# Patient Record
Sex: MALE | Race: WHITE | NOT HISPANIC OR LATINO | URBAN - METROPOLITAN AREA
[De-identification: names, ages, dates, MRNs, and addresses within clinical notes are randomized per-mention and may not be internally consistent; named-entity substitution may affect disease eponyms.]

---

## 2017-02-17 ENCOUNTER — INPATIENT (INPATIENT)
Facility: HOSPITAL | Age: 67
LOS: 4 days | Discharge: HOME | DRG: 446 | End: 2017-02-22
Attending: HOSPITALIST | Admitting: INTERNAL MEDICINE
Payer: MEDICARE

## 2017-02-17 VITALS
HEART RATE: 100 BPM | SYSTOLIC BLOOD PRESSURE: 178 MMHG | WEIGHT: 173.06 LBS | HEIGHT: 70 IN | RESPIRATION RATE: 16 BRPM | TEMPERATURE: 98 F | OXYGEN SATURATION: 99 % | DIASTOLIC BLOOD PRESSURE: 95 MMHG

## 2017-02-17 DIAGNOSIS — Z98.1 ARTHRODESIS STATUS: Chronic | ICD-10-CM

## 2017-02-17 DIAGNOSIS — R17 UNSPECIFIED JAUNDICE: ICD-10-CM

## 2017-02-17 LAB
ALBUMIN SERPL ELPH-MCNC: 4.9 G/DL — SIGNIFICANT CHANGE UP (ref 3.3–5)
ALP SERPL-CCNC: 450 U/L — HIGH (ref 40–120)
ALT FLD-CCNC: 189 U/L RC — HIGH (ref 10–45)
ANION GAP SERPL CALC-SCNC: 19 MMOL/L — HIGH (ref 5–17)
AST SERPL-CCNC: 116 U/L — HIGH (ref 10–40)
BASOPHILS # BLD AUTO: 0.1 K/UL — SIGNIFICANT CHANGE UP (ref 0–0.2)
BASOPHILS NFR BLD AUTO: 1.8 % — SIGNIFICANT CHANGE UP (ref 0–2)
BILIRUB DIRECT SERPL-MCNC: 9.1 MG/DL — HIGH (ref 0–0.2)
BILIRUB INDIRECT FLD-MCNC: 3.4 MG/DL — HIGH (ref 0.2–1)
BILIRUB SERPL-MCNC: 12.5 MG/DL — HIGH (ref 0.2–1.2)
BILIRUB SERPL-MCNC: 12.5 MG/DL — HIGH (ref 0.2–1.2)
BUN SERPL-MCNC: 20 MG/DL — SIGNIFICANT CHANGE UP (ref 7–23)
CALCIUM SERPL-MCNC: 10.1 MG/DL — SIGNIFICANT CHANGE UP (ref 8.4–10.5)
CHLORIDE SERPL-SCNC: 104 MMOL/L — SIGNIFICANT CHANGE UP (ref 96–108)
CO2 SERPL-SCNC: 17 MMOL/L — LOW (ref 22–31)
CREAT SERPL-MCNC: 1.13 MG/DL — SIGNIFICANT CHANGE UP (ref 0.5–1.3)
EOSINOPHIL # BLD AUTO: 0.1 K/UL — SIGNIFICANT CHANGE UP (ref 0–0.5)
EOSINOPHIL NFR BLD AUTO: 1.3 % — SIGNIFICANT CHANGE UP (ref 0–6)
GAS PNL BLDV: SIGNIFICANT CHANGE UP
GLUCOSE SERPL-MCNC: 108 MG/DL — HIGH (ref 70–99)
HCT VFR BLD CALC: 46.3 % — SIGNIFICANT CHANGE UP (ref 39–50)
HGB BLD-MCNC: 15.4 G/DL — SIGNIFICANT CHANGE UP (ref 13–17)
LDH SERPL L TO P-CCNC: 208 U/L — SIGNIFICANT CHANGE UP (ref 50–242)
LYMPHOCYTES # BLD AUTO: 2 K/UL — SIGNIFICANT CHANGE UP (ref 1–3.3)
LYMPHOCYTES # BLD AUTO: 30.7 % — SIGNIFICANT CHANGE UP (ref 13–44)
MCHC RBC-ENTMCNC: 29.8 PG — SIGNIFICANT CHANGE UP (ref 27–34)
MCHC RBC-ENTMCNC: 33.3 GM/DL — SIGNIFICANT CHANGE UP (ref 32–36)
MCV RBC AUTO: 89.6 FL — SIGNIFICANT CHANGE UP (ref 80–100)
MONOCYTES # BLD AUTO: 0.8 K/UL — SIGNIFICANT CHANGE UP (ref 0–0.9)
MONOCYTES NFR BLD AUTO: 12.4 % — SIGNIFICANT CHANGE UP (ref 2–14)
NEUTROPHILS # BLD AUTO: 3.6 K/UL — SIGNIFICANT CHANGE UP (ref 1.8–7.4)
NEUTROPHILS NFR BLD AUTO: 53.8 % — SIGNIFICANT CHANGE UP (ref 43–77)
PLATELET # BLD AUTO: 251 K/UL — SIGNIFICANT CHANGE UP (ref 150–400)
POTASSIUM SERPL-MCNC: 3.9 MMOL/L — SIGNIFICANT CHANGE UP (ref 3.5–5.3)
POTASSIUM SERPL-SCNC: 3.9 MMOL/L — SIGNIFICANT CHANGE UP (ref 3.5–5.3)
PROT SERPL-MCNC: 8.8 G/DL — HIGH (ref 6–8.3)
RBC # BLD: 5.17 M/UL — SIGNIFICANT CHANGE UP (ref 4.2–5.8)
RBC # FLD: 14.7 % — HIGH (ref 10.3–14.5)
SODIUM SERPL-SCNC: 140 MMOL/L — SIGNIFICANT CHANGE UP (ref 135–145)
WBC # BLD: 6.6 K/UL — SIGNIFICANT CHANGE UP (ref 3.8–10.5)
WBC # FLD AUTO: 6.6 K/UL — SIGNIFICANT CHANGE UP (ref 3.8–10.5)

## 2017-02-17 PROCEDURE — 74177 CT ABD & PELVIS W/CONTRAST: CPT | Mod: 26

## 2017-02-17 PROCEDURE — 99284 EMERGENCY DEPT VISIT MOD MDM: CPT | Mod: GC

## 2017-02-17 RX ORDER — SODIUM CHLORIDE 9 MG/ML
1000 INJECTION INTRAMUSCULAR; INTRAVENOUS; SUBCUTANEOUS
Qty: 0 | Refills: 0 | Status: DISCONTINUED | OUTPATIENT
Start: 2017-02-17 | End: 2017-02-22

## 2017-02-17 RX ORDER — SODIUM CHLORIDE 9 MG/ML
2000 INJECTION INTRAMUSCULAR; INTRAVENOUS; SUBCUTANEOUS ONCE
Qty: 0 | Refills: 0 | Status: COMPLETED | OUTPATIENT
Start: 2017-02-17 | End: 2017-02-17

## 2017-02-17 RX ADMIN — SODIUM CHLORIDE 75 MILLILITER(S): 9 INJECTION INTRAMUSCULAR; INTRAVENOUS; SUBCUTANEOUS at 23:20

## 2017-02-17 RX ADMIN — SODIUM CHLORIDE 4000 MILLILITER(S): 9 INJECTION INTRAMUSCULAR; INTRAVENOUS; SUBCUTANEOUS at 20:38

## 2017-02-17 NOTE — ED PROVIDER NOTE - OBJECTIVE STATEMENT
57 year old male with history of hepatic steatosis, gerd, ibs-c, htn and hld presents with one week of painless jaundice and pruritis.   Patient was recently evaluated by his GI (NANCY Zhao) for transaminitis, serial blood work demonstrated improvement. Patient was referred for outpatient ultrasound (results not available at this time). During this time, patient reports dark colored urine and pale stool. He denies diarrhea or abdominal pain. Patient also denies nausea and vomiting.   Patient denies fever, chills, weight loss, black stool, bright red blood per rectum

## 2017-02-17 NOTE — ED ADULT NURSE NOTE - CHPI ED SYMPTOMS NEG
no dysuria/no abdominal distension/no vomiting/no diarrhea/no fever/no burning urination/no chills/no nausea/no hematuria

## 2017-02-17 NOTE — ED PROVIDER NOTE - CHIEF COMPLAINT
The patient is a 66y Male complaining of see chief jaundice The patient is a 66y Male complaining of jaundice

## 2017-02-17 NOTE — ED PROVIDER NOTE - ATTENDING CONTRIBUTION TO CARE
****ATTENDING**** 65yo male hx listed pw transaminitis x 1 mo and symptoms of jaundice and itching. Patient saw GI and was told that he had a CBD curvature. No abd pain, fever, chills, vomiting. No recent travel. On exam, Patient is awake,alert,oriented x 3. Patient is well appearing and in no acute distress. +scelra icteric, + jaundice. Patient's chest is clear to ausculation, +s1s2. Abdomen is soft nd/nt +BS. Extremity with no swelling or calf tenderness. Check Labs, CT to eval for malignancy and IVF.

## 2017-02-17 NOTE — ED ADULT NURSE NOTE - OBJECTIVE STATEMENT
pt is as 66 yr M presents with worsening jaundice and urticaria. denies abd pain/n/v/fever/chills. +dark yellow urine. abd soft. denies medical hx.

## 2017-02-17 NOTE — ED PROVIDER NOTE - MEDICAL DECISION MAKING DETAILS
66M presents with painless jaundice  Plan cbc cmp indirect roni vbg ct a/p r/o obstructive mass vs stone  ivf

## 2017-02-18 DIAGNOSIS — R17 UNSPECIFIED JAUNDICE: ICD-10-CM

## 2017-02-18 DIAGNOSIS — I10 ESSENTIAL (PRIMARY) HYPERTENSION: ICD-10-CM

## 2017-02-18 DIAGNOSIS — I82.90 ACUTE EMBOLISM AND THROMBOSIS OF UNSPECIFIED VEIN: ICD-10-CM

## 2017-02-18 DIAGNOSIS — K58.1 IRRITABLE BOWEL SYNDROME WITH CONSTIPATION: ICD-10-CM

## 2017-02-18 LAB
ALBUMIN SERPL ELPH-MCNC: 3.5 G/DL — SIGNIFICANT CHANGE UP (ref 3.3–5)
ALP SERPL-CCNC: 312 U/L — HIGH (ref 40–120)
ALT FLD-CCNC: 126 U/L — HIGH (ref 10–45)
ANION GAP SERPL CALC-SCNC: 15 MMOL/L — SIGNIFICANT CHANGE UP (ref 5–17)
AST SERPL-CCNC: 83 U/L — HIGH (ref 10–40)
BASOPHILS # BLD AUTO: 0.03 K/UL — SIGNIFICANT CHANGE UP (ref 0–0.2)
BASOPHILS NFR BLD AUTO: 0.6 % — SIGNIFICANT CHANGE UP (ref 0–2)
BILIRUB DIRECT SERPL-MCNC: 7.2 MG/DL — HIGH (ref 0–0.2)
BILIRUB INDIRECT FLD-MCNC: 2.8 MG/DL — HIGH (ref 0.2–1)
BILIRUB SERPL-MCNC: 10 MG/DL — HIGH (ref 0.2–1.2)
BUN SERPL-MCNC: 16 MG/DL — SIGNIFICANT CHANGE UP (ref 7–23)
CALCIUM SERPL-MCNC: 9.2 MG/DL — SIGNIFICANT CHANGE UP (ref 8.4–10.5)
CHLORIDE SERPL-SCNC: 109 MMOL/L — HIGH (ref 96–108)
CO2 SERPL-SCNC: 17 MMOL/L — LOW (ref 22–31)
CREAT SERPL-MCNC: 0.91 MG/DL — SIGNIFICANT CHANGE UP (ref 0.5–1.3)
EOSINOPHIL # BLD AUTO: 0.19 K/UL — SIGNIFICANT CHANGE UP (ref 0–0.5)
EOSINOPHIL NFR BLD AUTO: 3.9 % — SIGNIFICANT CHANGE UP (ref 0–6)
GLUCOSE SERPL-MCNC: 92 MG/DL — SIGNIFICANT CHANGE UP (ref 70–99)
HAPTOGLOB SERPL-MCNC: 122 MG/DL — SIGNIFICANT CHANGE UP (ref 34–200)
HCT VFR BLD CALC: 36.1 % — LOW (ref 39–50)
HGB BLD-MCNC: 12 G/DL — LOW (ref 13–17)
IMM GRANULOCYTES NFR BLD AUTO: 0.2 % — SIGNIFICANT CHANGE UP (ref 0–1.5)
LYMPHOCYTES # BLD AUTO: 1.38 K/UL — SIGNIFICANT CHANGE UP (ref 1–3.3)
LYMPHOCYTES # BLD AUTO: 28.6 % — SIGNIFICANT CHANGE UP (ref 13–44)
MCHC RBC-ENTMCNC: 29.1 PG — SIGNIFICANT CHANGE UP (ref 27–34)
MCHC RBC-ENTMCNC: 33.2 GM/DL — SIGNIFICANT CHANGE UP (ref 32–36)
MCV RBC AUTO: 87.6 FL — SIGNIFICANT CHANGE UP (ref 80–100)
MONOCYTES # BLD AUTO: 0.78 K/UL — SIGNIFICANT CHANGE UP (ref 0–0.9)
MONOCYTES NFR BLD AUTO: 16.1 % — HIGH (ref 2–14)
NEUTROPHILS # BLD AUTO: 2.44 K/UL — SIGNIFICANT CHANGE UP (ref 1.8–7.4)
NEUTROPHILS NFR BLD AUTO: 50.6 % — SIGNIFICANT CHANGE UP (ref 43–77)
PLATELET # BLD AUTO: 235 K/UL — SIGNIFICANT CHANGE UP (ref 150–400)
POTASSIUM SERPL-MCNC: 4.1 MMOL/L — SIGNIFICANT CHANGE UP (ref 3.5–5.3)
POTASSIUM SERPL-SCNC: 4.1 MMOL/L — SIGNIFICANT CHANGE UP (ref 3.5–5.3)
PROT SERPL-MCNC: 6.2 G/DL — SIGNIFICANT CHANGE UP (ref 6–8.3)
RBC # BLD: 4.12 M/UL — LOW (ref 4.2–5.8)
RBC # FLD: 15.6 % — HIGH (ref 10.3–14.5)
SODIUM SERPL-SCNC: 141 MMOL/L — SIGNIFICANT CHANGE UP (ref 135–145)
WBC # BLD: 4.83 K/UL — SIGNIFICANT CHANGE UP (ref 3.8–10.5)
WBC # FLD AUTO: 4.83 K/UL — SIGNIFICANT CHANGE UP (ref 3.8–10.5)

## 2017-02-18 PROCEDURE — 99223 1ST HOSP IP/OBS HIGH 75: CPT | Mod: AI

## 2017-02-18 PROCEDURE — 99233 SBSQ HOSP IP/OBS HIGH 50: CPT | Mod: GC

## 2017-02-18 PROCEDURE — 93010 ELECTROCARDIOGRAM REPORT: CPT

## 2017-02-18 PROCEDURE — 74182 MRI ABDOMEN W/CONTRAST: CPT | Mod: 26

## 2017-02-18 RX ORDER — ACETAMINOPHEN 500 MG
650 TABLET ORAL EVERY 6 HOURS
Qty: 0 | Refills: 0 | Status: DISCONTINUED | OUTPATIENT
Start: 2017-02-18 | End: 2017-02-22

## 2017-02-18 RX ORDER — SODIUM CHLORIDE 0.65 %
1 AEROSOL, SPRAY (ML) NASAL EVERY 4 HOURS
Qty: 0 | Refills: 0 | Status: DISCONTINUED | OUTPATIENT
Start: 2017-02-18 | End: 2017-02-22

## 2017-02-18 RX ORDER — PANTOPRAZOLE SODIUM 20 MG/1
40 TABLET, DELAYED RELEASE ORAL
Qty: 0 | Refills: 0 | Status: DISCONTINUED | OUTPATIENT
Start: 2017-02-18 | End: 2017-02-22

## 2017-02-18 RX ORDER — ONDANSETRON 8 MG/1
4 TABLET, FILM COATED ORAL EVERY 6 HOURS
Qty: 0 | Refills: 0 | Status: DISCONTINUED | OUTPATIENT
Start: 2017-02-18 | End: 2017-02-22

## 2017-02-18 RX ADMIN — PANTOPRAZOLE SODIUM 40 MILLIGRAM(S): 20 TABLET, DELAYED RELEASE ORAL at 04:20

## 2017-02-18 RX ADMIN — Medication 20 MILLIGRAM(S): at 18:15

## 2017-02-18 RX ADMIN — Medication 20 MILLIGRAM(S): at 04:20

## 2017-02-18 RX ADMIN — SODIUM CHLORIDE 75 MILLILITER(S): 9 INJECTION INTRAMUSCULAR; INTRAVENOUS; SUBCUTANEOUS at 12:33

## 2017-02-18 NOTE — H&P ADULT. - GASTROINTESTINAL DETAILS
soft/no rebound tenderness/nontender/no masses palpable/no distention/no organomegaly/no guarding/no rigidity/bowel sounds normal

## 2017-02-18 NOTE — H&P ADULT. - PROBLEM SELECTOR PLAN 1
CT A/P w/ significant intra/extrahepatic ductal dilatation, no evidence of stone or pancreatic head mass. labs w/ tbili-12.5 (dbili-9.1), ast/alt-116/189, alp-450. c/f choledocholithiasis vs malignancy. afebrile, hemodynamically stable, unremarkable gi exam.  -check MRCP  -f/u RUQ sono  -GI consult  -trend hepatic enzymes CT A/P w/ significant intra/extrahepatic ductal dilatation, no evidence of stone or pancreatic head mass. labs w/ tbili-12.5 (dbili-9.1), ast/alt-116/189, alp-450. c/f choledocholithiasis vs malignancy (panc ca, cholangioca). afebrile, hemodynamically stable, unremarkable gi exam, diffusely jaundiced.  -check MRCP  -f/u RUQ sono  -GI consult  -trend hepatic enzymes  -npo

## 2017-02-18 NOTE — H&P ADULT. - FAMILY HISTORY
Mother  Still living? Unknown  Family history of coronary artery disease, Age at diagnosis: Age Unknown Mother  Still living? Unknown  Family history of coronary artery disease, Age at diagnosis: Age Unknown     Father  Still living? Unknown  Family history of prostate cancer, Age at diagnosis: Age Unknown

## 2017-02-18 NOTE — H&P ADULT. - PMH
Essential hypertension    GERD (gastroesophageal reflux disease)    IBS (irritable bowel syndrome)    Other hyperlipidemia

## 2017-02-18 NOTE — H&P ADULT. - RADIOLOGY RESULTS AND INTERPRETATION
CT A/P personally reviewed: intra and extrahepatic biliary duct dilatation w/o evidence retained CBD stone or definite pancreatic head mass. no pancreatic ductal dilatation. kicked appearance to mid to distal cbd of uncertain significance.  RUQ sono ordered.

## 2017-02-18 NOTE — H&P ADULT. - RS GEN PE MLT RESP DETAILS PC
airway patent/clear to auscultation bilaterally/good air movement/breath sounds equal/respirations non-labored

## 2017-02-18 NOTE — H&P ADULT. - PROBLEM SELECTOR PLAN 4
hold am dvt ppx as likely ercp/possible sphincterotomy hold am dvt ppx as likely ercp/possible sphincterotomy  -verify home medications in am

## 2017-02-18 NOTE — H&P ADULT. - HISTORY OF PRESENT ILLNESS
57M hx hepatic steatosis, gerd, ibs-c, htn p/w painless jaundice, pruritus x 1 week.    ED VS: Tmax: 98.3, BP: 169-189/, P: , R: 16-18, O2: 96-99% on RA  ED meds: NS x 2L Nighttime hospitalist, patient not previously known to me    57M hx hepatic steatosis, gerd, ibs-c, htn p/w painless jaundice, pruritus. notes he saw his pcp around a month ago for a checkup and was told he had liver abnormalities on labwork. repeat labwork was for the most part unchanged and he was sent for a RUQ sono and told he had biliary and liver abnormalities and was recommended by his pcp to have repeat labwork in a month. over the past couple of weeks he has experienced jaundice and diffuse itching. over the past few days he has had darkening of his urine and lightening of his stools. he denies new abdominal pain, fever, chills, night sweats, unexplained weight loss, fhx GI cancer, dysphagia/odynophagia, pain associated with fatty food, dysuria, hematuria.    ED VS: Tmax: 98.3, BP: 169-189/, P: , R: 16-18, O2: 96-99% on RA  ED meds: NS x 2L

## 2017-02-19 LAB
ALBUMIN SERPL ELPH-MCNC: 3.6 G/DL — SIGNIFICANT CHANGE UP (ref 3.3–5)
ALP SERPL-CCNC: 332 U/L — HIGH (ref 40–120)
ALT FLD-CCNC: 116 U/L — HIGH (ref 10–45)
ANION GAP SERPL CALC-SCNC: 15 MMOL/L — SIGNIFICANT CHANGE UP (ref 5–17)
AST SERPL-CCNC: 76 U/L — HIGH (ref 10–40)
BASOPHILS # BLD AUTO: 0.09 K/UL — SIGNIFICANT CHANGE UP (ref 0–0.2)
BASOPHILS NFR BLD AUTO: 1.7 % — SIGNIFICANT CHANGE UP (ref 0–2)
BILIRUB SERPL-MCNC: 11.9 MG/DL — HIGH (ref 0.2–1.2)
BUN SERPL-MCNC: 9 MG/DL — SIGNIFICANT CHANGE UP (ref 7–23)
CALCIUM SERPL-MCNC: 9.5 MG/DL — SIGNIFICANT CHANGE UP (ref 8.4–10.5)
CANCER AG19-9 SERPL-ACNC: 31.1 U/ML — SIGNIFICANT CHANGE UP
CHLORIDE SERPL-SCNC: 110 MMOL/L — HIGH (ref 96–108)
CO2 SERPL-SCNC: 18 MMOL/L — LOW (ref 22–31)
CREAT SERPL-MCNC: 0.96 MG/DL — SIGNIFICANT CHANGE UP (ref 0.5–1.3)
EOSINOPHIL # BLD AUTO: 0.23 K/UL — SIGNIFICANT CHANGE UP (ref 0–0.5)
EOSINOPHIL NFR BLD AUTO: 4.3 % — SIGNIFICANT CHANGE UP (ref 0–6)
GLUCOSE SERPL-MCNC: 114 MG/DL — HIGH (ref 70–99)
HCT VFR BLD CALC: 35.5 % — LOW (ref 39–50)
HGB BLD-MCNC: 11.8 G/DL — LOW (ref 13–17)
LYMPHOCYTES # BLD AUTO: 0.77 K/UL — LOW (ref 1–3.3)
LYMPHOCYTES # BLD AUTO: 14.5 % — SIGNIFICANT CHANGE UP (ref 13–44)
MCHC RBC-ENTMCNC: 29.1 PG — SIGNIFICANT CHANGE UP (ref 27–34)
MCHC RBC-ENTMCNC: 33.2 GM/DL — SIGNIFICANT CHANGE UP (ref 32–36)
MCV RBC AUTO: 87.4 FL — SIGNIFICANT CHANGE UP (ref 80–100)
MONOCYTES # BLD AUTO: 0.5 K/UL — SIGNIFICANT CHANGE UP (ref 0–0.9)
MONOCYTES NFR BLD AUTO: 9.4 % — SIGNIFICANT CHANGE UP (ref 2–14)
NEUTROPHILS # BLD AUTO: 3.7 K/UL — SIGNIFICANT CHANGE UP (ref 1.8–7.4)
NEUTROPHILS NFR BLD AUTO: 69.2 % — SIGNIFICANT CHANGE UP (ref 43–77)
PLATELET # BLD AUTO: 270 K/UL — SIGNIFICANT CHANGE UP (ref 150–400)
POTASSIUM SERPL-MCNC: 4.1 MMOL/L — SIGNIFICANT CHANGE UP (ref 3.5–5.3)
POTASSIUM SERPL-SCNC: 4.1 MMOL/L — SIGNIFICANT CHANGE UP (ref 3.5–5.3)
PROT SERPL-MCNC: 6.3 G/DL — SIGNIFICANT CHANGE UP (ref 6–8.3)
RBC # BLD: 4.06 M/UL — LOW (ref 4.2–5.8)
RBC # FLD: 15.8 % — HIGH (ref 10.3–14.5)
SODIUM SERPL-SCNC: 143 MMOL/L — SIGNIFICANT CHANGE UP (ref 135–145)
WBC # BLD: 5.34 K/UL — SIGNIFICANT CHANGE UP (ref 3.8–10.5)
WBC # FLD AUTO: 5.34 K/UL — SIGNIFICANT CHANGE UP (ref 3.8–10.5)

## 2017-02-19 PROCEDURE — 99233 SBSQ HOSP IP/OBS HIGH 50: CPT

## 2017-02-19 PROCEDURE — 99222 1ST HOSP IP/OBS MODERATE 55: CPT | Mod: GC

## 2017-02-19 RX ORDER — URSODIOL 250 MG/1
300 TABLET, FILM COATED ORAL
Qty: 0 | Refills: 0 | Status: DISCONTINUED | OUTPATIENT
Start: 2017-02-19 | End: 2017-02-22

## 2017-02-19 RX ORDER — POLYETHYLENE GLYCOL 3350 17 G/17G
17 POWDER, FOR SOLUTION ORAL DAILY
Qty: 0 | Refills: 0 | Status: DISCONTINUED | OUTPATIENT
Start: 2017-02-19 | End: 2017-02-22

## 2017-02-19 RX ORDER — DIPHENHYDRAMINE HCL 50 MG
25 CAPSULE ORAL EVERY 6 HOURS
Qty: 0 | Refills: 0 | Status: DISCONTINUED | OUTPATIENT
Start: 2017-02-19 | End: 2017-02-22

## 2017-02-19 RX ADMIN — PANTOPRAZOLE SODIUM 40 MILLIGRAM(S): 20 TABLET, DELAYED RELEASE ORAL at 06:24

## 2017-02-19 RX ADMIN — Medication 20 MILLIGRAM(S): at 06:24

## 2017-02-19 RX ADMIN — URSODIOL 300 MILLIGRAM(S): 250 TABLET, FILM COATED ORAL at 12:28

## 2017-02-19 RX ADMIN — Medication 25 MILLIGRAM(S): at 18:12

## 2017-02-19 RX ADMIN — Medication 20 MILLIGRAM(S): at 17:42

## 2017-02-19 RX ADMIN — URSODIOL 300 MILLIGRAM(S): 250 TABLET, FILM COATED ORAL at 17:43

## 2017-02-19 RX ADMIN — POLYETHYLENE GLYCOL 3350 17 GRAM(S): 17 POWDER, FOR SOLUTION ORAL at 12:28

## 2017-02-20 LAB
ALBUMIN SERPL ELPH-MCNC: 4.1 G/DL — SIGNIFICANT CHANGE UP (ref 3.3–5)
ALP SERPL-CCNC: 368 U/L — HIGH (ref 40–120)
ALT FLD-CCNC: 103 U/L — HIGH (ref 10–45)
ANION GAP SERPL CALC-SCNC: 15 MMOL/L — SIGNIFICANT CHANGE UP (ref 5–17)
AST SERPL-CCNC: 60 U/L — HIGH (ref 10–40)
BILIRUB SERPL-MCNC: 12.4 MG/DL — HIGH (ref 0.2–1.2)
BUN SERPL-MCNC: 6 MG/DL — LOW (ref 7–23)
CALCIUM SERPL-MCNC: 9.5 MG/DL — SIGNIFICANT CHANGE UP (ref 8.4–10.5)
CHLORIDE SERPL-SCNC: 106 MMOL/L — SIGNIFICANT CHANGE UP (ref 96–108)
CO2 SERPL-SCNC: 21 MMOL/L — LOW (ref 22–31)
CREAT SERPL-MCNC: 0.86 MG/DL — SIGNIFICANT CHANGE UP (ref 0.5–1.3)
GLUCOSE SERPL-MCNC: 91 MG/DL — SIGNIFICANT CHANGE UP (ref 70–99)
HCT VFR BLD CALC: 37.6 % — LOW (ref 39–50)
HGB BLD-MCNC: 12.7 G/DL — LOW (ref 13–17)
IGG SERPL-MCNC: 991 MG/DL — SIGNIFICANT CHANGE UP (ref 767–1590)
IGG1 SER-MCNC: 526 MG/DL — SIGNIFICANT CHANGE UP (ref 341–894)
IGG2 SER-MCNC: 330 MG/DL — SIGNIFICANT CHANGE UP (ref 171–632)
IGG3 SER-MCNC: 28.8 MG/DL — SIGNIFICANT CHANGE UP (ref 18.4–106)
IGG4 SER-MCNC: 25.6 MG/DL — SIGNIFICANT CHANGE UP (ref 2.4–121)
MCHC RBC-ENTMCNC: 29.1 PG — SIGNIFICANT CHANGE UP (ref 27–34)
MCHC RBC-ENTMCNC: 33.8 GM/DL — SIGNIFICANT CHANGE UP (ref 32–36)
MCV RBC AUTO: 86 FL — SIGNIFICANT CHANGE UP (ref 80–100)
PLATELET # BLD AUTO: 281 K/UL — SIGNIFICANT CHANGE UP (ref 150–400)
POTASSIUM SERPL-MCNC: 3.9 MMOL/L — SIGNIFICANT CHANGE UP (ref 3.5–5.3)
POTASSIUM SERPL-SCNC: 3.9 MMOL/L — SIGNIFICANT CHANGE UP (ref 3.5–5.3)
PROT SERPL-MCNC: 7 G/DL — SIGNIFICANT CHANGE UP (ref 6–8.3)
RBC # BLD: 4.37 M/UL — SIGNIFICANT CHANGE UP (ref 4.2–5.8)
RBC # FLD: 16.4 % — HIGH (ref 10.3–14.5)
SODIUM SERPL-SCNC: 142 MMOL/L — SIGNIFICANT CHANGE UP (ref 135–145)
WBC # BLD: 4.17 K/UL — SIGNIFICANT CHANGE UP (ref 3.8–10.5)
WBC # FLD AUTO: 4.17 K/UL — SIGNIFICANT CHANGE UP (ref 3.8–10.5)

## 2017-02-20 PROCEDURE — 99232 SBSQ HOSP IP/OBS MODERATE 35: CPT

## 2017-02-20 RX ORDER — SIMETHICONE 80 MG/1
80 TABLET, CHEWABLE ORAL ONCE
Qty: 0 | Refills: 0 | Status: COMPLETED | OUTPATIENT
Start: 2017-02-20 | End: 2017-02-20

## 2017-02-20 RX ADMIN — SIMETHICONE 80 MILLIGRAM(S): 80 TABLET, CHEWABLE ORAL at 22:26

## 2017-02-20 RX ADMIN — SODIUM CHLORIDE 75 MILLILITER(S): 9 INJECTION INTRAMUSCULAR; INTRAVENOUS; SUBCUTANEOUS at 18:35

## 2017-02-20 RX ADMIN — SODIUM CHLORIDE 75 MILLILITER(S): 9 INJECTION INTRAMUSCULAR; INTRAVENOUS; SUBCUTANEOUS at 05:27

## 2017-02-20 RX ADMIN — POLYETHYLENE GLYCOL 3350 17 GRAM(S): 17 POWDER, FOR SOLUTION ORAL at 11:43

## 2017-02-20 RX ADMIN — Medication 25 MILLIGRAM(S): at 22:13

## 2017-02-20 RX ADMIN — Medication 20 MILLIGRAM(S): at 05:27

## 2017-02-20 RX ADMIN — PANTOPRAZOLE SODIUM 40 MILLIGRAM(S): 20 TABLET, DELAYED RELEASE ORAL at 05:27

## 2017-02-20 RX ADMIN — Medication 25 MILLIGRAM(S): at 00:26

## 2017-02-20 RX ADMIN — Medication 25 MILLIGRAM(S): at 16:06

## 2017-02-20 RX ADMIN — Medication 20 MILLIGRAM(S): at 18:35

## 2017-02-20 RX ADMIN — URSODIOL 300 MILLIGRAM(S): 250 TABLET, FILM COATED ORAL at 17:47

## 2017-02-20 RX ADMIN — URSODIOL 300 MILLIGRAM(S): 250 TABLET, FILM COATED ORAL at 09:54

## 2017-02-20 RX ADMIN — URSODIOL 300 MILLIGRAM(S): 250 TABLET, FILM COATED ORAL at 13:34

## 2017-02-21 LAB
ANION GAP SERPL CALC-SCNC: 13 MMOL/L — SIGNIFICANT CHANGE UP (ref 5–17)
BUN SERPL-MCNC: 5 MG/DL — LOW (ref 7–23)
CALCIUM SERPL-MCNC: 9.1 MG/DL — SIGNIFICANT CHANGE UP (ref 8.4–10.5)
CHLORIDE SERPL-SCNC: 107 MMOL/L — SIGNIFICANT CHANGE UP (ref 96–108)
CO2 SERPL-SCNC: 23 MMOL/L — SIGNIFICANT CHANGE UP (ref 22–31)
CREAT SERPL-MCNC: 0.83 MG/DL — SIGNIFICANT CHANGE UP (ref 0.5–1.3)
GLUCOSE SERPL-MCNC: 117 MG/DL — HIGH (ref 70–99)
HCT VFR BLD CALC: 34.5 % — LOW (ref 39–50)
HGB BLD-MCNC: 11.5 G/DL — LOW (ref 13–17)
MCHC RBC-ENTMCNC: 28.5 PG — SIGNIFICANT CHANGE UP (ref 27–34)
MCHC RBC-ENTMCNC: 33.3 GM/DL — SIGNIFICANT CHANGE UP (ref 32–36)
MCV RBC AUTO: 85.4 FL — SIGNIFICANT CHANGE UP (ref 80–100)
PLATELET # BLD AUTO: 280 K/UL — SIGNIFICANT CHANGE UP (ref 150–400)
POTASSIUM SERPL-MCNC: 4.3 MMOL/L — SIGNIFICANT CHANGE UP (ref 3.5–5.3)
POTASSIUM SERPL-SCNC: 4.3 MMOL/L — SIGNIFICANT CHANGE UP (ref 3.5–5.3)
RBC # BLD: 4.04 M/UL — LOW (ref 4.2–5.8)
RBC # FLD: 16.5 % — HIGH (ref 10.3–14.5)
SODIUM SERPL-SCNC: 143 MMOL/L — SIGNIFICANT CHANGE UP (ref 135–145)
WBC # BLD: 4.33 K/UL — SIGNIFICANT CHANGE UP (ref 3.8–10.5)
WBC # FLD AUTO: 4.33 K/UL — SIGNIFICANT CHANGE UP (ref 3.8–10.5)

## 2017-02-21 PROCEDURE — 43261 ENDO CHOLANGIOPANCREATOGRAPH: CPT | Mod: GC

## 2017-02-21 PROCEDURE — 43273 ENDOSCOPIC PANCREATOSCOPY: CPT | Mod: GC

## 2017-02-21 PROCEDURE — 43264 ERCP REMOVE DUCT CALCULI: CPT | Mod: GC

## 2017-02-21 PROCEDURE — 88305 TISSUE EXAM BY PATHOLOGIST: CPT | Mod: 26

## 2017-02-21 PROCEDURE — 43242 EGD US FINE NEEDLE BX/ASPIR: CPT | Mod: GC

## 2017-02-21 PROCEDURE — 99232 SBSQ HOSP IP/OBS MODERATE 35: CPT

## 2017-02-21 PROCEDURE — 88173 CYTOPATH EVAL FNA REPORT: CPT | Mod: 26,59

## 2017-02-21 PROCEDURE — 43254 EGD ENDO MUCOSAL RESECTION: CPT | Mod: GC

## 2017-02-21 PROCEDURE — 43274 ERCP DUCT STENT PLACEMENT: CPT | Mod: GC

## 2017-02-21 PROCEDURE — 74328 X-RAY BILE DUCT ENDOSCOPY: CPT | Mod: 26,GC

## 2017-02-21 PROCEDURE — 88104 CYTOPATH FL NONGYN SMEARS: CPT | Mod: 26

## 2017-02-21 RX ORDER — MORPHINE SULFATE 50 MG/1
2 CAPSULE, EXTENDED RELEASE ORAL EVERY 4 HOURS
Qty: 0 | Refills: 0 | Status: DISCONTINUED | OUTPATIENT
Start: 2017-02-21 | End: 2017-02-22

## 2017-02-21 RX ADMIN — Medication 25 MILLIGRAM(S): at 10:46

## 2017-02-21 RX ADMIN — SODIUM CHLORIDE 75 MILLILITER(S): 9 INJECTION INTRAMUSCULAR; INTRAVENOUS; SUBCUTANEOUS at 19:59

## 2017-02-21 RX ADMIN — MORPHINE SULFATE 2 MILLIGRAM(S): 50 CAPSULE, EXTENDED RELEASE ORAL at 21:13

## 2017-02-21 RX ADMIN — Medication 20 MILLIGRAM(S): at 05:24

## 2017-02-21 RX ADMIN — Medication 20 MILLIGRAM(S): at 18:55

## 2017-02-21 RX ADMIN — Medication 25 MILLIGRAM(S): at 19:59

## 2017-02-21 RX ADMIN — PANTOPRAZOLE SODIUM 40 MILLIGRAM(S): 20 TABLET, DELAYED RELEASE ORAL at 05:24

## 2017-02-21 RX ADMIN — MORPHINE SULFATE 2 MILLIGRAM(S): 50 CAPSULE, EXTENDED RELEASE ORAL at 20:58

## 2017-02-22 ENCOUNTER — TRANSCRIPTION ENCOUNTER (OUTPATIENT)
Age: 67
End: 2017-02-22

## 2017-02-22 VITALS
TEMPERATURE: 98 F | SYSTOLIC BLOOD PRESSURE: 129 MMHG | HEART RATE: 89 BPM | RESPIRATION RATE: 189 BRPM | DIASTOLIC BLOOD PRESSURE: 67 MMHG | OXYGEN SATURATION: 97 %

## 2017-02-22 LAB
ALBUMIN SERPL ELPH-MCNC: 2.6 G/DL — LOW (ref 3.3–5)
ALP SERPL-CCNC: 301 U/L — HIGH (ref 40–120)
ALT FLD-CCNC: 69 U/L — HIGH (ref 10–45)
ANION GAP SERPL CALC-SCNC: 12 MMOL/L — SIGNIFICANT CHANGE UP (ref 5–17)
AST SERPL-CCNC: 53 U/L — HIGH (ref 10–40)
BILIRUB SERPL-MCNC: 9.5 MG/DL — HIGH (ref 0.2–1.2)
BUN SERPL-MCNC: 8 MG/DL — SIGNIFICANT CHANGE UP (ref 7–23)
CALCIUM SERPL-MCNC: 8.5 MG/DL — SIGNIFICANT CHANGE UP (ref 8.4–10.5)
CHLORIDE SERPL-SCNC: 106 MMOL/L — SIGNIFICANT CHANGE UP (ref 96–108)
CO2 SERPL-SCNC: 22 MMOL/L — SIGNIFICANT CHANGE UP (ref 22–31)
CREAT SERPL-MCNC: 1.04 MG/DL — SIGNIFICANT CHANGE UP (ref 0.5–1.3)
GLUCOSE SERPL-MCNC: 136 MG/DL — HIGH (ref 70–99)
HCT VFR BLD CALC: 30.7 % — LOW (ref 39–50)
HGB BLD-MCNC: 10.3 G/DL — LOW (ref 13–17)
MCHC RBC-ENTMCNC: 28.9 PG — SIGNIFICANT CHANGE UP (ref 27–34)
MCHC RBC-ENTMCNC: 33.6 GM/DL — SIGNIFICANT CHANGE UP (ref 32–36)
MCV RBC AUTO: 86.2 FL — SIGNIFICANT CHANGE UP (ref 80–100)
PLATELET # BLD AUTO: 260 K/UL — SIGNIFICANT CHANGE UP (ref 150–400)
POTASSIUM SERPL-MCNC: 3.9 MMOL/L — SIGNIFICANT CHANGE UP (ref 3.5–5.3)
POTASSIUM SERPL-SCNC: 3.9 MMOL/L — SIGNIFICANT CHANGE UP (ref 3.5–5.3)
PROT SERPL-MCNC: 5.6 G/DL — LOW (ref 6–8.3)
RBC # BLD: 3.56 M/UL — LOW (ref 4.2–5.8)
RBC # FLD: 16.4 % — HIGH (ref 10.3–14.5)
SODIUM SERPL-SCNC: 140 MMOL/L — SIGNIFICANT CHANGE UP (ref 135–145)
WBC # BLD: 6.85 K/UL — SIGNIFICANT CHANGE UP (ref 3.8–10.5)
WBC # FLD AUTO: 6.85 K/UL — SIGNIFICANT CHANGE UP (ref 3.8–10.5)

## 2017-02-22 PROCEDURE — 93005 ELECTROCARDIOGRAM TRACING: CPT

## 2017-02-22 PROCEDURE — 88172 CYTP DX EVAL FNA 1ST EA SITE: CPT

## 2017-02-22 PROCEDURE — 88173 CYTOPATH EVAL FNA REPORT: CPT

## 2017-02-22 PROCEDURE — 83010 ASSAY OF HAPTOGLOBIN QUANT: CPT

## 2017-02-22 PROCEDURE — 88104 CYTOPATH FL NONGYN SMEARS: CPT

## 2017-02-22 PROCEDURE — 86301 IMMUNOASSAY TUMOR CA 19-9: CPT

## 2017-02-22 PROCEDURE — C2617: CPT

## 2017-02-22 PROCEDURE — C1726: CPT

## 2017-02-22 PROCEDURE — 74330 X-RAY BILE/PANC ENDOSCOPY: CPT

## 2017-02-22 PROCEDURE — 82248 BILIRUBIN DIRECT: CPT

## 2017-02-22 PROCEDURE — C1769: CPT

## 2017-02-22 PROCEDURE — 83615 LACTATE (LD) (LDH) ENZYME: CPT

## 2017-02-22 PROCEDURE — 99285 EMERGENCY DEPT VISIT HI MDM: CPT | Mod: 25

## 2017-02-22 PROCEDURE — 74182 MRI ABDOMEN W/CONTRAST: CPT

## 2017-02-22 PROCEDURE — 82435 ASSAY OF BLOOD CHLORIDE: CPT

## 2017-02-22 PROCEDURE — A9585: CPT

## 2017-02-22 PROCEDURE — 82247 BILIRUBIN TOTAL: CPT

## 2017-02-22 PROCEDURE — 82947 ASSAY GLUCOSE BLOOD QUANT: CPT

## 2017-02-22 PROCEDURE — 80076 HEPATIC FUNCTION PANEL: CPT

## 2017-02-22 PROCEDURE — 80048 BASIC METABOLIC PNL TOTAL CA: CPT

## 2017-02-22 PROCEDURE — 99239 HOSP IP/OBS DSCHRG MGMT >30: CPT

## 2017-02-22 PROCEDURE — 82330 ASSAY OF CALCIUM: CPT

## 2017-02-22 PROCEDURE — 88305 TISSUE EXAM BY PATHOLOGIST: CPT

## 2017-02-22 PROCEDURE — 80053 COMPREHEN METABOLIC PANEL: CPT

## 2017-02-22 PROCEDURE — C1776: CPT

## 2017-02-22 PROCEDURE — 84132 ASSAY OF SERUM POTASSIUM: CPT

## 2017-02-22 PROCEDURE — 85027 COMPLETE CBC AUTOMATED: CPT

## 2017-02-22 PROCEDURE — 83605 ASSAY OF LACTIC ACID: CPT

## 2017-02-22 PROCEDURE — 85014 HEMATOCRIT: CPT

## 2017-02-22 PROCEDURE — 82787 IGG 1 2 3 OR 4 EACH: CPT

## 2017-02-22 PROCEDURE — 84295 ASSAY OF SERUM SODIUM: CPT

## 2017-02-22 PROCEDURE — 74177 CT ABD & PELVIS W/CONTRAST: CPT

## 2017-02-22 PROCEDURE — 82803 BLOOD GASES ANY COMBINATION: CPT

## 2017-02-22 RX ORDER — DIPHENHYDRAMINE HCL 50 MG
1 CAPSULE ORAL
Qty: 28 | Refills: 0 | OUTPATIENT
Start: 2017-02-22 | End: 2017-03-01

## 2017-02-22 RX ORDER — URSODIOL 250 MG/1
1 TABLET, FILM COATED ORAL
Qty: 0 | Refills: 0 | COMMUNITY
Start: 2017-02-22

## 2017-02-22 RX ORDER — DIPHENHYDRAMINE HCL 50 MG
1 CAPSULE ORAL
Qty: 0 | Refills: 0 | COMMUNITY
Start: 2017-02-22

## 2017-02-22 RX ORDER — HYOSCYAMINE SULFATE 0.13 MG
0 TABLET ORAL
Qty: 0 | Refills: 0 | COMMUNITY

## 2017-02-22 RX ORDER — LINACLOTIDE 145 UG/1
0 CAPSULE, GELATIN COATED ORAL
Qty: 0 | Refills: 0 | COMMUNITY

## 2017-02-22 RX ORDER — PANTOPRAZOLE SODIUM 20 MG/1
0 TABLET, DELAYED RELEASE ORAL
Qty: 0 | Refills: 0 | COMMUNITY

## 2017-02-22 RX ORDER — PANTOPRAZOLE SODIUM 20 MG/1
1 TABLET, DELAYED RELEASE ORAL
Qty: 0 | Refills: 0 | COMMUNITY
Start: 2017-02-22

## 2017-02-22 RX ORDER — HYDROXYZINE HCL 10 MG
0 TABLET ORAL
Qty: 0 | Refills: 0 | COMMUNITY

## 2017-02-22 RX ORDER — POLYETHYLENE GLYCOL 3350 17 G/17G
17 POWDER, FOR SOLUTION ORAL
Qty: 0 | Refills: 0 | COMMUNITY
Start: 2017-02-22

## 2017-02-22 RX ORDER — AMITRIPTYLINE HCL 25 MG
0 TABLET ORAL
Qty: 0 | Refills: 0 | COMMUNITY

## 2017-02-22 RX ORDER — URSODIOL 250 MG/1
1 TABLET, FILM COATED ORAL
Qty: 90 | Refills: 0 | OUTPATIENT
Start: 2017-02-22 | End: 2017-03-24

## 2017-02-22 RX ADMIN — Medication 20 MILLIGRAM(S): at 05:28

## 2017-02-22 RX ADMIN — PANTOPRAZOLE SODIUM 40 MILLIGRAM(S): 20 TABLET, DELAYED RELEASE ORAL at 05:28

## 2017-02-22 RX ADMIN — Medication 20 MILLIGRAM(S): at 16:56

## 2017-02-22 RX ADMIN — URSODIOL 300 MILLIGRAM(S): 250 TABLET, FILM COATED ORAL at 09:09

## 2017-02-22 RX ADMIN — MORPHINE SULFATE 2 MILLIGRAM(S): 50 CAPSULE, EXTENDED RELEASE ORAL at 05:51

## 2017-02-22 RX ADMIN — URSODIOL 300 MILLIGRAM(S): 250 TABLET, FILM COATED ORAL at 13:19

## 2017-02-22 RX ADMIN — SODIUM CHLORIDE 75 MILLILITER(S): 9 INJECTION INTRAMUSCULAR; INTRAVENOUS; SUBCUTANEOUS at 09:29

## 2017-02-22 RX ADMIN — MORPHINE SULFATE 2 MILLIGRAM(S): 50 CAPSULE, EXTENDED RELEASE ORAL at 05:36

## 2017-02-22 NOTE — DISCHARGE NOTE ADULT - CARE PROVIDERS DIRECT ADDRESSES
[email or web address],DirectAddress_Unknown

## 2017-02-22 NOTE — DISCHARGE NOTE ADULT - PATIENT PORTAL LINK FT
“You can access the [facility] Patient Portal, offered by [facility], by registering with the following website: [email or web address]”

## 2017-02-22 NOTE — DISCHARGE NOTE ADULT - PLAN OF CARE
you were seen by gastroenterologist        you had ERCP on 2/21/17    1. Multiple gastric polyps s/p resection of one of the larger polyps                       2. EUS finding with subtle 14mm x 15mm hypoechoic, heterogenous area in                        pancreatic head (at site of transition to biliary dilation) s/p FNA and core                        biopsies                       3. ERCP with biliary sphincterotomy, extraction of dark-thick sludge,                        cholangioscopy, biopies of exposed ampulla/distal CBD, brushings of distal                        CBD, and placement of double pigtail biliary stent. No obvious findings seen                        on cholangioscopy although visualization limited by thick, dark, sludge                        throughout biliary tree.  Recommendation:      1. Follow up FNA results, exposed ampulla/distal CBD biopsies, brushings of                        distal CBD, and resected gastric polyp results                       2. Monitor LFTS                       3. Patient will need stent revision in 4-12 weeks based on pathology results                       4. Follow up with Dr. Rashaad Zhao improve symptoms continue your medication continue your blood pressure medication  follow up with your cardiologist continue with your medication you were seen by gastroenterologist        you had ERCP on 2/21/17    1. Multiple gastric polyps s/p resection of one of the larger polyps                       2. EUS finding with subtle 14mm x 15mm hypoechoic, heterogenous area in                        pancreatic head (at site of transition to biliary dilation) s/p FNA and core                        biopsies                       3. ERCP with biliary sphincterotomy, extraction of dark-thick sludge,                        cholangioscopy, biopies of exposed ampulla/distal CBD, brushings of distal                        CBD, and placement of double pigtail biliary stent. No obvious findings seen                        on cholangioscopy although visualization limited by thick, dark, sludge                        throughout biliary tree.  Recommendation:      1. Follow up FNA results, exposed ampulla/distal CBD biopsies, brushings of                        distal CBD, and resected gastric polyp results                       2. Monitor LFTS                       3. Patient will need stent revision in 4-12 weeks based on pathology results                       4. Follow up with Dr. Rashaad Zhao  follow up with your gastroenterologist-biopsy result and follow up in 1-2 weeks improve symptomss

## 2017-02-22 NOTE — DISCHARGE NOTE ADULT - CARE PLAN
Principal Discharge DX:	Jaundice of recent onset  Instructions for follow-up, activity and diet:	you were seen by gastroenterologist        you had ERCP on 2/21/17    1. Multiple gastric polyps s/p resection of one of the larger polyps                       2. EUS finding with subtle 14mm x 15mm hypoechoic, heterogenous area in                        pancreatic head (at site of transition to biliary dilation) s/p FNA and core                        biopsies                       3. ERCP with biliary sphincterotomy, extraction of dark-thick sludge,                        cholangioscopy, biopies of exposed ampulla/distal CBD, brushings of distal                        CBD, and placement of double pigtail biliary stent. No obvious findings seen                        on cholangioscopy although visualization limited by thick, dark, sludge                        throughout biliary tree.  Recommendation:      1. Follow up FNA results, exposed ampulla/distal CBD biopsies, brushings of                        distal CBD, and resected gastric polyp results                       2. Monitor LFTS                       3. Patient will need stent revision in 4-12 weeks based on pathology results                       4. Follow up with Dr. Rashaad Zhao  Secondary Diagnosis:	Irritable bowel syndrome with constipation  Secondary Diagnosis:	Essential hypertension  Secondary Diagnosis:	GERD (gastroesophageal reflux disease) Principal Discharge DX:	Jaundice of recent onset  Goal:	improve symptoms  Instructions for follow-up, activity and diet:	you were seen by gastroenterologist        you had ERCP on 2/21/17    1. Multiple gastric polyps s/p resection of one of the larger polyps                       2. EUS finding with subtle 14mm x 15mm hypoechoic, heterogenous area in                        pancreatic head (at site of transition to biliary dilation) s/p FNA and core                        biopsies                       3. ERCP with biliary sphincterotomy, extraction of dark-thick sludge,                        cholangioscopy, biopies of exposed ampulla/distal CBD, brushings of distal                        CBD, and placement of double pigtail biliary stent. No obvious findings seen                        on cholangioscopy although visualization limited by thick, dark, sludge                        throughout biliary tree.  Recommendation:      1. Follow up FNA results, exposed ampulla/distal CBD biopsies, brushings of                        distal CBD, and resected gastric polyp results                       2. Monitor LFTS                       3. Patient will need stent revision in 4-12 weeks based on pathology results                       4. Follow up with Dr. Rashaad Zhao  follow up with your gastroenterologist-biopsy result and follow up in 1-2 weeks  Secondary Diagnosis:	Irritable bowel syndrome with constipation  Instructions for follow-up, activity and diet:	continue your medication  Secondary Diagnosis:	Essential hypertension  Instructions for follow-up, activity and diet:	continue your blood pressure medication  follow up with your cardiologist  Secondary Diagnosis:	GERD (gastroesophageal reflux disease)  Instructions for follow-up, activity and diet:	continue with your medication Principal Discharge DX:	Jaundice of recent onset  Goal:	improve symptomss  Assessment and plan of treatment:	you were seen by gastroenterologist        you had ERCP on 2/21/17    1. Multiple gastric polyps s/p resection of one of the larger polyps                       2. EUS finding with subtle 14mm x 15mm hypoechoic, heterogenous area in                        pancreatic head (at site of transition to biliary dilation) s/p FNA and core                        biopsies                       3. ERCP with biliary sphincterotomy, extraction of dark-thick sludge,                        cholangioscopy, biopies of exposed ampulla/distal CBD, brushings of distal                        CBD, and placement of double pigtail biliary stent. No obvious findings seen                        on cholangioscopy although visualization limited by thick, dark, sludge                        throughout biliary tree.  Recommendation:      1. Follow up FNA results, exposed ampulla/distal CBD biopsies, brushings of                        distal CBD, and resected gastric polyp results                       2. Monitor LFTS                       3. Patient will need stent revision in 4-12 weeks based on pathology results                       4. Follow up with Dr. Rashaad Zhao  follow up with your gastroenterologist-biopsy result and follow up in 1-2 weeks  Secondary Diagnosis:	Irritable bowel syndrome with constipation  Assessment and plan of treatment:	continue your medication  Secondary Diagnosis:	Essential hypertension  Assessment and plan of treatment:	continue your blood pressure medication  follow up with your cardiologist  Secondary Diagnosis:	GERD (gastroesophageal reflux disease)  Assessment and plan of treatment:	continue with your medication

## 2017-02-22 NOTE — DISCHARGE NOTE ADULT - MEDICATION SUMMARY - MEDICATIONS TO TAKE
I will START or STAY ON the medications listed below when I get home from the hospital:    enalapril 20 mg oral tablet  -- 1 tab(s) by mouth 2 times a day  -- Indication: For Essential hypertension    amitriptyline 25 mg oral tablet  -- 1 tab(s) by mouth once a day (at bedtime)  -- Indication: For IBS (irritable bowel syndrome)    diphenhydrAMINE 25 mg oral capsule  -- 1 cap(s) by mouth every 6 hours, As needed, Rash and/or Itching  -- Indication: For Jaundice    Linzess 145 mcg oral capsule  -- 1 cap(s) by mouth once a day  -- Indication: For Irritable bowel syndrome with constipation    hyoscyamine 0.125 mg oral tablet  -- 1 tab(s) by mouth 4 times a day, As Needed  -- for abdominal pain  -- Indication: For Irritable bowel syndrome with constipation    ursodiol 300 mg oral capsule  -- 1 cap(s) by mouth 3 times a day (with meals)  -- Indication: For Jaundice    pantoprazole 40 mg oral delayed release tablet  -- 1 tab(s) by mouth once a day (before a meal)  -- Indication: For GERD (gastroesophageal reflux disease)

## 2017-02-22 NOTE — DISCHARGE NOTE ADULT - HOSPITAL COURSE
to be completed by attending md 57M hx hepatic steatosis, gerd, ibs-c, htn p/w painless jaundice, pruritus x 1 week. On admission pt noted to have elevated bilirubin levels- GI workup initiated with concern for pancreatic vs biliary mass. Pt started on ursodiol by GI. CT A/P revealed intra and extra hepatic biliary duct dilation, with no clear evidence of pancreatic mass. Follow up MRI abdomen revealed a stricture of the CBD again with no obvious pancreatic or biliary mass seen. Pt subsequently underwent ERCP with sphincterotomy and stent placement. ERCP  which revealed significant biliary sludge and a hypoechoic pancreatic ?mass- multiple biopsies were taken of bilary and pancreatic tissue. Following ERCP patient was advanced to a regular diet, and improved symptomatically. Per GI he was medically stable for discharge with close follow up for biopsy results. Medically stable for discharge home on 2/22

## 2017-02-22 NOTE — DISCHARGE NOTE ADULT - SECONDARY DIAGNOSIS.
Irritable bowel syndrome with constipation Essential hypertension GERD (gastroesophageal reflux disease)

## 2017-02-22 NOTE — DISCHARGE NOTE ADULT - CARE PROVIDER_API CALL
Sukhi Zhao), Medicine  [address] [facility] [address] [place] [address]  Phone: [phone]  Fax: [phone] Sukhi Zhao), Medicine  [address] [facility] [address] [place] [id number]  Phone: [phone]  Fax: [phone]    Brady Roberts), Gastroenterology  [address] [place] [address]  Phone: [phone]  Fax: [phone]

## 2017-02-24 LAB — NON-GYNECOLOGICAL CYTOLOGY STUDY: SIGNIFICANT CHANGE UP

## 2017-02-28 LAB — NON-GYNECOLOGICAL CYTOLOGY STUDY: SIGNIFICANT CHANGE UP

## 2017-03-01 ENCOUNTER — INPATIENT (INPATIENT)
Facility: HOSPITAL | Age: 67
LOS: 4 days | Discharge: HOME | DRG: 441 | End: 2017-03-06
Attending: INTERNAL MEDICINE | Admitting: HOSPITALIST
Payer: MEDICARE

## 2017-03-01 VITALS
SYSTOLIC BLOOD PRESSURE: 158 MMHG | HEIGHT: 70 IN | WEIGHT: 169.98 LBS | OXYGEN SATURATION: 96 % | TEMPERATURE: 98 F | HEART RATE: 95 BPM | RESPIRATION RATE: 18 BRPM | DIASTOLIC BLOOD PRESSURE: 96 MMHG

## 2017-03-01 DIAGNOSIS — Z98.1 ARTHRODESIS STATUS: Chronic | ICD-10-CM

## 2017-03-01 DIAGNOSIS — K21.9 GASTRO-ESOPHAGEAL REFLUX DISEASE WITHOUT ESOPHAGITIS: ICD-10-CM

## 2017-03-01 DIAGNOSIS — E80.6 OTHER DISORDERS OF BILIRUBIN METABOLISM: ICD-10-CM

## 2017-03-01 DIAGNOSIS — R21 RASH AND OTHER NONSPECIFIC SKIN ERUPTION: ICD-10-CM

## 2017-03-01 DIAGNOSIS — Z29.9 ENCOUNTER FOR PROPHYLACTIC MEASURES, UNSPECIFIED: ICD-10-CM

## 2017-03-01 DIAGNOSIS — Z98.890 OTHER SPECIFIED POSTPROCEDURAL STATES: Chronic | ICD-10-CM

## 2017-03-01 DIAGNOSIS — I10 ESSENTIAL (PRIMARY) HYPERTENSION: ICD-10-CM

## 2017-03-01 PROBLEM — K58.9 IRRITABLE BOWEL SYNDROME WITHOUT DIARRHEA: Chronic | Status: ACTIVE | Noted: 2017-02-17

## 2017-03-01 LAB
ALBUMIN SERPL ELPH-MCNC: 4.1 G/DL — SIGNIFICANT CHANGE UP (ref 3.3–5)
ALP SERPL-CCNC: 502 U/L — HIGH (ref 40–120)
ALT FLD-CCNC: 30 U/L RC — SIGNIFICANT CHANGE UP (ref 10–45)
ANION GAP SERPL CALC-SCNC: 18 MMOL/L — HIGH (ref 5–17)
APPEARANCE UR: ABNORMAL
APTT BLD: 36.3 SEC — SIGNIFICANT CHANGE UP (ref 27.5–37.4)
AST SERPL-CCNC: 35 U/L — SIGNIFICANT CHANGE UP (ref 10–40)
BASOPHILS # BLD AUTO: 0.1 K/UL — SIGNIFICANT CHANGE UP (ref 0–0.2)
BASOPHILS NFR BLD AUTO: 1.4 % — SIGNIFICANT CHANGE UP (ref 0–2)
BILIRUB DIRECT SERPL-MCNC: 16.7 MG/DL — HIGH (ref 0–0.2)
BILIRUB INDIRECT FLD-MCNC: 2.2 MG/DL — HIGH (ref 0.2–1)
BILIRUB SERPL-MCNC: 18.9 MG/DL — HIGH (ref 0.2–1.2)
BILIRUB SERPL-MCNC: 18.9 MG/DL — HIGH (ref 0.2–1.2)
BILIRUB UR-MCNC: ABNORMAL
BLD GP AB SCN SERPL QL: NEGATIVE — SIGNIFICANT CHANGE UP
BUN SERPL-MCNC: 11 MG/DL — SIGNIFICANT CHANGE UP (ref 7–23)
CALCIUM SERPL-MCNC: 9.8 MG/DL — SIGNIFICANT CHANGE UP (ref 8.4–10.5)
CHLORIDE SERPL-SCNC: 100 MMOL/L — SIGNIFICANT CHANGE UP (ref 96–108)
CO2 SERPL-SCNC: 22 MMOL/L — SIGNIFICANT CHANGE UP (ref 22–31)
COLOR SPEC: ABNORMAL
CREAT SERPL-MCNC: 1.26 MG/DL — SIGNIFICANT CHANGE UP (ref 0.5–1.3)
DIFF PNL FLD: NEGATIVE — SIGNIFICANT CHANGE UP
EOSINOPHIL # BLD AUTO: 0.1 K/UL — SIGNIFICANT CHANGE UP (ref 0–0.5)
EOSINOPHIL NFR BLD AUTO: 2.3 % — SIGNIFICANT CHANGE UP (ref 0–6)
GLUCOSE SERPL-MCNC: 133 MG/DL — HIGH (ref 70–99)
GLUCOSE UR QL: NEGATIVE — SIGNIFICANT CHANGE UP
HCT VFR BLD CALC: 41.1 % — SIGNIFICANT CHANGE UP (ref 39–50)
HGB BLD-MCNC: 13.9 G/DL — SIGNIFICANT CHANGE UP (ref 13–17)
INR BLD: 1.2 RATIO — HIGH (ref 0.88–1.16)
KETONES UR-MCNC: NEGATIVE — SIGNIFICANT CHANGE UP
LEUKOCYTE ESTERASE UR-ACNC: NEGATIVE — SIGNIFICANT CHANGE UP
LIDOCAIN IGE QN: 21 U/L — SIGNIFICANT CHANGE UP (ref 7–60)
LYMPHOCYTES # BLD AUTO: 1.6 K/UL — SIGNIFICANT CHANGE UP (ref 1–3.3)
LYMPHOCYTES # BLD AUTO: 26 % — SIGNIFICANT CHANGE UP (ref 13–44)
MCHC RBC-ENTMCNC: 30.4 PG — SIGNIFICANT CHANGE UP (ref 27–34)
MCHC RBC-ENTMCNC: 33.9 GM/DL — SIGNIFICANT CHANGE UP (ref 32–36)
MCV RBC AUTO: 89.5 FL — SIGNIFICANT CHANGE UP (ref 80–100)
MONOCYTES # BLD AUTO: 1 K/UL — HIGH (ref 0–0.9)
MONOCYTES NFR BLD AUTO: 15.8 % — HIGH (ref 2–14)
NEUTROPHILS # BLD AUTO: 3.3 K/UL — SIGNIFICANT CHANGE UP (ref 1.8–7.4)
NEUTROPHILS NFR BLD AUTO: 54.4 % — SIGNIFICANT CHANGE UP (ref 43–77)
NITRITE UR-MCNC: NEGATIVE — SIGNIFICANT CHANGE UP
PH UR: 6 — SIGNIFICANT CHANGE UP (ref 4.8–8)
PLATELET # BLD AUTO: 376 K/UL — SIGNIFICANT CHANGE UP (ref 150–400)
POTASSIUM SERPL-MCNC: 3.9 MMOL/L — SIGNIFICANT CHANGE UP (ref 3.5–5.3)
POTASSIUM SERPL-SCNC: 3.9 MMOL/L — SIGNIFICANT CHANGE UP (ref 3.5–5.3)
PROT SERPL-MCNC: 7.3 G/DL — SIGNIFICANT CHANGE UP (ref 6–8.3)
PROT UR-MCNC: SIGNIFICANT CHANGE UP
PROTHROM AB SERPL-ACNC: 13 SEC — SIGNIFICANT CHANGE UP (ref 10–13.1)
RBC # BLD: 4.59 M/UL — SIGNIFICANT CHANGE UP (ref 4.2–5.8)
RBC # FLD: 16.6 % — HIGH (ref 10.3–14.5)
RH IG SCN BLD-IMP: POSITIVE — SIGNIFICANT CHANGE UP
SODIUM SERPL-SCNC: 140 MMOL/L — SIGNIFICANT CHANGE UP (ref 135–145)
SP GR SPEC: 1.01 — SIGNIFICANT CHANGE UP (ref 1.01–1.02)
UROBILINOGEN FLD QL: 1
WBC # BLD: 6.1 K/UL — SIGNIFICANT CHANGE UP (ref 3.8–10.5)
WBC # FLD AUTO: 6.1 K/UL — SIGNIFICANT CHANGE UP (ref 3.8–10.5)

## 2017-03-01 PROCEDURE — 99222 1ST HOSP IP/OBS MODERATE 55: CPT | Mod: GC

## 2017-03-01 PROCEDURE — 99285 EMERGENCY DEPT VISIT HI MDM: CPT | Mod: GC

## 2017-03-01 PROCEDURE — 99223 1ST HOSP IP/OBS HIGH 75: CPT | Mod: GC

## 2017-03-01 RX ORDER — CHOLESTYRAMINE 4 G/9G
4 POWDER, FOR SUSPENSION ORAL
Qty: 0 | Refills: 0 | Status: DISCONTINUED | OUTPATIENT
Start: 2017-03-01 | End: 2017-03-01

## 2017-03-01 RX ORDER — HYOSCYAMINE SULFATE 0.13 MG
1 TABLET ORAL
Qty: 0 | Refills: 0 | COMMUNITY

## 2017-03-01 RX ORDER — ENOXAPARIN SODIUM 100 MG/ML
40 INJECTION SUBCUTANEOUS EVERY 24 HOURS
Qty: 0 | Refills: 0 | Status: DISCONTINUED | OUTPATIENT
Start: 2017-03-01 | End: 2017-03-06

## 2017-03-01 RX ORDER — AMITRIPTYLINE HCL 25 MG
1 TABLET ORAL
Qty: 0 | Refills: 0 | COMMUNITY

## 2017-03-01 RX ORDER — CHOLESTYRAMINE 4 G/9G
4 POWDER, FOR SUSPENSION ORAL DAILY
Qty: 0 | Refills: 0 | Status: DISCONTINUED | OUTPATIENT
Start: 2017-03-01 | End: 2017-03-06

## 2017-03-01 RX ORDER — PANTOPRAZOLE SODIUM 20 MG/1
40 TABLET, DELAYED RELEASE ORAL
Qty: 0 | Refills: 0 | Status: DISCONTINUED | OUTPATIENT
Start: 2017-03-01 | End: 2017-03-06

## 2017-03-01 RX ORDER — LINACLOTIDE 145 UG/1
1 CAPSULE, GELATIN COATED ORAL
Qty: 0 | Refills: 0 | COMMUNITY

## 2017-03-01 RX ORDER — DIPHENHYDRAMINE HCL 50 MG
25 CAPSULE ORAL EVERY 6 HOURS
Qty: 0 | Refills: 0 | Status: DISCONTINUED | OUTPATIENT
Start: 2017-03-01 | End: 2017-03-06

## 2017-03-01 RX ADMIN — Medication 1 APPLICATION(S): at 23:21

## 2017-03-01 RX ADMIN — Medication 20 MILLIGRAM(S): at 22:11

## 2017-03-01 RX ADMIN — ENOXAPARIN SODIUM 40 MILLIGRAM(S): 100 INJECTION SUBCUTANEOUS at 22:11

## 2017-03-01 RX ADMIN — CHOLESTYRAMINE 4 GRAM(S): 4 POWDER, FOR SUSPENSION ORAL at 22:54

## 2017-03-01 RX ADMIN — Medication 25 MILLIGRAM(S): at 22:17

## 2017-03-01 NOTE — ED PROVIDER NOTE - PHYSICAL EXAMINATION
Vladislav: A & O x 3, NAD, HEENT with icteric sclera and no facial asymmetry; lungs CTAB, heart with reg rhythm without murmur; abdomen soft NTND; extremities with no edema; skin jaundice and abdominal blanchable telangectasia, neuro exam non focal with no motor or sensory deficits

## 2017-03-01 NOTE — H&P ADULT. - MUSCULOSKELETAL
details… detailed exam normal strength/no calf tenderness/no joint warmth/no joint erythema/no joint swelling/ROM intact

## 2017-03-01 NOTE — H&P ADULT. - PMH
Essential hypertension    GERD (gastroesophageal reflux disease)    IBS (irritable bowel syndrome)    Other hyperlipidemia Biliary stricture  s/p biliary stent  Essential hypertension    Gastric polyp    GERD (gastroesophageal reflux disease)    IBS (irritable bowel syndrome)    Obstructive jaundice    Other hyperlipidemia    Pancreatic lesion    Rash

## 2017-03-01 NOTE — ED ADULT TRIAGE NOTE - CHIEF COMPLAINT QUOTE
recurrent rash on upper body and itching all over the body started 2wks ago, had follow up with dermatology in past . recently had biliary stent on last Tuesday

## 2017-03-01 NOTE — ED ADULT NURSE NOTE - PSH
History of biliary stent insertion  2/21/2017  S/P cervical spinal fusion    S/P lumbar spinal fusion

## 2017-03-01 NOTE — ED PROVIDER NOTE - ATTENDING CONTRIBUTION TO CARE
MD Valencia:  patient seen and evaluated with the resident.  I was present for key portions of the History & Physical, and I agree with the Impression & Plan.  MD Valencia:  67 yo M, c/o 8d progressively worsening jaundice.  Recent ERCP for obstructive jaundice on 2/21/17 MD Valencia:  patient seen and evaluated with the resident.  I was present for key portions of the History & Physical, and I agree with the Impression & Plan.  MD Valencia:  67 yo M, c/o 8d progressively worsening jaundice.  Recent ERCP for obstructive jaundice on 2/21/17; obstruction 2/2 black sludge; stent placed.  Patient dc'd with down-trending bilrubin and jaundice, but symptoms have returned, and he was instructed to return to the ED.  Impression:  recurrent obstructive jaundice - cause unknown.  Plan:  re-admit for repeat ERCP, primary GI aware, awaiting call back from GI fellow.

## 2017-03-01 NOTE — H&P ADULT. - PROBLEM SELECTOR PLAN 1
-given worsening since stent placement last week, there is concern for possible occlusion of the stent  -GI consulted - plan for ERCP +/- EUS tomorrow  -clear liquid diet; NPO after midnight  -patient received one dose of rifampin in ED for pruritis; will give benadryl PRN and trial cholestyramine  -trend CMP  -follow up fine needle aspiration from pancreatic mass -given worsening since stent placement last week, there is concern for possible occlusion of the stent  -GI consulted - plan for ERCP +/- EUS tomorrow  -clear liquid diet; NPO after midnight  -patient received one dose of rifampin in ED for pruritis; will give benadryl PRN and trial cholestyramine  -trend CMP  -initial biopsy results are concerning for adenocarcinoma -given worsening since stent placement last week, there is concern for possible occlusion of the stent  -GI consulted - plan for ERCP +/- EUS tomorrow  -clear liquid diet; NPO after midnight  -patient received one dose of rifampin in ED for pruritis; will give benadryl PRN and trial cholestyramine  -trend CMP  -if develops fever, check blood culture and consider starting IV abx for possible ascending cholangitis.  -recent bile duct brush results are concerning for adenocarcinoma, FNA of pancreatic lesion showed atypical findings. Discuss with GI and consider obtaining input from oncology.

## 2017-03-01 NOTE — H&P ADULT. - NEUROLOGICAL DETAILS
sensation intact/alert and oriented x 3/cranial nerves intact/normal strength responds to pain/cranial nerves intact/sensation intact/normal strength/alert and oriented x 3/responds to verbal commands

## 2017-03-01 NOTE — ED PROVIDER NOTE - NS ED ROS FT
ROS: No fever/chills, no eye pain, no throat pain, no chest pain, no shortness of breath, no abdominal pain,  no dysuria, no muscle pain, abdominal blanchable rash , no focal neurologic complaints, no known mental health issues

## 2017-03-01 NOTE — H&P ADULT. - FAMILY HISTORY
Father  Still living? Unknown  Family history of coronary artery disease, Age at diagnosis: Age Unknown  Family history of prostate cancer, Age at diagnosis: Age Unknown

## 2017-03-01 NOTE — ED PROVIDER NOTE - MEDICAL DECISION MAKING DETAILS
MD Valencia:  67 yo M, c/o 8d progressively worsening jaundice.  Recent ERCP for obstructive jaundice on 2/21/17; obstruction 2/2 black sludge; stent placed.  Patient dc'd with down-trending bilrubin and jaundice, but symptoms have returned, and he was instructed to return to the ED.  Impression:  recurrent obstructive jaundice - cause unknown.  Plan:  re-admit for repeat ERCP, primary GI aware, awaiting call back from GI fellow.

## 2017-03-01 NOTE — H&P ADULT. - PROBLEM SELECTOR PLAN 5
-patient reports intermittent episodes of rash on scalp and chest. he has been dermatologist as outpatient and was prescribed triamcinolone which he has been using off and on the last couple of weeks but the rash is spreading to his abdomen. continue with triamcinolone and consider inpatient Derm consult.

## 2017-03-01 NOTE — ED ADULT NURSE NOTE - OBJECTIVE STATEMENT
65 yo male presents in the ed for jaundice and increased itching s/p ERCP with stent placement. pt is alert and orientedx4, speaking coherently. Pt states that he was sent by his gastro due to the sent not working- pt denies abdominal pain. lungs clear to ascultation, abdomen soft, non tender. Pt states that he has had a rash on chest and abdomen. Pt denies chest pain, fever, chill, n/v. Md at bedside, will continue to reassess

## 2017-03-01 NOTE — H&P ADULT. - HISTORY OF PRESENT ILLNESS
Mr. Medrano is a 66-year-old male with a history of HTN and biliary occlusion who presents to the ED with worsening jaundice and pruritis. The patient has had jaundice for two weeks and was admitted one week ago for hyperbilirubinemia to . He was evaluated by GI and had an ERCP that showed a pancreatic mass and thick and dark sludge throughout the biliary tree. Biopsies were done of the mass and a stent was placed. Since then, the jaundice and pruritis has worsened. He is unable to sleep because of the itchiness. It is worse in the legs than the upper body. He denies fevers, chills, nausea/vomiting/diarrhea, dysuria, confusion or any neurological deficits. He does endorse dark urine and light colored stools. He has no abdominal pain. He has been trying triamcinolone cream and ursodiol with minimal benefit.    In the ED, T 98.2 HR 85 /85 RR 19 O2 100% on room air. He received rifampin 300 mg. He is in no acute distress but is still uncomfortable because of the jaundice and pruritis. Mr. Medrano is a 66-year-old male with a history of HTN and obstructive jaundice due to biliary stricture who presents to the ED with worsening jaundice and pruritis. The patient has had jaundice for two weeks and was admitted one week ago for hyperbilirubinemia to 12. He was evaluated by GI and had an ERCP that showed 1.4 x 1.3cm hypoechoic heterogeneous area in pancreatic head and thick and dark sludge throughout the biliary tree. Biopsies were done of the pancreatic lesion, brushing of the bile duct and biliary stent was placed. Since then, the jaundice and pruritis has worsened. He is unable to sleep because of the itchiness. It is worse in the legs than the upper body. He denies fevers, chills, nausea/vomiting/diarrhea, dysuria, confusion or any neurological deficits. He does endorse dark urine and light colored stools. He has no abdominal pain. He has been trying triamcinolone cream and ursodiol with minimal benefit.    In the ED, T 98.2 HR 85 /85 RR 19 O2 100% on room air. He received rifampin 300 mg. He is in no acute distress but is still uncomfortable because of the jaundice and pruritis. Mr. Medrano is a 66-year-old male with a history of HTN and obstructive jaundice due to biliary stricture who presents to the ED with worsening jaundice and pruritis. The patient has had jaundice for two weeks and was admitted one week ago for hyperbilirubinemia to 12. He was evaluated by GI and had an ERCP that showed "Multiple gastric polyps s/p resection of one of the larger polyps, EUS finding with subtle 14mm x 15mm hypoechoic, heterogenous area in pancreatic head (at site of transition to biliary dilation) s/p FNA and core biopsies. ERCP with biliary sphincterotomy, extraction of dark-thick sludge, cholangioscopy, biopies of exposed ampulla/distal CBD, brushings of distal CBD, and placement of double pigtail biliary stent. No obvious findings seen on cholangioscopy although visualization limited by thick, dark, sludge throughout biliary tree." MRI abdomen did not reveal definitive pancreatic mass.     Since then, the jaundice and pruritis has worsened. He is unable to sleep because of the itchiness. It is worse in the legs than the upper body. He denies fevers, chills, nausea/vomiting/diarrhea, dysuria, confusion or any neurological deficits. He does endorse dark urine and light colored stools. He has no abdominal pain. He has been trying triamcinolone cream and ursodiol with minimal benefit.    In the ED, T 98.2 HR 85 /85 RR 19 O2 100% on room air. He received rifampin 300 mg. He is in no acute distress but is still uncomfortable because of the jaundice and pruritis. Mr. Medrano is a 66-year-old male with a history of HTN and obstructive jaundice due to biliary stricture who presents to the ED with worsening jaundice and pruritis. The patient has had jaundice for two weeks and was admitted one week ago for hyperbilirubinemia to 12. He was evaluated by GI and had an ERCP that showed "Multiple gastric polyps s/p resection of one of the larger polyps, EUS finding with subtle 14mm x 15mm hypoechoic, heterogenous area in pancreatic head (at site of transition to biliary dilation) s/p FNA and core biopsies. ERCP with biliary sphincterotomy, extraction of dark-thick sludge, cholangioscopy, biopies of exposed ampulla/distal CBD, brushings of distal CBD, and placement of double pigtail biliary stent. No obvious findings seen on cholangioscopy although visualization limited by thick, dark, sludge throughout biliary tree." MRI abdomen during last admission did not reveal definitive pancreatic mass, though it showed "subtle enhancement of the wall of the common bile duct which may be related to inflammation or malignant stricture of the common bile duct."    Since then, the jaundice and pruritis has worsened. He is unable to sleep because of the itchiness. It is worse in the legs than the upper body. He denies fevers, chills, nausea/vomiting/diarrhea, dysuria, confusion or any neurological deficits. He does endorse dark urine and light colored stools. He has no abdominal pain. He has been trying triamcinolone cream and ursodiol with minimal benefit.    In the ED, T 98.2 HR 85 /85 RR 19 O2 100% on room air. He received rifampin 300 mg. He is in no acute distress but is still uncomfortable because of the jaundice and pruritis.

## 2017-03-01 NOTE — H&P ADULT. - ASSESSMENT
66-year-old male with history of HTN and recent biliary stenting who presents with worsening jaundice, pruritis and hyperbilirubinemia concerning for occlusion of the biliary stent. 66-year-old male with a history of HTN, IBS and obstructive jaundice due to biliary stricture s/p ERCP with resection of gastric polyp, biliary sphincterotomy with extraction of sludge, biopsy and brushing of bile duct and placement of biliary stent, s/p FNA of pancreatic lesion, presents to the ED with worsening jaundice and pruritis. Patient found to have worsening hyperbilirubinemia concerning for biliary stent occlusion.

## 2017-03-01 NOTE — ED PROVIDER NOTE - OBJECTIVE STATEMENT
66 year old with jaundice, itching, dark urine, light stools. symptoms began 2 weeks ago, had ERCP last week with Dr. Roberts, with findings of "dark sticky sludge". patient was discharged after Bilirubin was downtrending. symptoms persisted and itching and jaundice worstened over the past few days and patient told to go to ER    GI: house

## 2017-03-01 NOTE — H&P ADULT. - LAB RESULTS AND INTERPRETATION
Labs reviewed. Notable for markedly elevated total bilirubin (18.9) with direct bilirubin 16.7. Alk phos elevated at 502. UA showing dark urine with bilirubin

## 2017-03-02 LAB
ALBUMIN SERPL ELPH-MCNC: 3.5 G/DL — SIGNIFICANT CHANGE UP (ref 3.3–5)
ALP SERPL-CCNC: 446 U/L — HIGH (ref 40–120)
ALT FLD-CCNC: 23 U/L — SIGNIFICANT CHANGE UP (ref 10–45)
ANION GAP SERPL CALC-SCNC: 15 MMOL/L — SIGNIFICANT CHANGE UP (ref 5–17)
APTT BLD: 38.7 SEC — HIGH (ref 27.5–37.4)
AST SERPL-CCNC: 36 U/L — SIGNIFICANT CHANGE UP (ref 10–40)
BASOPHILS # BLD AUTO: 0.04 K/UL — SIGNIFICANT CHANGE UP (ref 0–0.2)
BASOPHILS NFR BLD AUTO: 0.9 % — SIGNIFICANT CHANGE UP (ref 0–2)
BILIRUB DIRECT SERPL-MCNC: 15.5 MG/DL — HIGH (ref 0–0.2)
BILIRUB INDIRECT FLD-MCNC: 2.9 MG/DL — HIGH (ref 0.2–1)
BILIRUB SERPL-MCNC: 18.4 MG/DL — HIGH (ref 0.2–1.2)
BILIRUB SERPL-MCNC: 18.6 MG/DL — HIGH (ref 0.2–1.2)
BUN SERPL-MCNC: 9 MG/DL — SIGNIFICANT CHANGE UP (ref 7–23)
CALCIUM SERPL-MCNC: 9.6 MG/DL — SIGNIFICANT CHANGE UP (ref 8.4–10.5)
CHLORIDE SERPL-SCNC: 101 MMOL/L — SIGNIFICANT CHANGE UP (ref 96–108)
CO2 SERPL-SCNC: 24 MMOL/L — SIGNIFICANT CHANGE UP (ref 22–31)
CREAT SERPL-MCNC: 1.14 MG/DL — SIGNIFICANT CHANGE UP (ref 0.5–1.3)
EOSINOPHIL # BLD AUTO: 0.41 K/UL — SIGNIFICANT CHANGE UP (ref 0–0.5)
EOSINOPHIL NFR BLD AUTO: 8.6 % — HIGH (ref 0–6)
GLUCOSE SERPL-MCNC: 105 MG/DL — HIGH (ref 70–99)
HCT VFR BLD CALC: 36.2 % — LOW (ref 39–50)
HGB BLD-MCNC: 12.4 G/DL — LOW (ref 13–17)
INR BLD: 1.16 RATIO — SIGNIFICANT CHANGE UP (ref 0.88–1.16)
LYMPHOCYTES # BLD AUTO: 1.49 K/UL — SIGNIFICANT CHANGE UP (ref 1–3.3)
LYMPHOCYTES # BLD AUTO: 31 % — SIGNIFICANT CHANGE UP (ref 13–44)
MAGNESIUM SERPL-MCNC: 2 MG/DL — SIGNIFICANT CHANGE UP (ref 1.6–2.6)
MCHC RBC-ENTMCNC: 28.6 PG — SIGNIFICANT CHANGE UP (ref 27–34)
MCHC RBC-ENTMCNC: 34.3 GM/DL — SIGNIFICANT CHANGE UP (ref 32–36)
MCV RBC AUTO: 83.6 FL — SIGNIFICANT CHANGE UP (ref 80–100)
MONOCYTES # BLD AUTO: 0.58 K/UL — SIGNIFICANT CHANGE UP (ref 0–0.9)
MONOCYTES NFR BLD AUTO: 12.1 % — SIGNIFICANT CHANGE UP (ref 2–14)
NEUTROPHILS # BLD AUTO: 2.28 K/UL — SIGNIFICANT CHANGE UP (ref 1.8–7.4)
NEUTROPHILS NFR BLD AUTO: 46.5 % — SIGNIFICANT CHANGE UP (ref 43–77)
PHOSPHATE SERPL-MCNC: 3.2 MG/DL — SIGNIFICANT CHANGE UP (ref 2.5–4.5)
PLATELET # BLD AUTO: 472 K/UL — HIGH (ref 150–400)
POTASSIUM SERPL-MCNC: 3.9 MMOL/L — SIGNIFICANT CHANGE UP (ref 3.5–5.3)
POTASSIUM SERPL-SCNC: 3.9 MMOL/L — SIGNIFICANT CHANGE UP (ref 3.5–5.3)
PROT SERPL-MCNC: 6.5 G/DL — SIGNIFICANT CHANGE UP (ref 6–8.3)
PROTHROM AB SERPL-ACNC: 13.1 SEC — SIGNIFICANT CHANGE UP (ref 10–13.1)
RBC # BLD: 4.33 M/UL — SIGNIFICANT CHANGE UP (ref 4.2–5.8)
RBC # FLD: 18.5 % — HIGH (ref 10.3–14.5)
SODIUM SERPL-SCNC: 140 MMOL/L — SIGNIFICANT CHANGE UP (ref 135–145)
WBC # BLD: 4.81 K/UL — SIGNIFICANT CHANGE UP (ref 3.8–10.5)
WBC # FLD AUTO: 4.81 K/UL — SIGNIFICANT CHANGE UP (ref 3.8–10.5)

## 2017-03-02 PROCEDURE — 88112 CYTOPATH CELL ENHANCE TECH: CPT | Mod: 26

## 2017-03-02 PROCEDURE — 43259 EGD US EXAM DUODENUM/JEJUNUM: CPT | Mod: 59,GC

## 2017-03-02 PROCEDURE — 74328 X-RAY BILE DUCT ENDOSCOPY: CPT | Mod: 26,GC

## 2017-03-02 PROCEDURE — 43264 ERCP REMOVE DUCT CALCULI: CPT | Mod: 59,GC

## 2017-03-02 PROCEDURE — 88305 TISSUE EXAM BY PATHOLOGIST: CPT | Mod: 26

## 2017-03-02 PROCEDURE — 43276 ERCP STENT EXCHANGE W/DILATE: CPT | Mod: GC

## 2017-03-02 PROCEDURE — 99233 SBSQ HOSP IP/OBS HIGH 50: CPT | Mod: GC

## 2017-03-02 PROCEDURE — 43273 ENDOSCOPIC PANCREATOSCOPY: CPT | Mod: 59,GC

## 2017-03-02 PROCEDURE — 43274 ERCP DUCT STENT PLACEMENT: CPT | Mod: 59,GC

## 2017-03-02 PROCEDURE — 43261 ENDO CHOLANGIOPANCREATOGRAPH: CPT | Mod: 59,GC

## 2017-03-02 RX ORDER — SODIUM CHLORIDE 9 MG/ML
1000 INJECTION, SOLUTION INTRAVENOUS
Qty: 0 | Refills: 0 | Status: DISCONTINUED | OUTPATIENT
Start: 2017-03-02 | End: 2017-03-02

## 2017-03-02 RX ORDER — SODIUM CHLORIDE 9 MG/ML
1000 INJECTION, SOLUTION INTRAVENOUS
Qty: 0 | Refills: 0 | Status: DISCONTINUED | OUTPATIENT
Start: 2017-03-02 | End: 2017-03-06

## 2017-03-02 RX ORDER — DIPHENHYDRAMINE HCL 50 MG
25 CAPSULE ORAL EVERY 6 HOURS
Qty: 0 | Refills: 0 | Status: DISCONTINUED | OUTPATIENT
Start: 2017-03-02 | End: 2017-03-06

## 2017-03-02 RX ORDER — DIPHENHYDRAMINE HCL 50 MG
50 CAPSULE ORAL ONCE
Qty: 0 | Refills: 0 | Status: COMPLETED | OUTPATIENT
Start: 2017-03-02 | End: 2017-03-02

## 2017-03-02 RX ORDER — SODIUM CHLORIDE 9 MG/ML
1000 INJECTION INTRAMUSCULAR; INTRAVENOUS; SUBCUTANEOUS
Qty: 0 | Refills: 0 | Status: DISCONTINUED | OUTPATIENT
Start: 2017-03-02 | End: 2017-03-02

## 2017-03-02 RX ADMIN — ENOXAPARIN SODIUM 40 MILLIGRAM(S): 100 INJECTION SUBCUTANEOUS at 21:27

## 2017-03-02 RX ADMIN — SODIUM CHLORIDE 75 MILLILITER(S): 9 INJECTION, SOLUTION INTRAVENOUS at 20:21

## 2017-03-02 RX ADMIN — Medication 1 APPLICATION(S): at 18:23

## 2017-03-02 RX ADMIN — CHOLESTYRAMINE 4 GRAM(S): 4 POWDER, FOR SUSPENSION ORAL at 18:22

## 2017-03-02 RX ADMIN — Medication 1 APPLICATION(S): at 06:02

## 2017-03-02 RX ADMIN — Medication 25 MILLIGRAM(S): at 22:47

## 2017-03-02 RX ADMIN — Medication 20 MILLIGRAM(S): at 18:22

## 2017-03-02 RX ADMIN — Medication 25 MILLIGRAM(S): at 03:41

## 2017-03-02 RX ADMIN — Medication 20 MILLIGRAM(S): at 06:02

## 2017-03-02 RX ADMIN — Medication 50 MILLIGRAM(S): at 08:53

## 2017-03-03 LAB
ALBUMIN SERPL ELPH-MCNC: 2.9 G/DL — LOW (ref 3.3–5)
ALP SERPL-CCNC: 387 U/L — HIGH (ref 40–120)
ALT FLD-CCNC: 20 U/L — SIGNIFICANT CHANGE UP (ref 10–45)
ANION GAP SERPL CALC-SCNC: 14 MMOL/L — SIGNIFICANT CHANGE UP (ref 5–17)
AST SERPL-CCNC: 57 U/L — HIGH (ref 10–40)
BASOPHILS # BLD AUTO: 0.03 K/UL — SIGNIFICANT CHANGE UP (ref 0–0.2)
BASOPHILS NFR BLD AUTO: 0.5 % — SIGNIFICANT CHANGE UP (ref 0–2)
BILIRUB SERPL-MCNC: 8.4 MG/DL — HIGH (ref 0.2–1.2)
BUN SERPL-MCNC: 15 MG/DL — SIGNIFICANT CHANGE UP (ref 7–23)
CALCIUM SERPL-MCNC: 9.1 MG/DL — SIGNIFICANT CHANGE UP (ref 8.4–10.5)
CHLORIDE SERPL-SCNC: 102 MMOL/L — SIGNIFICANT CHANGE UP (ref 96–108)
CO2 SERPL-SCNC: 22 MMOL/L — SIGNIFICANT CHANGE UP (ref 22–31)
CREAT SERPL-MCNC: 1.22 MG/DL — SIGNIFICANT CHANGE UP (ref 0.5–1.3)
EOSINOPHIL # BLD AUTO: 0.09 K/UL — SIGNIFICANT CHANGE UP (ref 0–0.5)
EOSINOPHIL NFR BLD AUTO: 1.6 % — SIGNIFICANT CHANGE UP (ref 0–6)
GLUCOSE SERPL-MCNC: 101 MG/DL — HIGH (ref 70–99)
HCT VFR BLD CALC: 32.2 % — LOW (ref 39–50)
HGB BLD-MCNC: 11.2 G/DL — LOW (ref 13–17)
IMM GRANULOCYTES NFR BLD AUTO: 0.4 % — SIGNIFICANT CHANGE UP (ref 0–1.5)
LYMPHOCYTES # BLD AUTO: 1.56 K/UL — SIGNIFICANT CHANGE UP (ref 1–3.3)
LYMPHOCYTES # BLD AUTO: 27.5 % — SIGNIFICANT CHANGE UP (ref 13–44)
MCHC RBC-ENTMCNC: 29 PG — SIGNIFICANT CHANGE UP (ref 27–34)
MCHC RBC-ENTMCNC: 34.8 GM/DL — SIGNIFICANT CHANGE UP (ref 32–36)
MCV RBC AUTO: 83.4 FL — SIGNIFICANT CHANGE UP (ref 80–100)
MONOCYTES # BLD AUTO: 1.03 K/UL — HIGH (ref 0–0.9)
MONOCYTES NFR BLD AUTO: 18.2 % — HIGH (ref 2–14)
NEUTROPHILS # BLD AUTO: 2.94 K/UL — SIGNIFICANT CHANGE UP (ref 1.8–7.4)
NEUTROPHILS NFR BLD AUTO: 51.8 % — SIGNIFICANT CHANGE UP (ref 43–77)
PLATELET # BLD AUTO: 413 K/UL — HIGH (ref 150–400)
POTASSIUM SERPL-MCNC: 4.4 MMOL/L — SIGNIFICANT CHANGE UP (ref 3.5–5.3)
POTASSIUM SERPL-SCNC: 4.4 MMOL/L — SIGNIFICANT CHANGE UP (ref 3.5–5.3)
PROT SERPL-MCNC: 5.8 G/DL — LOW (ref 6–8.3)
RBC # BLD: 3.86 M/UL — LOW (ref 4.2–5.8)
RBC # FLD: 18.6 % — HIGH (ref 10.3–14.5)
SODIUM SERPL-SCNC: 138 MMOL/L — SIGNIFICANT CHANGE UP (ref 135–145)
WBC # BLD: 5.67 K/UL — SIGNIFICANT CHANGE UP (ref 3.8–10.5)
WBC # FLD AUTO: 5.67 K/UL — SIGNIFICANT CHANGE UP (ref 3.8–10.5)

## 2017-03-03 PROCEDURE — 99233 SBSQ HOSP IP/OBS HIGH 50: CPT

## 2017-03-03 RX ORDER — OXYCODONE HYDROCHLORIDE 5 MG/1
5 TABLET ORAL ONCE
Qty: 0 | Refills: 0 | Status: DISCONTINUED | OUTPATIENT
Start: 2017-03-03 | End: 2017-03-03

## 2017-03-03 RX ADMIN — OXYCODONE HYDROCHLORIDE 5 MILLIGRAM(S): 5 TABLET ORAL at 22:38

## 2017-03-03 RX ADMIN — PANTOPRAZOLE SODIUM 40 MILLIGRAM(S): 20 TABLET, DELAYED RELEASE ORAL at 05:42

## 2017-03-03 RX ADMIN — CHOLESTYRAMINE 4 GRAM(S): 4 POWDER, FOR SUSPENSION ORAL at 12:29

## 2017-03-03 RX ADMIN — Medication 1 APPLICATION(S): at 18:04

## 2017-03-03 RX ADMIN — Medication 1 APPLICATION(S): at 05:43

## 2017-03-03 RX ADMIN — Medication 20 MILLIGRAM(S): at 05:42

## 2017-03-03 RX ADMIN — SODIUM CHLORIDE 75 MILLILITER(S): 9 INJECTION, SOLUTION INTRAVENOUS at 20:55

## 2017-03-03 RX ADMIN — Medication 25 MILLIGRAM(S): at 20:55

## 2017-03-03 RX ADMIN — ENOXAPARIN SODIUM 40 MILLIGRAM(S): 100 INJECTION SUBCUTANEOUS at 22:38

## 2017-03-03 RX ADMIN — Medication 25 MILLIGRAM(S): at 12:28

## 2017-03-03 RX ADMIN — OXYCODONE HYDROCHLORIDE 5 MILLIGRAM(S): 5 TABLET ORAL at 23:30

## 2017-03-03 RX ADMIN — Medication 25 MILLIGRAM(S): at 05:42

## 2017-03-04 LAB
ALBUMIN SERPL ELPH-MCNC: 3.1 G/DL — LOW (ref 3.3–5)
ALP SERPL-CCNC: 360 U/L — HIGH (ref 40–120)
ALT FLD-CCNC: 21 U/L — SIGNIFICANT CHANGE UP (ref 10–45)
ANION GAP SERPL CALC-SCNC: 17 MMOL/L — SIGNIFICANT CHANGE UP (ref 5–17)
AST SERPL-CCNC: 40 U/L — SIGNIFICANT CHANGE UP (ref 10–40)
BILIRUB SERPL-MCNC: 16 MG/DL — HIGH (ref 0.2–1.2)
BUN SERPL-MCNC: 17 MG/DL — SIGNIFICANT CHANGE UP (ref 7–23)
CALCIUM SERPL-MCNC: 9.5 MG/DL — SIGNIFICANT CHANGE UP (ref 8.4–10.5)
CHLORIDE SERPL-SCNC: 104 MMOL/L — SIGNIFICANT CHANGE UP (ref 96–108)
CO2 SERPL-SCNC: 21 MMOL/L — LOW (ref 22–31)
CREAT SERPL-MCNC: 1.09 MG/DL — SIGNIFICANT CHANGE UP (ref 0.5–1.3)
GLUCOSE SERPL-MCNC: 105 MG/DL — HIGH (ref 70–99)
HCT VFR BLD CALC: 31 % — LOW (ref 39–50)
HGB BLD-MCNC: 11 G/DL — LOW (ref 13–17)
MCHC RBC-ENTMCNC: 29.6 PG — SIGNIFICANT CHANGE UP (ref 27–34)
MCHC RBC-ENTMCNC: 35.5 GM/DL — SIGNIFICANT CHANGE UP (ref 32–36)
MCV RBC AUTO: 83.3 FL — SIGNIFICANT CHANGE UP (ref 80–100)
PLATELET # BLD AUTO: 414 K/UL — HIGH (ref 150–400)
POTASSIUM SERPL-MCNC: 4.4 MMOL/L — SIGNIFICANT CHANGE UP (ref 3.5–5.3)
POTASSIUM SERPL-SCNC: 4.4 MMOL/L — SIGNIFICANT CHANGE UP (ref 3.5–5.3)
PROT SERPL-MCNC: 5.8 G/DL — LOW (ref 6–8.3)
RBC # BLD: 3.72 M/UL — LOW (ref 4.2–5.8)
RBC # FLD: 18.9 % — HIGH (ref 10.3–14.5)
SODIUM SERPL-SCNC: 142 MMOL/L — SIGNIFICANT CHANGE UP (ref 135–145)
WBC # BLD: 4.94 K/UL — SIGNIFICANT CHANGE UP (ref 3.8–10.5)
WBC # FLD AUTO: 4.94 K/UL — SIGNIFICANT CHANGE UP (ref 3.8–10.5)

## 2017-03-04 PROCEDURE — 99233 SBSQ HOSP IP/OBS HIGH 50: CPT

## 2017-03-04 RX ORDER — OXYCODONE HYDROCHLORIDE 5 MG/1
5 TABLET ORAL ONCE
Qty: 0 | Refills: 0 | Status: DISCONTINUED | OUTPATIENT
Start: 2017-03-04 | End: 2017-03-04

## 2017-03-04 RX ADMIN — OXYCODONE HYDROCHLORIDE 5 MILLIGRAM(S): 5 TABLET ORAL at 21:57

## 2017-03-04 RX ADMIN — Medication 20 MILLIGRAM(S): at 06:30

## 2017-03-04 RX ADMIN — Medication 25 MILLIGRAM(S): at 15:56

## 2017-03-04 RX ADMIN — PANTOPRAZOLE SODIUM 40 MILLIGRAM(S): 20 TABLET, DELAYED RELEASE ORAL at 06:30

## 2017-03-04 RX ADMIN — Medication 25 MILLIGRAM(S): at 09:40

## 2017-03-04 RX ADMIN — OXYCODONE HYDROCHLORIDE 5 MILLIGRAM(S): 5 TABLET ORAL at 20:57

## 2017-03-04 RX ADMIN — CHOLESTYRAMINE 4 GRAM(S): 4 POWDER, FOR SUSPENSION ORAL at 09:36

## 2017-03-04 RX ADMIN — Medication 25 MILLIGRAM(S): at 22:40

## 2017-03-05 LAB
ALBUMIN SERPL ELPH-MCNC: 3.2 G/DL — LOW (ref 3.3–5)
ALP SERPL-CCNC: 350 U/L — HIGH (ref 40–120)
ALT FLD-CCNC: 26 U/L — SIGNIFICANT CHANGE UP (ref 10–45)
ANION GAP SERPL CALC-SCNC: 17 MMOL/L — SIGNIFICANT CHANGE UP (ref 5–17)
AST SERPL-CCNC: 45 U/L — HIGH (ref 10–40)
BILIRUB SERPL-MCNC: 15.4 MG/DL — HIGH (ref 0.2–1.2)
BUN SERPL-MCNC: 16 MG/DL — SIGNIFICANT CHANGE UP (ref 7–23)
CALCIUM SERPL-MCNC: 9.5 MG/DL — SIGNIFICANT CHANGE UP (ref 8.4–10.5)
CHLORIDE SERPL-SCNC: 102 MMOL/L — SIGNIFICANT CHANGE UP (ref 96–108)
CO2 SERPL-SCNC: 23 MMOL/L — SIGNIFICANT CHANGE UP (ref 22–31)
CREAT SERPL-MCNC: 0.96 MG/DL — SIGNIFICANT CHANGE UP (ref 0.5–1.3)
GLUCOSE SERPL-MCNC: 106 MG/DL — HIGH (ref 70–99)
POTASSIUM SERPL-MCNC: 4.3 MMOL/L — SIGNIFICANT CHANGE UP (ref 3.5–5.3)
POTASSIUM SERPL-SCNC: 4.3 MMOL/L — SIGNIFICANT CHANGE UP (ref 3.5–5.3)
PROT SERPL-MCNC: 6.2 G/DL — SIGNIFICANT CHANGE UP (ref 6–8.3)
SODIUM SERPL-SCNC: 142 MMOL/L — SIGNIFICANT CHANGE UP (ref 135–145)

## 2017-03-05 PROCEDURE — 99232 SBSQ HOSP IP/OBS MODERATE 35: CPT

## 2017-03-05 RX ADMIN — Medication 25 MILLIGRAM(S): at 22:09

## 2017-03-05 RX ADMIN — PANTOPRAZOLE SODIUM 40 MILLIGRAM(S): 20 TABLET, DELAYED RELEASE ORAL at 06:01

## 2017-03-05 RX ADMIN — Medication 20 MILLIGRAM(S): at 05:40

## 2017-03-05 RX ADMIN — Medication 20 MILLIGRAM(S): at 17:43

## 2017-03-05 RX ADMIN — Medication 1 APPLICATION(S): at 17:43

## 2017-03-05 RX ADMIN — Medication 25 MILLIGRAM(S): at 05:40

## 2017-03-05 RX ADMIN — Medication 25 MILLIGRAM(S): at 13:05

## 2017-03-05 RX ADMIN — CHOLESTYRAMINE 4 GRAM(S): 4 POWDER, FOR SUSPENSION ORAL at 10:19

## 2017-03-06 VITALS
DIASTOLIC BLOOD PRESSURE: 78 MMHG | OXYGEN SATURATION: 99 % | SYSTOLIC BLOOD PRESSURE: 133 MMHG | RESPIRATION RATE: 18 BRPM | HEART RATE: 72 BPM | TEMPERATURE: 98 F

## 2017-03-06 LAB
ALBUMIN SERPL ELPH-MCNC: 3.2 G/DL — LOW (ref 3.3–5)
ALP SERPL-CCNC: 351 U/L — HIGH (ref 40–120)
ALT FLD-CCNC: 27 U/L — SIGNIFICANT CHANGE UP (ref 10–45)
ANION GAP SERPL CALC-SCNC: 14 MMOL/L — SIGNIFICANT CHANGE UP (ref 5–17)
AST SERPL-CCNC: 51 U/L — HIGH (ref 10–40)
BILIRUB DIRECT SERPL-MCNC: 12 MG/DL — HIGH (ref 0–0.2)
BILIRUB SERPL-MCNC: 16 MG/DL — HIGH (ref 0.2–1.2)
BUN SERPL-MCNC: 14 MG/DL — SIGNIFICANT CHANGE UP (ref 7–23)
CALCIUM SERPL-MCNC: 9.5 MG/DL — SIGNIFICANT CHANGE UP (ref 8.4–10.5)
CHLORIDE SERPL-SCNC: 104 MMOL/L — SIGNIFICANT CHANGE UP (ref 96–108)
CO2 SERPL-SCNC: 20 MMOL/L — LOW (ref 22–31)
CREAT SERPL-MCNC: 1 MG/DL — SIGNIFICANT CHANGE UP (ref 0.5–1.3)
GLUCOSE SERPL-MCNC: 108 MG/DL — HIGH (ref 70–99)
HCT VFR BLD CALC: 33 % — LOW (ref 39–50)
HGB BLD-MCNC: 11.2 G/DL — LOW (ref 13–17)
MCHC RBC-ENTMCNC: 28.5 PG — SIGNIFICANT CHANGE UP (ref 27–34)
MCHC RBC-ENTMCNC: 33.9 GM/DL — SIGNIFICANT CHANGE UP (ref 32–36)
MCV RBC AUTO: 84 FL — SIGNIFICANT CHANGE UP (ref 80–100)
NON-GYNECOLOGICAL CYTOLOGY STUDY: SIGNIFICANT CHANGE UP
PLATELET # BLD AUTO: 419 K/UL — HIGH (ref 150–400)
POTASSIUM SERPL-MCNC: 4.1 MMOL/L — SIGNIFICANT CHANGE UP (ref 3.5–5.3)
POTASSIUM SERPL-SCNC: 4.1 MMOL/L — SIGNIFICANT CHANGE UP (ref 3.5–5.3)
PROT SERPL-MCNC: 6.2 G/DL — SIGNIFICANT CHANGE UP (ref 6–8.3)
RBC # BLD: 3.93 M/UL — LOW (ref 4.2–5.8)
RBC # FLD: 19.1 % — HIGH (ref 10.3–14.5)
SODIUM SERPL-SCNC: 138 MMOL/L — SIGNIFICANT CHANGE UP (ref 135–145)
WBC # BLD: 5.41 K/UL — SIGNIFICANT CHANGE UP (ref 3.8–10.5)
WBC # FLD AUTO: 5.41 K/UL — SIGNIFICANT CHANGE UP (ref 3.8–10.5)

## 2017-03-06 PROCEDURE — 88112 CYTOPATH CELL ENHANCE TECH: CPT

## 2017-03-06 PROCEDURE — 86901 BLOOD TYPING SEROLOGIC RH(D): CPT

## 2017-03-06 PROCEDURE — 84100 ASSAY OF PHOSPHORUS: CPT

## 2017-03-06 PROCEDURE — C1726: CPT

## 2017-03-06 PROCEDURE — C1769: CPT

## 2017-03-06 PROCEDURE — 99285 EMERGENCY DEPT VISIT HI MDM: CPT

## 2017-03-06 PROCEDURE — 85610 PROTHROMBIN TIME: CPT

## 2017-03-06 PROCEDURE — 86850 RBC ANTIBODY SCREEN: CPT

## 2017-03-06 PROCEDURE — 74330 X-RAY BILE/PANC ENDOSCOPY: CPT

## 2017-03-06 PROCEDURE — 85027 COMPLETE CBC AUTOMATED: CPT

## 2017-03-06 PROCEDURE — C1887: CPT

## 2017-03-06 PROCEDURE — C1877: CPT

## 2017-03-06 PROCEDURE — 86900 BLOOD TYPING SEROLOGIC ABO: CPT

## 2017-03-06 PROCEDURE — 99239 HOSP IP/OBS DSCHRG MGMT >30: CPT

## 2017-03-06 PROCEDURE — 80053 COMPREHEN METABOLIC PANEL: CPT

## 2017-03-06 PROCEDURE — C2617: CPT

## 2017-03-06 PROCEDURE — 83690 ASSAY OF LIPASE: CPT

## 2017-03-06 PROCEDURE — 88305 TISSUE EXAM BY PATHOLOGIST: CPT

## 2017-03-06 PROCEDURE — 85730 THROMBOPLASTIN TIME PARTIAL: CPT

## 2017-03-06 PROCEDURE — 83735 ASSAY OF MAGNESIUM: CPT

## 2017-03-06 PROCEDURE — 81001 URINALYSIS AUTO W/SCOPE: CPT

## 2017-03-06 PROCEDURE — 82247 BILIRUBIN TOTAL: CPT

## 2017-03-06 PROCEDURE — 82248 BILIRUBIN DIRECT: CPT

## 2017-03-06 RX ORDER — DIPHENHYDRAMINE HCL 50 MG
25 CAPSULE ORAL EVERY 12 HOURS
Qty: 0 | Refills: 0 | Status: DISCONTINUED | OUTPATIENT
Start: 2017-03-06 | End: 2017-03-06

## 2017-03-06 RX ORDER — HYDROXYZINE HCL 10 MG
25 TABLET ORAL EVERY 6 HOURS
Qty: 0 | Refills: 0 | Status: DISCONTINUED | OUTPATIENT
Start: 2017-03-06 | End: 2017-03-06

## 2017-03-06 RX ORDER — CHOLESTYRAMINE 4 G/9G
4 POWDER, FOR SUSPENSION ORAL
Qty: 56 | Refills: 0 | OUTPATIENT
Start: 2017-03-06 | End: 2017-03-20

## 2017-03-06 RX ORDER — HYDROXYZINE HCL 10 MG
1 TABLET ORAL
Qty: 28 | Refills: 0 | OUTPATIENT
Start: 2017-03-06 | End: 2017-03-13

## 2017-03-06 RX ORDER — HYDROXYZINE HCL 10 MG
1 TABLET ORAL
Qty: 0 | Refills: 0 | COMMUNITY
Start: 2017-03-06

## 2017-03-06 RX ORDER — HYDROXYZINE HCL 10 MG
1 TABLET ORAL
Qty: 28 | Refills: 0 | COMMUNITY
Start: 2017-03-06 | End: 2017-03-13

## 2017-03-06 RX ORDER — DIPHENHYDRAMINE HCL 50 MG
12.5 CAPSULE ORAL ONCE
Qty: 0 | Refills: 0 | Status: COMPLETED | OUTPATIENT
Start: 2017-03-06 | End: 2017-03-06

## 2017-03-06 RX ORDER — DIPHENHYDRAMINE HCL 50 MG
12.5 CAPSULE ORAL ONCE
Qty: 0 | Refills: 0 | Status: DISCONTINUED | OUTPATIENT
Start: 2017-03-06 | End: 2017-03-06

## 2017-03-06 RX ADMIN — Medication 12.5 MILLIGRAM(S): at 04:07

## 2017-03-06 RX ADMIN — PANTOPRAZOLE SODIUM 40 MILLIGRAM(S): 20 TABLET, DELAYED RELEASE ORAL at 06:28

## 2017-03-06 RX ADMIN — SODIUM CHLORIDE 75 MILLILITER(S): 9 INJECTION, SOLUTION INTRAVENOUS at 09:26

## 2017-03-06 RX ADMIN — Medication 1 APPLICATION(S): at 05:42

## 2017-03-06 RX ADMIN — Medication 25 MILLIGRAM(S): at 09:25

## 2017-03-06 RX ADMIN — CHOLESTYRAMINE 4 GRAM(S): 4 POWDER, FOR SUSPENSION ORAL at 09:25

## 2017-03-06 RX ADMIN — Medication 20 MILLIGRAM(S): at 05:42

## 2017-03-06 RX ADMIN — Medication 25 MILLIGRAM(S): at 02:59

## 2017-03-06 NOTE — DISCHARGE NOTE ADULT - HOSPITAL COURSE
To be completed by attending 66-year-old male with a history of HTN and obstructive jaundice due to biliary stricture who presents to the ED with worsening jaundice and pruritis. The patient has had jaundice for two weeks and was admitted one week ago for hyperbilirubinemia to 12. He was evaluated by GI and had an ERCP that showed "Multiple gastric polyps s/p resection of one of the larger polyps, EUS finding with subtle 14mm x 15mm hypoechoic, heterogenous area in pancreatic head (at site of transition to biliary dilation) s/p FNA and core biopsies. ERCP with biliary sphincterotomy, extraction of dark-thick sludge, cholangioscopy, biopies of exposed ampulla/distal CBD, brushings of distal CBD, and placement of double pigtail biliary stent. No obvious findings seen on cholangioscopy although visualization limited by thick, dark, sludge throughout biliary tree." MRI abdomen during last admission did not reveal definitive pancreatic mass, though it showed "subtle enhancement of the wall of the common bile duct which may be related to inflammation or malignant stricture of the common bile duct."    Admitted patient for worsening Jaundice, Hyperbilirubinemia, seen by GI had ERCP with Biliary Stent exchange.   Trended down Bilirubin levels.   Discharge Condition was stable.   Patient to have follow up GI as out patient.

## 2017-03-06 NOTE — DISCHARGE NOTE ADULT - CARE PLAN
Principal Discharge DX:	Hyperbilirubinemia  Goal:	Remain free of symptoms  Instructions for follow-up, activity and diet:	Follow up with your GI doctor, call to set up appointment when you get home  you will need to have your bilirubin level checked latter this week Principal Discharge DX:	Hyperbilirubinemia  Goal:	Remain free of symptoms  Instructions for follow-up, activity and diet:	Follow up with your GI doctor  Call to set up appointment when you get home  you will need to have your bilirubin level checked later this week

## 2017-03-06 NOTE — DISCHARGE NOTE ADULT - CARE PROVIDER_API CALL
Brady Roberts), Gastroenterology  [address] [place] [address]  Phone: [phone]  Fax: [phone]

## 2017-03-06 NOTE — DISCHARGE NOTE ADULT - MEDICATION SUMMARY - MEDICATIONS TO CHANGE
I will SWITCH the dose or number of times a day I take the medications listed below when I get home from the hospital:    diphenhydrAMINE 25 mg oral capsule  -- 1 cap(s) by mouth every 6 hours, As needed, Rash and/or Itching

## 2017-03-06 NOTE — DISCHARGE NOTE ADULT - PATIENT PORTAL LINK FT
“You can access the Follow[employer] Patient Portal, offered by [facility], by registering with the following website: [email or web address]”

## 2017-03-06 NOTE — DISCHARGE NOTE ADULT - MEDICATION SUMMARY - MEDICATIONS TO TAKE
I will START or STAY ON the medications listed below when I get home from the hospital:    enalapril 20 mg oral tablet  -- 1 tab(s) by mouth 2 times a day  -- Indication: For High blood pressure    diphenhydrAMINE 25 mg oral capsule  -- 1 cap(s) by mouth 2 times a day, As Needed -Rash and/or Itching  -- Indication: For Itching     cholestyramine 4 g/9 g oral powder for reconstitution  -- 4 gram(s) by mouth once a day  -- Indication: For High cholesterol     hydrOXYzine hydrochloride 25 mg oral tablet  -- 1 tab(s) by mouth every 6 hours, As needed, Itching  -- Indication: For Itching    triamcinolone 0.1% topical cream  -- Apply on skin to affected area 2 times a day  -- Indication: For Rash    pantoprazole 40 mg oral delayed release tablet  -- 1 tab(s) by mouth once a day (before a meal)  -- Indication: For GERD (gastroesophageal reflux disease)

## 2017-03-06 NOTE — DISCHARGE NOTE ADULT - PLAN OF CARE
Remain free of symptoms Follow up with your GI doctor, call to set up appointment when you get home  you will need to have your bilirubin level checked latter this week Follow up with your GI doctor  Call to set up appointment when you get home  you will need to have your bilirubin level checked later this week

## 2017-03-07 DIAGNOSIS — E80.6 OTHER DISORDERS OF BILIRUBIN METABOLISM: ICD-10-CM

## 2017-03-07 PROBLEM — Z00.00 ENCOUNTER FOR PREVENTIVE HEALTH EXAMINATION: Status: ACTIVE | Noted: 2017-03-07

## 2017-03-08 ENCOUNTER — RESULT REVIEW (OUTPATIENT)
Age: 67
End: 2017-03-08

## 2017-03-08 ENCOUNTER — LABORATORY RESULT (OUTPATIENT)
Age: 67
End: 2017-03-08

## 2017-03-09 ENCOUNTER — OUTPATIENT (OUTPATIENT)
Dept: OUTPATIENT SERVICES | Facility: HOSPITAL | Age: 67
LOS: 1 days | End: 2017-03-09
Payer: MEDICARE

## 2017-03-09 ENCOUNTER — APPOINTMENT (OUTPATIENT)
Dept: GASTROENTEROLOGY | Facility: HOSPITAL | Age: 67
End: 2017-03-09

## 2017-03-09 DIAGNOSIS — E80.6 OTHER DISORDERS OF BILIRUBIN METABOLISM: ICD-10-CM

## 2017-03-09 DIAGNOSIS — Z98.1 ARTHRODESIS STATUS: Chronic | ICD-10-CM

## 2017-03-09 DIAGNOSIS — Z98.890 OTHER SPECIFIED POSTPROCEDURAL STATES: Chronic | ICD-10-CM

## 2017-03-09 LAB
ALBUMIN SERPL ELPH-MCNC: 3.7 G/DL
ALP BLD-CCNC: 426 U/L
ALT SERPL-CCNC: 27 U/L
ANION GAP SERPL CALC-SCNC: 16 MMOL/L
AST SERPL-CCNC: 51 U/L
BASOPHILS # BLD AUTO: 0.07 K/UL
BASOPHILS NFR BLD AUTO: 1.1 %
BILIRUB DIRECT SERPL-MCNC: 14.8 MG/DL
BILIRUB SERPL-MCNC: 20.6 MG/DL
BUN SERPL-MCNC: 15 MG/DL
CALCIUM SERPL-MCNC: 9.9 MG/DL
CHLORIDE SERPL-SCNC: 105 MMOL/L
CO2 SERPL-SCNC: 22 MMOL/L
CREAT SERPL-MCNC: 1.08 MG/DL
EOSINOPHIL # BLD AUTO: 0.32 K/UL
EOSINOPHIL NFR BLD AUTO: 4.8 %
GLUCOSE SERPL-MCNC: 110 MG/DL
HCT VFR BLD CALC: 36.5 %
HGB BLD-MCNC: 12.3 G/DL
IMM GRANULOCYTES NFR BLD AUTO: 0.8 %
LYMPHOCYTES # BLD AUTO: 1.67 K/UL
LYMPHOCYTES NFR BLD AUTO: 25.1 %
MAN DIFF?: NORMAL
MCHC RBC-ENTMCNC: 28.8 PG
MCHC RBC-ENTMCNC: 33.7 GM/DL
MCV RBC AUTO: 85.5 FL
MONOCYTES # BLD AUTO: 0.77 K/UL
MONOCYTES NFR BLD AUTO: 11.6 %
NEUTROPHILS # BLD AUTO: 3.77 K/UL
NEUTROPHILS NFR BLD AUTO: 56.6 %
PLATELET # BLD AUTO: 470 K/UL
POTASSIUM SERPL-SCNC: 4.7 MMOL/L
PROT SERPL-MCNC: 6.4 G/DL
RBC # BLD: 4.27 M/UL
RBC # FLD: 20.8 %
SODIUM SERPL-SCNC: 143 MMOL/L
WBC # FLD AUTO: 6.65 K/UL

## 2017-03-09 PROCEDURE — 88305 TISSUE EXAM BY PATHOLOGIST: CPT | Mod: 26

## 2017-03-09 PROCEDURE — 43261 ENDO CHOLANGIOPANCREATOGRAPH: CPT | Mod: GC

## 2017-03-09 PROCEDURE — 88305 TISSUE EXAM BY PATHOLOGIST: CPT

## 2017-03-09 PROCEDURE — 74330 X-RAY BILE/PANC ENDOSCOPY: CPT

## 2017-03-09 PROCEDURE — 88104 CYTOPATH FL NONGYN SMEARS: CPT

## 2017-03-09 PROCEDURE — 88112 CYTOPATH CELL ENHANCE TECH: CPT

## 2017-03-09 PROCEDURE — 88173 CYTOPATH EVAL FNA REPORT: CPT | Mod: 26,59

## 2017-03-09 PROCEDURE — 43261 ENDO CHOLANGIOPANCREATOGRAPH: CPT

## 2017-03-09 PROCEDURE — 88104 CYTOPATH FL NONGYN SMEARS: CPT | Mod: 26

## 2017-03-09 PROCEDURE — C1769: CPT

## 2017-03-09 PROCEDURE — C1874: CPT

## 2017-03-09 PROCEDURE — 43276 ERCP STENT EXCHANGE W/DILATE: CPT

## 2017-03-09 PROCEDURE — 88112 CYTOPATH CELL ENHANCE TECH: CPT | Mod: 26,59

## 2017-03-09 PROCEDURE — C1726: CPT

## 2017-03-09 PROCEDURE — 74328 X-RAY BILE DUCT ENDOSCOPY: CPT | Mod: 26,GC

## 2017-03-09 PROCEDURE — 43273 ENDOSCOPIC PANCREATOSCOPY: CPT | Mod: GC

## 2017-03-09 PROCEDURE — 43242 EGD US FINE NEEDLE BX/ASPIR: CPT | Mod: GC

## 2017-03-09 PROCEDURE — 88173 CYTOPATH EVAL FNA REPORT: CPT

## 2017-03-09 PROCEDURE — 43276 ERCP STENT EXCHANGE W/DILATE: CPT | Mod: GC

## 2017-03-09 PROCEDURE — 43264 ERCP REMOVE DUCT CALCULI: CPT | Mod: GC

## 2017-03-09 PROCEDURE — 88305 TISSUE EXAM BY PATHOLOGIST: CPT | Mod: 26,59

## 2017-03-10 LAB
IGG SUBSET TOTAL IGG: 1160 MG/DL
IGG1 SER-MCNC: 620 MG/DL
IGG2 SER-MCNC: 363 MG/DL
IGG3 SER-MCNC: 29.2 MG/DL
IGG4 SER-MCNC: 30.9 MG/DL

## 2017-03-13 ENCOUNTER — LABORATORY RESULT (OUTPATIENT)
Age: 67
End: 2017-03-13

## 2017-03-13 ENCOUNTER — INPATIENT (INPATIENT)
Facility: HOSPITAL | Age: 67
LOS: 7 days | Discharge: HOME | DRG: 446 | End: 2017-03-21
Attending: HOSPITALIST | Admitting: HOSPITALIST
Payer: MEDICARE

## 2017-03-13 VITALS
WEIGHT: 164.91 LBS | TEMPERATURE: 98 F | HEART RATE: 86 BPM | SYSTOLIC BLOOD PRESSURE: 136 MMHG | DIASTOLIC BLOOD PRESSURE: 85 MMHG | OXYGEN SATURATION: 98 % | RESPIRATION RATE: 16 BRPM | HEIGHT: 70 IN

## 2017-03-13 DIAGNOSIS — Z98.890 OTHER SPECIFIED POSTPROCEDURAL STATES: Chronic | ICD-10-CM

## 2017-03-13 DIAGNOSIS — Z98.1 ARTHRODESIS STATUS: Chronic | ICD-10-CM

## 2017-03-13 PROCEDURE — 99285 EMERGENCY DEPT VISIT HI MDM: CPT | Mod: 25,GC

## 2017-03-13 PROCEDURE — 93010 ELECTROCARDIOGRAM REPORT: CPT

## 2017-03-13 NOTE — ED ADULT NURSE NOTE - OBJECTIVE STATEMENT
67 y/o M, enters ED w/ c/o increased bilirubin levels. Pt. 67 y/o M, enters ED w/ c/o increased bilirubin levels. Went in to PCP around 1300 on 3/13 for blood work, PCP called pt. around 2100 and told pt. to come to ED for bilirubin level of 20. Pt. has received 3 stents in the past 3 weeks due to stricture in bile duct. Pig tail stent put in on 2/21, double stent put in on 3/2 and metal stent put in on 3/9 - none effective. Pt. presents w/ extreme pruritis all over, but more specifically on left arm. Skin to left arm is red and irritated. Skin otherwise intact. Denies pain anywhere/denies SOB/chest pain. Wife at bedside. 67 y/o M, enters ED w/ c/o increased bilirubin levels. Went in to PCP around 1300 on 3/13 for blood work, PCP called pt. around 2100 and told pt. to come to ED for bilirubin level of 20. Pt. has received 3 stents in the past 3 weeks due to stricture in bile duct. Pig tail stent put in on 2/21, double stent put in on 3/2 and metal stent put in on 3/9 - none effective. Pt. presents jaundice, yellow sclera w/ extreme pruritis all over, but more specifically on left arm. Skin to left arm is red and irritated. Skin otherwise intact. Denies pain anywhere/denies SOB/chest pain. Wife at bedside.

## 2017-03-13 NOTE — ED ADULT NURSE NOTE - PMH
Biliary stricture  s/p biliary stent  Essential hypertension    Gastric polyp    GERD (gastroesophageal reflux disease)    IBS (irritable bowel syndrome)    Obstructive jaundice    Other hyperlipidemia    Pancreatic lesion    Rash

## 2017-03-14 DIAGNOSIS — Z29.9 ENCOUNTER FOR PROPHYLACTIC MEASURES, UNSPECIFIED: ICD-10-CM

## 2017-03-14 DIAGNOSIS — K21.9 GASTRO-ESOPHAGEAL REFLUX DISEASE WITHOUT ESOPHAGITIS: ICD-10-CM

## 2017-03-14 DIAGNOSIS — K58.9 IRRITABLE BOWEL SYNDROME WITHOUT DIARRHEA: ICD-10-CM

## 2017-03-14 DIAGNOSIS — R17 UNSPECIFIED JAUNDICE: ICD-10-CM

## 2017-03-14 DIAGNOSIS — I10 ESSENTIAL (PRIMARY) HYPERTENSION: ICD-10-CM

## 2017-03-14 DIAGNOSIS — K83.8 OTHER SPECIFIED DISEASES OF BILIARY TRACT: ICD-10-CM

## 2017-03-14 LAB
ALBUMIN SERPL ELPH-MCNC: 3.4 G/DL — SIGNIFICANT CHANGE UP (ref 3.3–5)
ALBUMIN SERPL ELPH-MCNC: 3.7 G/DL — SIGNIFICANT CHANGE UP (ref 3.3–5)
ALP SERPL-CCNC: 335 U/L — HIGH (ref 40–120)
ALP SERPL-CCNC: 379 U/L — HIGH (ref 40–120)
ALT FLD-CCNC: 36 U/L RC — SIGNIFICANT CHANGE UP (ref 10–45)
ALT FLD-CCNC: 42 U/L RC — SIGNIFICANT CHANGE UP (ref 10–45)
ANION GAP SERPL CALC-SCNC: 14 MMOL/L — SIGNIFICANT CHANGE UP (ref 5–17)
ANION GAP SERPL CALC-SCNC: 17 MMOL/L — SIGNIFICANT CHANGE UP (ref 5–17)
APTT BLD: 34.8 SEC — SIGNIFICANT CHANGE UP (ref 27.5–37.4)
AST SERPL-CCNC: 58 U/L — HIGH (ref 10–40)
AST SERPL-CCNC: 88 U/L — HIGH (ref 10–40)
BASOPHILS # BLD AUTO: 0.1 K/UL — SIGNIFICANT CHANGE UP (ref 0–0.2)
BASOPHILS # BLD AUTO: 0.1 K/UL — SIGNIFICANT CHANGE UP (ref 0–0.2)
BASOPHILS NFR BLD AUTO: 1.4 % — SIGNIFICANT CHANGE UP (ref 0–2)
BASOPHILS NFR BLD AUTO: 1.5 % — SIGNIFICANT CHANGE UP (ref 0–2)
BILIRUB DIRECT SERPL-MCNC: 13 MG/DL — HIGH (ref 0–0.2)
BILIRUB INDIRECT FLD-MCNC: 2.8 MG/DL — HIGH (ref 0.2–1)
BILIRUB SERPL-MCNC: 15.8 MG/DL — HIGH (ref 0.2–1.2)
BILIRUB SERPL-MCNC: 17.4 MG/DL — HIGH (ref 0.2–1.2)
BUN SERPL-MCNC: 13 MG/DL — SIGNIFICANT CHANGE UP (ref 7–23)
BUN SERPL-MCNC: 15 MG/DL — SIGNIFICANT CHANGE UP (ref 7–23)
CALCIUM SERPL-MCNC: 9.5 MG/DL — SIGNIFICANT CHANGE UP (ref 8.4–10.5)
CALCIUM SERPL-MCNC: 9.5 MG/DL — SIGNIFICANT CHANGE UP (ref 8.4–10.5)
CHLORIDE SERPL-SCNC: 102 MMOL/L — SIGNIFICANT CHANGE UP (ref 96–108)
CHLORIDE SERPL-SCNC: 104 MMOL/L — SIGNIFICANT CHANGE UP (ref 96–108)
CO2 SERPL-SCNC: 20 MMOL/L — LOW (ref 22–31)
CO2 SERPL-SCNC: 25 MMOL/L — SIGNIFICANT CHANGE UP (ref 22–31)
CREAT SERPL-MCNC: 0.99 MG/DL — SIGNIFICANT CHANGE UP (ref 0.5–1.3)
CREAT SERPL-MCNC: 1.01 MG/DL — SIGNIFICANT CHANGE UP (ref 0.5–1.3)
EOSINOPHIL # BLD AUTO: 0.3 K/UL — SIGNIFICANT CHANGE UP (ref 0–0.5)
EOSINOPHIL # BLD AUTO: 0.3 K/UL — SIGNIFICANT CHANGE UP (ref 0–0.5)
EOSINOPHIL NFR BLD AUTO: 5.1 % — SIGNIFICANT CHANGE UP (ref 0–6)
EOSINOPHIL NFR BLD AUTO: 5.7 % — SIGNIFICANT CHANGE UP (ref 0–6)
GAS PNL BLDV: SIGNIFICANT CHANGE UP
GLUCOSE SERPL-MCNC: 104 MG/DL — HIGH (ref 70–99)
GLUCOSE SERPL-MCNC: 99 MG/DL — SIGNIFICANT CHANGE UP (ref 70–99)
HCT VFR BLD CALC: 34.5 % — LOW (ref 39–50)
HCT VFR BLD CALC: 36.8 % — LOW (ref 39–50)
HGB BLD-MCNC: 11.4 G/DL — LOW (ref 13–17)
HGB BLD-MCNC: 12.3 G/DL — LOW (ref 13–17)
INR BLD: 1.08 RATIO — SIGNIFICANT CHANGE UP (ref 0.88–1.16)
INR BLD: 1.1 RATIO — SIGNIFICANT CHANGE UP (ref 0.88–1.16)
LIDOCAIN IGE QN: 45 U/L — SIGNIFICANT CHANGE UP (ref 7–60)
LYMPHOCYTES # BLD AUTO: 1.6 K/UL — SIGNIFICANT CHANGE UP (ref 1–3.3)
LYMPHOCYTES # BLD AUTO: 2 K/UL — SIGNIFICANT CHANGE UP (ref 1–3.3)
LYMPHOCYTES # BLD AUTO: 31.3 % — SIGNIFICANT CHANGE UP (ref 13–44)
LYMPHOCYTES # BLD AUTO: 32.6 % — SIGNIFICANT CHANGE UP (ref 13–44)
MAGNESIUM SERPL-MCNC: 2.3 MG/DL — SIGNIFICANT CHANGE UP (ref 1.6–2.6)
MCHC RBC-ENTMCNC: 30.5 PG — SIGNIFICANT CHANGE UP (ref 27–34)
MCHC RBC-ENTMCNC: 30.7 PG — SIGNIFICANT CHANGE UP (ref 27–34)
MCHC RBC-ENTMCNC: 32.9 GM/DL — SIGNIFICANT CHANGE UP (ref 32–36)
MCHC RBC-ENTMCNC: 33.4 GM/DL — SIGNIFICANT CHANGE UP (ref 32–36)
MCV RBC AUTO: 91.9 FL — SIGNIFICANT CHANGE UP (ref 80–100)
MCV RBC AUTO: 92.6 FL — SIGNIFICANT CHANGE UP (ref 80–100)
MONOCYTES # BLD AUTO: 0.6 K/UL — SIGNIFICANT CHANGE UP (ref 0–0.9)
MONOCYTES # BLD AUTO: 0.8 K/UL — SIGNIFICANT CHANGE UP (ref 0–0.9)
MONOCYTES NFR BLD AUTO: 12.2 % — SIGNIFICANT CHANGE UP (ref 2–14)
MONOCYTES NFR BLD AUTO: 12.4 % — SIGNIFICANT CHANGE UP (ref 2–14)
NEUTROPHILS # BLD AUTO: 2.5 K/UL — SIGNIFICANT CHANGE UP (ref 1.8–7.4)
NEUTROPHILS # BLD AUTO: 3.1 K/UL — SIGNIFICANT CHANGE UP (ref 1.8–7.4)
NEUTROPHILS NFR BLD AUTO: 48.6 % — SIGNIFICANT CHANGE UP (ref 43–77)
NEUTROPHILS NFR BLD AUTO: 49.3 % — SIGNIFICANT CHANGE UP (ref 43–77)
PHOSPHATE SERPL-MCNC: 3.6 MG/DL — SIGNIFICANT CHANGE UP (ref 2.5–4.5)
PLATELET # BLD AUTO: 289 K/UL — SIGNIFICANT CHANGE UP (ref 150–400)
PLATELET # BLD AUTO: 313 K/UL — SIGNIFICANT CHANGE UP (ref 150–400)
POTASSIUM SERPL-MCNC: 4.3 MMOL/L — SIGNIFICANT CHANGE UP (ref 3.5–5.3)
POTASSIUM SERPL-MCNC: 5.9 MMOL/L — HIGH (ref 3.5–5.3)
POTASSIUM SERPL-SCNC: 4.3 MMOL/L — SIGNIFICANT CHANGE UP (ref 3.5–5.3)
POTASSIUM SERPL-SCNC: 5.9 MMOL/L — HIGH (ref 3.5–5.3)
PROT SERPL-MCNC: 6.2 G/DL — SIGNIFICANT CHANGE UP (ref 6–8.3)
PROT SERPL-MCNC: 7.1 G/DL — SIGNIFICANT CHANGE UP (ref 6–8.3)
PROTHROM AB SERPL-ACNC: 11.7 SEC — SIGNIFICANT CHANGE UP (ref 10–13.1)
PROTHROM AB SERPL-ACNC: 12 SEC — SIGNIFICANT CHANGE UP (ref 10–13.1)
RBC # BLD: 3.72 M/UL — LOW (ref 4.2–5.8)
RBC # BLD: 4 M/UL — LOW (ref 4.2–5.8)
RBC # FLD: 17.2 % — HIGH (ref 10.3–14.5)
RBC # FLD: 17.6 % — HIGH (ref 10.3–14.5)
SODIUM SERPL-SCNC: 139 MMOL/L — SIGNIFICANT CHANGE UP (ref 135–145)
SODIUM SERPL-SCNC: 143 MMOL/L — SIGNIFICANT CHANGE UP (ref 135–145)
WBC # BLD: 5 K/UL — SIGNIFICANT CHANGE UP (ref 3.8–10.5)
WBC # BLD: 6.3 K/UL — SIGNIFICANT CHANGE UP (ref 3.8–10.5)
WBC # FLD AUTO: 5 K/UL — SIGNIFICANT CHANGE UP (ref 3.8–10.5)
WBC # FLD AUTO: 6.3 K/UL — SIGNIFICANT CHANGE UP (ref 3.8–10.5)

## 2017-03-14 PROCEDURE — 71010: CPT | Mod: 26

## 2017-03-14 PROCEDURE — 99223 1ST HOSP IP/OBS HIGH 75: CPT | Mod: AI,GC

## 2017-03-14 PROCEDURE — 99222 1ST HOSP IP/OBS MODERATE 55: CPT | Mod: GC

## 2017-03-14 RX ORDER — DIPHENHYDRAMINE HCL 50 MG
50 CAPSULE ORAL ONCE
Qty: 0 | Refills: 0 | Status: COMPLETED | OUTPATIENT
Start: 2017-03-14 | End: 2017-03-14

## 2017-03-14 RX ORDER — CHOLESTYRAMINE 4 G/9G
4 POWDER, FOR SUSPENSION ORAL ONCE
Qty: 0 | Refills: 0 | Status: COMPLETED | OUTPATIENT
Start: 2017-03-14 | End: 2017-03-14

## 2017-03-14 RX ORDER — CHOLESTYRAMINE 4 G/9G
16 POWDER, FOR SUSPENSION ORAL DAILY
Qty: 0 | Refills: 0 | Status: DISCONTINUED | OUTPATIENT
Start: 2017-03-14 | End: 2017-03-15

## 2017-03-14 RX ORDER — SIMETHICONE 80 MG/1
80 TABLET, CHEWABLE ORAL DAILY
Qty: 0 | Refills: 0 | Status: DISCONTINUED | OUTPATIENT
Start: 2017-03-14 | End: 2017-03-21

## 2017-03-14 RX ORDER — SODIUM CHLORIDE 9 MG/ML
1000 INJECTION, SOLUTION INTRAVENOUS ONCE
Qty: 0 | Refills: 0 | Status: COMPLETED | OUTPATIENT
Start: 2017-03-14 | End: 2017-03-14

## 2017-03-14 RX ORDER — ONDANSETRON 8 MG/1
4 TABLET, FILM COATED ORAL EVERY 6 HOURS
Qty: 0 | Refills: 0 | Status: DISCONTINUED | OUTPATIENT
Start: 2017-03-14 | End: 2017-03-21

## 2017-03-14 RX ORDER — SODIUM CHLORIDE 9 MG/ML
1000 INJECTION INTRAMUSCULAR; INTRAVENOUS; SUBCUTANEOUS
Qty: 0 | Refills: 0 | Status: DISCONTINUED | OUTPATIENT
Start: 2017-03-14 | End: 2017-03-15

## 2017-03-14 RX ORDER — POLYETHYLENE GLYCOL 3350 17 G/17G
17 POWDER, FOR SOLUTION ORAL DAILY
Qty: 0 | Refills: 0 | Status: DISCONTINUED | OUTPATIENT
Start: 2017-03-14 | End: 2017-03-21

## 2017-03-14 RX ORDER — SENNA PLUS 8.6 MG/1
2 TABLET ORAL AT BEDTIME
Qty: 0 | Refills: 0 | Status: DISCONTINUED | OUTPATIENT
Start: 2017-03-14 | End: 2017-03-21

## 2017-03-14 RX ORDER — PANTOPRAZOLE SODIUM 20 MG/1
40 TABLET, DELAYED RELEASE ORAL
Qty: 0 | Refills: 0 | Status: DISCONTINUED | OUTPATIENT
Start: 2017-03-14 | End: 2017-03-21

## 2017-03-14 RX ORDER — SODIUM CHLORIDE 9 MG/ML
1000 INJECTION INTRAMUSCULAR; INTRAVENOUS; SUBCUTANEOUS
Qty: 0 | Refills: 0 | Status: DISCONTINUED | OUTPATIENT
Start: 2017-03-14 | End: 2017-03-21

## 2017-03-14 RX ORDER — CHOLESTYRAMINE 4 G/9G
4 POWDER, FOR SUSPENSION ORAL DAILY
Qty: 0 | Refills: 0 | Status: DISCONTINUED | OUTPATIENT
Start: 2017-03-14 | End: 2017-03-14

## 2017-03-14 RX ORDER — HYDROXYZINE HCL 10 MG
25 TABLET ORAL ONCE
Qty: 0 | Refills: 0 | Status: COMPLETED | OUTPATIENT
Start: 2017-03-14 | End: 2017-03-14

## 2017-03-14 RX ORDER — ENOXAPARIN SODIUM 100 MG/ML
40 INJECTION SUBCUTANEOUS EVERY 24 HOURS
Qty: 0 | Refills: 0 | Status: DISCONTINUED | OUTPATIENT
Start: 2017-03-14 | End: 2017-03-14

## 2017-03-14 RX ORDER — HYDROXYZINE HCL 10 MG
25 TABLET ORAL EVERY 6 HOURS
Qty: 0 | Refills: 0 | Status: DISCONTINUED | OUTPATIENT
Start: 2017-03-14 | End: 2017-03-21

## 2017-03-14 RX ORDER — DOCUSATE SODIUM 100 MG
100 CAPSULE ORAL THREE TIMES A DAY
Qty: 0 | Refills: 0 | Status: DISCONTINUED | OUTPATIENT
Start: 2017-03-14 | End: 2017-03-21

## 2017-03-14 RX ORDER — DIPHENHYDRAMINE HCL 50 MG
25 CAPSULE ORAL EVERY 6 HOURS
Qty: 0 | Refills: 0 | Status: DISCONTINUED | OUTPATIENT
Start: 2017-03-14 | End: 2017-03-15

## 2017-03-14 RX ORDER — DIPHENHYDRAMINE HCL 50 MG
25 CAPSULE ORAL ONCE
Qty: 0 | Refills: 0 | Status: COMPLETED | OUTPATIENT
Start: 2017-03-14 | End: 2017-03-14

## 2017-03-14 RX ADMIN — Medication 25 MILLIGRAM(S): at 13:02

## 2017-03-14 RX ADMIN — Medication 100 MILLIGRAM(S): at 23:45

## 2017-03-14 RX ADMIN — Medication 1 APPLICATION(S): at 17:48

## 2017-03-14 RX ADMIN — Medication 25 MILLIGRAM(S): at 23:46

## 2017-03-14 RX ADMIN — SODIUM CHLORIDE 2000 MILLILITER(S): 9 INJECTION, SOLUTION INTRAVENOUS at 02:40

## 2017-03-14 RX ADMIN — Medication 100 MILLIGRAM(S): at 13:02

## 2017-03-14 RX ADMIN — SODIUM CHLORIDE 75 MILLILITER(S): 9 INJECTION INTRAMUSCULAR; INTRAVENOUS; SUBCUTANEOUS at 23:47

## 2017-03-14 RX ADMIN — Medication 25 MILLIGRAM(S): at 06:02

## 2017-03-14 RX ADMIN — Medication 25 MILLIGRAM(S): at 05:16

## 2017-03-14 RX ADMIN — Medication 1 APPLICATION(S): at 06:01

## 2017-03-14 RX ADMIN — Medication 50 MILLIGRAM(S): at 01:03

## 2017-03-14 RX ADMIN — SIMETHICONE 80 MILLIGRAM(S): 80 TABLET, CHEWABLE ORAL at 05:16

## 2017-03-14 RX ADMIN — CHOLESTYRAMINE 4 GRAM(S): 4 POWDER, FOR SUSPENSION ORAL at 13:02

## 2017-03-14 RX ADMIN — Medication 25 MILLIGRAM(S): at 17:54

## 2017-03-14 RX ADMIN — PANTOPRAZOLE SODIUM 40 MILLIGRAM(S): 20 TABLET, DELAYED RELEASE ORAL at 05:31

## 2017-03-14 RX ADMIN — Medication 20 MILLIGRAM(S): at 17:47

## 2017-03-14 RX ADMIN — CHOLESTYRAMINE 16 GRAM(S): 4 POWDER, FOR SUSPENSION ORAL at 17:47

## 2017-03-14 RX ADMIN — SIMETHICONE 80 MILLIGRAM(S): 80 TABLET, CHEWABLE ORAL at 13:02

## 2017-03-14 RX ADMIN — Medication 20 MILLIGRAM(S): at 06:02

## 2017-03-14 RX ADMIN — SODIUM CHLORIDE 100 MILLILITER(S): 9 INJECTION INTRAMUSCULAR; INTRAVENOUS; SUBCUTANEOUS at 05:37

## 2017-03-14 RX ADMIN — Medication 100 MILLIGRAM(S): at 05:31

## 2017-03-14 RX ADMIN — SODIUM CHLORIDE 75 MILLILITER(S): 9 INJECTION INTRAMUSCULAR; INTRAVENOUS; SUBCUTANEOUS at 20:29

## 2017-03-14 NOTE — ED PROVIDER NOTE - OBJECTIVE STATEMENT
GI: Sarah  PMD: mauricio  66-year-old male with a history of HTN and obstructive jaundice due to biliary stricture who presents to the ED with worsening jaundice and pruritis. The patient has had jaundice for three weeks and was admitted one week ago for hyperbilirubinemia to 12. He was evaluated by GI and had 3 ERCP that showed "Multiple gastric polyps s/p resection of one of the larger polyps, EUS finding with subtle 14mm x 15mm hypoechoic, heterogenous area in pancreatic head (at site of transition to biliary dilation) s/p FNA and core biopsies. ERCP with biliary sphincterotomy, extraction of dark-thick sludge, cholangioscopy, biopies of exposed ampulla/distal CBD, brushings of distal CBD, and placement of double pigtail biliary stent. No obvious findings seen on cholangioscopy although visualization limited by thick, dark, sludge throughout biliary tree." MRI abdomen during last admission did not reveal definitive pancreatic mass, though it showed "subtle enhancement of the wall of the common bile duct which may be related to inflammation or malignant stricture of the common bile duct." Pt seen in outpatient office and had rising bilirubin to 20. sent in for admission. GI NP said to call Dr. Chance Lo at [phone].

## 2017-03-14 NOTE — H&P ADULT. - HISTORY OF PRESENT ILLNESS
66-year-old  M with pmhx of HTN and obstructive jaundice 2/2 biliary stricture presents to [facility] with worsening jaundice and pruritis.     The obstructive jaundice had started in January when pt was found to originally have an obstruction with bili of 20. Since then, the patient has had a total of 3 ERCPs, is  s/p resection of gastric polyps, an EUS with findings of 14mm x 15mm hypoechoic, heterogenous area in pancreatic head (at site of transition to biliary dilation) s/p FNA and core biopsies. The first ERCP was performed with biliary sphincterotomy, extraction of dark-thick sludge, cholangioscopy, and placement of double pigtail biliary stent.    MRI abdomen during last admission did not reveal definitive pancreatic mass, though it showed "subtle enhancement of the wall of the common bile duct which may be related to inflammation or malignant stricture of the common bile duct." He had biliary stent exchange at that time as well.    Pt continued to experience jaundice and intense pruritis, was seen in outpatient office last week where he had a metal stent placed, but today when he had a follow u0p appt he was once again found to have an elevated bilirubin to 20 and was sent in for admission.

## 2017-03-14 NOTE — ED PROVIDER NOTE - NS ED ROS FT
Constitutional: No fever or chills  Eyes: No visual changes  HEENT: No throat pain  CV: No chest pain  Resp: No SOB no cough  GI: mild abd pain, no nausea or vomiting  : No dysuria  MSK: No musculoskeletal pain  Skin: jaundice. puritis pale stool  Neuro: No headache

## 2017-03-14 NOTE — ED PROVIDER NOTE - MEDICAL DECISION MAKING DETAILS
66M hx htn obstructive jaundice s/p multiple ercp presents with jaundice puritis and worsening bili. will obtain labs gi consult and admit 66M hx htn obstructive jaundice s/p multiple ercp presents with jaundice puritis and worsening bili. will obtain labs gi consult and admit  Avanirin: See attending statement below

## 2017-03-14 NOTE — ED PROVIDER NOTE - PHYSICAL EXAMINATION
Constitutional: moderate distress  Eyes: PERRLA EOMI scleral icteris  Head: Normocephalic atraumatic  Cardiac: regular rate   Resp: Lungs CTAB  GI: Abd s/nt/nd  Neuro: CN2-12 intact  Skin: jaundice

## 2017-03-14 NOTE — H&P ADULT. - PROBLEM SELECTOR PLAN 1
- GI consulted o/n by ED, will assess patient this am for further intervention  - symptomatic control of jaundice and associated pruritis  - cholestyramine, benadryl, atarex, triamcinolone  - c/w IVF  - monitor bili, LFTs

## 2017-03-14 NOTE — H&P ADULT. - ASSESSMENT
66-year-old male with a history of HTN and obstructive jaundice due to biliary stricture who presents to the ED with worsening jaundice and pruritis likely in the setting of a re-obstructed biliary stent.

## 2017-03-14 NOTE — ED PROVIDER NOTE - ATTENDING CONTRIBUTION TO CARE
65yo M hx of obstructive jaundice 2/2 biliary stricture with recent stent placement sent in by GI for increasing bilirubin. Pt reports increasing pruritis. No change in jaundice.  Gen: WNWD NAD jaundiced   HEENT: NCAT scleral icterus PERRL EOMI normal pharynx  Neck: supple  CV: RRR, no murmur  Lung: CTA BL  Abd: +BS soft NTND  Ext: wwp, palp pulses, FROMx4, no cce  Neuro: A&Ox3, CN grossly intact, sensation intact, motor 5/5 throughout  Plan: Labs, CMP, benadryl, IVF, admit, call to GI

## 2017-03-14 NOTE — ED ADULT NURSE REASSESSMENT NOTE - NS ED NURSE REASSESS COMMENT FT1
report given to Brandi on 5 Conner. RN aware pt.'s pruritis. Safety and comfort provided while in ED.

## 2017-03-15 LAB
A1AT SERPL-MCNC: 167 MG/DL — SIGNIFICANT CHANGE UP (ref 90–200)
ALBUMIN SERPL ELPH-MCNC: 3.3 G/DL — SIGNIFICANT CHANGE UP (ref 3.3–5)
ALP SERPL-CCNC: 303 U/L — HIGH (ref 40–120)
ALT FLD-CCNC: 36 U/L RC — SIGNIFICANT CHANGE UP (ref 10–45)
ANION GAP SERPL CALC-SCNC: 12 MMOL/L — SIGNIFICANT CHANGE UP (ref 5–17)
APTT BLD: 32.7 SEC — SIGNIFICANT CHANGE UP (ref 27.5–37.4)
AST SERPL-CCNC: 66 U/L — HIGH (ref 10–40)
BILIRUB SERPL-MCNC: 14.3 MG/DL — HIGH (ref 0.2–1.2)
BUN SERPL-MCNC: 12 MG/DL — SIGNIFICANT CHANGE UP (ref 7–23)
CALCIUM SERPL-MCNC: 9.2 MG/DL — SIGNIFICANT CHANGE UP (ref 8.4–10.5)
CHLORIDE SERPL-SCNC: 108 MMOL/L — SIGNIFICANT CHANGE UP (ref 96–108)
CO2 SERPL-SCNC: 24 MMOL/L — SIGNIFICANT CHANGE UP (ref 22–31)
CREAT SERPL-MCNC: 0.87 MG/DL — SIGNIFICANT CHANGE UP (ref 0.5–1.3)
FERRITIN SERPL-MCNC: 487.5 NG/ML — HIGH (ref 30–400)
GLUCOSE SERPL-MCNC: 110 MG/DL — HIGH (ref 70–99)
HAV IGM SER-ACNC: SIGNIFICANT CHANGE UP
HBV CORE AB SER-ACNC: SIGNIFICANT CHANGE UP
HBV CORE IGM SER-ACNC: SIGNIFICANT CHANGE UP
HBV SURFACE AG SER-ACNC: SIGNIFICANT CHANGE UP
HCT VFR BLD CALC: 34.3 % — LOW (ref 39–50)
HCV AB S/CO SERPL IA: 0.1 S/CO — SIGNIFICANT CHANGE UP
HCV AB SERPL-IMP: SIGNIFICANT CHANGE UP
HGB BLD-MCNC: 11.1 G/DL — LOW (ref 13–17)
INR BLD: 1.07 RATIO — SIGNIFICANT CHANGE UP (ref 0.88–1.16)
IRON SATN MFR SERPL: 106 UG/DL — SIGNIFICANT CHANGE UP (ref 45–165)
IRON SATN MFR SERPL: 50 % — SIGNIFICANT CHANGE UP (ref 16–55)
MCHC RBC-ENTMCNC: 30.1 PG — SIGNIFICANT CHANGE UP (ref 27–34)
MCHC RBC-ENTMCNC: 32.3 GM/DL — SIGNIFICANT CHANGE UP (ref 32–36)
MCV RBC AUTO: 93.2 FL — SIGNIFICANT CHANGE UP (ref 80–100)
PLATELET # BLD AUTO: 255 K/UL — SIGNIFICANT CHANGE UP (ref 150–400)
POTASSIUM SERPL-MCNC: 3.9 MMOL/L — SIGNIFICANT CHANGE UP (ref 3.5–5.3)
POTASSIUM SERPL-SCNC: 3.9 MMOL/L — SIGNIFICANT CHANGE UP (ref 3.5–5.3)
PROT SERPL-MCNC: 5.9 G/DL — LOW (ref 6–8.3)
PROTHROM AB SERPL-ACNC: 11.7 SEC — SIGNIFICANT CHANGE UP (ref 10–13.1)
RBC # BLD: 3.68 M/UL — LOW (ref 4.2–5.8)
RBC # FLD: 17.3 % — HIGH (ref 10.3–14.5)
SODIUM SERPL-SCNC: 144 MMOL/L — SIGNIFICANT CHANGE UP (ref 135–145)
TIBC SERPL-MCNC: 211 UG/DL — LOW (ref 220–430)
UIBC SERPL-MCNC: 105 UG/DL — LOW (ref 110–370)
WBC # BLD: 5.1 K/UL — SIGNIFICANT CHANGE UP (ref 3.8–10.5)
WBC # FLD AUTO: 5.1 K/UL — SIGNIFICANT CHANGE UP (ref 3.8–10.5)

## 2017-03-15 PROCEDURE — 74177 CT ABD & PELVIS W/CONTRAST: CPT | Mod: 26

## 2017-03-15 PROCEDURE — 99232 SBSQ HOSP IP/OBS MODERATE 35: CPT | Mod: GC

## 2017-03-15 PROCEDURE — 99233 SBSQ HOSP IP/OBS HIGH 50: CPT | Mod: GC

## 2017-03-15 RX ORDER — DIPHENHYDRAMINE HCL 50 MG
25 CAPSULE ORAL EVERY 6 HOURS
Qty: 0 | Refills: 0 | Status: DISCONTINUED | OUTPATIENT
Start: 2017-03-15 | End: 2017-03-21

## 2017-03-15 RX ORDER — CHOLESTYRAMINE 4 G/9G
8 POWDER, FOR SUSPENSION ORAL
Qty: 0 | Refills: 0 | Status: DISCONTINUED | OUTPATIENT
Start: 2017-03-15 | End: 2017-03-21

## 2017-03-15 RX ADMIN — Medication 1 APPLICATION(S): at 06:08

## 2017-03-15 RX ADMIN — Medication 100 MILLIGRAM(S): at 06:06

## 2017-03-15 RX ADMIN — Medication 25 MILLIGRAM(S): at 11:43

## 2017-03-15 RX ADMIN — SODIUM CHLORIDE 75 MILLILITER(S): 9 INJECTION INTRAMUSCULAR; INTRAVENOUS; SUBCUTANEOUS at 06:07

## 2017-03-15 RX ADMIN — CHOLESTYRAMINE 16 GRAM(S): 4 POWDER, FOR SUSPENSION ORAL at 11:46

## 2017-03-15 RX ADMIN — Medication 25 MILLIGRAM(S): at 17:08

## 2017-03-15 RX ADMIN — Medication 100 MILLIGRAM(S): at 21:02

## 2017-03-15 RX ADMIN — PANTOPRAZOLE SODIUM 40 MILLIGRAM(S): 20 TABLET, DELAYED RELEASE ORAL at 06:06

## 2017-03-15 RX ADMIN — Medication 20 MILLIGRAM(S): at 06:07

## 2017-03-15 RX ADMIN — Medication 25 MILLIGRAM(S): at 17:05

## 2017-03-15 RX ADMIN — SENNA PLUS 2 TABLET(S): 8.6 TABLET ORAL at 21:02

## 2017-03-15 RX ADMIN — Medication 25 MILLIGRAM(S): at 11:46

## 2017-03-15 RX ADMIN — SIMETHICONE 80 MILLIGRAM(S): 80 TABLET, CHEWABLE ORAL at 15:09

## 2017-03-15 RX ADMIN — Medication 25 MILLIGRAM(S): at 04:23

## 2017-03-15 RX ADMIN — Medication 20 MILLIGRAM(S): at 18:09

## 2017-03-15 RX ADMIN — Medication 100 MILLIGRAM(S): at 15:09

## 2017-03-15 RX ADMIN — POLYETHYLENE GLYCOL 3350 17 GRAM(S): 17 POWDER, FOR SOLUTION ORAL at 21:03

## 2017-03-15 RX ADMIN — Medication 25 MILLIGRAM(S): at 23:56

## 2017-03-15 RX ADMIN — Medication 1 APPLICATION(S): at 17:02

## 2017-03-15 RX ADMIN — Medication 25 MILLIGRAM(S): at 23:32

## 2017-03-15 RX ADMIN — Medication 25 MILLIGRAM(S): at 04:24

## 2017-03-16 LAB
ALBUMIN SERPL ELPH-MCNC: 3.4 G/DL — SIGNIFICANT CHANGE UP (ref 3.3–5)
ALP SERPL-CCNC: 302 U/L — HIGH (ref 40–120)
ALT FLD-CCNC: 46 U/L RC — HIGH (ref 10–45)
ANA TITR SER: NEGATIVE — SIGNIFICANT CHANGE UP
ANION GAP SERPL CALC-SCNC: 16 MMOL/L — SIGNIFICANT CHANGE UP (ref 5–17)
AST SERPL-CCNC: 67 U/L — HIGH (ref 10–40)
BILIRUB SERPL-MCNC: 13.8 MG/DL — HIGH (ref 0.2–1.2)
BUN SERPL-MCNC: 10 MG/DL — SIGNIFICANT CHANGE UP (ref 7–23)
CALCIUM SERPL-MCNC: 9.1 MG/DL — SIGNIFICANT CHANGE UP (ref 8.4–10.5)
CEA SERPL-MCNC: 1.2 NG/ML — SIGNIFICANT CHANGE UP (ref 0–3.8)
CHLORIDE SERPL-SCNC: 102 MMOL/L — SIGNIFICANT CHANGE UP (ref 96–108)
CO2 SERPL-SCNC: 23 MMOL/L — SIGNIFICANT CHANGE UP (ref 22–31)
CREAT SERPL-MCNC: 0.96 MG/DL — SIGNIFICANT CHANGE UP (ref 0.5–1.3)
GLUCOSE SERPL-MCNC: 97 MG/DL — SIGNIFICANT CHANGE UP (ref 70–99)
HCT VFR BLD CALC: 34 % — LOW (ref 39–50)
HGB BLD-MCNC: 11.5 G/DL — LOW (ref 13–17)
IGA FLD-MCNC: 140 MG/DL — SIGNIFICANT CHANGE UP (ref 68–378)
IGG FLD-MCNC: 933 MG/DL — SIGNIFICANT CHANGE UP (ref 694–1618)
IGM SERPL-MCNC: 71 MG/DL — SIGNIFICANT CHANGE UP (ref 40–230)
KAPPA LC SER QL IFE: 1.51 MG/DL — SIGNIFICANT CHANGE UP (ref 0.33–1.94)
KAPPA/LAMBDA FREE LIGHT CHAIN RATIO, SERUM: 1.06 RATIO — SIGNIFICANT CHANGE UP (ref 0.26–1.65)
LAMBDA LC SER QL IFE: 1.42 MG/DL — SIGNIFICANT CHANGE UP (ref 0.57–2.63)
MAGNESIUM SERPL-MCNC: 2.2 MG/DL — SIGNIFICANT CHANGE UP (ref 1.6–2.6)
MCHC RBC-ENTMCNC: 31.7 PG — SIGNIFICANT CHANGE UP (ref 27–34)
MCHC RBC-ENTMCNC: 33.8 GM/DL — SIGNIFICANT CHANGE UP (ref 32–36)
MCV RBC AUTO: 93.8 FL — SIGNIFICANT CHANGE UP (ref 80–100)
MITOCHONDRIA AB SER-ACNC: SIGNIFICANT CHANGE UP
PHOSPHATE SERPL-MCNC: 3.4 MG/DL — SIGNIFICANT CHANGE UP (ref 2.5–4.5)
PLATELET # BLD AUTO: 241 K/UL — SIGNIFICANT CHANGE UP (ref 150–400)
POTASSIUM SERPL-MCNC: 3.8 MMOL/L — SIGNIFICANT CHANGE UP (ref 3.5–5.3)
POTASSIUM SERPL-SCNC: 3.8 MMOL/L — SIGNIFICANT CHANGE UP (ref 3.5–5.3)
PROT SERPL-MCNC: 6.3 G/DL — SIGNIFICANT CHANGE UP (ref 6–8.3)
RBC # BLD: 3.63 M/UL — LOW (ref 4.2–5.8)
RBC # FLD: 17.5 % — HIGH (ref 10.3–14.5)
SMOOTH MUSCLE AB SER-ACNC: SIGNIFICANT CHANGE UP
SODIUM SERPL-SCNC: 141 MMOL/L — SIGNIFICANT CHANGE UP (ref 135–145)
WBC # BLD: 7.1 K/UL — SIGNIFICANT CHANGE UP (ref 3.8–10.5)
WBC # FLD AUTO: 7.1 K/UL — SIGNIFICANT CHANGE UP (ref 3.8–10.5)

## 2017-03-16 PROCEDURE — 99222 1ST HOSP IP/OBS MODERATE 55: CPT

## 2017-03-16 PROCEDURE — 99233 SBSQ HOSP IP/OBS HIGH 50: CPT | Mod: GC

## 2017-03-16 PROCEDURE — 99223 1ST HOSP IP/OBS HIGH 75: CPT | Mod: GC

## 2017-03-16 RX ORDER — DIPHENHYDRAMINE HCL 50 MG
25 CAPSULE ORAL ONCE
Qty: 0 | Refills: 0 | Status: COMPLETED | OUTPATIENT
Start: 2017-03-16 | End: 2017-03-16

## 2017-03-16 RX ADMIN — Medication 1 APPLICATION(S): at 17:25

## 2017-03-16 RX ADMIN — Medication 100 MILLIGRAM(S): at 05:25

## 2017-03-16 RX ADMIN — PANTOPRAZOLE SODIUM 40 MILLIGRAM(S): 20 TABLET, DELAYED RELEASE ORAL at 05:25

## 2017-03-16 RX ADMIN — Medication 25 MILLIGRAM(S): at 05:24

## 2017-03-16 RX ADMIN — Medication 25 MILLIGRAM(S): at 12:07

## 2017-03-16 RX ADMIN — Medication 20 MILLIGRAM(S): at 05:25

## 2017-03-16 RX ADMIN — CHOLESTYRAMINE 8 GRAM(S): 4 POWDER, FOR SUSPENSION ORAL at 09:50

## 2017-03-16 RX ADMIN — SIMETHICONE 80 MILLIGRAM(S): 80 TABLET, CHEWABLE ORAL at 15:03

## 2017-03-16 RX ADMIN — Medication 25 MILLIGRAM(S): at 17:52

## 2017-03-16 RX ADMIN — Medication 25 MILLIGRAM(S): at 05:25

## 2017-03-16 RX ADMIN — Medication 25 MILLIGRAM(S): at 07:45

## 2017-03-16 RX ADMIN — Medication 25 MILLIGRAM(S): at 23:23

## 2017-03-16 RX ADMIN — Medication 1 APPLICATION(S): at 05:26

## 2017-03-16 RX ADMIN — Medication 20 MILLIGRAM(S): at 17:51

## 2017-03-16 RX ADMIN — Medication 25 MILLIGRAM(S): at 12:08

## 2017-03-16 RX ADMIN — CHOLESTYRAMINE 8 GRAM(S): 4 POWDER, FOR SUSPENSION ORAL at 21:52

## 2017-03-16 RX ADMIN — Medication 25 MILLIGRAM(S): at 17:51

## 2017-03-17 LAB
ALBUMIN SERPL ELPH-MCNC: 3.6 G/DL — SIGNIFICANT CHANGE UP (ref 3.3–5)
ALP SERPL-CCNC: 264 U/L — HIGH (ref 40–120)
ALT FLD-CCNC: 47 U/L RC — HIGH (ref 10–45)
ANION GAP SERPL CALC-SCNC: 13 MMOL/L — SIGNIFICANT CHANGE UP (ref 5–17)
AST SERPL-CCNC: 74 U/L — HIGH (ref 10–40)
BILIRUB SERPL-MCNC: 11.4 MG/DL — HIGH (ref 0.2–1.2)
BUN SERPL-MCNC: 12 MG/DL — SIGNIFICANT CHANGE UP (ref 7–23)
CALCIUM SERPL-MCNC: 9.1 MG/DL — SIGNIFICANT CHANGE UP (ref 8.4–10.5)
CARDIOLIPIN AB SER-ACNC: NEGATIVE — SIGNIFICANT CHANGE UP
CHLORIDE SERPL-SCNC: 109 MMOL/L — HIGH (ref 96–108)
CO2 SERPL-SCNC: 22 MMOL/L — SIGNIFICANT CHANGE UP (ref 22–31)
CREAT SERPL-MCNC: 0.85 MG/DL — SIGNIFICANT CHANGE UP (ref 0.5–1.3)
GLUCOSE SERPL-MCNC: 105 MG/DL — HIGH (ref 70–99)
HCT VFR BLD CALC: 33.2 % — LOW (ref 39–50)
HGB BLD-MCNC: 10.9 G/DL — LOW (ref 13–17)
IGG SERPL-MCNC: 1080 MG/DL — SIGNIFICANT CHANGE UP (ref 767–1590)
IGG1 SER-MCNC: 511 MG/DL — SIGNIFICANT CHANGE UP (ref 341–894)
IGG2 SER-MCNC: 325 MG/DL — SIGNIFICANT CHANGE UP (ref 171–632)
IGG3 SER-MCNC: 27.4 MG/DL — SIGNIFICANT CHANGE UP (ref 18.4–106)
IGG4 SER-MCNC: 27.7 MG/DL — SIGNIFICANT CHANGE UP (ref 2.4–121)
MCHC RBC-ENTMCNC: 31 PG — SIGNIFICANT CHANGE UP (ref 27–34)
MCHC RBC-ENTMCNC: 32.8 GM/DL — SIGNIFICANT CHANGE UP (ref 32–36)
MCV RBC AUTO: 94.6 FL — SIGNIFICANT CHANGE UP (ref 80–100)
PLATELET # BLD AUTO: 241 K/UL — SIGNIFICANT CHANGE UP (ref 150–400)
POTASSIUM SERPL-MCNC: 4.3 MMOL/L — SIGNIFICANT CHANGE UP (ref 3.5–5.3)
POTASSIUM SERPL-SCNC: 4.3 MMOL/L — SIGNIFICANT CHANGE UP (ref 3.5–5.3)
PROT SERPL-MCNC: 6.1 G/DL — SIGNIFICANT CHANGE UP (ref 6–8.3)
RBC # BLD: 3.51 M/UL — LOW (ref 4.2–5.8)
RBC # FLD: 17 % — HIGH (ref 10.3–14.5)
SODIUM SERPL-SCNC: 144 MMOL/L — SIGNIFICANT CHANGE UP (ref 135–145)
WBC # BLD: 5.7 K/UL — SIGNIFICANT CHANGE UP (ref 3.8–10.5)
WBC # FLD AUTO: 5.7 K/UL — SIGNIFICANT CHANGE UP (ref 3.8–10.5)

## 2017-03-17 PROCEDURE — 99233 SBSQ HOSP IP/OBS HIGH 50: CPT | Mod: GC

## 2017-03-17 PROCEDURE — 99232 SBSQ HOSP IP/OBS MODERATE 35: CPT | Mod: GC

## 2017-03-17 RX ADMIN — Medication 25 MILLIGRAM(S): at 06:03

## 2017-03-17 RX ADMIN — Medication 1 APPLICATION(S): at 17:54

## 2017-03-17 RX ADMIN — SODIUM CHLORIDE 75 MILLILITER(S): 9 INJECTION INTRAMUSCULAR; INTRAVENOUS; SUBCUTANEOUS at 06:02

## 2017-03-17 RX ADMIN — CHOLESTYRAMINE 8 GRAM(S): 4 POWDER, FOR SUSPENSION ORAL at 10:26

## 2017-03-17 RX ADMIN — Medication 25 MILLIGRAM(S): at 23:11

## 2017-03-17 RX ADMIN — PANTOPRAZOLE SODIUM 40 MILLIGRAM(S): 20 TABLET, DELAYED RELEASE ORAL at 06:03

## 2017-03-17 RX ADMIN — Medication 25 MILLIGRAM(S): at 17:53

## 2017-03-17 RX ADMIN — Medication 25 MILLIGRAM(S): at 11:34

## 2017-03-17 RX ADMIN — Medication 20 MILLIGRAM(S): at 17:53

## 2017-03-17 RX ADMIN — SODIUM CHLORIDE 75 MILLILITER(S): 9 INJECTION INTRAMUSCULAR; INTRAVENOUS; SUBCUTANEOUS at 16:19

## 2017-03-17 RX ADMIN — SIMETHICONE 80 MILLIGRAM(S): 80 TABLET, CHEWABLE ORAL at 16:18

## 2017-03-17 RX ADMIN — Medication 20 MILLIGRAM(S): at 06:04

## 2017-03-17 RX ADMIN — CHOLESTYRAMINE 8 GRAM(S): 4 POWDER, FOR SUSPENSION ORAL at 22:02

## 2017-03-17 RX ADMIN — Medication 1 APPLICATION(S): at 06:04

## 2017-03-17 RX ADMIN — Medication 100 MILLIGRAM(S): at 16:18

## 2017-03-17 RX ADMIN — Medication 25 MILLIGRAM(S): at 17:52

## 2017-03-18 LAB
ALBUMIN SERPL ELPH-MCNC: 3.1 G/DL — LOW (ref 3.3–5)
ALP SERPL-CCNC: 239 U/L — HIGH (ref 40–120)
ALT FLD-CCNC: 50 U/L RC — HIGH (ref 10–45)
ANION GAP SERPL CALC-SCNC: 14 MMOL/L — SIGNIFICANT CHANGE UP (ref 5–17)
AST SERPL-CCNC: 65 U/L — HIGH (ref 10–40)
BILIRUB SERPL-MCNC: 9 MG/DL — HIGH (ref 0.2–1.2)
BUN SERPL-MCNC: 12 MG/DL — SIGNIFICANT CHANGE UP (ref 7–23)
CALCIUM SERPL-MCNC: 8.9 MG/DL — SIGNIFICANT CHANGE UP (ref 8.4–10.5)
CHLORIDE SERPL-SCNC: 106 MMOL/L — SIGNIFICANT CHANGE UP (ref 96–108)
CO2 SERPL-SCNC: 23 MMOL/L — SIGNIFICANT CHANGE UP (ref 22–31)
CREAT SERPL-MCNC: 0.9 MG/DL — SIGNIFICANT CHANGE UP (ref 0.5–1.3)
GLUCOSE SERPL-MCNC: 114 MG/DL — HIGH (ref 70–99)
HCT VFR BLD CALC: 31.9 % — LOW (ref 39–50)
HGB BLD-MCNC: 11 G/DL — LOW (ref 13–17)
MCHC RBC-ENTMCNC: 32.6 PG — SIGNIFICANT CHANGE UP (ref 27–34)
MCHC RBC-ENTMCNC: 34.4 GM/DL — SIGNIFICANT CHANGE UP (ref 32–36)
MCV RBC AUTO: 94.9 FL — SIGNIFICANT CHANGE UP (ref 80–100)
PLATELET # BLD AUTO: 215 K/UL — SIGNIFICANT CHANGE UP (ref 150–400)
POTASSIUM SERPL-MCNC: 4.2 MMOL/L — SIGNIFICANT CHANGE UP (ref 3.5–5.3)
POTASSIUM SERPL-SCNC: 4.2 MMOL/L — SIGNIFICANT CHANGE UP (ref 3.5–5.3)
PROT SERPL-MCNC: 5.7 G/DL — LOW (ref 6–8.3)
RBC # BLD: 3.36 M/UL — LOW (ref 4.2–5.8)
RBC # FLD: 17 % — HIGH (ref 10.3–14.5)
SODIUM SERPL-SCNC: 143 MMOL/L — SIGNIFICANT CHANGE UP (ref 135–145)
WBC # BLD: 6.7 K/UL — SIGNIFICANT CHANGE UP (ref 3.8–10.5)
WBC # FLD AUTO: 6.7 K/UL — SIGNIFICANT CHANGE UP (ref 3.8–10.5)

## 2017-03-18 PROCEDURE — 99233 SBSQ HOSP IP/OBS HIGH 50: CPT | Mod: GC

## 2017-03-18 RX ORDER — DIPHENHYDRAMINE HCL 50 MG
25 CAPSULE ORAL ONCE
Qty: 0 | Refills: 0 | Status: COMPLETED | OUTPATIENT
Start: 2017-03-18 | End: 2017-03-18

## 2017-03-18 RX ADMIN — CHOLESTYRAMINE 8 GRAM(S): 4 POWDER, FOR SUSPENSION ORAL at 20:55

## 2017-03-18 RX ADMIN — Medication 100 MILLIGRAM(S): at 22:32

## 2017-03-18 RX ADMIN — SIMETHICONE 80 MILLIGRAM(S): 80 TABLET, CHEWABLE ORAL at 11:22

## 2017-03-18 RX ADMIN — Medication 25 MILLIGRAM(S): at 14:18

## 2017-03-18 RX ADMIN — Medication 25 MILLIGRAM(S): at 17:13

## 2017-03-18 RX ADMIN — PANTOPRAZOLE SODIUM 40 MILLIGRAM(S): 20 TABLET, DELAYED RELEASE ORAL at 06:02

## 2017-03-18 RX ADMIN — Medication 100 MILLIGRAM(S): at 13:40

## 2017-03-18 RX ADMIN — Medication 25 MILLIGRAM(S): at 23:51

## 2017-03-18 RX ADMIN — Medication 25 MILLIGRAM(S): at 17:12

## 2017-03-18 RX ADMIN — Medication 20 MILLIGRAM(S): at 17:12

## 2017-03-18 RX ADMIN — Medication 25 MILLIGRAM(S): at 11:22

## 2017-03-18 RX ADMIN — SODIUM CHLORIDE 75 MILLILITER(S): 9 INJECTION INTRAMUSCULAR; INTRAVENOUS; SUBCUTANEOUS at 15:13

## 2017-03-18 RX ADMIN — CHOLESTYRAMINE 8 GRAM(S): 4 POWDER, FOR SUSPENSION ORAL at 10:29

## 2017-03-18 RX ADMIN — SODIUM CHLORIDE 75 MILLILITER(S): 9 INJECTION INTRAMUSCULAR; INTRAVENOUS; SUBCUTANEOUS at 22:34

## 2017-03-18 RX ADMIN — Medication 25 MILLIGRAM(S): at 11:21

## 2017-03-18 RX ADMIN — Medication 1 APPLICATION(S): at 17:15

## 2017-03-18 RX ADMIN — Medication 25 MILLIGRAM(S): at 05:30

## 2017-03-18 RX ADMIN — Medication 1 APPLICATION(S): at 05:31

## 2017-03-18 RX ADMIN — Medication 20 MILLIGRAM(S): at 05:30

## 2017-03-19 LAB
ALBUMIN SERPL ELPH-MCNC: 3.3 G/DL — SIGNIFICANT CHANGE UP (ref 3.3–5)
ALP SERPL-CCNC: 231 U/L — HIGH (ref 40–120)
ALT FLD-CCNC: 53 U/L RC — HIGH (ref 10–45)
ANION GAP SERPL CALC-SCNC: 6 MMOL/L — SIGNIFICANT CHANGE UP (ref 5–17)
AST SERPL-CCNC: 69 U/L — HIGH (ref 10–40)
BILIRUB SERPL-MCNC: 9 MG/DL — HIGH (ref 0.2–1.2)
BLD GP AB SCN SERPL QL: NEGATIVE — SIGNIFICANT CHANGE UP
BUN SERPL-MCNC: 14 MG/DL — SIGNIFICANT CHANGE UP (ref 7–23)
CALCIUM SERPL-MCNC: 9.2 MG/DL — SIGNIFICANT CHANGE UP (ref 8.4–10.5)
CHLORIDE SERPL-SCNC: 108 MMOL/L — SIGNIFICANT CHANGE UP (ref 96–108)
CO2 SERPL-SCNC: 29 MMOL/L — SIGNIFICANT CHANGE UP (ref 22–31)
CREAT SERPL-MCNC: 0.91 MG/DL — SIGNIFICANT CHANGE UP (ref 0.5–1.3)
GLUCOSE SERPL-MCNC: 108 MG/DL — HIGH (ref 70–99)
HCT VFR BLD CALC: 34.7 % — LOW (ref 39–50)
HGB BLD-MCNC: 11.2 G/DL — LOW (ref 13–17)
MCHC RBC-ENTMCNC: 30.6 PG — SIGNIFICANT CHANGE UP (ref 27–34)
MCHC RBC-ENTMCNC: 32.2 GM/DL — SIGNIFICANT CHANGE UP (ref 32–36)
MCV RBC AUTO: 94.8 FL — SIGNIFICANT CHANGE UP (ref 80–100)
PLATELET # BLD AUTO: 232 K/UL — SIGNIFICANT CHANGE UP (ref 150–400)
POTASSIUM SERPL-MCNC: 4.4 MMOL/L — SIGNIFICANT CHANGE UP (ref 3.5–5.3)
POTASSIUM SERPL-SCNC: 4.4 MMOL/L — SIGNIFICANT CHANGE UP (ref 3.5–5.3)
PROT SERPL-MCNC: 6.1 G/DL — SIGNIFICANT CHANGE UP (ref 6–8.3)
RBC # BLD: 3.66 M/UL — LOW (ref 4.2–5.8)
RBC # FLD: 16.5 % — HIGH (ref 10.3–14.5)
RH IG SCN BLD-IMP: POSITIVE — SIGNIFICANT CHANGE UP
SODIUM SERPL-SCNC: 143 MMOL/L — SIGNIFICANT CHANGE UP (ref 135–145)
WBC # BLD: 5.4 K/UL — SIGNIFICANT CHANGE UP (ref 3.8–10.5)
WBC # FLD AUTO: 5.4 K/UL — SIGNIFICANT CHANGE UP (ref 3.8–10.5)

## 2017-03-19 PROCEDURE — 99232 SBSQ HOSP IP/OBS MODERATE 35: CPT | Mod: GC

## 2017-03-19 RX ORDER — PHENYLEPHRINE-SHARK LIVER OIL-MINERAL OIL-PETROLATUM RECTAL OINTMENT
1 OINTMENT (GRAM) RECTAL EVERY 6 HOURS
Qty: 0 | Refills: 0 | Status: DISCONTINUED | OUTPATIENT
Start: 2017-03-19 | End: 2017-03-21

## 2017-03-19 RX ORDER — SOD SULF/SODIUM/NAHCO3/KCL/PEG
1000 SOLUTION, RECONSTITUTED, ORAL ORAL EVERY 4 HOURS
Qty: 0 | Refills: 0 | Status: COMPLETED | OUTPATIENT
Start: 2017-03-19 | End: 2017-03-19

## 2017-03-19 RX ADMIN — CHOLESTYRAMINE 8 GRAM(S): 4 POWDER, FOR SUSPENSION ORAL at 10:12

## 2017-03-19 RX ADMIN — Medication 25 MILLIGRAM(S): at 06:27

## 2017-03-19 RX ADMIN — Medication 1 APPLICATION(S): at 06:18

## 2017-03-19 RX ADMIN — Medication 25 MILLIGRAM(S): at 12:48

## 2017-03-19 RX ADMIN — PANTOPRAZOLE SODIUM 40 MILLIGRAM(S): 20 TABLET, DELAYED RELEASE ORAL at 06:17

## 2017-03-19 RX ADMIN — Medication 20 MILLIGRAM(S): at 17:17

## 2017-03-19 RX ADMIN — Medication 1000 MILLILITER(S): at 17:18

## 2017-03-19 RX ADMIN — Medication 100 MILLIGRAM(S): at 06:17

## 2017-03-19 RX ADMIN — Medication 25 MILLIGRAM(S): at 18:40

## 2017-03-19 RX ADMIN — Medication 1 APPLICATION(S): at 17:21

## 2017-03-19 RX ADMIN — Medication 1000 MILLILITER(S): at 21:08

## 2017-03-19 RX ADMIN — Medication 20 MILLIGRAM(S): at 06:17

## 2017-03-19 RX ADMIN — SODIUM CHLORIDE 75 MILLILITER(S): 9 INJECTION INTRAMUSCULAR; INTRAVENOUS; SUBCUTANEOUS at 17:37

## 2017-03-19 RX ADMIN — SIMETHICONE 80 MILLIGRAM(S): 80 TABLET, CHEWABLE ORAL at 11:01

## 2017-03-19 RX ADMIN — CHOLESTYRAMINE 8 GRAM(S): 4 POWDER, FOR SUSPENSION ORAL at 21:05

## 2017-03-20 ENCOUNTER — RESULT REVIEW (OUTPATIENT)
Age: 67
End: 2017-03-20

## 2017-03-20 LAB
ALBUMIN SERPL ELPH-MCNC: 3.3 G/DL — SIGNIFICANT CHANGE UP (ref 3.3–5)
ALP SERPL-CCNC: 217 U/L — HIGH (ref 40–120)
ALT FLD-CCNC: 58 U/L RC — HIGH (ref 10–45)
ANION GAP SERPL CALC-SCNC: 14 MMOL/L — SIGNIFICANT CHANGE UP (ref 5–17)
APTT BLD: 33.7 SEC — SIGNIFICANT CHANGE UP (ref 27.5–37.4)
AST SERPL-CCNC: 77 U/L — HIGH (ref 10–40)
AUTO DIFF PNL BLD: NEGATIVE — SIGNIFICANT CHANGE UP
BILIRUB SERPL-MCNC: 8.8 MG/DL — HIGH (ref 0.2–1.2)
BUN SERPL-MCNC: 11 MG/DL — SIGNIFICANT CHANGE UP (ref 7–23)
C-ANCA SER-ACNC: NEGATIVE — SIGNIFICANT CHANGE UP
CALCIUM SERPL-MCNC: 9 MG/DL — SIGNIFICANT CHANGE UP (ref 8.4–10.5)
CHLORIDE SERPL-SCNC: 110 MMOL/L — HIGH (ref 96–108)
CO2 SERPL-SCNC: 21 MMOL/L — LOW (ref 22–31)
CREAT SERPL-MCNC: 0.91 MG/DL — SIGNIFICANT CHANGE UP (ref 0.5–1.3)
GLUCOSE SERPL-MCNC: 90 MG/DL — SIGNIFICANT CHANGE UP (ref 70–99)
HCT VFR BLD CALC: 34.2 % — LOW (ref 39–50)
HGB BLD-MCNC: 11 G/DL — LOW (ref 13–17)
INR BLD: 1.11 RATIO — SIGNIFICANT CHANGE UP (ref 0.88–1.16)
MCHC RBC-ENTMCNC: 30.7 PG — SIGNIFICANT CHANGE UP (ref 27–34)
MCHC RBC-ENTMCNC: 32.2 GM/DL — SIGNIFICANT CHANGE UP (ref 32–36)
MCV RBC AUTO: 95.6 FL — SIGNIFICANT CHANGE UP (ref 80–100)
P-ANCA SER-ACNC: NEGATIVE — SIGNIFICANT CHANGE UP
PLATELET # BLD AUTO: 241 K/UL — SIGNIFICANT CHANGE UP (ref 150–400)
POTASSIUM SERPL-MCNC: 4.7 MMOL/L — SIGNIFICANT CHANGE UP (ref 3.5–5.3)
POTASSIUM SERPL-SCNC: 4.7 MMOL/L — SIGNIFICANT CHANGE UP (ref 3.5–5.3)
PROT SERPL-MCNC: 6 G/DL — SIGNIFICANT CHANGE UP (ref 6–8.3)
PROTHROM AB SERPL-ACNC: 12 SEC — SIGNIFICANT CHANGE UP (ref 10–13.1)
RBC # BLD: 3.58 M/UL — LOW (ref 4.2–5.8)
RBC # FLD: 16.5 % — HIGH (ref 10.3–14.5)
SODIUM SERPL-SCNC: 145 MMOL/L — SIGNIFICANT CHANGE UP (ref 135–145)
WBC # BLD: 5.8 K/UL — SIGNIFICANT CHANGE UP (ref 3.8–10.5)
WBC # FLD AUTO: 5.8 K/UL — SIGNIFICANT CHANGE UP (ref 3.8–10.5)

## 2017-03-20 PROCEDURE — 99232 SBSQ HOSP IP/OBS MODERATE 35: CPT

## 2017-03-20 PROCEDURE — 99233 SBSQ HOSP IP/OBS HIGH 50: CPT | Mod: GC

## 2017-03-20 PROCEDURE — 45378 DIAGNOSTIC COLONOSCOPY: CPT | Mod: GC

## 2017-03-20 RX ORDER — DIPHENHYDRAMINE HCL 50 MG
25 CAPSULE ORAL ONCE
Qty: 0 | Refills: 0 | Status: COMPLETED | OUTPATIENT
Start: 2017-03-20 | End: 2017-03-20

## 2017-03-20 RX ADMIN — Medication 20 MILLIGRAM(S): at 05:13

## 2017-03-20 RX ADMIN — Medication 25 MILLIGRAM(S): at 16:46

## 2017-03-20 RX ADMIN — PHENYLEPHRINE-SHARK LIVER OIL-MINERAL OIL-PETROLATUM RECTAL OINTMENT 1 APPLICATION(S): at 05:14

## 2017-03-20 RX ADMIN — Medication 20 MILLIGRAM(S): at 18:46

## 2017-03-20 RX ADMIN — Medication 25 MILLIGRAM(S): at 22:51

## 2017-03-20 RX ADMIN — PHENYLEPHRINE-SHARK LIVER OIL-MINERAL OIL-PETROLATUM RECTAL OINTMENT 1 APPLICATION(S): at 18:46

## 2017-03-20 RX ADMIN — Medication 25 MILLIGRAM(S): at 00:46

## 2017-03-20 RX ADMIN — Medication 1 APPLICATION(S): at 18:46

## 2017-03-20 RX ADMIN — Medication 25 MILLIGRAM(S): at 06:38

## 2017-03-20 RX ADMIN — SODIUM CHLORIDE 75 MILLILITER(S): 9 INJECTION INTRAMUSCULAR; INTRAVENOUS; SUBCUTANEOUS at 11:58

## 2017-03-20 RX ADMIN — PHENYLEPHRINE-SHARK LIVER OIL-MINERAL OIL-PETROLATUM RECTAL OINTMENT 1 APPLICATION(S): at 11:57

## 2017-03-20 RX ADMIN — Medication 25 MILLIGRAM(S): at 22:43

## 2017-03-20 RX ADMIN — Medication 25 MILLIGRAM(S): at 08:56

## 2017-03-20 RX ADMIN — CHOLESTYRAMINE 8 GRAM(S): 4 POWDER, FOR SUSPENSION ORAL at 22:43

## 2017-03-20 RX ADMIN — Medication 25 MILLIGRAM(S): at 13:07

## 2017-03-20 RX ADMIN — Medication 100 MILLIGRAM(S): at 22:44

## 2017-03-21 ENCOUNTER — TRANSCRIPTION ENCOUNTER (OUTPATIENT)
Age: 67
End: 2017-03-21

## 2017-03-21 VITALS
SYSTOLIC BLOOD PRESSURE: 118 MMHG | OXYGEN SATURATION: 99 % | HEART RATE: 72 BPM | TEMPERATURE: 97 F | RESPIRATION RATE: 18 BRPM | DIASTOLIC BLOOD PRESSURE: 72 MMHG

## 2017-03-21 PROCEDURE — 83735 ASSAY OF MAGNESIUM: CPT

## 2017-03-21 PROCEDURE — 76942 ECHO GUIDE FOR BIOPSY: CPT | Mod: 26

## 2017-03-21 PROCEDURE — 80076 HEPATIC FUNCTION PANEL: CPT

## 2017-03-21 PROCEDURE — 84295 ASSAY OF SERUM SODIUM: CPT

## 2017-03-21 PROCEDURE — 88313 SPECIAL STAINS GROUP 2: CPT

## 2017-03-21 PROCEDURE — 47000 NEEDLE BIOPSY OF LIVER PERQ: CPT

## 2017-03-21 PROCEDURE — 85610 PROTHROMBIN TIME: CPT

## 2017-03-21 PROCEDURE — 82947 ASSAY GLUCOSE BLOOD QUANT: CPT

## 2017-03-21 PROCEDURE — 88313 SPECIAL STAINS GROUP 2: CPT | Mod: 26

## 2017-03-21 PROCEDURE — 74177 CT ABD & PELVIS W/CONTRAST: CPT

## 2017-03-21 PROCEDURE — 83550 IRON BINDING TEST: CPT

## 2017-03-21 PROCEDURE — 82784 ASSAY IGA/IGD/IGG/IGM EACH: CPT

## 2017-03-21 PROCEDURE — 82728 ASSAY OF FERRITIN: CPT

## 2017-03-21 PROCEDURE — 80053 COMPREHEN METABOLIC PANEL: CPT

## 2017-03-21 PROCEDURE — 82803 BLOOD GASES ANY COMBINATION: CPT

## 2017-03-21 PROCEDURE — 99239 HOSP IP/OBS DSCHRG MGMT >30: CPT

## 2017-03-21 PROCEDURE — 85014 HEMATOCRIT: CPT

## 2017-03-21 PROCEDURE — 86038 ANTINUCLEAR ANTIBODIES: CPT

## 2017-03-21 PROCEDURE — 84132 ASSAY OF SERUM POTASSIUM: CPT

## 2017-03-21 PROCEDURE — 82435 ASSAY OF BLOOD CHLORIDE: CPT

## 2017-03-21 PROCEDURE — 82248 BILIRUBIN DIRECT: CPT

## 2017-03-21 PROCEDURE — 84100 ASSAY OF PHOSPHORUS: CPT

## 2017-03-21 PROCEDURE — 86036 ANCA SCREEN EACH ANTIBODY: CPT

## 2017-03-21 PROCEDURE — 86381 MITOCHONDRIAL ANTIBODY EACH: CPT

## 2017-03-21 PROCEDURE — 99285 EMERGENCY DEPT VISIT HI MDM: CPT | Mod: 25

## 2017-03-21 PROCEDURE — 82787 IGG 1 2 3 OR 4 EACH: CPT

## 2017-03-21 PROCEDURE — 86255 FLUORESCENT ANTIBODY SCREEN: CPT

## 2017-03-21 PROCEDURE — 85027 COMPLETE CBC AUTOMATED: CPT

## 2017-03-21 PROCEDURE — 80048 BASIC METABOLIC PNL TOTAL CA: CPT

## 2017-03-21 PROCEDURE — 86147 CARDIOLIPIN ANTIBODY EA IG: CPT

## 2017-03-21 PROCEDURE — 93005 ELECTROCARDIOGRAM TRACING: CPT

## 2017-03-21 PROCEDURE — 82330 ASSAY OF CALCIUM: CPT

## 2017-03-21 PROCEDURE — 85730 THROMBOPLASTIN TIME PARTIAL: CPT

## 2017-03-21 PROCEDURE — 96374 THER/PROPH/DIAG INJ IV PUSH: CPT

## 2017-03-21 PROCEDURE — 83690 ASSAY OF LIPASE: CPT

## 2017-03-21 PROCEDURE — 88307 TISSUE EXAM BY PATHOLOGIST: CPT | Mod: 26

## 2017-03-21 PROCEDURE — 80074 ACUTE HEPATITIS PANEL: CPT

## 2017-03-21 PROCEDURE — 86704 HEP B CORE ANTIBODY TOTAL: CPT

## 2017-03-21 PROCEDURE — 81332 SERPINA1 GENE: CPT

## 2017-03-21 PROCEDURE — 86850 RBC ANTIBODY SCREEN: CPT

## 2017-03-21 PROCEDURE — 88307 TISSUE EXAM BY PATHOLOGIST: CPT

## 2017-03-21 PROCEDURE — 86901 BLOOD TYPING SEROLOGIC RH(D): CPT

## 2017-03-21 PROCEDURE — 76942 ECHO GUIDE FOR BIOPSY: CPT

## 2017-03-21 PROCEDURE — 71045 X-RAY EXAM CHEST 1 VIEW: CPT

## 2017-03-21 PROCEDURE — 86900 BLOOD TYPING SEROLOGIC ABO: CPT

## 2017-03-21 PROCEDURE — 82378 CARCINOEMBRYONIC ANTIGEN: CPT

## 2017-03-21 PROCEDURE — 83605 ASSAY OF LACTIC ACID: CPT

## 2017-03-21 RX ORDER — OXYCODONE HYDROCHLORIDE 5 MG/1
1 TABLET ORAL
Qty: 12 | Refills: 0 | OUTPATIENT
Start: 2017-03-21 | End: 2017-03-23

## 2017-03-21 RX ORDER — OXYCODONE HYDROCHLORIDE 5 MG/1
5 TABLET ORAL ONCE
Qty: 0 | Refills: 0 | Status: DISCONTINUED | OUTPATIENT
Start: 2017-03-21 | End: 2017-03-21

## 2017-03-21 RX ORDER — CHOLESTYRAMINE 4 G/9G
8 POWDER, FOR SUSPENSION ORAL
Qty: 480 | Refills: 0 | OUTPATIENT
Start: 2017-03-21 | End: 2017-04-20

## 2017-03-21 RX ADMIN — OXYCODONE HYDROCHLORIDE 5 MILLIGRAM(S): 5 TABLET ORAL at 16:20

## 2017-03-21 RX ADMIN — SODIUM CHLORIDE 75 MILLILITER(S): 9 INJECTION INTRAMUSCULAR; INTRAVENOUS; SUBCUTANEOUS at 06:53

## 2017-03-21 RX ADMIN — PHENYLEPHRINE-SHARK LIVER OIL-MINERAL OIL-PETROLATUM RECTAL OINTMENT 1 APPLICATION(S): at 00:06

## 2017-03-21 RX ADMIN — Medication 25 MILLIGRAM(S): at 06:52

## 2017-03-21 RX ADMIN — SIMETHICONE 80 MILLIGRAM(S): 80 TABLET, CHEWABLE ORAL at 12:47

## 2017-03-21 RX ADMIN — Medication 20 MILLIGRAM(S): at 17:23

## 2017-03-21 RX ADMIN — Medication 25 MILLIGRAM(S): at 12:47

## 2017-03-21 RX ADMIN — Medication 1 APPLICATION(S): at 06:57

## 2017-03-21 RX ADMIN — Medication 100 MILLIGRAM(S): at 12:47

## 2017-03-21 RX ADMIN — Medication 20 MILLIGRAM(S): at 06:53

## 2017-03-21 RX ADMIN — OXYCODONE HYDROCHLORIDE 5 MILLIGRAM(S): 5 TABLET ORAL at 15:52

## 2017-03-21 RX ADMIN — Medication 1 APPLICATION(S): at 17:23

## 2017-03-21 RX ADMIN — PANTOPRAZOLE SODIUM 40 MILLIGRAM(S): 20 TABLET, DELAYED RELEASE ORAL at 06:54

## 2017-03-21 RX ADMIN — PHENYLEPHRINE-SHARK LIVER OIL-MINERAL OIL-PETROLATUM RECTAL OINTMENT 1 APPLICATION(S): at 06:58

## 2017-03-21 NOTE — DISCHARGE NOTE ADULT - CARE PROVIDERS DIRECT ADDRESSES
[email or web address]_Unknown [email or web address],DirectAddress_Unknown

## 2017-03-21 NOTE — DISCHARGE NOTE ADULT - MEDICATION SUMMARY - MEDICATIONS TO TAKE
I will START or STAY ON the medications listed below when I get home from the hospital:    enalapril 20 mg oral tablet  -- 1 tab(s) by mouth 2 times a day  -- Indication: For Hypertension    diphenhydrAMINE 25 mg oral capsule  -- 1 cap(s) by mouth 2 times a day, As Needed -Rash and/or Itching  -- Indication: For Itching    Questran Packets 4 g/9 g oral powder for reconstitution  -- 8 gram(s) by mouth 2 times a day  -- Indication: For Itching    hydrOXYzine hydrochloride 25 mg oral tablet  -- 1 tab(s) by mouth every 6 hours, As needed, Itching  -- Indication: For Itching    triamcinolone 0.1% topical cream  -- Apply on skin to affected area 2 times a day  -- Indication: For Itching    pantoprazole 40 mg oral delayed release tablet  -- 1 tab(s) by mouth once a day (before a meal)  -- Indication: For GERD (gastroesophageal reflux disease)

## 2017-03-21 NOTE — DISCHARGE NOTE ADULT - PLAN OF CARE
Continue with follow up You were admitted to the hospital with yellowing of your skin and itching. You were evaluated by the gastroenterologists, surgeons, and hepatologists. You had a colonoscopy, which was normal, and a liver biopsy. Please continue to take your medications for relief of itching. Please follow up with the surgeon and gastroenterologist after you are discharged. You will need to make these appointment s Continue with treatment You have a history of high blood pressure, which was stable during this admission. Please continue to take your blood pressure medication and follow up with your primary care doctor in 1-2 weeks. Continue treatment You have a history of GERD, which was stable during this admission. Please continue to take your omeprazole and follow up with your primary care doctor in 1-2 weeks. You were admitted to the hospital with yellowing of your skin and itching. You were evaluated by the gastroenterologists, surgeons, and hepatologists. You had a colonoscopy, which was normal, and a liver biopsy. Please continue to take your medications for relief of itching. Please follow up with the surgeon and gastroenterologist after you are discharged, the contact numbers for these providers can be found in this document. Please only take the oxycodone as needed for abdominal pain after the procedure. If you develop fevers and/or extreme pain in your abdomen, please call your primary care doctor and/or present to the emergency room.

## 2017-03-21 NOTE — DISCHARGE NOTE ADULT - CARE PLAN
Principal Discharge DX:	Jaundice  Goal:	Continue with follow up  Instructions for follow-up, activity and diet:	You were admitted to the hospital with yellowing of your skin and itching. You were evaluated by the gastroenterologists, surgeons, and hepatologists. You had a colonoscopy, which was normal, and a liver biopsy. Please continue to take your medications for relief of itching. Please follow up with the surgeon and gastroenterologist after you are discharged. You will need to make these appointment s  Secondary Diagnosis:	Essential hypertension  Secondary Diagnosis:	GERD (gastroesophageal reflux disease) Principal Discharge DX:	Jaundice  Goal:	Continue with follow up  Instructions for follow-up, activity and diet:	You were admitted to the hospital with yellowing of your skin and itching. You were evaluated by the gastroenterologists, surgeons, and hepatologists. You had a colonoscopy, which was normal, and a liver biopsy. Please continue to take your medications for relief of itching. Please follow up with the surgeon and gastroenterologist after you are discharged. You will need to make these appointment s  Secondary Diagnosis:	Essential hypertension  Goal:	Continue with treatment  Instructions for follow-up, activity and diet:	You have a history of high blood pressure, which was stable during this admission. Please continue to take your blood pressure medication and follow up with your primary care doctor in 1-2 weeks.  Secondary Diagnosis:	GERD (gastroesophageal reflux disease)  Goal:	Continue treatment  Instructions for follow-up, activity and diet:	You have a history of GERD, which was stable during this admission. Please continue to take your omeprazole and follow up with your primary care doctor in 1-2 weeks. Principal Discharge DX:	Jaundice  Goal:	Continue with follow up  Instructions for follow-up, activity and diet:	You were admitted to the hospital with yellowing of your skin and itching. You were evaluated by the gastroenterologists, surgeons, and hepatologists. You had a colonoscopy, which was normal, and a liver biopsy. Please continue to take your medications for relief of itching. Please follow up with the surgeon and gastroenterologist after you are discharged, the contact numbers for these providers can be found in this document. Please only take the oxycodone as needed for abdominal pain after the procedure. If you develop fevers and/or extreme pain in your abdomen, please call your primary care doctor and/or present to the emergency room.  Secondary Diagnosis:	Essential hypertension  Goal:	Continue with treatment  Instructions for follow-up, activity and diet:	You have a history of high blood pressure, which was stable during this admission. Please continue to take your blood pressure medication and follow up with your primary care doctor in 1-2 weeks.  Secondary Diagnosis:	GERD (gastroesophageal reflux disease)  Goal:	Continue treatment  Instructions for follow-up, activity and diet:	You have a history of GERD, which was stable during this admission. Please continue to take your omeprazole and follow up with your primary care doctor in 1-2 weeks.

## 2017-03-21 NOTE — DISCHARGE NOTE ADULT - PATIENT PORTAL LINK FT
“You can access the Follow[employer] Patient Portal, offered by [facility], by registering with the following website: [email or web address]”

## 2017-03-21 NOTE — DISCHARGE NOTE ADULT - MEDICATION SUMMARY - MEDICATIONS TO CHANGE
I will SWITCH the dose or number of times a day I take the medications listed below when I get home from the hospital:    cholestyramine 4 g/9 g oral powder for reconstitution  -- 4 gram(s) by mouth once a day

## 2017-03-21 NOTE — DISCHARGE NOTE ADULT - HOSPITAL COURSE
66 year-old M with a PMH of obstructive jaundice and HTN who presented to the ED with increased jaundice and a total bilirubin of 20. He was admitted to the hospital and gastroenterology was consulted. The patient was begun on symptomatic relieving medications and had a CT triple phase to evaluate his liver. This study showed a dilated appendix, so surgery was consulted. Surgical oncology saw the patient and recommended planning for future removal of the appendix and biliary resection. The patient was scheduled for a colonoscopy and a liver biopsy to further evaluate etiology of the jaundice. The colonoscopy revealed no abnormalities and the liver biopsy was performed with no immediate complications. The patient is now medically stable, his bilirubin has trended down, and he is cleared for discharge with surgical oncology and gastroenterology follow up. 66 year-old M with a PMH of obstructive jaundice and HTN who presented to the ED with increased jaundice and a total bilirubin of 20. He was admitted to the hospital and gastroenterology was consulted. The patient was begun on symptomatic relieving medications and had a CT triple phase to evaluate his liver. This study showed a dilated appendix, so surgery was consulted. Surgical oncology saw the patient and recommended planning for future removal of the appendix and biliary resection. The patient was scheduled for a colonoscopy and a liver biopsy to further evaluate etiology of the jaundice. The colonoscopy revealed no abnormalities and the liver biopsy was performed with no immediate complications. The patient is now medically stable, his bilirubin has trended down, and he is cleared for discharge with surgical oncology and gastroenterology follow up to obtain biopsy results and determine any need for surgical intervention.

## 2017-03-21 NOTE — DISCHARGE NOTE ADULT - CARE PROVIDER_API CALL
Marshall Carcamo), Surgery; Surgical Oncology  [address] [place] [address]  Phone: [phone]  Fax: [phone] Marshall Carcamo), Surgery; Surgical Oncology  [address] [place] [address]  Phone: [phone]  Fax: [phone]    Brady Roberts), Gastroenterology  [address] [place] [address]  Phone: [phone]  Fax: [phone]

## 2017-03-24 ENCOUNTER — APPOINTMENT (OUTPATIENT)
Dept: SURGERY | Facility: CLINIC | Age: 67
End: 2017-03-24

## 2017-03-24 VITALS
HEART RATE: 83 BPM | HEIGHT: 70 IN | BODY MASS INDEX: 22.76 KG/M2 | WEIGHT: 159 LBS | SYSTOLIC BLOOD PRESSURE: 120 MMHG | DIASTOLIC BLOOD PRESSURE: 76 MMHG

## 2017-03-24 DIAGNOSIS — Z87.01 PERSONAL HISTORY OF PNEUMONIA (RECURRENT): ICD-10-CM

## 2017-03-24 DIAGNOSIS — E78.00 PURE HYPERCHOLESTEROLEMIA, UNSPECIFIED: ICD-10-CM

## 2017-03-24 DIAGNOSIS — Z86.39 PERSONAL HISTORY OF OTHER ENDOCRINE, NUTRITIONAL AND METABOLIC DISEASE: ICD-10-CM

## 2017-03-26 PROBLEM — Z87.01 HISTORY OF PNEUMONIA: Status: RESOLVED | Noted: 2017-03-26 | Resolved: 2017-03-26

## 2017-03-26 PROBLEM — E78.00 HIGH CHOLESTEROL: Status: RESOLVED | Noted: 2017-03-26 | Resolved: 2017-03-26

## 2017-03-26 PROBLEM — Z86.39 HISTORY OF THYROID DISEASE: Status: RESOLVED | Noted: 2017-03-26 | Resolved: 2017-03-26

## 2017-04-12 ENCOUNTER — OUTPATIENT (OUTPATIENT)
Dept: OUTPATIENT SERVICES | Facility: HOSPITAL | Age: 67
LOS: 1 days | End: 2017-04-12
Payer: MEDICARE

## 2017-04-12 VITALS
WEIGHT: 166.01 LBS | TEMPERATURE: 98 F | HEIGHT: 70 IN | DIASTOLIC BLOOD PRESSURE: 86 MMHG | SYSTOLIC BLOOD PRESSURE: 135 MMHG | OXYGEN SATURATION: 99 % | RESPIRATION RATE: 16 BRPM | HEART RATE: 80 BPM

## 2017-04-12 DIAGNOSIS — Z98.1 ARTHRODESIS STATUS: Chronic | ICD-10-CM

## 2017-04-12 DIAGNOSIS — Z98.890 OTHER SPECIFIED POSTPROCEDURAL STATES: Chronic | ICD-10-CM

## 2017-04-12 DIAGNOSIS — K86.9 DISEASE OF PANCREAS, UNSPECIFIED: ICD-10-CM

## 2017-04-12 DIAGNOSIS — K83.1 OBSTRUCTION OF BILE DUCT: ICD-10-CM

## 2017-04-12 DIAGNOSIS — I10 ESSENTIAL (PRIMARY) HYPERTENSION: ICD-10-CM

## 2017-04-12 DIAGNOSIS — Z01.818 ENCOUNTER FOR OTHER PREPROCEDURAL EXAMINATION: ICD-10-CM

## 2017-04-12 LAB
ALBUMIN SERPL ELPH-MCNC: 4.2 G/DL — SIGNIFICANT CHANGE UP (ref 3.3–5)
ALP SERPL-CCNC: 123 U/L — HIGH (ref 40–120)
ALT FLD-CCNC: 22 U/L — SIGNIFICANT CHANGE UP (ref 10–45)
ANION GAP SERPL CALC-SCNC: 14 MMOL/L — SIGNIFICANT CHANGE UP (ref 5–17)
AST SERPL-CCNC: 27 U/L — SIGNIFICANT CHANGE UP (ref 10–40)
BILIRUB SERPL-MCNC: 2.5 MG/DL — HIGH (ref 0.2–1.2)
BLD GP AB SCN SERPL QL: NEGATIVE — SIGNIFICANT CHANGE UP
BUN SERPL-MCNC: 17 MG/DL — SIGNIFICANT CHANGE UP (ref 7–23)
CALCIUM SERPL-MCNC: 9.7 MG/DL — SIGNIFICANT CHANGE UP (ref 8.4–10.5)
CHLORIDE SERPL-SCNC: 105 MMOL/L — SIGNIFICANT CHANGE UP (ref 96–108)
CO2 SERPL-SCNC: 22 MMOL/L — SIGNIFICANT CHANGE UP (ref 22–31)
CREAT SERPL-MCNC: 1 MG/DL — SIGNIFICANT CHANGE UP (ref 0.5–1.3)
GLUCOSE SERPL-MCNC: 74 MG/DL — SIGNIFICANT CHANGE UP (ref 70–99)
HCT VFR BLD CALC: 40.6 % — SIGNIFICANT CHANGE UP (ref 39–50)
HGB BLD-MCNC: 13.1 G/DL — SIGNIFICANT CHANGE UP (ref 13–17)
MCHC RBC-ENTMCNC: 29.9 PG — SIGNIFICANT CHANGE UP (ref 27–34)
MCHC RBC-ENTMCNC: 32.3 GM/DL — SIGNIFICANT CHANGE UP (ref 32–36)
MCV RBC AUTO: 92.7 FL — SIGNIFICANT CHANGE UP (ref 80–100)
PLATELET # BLD AUTO: 299 K/UL — SIGNIFICANT CHANGE UP (ref 150–400)
POTASSIUM SERPL-MCNC: 4.7 MMOL/L — SIGNIFICANT CHANGE UP (ref 3.5–5.3)
POTASSIUM SERPL-SCNC: 4.7 MMOL/L — SIGNIFICANT CHANGE UP (ref 3.5–5.3)
PROT SERPL-MCNC: 6.9 G/DL — SIGNIFICANT CHANGE UP (ref 6–8.3)
RBC # BLD: 4.38 M/UL — SIGNIFICANT CHANGE UP (ref 4.2–5.8)
RBC # FLD: 16 % — HIGH (ref 10.3–14.5)
RH IG SCN BLD-IMP: POSITIVE — SIGNIFICANT CHANGE UP
SODIUM SERPL-SCNC: 141 MMOL/L — SIGNIFICANT CHANGE UP (ref 135–145)
WBC # BLD: 5.09 K/UL — SIGNIFICANT CHANGE UP (ref 3.8–10.5)
WBC # FLD AUTO: 5.09 K/UL — SIGNIFICANT CHANGE UP (ref 3.8–10.5)

## 2017-04-12 PROCEDURE — 85027 COMPLETE CBC AUTOMATED: CPT

## 2017-04-12 PROCEDURE — 86900 BLOOD TYPING SEROLOGIC ABO: CPT

## 2017-04-12 PROCEDURE — 80053 COMPREHEN METABOLIC PANEL: CPT

## 2017-04-12 PROCEDURE — G0463: CPT

## 2017-04-12 PROCEDURE — 86901 BLOOD TYPING SEROLOGIC RH(D): CPT

## 2017-04-12 PROCEDURE — 86850 RBC ANTIBODY SCREEN: CPT

## 2017-04-12 NOTE — H&P PST ADULT - VENOUS THROMBOEMBOLISM CURRENT STATUS
present cancer or chemotherapy/major surgery lasting over 3 hrs/none major surgery lasting over 3 hrs/present cancer or chemotherapy

## 2017-04-12 NOTE — H&P PST ADULT - PSH
History of biliary stent insertion  2/21/2017  S/P cervical spinal fusion History of biliary stent insertion  2/21/2017  History of lumbar laminectomy    S/P cervical spinal fusion    S/P tendon repair  hand  S/P unilateral inguinal hernia repair

## 2017-04-12 NOTE — H&P PST ADULT - SKIN
detailed exam diffuse excoriations from itching/jaundiced/warm and dry jaundiced/warm and dry/mild generalized

## 2017-04-12 NOTE — H&P PST ADULT - GASTROINTESTINAL DETAILS
no distention/nontender/no masses palpable/soft/bowel sounds normal no distention/no rebound tenderness/no masses palpable/bowel sounds normal/nontender/soft

## 2017-04-12 NOTE — H&P PST ADULT - MUSCULOSKELETAL
details… detailed exam normal/ROM intact No joint pain, swelling or deformity; no limitation of movement

## 2017-04-12 NOTE — H&P PST ADULT - PSYCHIATRIC
negative detailed exam Affect and characteristics of appearance, verbalizations, behaviors are appropriate

## 2017-04-12 NOTE — H&P PST ADULT - HISTORY OF PRESENT ILLNESS
66-year-old  M with pmhx of HTN and obstructive jaundice 66 year old male with PMH of HTN, IBS-C, obstructive jaundice/pancreatic mass  with hospitalizations s/p biliary stent placement planned for whipple procedure, appendectomy. 66 year old male with PMH of HTN, IBS-C, obstructive jaundice/pancreatic mass  with hospitalizations s/p biliary stent placement 3/2017 planned for whipple procedure, appendectomy.

## 2017-04-25 ENCOUNTER — RESULT REVIEW (OUTPATIENT)
Age: 67
End: 2017-04-25

## 2017-04-26 ENCOUNTER — RESULT REVIEW (OUTPATIENT)
Age: 67
End: 2017-04-26

## 2017-04-26 ENCOUNTER — INPATIENT (INPATIENT)
Facility: HOSPITAL | Age: 67
LOS: 18 days | Discharge: HOME | DRG: 405 | End: 2017-05-15
Attending: SURGERY | Admitting: SURGERY
Payer: MEDICARE

## 2017-04-26 VITALS
HEIGHT: 70 IN | TEMPERATURE: 98 F | SYSTOLIC BLOOD PRESSURE: 149 MMHG | WEIGHT: 166.01 LBS | DIASTOLIC BLOOD PRESSURE: 85 MMHG | RESPIRATION RATE: 18 BRPM | HEART RATE: 92 BPM

## 2017-04-26 DIAGNOSIS — Z98.890 OTHER SPECIFIED POSTPROCEDURAL STATES: Chronic | ICD-10-CM

## 2017-04-26 DIAGNOSIS — K83.1 OBSTRUCTION OF BILE DUCT: ICD-10-CM

## 2017-04-26 DIAGNOSIS — Z98.1 ARTHRODESIS STATUS: Chronic | ICD-10-CM

## 2017-04-26 LAB
ALBUMIN SERPL ELPH-MCNC: 3.4 G/DL — SIGNIFICANT CHANGE UP (ref 3.3–5)
ALP SERPL-CCNC: 76 U/L — SIGNIFICANT CHANGE UP (ref 40–120)
ALT FLD-CCNC: 31 U/L RC — SIGNIFICANT CHANGE UP (ref 10–45)
ANION GAP SERPL CALC-SCNC: 16 MMOL/L — SIGNIFICANT CHANGE UP (ref 5–17)
AST SERPL-CCNC: 52 U/L — HIGH (ref 10–40)
BILIRUB SERPL-MCNC: 1.3 MG/DL — HIGH (ref 0.2–1.2)
BUN SERPL-MCNC: 9 MG/DL — SIGNIFICANT CHANGE UP (ref 7–23)
CALCIUM SERPL-MCNC: 8.6 MG/DL — SIGNIFICANT CHANGE UP (ref 8.4–10.5)
CHLORIDE SERPL-SCNC: 107 MMOL/L — SIGNIFICANT CHANGE UP (ref 96–108)
CO2 SERPL-SCNC: 20 MMOL/L — LOW (ref 22–31)
CREAT SERPL-MCNC: 0.93 MG/DL — SIGNIFICANT CHANGE UP (ref 0.5–1.3)
GAS PNL BLDA: SIGNIFICANT CHANGE UP
GLUCOSE SERPL-MCNC: 162 MG/DL — HIGH (ref 70–99)
HCT VFR BLD CALC: 36.1 % — LOW (ref 39–50)
HGB BLD-MCNC: 12.6 G/DL — LOW (ref 13–17)
MAGNESIUM SERPL-MCNC: 1.8 MG/DL — SIGNIFICANT CHANGE UP (ref 1.6–2.6)
MCHC RBC-ENTMCNC: 31.6 PG — SIGNIFICANT CHANGE UP (ref 27–34)
MCHC RBC-ENTMCNC: 34.9 GM/DL — SIGNIFICANT CHANGE UP (ref 32–36)
MCV RBC AUTO: 90.4 FL — SIGNIFICANT CHANGE UP (ref 80–100)
PHOSPHATE SERPL-MCNC: 3.2 MG/DL — SIGNIFICANT CHANGE UP (ref 2.5–4.5)
PLATELET # BLD AUTO: 209 K/UL — SIGNIFICANT CHANGE UP (ref 150–400)
POTASSIUM SERPL-MCNC: 4 MMOL/L — SIGNIFICANT CHANGE UP (ref 3.5–5.3)
POTASSIUM SERPL-SCNC: 4 MMOL/L — SIGNIFICANT CHANGE UP (ref 3.5–5.3)
PROT SERPL-MCNC: 5.6 G/DL — LOW (ref 6–8.3)
RBC # BLD: 3.99 M/UL — LOW (ref 4.2–5.8)
RBC # FLD: 13.4 % — SIGNIFICANT CHANGE UP (ref 10.3–14.5)
SODIUM SERPL-SCNC: 143 MMOL/L — SIGNIFICANT CHANGE UP (ref 135–145)
WBC # BLD: 14.2 K/UL — HIGH (ref 3.8–10.5)
WBC # FLD AUTO: 14.2 K/UL — HIGH (ref 3.8–10.5)

## 2017-04-26 PROCEDURE — 88307 TISSUE EXAM BY PATHOLOGIST: CPT | Mod: 26

## 2017-04-26 PROCEDURE — 48150 PARTIAL REMOVAL OF PANCREAS: CPT | Mod: GC

## 2017-04-26 PROCEDURE — 88309 TISSUE EXAM BY PATHOLOGIST: CPT | Mod: 26

## 2017-04-26 PROCEDURE — 88331 PATH CONSLTJ SURG 1 BLK 1SPC: CPT | Mod: 26

## 2017-04-26 PROCEDURE — 44955 APPENDECTOMY ADD-ON: CPT | Mod: GC

## 2017-04-26 PROCEDURE — 44955 APPENDECTOMY ADD-ON: CPT | Mod: 82

## 2017-04-26 PROCEDURE — 88304 TISSUE EXAM BY PATHOLOGIST: CPT | Mod: 26

## 2017-04-26 PROCEDURE — 48150 PARTIAL REMOVAL OF PANCREAS: CPT | Mod: 82

## 2017-04-26 RX ORDER — CIPROFLOXACIN LACTATE 400MG/40ML
400 VIAL (ML) INTRAVENOUS ONCE
Qty: 0 | Refills: 0 | Status: DISCONTINUED | OUTPATIENT
Start: 2017-04-26 | End: 2017-04-26

## 2017-04-26 RX ORDER — SODIUM CHLORIDE 9 MG/ML
1000 INJECTION, SOLUTION INTRAVENOUS
Qty: 0 | Refills: 0 | Status: DISCONTINUED | OUTPATIENT
Start: 2017-04-26 | End: 2017-04-28

## 2017-04-26 RX ORDER — ACETAMINOPHEN 500 MG
1000 TABLET ORAL ONCE
Qty: 0 | Refills: 0 | Status: COMPLETED | OUTPATIENT
Start: 2017-04-27 | End: 2017-04-27

## 2017-04-26 RX ORDER — ONDANSETRON 8 MG/1
4 TABLET, FILM COATED ORAL EVERY 6 HOURS
Qty: 0 | Refills: 0 | Status: DISCONTINUED | OUTPATIENT
Start: 2017-04-26 | End: 2017-05-15

## 2017-04-26 RX ORDER — ONDANSETRON 8 MG/1
4 TABLET, FILM COATED ORAL ONCE
Qty: 0 | Refills: 0 | Status: DISCONTINUED | OUTPATIENT
Start: 2017-04-26 | End: 2017-04-27

## 2017-04-26 RX ORDER — PANTOPRAZOLE SODIUM 20 MG/1
40 TABLET, DELAYED RELEASE ORAL DAILY
Qty: 0 | Refills: 0 | Status: DISCONTINUED | OUTPATIENT
Start: 2017-04-26 | End: 2017-05-12

## 2017-04-26 RX ORDER — SODIUM CHLORIDE 9 MG/ML
3 INJECTION INTRAMUSCULAR; INTRAVENOUS; SUBCUTANEOUS EVERY 8 HOURS
Qty: 0 | Refills: 0 | Status: DISCONTINUED | OUTPATIENT
Start: 2017-04-26 | End: 2017-04-26

## 2017-04-26 RX ORDER — HYDROMORPHONE HYDROCHLORIDE 2 MG/ML
0.5 INJECTION INTRAMUSCULAR; INTRAVENOUS; SUBCUTANEOUS
Qty: 0 | Refills: 0 | Status: DISCONTINUED | OUTPATIENT
Start: 2017-04-26 | End: 2017-04-27

## 2017-04-26 RX ORDER — HEPARIN SODIUM 5000 [USP'U]/ML
5000 INJECTION INTRAVENOUS; SUBCUTANEOUS EVERY 8 HOURS
Qty: 0 | Refills: 0 | Status: DISCONTINUED | OUTPATIENT
Start: 2017-04-26 | End: 2017-04-26

## 2017-04-26 RX ORDER — HEPARIN SODIUM 5000 [USP'U]/ML
5000 INJECTION INTRAVENOUS; SUBCUTANEOUS EVERY 12 HOURS
Qty: 0 | Refills: 0 | Status: DISCONTINUED | OUTPATIENT
Start: 2017-04-26 | End: 2017-05-01

## 2017-04-26 RX ORDER — NALOXONE HYDROCHLORIDE 4 MG/.1ML
0.1 SPRAY NASAL
Qty: 0 | Refills: 0 | Status: DISCONTINUED | OUTPATIENT
Start: 2017-04-26 | End: 2017-05-15

## 2017-04-26 RX ORDER — ACETAMINOPHEN 500 MG
1000 TABLET ORAL ONCE
Qty: 0 | Refills: 0 | Status: COMPLETED | OUTPATIENT
Start: 2017-04-26 | End: 2017-04-26

## 2017-04-26 RX ADMIN — SODIUM CHLORIDE 100 MILLILITER(S): 9 INJECTION, SOLUTION INTRAVENOUS at 18:18

## 2017-04-26 RX ADMIN — Medication 1000 MILLIGRAM(S): at 22:15

## 2017-04-26 RX ADMIN — Medication 1.25 MILLIGRAM(S): at 18:28

## 2017-04-26 RX ADMIN — Medication 400 MILLIGRAM(S): at 22:00

## 2017-04-26 NOTE — BRIEF OPERATIVE NOTE - POST-OP DX
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

## 2017-04-26 NOTE — PATIENT PROFILE ADULT. - PSH
History of biliary stent insertion  2/21/2017  History of lumbar laminectomy    S/P cervical spinal fusion    S/P tendon repair  hand  S/P unilateral inguinal hernia repair

## 2017-04-26 NOTE — BRIEF OPERATIVE NOTE - PROCEDURE
Open jejunostomy  04/26/2017    Active  [id number]  Pancreaticoduodenectomy  04/26/2017    Active  [id number]  Appendectomy  04/26/2017    Active  [id number]

## 2017-04-26 NOTE — BRIEF OPERATIVE NOTE - OPERATION/FINDINGS
OPERATION: appendectomy, pancreaticoduodenectomy, feeding jejunostomy tube placement    INDICATION: obstructive jaundice s/p ERCP (no masses identified, biopsy of bile duct negative for dysplasia); incidental appendiceal mass seen on imaging    DETAILS: extended R subcostal incision made - no signs of metastatic disease; appendix noted to have a soft lesion at the base ~ 1.5 cm; base of appendix taken with endoGIA stapler (tan load); mesentery taken w/ endo ANJANA stapler (gray load)    hepatic flexure mobilized; duodenum kocherized; CBD isolated; tunnel behind pancreatic neck created; stomach divided with endoGIA (black load); CBD transected (just above metal stent); pancreatic head divided w/ Harmonic; DJ flexure taken w/ endoGIA (purple load)    jejunum brought up to RUQ in a C-loop; pancreaticojejunostomy created (duct to mucosa; 2-layers); hepaticojejunostomy created (duct to mucosa; 1-layer); GJ created (stapled using endoGIA - purple load; antecolic, anterior stomach)    14-Pashto red rubber catheter used as jejunostomy tube (non-Witzeled); tacked to anterior abdominal wall    3 drains were placed:  1 - at hepaticojejunostomy (R-most drain)  2 - R of pancreaticojejunostomy (middle drain)  3 - L of pancreaticojejunostomy (L-most drain)

## 2017-04-27 LAB
ALBUMIN SERPL ELPH-MCNC: 3.1 G/DL — LOW (ref 3.3–5)
ALP SERPL-CCNC: 62 U/L — SIGNIFICANT CHANGE UP (ref 40–120)
ALT FLD-CCNC: 31 U/L RC — SIGNIFICANT CHANGE UP (ref 10–45)
ANION GAP SERPL CALC-SCNC: 11 MMOL/L — SIGNIFICANT CHANGE UP (ref 5–17)
AST SERPL-CCNC: 50 U/L — HIGH (ref 10–40)
BILIRUB SERPL-MCNC: 1.2 MG/DL — SIGNIFICANT CHANGE UP (ref 0.2–1.2)
BUN SERPL-MCNC: 9 MG/DL — SIGNIFICANT CHANGE UP (ref 7–23)
CALCIUM SERPL-MCNC: 7.9 MG/DL — LOW (ref 8.4–10.5)
CHLORIDE SERPL-SCNC: 107 MMOL/L — SIGNIFICANT CHANGE UP (ref 96–108)
CO2 SERPL-SCNC: 24 MMOL/L — SIGNIFICANT CHANGE UP (ref 22–31)
CREAT SERPL-MCNC: 0.82 MG/DL — SIGNIFICANT CHANGE UP (ref 0.5–1.3)
GLUCOSE SERPL-MCNC: 140 MG/DL — HIGH (ref 70–99)
HCT VFR BLD CALC: 34.8 % — LOW (ref 39–50)
HGB BLD-MCNC: 11.9 G/DL — LOW (ref 13–17)
MAGNESIUM SERPL-MCNC: 1.8 MG/DL — SIGNIFICANT CHANGE UP (ref 1.6–2.6)
MCHC RBC-ENTMCNC: 31.1 PG — SIGNIFICANT CHANGE UP (ref 27–34)
MCHC RBC-ENTMCNC: 34.1 GM/DL — SIGNIFICANT CHANGE UP (ref 32–36)
MCV RBC AUTO: 91.1 FL — SIGNIFICANT CHANGE UP (ref 80–100)
PHOSPHATE SERPL-MCNC: 3.5 MG/DL — SIGNIFICANT CHANGE UP (ref 2.5–4.5)
PLATELET # BLD AUTO: 201 K/UL — SIGNIFICANT CHANGE UP (ref 150–400)
POTASSIUM SERPL-MCNC: 4.2 MMOL/L — SIGNIFICANT CHANGE UP (ref 3.5–5.3)
POTASSIUM SERPL-SCNC: 4.2 MMOL/L — SIGNIFICANT CHANGE UP (ref 3.5–5.3)
PROT SERPL-MCNC: 4.9 G/DL — LOW (ref 6–8.3)
RBC # BLD: 3.82 M/UL — LOW (ref 4.2–5.8)
RBC # FLD: 13.3 % — SIGNIFICANT CHANGE UP (ref 10.3–14.5)
SODIUM SERPL-SCNC: 142 MMOL/L — SIGNIFICANT CHANGE UP (ref 135–145)
WBC # BLD: 13.3 K/UL — HIGH (ref 3.8–10.5)
WBC # FLD AUTO: 13.3 K/UL — HIGH (ref 3.8–10.5)

## 2017-04-27 PROCEDURE — 99222 1ST HOSP IP/OBS MODERATE 55: CPT

## 2017-04-27 RX ADMIN — Medication 1000 MILLIGRAM(S): at 06:30

## 2017-04-27 RX ADMIN — Medication 1.25 MILLIGRAM(S): at 18:15

## 2017-04-27 RX ADMIN — Medication 400 MILLIGRAM(S): at 12:00

## 2017-04-27 RX ADMIN — HEPARIN SODIUM 5000 UNIT(S): 5000 INJECTION INTRAVENOUS; SUBCUTANEOUS at 18:15

## 2017-04-27 RX ADMIN — PANTOPRAZOLE SODIUM 40 MILLIGRAM(S): 20 TABLET, DELAYED RELEASE ORAL at 12:00

## 2017-04-27 RX ADMIN — Medication 1000 MILLIGRAM(S): at 12:00

## 2017-04-27 RX ADMIN — Medication 1.25 MILLIGRAM(S): at 14:11

## 2017-04-27 RX ADMIN — HEPARIN SODIUM 5000 UNIT(S): 5000 INJECTION INTRAVENOUS; SUBCUTANEOUS at 05:59

## 2017-04-27 RX ADMIN — Medication 1.25 MILLIGRAM(S): at 05:58

## 2017-04-27 RX ADMIN — Medication 1000 MILLIGRAM(S): at 18:19

## 2017-04-27 RX ADMIN — Medication 1.25 MILLIGRAM(S): at 00:00

## 2017-04-27 RX ADMIN — Medication 400 MILLIGRAM(S): at 18:14

## 2017-04-27 RX ADMIN — Medication 400 MILLIGRAM(S): at 05:59

## 2017-04-27 NOTE — OCCUPATIONAL THERAPY INITIAL EVALUATION ADULT - LIVES WITH, PROFILE
spouse spouse/Pt lives with his wife in a private home, 1 step to enter with full flight to 2nd floor. Pt has a stall shower and was independent with ADLs, IADLs, driving prior to admission.

## 2017-04-27 NOTE — OCCUPATIONAL THERAPY INITIAL EVALUATION ADULT - PERTINENT HX OF CURRENT PROBLEM, REHAB EVAL
66 year old male with PMH of HTN, IBS-C, obstructive jaundice/pancreatic mass  with hospitalizations s/p biliary stent placement 3/2017 planned for whipple procedure, appendectomy.  Pt s/p whipple, feeding jujunostomy, appendectomy 4/26.

## 2017-04-27 NOTE — OCCUPATIONAL THERAPY INITIAL EVALUATION ADULT - PLANNED THERAPY INTERVENTIONS, OT EVAL
balance training/bed mobility training/strengthening/IADL retraining/transfer training/fine motor coordination training/ADL retraining/neuromuscular re-education

## 2017-04-28 LAB
ANION GAP SERPL CALC-SCNC: 11 MMOL/L — SIGNIFICANT CHANGE UP (ref 5–17)
BUN SERPL-MCNC: 12 MG/DL — SIGNIFICANT CHANGE UP (ref 7–23)
CALCIUM SERPL-MCNC: 8.3 MG/DL — LOW (ref 8.4–10.5)
CHLORIDE SERPL-SCNC: 104 MMOL/L — SIGNIFICANT CHANGE UP (ref 96–108)
CO2 SERPL-SCNC: 27 MMOL/L — SIGNIFICANT CHANGE UP (ref 22–31)
CREAT SERPL-MCNC: 1.04 MG/DL — SIGNIFICANT CHANGE UP (ref 0.5–1.3)
GLUCOSE SERPL-MCNC: 89 MG/DL — SIGNIFICANT CHANGE UP (ref 70–99)
HCT VFR BLD CALC: 33.2 % — LOW (ref 39–50)
HGB BLD-MCNC: 10.9 G/DL — LOW (ref 13–17)
MAGNESIUM SERPL-MCNC: 2 MG/DL — SIGNIFICANT CHANGE UP (ref 1.6–2.6)
MCHC RBC-ENTMCNC: 31.1 PG — SIGNIFICANT CHANGE UP (ref 27–34)
MCHC RBC-ENTMCNC: 32.8 GM/DL — SIGNIFICANT CHANGE UP (ref 32–36)
MCV RBC AUTO: 94.9 FL — SIGNIFICANT CHANGE UP (ref 80–100)
PHOSPHATE SERPL-MCNC: 2.3 MG/DL — LOW (ref 2.5–4.5)
PLATELET # BLD AUTO: 170 K/UL — SIGNIFICANT CHANGE UP (ref 150–400)
POTASSIUM SERPL-MCNC: 4.4 MMOL/L — SIGNIFICANT CHANGE UP (ref 3.5–5.3)
POTASSIUM SERPL-SCNC: 4.4 MMOL/L — SIGNIFICANT CHANGE UP (ref 3.5–5.3)
RBC # BLD: 3.5 M/UL — LOW (ref 4.2–5.8)
RBC # FLD: 14.6 % — HIGH (ref 10.3–14.5)
SODIUM SERPL-SCNC: 142 MMOL/L — SIGNIFICANT CHANGE UP (ref 135–145)
WBC # BLD: 12.47 K/UL — HIGH (ref 3.8–10.5)
WBC # FLD AUTO: 12.47 K/UL — HIGH (ref 3.8–10.5)

## 2017-04-28 RX ORDER — ACETAMINOPHEN 500 MG
1000 TABLET ORAL ONCE
Qty: 0 | Refills: 0 | Status: COMPLETED | OUTPATIENT
Start: 2017-04-28 | End: 2017-04-28

## 2017-04-28 RX ORDER — SODIUM CHLORIDE 9 MG/ML
1000 INJECTION INTRAMUSCULAR; INTRAVENOUS; SUBCUTANEOUS ONCE
Qty: 0 | Refills: 0 | Status: DISCONTINUED | OUTPATIENT
Start: 2017-04-28 | End: 2017-04-28

## 2017-04-28 RX ORDER — DEXTROSE MONOHYDRATE, SODIUM CHLORIDE, AND POTASSIUM CHLORIDE 50; .745; 4.5 G/1000ML; G/1000ML; G/1000ML
1000 INJECTION, SOLUTION INTRAVENOUS
Qty: 0 | Refills: 0 | Status: DISCONTINUED | OUTPATIENT
Start: 2017-04-28 | End: 2017-05-13

## 2017-04-28 RX ORDER — METOCLOPRAMIDE HCL 10 MG
10 TABLET ORAL EVERY 8 HOURS
Qty: 0 | Refills: 0 | Status: COMPLETED | OUTPATIENT
Start: 2017-04-28 | End: 2017-04-28

## 2017-04-28 RX ORDER — TETRACAINE/BENZOCAINE/BUTAMBEN 2%-14%-2%
1 OINTMENT (GRAM) TOPICAL THREE TIMES A DAY
Qty: 0 | Refills: 0 | Status: DISCONTINUED | OUTPATIENT
Start: 2017-04-28 | End: 2017-05-15

## 2017-04-28 RX ORDER — DEXTROSE MONOHYDRATE, SODIUM CHLORIDE, AND POTASSIUM CHLORIDE 50; .745; 4.5 G/1000ML; G/1000ML; G/1000ML
1000 INJECTION, SOLUTION INTRAVENOUS
Qty: 0 | Refills: 0 | Status: DISCONTINUED | OUTPATIENT
Start: 2017-04-28 | End: 2017-04-28

## 2017-04-28 RX ADMIN — DEXTROSE MONOHYDRATE, SODIUM CHLORIDE, AND POTASSIUM CHLORIDE 75 MILLILITER(S): 50; .745; 4.5 INJECTION, SOLUTION INTRAVENOUS at 10:43

## 2017-04-28 RX ADMIN — HEPARIN SODIUM 5000 UNIT(S): 5000 INJECTION INTRAVENOUS; SUBCUTANEOUS at 18:13

## 2017-04-28 RX ADMIN — Medication 1 SPRAY(S): at 18:24

## 2017-04-28 RX ADMIN — Medication 400 MILLIGRAM(S): at 03:06

## 2017-04-28 RX ADMIN — Medication 1.25 MILLIGRAM(S): at 05:22

## 2017-04-28 RX ADMIN — SODIUM CHLORIDE 100 MILLILITER(S): 9 INJECTION, SOLUTION INTRAVENOUS at 05:22

## 2017-04-28 RX ADMIN — Medication 1.25 MILLIGRAM(S): at 23:39

## 2017-04-28 RX ADMIN — Medication 1000 MILLIGRAM(S): at 03:36

## 2017-04-28 RX ADMIN — PANTOPRAZOLE SODIUM 40 MILLIGRAM(S): 20 TABLET, DELAYED RELEASE ORAL at 12:12

## 2017-04-28 RX ADMIN — Medication 63.75 MILLIMOLE(S): at 19:53

## 2017-04-28 RX ADMIN — Medication 1000 MILLIGRAM(S): at 15:25

## 2017-04-28 RX ADMIN — Medication 400 MILLIGRAM(S): at 15:05

## 2017-04-28 RX ADMIN — HEPARIN SODIUM 5000 UNIT(S): 5000 INJECTION INTRAVENOUS; SUBCUTANEOUS at 05:22

## 2017-04-28 NOTE — DIETITIAN INITIAL EVALUATION ADULT. - OTHER INFO
Pt seen for NPO day #3. POD#2 s/p whipple with feeding J-tube. NPO with NGT to suction (output: 4/2853=266nz thus far, 4/2753=763gm). Awaiting GI function, no flatus. Denies N+V. States NKFA. No hx of difficulty chewing/swallowing.

## 2017-04-28 NOTE — DIETITIAN INITIAL EVALUATION ADULT. - NS FNS REASON FOR WEIGHT CHANG
altered GI function (specify)/decreased po intake/Weight History: Pt reports UBW of 170-175 pounds with wt loss to ~158 pounds with subsequent intentional wt regain to 166 pounds (net loss of 4-9 lbs) x5 months decreased po intake/altered GI function (specify)/Weight History: Pt reports UBW of 170-175 up until January 2017 pounds with wt loss to ~158 pounds (February 2017) with subsequent intentional wt regain to 166 pounds (net loss of 4-9 lbs) x5 months.

## 2017-04-28 NOTE — DIETITIAN INITIAL EVALUATION ADULT. - NS AS NUTRI INTERV ENTERAL NUTRITION
When medically feasible, consider initiating enteral nutrition via J-tube. Recommend starting at 10-20ml/hr and increase as tolerated by 10ml/hr q  hrs to goal of Vital to goal rate of 65ml/hr x24 hrs. Provides: 1872kcal, 117g protein (~25kcal/kg, 1.55g prot/kg of 75.3kg dosing wt). RD to remain available for further nutritional interventions as indicated/requested by medical team/pt. When medically feasible, consider initiating enteral nutrition via J-tube. Recommend starting Jevity 1.2 at 10-20ml/hr and increase as tolerated by 10ml/hr q  6hrs to goal of Jevity 1.2 @70ml/hr x24 hrs. Provides 2016 kcal, 93 g protein (27kcal/kg, 1.2g prot/kg based on dosing wt of 75.3kg)

## 2017-04-28 NOTE — DIETITIAN INITIAL EVALUATION ADULT. - NS AS NUTRI INTERV COLLABORAT
Malnutrition sticker placed in chart & discussed with [facility] PA. RD to remain available for further nutritional interventions as indicated/requested by medical team/pt.

## 2017-04-28 NOTE — DIETITIAN INITIAL EVALUATION ADULT. - ORAL INTAKE PTA
poor/Pt reports periods of good and poor intake, stating intake was poor 2-3 weeks PTA. Attributes poor intake to early satiety and limited appetite. Lavelle taking any vitamins/supplements PTA.

## 2017-04-28 NOTE — DIETITIAN INITIAL EVALUATION ADULT. - NS AS NUTRI INTERV MEALS SNACK
Medical team to advance diet when medically feasible via tolerated route. Consider advancing to clear liquid, followed by low fiber diet as tolerated. If NPO exceeds 7 days, recommend team consider alternate means of nutrition.

## 2017-04-28 NOTE — DIETITIAN INITIAL EVALUATION ADULT. - ENERGY NEEDS
Height: 70 inches, Weight: 166 pounds, BMI: 23.8 kg/m2 IBW: 166 pounds (+/-10%), %IBW: 100%  No edema, no pressure ulcers.

## 2017-04-29 LAB
ALBUMIN SERPL ELPH-MCNC: 2.8 G/DL — LOW (ref 3.3–5)
ALP SERPL-CCNC: 97 U/L — SIGNIFICANT CHANGE UP (ref 40–120)
ALT FLD-CCNC: 25 U/L — SIGNIFICANT CHANGE UP (ref 10–45)
ANION GAP SERPL CALC-SCNC: 11 MMOL/L — SIGNIFICANT CHANGE UP (ref 5–17)
ANION GAP SERPL CALC-SCNC: 12 MMOL/L — SIGNIFICANT CHANGE UP (ref 5–17)
ANION GAP SERPL CALC-SCNC: 9 MMOL/L — SIGNIFICANT CHANGE UP (ref 5–17)
APPEARANCE UR: CLEAR — SIGNIFICANT CHANGE UP
AST SERPL-CCNC: 37 U/L — SIGNIFICANT CHANGE UP (ref 10–40)
BILIRUB SERPL-MCNC: 0.9 MG/DL — SIGNIFICANT CHANGE UP (ref 0.2–1.2)
BILIRUB UR-MCNC: NEGATIVE — SIGNIFICANT CHANGE UP
BUN SERPL-MCNC: 10 MG/DL — SIGNIFICANT CHANGE UP (ref 7–23)
BUN SERPL-MCNC: 11 MG/DL — SIGNIFICANT CHANGE UP (ref 7–23)
BUN SERPL-MCNC: 8 MG/DL — SIGNIFICANT CHANGE UP (ref 7–23)
CALCIUM SERPL-MCNC: 7.9 MG/DL — LOW (ref 8.4–10.5)
CALCIUM SERPL-MCNC: 7.9 MG/DL — LOW (ref 8.4–10.5)
CALCIUM SERPL-MCNC: 8 MG/DL — LOW (ref 8.4–10.5)
CHLORIDE SERPL-SCNC: 102 MMOL/L — SIGNIFICANT CHANGE UP (ref 96–108)
CHLORIDE SERPL-SCNC: 103 MMOL/L — SIGNIFICANT CHANGE UP (ref 96–108)
CHLORIDE SERPL-SCNC: 104 MMOL/L — SIGNIFICANT CHANGE UP (ref 96–108)
CO2 SERPL-SCNC: 25 MMOL/L — SIGNIFICANT CHANGE UP (ref 22–31)
CO2 SERPL-SCNC: 27 MMOL/L — SIGNIFICANT CHANGE UP (ref 22–31)
CO2 SERPL-SCNC: 30 MMOL/L — SIGNIFICANT CHANGE UP (ref 22–31)
COLOR SPEC: YELLOW — SIGNIFICANT CHANGE UP
CREAT SERPL-MCNC: 0.78 MG/DL — SIGNIFICANT CHANGE UP (ref 0.5–1.3)
CREAT SERPL-MCNC: 0.81 MG/DL — SIGNIFICANT CHANGE UP (ref 0.5–1.3)
CREAT SERPL-MCNC: 0.88 MG/DL — SIGNIFICANT CHANGE UP (ref 0.5–1.3)
DIFF PNL FLD: ABNORMAL
GLUCOSE SERPL-MCNC: 134 MG/DL — HIGH (ref 70–99)
GLUCOSE SERPL-MCNC: 136 MG/DL — HIGH (ref 70–99)
GLUCOSE SERPL-MCNC: 137 MG/DL — HIGH (ref 70–99)
GLUCOSE UR QL: NEGATIVE — SIGNIFICANT CHANGE UP
HCT VFR BLD CALC: 30.9 % — LOW (ref 39–50)
HCT VFR BLD CALC: 32.4 % — LOW (ref 39–50)
HCT VFR BLD CALC: 35 % — LOW (ref 39–50)
HGB BLD-MCNC: 10.6 G/DL — LOW (ref 13–17)
HGB BLD-MCNC: 10.9 G/DL — LOW (ref 13–17)
HGB BLD-MCNC: 11 G/DL — LOW (ref 13–17)
KETONES UR-MCNC: ABNORMAL
LEUKOCYTE ESTERASE UR-ACNC: NEGATIVE — SIGNIFICANT CHANGE UP
MCHC RBC-ENTMCNC: 29.1 PG — SIGNIFICANT CHANGE UP (ref 27–34)
MCHC RBC-ENTMCNC: 31.1 GM/DL — LOW (ref 32–36)
MCHC RBC-ENTMCNC: 31.7 PG — SIGNIFICANT CHANGE UP (ref 27–34)
MCHC RBC-ENTMCNC: 31.9 PG — SIGNIFICANT CHANGE UP (ref 27–34)
MCHC RBC-ENTMCNC: 34 GM/DL — SIGNIFICANT CHANGE UP (ref 32–36)
MCHC RBC-ENTMCNC: 34.4 GM/DL — SIGNIFICANT CHANGE UP (ref 32–36)
MCV RBC AUTO: 92.6 FL — SIGNIFICANT CHANGE UP (ref 80–100)
MCV RBC AUTO: 93.3 FL — SIGNIFICANT CHANGE UP (ref 80–100)
MCV RBC AUTO: 93.3 FL — SIGNIFICANT CHANGE UP (ref 80–100)
NITRITE UR-MCNC: NEGATIVE — SIGNIFICANT CHANGE UP
PH UR: 6 — SIGNIFICANT CHANGE UP (ref 5–8)
PLATELET # BLD AUTO: 163 K/UL — SIGNIFICANT CHANGE UP (ref 150–400)
PLATELET # BLD AUTO: 196 K/UL — SIGNIFICANT CHANGE UP (ref 150–400)
PLATELET # BLD AUTO: 207 K/UL — SIGNIFICANT CHANGE UP (ref 150–400)
POTASSIUM SERPL-MCNC: 3.6 MMOL/L — SIGNIFICANT CHANGE UP (ref 3.5–5.3)
POTASSIUM SERPL-MCNC: 3.6 MMOL/L — SIGNIFICANT CHANGE UP (ref 3.5–5.3)
POTASSIUM SERPL-MCNC: 3.9 MMOL/L — SIGNIFICANT CHANGE UP (ref 3.5–5.3)
POTASSIUM SERPL-SCNC: 3.6 MMOL/L — SIGNIFICANT CHANGE UP (ref 3.5–5.3)
POTASSIUM SERPL-SCNC: 3.6 MMOL/L — SIGNIFICANT CHANGE UP (ref 3.5–5.3)
POTASSIUM SERPL-SCNC: 3.9 MMOL/L — SIGNIFICANT CHANGE UP (ref 3.5–5.3)
PROT SERPL-MCNC: 6 G/DL — SIGNIFICANT CHANGE UP (ref 6–8.3)
PROT UR-MCNC: 100 MG/DL
RBC # BLD: 3.34 M/UL — LOW (ref 4.2–5.8)
RBC # BLD: 3.47 M/UL — LOW (ref 4.2–5.8)
RBC # BLD: 3.75 M/UL — LOW (ref 4.2–5.8)
RBC # FLD: 12.5 % — SIGNIFICANT CHANGE UP (ref 10.3–14.5)
RBC # FLD: 12.6 % — SIGNIFICANT CHANGE UP (ref 10.3–14.5)
RBC # FLD: 14 % — SIGNIFICANT CHANGE UP (ref 10.3–14.5)
SODIUM SERPL-SCNC: 140 MMOL/L — SIGNIFICANT CHANGE UP (ref 135–145)
SODIUM SERPL-SCNC: 141 MMOL/L — SIGNIFICANT CHANGE UP (ref 135–145)
SODIUM SERPL-SCNC: 142 MMOL/L — SIGNIFICANT CHANGE UP (ref 135–145)
SP GR SPEC: 1.03 — HIGH (ref 1.01–1.02)
UROBILINOGEN FLD QL: NEGATIVE — SIGNIFICANT CHANGE UP
WBC # BLD: 10.1 K/UL — SIGNIFICANT CHANGE UP (ref 3.8–10.5)
WBC # BLD: 10.61 K/UL — HIGH (ref 3.8–10.5)
WBC # BLD: 9.6 K/UL — SIGNIFICANT CHANGE UP (ref 3.8–10.5)
WBC # FLD AUTO: 10.1 K/UL — SIGNIFICANT CHANGE UP (ref 3.8–10.5)
WBC # FLD AUTO: 10.61 K/UL — HIGH (ref 3.8–10.5)
WBC # FLD AUTO: 9.6 K/UL — SIGNIFICANT CHANGE UP (ref 3.8–10.5)

## 2017-04-29 PROCEDURE — 71010: CPT | Mod: 26

## 2017-04-29 RX ORDER — ACETAMINOPHEN 500 MG
1000 TABLET ORAL ONCE
Qty: 0 | Refills: 0 | Status: COMPLETED | OUTPATIENT
Start: 2017-04-29 | End: 2017-04-29

## 2017-04-29 RX ADMIN — Medication 400 MILLIGRAM(S): at 20:47

## 2017-04-29 RX ADMIN — Medication 1000 MILLIGRAM(S): at 21:17

## 2017-04-29 RX ADMIN — HEPARIN SODIUM 5000 UNIT(S): 5000 INJECTION INTRAVENOUS; SUBCUTANEOUS at 18:30

## 2017-04-29 RX ADMIN — Medication 1000 MILLIGRAM(S): at 13:55

## 2017-04-29 RX ADMIN — Medication 1000 MILLIGRAM(S): at 08:47

## 2017-04-29 RX ADMIN — PANTOPRAZOLE SODIUM 40 MILLIGRAM(S): 20 TABLET, DELAYED RELEASE ORAL at 11:03

## 2017-04-29 RX ADMIN — Medication 400 MILLIGRAM(S): at 13:54

## 2017-04-29 RX ADMIN — Medication 1.25 MILLIGRAM(S): at 11:03

## 2017-04-29 RX ADMIN — HEPARIN SODIUM 5000 UNIT(S): 5000 INJECTION INTRAVENOUS; SUBCUTANEOUS at 04:54

## 2017-04-29 RX ADMIN — Medication 400 MILLIGRAM(S): at 08:46

## 2017-04-29 RX ADMIN — Medication 1000 MILLIGRAM(S): at 02:45

## 2017-04-29 RX ADMIN — Medication 400 MILLIGRAM(S): at 02:11

## 2017-04-30 LAB
ALBUMIN SERPL ELPH-MCNC: 2.4 G/DL — LOW (ref 3.3–5)
ALP SERPL-CCNC: 64 U/L — SIGNIFICANT CHANGE UP (ref 40–120)
ALT FLD-CCNC: 23 U/L — SIGNIFICANT CHANGE UP (ref 10–45)
ANION GAP SERPL CALC-SCNC: 15 MMOL/L — SIGNIFICANT CHANGE UP (ref 5–17)
APPEARANCE UR: CLEAR — SIGNIFICANT CHANGE UP
APTT BLD: 29.8 SEC — SIGNIFICANT CHANGE UP (ref 27.5–37.4)
AST SERPL-CCNC: 24 U/L — SIGNIFICANT CHANGE UP (ref 10–40)
BILIRUB DIRECT SERPL-MCNC: 0.4 MG/DL — HIGH (ref 0–0.2)
BILIRUB INDIRECT FLD-MCNC: 0.4 MG/DL — SIGNIFICANT CHANGE UP (ref 0.2–1)
BILIRUB SERPL-MCNC: 0.8 MG/DL — SIGNIFICANT CHANGE UP (ref 0.2–1.2)
BILIRUB UR-MCNC: NEGATIVE — SIGNIFICANT CHANGE UP
BUN SERPL-MCNC: 7 MG/DL — SIGNIFICANT CHANGE UP (ref 7–23)
CALCIUM SERPL-MCNC: 8.2 MG/DL — LOW (ref 8.4–10.5)
CHLORIDE SERPL-SCNC: 105 MMOL/L — SIGNIFICANT CHANGE UP (ref 96–108)
CO2 SERPL-SCNC: 22 MMOL/L — SIGNIFICANT CHANGE UP (ref 22–31)
COLOR SPEC: YELLOW — SIGNIFICANT CHANGE UP
CREAT SERPL-MCNC: 0.78 MG/DL — SIGNIFICANT CHANGE UP (ref 0.5–1.3)
CULTURE RESULTS: SIGNIFICANT CHANGE UP
DIFF PNL FLD: ABNORMAL
GLUCOSE SERPL-MCNC: 148 MG/DL — HIGH (ref 70–99)
GLUCOSE UR QL: NEGATIVE — SIGNIFICANT CHANGE UP
HCT VFR BLD CALC: 31.6 % — LOW (ref 39–50)
HGB BLD-MCNC: 10.3 G/DL — LOW (ref 13–17)
INR BLD: 0.97 RATIO — SIGNIFICANT CHANGE UP (ref 0.88–1.16)
KETONES UR-MCNC: NEGATIVE — SIGNIFICANT CHANGE UP
LEUKOCYTE ESTERASE UR-ACNC: NEGATIVE — SIGNIFICANT CHANGE UP
MAGNESIUM SERPL-MCNC: 1.8 MG/DL — SIGNIFICANT CHANGE UP (ref 1.6–2.6)
MCHC RBC-ENTMCNC: 30.6 PG — SIGNIFICANT CHANGE UP (ref 27–34)
MCHC RBC-ENTMCNC: 32.6 GM/DL — SIGNIFICANT CHANGE UP (ref 32–36)
MCV RBC AUTO: 93.8 FL — SIGNIFICANT CHANGE UP (ref 80–100)
NITRITE UR-MCNC: NEGATIVE — SIGNIFICANT CHANGE UP
PH UR: 6 — SIGNIFICANT CHANGE UP (ref 5–8)
PLATELET # BLD AUTO: 237 K/UL — SIGNIFICANT CHANGE UP (ref 150–400)
POTASSIUM SERPL-MCNC: 3.9 MMOL/L — SIGNIFICANT CHANGE UP (ref 3.5–5.3)
POTASSIUM SERPL-SCNC: 3.9 MMOL/L — SIGNIFICANT CHANGE UP (ref 3.5–5.3)
PROT SERPL-MCNC: 5.5 G/DL — LOW (ref 6–8.3)
PROT UR-MCNC: 100 MG/DL
PROTHROM AB SERPL-ACNC: 11 SEC — SIGNIFICANT CHANGE UP (ref 10–13.1)
RBC # BLD: 3.37 M/UL — LOW (ref 4.2–5.8)
RBC # FLD: 13.7 % — SIGNIFICANT CHANGE UP (ref 10.3–14.5)
SODIUM SERPL-SCNC: 142 MMOL/L — SIGNIFICANT CHANGE UP (ref 135–145)
SP GR SPEC: >1.03 — HIGH (ref 1.01–1.02)
SPECIMEN SOURCE: SIGNIFICANT CHANGE UP
UROBILINOGEN FLD QL: NEGATIVE — SIGNIFICANT CHANGE UP
WBC # BLD: 9.8 K/UL — SIGNIFICANT CHANGE UP (ref 3.8–10.5)
WBC # FLD AUTO: 9.8 K/UL — SIGNIFICANT CHANGE UP (ref 3.8–10.5)

## 2017-04-30 RX ORDER — ACETAMINOPHEN 500 MG
1000 TABLET ORAL ONCE
Qty: 0 | Refills: 0 | Status: COMPLETED | OUTPATIENT
Start: 2017-04-30 | End: 2017-04-30

## 2017-04-30 RX ORDER — MAGNESIUM SULFATE 500 MG/ML
2 VIAL (ML) INJECTION ONCE
Qty: 0 | Refills: 0 | Status: COMPLETED | OUTPATIENT
Start: 2017-04-30 | End: 2017-04-30

## 2017-04-30 RX ORDER — HYDROMORPHONE HYDROCHLORIDE 2 MG/ML
0.5 INJECTION INTRAMUSCULAR; INTRAVENOUS; SUBCUTANEOUS
Qty: 0 | Refills: 0 | Status: DISCONTINUED | OUTPATIENT
Start: 2017-04-30 | End: 2017-05-02

## 2017-04-30 RX ORDER — HYDROMORPHONE HYDROCHLORIDE 2 MG/ML
30 INJECTION INTRAMUSCULAR; INTRAVENOUS; SUBCUTANEOUS
Qty: 0 | Refills: 0 | Status: DISCONTINUED | OUTPATIENT
Start: 2017-04-30 | End: 2017-05-07

## 2017-04-30 RX ORDER — POTASSIUM PHOSPHATE, MONOBASIC POTASSIUM PHOSPHATE, DIBASIC 236; 224 MG/ML; MG/ML
15 INJECTION, SOLUTION INTRAVENOUS ONCE
Qty: 0 | Refills: 0 | Status: COMPLETED | OUTPATIENT
Start: 2017-04-30 | End: 2017-04-30

## 2017-04-30 RX ORDER — DIPHENHYDRAMINE HCL 50 MG
12.5 CAPSULE ORAL EVERY 4 HOURS
Qty: 0 | Refills: 0 | Status: DISCONTINUED | OUTPATIENT
Start: 2017-04-30 | End: 2017-05-15

## 2017-04-30 RX ADMIN — Medication 1.25 MILLIGRAM(S): at 12:54

## 2017-04-30 RX ADMIN — HEPARIN SODIUM 5000 UNIT(S): 5000 INJECTION INTRAVENOUS; SUBCUTANEOUS at 17:23

## 2017-04-30 RX ADMIN — Medication 400 MILLIGRAM(S): at 06:35

## 2017-04-30 RX ADMIN — Medication 1.25 MILLIGRAM(S): at 17:37

## 2017-04-30 RX ADMIN — HYDROMORPHONE HYDROCHLORIDE 30 MILLILITER(S): 2 INJECTION INTRAMUSCULAR; INTRAVENOUS; SUBCUTANEOUS at 14:14

## 2017-04-30 RX ADMIN — Medication 1.25 MILLIGRAM(S): at 05:52

## 2017-04-30 RX ADMIN — HEPARIN SODIUM 5000 UNIT(S): 5000 INJECTION INTRAVENOUS; SUBCUTANEOUS at 05:51

## 2017-04-30 RX ADMIN — Medication 1.25 MILLIGRAM(S): at 01:30

## 2017-04-30 RX ADMIN — HYDROMORPHONE HYDROCHLORIDE 30 MILLILITER(S): 2 INJECTION INTRAMUSCULAR; INTRAVENOUS; SUBCUTANEOUS at 19:17

## 2017-04-30 RX ADMIN — POTASSIUM PHOSPHATE, MONOBASIC POTASSIUM PHOSPHATE, DIBASIC 62.5 MILLIMOLE(S): 236; 224 INJECTION, SOLUTION INTRAVENOUS at 17:20

## 2017-04-30 RX ADMIN — PANTOPRAZOLE SODIUM 40 MILLIGRAM(S): 20 TABLET, DELAYED RELEASE ORAL at 12:54

## 2017-04-30 RX ADMIN — ONDANSETRON 4 MILLIGRAM(S): 8 TABLET, FILM COATED ORAL at 14:16

## 2017-04-30 RX ADMIN — Medication 50 GRAM(S): at 13:13

## 2017-05-01 RX ORDER — SODIUM CHLORIDE 9 MG/ML
1000 INJECTION, SOLUTION INTRAVENOUS ONCE
Qty: 0 | Refills: 0 | Status: COMPLETED | OUTPATIENT
Start: 2017-05-01 | End: 2017-05-01

## 2017-05-01 RX ORDER — ENOXAPARIN SODIUM 100 MG/ML
40 INJECTION SUBCUTANEOUS DAILY
Qty: 0 | Refills: 0 | Status: DISCONTINUED | OUTPATIENT
Start: 2017-05-01 | End: 2017-05-15

## 2017-05-01 RX ADMIN — ENOXAPARIN SODIUM 40 MILLIGRAM(S): 100 INJECTION SUBCUTANEOUS at 11:58

## 2017-05-01 RX ADMIN — PANTOPRAZOLE SODIUM 40 MILLIGRAM(S): 20 TABLET, DELAYED RELEASE ORAL at 11:58

## 2017-05-01 RX ADMIN — HYDROMORPHONE HYDROCHLORIDE 30 MILLILITER(S): 2 INJECTION INTRAMUSCULAR; INTRAVENOUS; SUBCUTANEOUS at 19:37

## 2017-05-01 RX ADMIN — Medication 1.25 MILLIGRAM(S): at 06:40

## 2017-05-01 RX ADMIN — HYDROMORPHONE HYDROCHLORIDE 0.5 MILLIGRAM(S): 2 INJECTION INTRAMUSCULAR; INTRAVENOUS; SUBCUTANEOUS at 23:36

## 2017-05-01 RX ADMIN — HYDROMORPHONE HYDROCHLORIDE 30 MILLILITER(S): 2 INJECTION INTRAMUSCULAR; INTRAVENOUS; SUBCUTANEOUS at 23:37

## 2017-05-01 RX ADMIN — HEPARIN SODIUM 5000 UNIT(S): 5000 INJECTION INTRAVENOUS; SUBCUTANEOUS at 06:39

## 2017-05-01 RX ADMIN — HYDROMORPHONE HYDROCHLORIDE 30 MILLILITER(S): 2 INJECTION INTRAMUSCULAR; INTRAVENOUS; SUBCUTANEOUS at 17:52

## 2017-05-01 RX ADMIN — HYDROMORPHONE HYDROCHLORIDE 0.5 MILLIGRAM(S): 2 INJECTION INTRAMUSCULAR; INTRAVENOUS; SUBCUTANEOUS at 10:19

## 2017-05-01 RX ADMIN — Medication 1.25 MILLIGRAM(S): at 23:06

## 2017-05-01 RX ADMIN — ONDANSETRON 4 MILLIGRAM(S): 8 TABLET, FILM COATED ORAL at 10:16

## 2017-05-01 RX ADMIN — HYDROMORPHONE HYDROCHLORIDE 30 MILLILITER(S): 2 INJECTION INTRAMUSCULAR; INTRAVENOUS; SUBCUTANEOUS at 07:26

## 2017-05-01 RX ADMIN — Medication 1.25 MILLIGRAM(S): at 01:19

## 2017-05-01 RX ADMIN — DEXTROSE MONOHYDRATE, SODIUM CHLORIDE, AND POTASSIUM CHLORIDE 150 MILLILITER(S): 50; .745; 4.5 INJECTION, SOLUTION INTRAVENOUS at 14:39

## 2017-05-01 RX ADMIN — SODIUM CHLORIDE 4000 MILLILITER(S): 9 INJECTION, SOLUTION INTRAVENOUS at 07:53

## 2017-05-01 RX ADMIN — Medication 1.25 MILLIGRAM(S): at 17:34

## 2017-05-01 RX ADMIN — Medication 1.25 MILLIGRAM(S): at 11:58

## 2017-05-01 RX ADMIN — ONDANSETRON 4 MILLIGRAM(S): 8 TABLET, FILM COATED ORAL at 23:06

## 2017-05-02 LAB
ALBUMIN SERPL ELPH-MCNC: 2.6 G/DL — LOW (ref 3.3–5)
ALP SERPL-CCNC: 131 U/L — HIGH (ref 40–120)
ALT FLD-CCNC: 26 U/L — SIGNIFICANT CHANGE UP (ref 10–45)
ANION GAP SERPL CALC-SCNC: 17 MMOL/L — SIGNIFICANT CHANGE UP (ref 5–17)
APPEARANCE UR: CLEAR — SIGNIFICANT CHANGE UP
AST SERPL-CCNC: 33 U/L — SIGNIFICANT CHANGE UP (ref 10–40)
BILIRUB SERPL-MCNC: 0.8 MG/DL — SIGNIFICANT CHANGE UP (ref 0.2–1.2)
BILIRUB UR-MCNC: NEGATIVE — SIGNIFICANT CHANGE UP
BUN SERPL-MCNC: 8 MG/DL — SIGNIFICANT CHANGE UP (ref 7–23)
CALCIUM SERPL-MCNC: 8.6 MG/DL — SIGNIFICANT CHANGE UP (ref 8.4–10.5)
CHLORIDE SERPL-SCNC: 103 MMOL/L — SIGNIFICANT CHANGE UP (ref 96–108)
CO2 SERPL-SCNC: 22 MMOL/L — SIGNIFICANT CHANGE UP (ref 22–31)
COLOR SPEC: ABNORMAL
CREAT SERPL-MCNC: 0.79 MG/DL — SIGNIFICANT CHANGE UP (ref 0.5–1.3)
DIFF PNL FLD: ABNORMAL
GLUCOSE SERPL-MCNC: 172 MG/DL — HIGH (ref 70–99)
GLUCOSE UR QL: NEGATIVE MG/DL — SIGNIFICANT CHANGE UP
HCT VFR BLD CALC: 37.1 % — LOW (ref 39–50)
HCT VFR BLD CALC: 38.1 % — LOW (ref 39–50)
HGB BLD-MCNC: 12.2 G/DL — LOW (ref 13–17)
HGB BLD-MCNC: 12.4 G/DL — LOW (ref 13–17)
KETONES UR-MCNC: NEGATIVE — SIGNIFICANT CHANGE UP
LEUKOCYTE ESTERASE UR-ACNC: NEGATIVE — SIGNIFICANT CHANGE UP
MAGNESIUM SERPL-MCNC: 2.1 MG/DL — SIGNIFICANT CHANGE UP (ref 1.6–2.6)
MCHC RBC-ENTMCNC: 29.9 PG — SIGNIFICANT CHANGE UP (ref 27–34)
MCHC RBC-ENTMCNC: 31 PG — SIGNIFICANT CHANGE UP (ref 27–34)
MCHC RBC-ENTMCNC: 32 GM/DL — SIGNIFICANT CHANGE UP (ref 32–36)
MCHC RBC-ENTMCNC: 33.5 GM/DL — SIGNIFICANT CHANGE UP (ref 32–36)
MCV RBC AUTO: 92.7 FL — SIGNIFICANT CHANGE UP (ref 80–100)
MCV RBC AUTO: 93.4 FL — SIGNIFICANT CHANGE UP (ref 80–100)
NITRITE UR-MCNC: NEGATIVE — SIGNIFICANT CHANGE UP
PH UR: 5.5 — SIGNIFICANT CHANGE UP (ref 5–8)
PHOSPHATE SERPL-MCNC: 3.1 MG/DL — SIGNIFICANT CHANGE UP (ref 2.5–4.5)
PLATELET # BLD AUTO: 336 K/UL — SIGNIFICANT CHANGE UP (ref 150–400)
PLATELET # BLD AUTO: 383 K/UL — SIGNIFICANT CHANGE UP (ref 150–400)
POTASSIUM SERPL-MCNC: 5.5 MMOL/L — HIGH (ref 3.5–5.3)
POTASSIUM SERPL-SCNC: 5.5 MMOL/L — HIGH (ref 3.5–5.3)
PROT SERPL-MCNC: 6 G/DL — SIGNIFICANT CHANGE UP (ref 6–8.3)
PROT UR-MCNC: 30 MG/DL
RBC # BLD: 4 M/UL — LOW (ref 4.2–5.8)
RBC # BLD: 4.08 M/UL — LOW (ref 4.2–5.8)
RBC # FLD: 12.7 % — SIGNIFICANT CHANGE UP (ref 10.3–14.5)
RBC # FLD: 13.9 % — SIGNIFICANT CHANGE UP (ref 10.3–14.5)
SODIUM SERPL-SCNC: 142 MMOL/L — SIGNIFICANT CHANGE UP (ref 135–145)
SP GR SPEC: 1.02 — SIGNIFICANT CHANGE UP (ref 1.01–1.02)
UROBILINOGEN FLD QL: NEGATIVE MG/DL — SIGNIFICANT CHANGE UP
WBC # BLD: 10.3 K/UL — SIGNIFICANT CHANGE UP (ref 3.8–10.5)
WBC # BLD: 11.81 K/UL — HIGH (ref 3.8–10.5)
WBC # FLD AUTO: 10.3 K/UL — SIGNIFICANT CHANGE UP (ref 3.8–10.5)
WBC # FLD AUTO: 11.81 K/UL — HIGH (ref 3.8–10.5)

## 2017-05-02 PROCEDURE — 71010: CPT | Mod: 26

## 2017-05-02 PROCEDURE — 74177 CT ABD & PELVIS W/CONTRAST: CPT | Mod: 26

## 2017-05-02 PROCEDURE — 93010 ELECTROCARDIOGRAM REPORT: CPT

## 2017-05-02 RX ORDER — PANTOPRAZOLE SODIUM 20 MG/1
40 TABLET, DELAYED RELEASE ORAL ONCE
Qty: 0 | Refills: 0 | Status: DISCONTINUED | OUTPATIENT
Start: 2017-05-02 | End: 2017-05-02

## 2017-05-02 RX ORDER — METOPROLOL TARTRATE 50 MG
5 TABLET ORAL ONCE
Qty: 0 | Refills: 0 | Status: COMPLETED | OUTPATIENT
Start: 2017-05-02 | End: 2017-05-02

## 2017-05-02 RX ORDER — DOXAZOSIN MESYLATE 4 MG
1 TABLET ORAL AT BEDTIME
Qty: 0 | Refills: 0 | Status: DISCONTINUED | OUTPATIENT
Start: 2017-05-02 | End: 2017-05-15

## 2017-05-02 RX ORDER — PANTOPRAZOLE SODIUM 20 MG/1
40 TABLET, DELAYED RELEASE ORAL DAILY
Qty: 0 | Refills: 0 | Status: DISCONTINUED | OUTPATIENT
Start: 2017-05-02 | End: 2017-05-02

## 2017-05-02 RX ORDER — ACETAMINOPHEN 500 MG
1000 TABLET ORAL ONCE
Qty: 0 | Refills: 0 | Status: COMPLETED | OUTPATIENT
Start: 2017-05-02 | End: 2017-05-02

## 2017-05-02 RX ORDER — ONDANSETRON 8 MG/1
4 TABLET, FILM COATED ORAL ONCE
Qty: 0 | Refills: 0 | Status: COMPLETED | OUTPATIENT
Start: 2017-05-02 | End: 2017-05-02

## 2017-05-02 RX ADMIN — DEXTROSE MONOHYDRATE, SODIUM CHLORIDE, AND POTASSIUM CHLORIDE 150 MILLILITER(S): 50; .745; 4.5 INJECTION, SOLUTION INTRAVENOUS at 10:44

## 2017-05-02 RX ADMIN — DEXTROSE MONOHYDRATE, SODIUM CHLORIDE, AND POTASSIUM CHLORIDE 150 MILLILITER(S): 50; .745; 4.5 INJECTION, SOLUTION INTRAVENOUS at 17:53

## 2017-05-02 RX ADMIN — HYDROMORPHONE HYDROCHLORIDE 0.5 MILLIGRAM(S): 2 INJECTION INTRAMUSCULAR; INTRAVENOUS; SUBCUTANEOUS at 07:15

## 2017-05-02 RX ADMIN — Medication 5 MILLIGRAM(S): at 03:59

## 2017-05-02 RX ADMIN — Medication 1 MILLIGRAM(S): at 23:07

## 2017-05-02 RX ADMIN — PANTOPRAZOLE SODIUM 40 MILLIGRAM(S): 20 TABLET, DELAYED RELEASE ORAL at 12:52

## 2017-05-02 RX ADMIN — ONDANSETRON 4 MILLIGRAM(S): 8 TABLET, FILM COATED ORAL at 07:11

## 2017-05-02 RX ADMIN — HYDROMORPHONE HYDROCHLORIDE 30 MILLILITER(S): 2 INJECTION INTRAMUSCULAR; INTRAVENOUS; SUBCUTANEOUS at 07:13

## 2017-05-02 RX ADMIN — ENOXAPARIN SODIUM 40 MILLIGRAM(S): 100 INJECTION SUBCUTANEOUS at 12:52

## 2017-05-02 RX ADMIN — Medication 400 MILLIGRAM(S): at 02:25

## 2017-05-02 RX ADMIN — Medication 1.25 MILLIGRAM(S): at 17:53

## 2017-05-02 RX ADMIN — Medication 1.25 MILLIGRAM(S): at 06:05

## 2017-05-02 RX ADMIN — Medication 10 MILLIGRAM(S): at 14:14

## 2017-05-02 RX ADMIN — ONDANSETRON 4 MILLIGRAM(S): 8 TABLET, FILM COATED ORAL at 10:43

## 2017-05-02 RX ADMIN — Medication 1.25 MILLIGRAM(S): at 12:52

## 2017-05-02 RX ADMIN — HYDROMORPHONE HYDROCHLORIDE 30 MILLILITER(S): 2 INJECTION INTRAMUSCULAR; INTRAVENOUS; SUBCUTANEOUS at 19:20

## 2017-05-02 NOTE — PROVIDER CONTACT NOTE (OTHER) - ACTION/TREATMENT ORDERED:
Blood culture, CBC, UA, UC, EKG, and chest xray done. 1000mg IV Tylenol given. Will continue to monitor pt.

## 2017-05-02 NOTE — PHYSICAL THERAPY INITIAL EVALUATION ADULT - ADDITIONAL COMMENTS
Pt lives with spouse in a private house with 1 step to enter, 1 flight of stairs inside house. As per pt, pt can stay on first floor upon DC, to avoid stair negotiation. Pt reporting he was staying on first floor post his two spinal surgeries. PTA, pt independent with all ADLs and functional activities with no AD. However pt owns cane, rolling walker. As per pt, spouse is available to assist when needed.

## 2017-05-02 NOTE — PHYSICAL THERAPY INITIAL EVALUATION ADULT - GAIT DEVIATIONS NOTED, PT EVAL
decreased whitney/decreased stride length/flexed posture, increased weight-bearing through UEs/decreased step length/decreased weight-shifting ability

## 2017-05-02 NOTE — PHYSICAL THERAPY INITIAL EVALUATION ADULT - PERTINENT HX OF CURRENT PROBLEM, REHAB EVAL
66yoM, pmhx below. presents for planned for whipple procedure, appendectomy. CT abd/pelvis Question mild enteritis and/or ileus along the course of the jejunostomy tube. Otherwise no acute abnormality with expected changes of Whipple procedure. CXR The heart is minimally enlarged. Poor inspiratory effort. Platelike atelectasis left lower lobe. Fluid in the right fissure. NG tube is removed. Orthopedic hardware is seen in cervical spine.

## 2017-05-02 NOTE — PHYSICAL THERAPY INITIAL EVALUATION ADULT - DISCHARGE DISPOSITION, PT EVAL
home w/ home PT/home w/ assist/Home with PT for functional/safety assessment, gait/endurance training, general strengthening and fall risk prevention. pt would require supervision with all ADLs and functional activities upon DC.

## 2017-05-03 LAB
ANION GAP SERPL CALC-SCNC: 13 MMOL/L — SIGNIFICANT CHANGE UP (ref 5–17)
APTT BLD: 28.3 SEC — SIGNIFICANT CHANGE UP (ref 27.5–37.4)
BUN SERPL-MCNC: 11 MG/DL — SIGNIFICANT CHANGE UP (ref 7–23)
CALCIUM SERPL-MCNC: 8.7 MG/DL — SIGNIFICANT CHANGE UP (ref 8.4–10.5)
CHLORIDE SERPL-SCNC: 103 MMOL/L — SIGNIFICANT CHANGE UP (ref 96–108)
CO2 SERPL-SCNC: 24 MMOL/L — SIGNIFICANT CHANGE UP (ref 22–31)
CREAT SERPL-MCNC: 0.7 MG/DL — SIGNIFICANT CHANGE UP (ref 0.5–1.3)
CULTURE RESULTS: NO GROWTH — SIGNIFICANT CHANGE UP
GLUCOSE SERPL-MCNC: 168 MG/DL — HIGH (ref 70–99)
HCT VFR BLD CALC: 36.1 % — LOW (ref 39–50)
HGB BLD-MCNC: 11.7 G/DL — LOW (ref 13–17)
INR BLD: 1.24 RATIO — HIGH (ref 0.88–1.16)
MAGNESIUM SERPL-MCNC: 1.9 MG/DL — SIGNIFICANT CHANGE UP (ref 1.6–2.6)
MCHC RBC-ENTMCNC: 30.2 PG — SIGNIFICANT CHANGE UP (ref 27–34)
MCHC RBC-ENTMCNC: 32.5 GM/DL — SIGNIFICANT CHANGE UP (ref 32–36)
MCV RBC AUTO: 93.1 FL — SIGNIFICANT CHANGE UP (ref 80–100)
PHOSPHATE SERPL-MCNC: 3.1 MG/DL — SIGNIFICANT CHANGE UP (ref 2.5–4.5)
PLATELET # BLD AUTO: 368 K/UL — SIGNIFICANT CHANGE UP (ref 150–400)
POTASSIUM SERPL-MCNC: 4.7 MMOL/L — SIGNIFICANT CHANGE UP (ref 3.5–5.3)
POTASSIUM SERPL-SCNC: 4.7 MMOL/L — SIGNIFICANT CHANGE UP (ref 3.5–5.3)
PROTHROM AB SERPL-ACNC: 13.5 SEC — HIGH (ref 9.8–12.7)
RBC # BLD: 3.88 M/UL — LOW (ref 4.2–5.8)
RBC # FLD: 12.8 % — SIGNIFICANT CHANGE UP (ref 10.3–14.5)
SODIUM SERPL-SCNC: 140 MMOL/L — SIGNIFICANT CHANGE UP (ref 135–145)
SPECIMEN SOURCE: SIGNIFICANT CHANGE UP
SURGICAL PATHOLOGY STUDY: SIGNIFICANT CHANGE UP
WBC # BLD: 10.9 K/UL — HIGH (ref 3.8–10.5)
WBC # FLD AUTO: 10.9 K/UL — HIGH (ref 3.8–10.5)

## 2017-05-03 RX ORDER — METOCLOPRAMIDE HCL 10 MG
5 TABLET ORAL EVERY 6 HOURS
Qty: 0 | Refills: 0 | Status: DISCONTINUED | OUTPATIENT
Start: 2017-05-03 | End: 2017-05-15

## 2017-05-03 RX ORDER — ERYTHROMYCIN ETHYLSUCCINATE 400 MG
125 TABLET ORAL EVERY 6 HOURS
Qty: 0 | Refills: 0 | Status: DISCONTINUED | OUTPATIENT
Start: 2017-05-03 | End: 2017-05-03

## 2017-05-03 RX ORDER — ERYTHROMYCIN ETHYLSUCCINATE 400 MG
125 TABLET ORAL EVERY 6 HOURS
Qty: 0 | Refills: 0 | Status: DISCONTINUED | OUTPATIENT
Start: 2017-05-03 | End: 2017-05-09

## 2017-05-03 RX ADMIN — Medication 100 MILLIGRAM(S): at 21:10

## 2017-05-03 RX ADMIN — ONDANSETRON 4 MILLIGRAM(S): 8 TABLET, FILM COATED ORAL at 02:18

## 2017-05-03 RX ADMIN — Medication 1.25 MILLIGRAM(S): at 17:10

## 2017-05-03 RX ADMIN — HYDROMORPHONE HYDROCHLORIDE 30 MILLILITER(S): 2 INJECTION INTRAMUSCULAR; INTRAVENOUS; SUBCUTANEOUS at 19:18

## 2017-05-03 RX ADMIN — PANTOPRAZOLE SODIUM 40 MILLIGRAM(S): 20 TABLET, DELAYED RELEASE ORAL at 13:57

## 2017-05-03 RX ADMIN — Medication 1.25 MILLIGRAM(S): at 02:16

## 2017-05-03 RX ADMIN — Medication 100 MILLIGRAM(S): at 13:56

## 2017-05-03 RX ADMIN — HYDROMORPHONE HYDROCHLORIDE 30 MILLILITER(S): 2 INJECTION INTRAMUSCULAR; INTRAVENOUS; SUBCUTANEOUS at 07:12

## 2017-05-03 RX ADMIN — DEXTROSE MONOHYDRATE, SODIUM CHLORIDE, AND POTASSIUM CHLORIDE 150 MILLILITER(S): 50; .745; 4.5 INJECTION, SOLUTION INTRAVENOUS at 13:56

## 2017-05-03 RX ADMIN — ENOXAPARIN SODIUM 40 MILLIGRAM(S): 100 INJECTION SUBCUTANEOUS at 13:56

## 2017-05-03 RX ADMIN — Medication 1.25 MILLIGRAM(S): at 13:57

## 2017-05-03 RX ADMIN — Medication 1.25 MILLIGRAM(S): at 06:09

## 2017-05-03 NOTE — PROVIDER CONTACT NOTE (OTHER) - ACTION/TREATMENT ORDERED:
PA made aware. Zofran given earlier. Pt c/o gastric reflux, on Protonix iv push daily. Will continue to monitor.

## 2017-05-04 LAB
ALBUMIN SERPL ELPH-MCNC: 2.4 G/DL — LOW (ref 3.3–5)
ALP SERPL-CCNC: 105 U/L — SIGNIFICANT CHANGE UP (ref 40–120)
ALT FLD-CCNC: 21 U/L RC — SIGNIFICANT CHANGE UP (ref 10–45)
ANION GAP SERPL CALC-SCNC: 13 MMOL/L — SIGNIFICANT CHANGE UP (ref 5–17)
AST SERPL-CCNC: 22 U/L — SIGNIFICANT CHANGE UP (ref 10–40)
BILIRUB DIRECT SERPL-MCNC: 0.3 MG/DL — HIGH (ref 0–0.2)
BILIRUB INDIRECT FLD-MCNC: 0.4 MG/DL — SIGNIFICANT CHANGE UP (ref 0.2–1)
BILIRUB SERPL-MCNC: 0.7 MG/DL — SIGNIFICANT CHANGE UP (ref 0.2–1.2)
BUN SERPL-MCNC: 10 MG/DL — SIGNIFICANT CHANGE UP (ref 7–23)
CALCIUM SERPL-MCNC: 7.8 MG/DL — LOW (ref 8.4–10.5)
CHLORIDE SERPL-SCNC: 98 MMOL/L — SIGNIFICANT CHANGE UP (ref 96–108)
CO2 SERPL-SCNC: 24 MMOL/L — SIGNIFICANT CHANGE UP (ref 22–31)
CREAT SERPL-MCNC: 0.77 MG/DL — SIGNIFICANT CHANGE UP (ref 0.5–1.3)
CULTURE RESULTS: SIGNIFICANT CHANGE UP
CULTURE RESULTS: SIGNIFICANT CHANGE UP
GLUCOSE SERPL-MCNC: 111 MG/DL — HIGH (ref 70–99)
HCT VFR BLD CALC: 32.3 % — LOW (ref 39–50)
HGB BLD-MCNC: 10.9 G/DL — LOW (ref 13–17)
MAGNESIUM SERPL-MCNC: 1.9 MG/DL — SIGNIFICANT CHANGE UP (ref 1.6–2.6)
MCHC RBC-ENTMCNC: 31.2 PG — SIGNIFICANT CHANGE UP (ref 27–34)
MCHC RBC-ENTMCNC: 33.7 GM/DL — SIGNIFICANT CHANGE UP (ref 32–36)
MCV RBC AUTO: 92.5 FL — SIGNIFICANT CHANGE UP (ref 80–100)
PHOSPHATE SERPL-MCNC: 4.1 MG/DL — SIGNIFICANT CHANGE UP (ref 2.5–4.5)
PLATELET # BLD AUTO: 365 K/UL — SIGNIFICANT CHANGE UP (ref 150–400)
POTASSIUM SERPL-MCNC: 4.5 MMOL/L — SIGNIFICANT CHANGE UP (ref 3.5–5.3)
POTASSIUM SERPL-SCNC: 4.5 MMOL/L — SIGNIFICANT CHANGE UP (ref 3.5–5.3)
PROT SERPL-MCNC: 5.1 G/DL — LOW (ref 6–8.3)
RBC # BLD: 3.5 M/UL — LOW (ref 4.2–5.8)
RBC # FLD: 12.7 % — SIGNIFICANT CHANGE UP (ref 10.3–14.5)
SODIUM SERPL-SCNC: 135 MMOL/L — SIGNIFICANT CHANGE UP (ref 135–145)
SPECIMEN SOURCE: SIGNIFICANT CHANGE UP
SPECIMEN SOURCE: SIGNIFICANT CHANGE UP
WBC # BLD: 13.4 K/UL — HIGH (ref 3.8–10.5)
WBC # FLD AUTO: 13.4 K/UL — HIGH (ref 3.8–10.5)

## 2017-05-04 PROCEDURE — 93010 ELECTROCARDIOGRAM REPORT: CPT

## 2017-05-04 RX ORDER — ACETAMINOPHEN 500 MG
1000 TABLET ORAL ONCE
Qty: 0 | Refills: 0 | Status: COMPLETED | OUTPATIENT
Start: 2017-05-04 | End: 2017-05-09

## 2017-05-04 RX ORDER — ACETAMINOPHEN 500 MG
1000 TABLET ORAL ONCE
Qty: 0 | Refills: 0 | Status: COMPLETED | OUTPATIENT
Start: 2017-05-04 | End: 2017-05-10

## 2017-05-04 RX ADMIN — Medication 100 MILLIGRAM(S): at 22:35

## 2017-05-04 RX ADMIN — Medication 5 MILLIGRAM(S): at 22:35

## 2017-05-04 RX ADMIN — Medication 100 MILLIGRAM(S): at 02:05

## 2017-05-04 RX ADMIN — PANTOPRAZOLE SODIUM 40 MILLIGRAM(S): 20 TABLET, DELAYED RELEASE ORAL at 11:25

## 2017-05-04 RX ADMIN — Medication 1.25 MILLIGRAM(S): at 06:46

## 2017-05-04 RX ADMIN — Medication 100 MILLIGRAM(S): at 08:38

## 2017-05-04 RX ADMIN — Medication 5 MILLIGRAM(S): at 13:44

## 2017-05-04 RX ADMIN — HYDROMORPHONE HYDROCHLORIDE 30 MILLILITER(S): 2 INJECTION INTRAMUSCULAR; INTRAVENOUS; SUBCUTANEOUS at 02:01

## 2017-05-04 RX ADMIN — HYDROMORPHONE HYDROCHLORIDE 30 MILLILITER(S): 2 INJECTION INTRAMUSCULAR; INTRAVENOUS; SUBCUTANEOUS at 07:11

## 2017-05-04 RX ADMIN — Medication 1.25 MILLIGRAM(S): at 22:59

## 2017-05-04 RX ADMIN — ENOXAPARIN SODIUM 40 MILLIGRAM(S): 100 INJECTION SUBCUTANEOUS at 11:26

## 2017-05-04 RX ADMIN — Medication 100 MILLIGRAM(S): at 15:07

## 2017-05-04 RX ADMIN — Medication 5 MILLIGRAM(S): at 07:12

## 2017-05-04 RX ADMIN — Medication 5 MILLIGRAM(S): at 02:04

## 2017-05-04 RX ADMIN — Medication 1.25 MILLIGRAM(S): at 02:05

## 2017-05-05 LAB
ALBUMIN SERPL ELPH-MCNC: 2.7 G/DL — LOW (ref 3.3–5)
ALP SERPL-CCNC: 118 U/L — SIGNIFICANT CHANGE UP (ref 40–120)
ALT FLD-CCNC: 25 U/L RC — SIGNIFICANT CHANGE UP (ref 10–45)
ANION GAP SERPL CALC-SCNC: 11 MMOL/L — SIGNIFICANT CHANGE UP (ref 5–17)
ANION GAP SERPL CALC-SCNC: 12 MMOL/L — SIGNIFICANT CHANGE UP (ref 5–17)
APPEARANCE UR: CLEAR — SIGNIFICANT CHANGE UP
AST SERPL-CCNC: 21 U/L — SIGNIFICANT CHANGE UP (ref 10–40)
BASOPHILS # BLD AUTO: 0.1 K/UL — SIGNIFICANT CHANGE UP (ref 0–0.2)
BASOPHILS NFR BLD AUTO: 0 % — SIGNIFICANT CHANGE UP (ref 0–2)
BILIRUB DIRECT SERPL-MCNC: 0.5 MG/DL — HIGH (ref 0–0.2)
BILIRUB INDIRECT FLD-MCNC: 0.3 MG/DL — SIGNIFICANT CHANGE UP (ref 0.2–1)
BILIRUB SERPL-MCNC: 0.8 MG/DL — SIGNIFICANT CHANGE UP (ref 0.2–1.2)
BILIRUB UR-MCNC: NEGATIVE — SIGNIFICANT CHANGE UP
BUN SERPL-MCNC: 10 MG/DL — SIGNIFICANT CHANGE UP (ref 7–23)
BUN SERPL-MCNC: 8 MG/DL — SIGNIFICANT CHANGE UP (ref 7–23)
CALCIUM SERPL-MCNC: 8.3 MG/DL — LOW (ref 8.4–10.5)
CALCIUM SERPL-MCNC: 8.3 MG/DL — LOW (ref 8.4–10.5)
CHLORIDE SERPL-SCNC: 100 MMOL/L — SIGNIFICANT CHANGE UP (ref 96–108)
CHLORIDE SERPL-SCNC: 97 MMOL/L — SIGNIFICANT CHANGE UP (ref 96–108)
CO2 SERPL-SCNC: 25 MMOL/L — SIGNIFICANT CHANGE UP (ref 22–31)
CO2 SERPL-SCNC: 25 MMOL/L — SIGNIFICANT CHANGE UP (ref 22–31)
COLOR SPEC: YELLOW — SIGNIFICANT CHANGE UP
CREAT SERPL-MCNC: 0.77 MG/DL — SIGNIFICANT CHANGE UP (ref 0.5–1.3)
CREAT SERPL-MCNC: 0.79 MG/DL — SIGNIFICANT CHANGE UP (ref 0.5–1.3)
CULTURE RESULTS: SIGNIFICANT CHANGE UP
CULTURE RESULTS: SIGNIFICANT CHANGE UP
DIFF PNL FLD: NEGATIVE — SIGNIFICANT CHANGE UP
EOSINOPHIL # BLD AUTO: 0.6 K/UL — HIGH (ref 0–0.5)
EOSINOPHIL NFR BLD AUTO: 2 % — SIGNIFICANT CHANGE UP (ref 0–6)
GLUCOSE SERPL-MCNC: 126 MG/DL — HIGH (ref 70–99)
GLUCOSE SERPL-MCNC: 98 MG/DL — SIGNIFICANT CHANGE UP (ref 70–99)
GLUCOSE UR QL: NEGATIVE — SIGNIFICANT CHANGE UP
HCT VFR BLD CALC: 32 % — LOW (ref 39–50)
HCT VFR BLD CALC: 32.9 % — LOW (ref 39–50)
HGB BLD-MCNC: 10.8 G/DL — LOW (ref 13–17)
HGB BLD-MCNC: 11.1 G/DL — LOW (ref 13–17)
KETONES UR-MCNC: NEGATIVE — SIGNIFICANT CHANGE UP
LEUKOCYTE ESTERASE UR-ACNC: NEGATIVE — SIGNIFICANT CHANGE UP
LYMPHOCYTES # BLD AUTO: 1.4 K/UL — SIGNIFICANT CHANGE UP (ref 1–3.3)
LYMPHOCYTES # BLD AUTO: 10 % — LOW (ref 13–44)
MAGNESIUM SERPL-MCNC: 1.8 MG/DL — SIGNIFICANT CHANGE UP (ref 1.6–2.6)
MAGNESIUM SERPL-MCNC: 1.9 MG/DL — SIGNIFICANT CHANGE UP (ref 1.6–2.6)
MCHC RBC-ENTMCNC: 30.8 PG — SIGNIFICANT CHANGE UP (ref 27–34)
MCHC RBC-ENTMCNC: 31 PG — SIGNIFICANT CHANGE UP (ref 27–34)
MCHC RBC-ENTMCNC: 33.6 GM/DL — SIGNIFICANT CHANGE UP (ref 32–36)
MCHC RBC-ENTMCNC: 33.8 GM/DL — SIGNIFICANT CHANGE UP (ref 32–36)
MCV RBC AUTO: 91.8 FL — SIGNIFICANT CHANGE UP (ref 80–100)
MCV RBC AUTO: 91.9 FL — SIGNIFICANT CHANGE UP (ref 80–100)
MONOCYTES # BLD AUTO: 1.5 K/UL — HIGH (ref 0–0.9)
MONOCYTES NFR BLD AUTO: 9 % — SIGNIFICANT CHANGE UP (ref 2–14)
NEUTROPHILS # BLD AUTO: 12.1 K/UL — HIGH (ref 1.8–7.4)
NEUTROPHILS NFR BLD AUTO: 74 % — SIGNIFICANT CHANGE UP (ref 43–77)
NEUTS BAND # BLD: 4 % — SIGNIFICANT CHANGE UP (ref 0–8)
NITRITE UR-MCNC: NEGATIVE — SIGNIFICANT CHANGE UP
PH UR: 5.5 — SIGNIFICANT CHANGE UP (ref 5–8)
PHOSPHATE SERPL-MCNC: 3.6 MG/DL — SIGNIFICANT CHANGE UP (ref 2.5–4.5)
PHOSPHATE SERPL-MCNC: 4 MG/DL — SIGNIFICANT CHANGE UP (ref 2.5–4.5)
PLAT MORPH BLD: NORMAL — SIGNIFICANT CHANGE UP
PLATELET # BLD AUTO: 433 K/UL — HIGH (ref 150–400)
PLATELET # BLD AUTO: 494 K/UL — HIGH (ref 150–400)
POTASSIUM SERPL-MCNC: 4.3 MMOL/L — SIGNIFICANT CHANGE UP (ref 3.5–5.3)
POTASSIUM SERPL-MCNC: 4.5 MMOL/L — SIGNIFICANT CHANGE UP (ref 3.5–5.3)
POTASSIUM SERPL-SCNC: 4.3 MMOL/L — SIGNIFICANT CHANGE UP (ref 3.5–5.3)
POTASSIUM SERPL-SCNC: 4.5 MMOL/L — SIGNIFICANT CHANGE UP (ref 3.5–5.3)
PROT SERPL-MCNC: 5.4 G/DL — LOW (ref 6–8.3)
PROT UR-MCNC: SIGNIFICANT CHANGE UP
RBC # BLD: 3.49 M/UL — LOW (ref 4.2–5.8)
RBC # BLD: 3.58 M/UL — LOW (ref 4.2–5.8)
RBC # FLD: 12.8 % — SIGNIFICANT CHANGE UP (ref 10.3–14.5)
RBC # FLD: 12.8 % — SIGNIFICANT CHANGE UP (ref 10.3–14.5)
RBC BLD AUTO: SIGNIFICANT CHANGE UP
SODIUM SERPL-SCNC: 133 MMOL/L — LOW (ref 135–145)
SODIUM SERPL-SCNC: 137 MMOL/L — SIGNIFICANT CHANGE UP (ref 135–145)
SP GR SPEC: 1.02 — SIGNIFICANT CHANGE UP (ref 1.01–1.02)
SPECIMEN SOURCE: SIGNIFICANT CHANGE UP
SPECIMEN SOURCE: SIGNIFICANT CHANGE UP
TOXIC GRANULES BLD QL SMEAR: PRESENT — SIGNIFICANT CHANGE UP
UROBILINOGEN FLD QL: NEGATIVE — SIGNIFICANT CHANGE UP
VARIANT LYMPHS # BLD: 1 % — SIGNIFICANT CHANGE UP (ref 0–6)
WBC # BLD: 15.6 K/UL — HIGH (ref 3.8–10.5)
WBC # BLD: 15.9 K/UL — HIGH (ref 3.8–10.5)
WBC # FLD AUTO: 15.6 K/UL — HIGH (ref 3.8–10.5)
WBC # FLD AUTO: 15.9 K/UL — HIGH (ref 3.8–10.5)

## 2017-05-05 PROCEDURE — 71010: CPT | Mod: 26

## 2017-05-05 RX ADMIN — Medication 1.25 MILLIGRAM(S): at 12:09

## 2017-05-05 RX ADMIN — Medication 100 MILLIGRAM(S): at 05:13

## 2017-05-05 RX ADMIN — Medication 1.25 MILLIGRAM(S): at 17:17

## 2017-05-05 RX ADMIN — HYDROMORPHONE HYDROCHLORIDE 30 MILLILITER(S): 2 INJECTION INTRAMUSCULAR; INTRAVENOUS; SUBCUTANEOUS at 19:06

## 2017-05-05 RX ADMIN — PANTOPRAZOLE SODIUM 40 MILLIGRAM(S): 20 TABLET, DELAYED RELEASE ORAL at 12:09

## 2017-05-05 RX ADMIN — Medication 100 MILLIGRAM(S): at 19:41

## 2017-05-05 RX ADMIN — HYDROMORPHONE HYDROCHLORIDE 30 MILLILITER(S): 2 INJECTION INTRAMUSCULAR; INTRAVENOUS; SUBCUTANEOUS at 07:15

## 2017-05-05 RX ADMIN — ENOXAPARIN SODIUM 40 MILLIGRAM(S): 100 INJECTION SUBCUTANEOUS at 12:08

## 2017-05-05 RX ADMIN — Medication 5 MILLIGRAM(S): at 17:17

## 2017-05-05 RX ADMIN — Medication 5 MILLIGRAM(S): at 05:12

## 2017-05-05 RX ADMIN — Medication 1.25 MILLIGRAM(S): at 05:12

## 2017-05-05 RX ADMIN — Medication 100 MILLIGRAM(S): at 12:17

## 2017-05-05 RX ADMIN — Medication 5 MILLIGRAM(S): at 12:09

## 2017-05-05 NOTE — PROVIDER CONTACT NOTE (OTHER) - ACTION/TREATMENT ORDERED:
Md made aware. stat labs ua, C7, mg and phos drawn stat. Md made aware. Md made aware. stat labs ua, C7, mg and phos +  blood cx drawn stat.  Will continue to monitor.

## 2017-05-06 LAB
ALBUMIN SERPL ELPH-MCNC: 2.1 G/DL — LOW (ref 3.3–5)
ALP SERPL-CCNC: 129 U/L — HIGH (ref 40–120)
ALT FLD-CCNC: 22 U/L — SIGNIFICANT CHANGE UP (ref 10–45)
ANION GAP SERPL CALC-SCNC: 14 MMOL/L — SIGNIFICANT CHANGE UP (ref 5–17)
AST SERPL-CCNC: 26 U/L — SIGNIFICANT CHANGE UP (ref 10–40)
BASOPHILS # BLD AUTO: 0.05 K/UL — SIGNIFICANT CHANGE UP (ref 0–0.2)
BASOPHILS NFR BLD AUTO: 0.3 % — SIGNIFICANT CHANGE UP (ref 0–2)
BILIRUB SERPL-MCNC: 0.7 MG/DL — SIGNIFICANT CHANGE UP (ref 0.2–1.2)
BUN SERPL-MCNC: 7 MG/DL — SIGNIFICANT CHANGE UP (ref 7–23)
CALCIUM SERPL-MCNC: 8.4 MG/DL — SIGNIFICANT CHANGE UP (ref 8.4–10.5)
CHLORIDE SERPL-SCNC: 101 MMOL/L — SIGNIFICANT CHANGE UP (ref 96–108)
CO2 SERPL-SCNC: 21 MMOL/L — LOW (ref 22–31)
CREAT SERPL-MCNC: 0.81 MG/DL — SIGNIFICANT CHANGE UP (ref 0.5–1.3)
EOSINOPHIL # BLD AUTO: 0.22 K/UL — SIGNIFICANT CHANGE UP (ref 0–0.5)
EOSINOPHIL NFR BLD AUTO: 1.5 % — SIGNIFICANT CHANGE UP (ref 0–6)
GLUCOSE SERPL-MCNC: 141 MG/DL — HIGH (ref 70–99)
HCT VFR BLD CALC: 29.9 % — LOW (ref 39–50)
HGB BLD-MCNC: 9.8 G/DL — LOW (ref 13–17)
IMM GRANULOCYTES NFR BLD AUTO: 1.9 % — HIGH (ref 0–1.5)
LYMPHOCYTES # BLD AUTO: 1.05 K/UL — SIGNIFICANT CHANGE UP (ref 1–3.3)
LYMPHOCYTES # BLD AUTO: 7.2 % — LOW (ref 13–44)
MAGNESIUM SERPL-MCNC: 2.1 MG/DL — SIGNIFICANT CHANGE UP (ref 1.6–2.6)
MCHC RBC-ENTMCNC: 29.8 PG — SIGNIFICANT CHANGE UP (ref 27–34)
MCHC RBC-ENTMCNC: 32.8 GM/DL — SIGNIFICANT CHANGE UP (ref 32–36)
MCV RBC AUTO: 90.9 FL — SIGNIFICANT CHANGE UP (ref 80–100)
MONOCYTES # BLD AUTO: 1.3 K/UL — HIGH (ref 0–0.9)
MONOCYTES NFR BLD AUTO: 9 % — SIGNIFICANT CHANGE UP (ref 2–14)
NEUTROPHILS # BLD AUTO: 11.62 K/UL — HIGH (ref 1.8–7.4)
NEUTROPHILS NFR BLD AUTO: 80.1 % — HIGH (ref 43–77)
PHOSPHATE SERPL-MCNC: 3.5 MG/DL — SIGNIFICANT CHANGE UP (ref 2.5–4.5)
PLATELET # BLD AUTO: 509 K/UL — HIGH (ref 150–400)
POTASSIUM SERPL-MCNC: 4.7 MMOL/L — SIGNIFICANT CHANGE UP (ref 3.5–5.3)
POTASSIUM SERPL-SCNC: 4.7 MMOL/L — SIGNIFICANT CHANGE UP (ref 3.5–5.3)
PROT SERPL-MCNC: 5.2 G/DL — LOW (ref 6–8.3)
RBC # BLD: 3.29 M/UL — LOW (ref 4.2–5.8)
RBC # FLD: 13.7 % — SIGNIFICANT CHANGE UP (ref 10.3–14.5)
SODIUM SERPL-SCNC: 136 MMOL/L — SIGNIFICANT CHANGE UP (ref 135–145)
WBC # BLD: 14.52 K/UL — HIGH (ref 3.8–10.5)
WBC # FLD AUTO: 14.52 K/UL — HIGH (ref 3.8–10.5)

## 2017-05-06 RX ADMIN — PANTOPRAZOLE SODIUM 40 MILLIGRAM(S): 20 TABLET, DELAYED RELEASE ORAL at 11:54

## 2017-05-06 RX ADMIN — Medication 1 MILLIGRAM(S): at 21:18

## 2017-05-06 RX ADMIN — Medication 1.25 MILLIGRAM(S): at 11:54

## 2017-05-06 RX ADMIN — Medication 5 MILLIGRAM(S): at 11:54

## 2017-05-06 RX ADMIN — Medication 100 MILLIGRAM(S): at 06:23

## 2017-05-06 RX ADMIN — Medication 5 MILLIGRAM(S): at 18:37

## 2017-05-06 RX ADMIN — Medication 1.25 MILLIGRAM(S): at 06:23

## 2017-05-06 RX ADMIN — Medication 5 MILLIGRAM(S): at 00:09

## 2017-05-06 RX ADMIN — HYDROMORPHONE HYDROCHLORIDE 30 MILLILITER(S): 2 INJECTION INTRAMUSCULAR; INTRAVENOUS; SUBCUTANEOUS at 07:15

## 2017-05-06 RX ADMIN — Medication 1.25 MILLIGRAM(S): at 00:09

## 2017-05-06 RX ADMIN — Medication 5 MILLIGRAM(S): at 06:23

## 2017-05-06 RX ADMIN — HYDROMORPHONE HYDROCHLORIDE 30 MILLILITER(S): 2 INJECTION INTRAMUSCULAR; INTRAVENOUS; SUBCUTANEOUS at 19:09

## 2017-05-06 RX ADMIN — Medication 100 MILLIGRAM(S): at 00:09

## 2017-05-06 RX ADMIN — Medication 100 MILLIGRAM(S): at 18:37

## 2017-05-06 RX ADMIN — Medication 100 MILLIGRAM(S): at 11:54

## 2017-05-06 RX ADMIN — Medication 1.25 MILLIGRAM(S): at 18:37

## 2017-05-06 RX ADMIN — ENOXAPARIN SODIUM 40 MILLIGRAM(S): 100 INJECTION SUBCUTANEOUS at 11:54

## 2017-05-07 LAB
ANION GAP SERPL CALC-SCNC: 16 MMOL/L — SIGNIFICANT CHANGE UP (ref 5–17)
BUN SERPL-MCNC: 6 MG/DL — LOW (ref 7–23)
CALCIUM SERPL-MCNC: 8.5 MG/DL — SIGNIFICANT CHANGE UP (ref 8.4–10.5)
CHLORIDE SERPL-SCNC: 99 MMOL/L — SIGNIFICANT CHANGE UP (ref 96–108)
CO2 SERPL-SCNC: 20 MMOL/L — LOW (ref 22–31)
CREAT SERPL-MCNC: 0.78 MG/DL — SIGNIFICANT CHANGE UP (ref 0.5–1.3)
CULTURE RESULTS: SIGNIFICANT CHANGE UP
CULTURE RESULTS: SIGNIFICANT CHANGE UP
GLUCOSE SERPL-MCNC: 121 MG/DL — HIGH (ref 70–99)
HCT VFR BLD CALC: 34.3 % — LOW (ref 39–50)
HGB BLD-MCNC: 11.5 G/DL — LOW (ref 13–17)
MAGNESIUM SERPL-MCNC: 2.2 MG/DL — SIGNIFICANT CHANGE UP (ref 1.6–2.6)
MCHC RBC-ENTMCNC: 30.7 PG — SIGNIFICANT CHANGE UP (ref 27–34)
MCHC RBC-ENTMCNC: 33.5 GM/DL — SIGNIFICANT CHANGE UP (ref 32–36)
MCV RBC AUTO: 91.7 FL — SIGNIFICANT CHANGE UP (ref 80–100)
PHOSPHATE SERPL-MCNC: 3.8 MG/DL — SIGNIFICANT CHANGE UP (ref 2.5–4.5)
PLATELET # BLD AUTO: 656 K/UL — HIGH (ref 150–400)
POTASSIUM SERPL-MCNC: 4.9 MMOL/L — SIGNIFICANT CHANGE UP (ref 3.5–5.3)
POTASSIUM SERPL-SCNC: 4.9 MMOL/L — SIGNIFICANT CHANGE UP (ref 3.5–5.3)
RBC # BLD: 3.74 M/UL — LOW (ref 4.2–5.8)
RBC # FLD: 13.6 % — SIGNIFICANT CHANGE UP (ref 10.3–14.5)
SODIUM SERPL-SCNC: 135 MMOL/L — SIGNIFICANT CHANGE UP (ref 135–145)
SPECIMEN SOURCE: SIGNIFICANT CHANGE UP
SPECIMEN SOURCE: SIGNIFICANT CHANGE UP
WBC # BLD: 12.47 K/UL — HIGH (ref 3.8–10.5)
WBC # FLD AUTO: 12.47 K/UL — HIGH (ref 3.8–10.5)

## 2017-05-07 RX ORDER — ONDANSETRON 8 MG/1
4 TABLET, FILM COATED ORAL EVERY 6 HOURS
Qty: 0 | Refills: 0 | Status: DISCONTINUED | OUTPATIENT
Start: 2017-05-08 | End: 2017-05-08

## 2017-05-07 RX ORDER — HYDROMORPHONE HYDROCHLORIDE 2 MG/ML
30 INJECTION INTRAMUSCULAR; INTRAVENOUS; SUBCUTANEOUS
Qty: 0 | Refills: 0 | Status: DISCONTINUED | OUTPATIENT
Start: 2017-05-08 | End: 2017-05-08

## 2017-05-07 RX ADMIN — Medication 5 MILLIGRAM(S): at 00:19

## 2017-05-07 RX ADMIN — Medication 100 MILLIGRAM(S): at 00:19

## 2017-05-07 RX ADMIN — HYDROMORPHONE HYDROCHLORIDE 30 MILLILITER(S): 2 INJECTION INTRAMUSCULAR; INTRAVENOUS; SUBCUTANEOUS at 07:11

## 2017-05-07 RX ADMIN — Medication 1.25 MILLIGRAM(S): at 18:12

## 2017-05-07 RX ADMIN — Medication 1.25 MILLIGRAM(S): at 00:19

## 2017-05-07 RX ADMIN — Medication 5 MILLIGRAM(S): at 05:09

## 2017-05-07 RX ADMIN — Medication 1 MILLIGRAM(S): at 21:40

## 2017-05-07 RX ADMIN — Medication 1.25 MILLIGRAM(S): at 12:56

## 2017-05-07 RX ADMIN — ENOXAPARIN SODIUM 40 MILLIGRAM(S): 100 INJECTION SUBCUTANEOUS at 11:36

## 2017-05-07 RX ADMIN — Medication 5 MILLIGRAM(S): at 11:36

## 2017-05-07 RX ADMIN — Medication 1.25 MILLIGRAM(S): at 23:37

## 2017-05-07 RX ADMIN — Medication 100 MILLIGRAM(S): at 18:12

## 2017-05-07 RX ADMIN — Medication 5 MILLIGRAM(S): at 23:37

## 2017-05-07 RX ADMIN — Medication 5 MILLIGRAM(S): at 18:12

## 2017-05-07 RX ADMIN — Medication 100 MILLIGRAM(S): at 23:36

## 2017-05-07 RX ADMIN — Medication 100 MILLIGRAM(S): at 05:09

## 2017-05-07 RX ADMIN — Medication 1.25 MILLIGRAM(S): at 05:09

## 2017-05-07 RX ADMIN — DEXTROSE MONOHYDRATE, SODIUM CHLORIDE, AND POTASSIUM CHLORIDE 150 MILLILITER(S): 50; .745; 4.5 INJECTION, SOLUTION INTRAVENOUS at 18:12

## 2017-05-07 RX ADMIN — Medication 100 MILLIGRAM(S): at 11:36

## 2017-05-07 RX ADMIN — PANTOPRAZOLE SODIUM 40 MILLIGRAM(S): 20 TABLET, DELAYED RELEASE ORAL at 11:36

## 2017-05-08 ENCOUNTER — TRANSCRIPTION ENCOUNTER (OUTPATIENT)
Age: 67
End: 2017-05-08

## 2017-05-08 LAB
ANION GAP SERPL CALC-SCNC: 11 MMOL/L — SIGNIFICANT CHANGE UP (ref 5–17)
ANION GAP SERPL CALC-SCNC: 24 MMOL/L — HIGH (ref 5–17)
BUN SERPL-MCNC: 7 MG/DL — SIGNIFICANT CHANGE UP (ref 7–23)
BUN SERPL-MCNC: 7 MG/DL — SIGNIFICANT CHANGE UP (ref 7–23)
CALCIUM SERPL-MCNC: 8.4 MG/DL — SIGNIFICANT CHANGE UP (ref 8.4–10.5)
CALCIUM SERPL-MCNC: 8.9 MG/DL — SIGNIFICANT CHANGE UP (ref 8.4–10.5)
CHLORIDE SERPL-SCNC: 95 MMOL/L — LOW (ref 96–108)
CHLORIDE SERPL-SCNC: 99 MMOL/L — SIGNIFICANT CHANGE UP (ref 96–108)
CO2 SERPL-SCNC: 14 MMOL/L — LOW (ref 22–31)
CO2 SERPL-SCNC: 28 MMOL/L — SIGNIFICANT CHANGE UP (ref 22–31)
CREAT SERPL-MCNC: 0.78 MG/DL — SIGNIFICANT CHANGE UP (ref 0.5–1.3)
CREAT SERPL-MCNC: 0.83 MG/DL — SIGNIFICANT CHANGE UP (ref 0.5–1.3)
GLUCOSE SERPL-MCNC: 102 MG/DL — HIGH (ref 70–99)
GLUCOSE SERPL-MCNC: 108 MG/DL — HIGH (ref 70–99)
HCT VFR BLD CALC: 33.5 % — LOW (ref 39–50)
HGB BLD-MCNC: 11.1 G/DL — LOW (ref 13–17)
MAGNESIUM SERPL-MCNC: 2.2 MG/DL — SIGNIFICANT CHANGE UP (ref 1.6–2.6)
MCHC RBC-ENTMCNC: 30.3 PG — SIGNIFICANT CHANGE UP (ref 27–34)
MCHC RBC-ENTMCNC: 33 GM/DL — SIGNIFICANT CHANGE UP (ref 32–36)
MCV RBC AUTO: 91.7 FL — SIGNIFICANT CHANGE UP (ref 80–100)
PHOSPHATE SERPL-MCNC: 4.2 MG/DL — SIGNIFICANT CHANGE UP (ref 2.5–4.5)
PLATELET # BLD AUTO: 722 K/UL — HIGH (ref 150–400)
POTASSIUM SERPL-MCNC: 4.5 MMOL/L — SIGNIFICANT CHANGE UP (ref 3.5–5.3)
POTASSIUM SERPL-MCNC: 5.5 MMOL/L — HIGH (ref 3.5–5.3)
POTASSIUM SERPL-SCNC: 4.5 MMOL/L — SIGNIFICANT CHANGE UP (ref 3.5–5.3)
POTASSIUM SERPL-SCNC: 5.5 MMOL/L — HIGH (ref 3.5–5.3)
RBC # BLD: 3.66 M/UL — LOW (ref 4.2–5.8)
RBC # FLD: 12.8 % — SIGNIFICANT CHANGE UP (ref 10.3–14.5)
SODIUM SERPL-SCNC: 133 MMOL/L — LOW (ref 135–145)
SODIUM SERPL-SCNC: 138 MMOL/L — SIGNIFICANT CHANGE UP (ref 135–145)
WBC # BLD: 12.2 K/UL — HIGH (ref 3.8–10.5)
WBC # FLD AUTO: 12.2 K/UL — HIGH (ref 3.8–10.5)

## 2017-05-08 RX ORDER — OXYCODONE HYDROCHLORIDE 5 MG/1
10 TABLET ORAL
Qty: 0 | Refills: 0 | Status: DISCONTINUED | OUTPATIENT
Start: 2017-05-08 | End: 2017-05-15

## 2017-05-08 RX ORDER — OXYCODONE HYDROCHLORIDE 5 MG/1
5 TABLET ORAL
Qty: 0 | Refills: 0 | Status: DISCONTINUED | OUTPATIENT
Start: 2017-05-08 | End: 2017-05-15

## 2017-05-08 RX ADMIN — Medication 5 MILLIGRAM(S): at 23:48

## 2017-05-08 RX ADMIN — Medication 5 MILLIGRAM(S): at 18:11

## 2017-05-08 RX ADMIN — OXYCODONE HYDROCHLORIDE 5 MILLIGRAM(S): 5 TABLET ORAL at 19:29

## 2017-05-08 RX ADMIN — ENOXAPARIN SODIUM 40 MILLIGRAM(S): 100 INJECTION SUBCUTANEOUS at 12:36

## 2017-05-08 RX ADMIN — Medication 1.25 MILLIGRAM(S): at 05:34

## 2017-05-08 RX ADMIN — Medication 1.25 MILLIGRAM(S): at 18:15

## 2017-05-08 RX ADMIN — PANTOPRAZOLE SODIUM 40 MILLIGRAM(S): 20 TABLET, DELAYED RELEASE ORAL at 12:36

## 2017-05-08 RX ADMIN — HYDROMORPHONE HYDROCHLORIDE 30 MILLILITER(S): 2 INJECTION INTRAMUSCULAR; INTRAVENOUS; SUBCUTANEOUS at 07:13

## 2017-05-08 RX ADMIN — Medication 100 MILLIGRAM(S): at 05:33

## 2017-05-08 RX ADMIN — OXYCODONE HYDROCHLORIDE 5 MILLIGRAM(S): 5 TABLET ORAL at 20:00

## 2017-05-08 RX ADMIN — Medication 100 MILLIGRAM(S): at 18:10

## 2017-05-08 RX ADMIN — Medication 5 MILLIGRAM(S): at 12:42

## 2017-05-08 RX ADMIN — Medication 100 MILLIGRAM(S): at 23:42

## 2017-05-08 RX ADMIN — ONDANSETRON 4 MILLIGRAM(S): 8 TABLET, FILM COATED ORAL at 08:41

## 2017-05-08 RX ADMIN — OXYCODONE HYDROCHLORIDE 5 MILLIGRAM(S): 5 TABLET ORAL at 12:59

## 2017-05-08 RX ADMIN — Medication 1 MILLIGRAM(S): at 21:50

## 2017-05-08 RX ADMIN — OXYCODONE HYDROCHLORIDE 10 MILLIGRAM(S): 5 TABLET ORAL at 23:49

## 2017-05-08 RX ADMIN — Medication 5 MILLIGRAM(S): at 05:34

## 2017-05-08 RX ADMIN — Medication 100 MILLIGRAM(S): at 12:42

## 2017-05-08 RX ADMIN — Medication 1.25 MILLIGRAM(S): at 12:36

## 2017-05-08 RX ADMIN — OXYCODONE HYDROCHLORIDE 5 MILLIGRAM(S): 5 TABLET ORAL at 12:35

## 2017-05-08 RX ADMIN — Medication 1.25 MILLIGRAM(S): at 23:48

## 2017-05-08 NOTE — PROVIDER CONTACT NOTE (OTHER) - SITUATION
pt with abd stpls halley, purulent fluid seeping from between stpls
Bp 99/63, 
Pt w/high temp of 100.5, asymptomatic, denies chills
Pt with one episode of 50ml foul smelling, bilious emesis
pt. has lower BP than he has had in the last few days
/64, , Temp 37.1
Order placed to have sawant removed at midnight but pt refuses. He wants sawant to be removed in the morning, MD notified.
Pt with low urine out s/p epidural removal.
Pts J tube is clogged. MD Flores notified
pt HR elevated, pt complaining of pain.
pt temp and heartrate elevated
Pt was DTV by 1800 tonight and he could not

## 2017-05-08 NOTE — DISCHARGE NOTE ADULT - PATIENT PORTAL LINK FT
“You can access the [employer] Patient Portal, offered by [facility], by registering with the following website: [email or web address]”

## 2017-05-08 NOTE — DISCHARGE NOTE ADULT - PLAN OF CARE
return to daily living activity prior to surgery, postoperative recovery WOUND CARE: Staples will be removed at follow up office visit.    BATHING: Please do not submerge wound underwater. You may shower and/or sponge bathe.  ACTIVITY: No heavy lifting anything more than 10-15lbs or straining. Otherwise, you may return to your usual level of physical activity. If you are taking narcotic pain medication (such as Percocet), do NOT drive a car, operate machinery or make important decisions.  DIET: Low fiber diet  NOTIFY YOUR SURGEON IF: You have any bleeding that does not stop, any pus draining from your wound, any fever (over 100.4 F) or chills, persistent nausea/vomiting with inability to tolerate food or liquids, persistent diarrhea, or if your pain is not controlled on your discharge pain medications.  FOLLOW-UP:  1. Please call to make a follow-up appointment within one week of discharge with Dr. Medrano (See below for office information to call for an appointment)   2. Please follow up with your primary care physician in one week regarding your hospitalization.

## 2017-05-08 NOTE — DISCHARGE NOTE ADULT - [id number]_GEN_ALL_CORE
wound care and assessment/observation and assessment/teaching and training/medication teaching and assessment

## 2017-05-08 NOTE — DISCHARGE NOTE ADULT - CARE PROVIDER_API CALL
Pravin Medrano), Surgery  [address] [place] [address]  Phone: [phone]  Fax: [phone]

## 2017-05-08 NOTE — DISCHARGE NOTE ADULT - HOSPITAL COURSE
66 year old male with PMH of HTN, IBS-C, obstructive jaundice/pancreatic mass with hospitalizations s/p biliary stent placement 3/2017. Pt underwent a whipple with J tube placement and appendectomy on 4/26/2017 with placement on 3 JAQUELIN drains. Pt tolerated the procedure well, was extubated and transferred to PACU in stable condition. On the floor, pain controlled with PCEA which was transitioned to PCA. Post op course complicated by ileus and febrile on 5/2 with a Tmax 38.3 and tachycardia HR @123. CT A/P performed which revealed questionable mild enteritis and/or ileus along the course of the jejunostomy tube. Otherwise no acute abnormality with expected changes of Whipple procedure. Pt started on ATC Reglan. Jtube feeds started and increased as tolerated. Once GI function resumed, NGT was removed and diet advanced slowly as tolerated. CXR on 5/5 Bibasilar linear or subsegmental atelectasis. Pt currently tolerating a low fiber diet, ambulating, voiding and pain controlled on oral regimen.

## 2017-05-08 NOTE — DISCHARGE NOTE ADULT - HOME CARE AGENCY
Home care services for visiting nurse and home physical therapy. VN will call day after discharge to schedule visit. [facility] [phone]. Home care services for visiting nurse and home physical therapy. VN will call day after discharge to schedule visit. Home physical therapist will call to schedule visit. [facility] [phone]. Visiting nurse will call day after discharge to schedule visit. VN scheduled to provide assessment, wound care, reinforce wound care and JAQUELIN teaching.

## 2017-05-08 NOTE — DISCHARGE NOTE ADULT - MEDICATION SUMMARY - MEDICATIONS TO TAKE
I will START or STAY ON the medications listed below when I get home from the hospital:    oxyCODONE 5 mg oral capsule  -- 1 cap(s) by mouth every 4 hours as needed for severe pain MDD:30mg  -- Caution federal law prohibits the transfer of this drug to any person other  than the person for whom it was prescribed.  It is very important that you take or use this exactly as directed.  Do not skip doses or discontinue unless directed by your doctor.  May cause drowsiness.  Alcohol may intensify this effect.  Use care when operating dangerous machinery.  This prescription cannot be refilled.  Using more of this medication than prescribed may cause serious breathing problems.    -- Indication: For Pain    enalapril 20 mg oral tablet  -- 1 tab(s) by mouth 2 times a day  -- Indication: For Hypertension    doxazosin 1 mg oral tablet  -- 1 tab(s) by mouth once a day (at bedtime)  -- Indication: For BPH    Reglan 5 mg oral tablet  -- 1 tab(s) by mouth every 6 hours, As Needed for nausea  -- May cause drowsiness.  Alcohol may intensify this effect.  Use care when operating dangerous machinery.  Take medication on an empty stomach 1 hour before or 2 to 3 hours after a meal unless otherwise directed by your doctor.    -- Indication: For Nausea    Questran Packets 4 g/9 g oral powder for reconstitution  -- 8 gram(s) by mouth 2 times a day  -- Indication: For Hyperlipidemia    hydrOXYzine hydrochloride 25 mg oral tablet  -- 1 tab(s) by mouth once a day (at bedtime) for itching  -- Indication: For Anxiety    famotidine 20 mg oral tablet  -- 1 tab(s) by mouth once a day (at bedtime)  -- Indication: For Reflux    sucralfate 1 g oral tablet  -- 1 tab(s) by mouth once a day  -- Indication: For GI prophylaxis    pantoprazole 40 mg oral delayed release tablet  -- 1 tab(s) by mouth once a day (before a meal)  -- Indication: For Reflux    ciprofloxacin 500 mg oral tablet  -- 1 tab(s) by mouth every 12 hours  -- Indication: For Wound infection

## 2017-05-08 NOTE — DISCHARGE NOTE ADULT - CARE PLAN
Principal Discharge DX:	Biliary stricture  Goal:	return to daily living activity prior to surgery, postoperative recovery  Instructions for follow-up, activity and diet:	WOUND CARE: Staples will be removed at follow up office visit.    BATHING: Please do not submerge wound underwater. You may shower and/or sponge bathe.  ACTIVITY: No heavy lifting anything more than 10-15lbs or straining. Otherwise, you may return to your usual level of physical activity. If you are taking narcotic pain medication (such as Percocet), do NOT drive a car, operate machinery or make important decisions.  DIET: Low fiber diet  NOTIFY YOUR SURGEON IF: You have any bleeding that does not stop, any pus draining from your wound, any fever (over 100.4 F) or chills, persistent nausea/vomiting with inability to tolerate food or liquids, persistent diarrhea, or if your pain is not controlled on your discharge pain medications.  FOLLOW-UP:  1. Please call to make a follow-up appointment within one week of discharge with Dr. Medrano (See below for office information to call for an appointment)   2. Please follow up with your primary care physician in one week regarding your hospitalization.

## 2017-05-08 NOTE — DISCHARGE NOTE ADULT - NS AS ACTIVITY OBS
Do not make important decisions/No Heavy lifting/straining/Do not drive or operate machinery/not while taking narcotic pain medication

## 2017-05-08 NOTE — DISCHARGE NOTE ADULT - INSTRUCTIONS
Low fiber diet Low fiber diet  You will be discharged with JAQUELIN drains. You will need to empty them and record outputs accurately. This will be taught to you by the nursing staff. Please do not remove the JAQUELIN drains. They will be removed in the office. Please bring to the office accurate records of output.

## 2017-05-08 NOTE — DISCHARGE NOTE ADULT - ADDITIONAL INSTRUCTIONS
Wound care: pack abdominal wound at medial and lateral portions with 1/2 inch plain packing daily. Cover incision with dry gauze and paper tape over.   You will be discharged with JAQUELIN drains. You will need to empty them and record outputs accurately. This will be taught to you by the nursing staff. Keep drain to suction at all times. Strip JAQUELIN drains once daily as instructed. Please do not remove the JAQUELIN drains. They will be removed in the office. Please bring to the office accurate records of output. Change dressing at drain site daily with dry 4x4 guaze and tape over. Wound care: pack abdominal wound at medial and lateral portions with 1/4 inch plain packing daily after irrigating wound with normal saline flush. Cover incision with dry gauze and paper tape over.   You will be discharged with JAQUELIN drains. You will need to empty them and record outputs accurately. This will be taught to you by the nursing staff. Keep drain to suction at all times. Strip JAQUELIN drains once daily as instructed. Please do not remove the JAQUELIN drains. They will be removed in the office. Please bring to the office accurate records of output. Change dressing at drain site daily with dry 4x4 guaze and tape over.

## 2017-05-09 LAB
BUN SERPL-MCNC: 9 MG/DL — SIGNIFICANT CHANGE UP (ref 7–23)
CALCIUM SERPL-MCNC: 8.6 MG/DL — SIGNIFICANT CHANGE UP (ref 8.4–10.5)
CHLORIDE SERPL-SCNC: 98 MMOL/L — SIGNIFICANT CHANGE UP (ref 96–108)
CO2 SERPL-SCNC: 25 MMOL/L — SIGNIFICANT CHANGE UP (ref 22–31)
CREAT SERPL-MCNC: 0.85 MG/DL — SIGNIFICANT CHANGE UP (ref 0.5–1.3)
GLUCOSE SERPL-MCNC: 142 MG/DL — HIGH (ref 70–99)
HCT VFR BLD CALC: 35.3 % — LOW (ref 39–50)
HGB BLD-MCNC: 12.2 G/DL — LOW (ref 13–17)
MAGNESIUM SERPL-MCNC: 2.1 MG/DL — SIGNIFICANT CHANGE UP (ref 1.6–2.6)
MCHC RBC-ENTMCNC: 30.9 PG — SIGNIFICANT CHANGE UP (ref 27–34)
MCHC RBC-ENTMCNC: 34.6 GM/DL — SIGNIFICANT CHANGE UP (ref 32–36)
MCV RBC AUTO: 89.4 FL — SIGNIFICANT CHANGE UP (ref 80–100)
PHOSPHATE SERPL-MCNC: 4.1 MG/DL — SIGNIFICANT CHANGE UP (ref 2.5–4.5)
PLATELET # BLD AUTO: 700 K/UL — HIGH (ref 150–400)
POTASSIUM SERPL-MCNC: 4.5 MMOL/L — SIGNIFICANT CHANGE UP (ref 3.5–5.3)
POTASSIUM SERPL-SCNC: 4.5 MMOL/L — SIGNIFICANT CHANGE UP (ref 3.5–5.3)
RBC # BLD: 3.94 M/UL — LOW (ref 4.2–5.8)
RBC # FLD: 12.9 % — SIGNIFICANT CHANGE UP (ref 10.3–14.5)
SODIUM SERPL-SCNC: 136 MMOL/L — SIGNIFICANT CHANGE UP (ref 135–145)
WBC # BLD: 18 K/UL — HIGH (ref 3.8–10.5)
WBC # FLD AUTO: 18 K/UL — HIGH (ref 3.8–10.5)

## 2017-05-09 RX ORDER — ERYTHROMYCIN ETHYLSUCCINATE 400 MG
TABLET ORAL
Qty: 0 | Refills: 0 | Status: DISCONTINUED | OUTPATIENT
Start: 2017-05-09 | End: 2017-05-15

## 2017-05-09 RX ORDER — ERYTHROMYCIN ETHYLSUCCINATE 400 MG
250 TABLET ORAL EVERY 8 HOURS
Qty: 0 | Refills: 0 | Status: DISCONTINUED | OUTPATIENT
Start: 2017-05-09 | End: 2017-05-15

## 2017-05-09 RX ORDER — ERYTHROMYCIN ETHYLSUCCINATE 400 MG
250 TABLET ORAL ONCE
Qty: 0 | Refills: 0 | Status: COMPLETED | OUTPATIENT
Start: 2017-05-09 | End: 2017-05-09

## 2017-05-09 RX ORDER — PANTOPRAZOLE SODIUM 20 MG/1
40 TABLET, DELAYED RELEASE ORAL ONCE
Qty: 0 | Refills: 0 | Status: COMPLETED | OUTPATIENT
Start: 2017-05-09 | End: 2017-05-10

## 2017-05-09 RX ADMIN — Medication 5 MILLIGRAM(S): at 11:37

## 2017-05-09 RX ADMIN — OXYCODONE HYDROCHLORIDE 10 MILLIGRAM(S): 5 TABLET ORAL at 07:00

## 2017-05-09 RX ADMIN — Medication 1.25 MILLIGRAM(S): at 17:26

## 2017-05-09 RX ADMIN — Medication 5 MILLIGRAM(S): at 05:28

## 2017-05-09 RX ADMIN — OXYCODONE HYDROCHLORIDE 10 MILLIGRAM(S): 5 TABLET ORAL at 00:20

## 2017-05-09 RX ADMIN — Medication 100 MILLIGRAM(S): at 21:21

## 2017-05-09 RX ADMIN — Medication 100 MILLIGRAM(S): at 11:32

## 2017-05-09 RX ADMIN — OXYCODONE HYDROCHLORIDE 5 MILLIGRAM(S): 5 TABLET ORAL at 06:29

## 2017-05-09 RX ADMIN — Medication 400 MILLIGRAM(S): at 19:49

## 2017-05-09 RX ADMIN — PANTOPRAZOLE SODIUM 40 MILLIGRAM(S): 20 TABLET, DELAYED RELEASE ORAL at 11:38

## 2017-05-09 RX ADMIN — Medication 1.25 MILLIGRAM(S): at 06:28

## 2017-05-09 RX ADMIN — Medication 100 MILLIGRAM(S): at 18:31

## 2017-05-09 RX ADMIN — Medication 1 MILLIGRAM(S): at 21:21

## 2017-05-09 RX ADMIN — ONDANSETRON 4 MILLIGRAM(S): 8 TABLET, FILM COATED ORAL at 03:32

## 2017-05-09 RX ADMIN — Medication 1.25 MILLIGRAM(S): at 11:38

## 2017-05-09 RX ADMIN — Medication 100 MILLIGRAM(S): at 17:26

## 2017-05-09 RX ADMIN — Medication 5 MILLIGRAM(S): at 17:26

## 2017-05-09 RX ADMIN — ENOXAPARIN SODIUM 40 MILLIGRAM(S): 100 INJECTION SUBCUTANEOUS at 11:38

## 2017-05-09 RX ADMIN — OXYCODONE HYDROCHLORIDE 5 MILLIGRAM(S): 5 TABLET ORAL at 05:40

## 2017-05-09 RX ADMIN — OXYCODONE HYDROCHLORIDE 5 MILLIGRAM(S): 5 TABLET ORAL at 05:19

## 2017-05-09 RX ADMIN — Medication 100 MILLIGRAM(S): at 05:28

## 2017-05-10 LAB
BASOPHILS # BLD AUTO: 0 K/UL — SIGNIFICANT CHANGE UP (ref 0–0.2)
BASOPHILS NFR BLD AUTO: 0.3 % — SIGNIFICANT CHANGE UP (ref 0–2)
BUN SERPL-MCNC: 12 MG/DL — SIGNIFICANT CHANGE UP (ref 7–23)
CALCIUM SERPL-MCNC: 8.3 MG/DL — LOW (ref 8.4–10.5)
CHLORIDE SERPL-SCNC: 100 MMOL/L — SIGNIFICANT CHANGE UP (ref 96–108)
CO2 SERPL-SCNC: 25 MMOL/L — SIGNIFICANT CHANGE UP (ref 22–31)
CREAT SERPL-MCNC: 0.86 MG/DL — SIGNIFICANT CHANGE UP (ref 0.5–1.3)
CULTURE RESULTS: SIGNIFICANT CHANGE UP
CULTURE RESULTS: SIGNIFICANT CHANGE UP
EOSINOPHIL # BLD AUTO: 0.2 K/UL — SIGNIFICANT CHANGE UP (ref 0–0.5)
EOSINOPHIL NFR BLD AUTO: 0.9 % — SIGNIFICANT CHANGE UP (ref 0–6)
GLUCOSE SERPL-MCNC: 146 MG/DL — HIGH (ref 70–99)
HCT VFR BLD CALC: 32 % — LOW (ref 39–50)
HGB BLD-MCNC: 11 G/DL — LOW (ref 13–17)
LYMPHOCYTES # BLD AUTO: 1.1 K/UL — SIGNIFICANT CHANGE UP (ref 1–3.3)
LYMPHOCYTES # BLD AUTO: 7.1 % — LOW (ref 13–44)
MAGNESIUM SERPL-MCNC: 2.1 MG/DL — SIGNIFICANT CHANGE UP (ref 1.6–2.6)
MCHC RBC-ENTMCNC: 31 PG — SIGNIFICANT CHANGE UP (ref 27–34)
MCHC RBC-ENTMCNC: 34.5 GM/DL — SIGNIFICANT CHANGE UP (ref 32–36)
MCV RBC AUTO: 89.8 FL — SIGNIFICANT CHANGE UP (ref 80–100)
MONOCYTES # BLD AUTO: 1.5 K/UL — HIGH (ref 0–0.9)
MONOCYTES NFR BLD AUTO: 9.3 % — SIGNIFICANT CHANGE UP (ref 2–14)
NEUTROPHILS # BLD AUTO: 13.2 K/UL — HIGH (ref 1.8–7.4)
NEUTROPHILS NFR BLD AUTO: 82.4 % — HIGH (ref 43–77)
PHOSPHATE SERPL-MCNC: 3.3 MG/DL — SIGNIFICANT CHANGE UP (ref 2.5–4.5)
PLATELET # BLD AUTO: 671 K/UL — HIGH (ref 150–400)
POTASSIUM SERPL-MCNC: 4.6 MMOL/L — SIGNIFICANT CHANGE UP (ref 3.5–5.3)
POTASSIUM SERPL-SCNC: 4.6 MMOL/L — SIGNIFICANT CHANGE UP (ref 3.5–5.3)
RBC # BLD: 3.56 M/UL — LOW (ref 4.2–5.8)
RBC # FLD: 12.8 % — SIGNIFICANT CHANGE UP (ref 10.3–14.5)
SODIUM SERPL-SCNC: 136 MMOL/L — SIGNIFICANT CHANGE UP (ref 135–145)
SPECIMEN SOURCE: SIGNIFICANT CHANGE UP
SPECIMEN SOURCE: SIGNIFICANT CHANGE UP
WBC # BLD: 16.1 K/UL — HIGH (ref 3.8–10.5)
WBC # FLD AUTO: 16.1 K/UL — HIGH (ref 3.8–10.5)

## 2017-05-10 RX ORDER — ACETAMINOPHEN 500 MG
1000 TABLET ORAL ONCE
Qty: 0 | Refills: 0 | Status: COMPLETED | OUTPATIENT
Start: 2017-05-10 | End: 2017-05-10

## 2017-05-10 RX ADMIN — Medication 5 MILLIGRAM(S): at 18:40

## 2017-05-10 RX ADMIN — Medication 400 MILLIGRAM(S): at 03:34

## 2017-05-10 RX ADMIN — Medication 5 MILLIGRAM(S): at 01:37

## 2017-05-10 RX ADMIN — Medication 1 MILLIGRAM(S): at 21:15

## 2017-05-10 RX ADMIN — OXYCODONE HYDROCHLORIDE 10 MILLIGRAM(S): 5 TABLET ORAL at 21:15

## 2017-05-10 RX ADMIN — Medication 1000 MILLIGRAM(S): at 04:16

## 2017-05-10 RX ADMIN — Medication 400 MILLIGRAM(S): at 11:14

## 2017-05-10 RX ADMIN — Medication 1.25 MILLIGRAM(S): at 11:14

## 2017-05-10 RX ADMIN — Medication 1.25 MILLIGRAM(S): at 01:37

## 2017-05-10 RX ADMIN — Medication 5 MILLIGRAM(S): at 11:14

## 2017-05-10 RX ADMIN — Medication 100 MILLIGRAM(S): at 21:43

## 2017-05-10 RX ADMIN — Medication 100 MILLIGRAM(S): at 14:57

## 2017-05-10 RX ADMIN — PANTOPRAZOLE SODIUM 40 MILLIGRAM(S): 20 TABLET, DELAYED RELEASE ORAL at 01:37

## 2017-05-10 RX ADMIN — Medication 1.25 MILLIGRAM(S): at 18:39

## 2017-05-10 RX ADMIN — OXYCODONE HYDROCHLORIDE 10 MILLIGRAM(S): 5 TABLET ORAL at 21:58

## 2017-05-10 RX ADMIN — DEXTROSE MONOHYDRATE, SODIUM CHLORIDE, AND POTASSIUM CHLORIDE 30 MILLILITER(S): 50; .745; 4.5 INJECTION, SOLUTION INTRAVENOUS at 11:14

## 2017-05-10 RX ADMIN — PANTOPRAZOLE SODIUM 40 MILLIGRAM(S): 20 TABLET, DELAYED RELEASE ORAL at 11:14

## 2017-05-10 RX ADMIN — Medication 100 MILLIGRAM(S): at 04:57

## 2017-05-10 RX ADMIN — Medication 5 MILLIGRAM(S): at 04:57

## 2017-05-10 RX ADMIN — Medication 5 MILLIGRAM(S): at 23:58

## 2017-05-10 RX ADMIN — ENOXAPARIN SODIUM 40 MILLIGRAM(S): 100 INJECTION SUBCUTANEOUS at 11:14

## 2017-05-10 RX ADMIN — Medication 1.25 MILLIGRAM(S): at 04:57

## 2017-05-11 LAB
ANION GAP SERPL CALC-SCNC: 13 MMOL/L — SIGNIFICANT CHANGE UP (ref 5–17)
BUN SERPL-MCNC: 11 MG/DL — SIGNIFICANT CHANGE UP (ref 7–23)
CALCIUM SERPL-MCNC: 8.4 MG/DL — SIGNIFICANT CHANGE UP (ref 8.4–10.5)
CHLORIDE SERPL-SCNC: 100 MMOL/L — SIGNIFICANT CHANGE UP (ref 96–108)
CO2 SERPL-SCNC: 23 MMOL/L — SIGNIFICANT CHANGE UP (ref 22–31)
CREAT SERPL-MCNC: 0.79 MG/DL — SIGNIFICANT CHANGE UP (ref 0.5–1.3)
GLUCOSE SERPL-MCNC: 115 MG/DL — HIGH (ref 70–99)
HCT VFR BLD CALC: 33.2 % — LOW (ref 39–50)
HGB BLD-MCNC: 11.3 G/DL — LOW (ref 13–17)
MAGNESIUM SERPL-MCNC: 2 MG/DL — SIGNIFICANT CHANGE UP (ref 1.6–2.6)
MCHC RBC-ENTMCNC: 30.8 PG — SIGNIFICANT CHANGE UP (ref 27–34)
MCHC RBC-ENTMCNC: 33.9 GM/DL — SIGNIFICANT CHANGE UP (ref 32–36)
MCV RBC AUTO: 90.7 FL — SIGNIFICANT CHANGE UP (ref 80–100)
PHOSPHATE SERPL-MCNC: 3.7 MG/DL — SIGNIFICANT CHANGE UP (ref 2.5–4.5)
PLATELET # BLD AUTO: 709 K/UL — HIGH (ref 150–400)
POTASSIUM SERPL-MCNC: 4.2 MMOL/L — SIGNIFICANT CHANGE UP (ref 3.5–5.3)
POTASSIUM SERPL-SCNC: 4.2 MMOL/L — SIGNIFICANT CHANGE UP (ref 3.5–5.3)
RBC # BLD: 3.66 M/UL — LOW (ref 4.2–5.8)
RBC # FLD: 12.8 % — SIGNIFICANT CHANGE UP (ref 10.3–14.5)
SODIUM SERPL-SCNC: 136 MMOL/L — SIGNIFICANT CHANGE UP (ref 135–145)
WBC # BLD: 17.8 K/UL — HIGH (ref 3.8–10.5)
WBC # FLD AUTO: 17.8 K/UL — HIGH (ref 3.8–10.5)

## 2017-05-11 RX ORDER — SUCRALFATE 1 G
1 TABLET ORAL DAILY
Qty: 0 | Refills: 0 | Status: DISCONTINUED | OUTPATIENT
Start: 2017-05-11 | End: 2017-05-15

## 2017-05-11 RX ORDER — FAMOTIDINE 10 MG/ML
20 INJECTION INTRAVENOUS ONCE
Qty: 0 | Refills: 0 | Status: COMPLETED | OUTPATIENT
Start: 2017-05-11 | End: 2017-05-11

## 2017-05-11 RX ADMIN — OXYCODONE HYDROCHLORIDE 10 MILLIGRAM(S): 5 TABLET ORAL at 10:45

## 2017-05-11 RX ADMIN — Medication 1.25 MILLIGRAM(S): at 05:17

## 2017-05-11 RX ADMIN — FAMOTIDINE 20 MILLIGRAM(S): 10 INJECTION INTRAVENOUS at 20:32

## 2017-05-11 RX ADMIN — Medication 1.25 MILLIGRAM(S): at 11:30

## 2017-05-11 RX ADMIN — Medication 5 MILLIGRAM(S): at 05:17

## 2017-05-11 RX ADMIN — DEXTROSE MONOHYDRATE, SODIUM CHLORIDE, AND POTASSIUM CHLORIDE 30 MILLILITER(S): 50; .745; 4.5 INJECTION, SOLUTION INTRAVENOUS at 11:30

## 2017-05-11 RX ADMIN — OXYCODONE HYDROCHLORIDE 10 MILLIGRAM(S): 5 TABLET ORAL at 10:13

## 2017-05-11 RX ADMIN — Medication 1 GRAM(S): at 11:30

## 2017-05-11 RX ADMIN — Medication 5 MILLIGRAM(S): at 18:05

## 2017-05-11 RX ADMIN — Medication 1.25 MILLIGRAM(S): at 18:05

## 2017-05-11 RX ADMIN — Medication 100 MILLIGRAM(S): at 14:16

## 2017-05-11 RX ADMIN — Medication 100 MILLIGRAM(S): at 05:17

## 2017-05-11 RX ADMIN — PANTOPRAZOLE SODIUM 40 MILLIGRAM(S): 20 TABLET, DELAYED RELEASE ORAL at 11:30

## 2017-05-11 RX ADMIN — OXYCODONE HYDROCHLORIDE 10 MILLIGRAM(S): 5 TABLET ORAL at 14:16

## 2017-05-11 RX ADMIN — ENOXAPARIN SODIUM 40 MILLIGRAM(S): 100 INJECTION SUBCUTANEOUS at 11:30

## 2017-05-11 RX ADMIN — Medication 5 MILLIGRAM(S): at 11:30

## 2017-05-12 LAB
ANION GAP SERPL CALC-SCNC: 12 MMOL/L — SIGNIFICANT CHANGE UP (ref 5–17)
BASOPHILS # BLD AUTO: 0 K/UL — SIGNIFICANT CHANGE UP (ref 0–0.2)
BASOPHILS NFR BLD AUTO: 0.2 % — SIGNIFICANT CHANGE UP (ref 0–2)
BUN SERPL-MCNC: 11 MG/DL — SIGNIFICANT CHANGE UP (ref 7–23)
CALCIUM SERPL-MCNC: 8 MG/DL — LOW (ref 8.4–10.5)
CHLORIDE SERPL-SCNC: 98 MMOL/L — SIGNIFICANT CHANGE UP (ref 96–108)
CO2 SERPL-SCNC: 24 MMOL/L — SIGNIFICANT CHANGE UP (ref 22–31)
CREAT SERPL-MCNC: 0.79 MG/DL — SIGNIFICANT CHANGE UP (ref 0.5–1.3)
EOSINOPHIL # BLD AUTO: 0.2 K/UL — SIGNIFICANT CHANGE UP (ref 0–0.5)
EOSINOPHIL NFR BLD AUTO: 1.2 % — SIGNIFICANT CHANGE UP (ref 0–6)
GLUCOSE SERPL-MCNC: 122 MG/DL — HIGH (ref 70–99)
HCT VFR BLD CALC: 33.1 % — LOW (ref 39–50)
HGB BLD-MCNC: 11 G/DL — LOW (ref 13–17)
LYMPHOCYTES # BLD AUTO: 1.4 K/UL — SIGNIFICANT CHANGE UP (ref 1–3.3)
LYMPHOCYTES # BLD AUTO: 7.9 % — LOW (ref 13–44)
MAGNESIUM SERPL-MCNC: 2 MG/DL — SIGNIFICANT CHANGE UP (ref 1.6–2.6)
MCHC RBC-ENTMCNC: 30.3 PG — SIGNIFICANT CHANGE UP (ref 27–34)
MCHC RBC-ENTMCNC: 33.1 GM/DL — SIGNIFICANT CHANGE UP (ref 32–36)
MCV RBC AUTO: 91.5 FL — SIGNIFICANT CHANGE UP (ref 80–100)
MONOCYTES # BLD AUTO: 1.7 K/UL — HIGH (ref 0–0.9)
MONOCYTES NFR BLD AUTO: 9.1 % — SIGNIFICANT CHANGE UP (ref 2–14)
NEUTROPHILS # BLD AUTO: 15 K/UL — HIGH (ref 1.8–7.4)
NEUTROPHILS NFR BLD AUTO: 81.6 % — HIGH (ref 43–77)
PHOSPHATE SERPL-MCNC: 3.6 MG/DL — SIGNIFICANT CHANGE UP (ref 2.5–4.5)
PLATELET # BLD AUTO: 669 K/UL — HIGH (ref 150–400)
POTASSIUM SERPL-MCNC: 4.1 MMOL/L — SIGNIFICANT CHANGE UP (ref 3.5–5.3)
POTASSIUM SERPL-SCNC: 4.1 MMOL/L — SIGNIFICANT CHANGE UP (ref 3.5–5.3)
RBC # BLD: 3.62 M/UL — LOW (ref 4.2–5.8)
RBC # FLD: 12.8 % — SIGNIFICANT CHANGE UP (ref 10.3–14.5)
SODIUM SERPL-SCNC: 134 MMOL/L — LOW (ref 135–145)
WBC # BLD: 18.3 K/UL — HIGH (ref 3.8–10.5)
WBC # FLD AUTO: 18.3 K/UL — HIGH (ref 3.8–10.5)

## 2017-05-12 RX ORDER — CALCIUM CARBONATE 500(1250)
1 TABLET ORAL ONCE
Qty: 0 | Refills: 0 | Status: DISCONTINUED | OUTPATIENT
Start: 2017-05-12 | End: 2017-05-15

## 2017-05-12 RX ORDER — FAMOTIDINE 10 MG/ML
20 INJECTION INTRAVENOUS DAILY
Qty: 0 | Refills: 0 | Status: DISCONTINUED | OUTPATIENT
Start: 2017-05-12 | End: 2017-05-13

## 2017-05-12 RX ADMIN — OXYCODONE HYDROCHLORIDE 10 MILLIGRAM(S): 5 TABLET ORAL at 23:40

## 2017-05-12 RX ADMIN — Medication 100 MILLIGRAM(S): at 18:00

## 2017-05-12 RX ADMIN — Medication 100 MILLIGRAM(S): at 09:30

## 2017-05-12 RX ADMIN — OXYCODONE HYDROCHLORIDE 5 MILLIGRAM(S): 5 TABLET ORAL at 01:39

## 2017-05-12 RX ADMIN — Medication 100 MILLIGRAM(S): at 00:57

## 2017-05-12 RX ADMIN — OXYCODONE HYDROCHLORIDE 10 MILLIGRAM(S): 5 TABLET ORAL at 23:10

## 2017-05-12 RX ADMIN — Medication 5 MILLIGRAM(S): at 12:37

## 2017-05-12 RX ADMIN — Medication 1 MILLIGRAM(S): at 21:24

## 2017-05-12 RX ADMIN — Medication 5 MILLIGRAM(S): at 23:06

## 2017-05-12 RX ADMIN — ENOXAPARIN SODIUM 40 MILLIGRAM(S): 100 INJECTION SUBCUTANEOUS at 12:32

## 2017-05-12 RX ADMIN — Medication 100 MILLIGRAM(S): at 23:05

## 2017-05-12 RX ADMIN — Medication 1.25 MILLIGRAM(S): at 23:06

## 2017-05-12 RX ADMIN — Medication 5 MILLIGRAM(S): at 17:58

## 2017-05-12 RX ADMIN — PANTOPRAZOLE SODIUM 40 MILLIGRAM(S): 20 TABLET, DELAYED RELEASE ORAL at 10:02

## 2017-05-12 RX ADMIN — FAMOTIDINE 20 MILLIGRAM(S): 10 INJECTION INTRAVENOUS at 12:33

## 2017-05-12 RX ADMIN — OXYCODONE HYDROCHLORIDE 5 MILLIGRAM(S): 5 TABLET ORAL at 01:09

## 2017-05-12 RX ADMIN — Medication 1.25 MILLIGRAM(S): at 12:35

## 2017-05-12 RX ADMIN — Medication 1 GRAM(S): at 12:33

## 2017-05-12 RX ADMIN — Medication 5 MILLIGRAM(S): at 00:57

## 2017-05-12 RX ADMIN — Medication 1.25 MILLIGRAM(S): at 06:04

## 2017-05-12 RX ADMIN — OXYCODONE HYDROCHLORIDE 5 MILLIGRAM(S): 5 TABLET ORAL at 14:38

## 2017-05-12 RX ADMIN — OXYCODONE HYDROCHLORIDE 5 MILLIGRAM(S): 5 TABLET ORAL at 15:08

## 2017-05-12 RX ADMIN — Medication 5 MILLIGRAM(S): at 06:04

## 2017-05-12 RX ADMIN — Medication 1.25 MILLIGRAM(S): at 00:58

## 2017-05-12 RX ADMIN — Medication 1.25 MILLIGRAM(S): at 18:04

## 2017-05-13 LAB
ANION GAP SERPL CALC-SCNC: 13 MMOL/L — SIGNIFICANT CHANGE UP (ref 5–17)
BUN SERPL-MCNC: 11 MG/DL — SIGNIFICANT CHANGE UP (ref 7–23)
CALCIUM SERPL-MCNC: 8.7 MG/DL — SIGNIFICANT CHANGE UP (ref 8.4–10.5)
CHLORIDE SERPL-SCNC: 100 MMOL/L — SIGNIFICANT CHANGE UP (ref 96–108)
CO2 SERPL-SCNC: 22 MMOL/L — SIGNIFICANT CHANGE UP (ref 22–31)
CREAT SERPL-MCNC: 0.86 MG/DL — SIGNIFICANT CHANGE UP (ref 0.5–1.3)
GLUCOSE SERPL-MCNC: 125 MG/DL — HIGH (ref 70–99)
HCT VFR BLD CALC: 33.6 % — LOW (ref 39–50)
HGB BLD-MCNC: 10.8 G/DL — LOW (ref 13–17)
MAGNESIUM SERPL-MCNC: 2 MG/DL — SIGNIFICANT CHANGE UP (ref 1.6–2.6)
MCHC RBC-ENTMCNC: 29 PG — SIGNIFICANT CHANGE UP (ref 27–34)
MCHC RBC-ENTMCNC: 32.1 GM/DL — SIGNIFICANT CHANGE UP (ref 32–36)
MCV RBC AUTO: 90.1 FL — SIGNIFICANT CHANGE UP (ref 80–100)
PHOSPHATE SERPL-MCNC: 3.8 MG/DL — SIGNIFICANT CHANGE UP (ref 2.5–4.5)
PLATELET # BLD AUTO: 662 K/UL — HIGH (ref 150–400)
POTASSIUM SERPL-MCNC: 4.1 MMOL/L — SIGNIFICANT CHANGE UP (ref 3.5–5.3)
POTASSIUM SERPL-SCNC: 4.1 MMOL/L — SIGNIFICANT CHANGE UP (ref 3.5–5.3)
RBC # BLD: 3.73 M/UL — LOW (ref 4.2–5.8)
RBC # FLD: 13.6 % — SIGNIFICANT CHANGE UP (ref 10.3–14.5)
SODIUM SERPL-SCNC: 135 MMOL/L — SIGNIFICANT CHANGE UP (ref 135–145)
WBC # BLD: 13.49 K/UL — HIGH (ref 3.8–10.5)
WBC # FLD AUTO: 13.49 K/UL — HIGH (ref 3.8–10.5)

## 2017-05-13 RX ORDER — FAMOTIDINE 10 MG/ML
20 INJECTION INTRAVENOUS AT BEDTIME
Qty: 0 | Refills: 0 | Status: DISCONTINUED | OUTPATIENT
Start: 2017-05-13 | End: 2017-05-15

## 2017-05-13 RX ORDER — SODIUM CHLORIDE 9 MG/ML
3 INJECTION INTRAMUSCULAR; INTRAVENOUS; SUBCUTANEOUS EVERY 8 HOURS
Qty: 0 | Refills: 0 | Status: DISCONTINUED | OUTPATIENT
Start: 2017-05-13 | End: 2017-05-15

## 2017-05-13 RX ADMIN — Medication 30 MILLILITER(S): at 12:43

## 2017-05-13 RX ADMIN — Medication 5 MILLIGRAM(S): at 23:26

## 2017-05-13 RX ADMIN — OXYCODONE HYDROCHLORIDE 5 MILLIGRAM(S): 5 TABLET ORAL at 19:38

## 2017-05-13 RX ADMIN — DEXTROSE MONOHYDRATE, SODIUM CHLORIDE, AND POTASSIUM CHLORIDE 50 MILLILITER(S): 50; .745; 4.5 INJECTION, SOLUTION INTRAVENOUS at 08:29

## 2017-05-13 RX ADMIN — Medication 100 MILLIGRAM(S): at 17:29

## 2017-05-13 RX ADMIN — Medication 1 MILLIGRAM(S): at 22:05

## 2017-05-13 RX ADMIN — OXYCODONE HYDROCHLORIDE 10 MILLIGRAM(S): 5 TABLET ORAL at 08:28

## 2017-05-13 RX ADMIN — Medication 1.25 MILLIGRAM(S): at 05:46

## 2017-05-13 RX ADMIN — Medication 1.25 MILLIGRAM(S): at 23:26

## 2017-05-13 RX ADMIN — FAMOTIDINE 20 MILLIGRAM(S): 10 INJECTION INTRAVENOUS at 22:05

## 2017-05-13 RX ADMIN — OXYCODONE HYDROCHLORIDE 10 MILLIGRAM(S): 5 TABLET ORAL at 08:58

## 2017-05-13 RX ADMIN — Medication 5 MILLIGRAM(S): at 12:38

## 2017-05-13 RX ADMIN — OXYCODONE HYDROCHLORIDE 10 MILLIGRAM(S): 5 TABLET ORAL at 23:26

## 2017-05-13 RX ADMIN — Medication 5 MILLIGRAM(S): at 17:29

## 2017-05-13 RX ADMIN — Medication 100 MILLIGRAM(S): at 23:29

## 2017-05-13 RX ADMIN — Medication 1.25 MILLIGRAM(S): at 17:30

## 2017-05-13 RX ADMIN — ENOXAPARIN SODIUM 40 MILLIGRAM(S): 100 INJECTION SUBCUTANEOUS at 12:37

## 2017-05-13 RX ADMIN — Medication 100 MILLIGRAM(S): at 08:29

## 2017-05-13 RX ADMIN — Medication 5 MILLIGRAM(S): at 05:46

## 2017-05-13 RX ADMIN — Medication 1 GRAM(S): at 12:37

## 2017-05-13 RX ADMIN — Medication 1.25 MILLIGRAM(S): at 12:43

## 2017-05-13 RX ADMIN — OXYCODONE HYDROCHLORIDE 10 MILLIGRAM(S): 5 TABLET ORAL at 23:50

## 2017-05-14 LAB
ANION GAP SERPL CALC-SCNC: 14 MMOL/L — SIGNIFICANT CHANGE UP (ref 5–17)
BUN SERPL-MCNC: 10 MG/DL — SIGNIFICANT CHANGE UP (ref 7–23)
CALCIUM SERPL-MCNC: 8.9 MG/DL — SIGNIFICANT CHANGE UP (ref 8.4–10.5)
CHLORIDE SERPL-SCNC: 101 MMOL/L — SIGNIFICANT CHANGE UP (ref 96–108)
CO2 SERPL-SCNC: 23 MMOL/L — SIGNIFICANT CHANGE UP (ref 22–31)
CREAT SERPL-MCNC: 1.04 MG/DL — SIGNIFICANT CHANGE UP (ref 0.5–1.3)
GLUCOSE SERPL-MCNC: 105 MG/DL — HIGH (ref 70–99)
HCT VFR BLD CALC: 34.4 % — LOW (ref 39–50)
HGB BLD-MCNC: 10.7 G/DL — LOW (ref 13–17)
MAGNESIUM SERPL-MCNC: 2 MG/DL — SIGNIFICANT CHANGE UP (ref 1.6–2.6)
MCHC RBC-ENTMCNC: 28.1 PG — SIGNIFICANT CHANGE UP (ref 27–34)
MCHC RBC-ENTMCNC: 31.1 GM/DL — LOW (ref 32–36)
MCV RBC AUTO: 90.3 FL — SIGNIFICANT CHANGE UP (ref 80–100)
PHOSPHATE SERPL-MCNC: 4 MG/DL — SIGNIFICANT CHANGE UP (ref 2.5–4.5)
PLATELET # BLD AUTO: 707 K/UL — HIGH (ref 150–400)
POTASSIUM SERPL-MCNC: 4.6 MMOL/L — SIGNIFICANT CHANGE UP (ref 3.5–5.3)
POTASSIUM SERPL-SCNC: 4.6 MMOL/L — SIGNIFICANT CHANGE UP (ref 3.5–5.3)
RBC # BLD: 3.81 M/UL — LOW (ref 4.2–5.8)
RBC # FLD: 13.8 % — SIGNIFICANT CHANGE UP (ref 10.3–14.5)
SODIUM SERPL-SCNC: 138 MMOL/L — SIGNIFICANT CHANGE UP (ref 135–145)
WBC # BLD: 14.34 K/UL — HIGH (ref 3.8–10.5)
WBC # FLD AUTO: 14.34 K/UL — HIGH (ref 3.8–10.5)

## 2017-05-14 RX ADMIN — Medication 1.25 MILLIGRAM(S): at 05:10

## 2017-05-14 RX ADMIN — Medication 1.25 MILLIGRAM(S): at 23:29

## 2017-05-14 RX ADMIN — OXYCODONE HYDROCHLORIDE 5 MILLIGRAM(S): 5 TABLET ORAL at 17:00

## 2017-05-14 RX ADMIN — Medication 100 MILLIGRAM(S): at 23:29

## 2017-05-14 RX ADMIN — Medication 1 GRAM(S): at 12:08

## 2017-05-14 RX ADMIN — Medication 1.25 MILLIGRAM(S): at 12:08

## 2017-05-14 RX ADMIN — Medication 5 MILLIGRAM(S): at 05:10

## 2017-05-14 RX ADMIN — SODIUM CHLORIDE 3 MILLILITER(S): 9 INJECTION INTRAMUSCULAR; INTRAVENOUS; SUBCUTANEOUS at 04:58

## 2017-05-14 RX ADMIN — Medication 5 MILLIGRAM(S): at 12:08

## 2017-05-14 RX ADMIN — Medication 5 MILLIGRAM(S): at 23:28

## 2017-05-14 RX ADMIN — Medication 100 MILLIGRAM(S): at 17:00

## 2017-05-14 RX ADMIN — SODIUM CHLORIDE 3 MILLILITER(S): 9 INJECTION INTRAMUSCULAR; INTRAVENOUS; SUBCUTANEOUS at 21:45

## 2017-05-14 RX ADMIN — Medication 1 MILLIGRAM(S): at 21:46

## 2017-05-14 RX ADMIN — OXYCODONE HYDROCHLORIDE 5 MILLIGRAM(S): 5 TABLET ORAL at 20:18

## 2017-05-14 RX ADMIN — SODIUM CHLORIDE 3 MILLILITER(S): 9 INJECTION INTRAMUSCULAR; INTRAVENOUS; SUBCUTANEOUS at 14:04

## 2017-05-14 RX ADMIN — Medication 100 MILLIGRAM(S): at 08:19

## 2017-05-14 RX ADMIN — OXYCODONE HYDROCHLORIDE 5 MILLIGRAM(S): 5 TABLET ORAL at 21:00

## 2017-05-14 RX ADMIN — FAMOTIDINE 20 MILLIGRAM(S): 10 INJECTION INTRAVENOUS at 21:46

## 2017-05-14 RX ADMIN — Medication 5 MILLIGRAM(S): at 17:14

## 2017-05-14 RX ADMIN — ENOXAPARIN SODIUM 40 MILLIGRAM(S): 100 INJECTION SUBCUTANEOUS at 12:08

## 2017-05-14 RX ADMIN — OXYCODONE HYDROCHLORIDE 5 MILLIGRAM(S): 5 TABLET ORAL at 17:30

## 2017-05-14 NOTE — PROVIDER CONTACT NOTE (OTHER) - BACKGROUND
pt status post modified whipple
pt status post modified whipple
4/26 - Derian  4/27 2M  4/28 tube feed held  4/29 pan cx for fever  4/30 Page #2 reinserted  5/3 NGT reinserted
Pt s/p charles post opt day #7, Pt's NGT d/c 5/2
Pt status post open j tube placement appendectomy, pancreotectomy.
S/P Derian
pt status post modified wipple
s/p whipple, pacreaticduodenectomy and appendectomy
4/26- s/p charles, 5/1 overnight- pt spiked fever, Elevated HR, elevated BP, pan cultures done
Pt s/p open jejunostomy, pancreaticoduodenectomy, and appendectomy on 4/26.
S/P Derian
s/p charles
s/p charles
s/p whipple procedure
obstruction of bile duct, jaundice, whipple procedure

## 2017-05-14 NOTE — PROVIDER CONTACT NOTE (OTHER) - DATE AND TIME:
10-May-2017 01:29
10-May-2017 23:00
14-May-2017 21:51
28-Apr-2017 23:59
03-May-2017 03:28
04-May-2017 22:32
28-Apr-2017 23:44
01-May-2017 18:45
02-May-2017 01:55
02-May-2017 10:00
06-May-2017 19:36
07-May-2017 00:05
27-Apr-2017 21:08
30-Apr-2017 18:50
08-May-2017 13:55

## 2017-05-14 NOTE — PROVIDER CONTACT NOTE (OTHER) - NAME OF MD/NP/PA/DO NOTIFIED:
ELHAM Candelaria
ELHAM Joshua
MD Flores
ELHAM Polo
MD Drew
MD Flores
MD Flores
MD vergara [facility]
[place], [facility]
PA Coper
MD Drew
PA Venita
diana rg
PA Venita
PA Venita

## 2017-05-14 NOTE — PROVIDER CONTACT NOTE (OTHER) - REASON
Elevated HR
J tube is clogged
Low urine output
low BP and elevated HR
pt BP low
pt bladder scan showed over 350ml retention
pt had temp and elevated heartrate
pt refusing sawant to be removed at midnight
J tube clogged
J tube will not flush
pt BP 96/61 
pt HR elevated at 110 BPM
temp 100.6

## 2017-05-14 NOTE — PROVIDER CONTACT NOTE (OTHER) - ASSESSMENT
J tube still will not flush after administration of medication by PA.
Jtube will not flush, tube feeds held.
BP 96/61. . All other VSS. Pt denies lightheadedness or dizziness. A&Ox4. No signs of distress noted.
Pt denies chills, c/o pain, pca dilaudid w/good relief.
Pt denies nausea, has been belching majority of night - PA made aware prior. Pt passing little flatus, faint BS, BM earlier sec to suppository
Pt having intermittent chills. Temp 100.6. . Pt 86% spo2 on room air. Pt placed on 2L o2 NC, now at 94%. Urine output 225. NGT output 300. Denies chest pain or shortness of breath.
Pt with no c/o chest pain, SOB or any other discomfort.
pt. asymptomatic, no c/o of dizziness or lightheaded ness, urine output adequate for shift, no complaints
Pt has Jejunostomy feeding tube in place, tube is clogged and feeds cannot infuse
Pt temp elevated to 101 and heartrate 123, bp elevated to 171/92, pt oxygenating normally.  Pt denies any chest pain or SOB. Denies and difficulty breathing.  Pt states a lot of gas and abdominal pain, PCA in use, pt currently reached his 4 hr limit.  Abdomen distended.
Pt with no c/o of chest pain, SOB or any other discomfort.
pt complaining of pain. HR elevated
pt stable, A&O
A&Ox4. Denies bladder pain or discomfort. Epidural removed by pain service. PCA Dilaudid ordered and infusing.
pt stated he didnt have urge to void

## 2017-05-14 NOTE — PROVIDER CONTACT NOTE (OTHER) - RECOMMENDATIONS
ELHAM Nathan made aware,
PA Venita made aware
informing ELHAM abdul of bladder scan which showed over 350ml and inserting sawant catheter
MD Drew made aware.
MD notified
PA Venita made aware
as per MD no intervention required at this time
recommend iv tylenol
As per MD no intervention required at this time
Make MD aware  Viokase to break up clog?
PA Copper aware and came to bedside to evaluate patient.
make MD aware
Notify red surgery team.

## 2017-05-15 VITALS
HEART RATE: 103 BPM | TEMPERATURE: 99 F | DIASTOLIC BLOOD PRESSURE: 66 MMHG | SYSTOLIC BLOOD PRESSURE: 107 MMHG | RESPIRATION RATE: 18 BRPM | OXYGEN SATURATION: 98 %

## 2017-05-15 LAB
BUN SERPL-MCNC: 9 MG/DL — SIGNIFICANT CHANGE UP (ref 7–23)
CALCIUM SERPL-MCNC: 8.3 MG/DL — LOW (ref 8.4–10.5)
CHLORIDE SERPL-SCNC: 100 MMOL/L — SIGNIFICANT CHANGE UP (ref 96–108)
CO2 SERPL-SCNC: 28 MMOL/L — SIGNIFICANT CHANGE UP (ref 22–31)
CREAT SERPL-MCNC: 1.17 MG/DL — SIGNIFICANT CHANGE UP (ref 0.5–1.3)
GLUCOSE SERPL-MCNC: 114 MG/DL — HIGH (ref 70–99)
HCT VFR BLD CALC: 33.3 % — LOW (ref 39–50)
HGB BLD-MCNC: 11 G/DL — LOW (ref 13–17)
MAGNESIUM SERPL-MCNC: 1.9 MG/DL — SIGNIFICANT CHANGE UP (ref 1.6–2.6)
MCHC RBC-ENTMCNC: 30.5 PG — SIGNIFICANT CHANGE UP (ref 27–34)
MCHC RBC-ENTMCNC: 33 GM/DL — SIGNIFICANT CHANGE UP (ref 32–36)
MCV RBC AUTO: 92.5 FL — SIGNIFICANT CHANGE UP (ref 80–100)
PHOSPHATE SERPL-MCNC: 4 MG/DL — SIGNIFICANT CHANGE UP (ref 2.5–4.5)
PLATELET # BLD AUTO: 611 K/UL — HIGH (ref 150–400)
POTASSIUM SERPL-MCNC: 4.2 MMOL/L — SIGNIFICANT CHANGE UP (ref 3.5–5.3)
POTASSIUM SERPL-SCNC: 4.2 MMOL/L — SIGNIFICANT CHANGE UP (ref 3.5–5.3)
RBC # BLD: 3.6 M/UL — LOW (ref 4.2–5.8)
RBC # FLD: 13 % — SIGNIFICANT CHANGE UP (ref 10.3–14.5)
SODIUM SERPL-SCNC: 139 MMOL/L — SIGNIFICANT CHANGE UP (ref 135–145)
WBC # BLD: 14.6 K/UL — HIGH (ref 3.8–10.5)
WBC # FLD AUTO: 14.6 K/UL — HIGH (ref 3.8–10.5)

## 2017-05-15 PROCEDURE — 82330 ASSAY OF CALCIUM: CPT

## 2017-05-15 PROCEDURE — 82803 BLOOD GASES ANY COMBINATION: CPT

## 2017-05-15 PROCEDURE — 88331 PATH CONSLTJ SURG 1 BLK 1SPC: CPT

## 2017-05-15 PROCEDURE — 85730 THROMBOPLASTIN TIME PARTIAL: CPT

## 2017-05-15 PROCEDURE — 84132 ASSAY OF SERUM POTASSIUM: CPT

## 2017-05-15 PROCEDURE — 83735 ASSAY OF MAGNESIUM: CPT

## 2017-05-15 PROCEDURE — 85014 HEMATOCRIT: CPT

## 2017-05-15 PROCEDURE — 80053 COMPREHEN METABOLIC PANEL: CPT

## 2017-05-15 PROCEDURE — 87086 URINE CULTURE/COLONY COUNT: CPT

## 2017-05-15 PROCEDURE — 97165 OT EVAL LOW COMPLEX 30 MIN: CPT

## 2017-05-15 PROCEDURE — 84100 ASSAY OF PHOSPHORUS: CPT

## 2017-05-15 PROCEDURE — 84295 ASSAY OF SERUM SODIUM: CPT

## 2017-05-15 PROCEDURE — 80048 BASIC METABOLIC PNL TOTAL CA: CPT

## 2017-05-15 PROCEDURE — 85610 PROTHROMBIN TIME: CPT

## 2017-05-15 PROCEDURE — 97110 THERAPEUTIC EXERCISES: CPT

## 2017-05-15 PROCEDURE — 86923 COMPATIBILITY TEST ELECTRIC: CPT

## 2017-05-15 PROCEDURE — 85027 COMPLETE CBC AUTOMATED: CPT

## 2017-05-15 PROCEDURE — 82435 ASSAY OF BLOOD CHLORIDE: CPT

## 2017-05-15 PROCEDURE — 74177 CT ABD & PELVIS W/CONTRAST: CPT

## 2017-05-15 PROCEDURE — 97116 GAIT TRAINING THERAPY: CPT

## 2017-05-15 PROCEDURE — 88307 TISSUE EXAM BY PATHOLOGIST: CPT

## 2017-05-15 PROCEDURE — 88304 TISSUE EXAM BY PATHOLOGIST: CPT

## 2017-05-15 PROCEDURE — 82947 ASSAY GLUCOSE BLOOD QUANT: CPT

## 2017-05-15 PROCEDURE — 97530 THERAPEUTIC ACTIVITIES: CPT

## 2017-05-15 PROCEDURE — 81001 URINALYSIS AUTO W/SCOPE: CPT

## 2017-05-15 PROCEDURE — 81003 URINALYSIS AUTO W/O SCOPE: CPT

## 2017-05-15 PROCEDURE — 82565 ASSAY OF CREATININE: CPT

## 2017-05-15 PROCEDURE — 88309 TISSUE EXAM BY PATHOLOGIST: CPT

## 2017-05-15 PROCEDURE — 83605 ASSAY OF LACTIC ACID: CPT

## 2017-05-15 PROCEDURE — 93005 ELECTROCARDIOGRAM TRACING: CPT

## 2017-05-15 PROCEDURE — 97162 PT EVAL MOD COMPLEX 30 MIN: CPT

## 2017-05-15 PROCEDURE — 87040 BLOOD CULTURE FOR BACTERIA: CPT

## 2017-05-15 PROCEDURE — 80076 HEPATIC FUNCTION PANEL: CPT

## 2017-05-15 PROCEDURE — 71045 X-RAY EXAM CHEST 1 VIEW: CPT

## 2017-05-15 RX ORDER — SUCRALFATE 1 G
1 TABLET ORAL
Qty: 0 | Refills: 0 | DISCHARGE
Start: 2017-05-15

## 2017-05-15 RX ORDER — FAMOTIDINE 10 MG/ML
1 INJECTION INTRAVENOUS
Qty: 0 | Refills: 0 | COMMUNITY
Start: 2017-05-15

## 2017-05-15 RX ORDER — DOXAZOSIN MESYLATE 4 MG
1 TABLET ORAL
Qty: 0 | Refills: 0 | DISCHARGE
Start: 2017-05-15

## 2017-05-15 RX ORDER — CIPROFLOXACIN LACTATE 400MG/40ML
500 VIAL (ML) INTRAVENOUS EVERY 12 HOURS
Qty: 0 | Refills: 0 | Status: DISCONTINUED | OUTPATIENT
Start: 2017-05-15 | End: 2017-05-15

## 2017-05-15 RX ORDER — CIPROFLOXACIN LACTATE 400MG/40ML
1 VIAL (ML) INTRAVENOUS
Qty: 14 | Refills: 0 | OUTPATIENT
Start: 2017-05-15 | End: 2017-05-22

## 2017-05-15 RX ORDER — OXYCODONE HYDROCHLORIDE 5 MG/1
1 TABLET ORAL
Qty: 12 | Refills: 0 | OUTPATIENT
Start: 2017-05-15 | End: 2017-05-17

## 2017-05-15 RX ORDER — METOCLOPRAMIDE HCL 10 MG
1 TABLET ORAL
Qty: 40 | Refills: 0
Start: 2017-05-15 | End: 2017-05-25

## 2017-05-15 RX ADMIN — OXYCODONE HYDROCHLORIDE 10 MILLIGRAM(S): 5 TABLET ORAL at 15:44

## 2017-05-15 RX ADMIN — Medication 5 MILLIGRAM(S): at 12:29

## 2017-05-15 RX ADMIN — OXYCODONE HYDROCHLORIDE 5 MILLIGRAM(S): 5 TABLET ORAL at 13:00

## 2017-05-15 RX ADMIN — SODIUM CHLORIDE 3 MILLILITER(S): 9 INJECTION INTRAMUSCULAR; INTRAVENOUS; SUBCUTANEOUS at 13:22

## 2017-05-15 RX ADMIN — OXYCODONE HYDROCHLORIDE 5 MILLIGRAM(S): 5 TABLET ORAL at 12:30

## 2017-05-15 RX ADMIN — Medication 100 MILLIGRAM(S): at 08:39

## 2017-05-15 RX ADMIN — SODIUM CHLORIDE 3 MILLILITER(S): 9 INJECTION INTRAMUSCULAR; INTRAVENOUS; SUBCUTANEOUS at 04:42

## 2017-05-15 RX ADMIN — Medication 1.25 MILLIGRAM(S): at 04:45

## 2017-05-15 RX ADMIN — ENOXAPARIN SODIUM 40 MILLIGRAM(S): 100 INJECTION SUBCUTANEOUS at 12:29

## 2017-05-15 RX ADMIN — Medication 5 MILLIGRAM(S): at 04:45

## 2017-05-15 RX ADMIN — Medication 1.25 MILLIGRAM(S): at 13:17

## 2017-05-15 RX ADMIN — Medication 1 GRAM(S): at 12:29

## 2017-05-16 RX ORDER — FAMOTIDINE 10 MG/ML
1 INJECTION INTRAVENOUS
Qty: 30 | Refills: 0 | OUTPATIENT
Start: 2017-05-16 | End: 2017-06-15

## 2017-05-16 RX ORDER — SUCRALFATE 1 G
1 TABLET ORAL
Qty: 30 | Refills: 0 | OUTPATIENT
Start: 2017-05-16 | End: 2017-06-15

## 2017-05-16 RX ORDER — DOXAZOSIN MESYLATE 4 MG
1 TABLET ORAL
Qty: 7 | Refills: 0 | OUTPATIENT
Start: 2017-05-16 | End: 2017-05-23

## 2017-05-23 ENCOUNTER — APPOINTMENT (OUTPATIENT)
Dept: SURGERY | Facility: CLINIC | Age: 67
End: 2017-05-23

## 2017-05-30 ENCOUNTER — APPOINTMENT (OUTPATIENT)
Dept: SURGERY | Facility: CLINIC | Age: 67
End: 2017-05-30

## 2017-06-06 ENCOUNTER — APPOINTMENT (OUTPATIENT)
Dept: SURGERY | Facility: CLINIC | Age: 67
End: 2017-06-06

## 2017-06-06 ENCOUNTER — OUTPATIENT (OUTPATIENT)
Dept: OUTPATIENT SERVICES | Facility: HOSPITAL | Age: 67
LOS: 1 days | End: 2017-06-06
Payer: MEDICARE

## 2017-06-06 DIAGNOSIS — Z98.890 OTHER SPECIFIED POSTPROCEDURAL STATES: Chronic | ICD-10-CM

## 2017-06-06 DIAGNOSIS — Z00.00 ENCOUNTER FOR GENERAL ADULT MEDICAL EXAMINATION WITHOUT ABNORMAL FINDINGS: ICD-10-CM

## 2017-06-06 DIAGNOSIS — Z98.1 ARTHRODESIS STATUS: Chronic | ICD-10-CM

## 2017-06-06 LAB
ALBUMIN SERPL ELPH-MCNC: 3.1 G/DL — LOW (ref 3.3–5)
ALP SERPL-CCNC: 187 U/L — HIGH (ref 30–120)
ALT FLD-CCNC: 13 U/L DA — SIGNIFICANT CHANGE UP (ref 10–60)
ANION GAP SERPL CALC-SCNC: 7 MMOL/L — SIGNIFICANT CHANGE UP (ref 5–17)
AST SERPL-CCNC: 20 U/L — SIGNIFICANT CHANGE UP (ref 10–40)
BASOPHILS # BLD AUTO: 0.1 K/UL — SIGNIFICANT CHANGE UP (ref 0–0.2)
BASOPHILS NFR BLD AUTO: 1.4 % — SIGNIFICANT CHANGE UP (ref 0–2)
BILIRUB SERPL-MCNC: 0.3 MG/DL — SIGNIFICANT CHANGE UP (ref 0.2–1.2)
BUN SERPL-MCNC: 13 MG/DL — SIGNIFICANT CHANGE UP (ref 7–23)
CALCIUM SERPL-MCNC: 9.3 MG/DL — SIGNIFICANT CHANGE UP (ref 8.4–10.5)
CHLORIDE SERPL-SCNC: 104 MMOL/L — SIGNIFICANT CHANGE UP (ref 96–108)
CO2 SERPL-SCNC: 30 MMOL/L — SIGNIFICANT CHANGE UP (ref 22–31)
CREAT SERPL-MCNC: 0.91 MG/DL — SIGNIFICANT CHANGE UP (ref 0.5–1.3)
EOSINOPHIL # BLD AUTO: 0.3 K/UL — SIGNIFICANT CHANGE UP (ref 0–0.5)
EOSINOPHIL NFR BLD AUTO: 4.5 % — SIGNIFICANT CHANGE UP (ref 0–6)
GLUCOSE SERPL-MCNC: 115 MG/DL — HIGH (ref 70–99)
GLUCOSE SERPL-MCNC: 116 MG/DL — HIGH (ref 70–99)
HCT VFR BLD CALC: 39.4 % — SIGNIFICANT CHANGE UP (ref 39–50)
HGB BLD-MCNC: 12.4 G/DL — LOW (ref 13–17)
LYMPHOCYTES # BLD AUTO: 1.3 K/UL — SIGNIFICANT CHANGE UP (ref 1–3.3)
LYMPHOCYTES # BLD AUTO: 19 % — SIGNIFICANT CHANGE UP (ref 13–44)
MCHC RBC-ENTMCNC: 27.3 PG — SIGNIFICANT CHANGE UP (ref 27–34)
MCHC RBC-ENTMCNC: 31.4 GM/DL — LOW (ref 32–36)
MCV RBC AUTO: 87.1 FL — SIGNIFICANT CHANGE UP (ref 80–100)
MONOCYTES # BLD AUTO: 0.6 K/UL — SIGNIFICANT CHANGE UP (ref 0–0.9)
MONOCYTES NFR BLD AUTO: 8 % — SIGNIFICANT CHANGE UP (ref 2–14)
NEUTROPHILS # BLD AUTO: 4.7 K/UL — SIGNIFICANT CHANGE UP (ref 1.8–7.4)
NEUTROPHILS NFR BLD AUTO: 67.1 % — SIGNIFICANT CHANGE UP (ref 43–77)
PLATELET # BLD AUTO: 384 K/UL — SIGNIFICANT CHANGE UP (ref 150–400)
POTASSIUM SERPL-MCNC: 4.6 MMOL/L — SIGNIFICANT CHANGE UP (ref 3.5–5.3)
POTASSIUM SERPL-SCNC: 4.6 MMOL/L — SIGNIFICANT CHANGE UP (ref 3.5–5.3)
PROT SERPL-MCNC: 7.3 G/DL — SIGNIFICANT CHANGE UP (ref 6–8.3)
RBC # BLD: 4.52 M/UL — SIGNIFICANT CHANGE UP (ref 4.2–5.8)
RBC # FLD: 14.3 % — SIGNIFICANT CHANGE UP (ref 10.3–14.5)
SODIUM SERPL-SCNC: 141 MMOL/L — SIGNIFICANT CHANGE UP (ref 135–145)
WBC # BLD: 7 K/UL — SIGNIFICANT CHANGE UP (ref 3.8–10.5)
WBC # FLD AUTO: 7 K/UL — SIGNIFICANT CHANGE UP (ref 3.8–10.5)

## 2017-06-06 PROCEDURE — 36415 COLL VENOUS BLD VENIPUNCTURE: CPT

## 2017-06-06 PROCEDURE — 82947 ASSAY GLUCOSE BLOOD QUANT: CPT

## 2017-06-06 PROCEDURE — 80053 COMPREHEN METABOLIC PANEL: CPT

## 2017-06-06 PROCEDURE — 85027 COMPLETE CBC AUTOMATED: CPT

## 2017-06-10 ENCOUNTER — INPATIENT (INPATIENT)
Facility: HOSPITAL | Age: 67
LOS: 3 days | Discharge: HOME | DRG: 862 | End: 2017-06-14
Attending: SURGERY | Admitting: SURGERY
Payer: MEDICARE

## 2017-06-10 VITALS
SYSTOLIC BLOOD PRESSURE: 103 MMHG | OXYGEN SATURATION: 97 % | TEMPERATURE: 100 F | DIASTOLIC BLOOD PRESSURE: 69 MMHG | RESPIRATION RATE: 22 BRPM | HEART RATE: 135 BPM

## 2017-06-10 DIAGNOSIS — R50.9 FEVER, UNSPECIFIED: ICD-10-CM

## 2017-06-10 DIAGNOSIS — Z98.1 ARTHRODESIS STATUS: Chronic | ICD-10-CM

## 2017-06-10 DIAGNOSIS — K90.81 WHIPPLE'S DISEASE: Chronic | ICD-10-CM

## 2017-06-10 DIAGNOSIS — Z98.890 OTHER SPECIFIED POSTPROCEDURAL STATES: Chronic | ICD-10-CM

## 2017-06-10 LAB
ALBUMIN SERPL ELPH-MCNC: 3 G/DL — LOW (ref 3.3–5)
ALP SERPL-CCNC: 127 U/L — HIGH (ref 40–120)
ALT FLD-CCNC: 9 U/L RC — LOW (ref 10–45)
ANION GAP SERPL CALC-SCNC: 14 MMOL/L — SIGNIFICANT CHANGE UP (ref 5–17)
APPEARANCE UR: CLEAR — SIGNIFICANT CHANGE UP
APTT BLD: 35.9 SEC — SIGNIFICANT CHANGE UP (ref 27.5–37.4)
AST SERPL-CCNC: 10 U/L — SIGNIFICANT CHANGE UP (ref 10–40)
BASOPHILS # BLD AUTO: 0 K/UL — SIGNIFICANT CHANGE UP (ref 0–0.2)
BASOPHILS NFR BLD AUTO: 0.2 % — SIGNIFICANT CHANGE UP (ref 0–2)
BILIRUB SERPL-MCNC: 0.6 MG/DL — SIGNIFICANT CHANGE UP (ref 0.2–1.2)
BILIRUB UR-MCNC: NEGATIVE — SIGNIFICANT CHANGE UP
BUN SERPL-MCNC: 9 MG/DL — SIGNIFICANT CHANGE UP (ref 7–23)
CALCIUM SERPL-MCNC: 8.6 MG/DL — SIGNIFICANT CHANGE UP (ref 8.4–10.5)
CHLORIDE SERPL-SCNC: 99 MMOL/L — SIGNIFICANT CHANGE UP (ref 96–108)
CO2 SERPL-SCNC: 24 MMOL/L — SIGNIFICANT CHANGE UP (ref 22–31)
COLOR SPEC: YELLOW — SIGNIFICANT CHANGE UP
CREAT SERPL-MCNC: 0.81 MG/DL — SIGNIFICANT CHANGE UP (ref 0.5–1.3)
DIFF PNL FLD: NEGATIVE — SIGNIFICANT CHANGE UP
EOSINOPHIL # BLD AUTO: 0.1 K/UL — SIGNIFICANT CHANGE UP (ref 0–0.5)
EOSINOPHIL NFR BLD AUTO: 0.9 % — SIGNIFICANT CHANGE UP (ref 0–6)
GAS PNL BLDV: SIGNIFICANT CHANGE UP
GLUCOSE SERPL-MCNC: 132 MG/DL — HIGH (ref 70–99)
GLUCOSE UR QL: NEGATIVE — SIGNIFICANT CHANGE UP
HCT VFR BLD CALC: 34.1 % — LOW (ref 39–50)
HGB BLD-MCNC: 11.1 G/DL — LOW (ref 13–17)
INR BLD: 1.35 RATIO — HIGH (ref 0.88–1.16)
KETONES UR-MCNC: NEGATIVE — SIGNIFICANT CHANGE UP
LEUKOCYTE ESTERASE UR-ACNC: NEGATIVE — SIGNIFICANT CHANGE UP
LIDOCAIN IGE QN: 16 U/L — SIGNIFICANT CHANGE UP (ref 7–60)
LYMPHOCYTES # BLD AUTO: 1.4 K/UL — SIGNIFICANT CHANGE UP (ref 1–3.3)
LYMPHOCYTES # BLD AUTO: 11.5 % — LOW (ref 13–44)
MCHC RBC-ENTMCNC: 28.6 PG — SIGNIFICANT CHANGE UP (ref 27–34)
MCHC RBC-ENTMCNC: 32.6 GM/DL — SIGNIFICANT CHANGE UP (ref 32–36)
MCV RBC AUTO: 87.7 FL — SIGNIFICANT CHANGE UP (ref 80–100)
MONOCYTES # BLD AUTO: 1.2 K/UL — HIGH (ref 0–0.9)
MONOCYTES NFR BLD AUTO: 9.9 % — SIGNIFICANT CHANGE UP (ref 2–14)
NEUTROPHILS # BLD AUTO: 9.8 K/UL — HIGH (ref 1.8–7.4)
NEUTROPHILS NFR BLD AUTO: 77.6 % — HIGH (ref 43–77)
NITRITE UR-MCNC: NEGATIVE — SIGNIFICANT CHANGE UP
PH UR: 6.5 — SIGNIFICANT CHANGE UP (ref 5–8)
PLATELET # BLD AUTO: 360 K/UL — SIGNIFICANT CHANGE UP (ref 150–400)
POTASSIUM SERPL-MCNC: 3.8 MMOL/L — SIGNIFICANT CHANGE UP (ref 3.5–5.3)
POTASSIUM SERPL-SCNC: 3.8 MMOL/L — SIGNIFICANT CHANGE UP (ref 3.5–5.3)
PROT SERPL-MCNC: 6.6 G/DL — SIGNIFICANT CHANGE UP (ref 6–8.3)
PROT UR-MCNC: NEGATIVE — SIGNIFICANT CHANGE UP
PROTHROM AB SERPL-ACNC: 14.8 SEC — HIGH (ref 9.8–12.7)
RAPID RVP RESULT: SIGNIFICANT CHANGE UP
RBC # BLD: 3.89 M/UL — LOW (ref 4.2–5.8)
RBC # FLD: 13.8 % — SIGNIFICANT CHANGE UP (ref 10.3–14.5)
SODIUM SERPL-SCNC: 137 MMOL/L — SIGNIFICANT CHANGE UP (ref 135–145)
SP GR SPEC: 1.01 — LOW (ref 1.01–1.02)
UROBILINOGEN FLD QL: NEGATIVE — SIGNIFICANT CHANGE UP
WBC # BLD: 12.6 K/UL — HIGH (ref 3.8–10.5)
WBC # FLD AUTO: 12.6 K/UL — HIGH (ref 3.8–10.5)

## 2017-06-10 PROCEDURE — 99285 EMERGENCY DEPT VISIT HI MDM: CPT | Mod: GC

## 2017-06-10 PROCEDURE — 74177 CT ABD & PELVIS W/CONTRAST: CPT | Mod: 26

## 2017-06-10 PROCEDURE — 71010: CPT | Mod: 26

## 2017-06-10 RX ORDER — HYDROXYZINE HCL 10 MG
25 TABLET ORAL AT BEDTIME
Qty: 0 | Refills: 0 | Status: DISCONTINUED | OUTPATIENT
Start: 2017-06-10 | End: 2017-06-14

## 2017-06-10 RX ORDER — CIPROFLOXACIN LACTATE 400MG/40ML
400 VIAL (ML) INTRAVENOUS ONCE
Qty: 0 | Refills: 0 | Status: COMPLETED | OUTPATIENT
Start: 2017-06-10 | End: 2017-06-10

## 2017-06-10 RX ORDER — ENOXAPARIN SODIUM 100 MG/ML
40 INJECTION SUBCUTANEOUS DAILY
Qty: 0 | Refills: 0 | Status: DISCONTINUED | OUTPATIENT
Start: 2017-06-10 | End: 2017-06-14

## 2017-06-10 RX ORDER — IBUPROFEN 200 MG
600 TABLET ORAL ONCE
Qty: 0 | Refills: 0 | Status: COMPLETED | OUTPATIENT
Start: 2017-06-10 | End: 2017-06-10

## 2017-06-10 RX ORDER — CIPROFLOXACIN LACTATE 400MG/40ML
400 VIAL (ML) INTRAVENOUS EVERY 12 HOURS
Qty: 0 | Refills: 0 | Status: DISCONTINUED | OUTPATIENT
Start: 2017-06-11 | End: 2017-06-12

## 2017-06-10 RX ORDER — ACETAMINOPHEN 500 MG
500 TABLET ORAL ONCE
Qty: 0 | Refills: 0 | Status: COMPLETED | OUTPATIENT
Start: 2017-06-10 | End: 2017-06-10

## 2017-06-10 RX ORDER — DOXAZOSIN MESYLATE 4 MG
1 TABLET ORAL AT BEDTIME
Qty: 0 | Refills: 0 | Status: DISCONTINUED | OUTPATIENT
Start: 2017-06-10 | End: 2017-06-14

## 2017-06-10 RX ORDER — AMITRIPTYLINE HCL 25 MG
25 TABLET ORAL AT BEDTIME
Qty: 0 | Refills: 0 | Status: DISCONTINUED | OUTPATIENT
Start: 2017-06-10 | End: 2017-06-14

## 2017-06-10 RX ORDER — METOCLOPRAMIDE HCL 10 MG
5 TABLET ORAL EVERY 6 HOURS
Qty: 0 | Refills: 0 | Status: DISCONTINUED | OUTPATIENT
Start: 2017-06-10 | End: 2017-06-14

## 2017-06-10 RX ORDER — SODIUM CHLORIDE 9 MG/ML
1500 INJECTION INTRAMUSCULAR; INTRAVENOUS; SUBCUTANEOUS ONCE
Qty: 0 | Refills: 0 | Status: COMPLETED | OUTPATIENT
Start: 2017-06-10 | End: 2017-06-10

## 2017-06-10 RX ORDER — METRONIDAZOLE 500 MG
500 TABLET ORAL ONCE
Qty: 0 | Refills: 0 | Status: COMPLETED | OUTPATIENT
Start: 2017-06-10 | End: 2017-06-10

## 2017-06-10 RX ORDER — METRONIDAZOLE 500 MG
500 TABLET ORAL EVERY 8 HOURS
Qty: 0 | Refills: 0 | Status: DISCONTINUED | OUTPATIENT
Start: 2017-06-11 | End: 2017-06-12

## 2017-06-10 RX ORDER — SODIUM CHLORIDE 9 MG/ML
3 INJECTION INTRAMUSCULAR; INTRAVENOUS; SUBCUTANEOUS ONCE
Qty: 0 | Refills: 0 | Status: COMPLETED | OUTPATIENT
Start: 2017-06-10 | End: 2017-06-10

## 2017-06-10 RX ORDER — SODIUM CHLORIDE 9 MG/ML
1000 INJECTION INTRAMUSCULAR; INTRAVENOUS; SUBCUTANEOUS ONCE
Qty: 0 | Refills: 0 | Status: COMPLETED | OUTPATIENT
Start: 2017-06-10 | End: 2017-06-10

## 2017-06-10 RX ORDER — PANTOPRAZOLE SODIUM 20 MG/1
40 TABLET, DELAYED RELEASE ORAL
Qty: 0 | Refills: 0 | Status: DISCONTINUED | OUTPATIENT
Start: 2017-06-10 | End: 2017-06-14

## 2017-06-10 RX ORDER — SODIUM CHLORIDE 9 MG/ML
1000 INJECTION, SOLUTION INTRAVENOUS
Qty: 0 | Refills: 0 | Status: DISCONTINUED | OUTPATIENT
Start: 2017-06-10 | End: 2017-06-11

## 2017-06-10 RX ORDER — SUCRALFATE 1 G
1 TABLET ORAL DAILY
Qty: 0 | Refills: 0 | Status: DISCONTINUED | OUTPATIENT
Start: 2017-06-10 | End: 2017-06-14

## 2017-06-10 RX ORDER — CHOLESTYRAMINE 4 G/9G
8 POWDER, FOR SUSPENSION ORAL
Qty: 0 | Refills: 0 | Status: DISCONTINUED | OUTPATIENT
Start: 2017-06-10 | End: 2017-06-14

## 2017-06-10 RX ORDER — FAMOTIDINE 10 MG/ML
20 INJECTION INTRAVENOUS DAILY
Qty: 0 | Refills: 0 | Status: DISCONTINUED | OUTPATIENT
Start: 2017-06-10 | End: 2017-06-11

## 2017-06-10 RX ORDER — VANCOMYCIN HCL 1 G
1000 VIAL (EA) INTRAVENOUS ONCE
Qty: 0 | Refills: 0 | Status: DISCONTINUED | OUTPATIENT
Start: 2017-06-10 | End: 2017-06-10

## 2017-06-10 RX ADMIN — Medication 600 MILLIGRAM(S): at 22:10

## 2017-06-10 RX ADMIN — Medication 200 MILLIGRAM(S): at 22:09

## 2017-06-10 RX ADMIN — SODIUM CHLORIDE 3 MILLILITER(S): 9 INJECTION INTRAMUSCULAR; INTRAVENOUS; SUBCUTANEOUS at 20:13

## 2017-06-10 RX ADMIN — SODIUM CHLORIDE 1000 MILLILITER(S): 9 INJECTION INTRAMUSCULAR; INTRAVENOUS; SUBCUTANEOUS at 19:46

## 2017-06-10 RX ADMIN — SODIUM CHLORIDE 1500 MILLILITER(S): 9 INJECTION INTRAMUSCULAR; INTRAVENOUS; SUBCUTANEOUS at 20:44

## 2017-06-10 RX ADMIN — Medication 500 MILLIGRAM(S): at 20:07

## 2017-06-10 RX ADMIN — Medication 600 MILLIGRAM(S): at 20:07

## 2017-06-10 RX ADMIN — Medication 100 MILLIGRAM(S): at 20:45

## 2017-06-10 NOTE — H&P ADULT - NSHPSOCIALHISTORY_GEN_ALL_CORE
Pt is  and lives with his wife. He used to drink 1-2 glasses of wine daily, but has not had any in several months. No history of drug use. Pt is a non-smoker.

## 2017-06-10 NOTE — H&P ADULT - HISTORY OF PRESENT ILLNESS
66M s/p whipple on 4/26/17 for pancreatic adenocarcinoma with positive lymph nodes presented to ER complaining of fevers. Pt reports that he had a fever of 102 at home. He reports some associated constipation, but was able to have a bowel movement this morning. No abdominal pain, diarrhea, nausea, emesis, dysuria, BRBPR, melena. ROS otherwise negative. 66M s/p whipple on 4/26/17 for pancreatic adenocarcinoma with positive lymph nodes presented to ER complaining of fevers. Pt reports that he had a fever of 102 at home. He reports some associated constipation, but was able to have a bowel movement this morning. Pt had his J tube and JAQUELIN removed four days ago. No abdominal pain, diarrhea, nausea, emesis, dysuria, BRBPR, melena. ROS otherwise negative.

## 2017-06-10 NOTE — H&P ADULT - PROBLEM SELECTOR PLAN 1
-NPO  -Pain Control  -Cipro/Flagyl  -Admit to Surgery  -If fevers/symptoms persist can consider IR drainage of abscess  -AM Labs  -Lovenox for DVT prophylaxis  -Continue home meds  -LR@75 -NPO  -Pain Control  -Cipro/Flagyl  -Admit to Surgery  -If fevers/symptoms persist can consider IR drainage of abscess  -AM Labs  -Lovenox for DVT prophylaxis  -Continue home meds  -LR@75  -Discussed with attending

## 2017-06-10 NOTE — ED PROVIDER NOTE - MEDICAL DECISION MAKING DETAILS
Att yo male [facility] 2017 presents with fever, malaise; no abdominal pain; no nausea, no vomiting nor cough, dysuria; on exam febrile; tachcyardic, lungs cta, abdomen ttp rlq (baseline per pt); r/o fluid collection, bacteremia; plan; sepsis labs, iv abx, cxr, ua, ct a/p, ivf, antipyretics, surgery, admission

## 2017-06-10 NOTE — H&P ADULT - NSHPLABSRESULTS_GEN_ALL_CORE
LABS:                        11.1   12.6  )-----------( 360      ( 10 Dudley 2017 19:52 )             34.1     06-10    137  |  99  |  9   ----------------------------<  132<H>  3.8   |  24  |  0.81    Ca    8.6      10 Dudley 2017 19:52    TPro  6.6  /  Alb  3.0<L>  /  TBili  0.6  /  DBili  x   /  AST  10  /  ALT  9<L>  /  AlkPhos  127<H>  0610    PT/INR - ( 10 Dudley 2017 19:52 )   PT: 14.8 sec;   INR: 1.35 ratio         PTT - ( 10 Dudley 2017 19:52 )  PTT:35.9 sec  Urinalysis Basic - ( 10 Dudley 2017 20:51 )    Color: Yellow / Appearance: Clear / S.008 / pH: x  Gluc: x / Ketone: Negative  / Bili: Negative / Urobili: Negative   Blood: x / Protein: Negative / Nitrite: Negative   Leuk Esterase: Negative / RBC: x / WBC x   Sq Epi: x / Non Sq Epi: x / Bacteria: x    CT abdomen/pelvis:   Abscess formation along the course of the prior JAQUELIN drain, with 3.4 cm   abscess collection adjacent to the cecum, contiguous with a smaller   collection in the right anterolateral abdominal wall and tracking   superiorly towards the duodenum.

## 2017-06-10 NOTE — H&P ADULT - NSHPPHYSICALEXAM_GEN_ALL_CORE
Vital Signs Last 24 Hrs  T(C): 36.7, Max: 37.9 (06-10 @ 18:55)  T(F): 98.1, Max: 100.3 (06-10 @ 18:55)  HR: 87 (87 - 135)  BP: 106/64 (103/69 - 112/70)  BP(mean): --  RR: 18 (18 - 22)  SpO2: 98% (97% - 98%)    General: NAD, well groomed  Neuro: CNs intact, motor/sensory intact.   Pulm: CTAB  CV: RRR  Abs: soft, NT/ND, well healed surgical incision. one small area along surgical incision that is packed, non-erythematous, no purulent drainage. the old JAQUELIN site and J tube site both are well-healed with a small opening. no purulent drainage or erythema around either site.

## 2017-06-10 NOTE — ED PROVIDER NOTE - ATTENDING CONTRIBUTION TO CARE
Att yo male [facility] 2017 presents with fever, malaise; no abdominal pain; no nausea, no vomiting nor cough, dysuria; on exam febrile; tachcyardic, lungs cta, abdomen ttp rlq (baseline per pt); r/o fluid collection, bacteremia; plan; sepsis labs, iv abx, cxr, ua, ct a/p, ivf, antipyretics, surgery, admission

## 2017-06-10 NOTE — ED ADULT NURSE NOTE - PLAN OF CARE
Side rails/Fall precautions/Position of comfort/Bedside visitors/Call bell/Explanation of exam/test/NPO

## 2017-06-10 NOTE — ED ADULT NURSE NOTE - PSH
History of biliary stent insertion  2/21/2017  History of lumbar laminectomy    Intestinal Whipple's disease    S/P cervical spinal fusion    S/P tendon repair  hand  S/P unilateral inguinal hernia repair

## 2017-06-10 NOTE — ED ADULT NURSE NOTE - OBJECTIVE STATEMENT
66 year old male presents to the ED complaining of fever, chills, fatigue. Pt had a whipple procedure on the 26th of April and had the last drain removed last week. As per patient he had a fever of 102 F at home today. Pt incisions are clean, dry, well approximated and absent of any signs or symptoms of infection. Pt tachycardic in ED. 2 IV lines places, cultures drawn,, pt placed on cardiac monitor, MD at bedside. Pt is A&O x 4, BP WNL, lung sounds clear.

## 2017-06-10 NOTE — ED PROVIDER NOTE - OBJECTIVE STATEMENT
66yoM pmh htn, billary ca (s/p whipple 4/17) and recent risa drain removal in past week comest to ED c/o +f (max 101) over past day. Pt reports no abd pain, no n/v/d, no cp/sob/palp/cough, no dysuria.     surgery - dr. chaudhry

## 2017-06-11 DIAGNOSIS — L02.91 CUTANEOUS ABSCESS, UNSPECIFIED: ICD-10-CM

## 2017-06-11 LAB
ALBUMIN SERPL ELPH-MCNC: 2.5 G/DL — LOW (ref 3.3–5)
ALP SERPL-CCNC: 103 U/L — SIGNIFICANT CHANGE UP (ref 40–120)
ALT FLD-CCNC: 7 U/L — LOW (ref 10–45)
ANION GAP SERPL CALC-SCNC: 14 MMOL/L — SIGNIFICANT CHANGE UP (ref 5–17)
APPEARANCE UR: CLEAR — SIGNIFICANT CHANGE UP
APTT BLD: 34.1 SEC — SIGNIFICANT CHANGE UP (ref 27.5–37.4)
AST SERPL-CCNC: 9 U/L — LOW (ref 10–40)
BASOPHILS # BLD AUTO: 0 K/UL — SIGNIFICANT CHANGE UP (ref 0–0.2)
BASOPHILS NFR BLD AUTO: 0.3 % — SIGNIFICANT CHANGE UP (ref 0–2)
BILIRUB DIRECT SERPL-MCNC: 0.2 MG/DL — SIGNIFICANT CHANGE UP (ref 0–0.2)
BILIRUB INDIRECT FLD-MCNC: 0.3 MG/DL — SIGNIFICANT CHANGE UP (ref 0.2–1)
BILIRUB SERPL-MCNC: 0.5 MG/DL — SIGNIFICANT CHANGE UP (ref 0.2–1.2)
BILIRUB UR-MCNC: NEGATIVE — SIGNIFICANT CHANGE UP
BLD GP AB SCN SERPL QL: NEGATIVE — SIGNIFICANT CHANGE UP
BUN SERPL-MCNC: 8 MG/DL — SIGNIFICANT CHANGE UP (ref 7–23)
CALCIUM SERPL-MCNC: 8.6 MG/DL — SIGNIFICANT CHANGE UP (ref 8.4–10.5)
CHLORIDE SERPL-SCNC: 108 MMOL/L — SIGNIFICANT CHANGE UP (ref 96–108)
CO2 SERPL-SCNC: 20 MMOL/L — LOW (ref 22–31)
COLOR SPEC: YELLOW — SIGNIFICANT CHANGE UP
CREAT SERPL-MCNC: 0.69 MG/DL — SIGNIFICANT CHANGE UP (ref 0.5–1.3)
CULTURE RESULTS: SIGNIFICANT CHANGE UP
DIFF PNL FLD: NEGATIVE — SIGNIFICANT CHANGE UP
EOSINOPHIL # BLD AUTO: 0 K/UL — SIGNIFICANT CHANGE UP (ref 0–0.5)
EOSINOPHIL NFR BLD AUTO: 0.3 % — SIGNIFICANT CHANGE UP (ref 0–6)
GLUCOSE SERPL-MCNC: 82 MG/DL — SIGNIFICANT CHANGE UP (ref 70–99)
GLUCOSE UR QL: NEGATIVE MG/DL — SIGNIFICANT CHANGE UP
HCT VFR BLD CALC: 30.9 % — LOW (ref 39–50)
HCT VFR BLD CALC: 30.9 % — LOW (ref 39–50)
HGB BLD-MCNC: 9.4 G/DL — LOW (ref 13–17)
HGB BLD-MCNC: 9.9 G/DL — LOW (ref 13–17)
INR BLD: 1.27 RATIO — HIGH (ref 0.88–1.16)
KETONES UR-MCNC: NEGATIVE — SIGNIFICANT CHANGE UP
LEUKOCYTE ESTERASE UR-ACNC: NEGATIVE — SIGNIFICANT CHANGE UP
LYMPHOCYTES # BLD AUTO: 1 K/UL — SIGNIFICANT CHANGE UP (ref 1–3.3)
LYMPHOCYTES # BLD AUTO: 7.3 % — LOW (ref 13–44)
MAGNESIUM SERPL-MCNC: 1.6 MG/DL — SIGNIFICANT CHANGE UP (ref 1.6–2.6)
MCHC RBC-ENTMCNC: 26.5 PG — LOW (ref 27–34)
MCHC RBC-ENTMCNC: 28 PG — SIGNIFICANT CHANGE UP (ref 27–34)
MCHC RBC-ENTMCNC: 30.4 GM/DL — LOW (ref 32–36)
MCHC RBC-ENTMCNC: 32 GM/DL — SIGNIFICANT CHANGE UP (ref 32–36)
MCV RBC AUTO: 87 FL — SIGNIFICANT CHANGE UP (ref 80–100)
MCV RBC AUTO: 87.3 FL — SIGNIFICANT CHANGE UP (ref 80–100)
MONOCYTES # BLD AUTO: 1.2 K/UL — HIGH (ref 0–0.9)
MONOCYTES NFR BLD AUTO: 9.1 % — SIGNIFICANT CHANGE UP (ref 2–14)
NEUTROPHILS # BLD AUTO: 11.4 K/UL — HIGH (ref 1.8–7.4)
NEUTROPHILS NFR BLD AUTO: 83 % — HIGH (ref 43–77)
NITRITE UR-MCNC: NEGATIVE — SIGNIFICANT CHANGE UP
PH UR: 5.5 — SIGNIFICANT CHANGE UP (ref 5–8)
PHOSPHATE SERPL-MCNC: 3.5 MG/DL — SIGNIFICANT CHANGE UP (ref 2.5–4.5)
PLATELET # BLD AUTO: 315 K/UL — SIGNIFICANT CHANGE UP (ref 150–400)
PLATELET # BLD AUTO: 318 K/UL — SIGNIFICANT CHANGE UP (ref 150–400)
POTASSIUM SERPL-MCNC: 4 MMOL/L — SIGNIFICANT CHANGE UP (ref 3.5–5.3)
POTASSIUM SERPL-SCNC: 4 MMOL/L — SIGNIFICANT CHANGE UP (ref 3.5–5.3)
PROT SERPL-MCNC: 5.4 G/DL — LOW (ref 6–8.3)
PROT UR-MCNC: ABNORMAL MG/DL
PROTHROM AB SERPL-ACNC: 14.4 SEC — HIGH (ref 10–13.1)
RBC # BLD: 3.54 M/UL — LOW (ref 4.2–5.8)
RBC # BLD: 3.55 M/UL — LOW (ref 4.2–5.8)
RBC # FLD: 13.7 % — SIGNIFICANT CHANGE UP (ref 10.3–14.5)
RBC # FLD: 14.8 % — HIGH (ref 10.3–14.5)
RH IG SCN BLD-IMP: POSITIVE — SIGNIFICANT CHANGE UP
SODIUM SERPL-SCNC: 142 MMOL/L — SIGNIFICANT CHANGE UP (ref 135–145)
SP GR SPEC: 1.03 — HIGH (ref 1.01–1.02)
SPECIMEN SOURCE: SIGNIFICANT CHANGE UP
UROBILINOGEN FLD QL: NEGATIVE MG/DL — SIGNIFICANT CHANGE UP
WBC # BLD: 11.3 K/UL — HIGH (ref 3.8–10.5)
WBC # BLD: 13.7 K/UL — HIGH (ref 3.8–10.5)
WBC # FLD AUTO: 11.3 K/UL — HIGH (ref 3.8–10.5)
WBC # FLD AUTO: 13.7 K/UL — HIGH (ref 3.8–10.5)

## 2017-06-11 PROCEDURE — 71010: CPT | Mod: 26

## 2017-06-11 RX ORDER — ACETAMINOPHEN 500 MG
650 TABLET ORAL ONCE
Qty: 0 | Refills: 0 | Status: COMPLETED | OUTPATIENT
Start: 2017-06-11 | End: 2017-06-11

## 2017-06-11 RX ORDER — ACETAMINOPHEN 500 MG
1000 TABLET ORAL ONCE
Qty: 0 | Refills: 0 | Status: COMPLETED | OUTPATIENT
Start: 2017-06-11 | End: 2017-06-11

## 2017-06-11 RX ORDER — DEXTROSE MONOHYDRATE, SODIUM CHLORIDE, AND POTASSIUM CHLORIDE 50; .745; 4.5 G/1000ML; G/1000ML; G/1000ML
1000 INJECTION, SOLUTION INTRAVENOUS
Qty: 0 | Refills: 0 | Status: DISCONTINUED | OUTPATIENT
Start: 2017-06-11 | End: 2017-06-13

## 2017-06-11 RX ORDER — MAGNESIUM SULFATE 500 MG/ML
2 VIAL (ML) INJECTION ONCE
Qty: 0 | Refills: 0 | Status: COMPLETED | OUTPATIENT
Start: 2017-06-11 | End: 2017-06-11

## 2017-06-11 RX ADMIN — PANTOPRAZOLE SODIUM 40 MILLIGRAM(S): 20 TABLET, DELAYED RELEASE ORAL at 06:58

## 2017-06-11 RX ADMIN — Medication 650 MILLIGRAM(S): at 23:40

## 2017-06-11 RX ADMIN — Medication 100 MILLIGRAM(S): at 06:19

## 2017-06-11 RX ADMIN — Medication 50 GRAM(S): at 11:47

## 2017-06-11 RX ADMIN — Medication 20 MILLIGRAM(S): at 06:58

## 2017-06-11 RX ADMIN — ENOXAPARIN SODIUM 40 MILLIGRAM(S): 100 INJECTION SUBCUTANEOUS at 12:09

## 2017-06-11 RX ADMIN — Medication 200 MILLIGRAM(S): at 23:36

## 2017-06-11 RX ADMIN — Medication 25 MILLIGRAM(S): at 23:26

## 2017-06-11 RX ADMIN — Medication 200 MILLIGRAM(S): at 09:30

## 2017-06-11 RX ADMIN — Medication 100 MILLIGRAM(S): at 13:19

## 2017-06-11 RX ADMIN — Medication 400 MILLIGRAM(S): at 17:02

## 2017-06-11 RX ADMIN — DEXTROSE MONOHYDRATE, SODIUM CHLORIDE, AND POTASSIUM CHLORIDE 75 MILLILITER(S): 50; .745; 4.5 INJECTION, SOLUTION INTRAVENOUS at 18:22

## 2017-06-11 RX ADMIN — CHOLESTYRAMINE 8 GRAM(S): 4 POWDER, FOR SUSPENSION ORAL at 23:37

## 2017-06-11 RX ADMIN — Medication 100 MILLIGRAM(S): at 23:34

## 2017-06-11 RX ADMIN — Medication 1 GRAM(S): at 23:37

## 2017-06-11 NOTE — PROGRESS NOTE ADULT - PROBLEM SELECTOR PLAN 1
-NPO  -Pain Control  -Cipro/Flagyl  -Admit to Surgery  -If fevers/symptoms persist can consider IR drainage of abscess  -AM Labs  -Lovenox for DVT prophylaxis  -Continue home meds  -LR@75  -Discussed with attending

## 2017-06-12 LAB
ALBUMIN SERPL ELPH-MCNC: 2.6 G/DL — LOW (ref 3.3–5)
ALP SERPL-CCNC: 92 U/L — SIGNIFICANT CHANGE UP (ref 40–120)
ALT FLD-CCNC: 7 U/L RC — LOW (ref 10–45)
ANION GAP SERPL CALC-SCNC: 11 MMOL/L — SIGNIFICANT CHANGE UP (ref 5–17)
AST SERPL-CCNC: 8 U/L — LOW (ref 10–40)
BILIRUB DIRECT SERPL-MCNC: 0.2 MG/DL — SIGNIFICANT CHANGE UP (ref 0–0.2)
BILIRUB INDIRECT FLD-MCNC: 0.2 MG/DL — SIGNIFICANT CHANGE UP (ref 0.2–1)
BILIRUB SERPL-MCNC: 0.4 MG/DL — SIGNIFICANT CHANGE UP (ref 0.2–1.2)
BUN SERPL-MCNC: 5 MG/DL — LOW (ref 7–23)
CALCIUM SERPL-MCNC: 7.9 MG/DL — LOW (ref 8.4–10.5)
CHLORIDE SERPL-SCNC: 102 MMOL/L — SIGNIFICANT CHANGE UP (ref 96–108)
CO2 SERPL-SCNC: 25 MMOL/L — SIGNIFICANT CHANGE UP (ref 22–31)
CREAT SERPL-MCNC: 0.75 MG/DL — SIGNIFICANT CHANGE UP (ref 0.5–1.3)
GLUCOSE SERPL-MCNC: 118 MG/DL — HIGH (ref 70–99)
HCT VFR BLD CALC: 29.9 % — LOW (ref 39–50)
HGB BLD-MCNC: 9.2 G/DL — LOW (ref 13–17)
MCHC RBC-ENTMCNC: 27.3 PG — SIGNIFICANT CHANGE UP (ref 27–34)
MCHC RBC-ENTMCNC: 30.9 GM/DL — LOW (ref 32–36)
MCV RBC AUTO: 88.3 FL — SIGNIFICANT CHANGE UP (ref 80–100)
PLATELET # BLD AUTO: 305 K/UL — SIGNIFICANT CHANGE UP (ref 150–400)
POTASSIUM SERPL-MCNC: 3.9 MMOL/L — SIGNIFICANT CHANGE UP (ref 3.5–5.3)
POTASSIUM SERPL-SCNC: 3.9 MMOL/L — SIGNIFICANT CHANGE UP (ref 3.5–5.3)
PROT SERPL-MCNC: 5.3 G/DL — LOW (ref 6–8.3)
RBC # BLD: 3.38 M/UL — LOW (ref 4.2–5.8)
RBC # FLD: 13.8 % — SIGNIFICANT CHANGE UP (ref 10.3–14.5)
SODIUM SERPL-SCNC: 138 MMOL/L — SIGNIFICANT CHANGE UP (ref 135–145)
WBC # BLD: 13.3 K/UL — HIGH (ref 3.8–10.5)
WBC # FLD AUTO: 13.3 K/UL — HIGH (ref 3.8–10.5)

## 2017-06-12 RX ORDER — MEROPENEM 1 G/30ML
INJECTION INTRAVENOUS
Qty: 0 | Refills: 0 | Status: DISCONTINUED | OUTPATIENT
Start: 2017-06-12 | End: 2017-06-14

## 2017-06-12 RX ORDER — ONDANSETRON 8 MG/1
4 TABLET, FILM COATED ORAL ONCE
Qty: 0 | Refills: 0 | Status: DISCONTINUED | OUTPATIENT
Start: 2017-06-12 | End: 2017-06-12

## 2017-06-12 RX ORDER — HYDROMORPHONE HYDROCHLORIDE 2 MG/ML
0.5 INJECTION INTRAMUSCULAR; INTRAVENOUS; SUBCUTANEOUS
Qty: 0 | Refills: 0 | Status: DISCONTINUED | OUTPATIENT
Start: 2017-06-12 | End: 2017-06-12

## 2017-06-12 RX ORDER — ACETAMINOPHEN 500 MG
1000 TABLET ORAL ONCE
Qty: 0 | Refills: 0 | Status: COMPLETED | OUTPATIENT
Start: 2017-06-12 | End: 2017-06-12

## 2017-06-12 RX ORDER — DIPHENHYDRAMINE HCL 50 MG
50 CAPSULE ORAL ONCE
Qty: 0 | Refills: 0 | Status: DISCONTINUED | OUTPATIENT
Start: 2017-06-12 | End: 2017-06-12

## 2017-06-12 RX ORDER — HYDROMORPHONE HYDROCHLORIDE 2 MG/ML
0.5 INJECTION INTRAMUSCULAR; INTRAVENOUS; SUBCUTANEOUS ONCE
Qty: 0 | Refills: 0 | Status: DISCONTINUED | OUTPATIENT
Start: 2017-06-12 | End: 2017-06-12

## 2017-06-12 RX ORDER — MEROPENEM 1 G/30ML
1000 INJECTION INTRAVENOUS EVERY 8 HOURS
Qty: 0 | Refills: 0 | Status: DISCONTINUED | OUTPATIENT
Start: 2017-06-12 | End: 2017-06-14

## 2017-06-12 RX ORDER — MEROPENEM 1 G/30ML
1000 INJECTION INTRAVENOUS ONCE
Qty: 0 | Refills: 0 | Status: COMPLETED | OUTPATIENT
Start: 2017-06-12 | End: 2017-06-12

## 2017-06-12 RX ADMIN — HYDROMORPHONE HYDROCHLORIDE 0.5 MILLIGRAM(S): 2 INJECTION INTRAMUSCULAR; INTRAVENOUS; SUBCUTANEOUS at 23:15

## 2017-06-12 RX ADMIN — Medication 25 MILLIGRAM(S): at 22:33

## 2017-06-12 RX ADMIN — PANTOPRAZOLE SODIUM 40 MILLIGRAM(S): 20 TABLET, DELAYED RELEASE ORAL at 06:52

## 2017-06-12 RX ADMIN — Medication 1 MILLIGRAM(S): at 22:32

## 2017-06-12 RX ADMIN — Medication 25 MILLIGRAM(S): at 22:32

## 2017-06-12 RX ADMIN — MEROPENEM 200 MILLIGRAM(S): 1 INJECTION INTRAVENOUS at 09:19

## 2017-06-12 RX ADMIN — MEROPENEM 200 MILLIGRAM(S): 1 INJECTION INTRAVENOUS at 17:05

## 2017-06-12 RX ADMIN — HYDROMORPHONE HYDROCHLORIDE 0.5 MILLIGRAM(S): 2 INJECTION INTRAMUSCULAR; INTRAVENOUS; SUBCUTANEOUS at 23:00

## 2017-06-12 RX ADMIN — MEROPENEM 200 MILLIGRAM(S): 1 INJECTION INTRAVENOUS at 22:34

## 2017-06-12 RX ADMIN — Medication 1000 MILLIGRAM(S): at 16:32

## 2017-06-12 RX ADMIN — Medication 5 MILLIGRAM(S): at 09:06

## 2017-06-12 RX ADMIN — Medication 400 MILLIGRAM(S): at 16:17

## 2017-06-12 RX ADMIN — HYDROMORPHONE HYDROCHLORIDE 0.5 MILLIGRAM(S): 2 INJECTION INTRAMUSCULAR; INTRAVENOUS; SUBCUTANEOUS at 20:21

## 2017-06-12 RX ADMIN — Medication 100 MILLIGRAM(S): at 04:46

## 2017-06-12 RX ADMIN — Medication 1 GRAM(S): at 22:33

## 2017-06-12 RX ADMIN — Medication 1 MILLIGRAM(S): at 01:12

## 2017-06-12 NOTE — PROGRESS NOTE ADULT - ASSESSMENT
66M with small abscess in RLQ after whipple procedure 66M with small abscess in RLQ after whipple procedure  - f/u fever workup  - consider changing antibiotics to meropenem  - oob/amb

## 2017-06-12 NOTE — PROCEDURE NOTE - GENERAL PROCEDURE DETAILS
10.2 Fr drain placed into RLQ fluid collection. ~10cc purulent fluid aspirated and sent for culture. drain connected to JAQUELIN bulb

## 2017-06-12 NOTE — PROGRESS NOTE ADULT - PROBLEM SELECTOR PLAN 1
-NPO  -Pain Control  -Cipro/Flagyl  - c/s IR for drainage of abscess  -AM Labs  -Lovenox for DVT prophylaxis  -Continue home meds

## 2017-06-13 ENCOUNTER — APPOINTMENT (OUTPATIENT)
Dept: SURGERY | Facility: CLINIC | Age: 67
End: 2017-06-13

## 2017-06-13 LAB
ANION GAP SERPL CALC-SCNC: 10 MMOL/L — SIGNIFICANT CHANGE UP (ref 5–17)
BUN SERPL-MCNC: 5 MG/DL — LOW (ref 7–23)
CALCIUM SERPL-MCNC: 8 MG/DL — LOW (ref 8.4–10.5)
CHLORIDE SERPL-SCNC: 105 MMOL/L — SIGNIFICANT CHANGE UP (ref 96–108)
CO2 SERPL-SCNC: 24 MMOL/L — SIGNIFICANT CHANGE UP (ref 22–31)
CREAT SERPL-MCNC: 0.59 MG/DL — SIGNIFICANT CHANGE UP (ref 0.5–1.3)
GLUCOSE SERPL-MCNC: 109 MG/DL — HIGH (ref 70–99)
HCT VFR BLD CALC: 29.5 % — LOW (ref 39–50)
HGB BLD-MCNC: 9.4 G/DL — LOW (ref 13–17)
MAGNESIUM SERPL-MCNC: 1.8 MG/DL — SIGNIFICANT CHANGE UP (ref 1.6–2.6)
MCHC RBC-ENTMCNC: 28.4 PG — SIGNIFICANT CHANGE UP (ref 27–34)
MCHC RBC-ENTMCNC: 31.9 GM/DL — LOW (ref 32–36)
MCV RBC AUTO: 88.8 FL — SIGNIFICANT CHANGE UP (ref 80–100)
PHOSPHATE SERPL-MCNC: 3 MG/DL — SIGNIFICANT CHANGE UP (ref 2.5–4.5)
PLATELET # BLD AUTO: 318 K/UL — SIGNIFICANT CHANGE UP (ref 150–400)
POTASSIUM SERPL-MCNC: 3.8 MMOL/L — SIGNIFICANT CHANGE UP (ref 3.5–5.3)
POTASSIUM SERPL-SCNC: 3.8 MMOL/L — SIGNIFICANT CHANGE UP (ref 3.5–5.3)
RBC # BLD: 3.32 M/UL — LOW (ref 4.2–5.8)
RBC # FLD: 13.7 % — SIGNIFICANT CHANGE UP (ref 10.3–14.5)
SODIUM SERPL-SCNC: 139 MMOL/L — SIGNIFICANT CHANGE UP (ref 135–145)
WBC # BLD: 9 K/UL — SIGNIFICANT CHANGE UP (ref 3.8–10.5)
WBC # FLD AUTO: 9 K/UL — SIGNIFICANT CHANGE UP (ref 3.8–10.5)

## 2017-06-13 PROCEDURE — 49406 IMAGE CATH FLUID PERI/RETRO: CPT

## 2017-06-13 RX ORDER — SODIUM CHLORIDE 9 MG/ML
1000 INJECTION, SOLUTION INTRAVENOUS
Qty: 0 | Refills: 0 | Status: DISCONTINUED | OUTPATIENT
Start: 2017-06-13 | End: 2017-06-14

## 2017-06-13 RX ORDER — OXYCODONE HYDROCHLORIDE 5 MG/1
10 TABLET ORAL EVERY 4 HOURS
Qty: 0 | Refills: 0 | Status: DISCONTINUED | OUTPATIENT
Start: 2017-06-13 | End: 2017-06-14

## 2017-06-13 RX ORDER — ACETAMINOPHEN 500 MG
650 TABLET ORAL EVERY 6 HOURS
Qty: 0 | Refills: 0 | Status: DISCONTINUED | OUTPATIENT
Start: 2017-06-13 | End: 2017-06-14

## 2017-06-13 RX ORDER — OXYCODONE HYDROCHLORIDE 5 MG/1
5 TABLET ORAL EVERY 4 HOURS
Qty: 0 | Refills: 0 | Status: DISCONTINUED | OUTPATIENT
Start: 2017-06-13 | End: 2017-06-14

## 2017-06-13 RX ADMIN — Medication 20 MILLIGRAM(S): at 17:37

## 2017-06-13 RX ADMIN — PANTOPRAZOLE SODIUM 40 MILLIGRAM(S): 20 TABLET, DELAYED RELEASE ORAL at 05:20

## 2017-06-13 RX ADMIN — ENOXAPARIN SODIUM 40 MILLIGRAM(S): 100 INJECTION SUBCUTANEOUS at 14:26

## 2017-06-13 RX ADMIN — MEROPENEM 200 MILLIGRAM(S): 1 INJECTION INTRAVENOUS at 14:26

## 2017-06-13 RX ADMIN — SODIUM CHLORIDE 50 MILLILITER(S): 9 INJECTION, SOLUTION INTRAVENOUS at 12:40

## 2017-06-13 RX ADMIN — Medication 650 MILLIGRAM(S): at 04:10

## 2017-06-13 RX ADMIN — MEROPENEM 200 MILLIGRAM(S): 1 INJECTION INTRAVENOUS at 21:51

## 2017-06-13 RX ADMIN — OXYCODONE HYDROCHLORIDE 10 MILLIGRAM(S): 5 TABLET ORAL at 00:05

## 2017-06-13 RX ADMIN — MEROPENEM 200 MILLIGRAM(S): 1 INJECTION INTRAVENOUS at 05:20

## 2017-06-13 RX ADMIN — Medication 650 MILLIGRAM(S): at 11:44

## 2017-06-13 RX ADMIN — Medication 5 MILLIGRAM(S): at 17:37

## 2017-06-13 RX ADMIN — OXYCODONE HYDROCHLORIDE 10 MILLIGRAM(S): 5 TABLET ORAL at 23:35

## 2017-06-13 RX ADMIN — Medication 25 MILLIGRAM(S): at 21:46

## 2017-06-13 RX ADMIN — Medication 1 GRAM(S): at 21:46

## 2017-06-13 RX ADMIN — OXYCODONE HYDROCHLORIDE 10 MILLIGRAM(S): 5 TABLET ORAL at 17:06

## 2017-06-13 RX ADMIN — Medication 650 MILLIGRAM(S): at 12:14

## 2017-06-13 RX ADMIN — Medication 650 MILLIGRAM(S): at 03:35

## 2017-06-13 RX ADMIN — OXYCODONE HYDROCHLORIDE 10 MILLIGRAM(S): 5 TABLET ORAL at 16:36

## 2017-06-13 RX ADMIN — Medication 5 MILLIGRAM(S): at 12:39

## 2017-06-13 NOTE — PROVIDER CONTACT NOTE (OTHER) - ACTION/TREATMENT ORDERED:
IV tylenol ordered and given and prescribed, IV ABx given, pt. sent to IR (IR RN aware), no further action required at present time, will continue to monitor.
IVF reordered. Will continue to monitor pt.
no action at this time continue to monitor
waiting for order and will hang. Waiting for lab work to be ordered

## 2017-06-13 NOTE — PROGRESS NOTE ADULT - SUBJECTIVE AND OBJECTIVE BOX
GENERAL SURGERY DAILY PROGRESS NOTE:     Subjective:  Some pain in RLQ. No n/v overnight. Was able to sleep some after admission.      Objective:    MEDICATIONS  (STANDING):  lactated ringers. 1000milliLiter(s) IV Continuous <Continuous>  enoxaparin Injectable 40milliGRAM(s) SubCutaneous daily  ciprofloxacin   IVPB 400milliGRAM(s) IV Intermittent every 12 hours  metroNIDAZOLE  IVPB 500milliGRAM(s) IV Intermittent every 8 hours  enalapril 20milliGRAM(s) Oral two times a day  doxazosin 1milliGRAM(s) Oral at bedtime  amitriptyline 25milliGRAM(s) Oral at bedtime  hydrOXYzine hydrochloride 25milliGRAM(s) Oral at bedtime  sucralfate 1Gram(s) Oral daily  pantoprazole    Tablet 40milliGRAM(s) Oral before breakfast  cholestyramine Powder (Sugar-Free) 8Gram(s) Oral two times a day    MEDICATIONS  (PRN):  metoclopramide 5milliGRAM(s) Oral every 6 hours PRN gut motility/nausea      Vital Signs Last 24 Hrs  T(C): 37.2, Max: 37.9 (06-10 @ 18:55)  T(F): 98.9, Max: 100.3 (06-10 @ 18:55)  HR: 100 (87 - 135)  BP: 115/74 (103/69 - 116/77)  BP(mean): --  RR: 18 (18 - 22)  SpO2: 98% (97% - 98%)    I&O's Detail    I & Os for current day (as of 2017 07:38)  =============================================  IN:    lactated ringers.: 450 ml    IV PiggyBack: 100 ml    Total IN: 550 ml  ---------------------------------------------  OUT:    Voided: 300 ml    Total OUT: 300 ml  ---------------------------------------------  Total NET: 250 ml      Daily     Daily     LABS:                        11.1   12.6  )-----------( 360      ( 10 Dudley 2017 19:52 )             34.1     06-10    137  |  99  |  9   ----------------------------<  132<H>  3.8   |  24  |  0.81    Ca    8.6      10 Dudley 2017 19:52    TPro  6.6  /  Alb  3.0<L>  /  TBili  0.6  /  DBili  x   /  AST  10  /  ALT  9<L>  /  AlkPhos  127<H>  0610    PT/INR - ( 10 Dudley 2017 19:52 )   PT: 14.8 sec;   INR: 1.35 ratio         PTT - ( 10 Dudley 2017 19:52 )  PTT:35.9 sec  Urinalysis Basic - ( 10 Dudley 2017 20:51 )    Color: Yellow / Appearance: Clear / S.008 / pH: x  Gluc: x / Ketone: Negative  / Bili: Negative / Urobili: Negative   Blood: x / Protein: Negative / Nitrite: Negative   Leuk Esterase: Negative / RBC: x / WBC x   Sq Epi: x / Non Sq Epi: x / Bacteria: x    NAD, awake and alert  Respirations nonlabored  Old incision healing well  No palpable fluctuance or skin changes
Interventional Radiology Pre-Procedure Note    This is a 66y male with h/o pancreatic lesion s/p whipple on 4/26/17. Pt was admitted on 6/10/17 with abdominal pain and fever.  CT on 6/10/17 demonstrated abscess formation along the course of the prior JAQUELIN drain, with 3.4 cm abscess collection adjacent to the cecum, contiguous with a smaller collection in the right anterolateral abdominal wall and tracking superiorly towards the duodenum. Pt reports J tube and RLQ abdominal drain was removed last Tuesday as an out pt. He was referred to IR for abdominal collection drainage. Pt is currently being treated with cipro 400mg (last dose 2336 6/11) and meropenem 1000mg q8hrs (last dose 1705, next dose alison 2200). He reports some constipation and mild RLQ discomfort. Last dose of lovenox 40mg at 1209PM 6/11. Plan is for CT guided drainage of abdominal fluid collection. Denies adverse events from prior anesthesia.     PAST MEDICAL & SURGICAL HISTORY:  Rash  Biliary stricture: s/p biliary stent  Pancreatic lesion  Gastric polyp  Obstructive jaundice  Other hyperlipidemia  Essential hypertension  IBS (irritable bowel syndrome)  GERD (gastroesophageal reflux disease)  Intestinal Whipple&#x27;s disease  S/P unilateral inguinal hernia repair  S/P tendon repair: hand  History of lumbar laminectomy  History of biliary stent insertion: 2/21/2017  S/P lumbar spinal fusion  S/P cervical spinal fusion       Vital Signs Last 24 Hrs  T(C): 39.1, Max: 39.1 (06-12 @ 16:09)  T(F): 102.4, Max: 102.4 (06-12 @ 16:09)  HR: 108 (90 - 108)  BP: 126/75 (105/59 - 126/75)  BP(mean): --  RR: 19 (18 - 20)  SpO2: 95% (95% - 97%)    Allergies: penicillin G benzathine (Rash)  sulfa drugs (Rash)    Physical Exam: Gen: NAD, A&O x3  Abd: Mild distension     Labs:                         9.2    13.3  )-----------( 305      ( 12 Jun 2017 09:26 )             29.9     06-12    138  |  102  |  5<L>  ----------------------------<  118<H>  3.9   |  25  |  0.75    Ca    7.9<L>      12 Jun 2017 09:25  Phos  3.5     06-11  Mg     1.6     06-11    TPro  5.3<L>  /  Alb  2.6<L>  /  TBili  0.4  /  DBili  0.2  /  AST  8<L>  /  ALT  7<L>  /  AlkPhos  92  06-12    PT/INR - ( 11 Jun 2017 09:35 )   PT: 14.4 sec;   INR: 1.27 ratio         PTT - ( 11 Jun 2017 09:35 )  PTT:34.1 sec    Informed consent obtained. All questions and concerns have been addressed at this time.
STATUS POST:  ID drain of RLQ abscess s/p Derian (2017)      Vital Signs Last 24 Hrs  T(C): 36.4, Max: 39.1 ( @ 16:09)  T(F): 97.5, Max: 102.4 ( @ 16:09)  HR: 96 (88 - 108)  BP: 103/67 (103/65 - 130/81)  BP(mean): --  RR: 18 (18 - 20)  SpO2: 96% (95% - 99%)    I&O's Summary  I & Os for 24h ending 2017 07:00  =============================================  IN: 1825 ml / OUT: 1710 ml / NET: 115 ml    I & Os for current day (as of 2017 12:28)  =============================================  IN: 240 ml / OUT: 570 ml / NET: -330 ml      SUBJECTIVE: Pt seen and examined. C/O of pain at drain site. Denies N/V.     Pain: [ ] YES [ ] NO  Pain (0-10):              Pain Control Adequate: [x ] YES [ ] NO  SOB: [ ]YES [x ] NO  Chest Discomfort: [ ] YES [x ] NO    Nausea: [ ] YES [x ] NO           Vomiting: [ ] YES [x ] NO  Void: [x ]YES [ ]No    Physical Exam:  General Appearance: Appears well, NAD  Abdomen: Soft, tender in RLQ. non-distended, purulent drainage at skin, drain in place      LABS:                        9.4    9.0   )-----------( 318      ( 2017 08:46 )             29.5     06-    139  |  105  |  5<L>  ----------------------------<  109<H>  3.8   |  24  |  0.59    Ca    8.0<L>      2017 08:46  Phos  3.0     06-13  Mg     1.8     06-13    TPro  5.3<L>  /  Alb  2.6<L>  /  TBili  0.4  /  DBili  0.2  /  AST  8<L>  /  ALT  7<L>  /  AlkPhos  92  06-12      Urinalysis Basic - ( 2017 20:36 )    Color: Yellow / Appearance: Clear / S.027 / pH: x  Gluc: x / Ketone: Negative  / Bili: Negative / Urobili: Negative mg/dL   Blood: x / Protein: Trace mg/dL / Nitrite: Negative   Leuk Esterase: Negative / RBC: 1 /HPF / WBC 1 /HPF   Sq Epi: x / Non Sq Epi: 0 /HPF / Bacteria: Few        Princess Joshua PA-C  p#[id number]
GENERAL SURGERY DAILY PROGRESS NOTE:     Subjective:    febrile overnight Tmax 102.3. UCx +coag neg staph.  Denies urinary symptoms ( frequency/ pain/ urgency)  Otherwise no other complaints          Objective:    MEDICATIONS  (STANDING):  enoxaparin Injectable 40milliGRAM(s) SubCutaneous daily  ciprofloxacin   IVPB 400milliGRAM(s) IV Intermittent every 12 hours  metroNIDAZOLE  IVPB 500milliGRAM(s) IV Intermittent every 8 hours  enalapril 20milliGRAM(s) Oral two times a day  doxazosin 1milliGRAM(s) Oral at bedtime  amitriptyline 25milliGRAM(s) Oral at bedtime  hydrOXYzine hydrochloride 25milliGRAM(s) Oral at bedtime  sucralfate 1Gram(s) Oral daily  pantoprazole    Tablet 40milliGRAM(s) Oral before breakfast  cholestyramine Powder (Sugar-Free) 8Gram(s) Oral two times a day  dextrose 5% + sodium chloride 0.45% with potassium chloride 20 mEq/L 1000milliLiter(s) IV Continuous <Continuous>    MEDICATIONS  (PRN):  metoclopramide 5milliGRAM(s) Oral every 6 hours PRN gut motility/nausea      Vital Signs Last 24 Hrs  T(C): 37.9, Max: 39.1 (11 @ 16:47)  T(F): 100.3, Max: 102.3 (11 @ 16:47)  HR: 90 (90 - 104)  BP: 105/59 (99/61 - 124/78)  BP(mean): --  RR: 18 (18 - 18)  SpO2: 96% (96% - 99%)    I&O's Detail    I & Os for current day (as of 2017 07:50)  =============================================  IN:    dextrose 5% + sodium chloride 0.45% with potassium chloride 20 mEq/L: 900 ml    Solution: 850 ml    Oral Fluid: 660 ml    lactated ringers.: 450 ml    Total IN: 2860 ml  ---------------------------------------------  OUT:    Voided: 2025 ml    Total OUT: 2025 ml  ---------------------------------------------  Total NET: 835 ml        LABS:                        9.9    13.7  )-----------( 315      ( 2017 17:24 )             30.9     -11    142  |  108  |  8   ----------------------------<  82  4.0   |  20<L>  |  0.69    Ca    8.6      2017 09:35  Phos  3.5       Mg     1.6         TPro  5.4<L>  /  Alb  2.5<L>  /  TBili  0.5  /  DBili  0.2  /  AST  9<L>  /  ALT  7<L>  /  AlkPhos  103  -11    PT/INR - ( 2017 09:35 )   PT: 14.4 sec;   INR: 1.27 ratio         PTT - ( 2017 09:35 )  PTT:34.1 sec  Urinalysis Basic - ( 2017 20:36 )    Color: Yellow / Appearance: Clear / S.027 / pH: x  Gluc: x / Ketone: Negative  / Bili: Negative / Urobili: Negative mg/dL   Blood: x / Protein: Trace mg/dL / Nitrite: Negative   Leuk Esterase: Negative / RBC: 1 /HPF / WBC 1 /HPF   Sq Epi: x / Non Sq Epi: 0 /HPF / Bacteria: Few          Physical Exam:   Gen: NAD A&Ox3  Abdomen: Soft nontender, nondistended  drain site C/D/I

## 2017-06-13 NOTE — PROGRESS NOTE ADULT - ASSESSMENT
66M with small abscess in RLQ after whipple procedure (04/2017), now s/p IR drain 6/12/17  -C/W meropenem  -F/U cultures  -Monitor for Fever  -Pain control

## 2017-06-13 NOTE — PROVIDER CONTACT NOTE (OTHER) - BACKGROUND
MD stated he would put in IV tylenol instead of PO. pt admitted for abd abscess
abscess
pt admitted for abd abscess
6/12 s/p abd. abscess drainage

## 2017-06-13 NOTE — ANESTHESIA FOLLOW-UP NOTE - NSEVALATION_GEN_ALL_CORE
No apparent complications or complaints reguarding anesthesia care at this time

## 2017-06-13 NOTE — PROVIDER CONTACT NOTE (OTHER) - SITUATION
Pt. s/p abd. abscess drainage. 1st OOB orthostatic VS positive. Pt. noted hypotensive when standing. Pt. s/p abd. abscess drainage 6/12. 1st OOB orthostatic VS positive. Pt. noted hypotensive when sitting/standing. Pt. noted hypotensive when standing during 1st OOB.  Laying BP: 103/65, standing BP: 79/48

## 2017-06-14 VITALS
SYSTOLIC BLOOD PRESSURE: 114 MMHG | DIASTOLIC BLOOD PRESSURE: 69 MMHG | RESPIRATION RATE: 18 BRPM | OXYGEN SATURATION: 98 % | TEMPERATURE: 98 F | HEART RATE: 86 BPM

## 2017-06-14 PROCEDURE — 83605 ASSAY OF LACTIC ACID: CPT

## 2017-06-14 PROCEDURE — 83735 ASSAY OF MAGNESIUM: CPT

## 2017-06-14 PROCEDURE — 81003 URINALYSIS AUTO W/O SCOPE: CPT

## 2017-06-14 PROCEDURE — 87486 CHLMYD PNEUM DNA AMP PROBE: CPT

## 2017-06-14 PROCEDURE — 87040 BLOOD CULTURE FOR BACTERIA: CPT

## 2017-06-14 PROCEDURE — 87070 CULTURE OTHR SPECIMN AEROBIC: CPT

## 2017-06-14 PROCEDURE — 49406 IMAGE CATH FLUID PERI/RETRO: CPT

## 2017-06-14 PROCEDURE — 85027 COMPLETE CBC AUTOMATED: CPT

## 2017-06-14 PROCEDURE — 96375 TX/PRO/DX INJ NEW DRUG ADDON: CPT

## 2017-06-14 PROCEDURE — 80053 COMPREHEN METABOLIC PANEL: CPT

## 2017-06-14 PROCEDURE — 82947 ASSAY GLUCOSE BLOOD QUANT: CPT

## 2017-06-14 PROCEDURE — 99285 EMERGENCY DEPT VISIT HI MDM: CPT | Mod: 25

## 2017-06-14 PROCEDURE — C1769: CPT

## 2017-06-14 PROCEDURE — 84100 ASSAY OF PHOSPHORUS: CPT

## 2017-06-14 PROCEDURE — 87205 SMEAR GRAM STAIN: CPT

## 2017-06-14 PROCEDURE — 82803 BLOOD GASES ANY COMBINATION: CPT

## 2017-06-14 PROCEDURE — 74177 CT ABD & PELVIS W/CONTRAST: CPT

## 2017-06-14 PROCEDURE — 85014 HEMATOCRIT: CPT

## 2017-06-14 PROCEDURE — 99223 1ST HOSP IP/OBS HIGH 75: CPT

## 2017-06-14 PROCEDURE — 80076 HEPATIC FUNCTION PANEL: CPT

## 2017-06-14 PROCEDURE — 87186 SC STD MICRODIL/AGAR DIL: CPT

## 2017-06-14 PROCEDURE — 87086 URINE CULTURE/COLONY COUNT: CPT

## 2017-06-14 PROCEDURE — 87581 M.PNEUMON DNA AMP PROBE: CPT

## 2017-06-14 PROCEDURE — 82435 ASSAY OF BLOOD CHLORIDE: CPT

## 2017-06-14 PROCEDURE — 84132 ASSAY OF SERUM POTASSIUM: CPT

## 2017-06-14 PROCEDURE — 86901 BLOOD TYPING SEROLOGIC RH(D): CPT

## 2017-06-14 PROCEDURE — 96374 THER/PROPH/DIAG INJ IV PUSH: CPT | Mod: XU

## 2017-06-14 PROCEDURE — 82330 ASSAY OF CALCIUM: CPT

## 2017-06-14 PROCEDURE — 84295 ASSAY OF SERUM SODIUM: CPT

## 2017-06-14 PROCEDURE — C1729: CPT

## 2017-06-14 PROCEDURE — 83690 ASSAY OF LIPASE: CPT

## 2017-06-14 PROCEDURE — 87798 DETECT AGENT NOS DNA AMP: CPT

## 2017-06-14 PROCEDURE — 81001 URINALYSIS AUTO W/SCOPE: CPT

## 2017-06-14 PROCEDURE — 85730 THROMBOPLASTIN TIME PARTIAL: CPT

## 2017-06-14 PROCEDURE — 71045 X-RAY EXAM CHEST 1 VIEW: CPT

## 2017-06-14 PROCEDURE — 85610 PROTHROMBIN TIME: CPT

## 2017-06-14 PROCEDURE — 87633 RESP VIRUS 12-25 TARGETS: CPT

## 2017-06-14 PROCEDURE — 86900 BLOOD TYPING SEROLOGIC ABO: CPT

## 2017-06-14 PROCEDURE — 80048 BASIC METABOLIC PNL TOTAL CA: CPT

## 2017-06-14 PROCEDURE — 86850 RBC ANTIBODY SCREEN: CPT

## 2017-06-14 RX ORDER — OXYCODONE HYDROCHLORIDE 5 MG/1
1 TABLET ORAL
Qty: 10 | Refills: 0 | OUTPATIENT
Start: 2017-06-14 | End: 2017-06-19

## 2017-06-14 RX ORDER — CEPHALEXIN 500 MG
1 CAPSULE ORAL
Qty: 30 | Refills: 0 | OUTPATIENT
Start: 2017-06-14 | End: 2017-06-24

## 2017-06-14 RX ORDER — ACETAMINOPHEN 500 MG
2 TABLET ORAL
Qty: 0 | Refills: 0 | DISCHARGE
Start: 2017-06-14

## 2017-06-14 RX ADMIN — MEROPENEM 200 MILLIGRAM(S): 1 INJECTION INTRAVENOUS at 13:52

## 2017-06-14 RX ADMIN — Medication 5 MILLIGRAM(S): at 08:12

## 2017-06-14 RX ADMIN — MEROPENEM 200 MILLIGRAM(S): 1 INJECTION INTRAVENOUS at 05:22

## 2017-06-14 RX ADMIN — Medication 5 MILLIGRAM(S): at 12:44

## 2017-06-14 RX ADMIN — PANTOPRAZOLE SODIUM 40 MILLIGRAM(S): 20 TABLET, DELAYED RELEASE ORAL at 05:23

## 2017-06-14 NOTE — CONSULT NOTE ADULT - ASSESSMENT
Drained infected collection growing g+c chains subbesting Enterococcus or Staph. The patient has low risk allergy to PCN and SULFA - Discussed with team who noted a large amount of drainage. Imipenem 500  q 6 hours; Vanco 1 mei every 12 hours  ----- Keflex and follow up culture results if pt discharged today is alternative.

## 2017-06-14 NOTE — DISCHARGE NOTE ADULT - PATIENT PORTAL LINK FT
“You can access the Follow[facility] Patient Portal, offered by [facility], by registering with the following website: [email or web address]”

## 2017-06-14 NOTE — DISCHARGE NOTE ADULT - ADDITIONAL INSTRUCTIONS
You will be discharged with JAQUELIN drains. You will need to empty them and record outputs accurately. This will be taught to you by the nursing staff. Please do not remove the JAQUELIN drains. They will be removed in the office. Please bring to the office accurate records of output.    Dr Palacios from ID will be following up your cultures and calling you Thursday for antibiotic plan.

## 2017-06-14 NOTE — DISCHARGE NOTE ADULT - CARE PROVIDER_API CALL
desmond enrique  [address] [place] [address]  Phone: [phone]  Fax: (   )    -    Pravin Medrano), Surgery  [address] [place] [address]  Phone: [phone]  Fax: [phone] Pravin Medrano), Surgery  [address] [place] [address]  Phone: [phone]  Fax: [phone]    desmond enrique  [address] [place] [address]  Phone: [phone]  Fax: (   )    -    Yan Palacios), Geriatric Medicine; Infectious Disease; Internal Medicine  [address] [place] [address]  Phone: [phone]  Fax: [phone]

## 2017-06-14 NOTE — DISCHARGE NOTE ADULT - CARE PLAN
Principal Discharge DX:	Abscess  Goal:	treat abscess  Instructions for follow-up, activity and diet:	complete course of antibiotics, empty and record drain output daily  plan for interventional radiology tube check in 1 week with Dr. Drew please call [phone] to schedule an appointment     follow up with Dr. Medrano in 1-2 weeks   if you have any problems coordinating interventional radiology appointment call Dr. Messina office to help arrange     notify Dr. Medrano if develop fever, chills, increased abdominal pain, unable to tolerate diet Principal Discharge DX:	Abscess  Goal:	treat abscess  Instructions for follow-up, activity and diet:	Complete course of antibiotics as per Dr Palacios from Infectious Disease.  Please plan with interventional radiology for a tube check in 1 week with Dr. Drew please call [phone] to schedule an appointment     follow up with Dr. Medrano in 1-2 weeks   if you have any problems coordinating interventional radiology appointment call Dr. Messina office to help arrange     notify Dr. Medrano if develop fever, chills, increased abdominal pain, unable to tolerate diet  Secondary Diagnosis:	Essential hypertension  Secondary Diagnosis:	Gastroesophageal reflux disease, esophagitis presence not specified  Secondary Diagnosis:	Intestinal Whipple's disease Principal Discharge DX:	Abscess  Goal:	treat abscess  Instructions for follow-up, activity and diet:	Complete course of antibiotics as per Dr Palacios from Infectious Disease.  Please plan with interventional radiology for a tube check in 1 week with Dr. Drew please call [phone] to schedule an appointment     follow up with Dr. Medrano in 1-2 weeks   if you have any problems coordinating interventional radiology appointment call Dr. Messina office to help arrange     notify Dr. Medrano if develop fever, chills, increased abdominal pain, unable to tolerate diet  Secondary Diagnosis:	Essential hypertension  Secondary Diagnosis:	Gastroesophageal reflux disease, esophagitis presence not specified  Secondary Diagnosis:	Intestinal Whipple's disease Principal Discharge DX:	Abscess  Goal:	treat abscess  Instructions for follow-up, activity and diet:	Complete course of antibiotics as per Dr Palacios from Infectious Disease.  Please plan with interventional radiology for a tube check in 1 week with Dr. Drew please call [phone] to schedule an appointment     follow up with Dr. Medrano in 1-2 weeks   if you have any problems coordinating interventional radiology appointment call Dr. Messina office to help arrange     notify Dr. Medrano if develop fever, chills, increased abdominal pain, unable to tolerate diet  Secondary Diagnosis:	Essential hypertension  Secondary Diagnosis:	Gastroesophageal reflux disease, esophagitis presence not specified  Secondary Diagnosis:	Intestinal Whipple's disease

## 2017-06-14 NOTE — DISCHARGE NOTE ADULT - SECONDARY DIAGNOSIS.
Essential hypertension Gastroesophageal reflux disease, esophagitis presence not specified Intestinal Whipple's disease

## 2017-06-14 NOTE — DISCHARGE NOTE ADULT - CARE PROVIDERS DIRECT ADDRESSES
,DirectAddress_Unknown,[email or web address],[email or web address],[email or web address]

## 2017-06-14 NOTE — DISCHARGE NOTE ADULT - HOSPITAL COURSE
66M s/p whipple on 4/26/17 for pancreatic adenocarcinoma with positive lymph nodes presented to ER complaining of fevers. Pt reports that he had a fever of 102 at home. He reports some associated constipation, but was able to have a bowel movement this morning. Pt had his J tube and JAQUELIN removed four days ago. No abdominal pain, diarrhea, nausea, emesis, dysuria, BRBPR, melena. ROS otherwise negative. Ct scan done showed Abscess formation along the course of the prior JAQUELIN drain, with 3.4 cm abscess collection adjacent to the cecum, contiguous with a smaller collection in the right anterolateral abdominal wall and tracking superiorly towards the duodenum. pt admitted IV Abx. Pt taken to IR underwent CT guided percutaneous drainage of a right lower quadrant fluid collection with a 10.2 Mosotho Whitney-Dodson drainage catheter yielded approximately 10 cc of purulent fluid. ID consulted for help with antibiotics management. Pt discharged home with drain to follow up with IR for tube check in 1 week 66M s/p whipple on 4/26/17 for pancreatic adenocarcinoma with positive lymph nodes presented to ER complaining of fevers. Pt reports that he had a fever of 102 at home. He reports some associated constipation, but was able to have a bowel movement this morning. Pt had his J tube and JAQUELIN removed four days ago. No abdominal pain, diarrhea, nausea, emesis, dysuria, BRBPR, melena. ROS otherwise negative.   CT scan showed Abscess formation along the course of the prior JAQUELIN drain, with 3.4 cm abscess collection adjacent to the cecum, contiguous with a smaller collection in the right anterolateral abdominal wall and tracking superiorly towards the duodenum. Pt  admitted for IV Abx. Pt taken to IR and underwent CT guided percutaneous drainage of a right lower quadrant fluid collection with a 10.2 Russian Whitney-Dodson drainage catheter yielded approximately 10 cc of purulent fluid.  ID consulted for help with antibiotics management.   Pt discharged home with drain to follow up with IR for tube check in 1 week 66M s/p whipple on 4/26/17 for pancreatic adenocarcinoma with positive lymph nodes presented to ER complaining of fevers. Pt reports that he had a fever of 102 at home. He reports some associated constipation, but was able to have a bowel movement this morning. Pt had his J tube and JAQUELIN removed four days ago. No abdominal pain, diarrhea, nausea, emesis, dysuria, BRBPR, melena. ROS otherwise negative.   CT scan showed Abscess formation along the course of the prior JAQUELIN drain, with 3.4 cm abscess collection adjacent to the cecum, contiguous with a smaller collection in the right anterolateral abdominal wall and tracking superiorly towards the duodenum. Pt  admitted for IV Abx. Pt taken to IR and underwent CT guided percutaneous drainage of a right lower quadrant fluid collection with a 10.2 Martiniquais Whitney-Dodson drainage catheter yielded approximately 10 cc of purulent fluid.  ID consulted for help with antibiotics management.   Pt discharged home with drain to follow up with IR for tube check in 1 week       Addendum:  Sepsis on Admission secondary to intra abdominal abscess.

## 2017-06-14 NOTE — DISCHARGE NOTE ADULT - PLAN OF CARE
treat abscess complete course of antibiotics, empty and record drain output daily  plan for interventional radiology tube check in 1 week with Dr. Drew please call [phone] to schedule an appointment     follow up with Dr. Medrano in 1-2 weeks   if you have any problems coordinating interventional radiology appointment call Dr. Messina office to help arrange     notify Dr. Medrano if develop fever, chills, increased abdominal pain, unable to tolerate diet Complete course of antibiotics as per Dr Palacios from Infectious Disease.  Please plan with interventional radiology for a tube check in 1 week with Dr. Drew please call [phone] to schedule an appointment     follow up with Dr. Medrano in 1-2 weeks   if you have any problems coordinating interventional radiology appointment call Dr. Messina office to help arrange     notify Dr. Medrano if develop fever, chills, increased abdominal pain, unable to tolerate diet

## 2017-06-14 NOTE — DISCHARGE NOTE ADULT - MEDICATION SUMMARY - MEDICATIONS TO TAKE
I will START or STAY ON the medications listed below when I get home from the hospital:    acetaminophen 325 mg oral tablet  -- 2 tab(s) by mouth every 6 hours, As needed, Mild Pain (1 - 3)  -- Indication: For mild pain    oxyCODONE 5 mg oral capsule  -- 1 cap(s) by mouth every 4 hours as needed for severe pain MDD:6  -- Caution federal law prohibits the transfer of this drug to any person other  than the person for whom it was prescribed.  It is very important that you take or use this exactly as directed.  Do not skip doses or discontinue unless directed by your doctor.  May cause drowsiness.  Alcohol may intensify this effect.  Use care when operating dangerous machinery.  This prescription cannot be refilled.  Using more of this medication than prescribed may cause serious breathing problems.    -- Indication: For mod pain    enalapril 20 mg oral tablet  -- 1 tab(s) by mouth 2 times a day  -- Indication: For HTN    doxazosin 1 mg oral tablet  -- 1 tab(s) by mouth once a day (at bedtime)  -- Indication: For BPH    amitriptyline 25 mg oral tablet  -- 1 tab(s) by mouth once a day (at bedtime)  -- Indication: For depression    Reglan 5 mg oral tablet  -- 1 tab(s) by mouth every 6 hours, As Needed for nausea  -- May cause drowsiness.  Alcohol may intensify this effect.  Use care when operating dangerous machinery.  Take medication on an empty stomach 1 hour before or 2 to 3 hours after a meal unless otherwise directed by your doctor.    -- Indication: For nausea    Questran Packets 4 g/9 g oral powder for reconstitution  -- 8 gram(s) by mouth 2 times a day  -- Indication: For whipple    hydrOXYzine hydrochloride 25 mg oral tablet  -- 1 tab(s) by mouth once a day (at bedtime) for itching  -- Indication: For Itching    Keflex 500 mg oral capsule  -- 1 cap(s) by mouth 3 times a day  -- Finish all this medication unless otherwise directed by prescriber.    -- Indication: For Abscess    famotidine 20 mg oral tablet  -- 1 tab(s) by mouth once a day (at bedtime)  -- Indication: For reflux    sucralfate 1 g oral tablet  -- 1 tab(s) by mouth once a day  -- Indication: For home med    pantoprazole 40 mg oral delayed release tablet  -- 1 tab(s) by mouth once a day (before a meal)  -- Indication: For reflux

## 2017-06-14 NOTE — CONSULT NOTE ADULT - SUBJECTIVE AND OBJECTIVE BOX
Patient is a 66y old  Male who presented with a chief complaint of fever admitted 17.      HPI:  66M s/p whipple on 17 for pancreatic adenocarcinoma with positive lymph nodes presented to ER complaining of fevers. His drain was removed on 17. He felt well until developing malaise on 12/10 and then following day fever and abdominal pain. Pt reports that he had a fever of 102 at home. He reports some associated constipation, but was able to have a bowel movement this morning. Pt had his J tube and JAQUELIN removed four days ago. No abdominal pain, diarrhea, nausea, emesis, dysuria, BRBPR, melena. ROS otherwise negative. (10 Dudley 2017 23:41)  Upon admission, Abd CT demonstrated collection. He initially was treated with Cipro/flagyl then changed to Meropenem on 17 when he last had fever with temp = 39.1. He underwent IR drainage on 17 with aspiration of 10 cc purulent material. Preliminary cutlure is growing G+C pairs.  He has low risk PCN allergy - At Age 6-7 he developed rash while he had mumps and was exposed to PCN and Sulfa.  He feels much better. His abdominal pain is much improved. He has good appetite.        PAST MEDICAL & SURGICAL HISTORY:  Rash  Biliary stricture: s/p biliary stent  Pancreatic lesion  Gastric polyp  Obstructive jaundice  Other hyperlipidemia  Essential hypertension  IBS (irritable bowel syndrome)  GERD (gastroesophageal reflux disease)  Intestinal Whipple&#x27;s disease  S/P unilateral inguinal hernia repair  S/P tendon repair: hand  History of lumbar laminectomy  History of biliary stent insertion: 2017  S/P lumbar spinal fusion  S/P cervical spinal fusion  No significant past surgical history      Social history:    FAMILY HISTORY:  No pertinent family history in first degree relatives  Family history of prostate cancer  Family history of coronary artery disease  Sister  at age 11      REVIEW OF SYSTEMS:  CONSTITUTIONAL: No weakness, fevers or chills since   EYES/ENT: No visual changes;  No vertigo or throat pain   NECK: No pain or stiffness  RESPIRATORY: No cough, wheezing, hemoptysis; No shortness of breath  CARDIOVASCULAR: No chest pain or palpitations  GASTROINTESTINAL: Mild residual RLQ abd pain. No nausea, vomiting, or hematemesis; No diarrhea or constipation. No melena or hematochezia.  GENITOURINARY: No dysuria, frequency or hematuria  NEUROLOGICAL: No numbness or weakness  SKIN: No itching, burning, rashes, or lesions   All other review of systems is negative unless indicated above    Allergies    penicillin G benzathine (Rash)  low risk remote history  sulfa drugs (Rash)    Intolerances        Antimicrobials:  meropenem IVPB  IV Intermittent   meropenem IVPB 1000milliGRAM(s) IV Intermittent every 8 hours      Vital Signs Last 24 Hrs  T(C): 36.8, Max: 36.8 ( @ 00:50)  T(F): 98.2, Max: 98.3 ( @ 00:50)  HR: 82 (68 - 96)  BP: 105/68 (103/67 - 123/74)  BP(mean): --  RR: 18 (18 - 18)  SpO2: 97% (95% - 98%)    PHYSICAL EXAM:  General: WN/WD NAD, Non-toxic  Neurology: A&Ox3, nonfocal  Respiratory: Clear to auscultation bilaterally  CV: RRR, S1S2, no murmurs, rubs or gallops  Abdominal: Soft, Non-tender, non-distended, normal bowel sounds  Extremities: No edema, + peripheral pulses  Line Sites: Clear  Skin: No rash                          9.4    9.0   )-----------( 318      ( 2017 08:46 )             29.5           139  |  105  |  5<L>  ----------------------------<  109<H>  3.8   |  24  |  0.59    Ca    8.0<L>      2017 08:46  Phos  3.0       Mg     1.8               MICROBIOLOGY: see above        Radiology:

## 2017-06-14 NOTE — DISCHARGE NOTE ADULT - OTHER SIGNIFICANT FINDINGS
EXAM:  CT ABDOMEN AND PELVIS OC IC                          PROCEDURE DATE:  06/10/2017      INTERPRETATION:  CLINICAL INFORMATION: Status post Whipple on April 2017 with recent JAQUELIN drain removal. Presenting with fevers.    COMPARISON: CT Abdomen/pelvis on 5/2/2017.    FINDINGS:    LOWER CHEST: Bibasilar subsegmental atelectasis.     LIVER: Subcentimeter hypodense lesion at the hepatic dome too small to characterize. Stable small hemangioma in the right lobe.  BILE DUCTS: Normal caliber. A stent traverses the hepaticojejunostomy, unchanged in position.   GALLBLADDER: Within normal limits.  SPLEEN: Within normal limits, splenule noted.  PANCREAS: Status post Whipple with pancreatic ductal stent still in place. The remaining pancreatic parenchyma is within normal limits. There is fat stranding with decrease in amount of free fluid along the pancreatic head surgical bed when compared with the prior study.  ADRENALS: Within normal limits.  KIDNEYS/URETERS: Left parapelvic cyst, otherwise within normal limits.    BLADDER: Distended.  REPRODUCTIVE ORGANS: The prostate and seminal vesicles are within normal limits.    BOWEL: There is a new 2.4 x 3.4 cm abscess collection in the right lower quadrant adjacent to cecum which is contiguous with the abdominal wall collection as described below along the tract of the drain that was removed. No bowel obstruction.   PERITONEUM: Interval removal of the percutaneous surgical chain. Small   amount of ascites, decreased from prior study.  VESSELS:  Atherosclerotic changes.  RETROPERITONEUM: No lymphadenopathy.    ABDOMINAL WALL: An ill-defined collection measuring roughly 2.5 x 2.4 cm   with likely peripheral enhancement in the abdominus muscle along the   right lateral abdominal wall, new from prior study. There is surrounding   fat stranding, slightly decreased from prior study.  BONES: Degenerative changes. Laminectomy L4 and L5.    IMPRESSION:  Abscess formation along the course of the prior JAQUELIN drain, with 3.4 cm abscess collection adjacent to the cecum, contiguous with a smaller collection in the right anterolateral abdominal wall and tracking superiorly towards the duodenum.        EXAM:  IR PROCEDURE NON PICC                            PROCEDURE DATE:  06/13/2017      INTERPRETATION:  CT-guided abscess drainage.    Clinical Information: Patient is status post Whipple procedure and recent drainage catheter removal who presents with a right lower quadrant abscess along the course of the previous drain tract.    : Dr. Drew.    Anesthesia: Sedation administered by anesthesiology attending. 2% Lidocaine local.    COMPLICATIONS: None.    TECHNIQUE: The procedure including risks and benefits was discussed with the patient. Informed written consent was obtained and placed in the patient's chart.    The patient was placed supine on the CT examination table and connected to physiologic monitoring.  Limited transaxial images of the abdomen were obtained without the use of oral or intravenous contrast materials.  The right lower quadrant fluid collection which had been seen on a recent CT scan was again identified and the overlying skin was prepped and draped in the usual sterile fashion.  Lidocaine 2% was administered for local anesthesia.    Using CT guidance, a 5 Bengali Yueh needle was advanced into the collection.  Access into the collection was confirmed upon aspirating 1cc of purulent fluid which was sent for microbiological analysis.  The needle was then removed over an Amplatz wire and the tract was dilated to 10 Bengali.  A 10.2 Bengali Whitney-Dodson drainage catheter was then advanced into the collection over the wire.  Approximately 10 cc of purulent fluid were manually aspirated.  Repeat images demonstrated good positioning of the catheter tip within the abscess cavity with virtual complete collapse of the cavity around the catheter tip.  The catheter was secured to the patient's skin with a silk suture and then connected to a JAQUELIN bulb.  A dry gauze dressing was then applied.  The patient tolerated the procedure well and was discharged from the radiology department in stable condition and without complaints.  There were no immediate complications.    Impression:   Successful CT guided percutaneous drainage of a right lower quadrant fluid collection with a 10.2 Bengali Whitney-Dodson drainage catheter yielded approximately 10 cc of purulent fluid. Sample of fluid sent for microbiological analysis. Catheter left to JAQUELIN bulb.

## 2017-06-14 NOTE — DISCHARGE NOTE ADULT - PROVIDER TOKENS
FREE:[LAST:[iva],FIRST:[desmond],PHONE:[phone]],FAX:[(   )    -],ADDRESS:[[address] [place] [address]]],TOKEN:'[id number] TOKEN:'[id number]',FREE:[LAST:[iva],FIRST:[desmond],PHONE:[phone]],FAX:[(   )    -],ADDRESS:[[address] [place] [address]]],TOKEN:'[id number]'

## 2017-06-14 NOTE — DISCHARGE NOTE ADULT - MEDICATION SUMMARY - MEDICATIONS TO STOP TAKING
I will STOP taking the medications listed below when I get home from the hospital:    ciprofloxacin 500 mg oral tablet  -- 1 tab(s) by mouth every 12 hours

## 2017-06-15 LAB
-  AMPICILLIN: SIGNIFICANT CHANGE UP
-  AMPICILLIN: SIGNIFICANT CHANGE UP
-  CIPROFLOXACIN: SIGNIFICANT CHANGE UP
-  CIPROFLOXACIN: SIGNIFICANT CHANGE UP
-  ERYTHROMYCIN: SIGNIFICANT CHANGE UP
-  ERYTHROMYCIN: SIGNIFICANT CHANGE UP
-  TETRACYCLINE: SIGNIFICANT CHANGE UP
-  TETRACYCLINE: SIGNIFICANT CHANGE UP
-  VANCOMYCIN: SIGNIFICANT CHANGE UP
-  VANCOMYCIN: SIGNIFICANT CHANGE UP
CULTURE RESULTS: SIGNIFICANT CHANGE UP
CULTURE RESULTS: SIGNIFICANT CHANGE UP
METHOD TYPE: SIGNIFICANT CHANGE UP
METHOD TYPE: SIGNIFICANT CHANGE UP
SPECIMEN SOURCE: SIGNIFICANT CHANGE UP
SPECIMEN SOURCE: SIGNIFICANT CHANGE UP

## 2017-06-16 ENCOUNTER — CLINICAL ADVICE (OUTPATIENT)
Age: 67
End: 2017-06-16

## 2017-06-17 LAB
CULTURE RESULTS: SIGNIFICANT CHANGE UP
ORGANISM # SPEC MICROSCOPIC CNT: SIGNIFICANT CHANGE UP
SPECIMEN SOURCE: SIGNIFICANT CHANGE UP

## 2017-06-20 ENCOUNTER — APPOINTMENT (OUTPATIENT)
Dept: SURGERY | Facility: CLINIC | Age: 67
End: 2017-06-20

## 2017-07-11 ENCOUNTER — APPOINTMENT (OUTPATIENT)
Dept: SURGERY | Facility: CLINIC | Age: 67
End: 2017-07-11

## 2017-07-18 NOTE — DISCHARGE NOTE ADULT - NS TRANSFER PATIENT BELONGINGS
I have personally taken the history and examined the patient and agree with the resident,s note as stated above Has been on prednisone 1mg daily x 3 months. No symptoms of PMR. Only c/o right medial knee pain as before. Fell off dock and landed on mid back, has appt with his primary already for this    Exam: no spinal tenderness. Neuro: normal reflexes and motor strength LEs. SLR neg  Knees:  mild right medial joint line tenderness. Mild PF crepitus both knees. No erythema, warmth or swelling. Bulge sign is negative. Nl medial and lateral collateral, neg drawer sign    Saw Nephrology 4/27/17:    1. Hypophosphatemia  No obvious etiology. He is chronically on a thiazide diuretic that only rarely can cause urinary phosphate wasting. No evidence of vitamin D deficiency or hyperparathyroidism when values were checked 3 weeks ago. Will order the following labs, including urine phosphorus to look for urine phos wasting as a source.   Comprehensive metabolic panel     Phosphorus     Magnesium     Urinalysis     Creatinine, urine, random     Phosphorus, urine, timed     PTH, intact     Vitamin D     FIBROBLAST GROWTH FACTOR 23, PLASMA      SUMMARY OF PLAN:  1. Ordering above listed labs  2. RTC in 6 weeks          Received note from AARON Roldan [facility] [place]  4/18/17: cc: itchiness scalp x 7-10 days.     Assessment: 1. Scar left posterior neck; 2. SK scattered upper body; 3. Dermatitis, ant scalp, erythematous and scaly plaque; LPP v seb derm: Rx clobetasol, if no better consider biopsy.     Results for ELISABETH MCCOLLUM JR. (MRN [id number]) as of 7/18/2017 10:02   Ref. Range 5/13/2016 09:50 6/28/2016 15:07 10/3/2016 09:54 11/17/2016 08:45 2/22/2017 14:30 3/17/2017 07:50 4/19/2017 13:30 4/24/2017 09:35 6/22/2017 09:53   Sed Rate Latest Ref Range: 0 - 10 mm/Hr 3 4 2 4 2 12 (H) 4 3 3   Results for ELISABETH MCCOLLUM JR. (MRN [id number]) as of 7/18/2017 10:02   Ref. Range 5/13/2016 09:50 6/28/2016 15:07 10/3/2016 09:54  11/17/2016 08:45 2/22/2017 14:30 3/17/2017 07:50 4/19/2017 13:30 4/24/2017 09:35 6/22/2017 09:53   CRP Latest Ref Range: 0.0 - 8.2 mg/L 1.4 2.0 1.7 2.0 3.1 4.0 1.8 1.4 2.3     Results for ELISABETH MCCOLLUM JR. (MRN [id number]) as of 7/18/2017 10:02   Ref. Range 3/22/2017 11:50 4/4/2017 09:40 4/27/2017 13:55 6/22/2017 09:53   Phosphorus Latest Ref Range: 2.7 - 4.5 mg/dL 2.2 (L) 2.3 (L) 3.2 2.9          Mild OA knees, right >>left knee pain posterior and prox calf pain dating to  Arthroscopic  medial meniscectomy 3/10/16, h/o ACL tear ? Repair at the time.  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 He is getting second opinion from Dr. Yeison Alfonso in [place] Thursday.   See primary as scheduled for post traumatic back pain  RTC 6 months   dvd; phone charge/Clothing/Cell Phone/PDA (specify)

## 2017-07-19 ENCOUNTER — OUTPATIENT (OUTPATIENT)
Dept: OUTPATIENT SERVICES | Facility: HOSPITAL | Age: 67
LOS: 1 days | Discharge: HOME | End: 2017-07-19

## 2017-07-19 DIAGNOSIS — Z98.890 OTHER SPECIFIED POSTPROCEDURAL STATES: Chronic | ICD-10-CM

## 2017-07-19 DIAGNOSIS — Z98.1 ARTHRODESIS STATUS: Chronic | ICD-10-CM

## 2017-07-19 DIAGNOSIS — C22.1 INTRAHEPATIC BILE DUCT CARCINOMA: ICD-10-CM

## 2017-07-19 DIAGNOSIS — K90.81 WHIPPLE'S DISEASE: Chronic | ICD-10-CM

## 2017-07-20 ENCOUNTER — APPOINTMENT (OUTPATIENT)
Dept: HEMATOLOGY ONCOLOGY | Facility: CLINIC | Age: 67
End: 2017-07-20

## 2017-07-20 ENCOUNTER — RESULT REVIEW (OUTPATIENT)
Age: 67
End: 2017-07-20

## 2017-07-20 VITALS
DIASTOLIC BLOOD PRESSURE: 80 MMHG | RESPIRATION RATE: 16 BRPM | TEMPERATURE: 97.9 F | HEIGHT: 70 IN | SYSTOLIC BLOOD PRESSURE: 140 MMHG | BODY MASS INDEX: 21.62 KG/M2 | OXYGEN SATURATION: 99 % | HEART RATE: 104 BPM | WEIGHT: 151.02 LBS

## 2017-07-20 DIAGNOSIS — T81.4XXD INFECTION FOLLOWING A PROCEDURE, SUBSEQUENT ENCOUNTER: ICD-10-CM

## 2017-07-20 DIAGNOSIS — K65.1 INFECTION FOLLOWING A PROCEDURE, SUBSEQUENT ENCOUNTER: ICD-10-CM

## 2017-07-20 LAB
HCT VFR BLD CALC: 37.3 % — LOW (ref 39–50)
HGB BLD-MCNC: 11.9 G/DL — LOW (ref 13–17)
MCHC RBC-ENTMCNC: 27.3 PG — SIGNIFICANT CHANGE UP (ref 27–34)
MCHC RBC-ENTMCNC: 31.8 G/DL — LOW (ref 32–36)
MCV RBC AUTO: 85.7 FL — SIGNIFICANT CHANGE UP (ref 80–100)
PLATELET # BLD AUTO: 229 K/UL — SIGNIFICANT CHANGE UP (ref 150–400)
RBC # BLD: 4.36 M/UL — SIGNIFICANT CHANGE UP (ref 4.2–5.8)
RBC # FLD: 18.7 % — HIGH (ref 10.3–14.5)
WBC # BLD: 4.9 K/UL — SIGNIFICANT CHANGE UP (ref 3.8–10.5)
WBC # FLD AUTO: 4.9 K/UL — SIGNIFICANT CHANGE UP (ref 3.8–10.5)

## 2017-07-20 RX ORDER — ONDANSETRON 4 MG/1
4 TABLET ORAL EVERY 6 HOURS
Qty: 40 | Refills: 0 | Status: DISCONTINUED | COMMUNITY
Start: 2017-03-13 | End: 2017-07-20

## 2017-07-20 RX ORDER — DOXAZOSIN 1 MG/1
1 TABLET ORAL DAILY
Qty: 30 | Refills: 0 | Status: ACTIVE | COMMUNITY
Start: 2017-05-23

## 2017-07-20 RX ORDER — TRIAMCINOLONE ACETONIDE 1 MG/G
0.1 CREAM TOPICAL
Qty: 454 | Refills: 0 | Status: DISCONTINUED | COMMUNITY
Start: 2017-02-14 | End: 2017-07-20

## 2017-07-20 RX ORDER — CIPROFLOXACIN HYDROCHLORIDE 500 MG/1
500 TABLET, FILM COATED ORAL TWICE DAILY
Qty: 10 | Refills: 0 | Status: DISCONTINUED | COMMUNITY
Start: 2017-06-16 | End: 2017-07-20

## 2017-07-20 RX ORDER — POLYETHYLENE GLYCOL 3350, SODIUM SULFATE, SODIUM CHLORIDE, POTASSIUM CHLORIDE, ASCORBIC ACID, SODIUM ASCORBATE 7.5-2.691G
100 KIT ORAL
Qty: 1 | Refills: 0 | Status: DISCONTINUED | COMMUNITY
Start: 2017-04-23 | End: 2017-07-20

## 2017-07-20 RX ORDER — OXYCODONE HYDROCHLORIDE 5 MG/1
5 CAPSULE ORAL
Qty: 12 | Refills: 0 | Status: DISCONTINUED | COMMUNITY
Start: 2017-05-15 | End: 2017-07-20

## 2017-07-20 RX ORDER — NALTREXONE HYDROCHLORIDE 50 MG/1
50 TABLET, FILM COATED ORAL DAILY
Qty: 4 | Refills: 4 | Status: DISCONTINUED | COMMUNITY
Start: 2017-03-10 | End: 2017-07-20

## 2017-07-20 RX ORDER — SUCRALFATE 1 G/1
1 TABLET ORAL
Qty: 30 | Refills: 0 | Status: DISCONTINUED | COMMUNITY
Start: 2017-05-16 | End: 2017-07-20

## 2017-07-20 RX ORDER — HYDROXYZINE HYDROCHLORIDE 25 MG/1
25 TABLET ORAL
Qty: 15 | Refills: 0 | Status: DISCONTINUED | COMMUNITY
Start: 2017-03-09 | End: 2017-07-20

## 2017-07-20 RX ORDER — METOCLOPRAMIDE 5 MG/1
5 TABLET ORAL 3 TIMES DAILY
Qty: 90 | Refills: 0 | Status: DISCONTINUED | COMMUNITY
Start: 2017-05-23 | End: 2017-07-20

## 2017-07-20 RX ORDER — URSODIOL 300 MG/1
300 CAPSULE ORAL
Qty: 90 | Refills: 0 | Status: DISCONTINUED | COMMUNITY
Start: 2017-02-22 | End: 2017-07-20

## 2017-07-20 RX ORDER — CHOLESTYRAMINE 4 G/9G
4 POWDER, FOR SUSPENSION ORAL TWICE DAILY
Qty: 60 | Refills: 6 | Status: DISCONTINUED | COMMUNITY
Start: 2017-03-13 | End: 2017-07-20

## 2017-07-20 RX ORDER — HYOSCYAMINE SULFATE 0.12 MG/1
0.12 TABLET, ORALLY DISINTEGRATING ORAL
Qty: 120 | Refills: 0 | Status: DISCONTINUED | COMMUNITY
Start: 2017-01-19 | End: 2017-07-20

## 2017-07-20 RX ORDER — CIPROFLOXACIN HYDROCHLORIDE 500 MG/1
500 TABLET, FILM COATED ORAL
Qty: 14 | Refills: 0 | Status: DISCONTINUED | COMMUNITY
Start: 2017-05-15 | End: 2017-07-20

## 2017-07-20 RX ORDER — FAMOTIDINE 20 MG/1
20 TABLET, FILM COATED ORAL
Qty: 30 | Refills: 0 | Status: DISCONTINUED | COMMUNITY
Start: 2017-05-16 | End: 2017-07-20

## 2017-07-23 ENCOUNTER — FORM ENCOUNTER (OUTPATIENT)
Age: 67
End: 2017-07-23

## 2017-07-23 PROBLEM — T81.4XXD: Status: ACTIVE | Noted: 2017-06-27

## 2017-07-24 ENCOUNTER — APPOINTMENT (OUTPATIENT)
Dept: CT IMAGING | Facility: CLINIC | Age: 67
End: 2017-07-24

## 2017-07-24 ENCOUNTER — OUTPATIENT (OUTPATIENT)
Dept: OUTPATIENT SERVICES | Facility: HOSPITAL | Age: 67
LOS: 1 days | End: 2017-07-24
Payer: MEDICARE

## 2017-07-24 DIAGNOSIS — Z98.890 OTHER SPECIFIED POSTPROCEDURAL STATES: Chronic | ICD-10-CM

## 2017-07-24 DIAGNOSIS — Z98.1 ARTHRODESIS STATUS: Chronic | ICD-10-CM

## 2017-07-24 DIAGNOSIS — K90.81 WHIPPLE'S DISEASE: Chronic | ICD-10-CM

## 2017-07-24 DIAGNOSIS — C24.9 MALIGNANT NEOPLASM OF BILIARY TRACT, UNSPECIFIED: ICD-10-CM

## 2017-07-24 PROCEDURE — 74178 CT ABD&PLV WO CNTR FLWD CNTR: CPT

## 2017-07-24 PROCEDURE — 82565 ASSAY OF CREATININE: CPT

## 2017-07-26 ENCOUNTER — FORM ENCOUNTER (OUTPATIENT)
Age: 67
End: 2017-07-26

## 2017-07-26 DIAGNOSIS — N40.0 BENIGN PROSTATIC HYPERPLASIA WITHOUT LOWER URINARY TRACT SYMPTOMS: ICD-10-CM

## 2017-07-26 DIAGNOSIS — C24.9 MALIGNANT NEOPLASM OF BILIARY TRACT, UNSPECIFIED: ICD-10-CM

## 2017-07-26 DIAGNOSIS — K21.9 GASTRO-ESOPHAGEAL REFLUX DISEASE WITHOUT ESOPHAGITIS: ICD-10-CM

## 2017-07-26 DIAGNOSIS — K92.89 OTHER SPECIFIED DISEASES OF THE DIGESTIVE SYSTEM: ICD-10-CM

## 2017-07-27 ENCOUNTER — APPOINTMENT (OUTPATIENT)
Age: 67
End: 2017-07-27
Payer: MEDICARE

## 2017-07-27 PROCEDURE — 36561 INSERT TUNNELED CV CATH: CPT

## 2017-07-27 PROCEDURE — 77001 FLUOROGUIDE FOR VEIN DEVICE: CPT

## 2017-07-27 PROCEDURE — 76937 US GUIDE VASCULAR ACCESS: CPT

## 2017-08-01 ENCOUNTER — APPOINTMENT (OUTPATIENT)
Dept: HEMATOLOGY ONCOLOGY | Facility: CLINIC | Age: 67
End: 2017-08-01
Payer: MEDICARE

## 2017-08-01 VITALS
WEIGHT: 154.1 LBS | SYSTOLIC BLOOD PRESSURE: 137 MMHG | TEMPERATURE: 98.3 F | HEART RATE: 86 BPM | BODY MASS INDEX: 22.11 KG/M2 | RESPIRATION RATE: 16 BRPM | DIASTOLIC BLOOD PRESSURE: 83 MMHG | OXYGEN SATURATION: 100 %

## 2017-08-01 PROCEDURE — 99214 OFFICE O/P EST MOD 30 MIN: CPT

## 2017-08-03 ENCOUNTER — LABORATORY RESULT (OUTPATIENT)
Age: 67
End: 2017-08-03

## 2017-08-03 ENCOUNTER — APPOINTMENT (OUTPATIENT)
Dept: INFUSION THERAPY | Facility: HOSPITAL | Age: 67
End: 2017-08-03

## 2017-08-03 ENCOUNTER — RESULT REVIEW (OUTPATIENT)
Age: 67
End: 2017-08-03

## 2017-08-03 LAB
HCT VFR BLD CALC: 35.1 % — LOW (ref 39–50)
HGB BLD-MCNC: 11.2 G/DL — LOW (ref 13–17)
MCHC RBC-ENTMCNC: 27.2 PG — SIGNIFICANT CHANGE UP (ref 27–34)
MCHC RBC-ENTMCNC: 31.9 G/DL — LOW (ref 32–36)
MCV RBC AUTO: 85.3 FL — SIGNIFICANT CHANGE UP (ref 80–100)
PLATELET # BLD AUTO: 234 K/UL — SIGNIFICANT CHANGE UP (ref 150–400)
RBC # BLD: 4.11 M/UL — LOW (ref 4.2–5.8)
RBC # FLD: 19.3 % — HIGH (ref 10.3–14.5)
WBC # BLD: 4.7 K/UL — SIGNIFICANT CHANGE UP (ref 3.8–10.5)
WBC # FLD AUTO: 4.7 K/UL — SIGNIFICANT CHANGE UP (ref 3.8–10.5)

## 2017-08-04 ENCOUNTER — APPOINTMENT (OUTPATIENT)
Dept: HEMATOLOGY ONCOLOGY | Facility: CLINIC | Age: 67
End: 2017-08-04

## 2017-08-04 DIAGNOSIS — Z51.11 ENCOUNTER FOR ANTINEOPLASTIC CHEMOTHERAPY: ICD-10-CM

## 2017-08-04 DIAGNOSIS — R11.2 NAUSEA WITH VOMITING, UNSPECIFIED: ICD-10-CM

## 2017-08-10 ENCOUNTER — APPOINTMENT (OUTPATIENT)
Dept: HEMATOLOGY ONCOLOGY | Facility: CLINIC | Age: 67
End: 2017-08-10
Payer: MEDICARE

## 2017-08-10 ENCOUNTER — RESULT REVIEW (OUTPATIENT)
Age: 67
End: 2017-08-10

## 2017-08-10 ENCOUNTER — APPOINTMENT (OUTPATIENT)
Dept: INFUSION THERAPY | Facility: HOSPITAL | Age: 67
End: 2017-08-10

## 2017-08-10 LAB
EOSINOPHIL # BLD AUTO: 0.2 K/UL — SIGNIFICANT CHANGE UP (ref 0–0.5)
EOSINOPHIL NFR BLD AUTO: 4 % — SIGNIFICANT CHANGE UP (ref 0–6)
HCT VFR BLD CALC: 33.2 % — LOW (ref 39–50)
HGB BLD-MCNC: 11.1 G/DL — LOW (ref 13–17)
LYMPHOCYTES # BLD AUTO: 1.3 K/UL — SIGNIFICANT CHANGE UP (ref 1–3.3)
LYMPHOCYTES # BLD AUTO: 68 % — HIGH (ref 13–44)
MCHC RBC-ENTMCNC: 28.6 PG — SIGNIFICANT CHANGE UP (ref 27–34)
MCHC RBC-ENTMCNC: 33.5 G/DL — SIGNIFICANT CHANGE UP (ref 32–36)
MCV RBC AUTO: 85.4 FL — SIGNIFICANT CHANGE UP (ref 80–100)
MONOCYTES # BLD AUTO: 0.1 K/UL — SIGNIFICANT CHANGE UP (ref 0–0.9)
MONOCYTES NFR BLD AUTO: 4 % — SIGNIFICANT CHANGE UP (ref 2–14)
NEUTROPHILS # BLD AUTO: 0.9 K/UL — LOW (ref 1.8–7.4)
NEUTROPHILS NFR BLD AUTO: 24 % — LOW (ref 43–77)
PLAT MORPH BLD: NORMAL — SIGNIFICANT CHANGE UP
PLATELET # BLD AUTO: 178 K/UL — SIGNIFICANT CHANGE UP (ref 150–400)
RBC # BLD: 3.89 M/UL — LOW (ref 4.2–5.8)
RBC # FLD: 17.5 % — HIGH (ref 10.3–14.5)
RBC BLD AUTO: SIGNIFICANT CHANGE UP
WBC # BLD: 2.5 K/UL — LOW (ref 3.8–10.5)
WBC # FLD AUTO: 2.5 K/UL — LOW (ref 3.8–10.5)

## 2017-08-10 PROCEDURE — 99215 OFFICE O/P EST HI 40 MIN: CPT

## 2017-08-21 ENCOUNTER — OUTPATIENT (OUTPATIENT)
Dept: OUTPATIENT SERVICES | Facility: HOSPITAL | Age: 67
LOS: 1 days | Discharge: HOME | End: 2017-08-21

## 2017-08-21 DIAGNOSIS — C24.9 MALIGNANT NEOPLASM OF BILIARY TRACT, UNSPECIFIED: ICD-10-CM

## 2017-08-21 DIAGNOSIS — Z98.890 OTHER SPECIFIED POSTPROCEDURAL STATES: Chronic | ICD-10-CM

## 2017-08-21 DIAGNOSIS — K90.81 WHIPPLE'S DISEASE: Chronic | ICD-10-CM

## 2017-08-21 DIAGNOSIS — Z98.1 ARTHRODESIS STATUS: Chronic | ICD-10-CM

## 2017-08-24 ENCOUNTER — RESULT REVIEW (OUTPATIENT)
Age: 67
End: 2017-08-24

## 2017-08-24 ENCOUNTER — APPOINTMENT (OUTPATIENT)
Dept: HEMATOLOGY ONCOLOGY | Facility: CLINIC | Age: 67
End: 2017-08-24
Payer: MEDICARE

## 2017-08-24 ENCOUNTER — APPOINTMENT (OUTPATIENT)
Dept: INFUSION THERAPY | Facility: HOSPITAL | Age: 67
End: 2017-08-24

## 2017-08-24 ENCOUNTER — OTHER (OUTPATIENT)
Age: 67
End: 2017-08-24

## 2017-08-24 VITALS
SYSTOLIC BLOOD PRESSURE: 120 MMHG | OXYGEN SATURATION: 99 % | HEART RATE: 89 BPM | TEMPERATURE: 98.1 F | WEIGHT: 153.22 LBS | RESPIRATION RATE: 16 BRPM | DIASTOLIC BLOOD PRESSURE: 80 MMHG | BODY MASS INDEX: 21.98 KG/M2

## 2017-08-24 LAB
HCT VFR BLD CALC: 36.1 % — LOW (ref 39–50)
HGB BLD-MCNC: 12 G/DL — LOW (ref 13–17)
MCHC RBC-ENTMCNC: 28.4 PG — SIGNIFICANT CHANGE UP (ref 27–34)
MCHC RBC-ENTMCNC: 33.3 G/DL — SIGNIFICANT CHANGE UP (ref 32–36)
MCV RBC AUTO: 85.4 FL — SIGNIFICANT CHANGE UP (ref 80–100)
PLATELET # BLD AUTO: 481 K/UL — HIGH (ref 150–400)
RBC # BLD: 4.23 M/UL — SIGNIFICANT CHANGE UP (ref 4.2–5.8)
RBC # FLD: 17.8 % — HIGH (ref 10.3–14.5)
WBC # BLD: 4.7 K/UL — SIGNIFICANT CHANGE UP (ref 3.8–10.5)
WBC # FLD AUTO: 4.7 K/UL — SIGNIFICANT CHANGE UP (ref 3.8–10.5)

## 2017-08-24 PROCEDURE — 99215 OFFICE O/P EST HI 40 MIN: CPT

## 2017-08-25 DIAGNOSIS — R11.2 NAUSEA WITH VOMITING, UNSPECIFIED: ICD-10-CM

## 2017-08-25 DIAGNOSIS — Z51.11 ENCOUNTER FOR ANTINEOPLASTIC CHEMOTHERAPY: ICD-10-CM

## 2017-08-31 ENCOUNTER — RESULT REVIEW (OUTPATIENT)
Age: 67
End: 2017-08-31

## 2017-08-31 ENCOUNTER — APPOINTMENT (OUTPATIENT)
Dept: INFUSION THERAPY | Facility: HOSPITAL | Age: 67
End: 2017-08-31

## 2017-08-31 LAB
BASOPHILS # BLD AUTO: 0.1 K/UL — SIGNIFICANT CHANGE UP (ref 0–0.2)
BASOPHILS NFR BLD AUTO: 2.3 % — HIGH (ref 0–2)
EOSINOPHIL # BLD AUTO: 0.1 K/UL — SIGNIFICANT CHANGE UP (ref 0–0.5)
EOSINOPHIL NFR BLD AUTO: 2.8 % — SIGNIFICANT CHANGE UP (ref 0–6)
HCT VFR BLD CALC: 36.2 % — LOW (ref 39–50)
HGB BLD-MCNC: 11.9 G/DL — LOW (ref 13–17)
LYMPHOCYTES # BLD AUTO: 1.9 K/UL — SIGNIFICANT CHANGE UP (ref 1–3.3)
LYMPHOCYTES # BLD AUTO: 51.5 % — HIGH (ref 13–44)
MCHC RBC-ENTMCNC: 28.1 PG — SIGNIFICANT CHANGE UP (ref 27–34)
MCHC RBC-ENTMCNC: 33 G/DL — SIGNIFICANT CHANGE UP (ref 32–36)
MCV RBC AUTO: 85.1 FL — SIGNIFICANT CHANGE UP (ref 80–100)
MONOCYTES # BLD AUTO: 0.1 K/UL — SIGNIFICANT CHANGE UP (ref 0–0.9)
MONOCYTES NFR BLD AUTO: 3.6 % — SIGNIFICANT CHANGE UP (ref 2–14)
NEUTROPHILS # BLD AUTO: 1.4 K/UL — LOW (ref 1.8–7.4)
NEUTROPHILS NFR BLD AUTO: 39.8 % — LOW (ref 43–77)
PLATELET # BLD AUTO: 258 K/UL — SIGNIFICANT CHANGE UP (ref 150–400)
RBC # BLD: 4.25 M/UL — SIGNIFICANT CHANGE UP (ref 4.2–5.8)
RBC # FLD: 16.9 % — HIGH (ref 10.3–14.5)
WBC # BLD: 3.6 K/UL — LOW (ref 3.8–10.5)
WBC # FLD AUTO: 3.6 K/UL — LOW (ref 3.8–10.5)

## 2017-09-01 DIAGNOSIS — K21.9 GASTRO-ESOPHAGEAL REFLUX DISEASE WITHOUT ESOPHAGITIS: ICD-10-CM

## 2017-09-01 DIAGNOSIS — K92.89 OTHER SPECIFIED DISEASES OF THE DIGESTIVE SYSTEM: ICD-10-CM

## 2017-09-01 DIAGNOSIS — N40.0 BENIGN PROSTATIC HYPERPLASIA WITHOUT LOWER URINARY TRACT SYMPTOMS: ICD-10-CM

## 2017-09-13 ENCOUNTER — APPOINTMENT (OUTPATIENT)
Age: 67
End: 2017-09-13
Payer: MEDICARE

## 2017-09-13 VITALS
HEART RATE: 105 BPM | SYSTOLIC BLOOD PRESSURE: 131 MMHG | DIASTOLIC BLOOD PRESSURE: 70 MMHG | TEMPERATURE: 97.4 F | HEIGHT: 70 IN | WEIGHT: 150 LBS | RESPIRATION RATE: 17 BRPM | BODY MASS INDEX: 21.47 KG/M2

## 2017-09-13 PROCEDURE — 99204 OFFICE O/P NEW MOD 45 MIN: CPT

## 2017-09-14 ENCOUNTER — LABORATORY RESULT (OUTPATIENT)
Age: 67
End: 2017-09-14

## 2017-09-14 ENCOUNTER — APPOINTMENT (OUTPATIENT)
Dept: INFUSION THERAPY | Facility: HOSPITAL | Age: 67
End: 2017-09-14

## 2017-09-14 ENCOUNTER — RESULT REVIEW (OUTPATIENT)
Age: 67
End: 2017-09-14

## 2017-09-14 LAB
HCT VFR BLD CALC: 32.5 % — LOW (ref 39–50)
HGB BLD-MCNC: 11.3 G/DL — LOW (ref 13–17)
MCHC RBC-ENTMCNC: 29.8 PG — SIGNIFICANT CHANGE UP (ref 27–34)
MCHC RBC-ENTMCNC: 34.7 G/DL — SIGNIFICANT CHANGE UP (ref 32–36)
MCV RBC AUTO: 85.9 FL — SIGNIFICANT CHANGE UP (ref 80–100)
PLATELET # BLD AUTO: 242 K/UL — SIGNIFICANT CHANGE UP (ref 150–400)
RBC # BLD: 3.78 M/UL — LOW (ref 4.2–5.8)
RBC # FLD: 17.1 % — HIGH (ref 10.3–14.5)
WBC # BLD: 4.6 K/UL — SIGNIFICANT CHANGE UP (ref 3.8–10.5)
WBC # FLD AUTO: 4.6 K/UL — SIGNIFICANT CHANGE UP (ref 3.8–10.5)

## 2017-09-19 ENCOUNTER — APPOINTMENT (OUTPATIENT)
Dept: GASTROENTEROLOGY | Facility: CLINIC | Age: 67
End: 2017-09-19
Payer: MEDICARE

## 2017-09-19 ENCOUNTER — APPOINTMENT (OUTPATIENT)
Dept: SURGERY | Facility: CLINIC | Age: 67
End: 2017-09-19
Payer: MEDICARE

## 2017-09-19 VITALS
DIASTOLIC BLOOD PRESSURE: 73 MMHG | HEIGHT: 70 IN | BODY MASS INDEX: 21.9 KG/M2 | WEIGHT: 153 LBS | OXYGEN SATURATION: 99 % | TEMPERATURE: 97.8 F | HEART RATE: 94 BPM | SYSTOLIC BLOOD PRESSURE: 110 MMHG | RESPIRATION RATE: 16 BRPM

## 2017-09-19 DIAGNOSIS — Z78.9 OTHER SPECIFIED HEALTH STATUS: ICD-10-CM

## 2017-09-19 DIAGNOSIS — Z80.51 FAMILY HISTORY OF MALIGNANT NEOPLASM OF KIDNEY: ICD-10-CM

## 2017-09-19 DIAGNOSIS — Z80.9 FAMILY HISTORY OF MALIGNANT NEOPLASM, UNSPECIFIED: ICD-10-CM

## 2017-09-19 DIAGNOSIS — Z80.42 FAMILY HISTORY OF MALIGNANT NEOPLASM OF PROSTATE: ICD-10-CM

## 2017-09-19 DIAGNOSIS — F17.200 NICOTINE DEPENDENCE, UNSPECIFIED, UNCOMPLICATED: ICD-10-CM

## 2017-09-19 DIAGNOSIS — Z82.49 FAMILY HISTORY OF ISCHEMIC HEART DISEASE AND OTHER DISEASES OF THE CIRCULATORY SYSTEM: ICD-10-CM

## 2017-09-19 PROCEDURE — 99213 OFFICE O/P EST LOW 20 MIN: CPT

## 2017-09-19 PROCEDURE — 99204 OFFICE O/P NEW MOD 45 MIN: CPT

## 2017-10-03 ENCOUNTER — OUTPATIENT (OUTPATIENT)
Dept: OUTPATIENT SERVICES | Facility: HOSPITAL | Age: 67
LOS: 1 days | Discharge: HOME | End: 2017-10-03

## 2017-10-03 DIAGNOSIS — Z98.890 OTHER SPECIFIED POSTPROCEDURAL STATES: Chronic | ICD-10-CM

## 2017-10-03 DIAGNOSIS — K90.81 WHIPPLE'S DISEASE: Chronic | ICD-10-CM

## 2017-10-03 DIAGNOSIS — C24.9 MALIGNANT NEOPLASM OF BILIARY TRACT, UNSPECIFIED: ICD-10-CM

## 2017-10-03 DIAGNOSIS — Z98.1 ARTHRODESIS STATUS: Chronic | ICD-10-CM

## 2017-10-05 ENCOUNTER — LABORATORY RESULT (OUTPATIENT)
Age: 67
End: 2017-10-05

## 2017-10-05 ENCOUNTER — APPOINTMENT (OUTPATIENT)
Dept: INFUSION THERAPY | Facility: HOSPITAL | Age: 67
End: 2017-10-05

## 2017-10-05 ENCOUNTER — RESULT REVIEW (OUTPATIENT)
Age: 67
End: 2017-10-05

## 2017-10-05 LAB
HCT VFR BLD CALC: 36.1 % — LOW (ref 39–50)
HGB BLD-MCNC: 12.1 G/DL — LOW (ref 13–17)
MCHC RBC-ENTMCNC: 29.9 PG — SIGNIFICANT CHANGE UP (ref 27–34)
MCHC RBC-ENTMCNC: 33.6 G/DL — SIGNIFICANT CHANGE UP (ref 32–36)
MCV RBC AUTO: 89.1 FL — SIGNIFICANT CHANGE UP (ref 80–100)
PLATELET # BLD AUTO: 254 K/UL — SIGNIFICANT CHANGE UP (ref 150–400)
RBC # BLD: 4.06 M/UL — LOW (ref 4.2–5.8)
RBC # FLD: 17.9 % — HIGH (ref 10.3–14.5)
WBC # BLD: 5.4 K/UL — SIGNIFICANT CHANGE UP (ref 3.8–10.5)
WBC # FLD AUTO: 5.4 K/UL — SIGNIFICANT CHANGE UP (ref 3.8–10.5)

## 2017-10-09 DIAGNOSIS — Z51.11 ENCOUNTER FOR ANTINEOPLASTIC CHEMOTHERAPY: ICD-10-CM

## 2017-10-09 DIAGNOSIS — R11.2 NAUSEA WITH VOMITING, UNSPECIFIED: ICD-10-CM

## 2017-10-12 ENCOUNTER — APPOINTMENT (OUTPATIENT)
Dept: HEMATOLOGY ONCOLOGY | Facility: CLINIC | Age: 67
End: 2017-10-12
Payer: MEDICARE

## 2017-10-12 ENCOUNTER — RESULT REVIEW (OUTPATIENT)
Age: 67
End: 2017-10-12

## 2017-10-12 ENCOUNTER — LABORATORY RESULT (OUTPATIENT)
Age: 67
End: 2017-10-12

## 2017-10-12 ENCOUNTER — APPOINTMENT (OUTPATIENT)
Dept: INFUSION THERAPY | Facility: HOSPITAL | Age: 67
End: 2017-10-12

## 2017-10-12 VITALS
DIASTOLIC BLOOD PRESSURE: 84 MMHG | HEART RATE: 82 BPM | BODY MASS INDEX: 22.24 KG/M2 | OXYGEN SATURATION: 100 % | TEMPERATURE: 97.8 F | WEIGHT: 154.98 LBS | SYSTOLIC BLOOD PRESSURE: 134 MMHG

## 2017-10-12 DIAGNOSIS — L29.9 PRURITUS, UNSPECIFIED: ICD-10-CM

## 2017-10-12 LAB
BASOPHILS # BLD AUTO: 0 K/UL — SIGNIFICANT CHANGE UP (ref 0–0.2)
BASOPHILS NFR BLD AUTO: 1 % — SIGNIFICANT CHANGE UP (ref 0–2)
EOSINOPHIL # BLD AUTO: 0.1 K/UL — SIGNIFICANT CHANGE UP (ref 0–0.5)
EOSINOPHIL NFR BLD AUTO: 3 % — SIGNIFICANT CHANGE UP (ref 0–6)
HCT VFR BLD CALC: 32.3 % — LOW (ref 39–50)
HGB BLD-MCNC: 11 G/DL — LOW (ref 13–17)
LYMPHOCYTES # BLD AUTO: 1.4 K/UL — SIGNIFICANT CHANGE UP (ref 1–3.3)
LYMPHOCYTES # BLD AUTO: 64 % — HIGH (ref 13–44)
MCHC RBC-ENTMCNC: 30.8 PG — SIGNIFICANT CHANGE UP (ref 27–34)
MCHC RBC-ENTMCNC: 34.1 G/DL — SIGNIFICANT CHANGE UP (ref 32–36)
MCV RBC AUTO: 90.2 FL — SIGNIFICANT CHANGE UP (ref 80–100)
MONOCYTES # BLD AUTO: 0.1 K/UL — SIGNIFICANT CHANGE UP (ref 0–0.9)
MONOCYTES NFR BLD AUTO: 4 % — SIGNIFICANT CHANGE UP (ref 2–14)
NEUTROPHILS # BLD AUTO: 0.8 K/UL — LOW (ref 1.8–7.4)
NEUTROPHILS NFR BLD AUTO: 28 % — LOW (ref 43–77)
PLAT MORPH BLD: NORMAL — SIGNIFICANT CHANGE UP
PLATELET # BLD AUTO: 186 K/UL — SIGNIFICANT CHANGE UP (ref 150–400)
RBC # BLD: 3.58 M/UL — LOW (ref 4.2–5.8)
RBC # FLD: 14.8 % — HIGH (ref 10.3–14.5)
RBC BLD AUTO: SIGNIFICANT CHANGE UP
WBC # BLD: 2.4 K/UL — LOW (ref 3.8–10.5)
WBC # FLD AUTO: 2.4 K/UL — LOW (ref 3.8–10.5)

## 2017-10-12 PROCEDURE — 99215 OFFICE O/P EST HI 40 MIN: CPT

## 2017-10-12 RX ORDER — PRAVASTATIN SODIUM 40 MG/1
40 TABLET ORAL
Refills: 0 | Status: DISCONTINUED | COMMUNITY
Start: 2016-12-15 | End: 2017-10-12

## 2017-10-12 RX ORDER — CEPHALEXIN 500 MG/1
500 CAPSULE ORAL
Qty: 30 | Refills: 0 | Status: COMPLETED | COMMUNITY
Start: 2017-06-14

## 2017-10-15 PROBLEM — L29.9 PRURITUS: Status: ACTIVE | Noted: 2017-03-09

## 2017-10-19 ENCOUNTER — RESULT REVIEW (OUTPATIENT)
Age: 67
End: 2017-10-19

## 2017-10-19 ENCOUNTER — APPOINTMENT (OUTPATIENT)
Dept: HEMATOLOGY ONCOLOGY | Facility: CLINIC | Age: 67
End: 2017-10-19

## 2017-10-19 ENCOUNTER — OTHER (OUTPATIENT)
Age: 67
End: 2017-10-19

## 2017-10-19 LAB
BASOPHILS # BLD AUTO: 0.1 K/UL — SIGNIFICANT CHANGE UP (ref 0–0.2)
BASOPHILS NFR BLD AUTO: 1.3 % — SIGNIFICANT CHANGE UP (ref 0–2)
EOSINOPHIL # BLD AUTO: 0.2 K/UL — SIGNIFICANT CHANGE UP (ref 0–0.5)
EOSINOPHIL NFR BLD AUTO: 3.2 % — SIGNIFICANT CHANGE UP (ref 0–6)
HCT VFR BLD CALC: 33.2 % — LOW (ref 39–50)
HGB BLD-MCNC: 11.3 G/DL — LOW (ref 13–17)
LYMPHOCYTES # BLD AUTO: 1.4 K/UL — SIGNIFICANT CHANGE UP (ref 1–3.3)
LYMPHOCYTES # BLD AUTO: 28.8 % — SIGNIFICANT CHANGE UP (ref 13–44)
MCHC RBC-ENTMCNC: 31.2 PG — SIGNIFICANT CHANGE UP (ref 27–34)
MCHC RBC-ENTMCNC: 34.2 G/DL — SIGNIFICANT CHANGE UP (ref 32–36)
MCV RBC AUTO: 91.3 FL — SIGNIFICANT CHANGE UP (ref 80–100)
MONOCYTES # BLD AUTO: 0.4 K/UL — SIGNIFICANT CHANGE UP (ref 0–0.9)
MONOCYTES NFR BLD AUTO: 9.4 % — SIGNIFICANT CHANGE UP (ref 2–14)
NEUTROPHILS # BLD AUTO: 2.7 K/UL — SIGNIFICANT CHANGE UP (ref 1.8–7.4)
NEUTROPHILS NFR BLD AUTO: 57.3 % — SIGNIFICANT CHANGE UP (ref 43–77)
PLATELET # BLD AUTO: 109 K/UL — LOW (ref 150–400)
RBC # BLD: 3.63 M/UL — LOW (ref 4.2–5.8)
RBC # FLD: 15.1 % — HIGH (ref 10.3–14.5)
WBC # BLD: 4.8 K/UL — SIGNIFICANT CHANGE UP (ref 3.8–10.5)
WBC # FLD AUTO: 4.8 K/UL — SIGNIFICANT CHANGE UP (ref 3.8–10.5)

## 2017-10-26 ENCOUNTER — APPOINTMENT (OUTPATIENT)
Dept: HEMATOLOGY ONCOLOGY | Facility: CLINIC | Age: 67
End: 2017-10-26

## 2017-10-26 ENCOUNTER — APPOINTMENT (OUTPATIENT)
Dept: INFUSION THERAPY | Facility: HOSPITAL | Age: 67
End: 2017-10-26

## 2017-11-02 ENCOUNTER — APPOINTMENT (OUTPATIENT)
Dept: INFUSION THERAPY | Facility: HOSPITAL | Age: 67
End: 2017-11-02

## 2017-11-13 ENCOUNTER — OUTPATIENT (OUTPATIENT)
Dept: OUTPATIENT SERVICES | Facility: HOSPITAL | Age: 67
LOS: 1 days | Discharge: HOME | End: 2017-11-13

## 2017-11-13 DIAGNOSIS — Z98.1 ARTHRODESIS STATUS: Chronic | ICD-10-CM

## 2017-11-13 DIAGNOSIS — Z98.890 OTHER SPECIFIED POSTPROCEDURAL STATES: Chronic | ICD-10-CM

## 2017-11-13 DIAGNOSIS — C24.9 MALIGNANT NEOPLASM OF BILIARY TRACT, UNSPECIFIED: ICD-10-CM

## 2017-11-13 DIAGNOSIS — K90.81 WHIPPLE'S DISEASE: Chronic | ICD-10-CM

## 2017-11-15 ENCOUNTER — APPOINTMENT (OUTPATIENT)
Dept: HEMATOLOGY ONCOLOGY | Facility: CLINIC | Age: 67
End: 2017-11-15
Payer: MEDICARE

## 2017-11-15 ENCOUNTER — RESULT REVIEW (OUTPATIENT)
Age: 67
End: 2017-11-15

## 2017-11-15 ENCOUNTER — LABORATORY RESULT (OUTPATIENT)
Age: 67
End: 2017-11-15

## 2017-11-15 ENCOUNTER — APPOINTMENT (OUTPATIENT)
Dept: INFUSION THERAPY | Facility: HOSPITAL | Age: 67
End: 2017-11-15

## 2017-11-15 ENCOUNTER — APPOINTMENT (OUTPATIENT)
Age: 67
End: 2017-11-15
Payer: MEDICARE

## 2017-11-15 VITALS
TEMPERATURE: 98.2 F | HEART RATE: 89 BPM | RESPIRATION RATE: 16 BRPM | OXYGEN SATURATION: 100 % | WEIGHT: 162.7 LBS | DIASTOLIC BLOOD PRESSURE: 80 MMHG | SYSTOLIC BLOOD PRESSURE: 126 MMHG | BODY MASS INDEX: 23.34 KG/M2

## 2017-11-15 LAB
BASOPHILS # BLD AUTO: 0 K/UL — SIGNIFICANT CHANGE UP (ref 0–0.2)
BASOPHILS NFR BLD AUTO: 0.8 % — SIGNIFICANT CHANGE UP (ref 0–2)
EOSINOPHIL # BLD AUTO: 0.1 K/UL — SIGNIFICANT CHANGE UP (ref 0–0.5)
EOSINOPHIL NFR BLD AUTO: 2.1 % — SIGNIFICANT CHANGE UP (ref 0–6)
HCT VFR BLD CALC: 32.8 % — LOW (ref 39–50)
HGB BLD-MCNC: 11.2 G/DL — LOW (ref 13–17)
LYMPHOCYTES # BLD AUTO: 1.2 K/UL — SIGNIFICANT CHANGE UP (ref 1–3.3)
LYMPHOCYTES # BLD AUTO: 34.1 % — SIGNIFICANT CHANGE UP (ref 13–44)
MCHC RBC-ENTMCNC: 32.1 PG — SIGNIFICANT CHANGE UP (ref 27–34)
MCHC RBC-ENTMCNC: 34.3 G/DL — SIGNIFICANT CHANGE UP (ref 32–36)
MCV RBC AUTO: 93.6 FL — SIGNIFICANT CHANGE UP (ref 80–100)
MONOCYTES # BLD AUTO: 0.3 K/UL — SIGNIFICANT CHANGE UP (ref 0–0.9)
MONOCYTES NFR BLD AUTO: 8.7 % — SIGNIFICANT CHANGE UP (ref 2–14)
NEUTROPHILS # BLD AUTO: 1.9 K/UL — SIGNIFICANT CHANGE UP (ref 1.8–7.4)
NEUTROPHILS NFR BLD AUTO: 54.3 % — SIGNIFICANT CHANGE UP (ref 43–77)
PLATELET # BLD AUTO: 211 K/UL — SIGNIFICANT CHANGE UP (ref 150–400)
RBC # BLD: 3.51 M/UL — LOW (ref 4.2–5.8)
RBC # FLD: 14.8 % — HIGH (ref 10.3–14.5)
WBC # BLD: 3.6 K/UL — LOW (ref 3.8–10.5)
WBC # FLD AUTO: 3.6 K/UL — LOW (ref 3.8–10.5)

## 2017-11-15 PROCEDURE — 99215 OFFICE O/P EST HI 40 MIN: CPT

## 2017-11-15 PROCEDURE — 91200 LIVER ELASTOGRAPHY: CPT

## 2017-11-21 ENCOUNTER — APPOINTMENT (OUTPATIENT)
Dept: GASTROENTEROLOGY | Facility: CLINIC | Age: 67
End: 2017-11-21
Payer: MEDICARE

## 2017-11-21 VITALS
WEIGHT: 158 LBS | HEART RATE: 86 BPM | RESPIRATION RATE: 15 BRPM | DIASTOLIC BLOOD PRESSURE: 93 MMHG | TEMPERATURE: 97.8 F | HEIGHT: 70 IN | SYSTOLIC BLOOD PRESSURE: 162 MMHG | BODY MASS INDEX: 22.62 KG/M2

## 2017-11-21 PROCEDURE — 99214 OFFICE O/P EST MOD 30 MIN: CPT

## 2017-12-07 ENCOUNTER — OUTPATIENT (OUTPATIENT)
Dept: OUTPATIENT SERVICES | Facility: HOSPITAL | Age: 67
LOS: 1 days | Discharge: HOME | End: 2017-12-07

## 2017-12-07 DIAGNOSIS — C24.9 MALIGNANT NEOPLASM OF BILIARY TRACT, UNSPECIFIED: ICD-10-CM

## 2017-12-07 DIAGNOSIS — Z98.890 OTHER SPECIFIED POSTPROCEDURAL STATES: Chronic | ICD-10-CM

## 2017-12-07 DIAGNOSIS — K90.81 WHIPPLE'S DISEASE: Chronic | ICD-10-CM

## 2017-12-07 DIAGNOSIS — Z98.1 ARTHRODESIS STATUS: Chronic | ICD-10-CM

## 2017-12-12 ENCOUNTER — OTHER (OUTPATIENT)
Age: 67
End: 2017-12-12

## 2017-12-14 ENCOUNTER — LABORATORY RESULT (OUTPATIENT)
Age: 67
End: 2017-12-14

## 2017-12-14 ENCOUNTER — APPOINTMENT (OUTPATIENT)
Dept: HEMATOLOGY ONCOLOGY | Facility: CLINIC | Age: 67
End: 2017-12-14
Payer: MEDICARE

## 2017-12-14 ENCOUNTER — APPOINTMENT (OUTPATIENT)
Dept: INFUSION THERAPY | Facility: HOSPITAL | Age: 67
End: 2017-12-14

## 2017-12-14 VITALS
SYSTOLIC BLOOD PRESSURE: 132 MMHG | WEIGHT: 162 LBS | RESPIRATION RATE: 16 BRPM | BODY MASS INDEX: 23.24 KG/M2 | DIASTOLIC BLOOD PRESSURE: 87 MMHG | OXYGEN SATURATION: 100 % | HEART RATE: 91 BPM | TEMPERATURE: 98.9 F

## 2017-12-14 PROCEDURE — 99215 OFFICE O/P EST HI 40 MIN: CPT

## 2017-12-26 ENCOUNTER — OTHER (OUTPATIENT)
Age: 67
End: 2017-12-26

## 2018-01-16 ENCOUNTER — OTHER (OUTPATIENT)
Age: 68
End: 2018-01-16

## 2018-01-16 ENCOUNTER — OUTPATIENT (OUTPATIENT)
Dept: OUTPATIENT SERVICES | Facility: HOSPITAL | Age: 68
LOS: 1 days | Discharge: HOME | End: 2018-01-16

## 2018-01-16 DIAGNOSIS — Z98.890 OTHER SPECIFIED POSTPROCEDURAL STATES: Chronic | ICD-10-CM

## 2018-01-16 DIAGNOSIS — Z98.1 ARTHRODESIS STATUS: Chronic | ICD-10-CM

## 2018-01-16 DIAGNOSIS — K90.81 WHIPPLE'S DISEASE: Chronic | ICD-10-CM

## 2018-01-16 DIAGNOSIS — C24.9 MALIGNANT NEOPLASM OF BILIARY TRACT, UNSPECIFIED: ICD-10-CM

## 2018-01-18 ENCOUNTER — APPOINTMENT (OUTPATIENT)
Dept: INFUSION THERAPY | Facility: HOSPITAL | Age: 68
End: 2018-01-18

## 2018-01-18 ENCOUNTER — APPOINTMENT (OUTPATIENT)
Dept: HEMATOLOGY ONCOLOGY | Facility: CLINIC | Age: 68
End: 2018-01-18
Payer: MEDICARE

## 2018-01-18 ENCOUNTER — RESULT REVIEW (OUTPATIENT)
Age: 68
End: 2018-01-18

## 2018-01-18 ENCOUNTER — LABORATORY RESULT (OUTPATIENT)
Age: 68
End: 2018-01-18

## 2018-01-18 VITALS
OXYGEN SATURATION: 100 % | RESPIRATION RATE: 16 BRPM | WEIGHT: 167.33 LBS | SYSTOLIC BLOOD PRESSURE: 137 MMHG | DIASTOLIC BLOOD PRESSURE: 81 MMHG | BODY MASS INDEX: 24.01 KG/M2 | HEART RATE: 82 BPM | TEMPERATURE: 98.2 F

## 2018-01-18 DIAGNOSIS — R11.0 NAUSEA: ICD-10-CM

## 2018-01-18 LAB
HCT VFR BLD CALC: 33.4 % — LOW (ref 39–50)
HGB BLD-MCNC: 11.5 G/DL — LOW (ref 13–17)
MCHC RBC-ENTMCNC: 31.9 PG — SIGNIFICANT CHANGE UP (ref 27–34)
MCHC RBC-ENTMCNC: 34.4 G/DL — SIGNIFICANT CHANGE UP (ref 32–36)
MCV RBC AUTO: 92.5 FL — SIGNIFICANT CHANGE UP (ref 80–100)
PLATELET # BLD AUTO: 295 K/UL — SIGNIFICANT CHANGE UP (ref 150–400)
RBC # BLD: 3.6 M/UL — LOW (ref 4.2–5.8)
RBC # FLD: 16.1 % — HIGH (ref 10.3–14.5)
WBC # BLD: 4.7 K/UL — SIGNIFICANT CHANGE UP (ref 3.8–10.5)
WBC # FLD AUTO: 4.7 K/UL — SIGNIFICANT CHANGE UP (ref 3.8–10.5)

## 2018-01-18 PROCEDURE — 99213 OFFICE O/P EST LOW 20 MIN: CPT

## 2018-01-18 RX ORDER — CAPECITABINE 500 MG/1
500 TABLET ORAL TWICE DAILY
Qty: 126 | Refills: 6 | Status: DISCONTINUED | COMMUNITY
Start: 2017-10-19 | End: 2018-01-15

## 2018-01-19 ENCOUNTER — OTHER (OUTPATIENT)
Age: 68
End: 2018-01-19

## 2018-01-21 RX ORDER — SUCRALFATE 1 G/1
1 TABLET ORAL
Qty: 60 | Refills: 0 | Status: DISCONTINUED | COMMUNITY
Start: 2017-06-06 | End: 2018-01-21

## 2018-01-21 RX ORDER — SUCRALFATE 1 G/1
1 TABLET ORAL
Qty: 60 | Refills: 5 | Status: DISCONTINUED | COMMUNITY
Start: 2017-11-21 | End: 2018-01-21

## 2018-01-25 ENCOUNTER — CLINICAL ADVICE (OUTPATIENT)
Age: 68
End: 2018-01-25

## 2018-01-26 ENCOUNTER — OTHER (OUTPATIENT)
Age: 68
End: 2018-01-26

## 2018-02-02 ENCOUNTER — OTHER (OUTPATIENT)
Age: 68
End: 2018-02-02

## 2018-02-06 ENCOUNTER — OTHER (OUTPATIENT)
Age: 68
End: 2018-02-06

## 2018-02-09 ENCOUNTER — MESSAGE (OUTPATIENT)
Age: 68
End: 2018-02-09

## 2018-02-12 NOTE — PATIENT PROFILE ADULT. - FUNCTIONAL SCREEN CURRENT LEVEL: TRANSFERRING, MLM
2/12/18, 12:02 PM    Discharge plan discussed by  and . Discharge plan reviewed with patient/ family. Patient/ family verbalize understanding of discharge plan and are in agreement with plan. Understanding was demonstrated using the teach back method. IMM Letter  IMM Letter date given[de-identified] 02/12/18  IMM Letter time given[de-identified] 7101       Discharge order in place. Pt plans return home with spouse and no new services. (0) independent

## 2018-02-13 ENCOUNTER — EMERGENCY (EMERGENCY)
Facility: HOSPITAL | Age: 68
LOS: 1 days | Discharge: HOME | End: 2018-02-13
Attending: EMERGENCY MEDICINE
Payer: MEDICARE

## 2018-02-13 VITALS
DIASTOLIC BLOOD PRESSURE: 96 MMHG | OXYGEN SATURATION: 99 % | RESPIRATION RATE: 17 BRPM | HEART RATE: 104 BPM | SYSTOLIC BLOOD PRESSURE: 194 MMHG | TEMPERATURE: 98 F

## 2018-02-13 DIAGNOSIS — Z98.890 OTHER SPECIFIED POSTPROCEDURAL STATES: Chronic | ICD-10-CM

## 2018-02-13 DIAGNOSIS — K90.81 WHIPPLE'S DISEASE: Chronic | ICD-10-CM

## 2018-02-13 DIAGNOSIS — Z98.1 ARTHRODESIS STATUS: Chronic | ICD-10-CM

## 2018-02-13 LAB
ALBUMIN SERPL ELPH-MCNC: 4.3 G/DL — SIGNIFICANT CHANGE UP (ref 3.3–5)
ALP SERPL-CCNC: 142 U/L — HIGH (ref 40–120)
ALT FLD-CCNC: 33 U/L RC — SIGNIFICANT CHANGE UP (ref 10–45)
ANION GAP SERPL CALC-SCNC: 16 MMOL/L — SIGNIFICANT CHANGE UP (ref 5–17)
APTT BLD: 32.6 SEC — SIGNIFICANT CHANGE UP (ref 27.5–37.4)
AST SERPL-CCNC: 56 U/L — HIGH (ref 10–40)
BASOPHILS # BLD AUTO: 0.1 K/UL — SIGNIFICANT CHANGE UP (ref 0–0.2)
BASOPHILS NFR BLD AUTO: 0.9 % — SIGNIFICANT CHANGE UP (ref 0–2)
BILIRUB SERPL-MCNC: 0.2 MG/DL — SIGNIFICANT CHANGE UP (ref 0.2–1.2)
BUN SERPL-MCNC: 12 MG/DL — SIGNIFICANT CHANGE UP (ref 7–23)
CALCIUM SERPL-MCNC: 9.1 MG/DL — SIGNIFICANT CHANGE UP (ref 8.4–10.5)
CHLORIDE SERPL-SCNC: 104 MMOL/L — SIGNIFICANT CHANGE UP (ref 96–108)
CK MB CFR SERPL CALC: 2.2 NG/ML — SIGNIFICANT CHANGE UP (ref 0–6.7)
CO2 SERPL-SCNC: 24 MMOL/L — SIGNIFICANT CHANGE UP (ref 22–31)
CREAT SERPL-MCNC: 1.07 MG/DL — SIGNIFICANT CHANGE UP (ref 0.5–1.3)
EOSINOPHIL # BLD AUTO: 0.2 K/UL — SIGNIFICANT CHANGE UP (ref 0–0.5)
EOSINOPHIL NFR BLD AUTO: 2.9 % — SIGNIFICANT CHANGE UP (ref 0–6)
GAS PNL BLDV: SIGNIFICANT CHANGE UP
GLUCOSE SERPL-MCNC: 99 MG/DL — SIGNIFICANT CHANGE UP (ref 70–99)
HCT VFR BLD CALC: 34.6 % — LOW (ref 39–50)
HGB BLD-MCNC: 12 G/DL — LOW (ref 13–17)
INR BLD: 1.01 RATIO — SIGNIFICANT CHANGE UP (ref 0.88–1.16)
LYMPHOCYTES # BLD AUTO: 2 K/UL — SIGNIFICANT CHANGE UP (ref 1–3.3)
LYMPHOCYTES # BLD AUTO: 32.4 % — SIGNIFICANT CHANGE UP (ref 13–44)
MCHC RBC-ENTMCNC: 32.7 PG — SIGNIFICANT CHANGE UP (ref 27–34)
MCHC RBC-ENTMCNC: 34.6 GM/DL — SIGNIFICANT CHANGE UP (ref 32–36)
MCV RBC AUTO: 94.4 FL — SIGNIFICANT CHANGE UP (ref 80–100)
MONOCYTES # BLD AUTO: 0.6 K/UL — SIGNIFICANT CHANGE UP (ref 0–0.9)
MONOCYTES NFR BLD AUTO: 9.7 % — SIGNIFICANT CHANGE UP (ref 2–14)
NEUTROPHILS # BLD AUTO: 3.3 K/UL — SIGNIFICANT CHANGE UP (ref 1.8–7.4)
NEUTROPHILS NFR BLD AUTO: 54.1 % — SIGNIFICANT CHANGE UP (ref 43–77)
PLATELET # BLD AUTO: 219 K/UL — SIGNIFICANT CHANGE UP (ref 150–400)
POTASSIUM SERPL-MCNC: 4.1 MMOL/L — SIGNIFICANT CHANGE UP (ref 3.5–5.3)
POTASSIUM SERPL-SCNC: 4.1 MMOL/L — SIGNIFICANT CHANGE UP (ref 3.5–5.3)
PROT SERPL-MCNC: 7.7 G/DL — SIGNIFICANT CHANGE UP (ref 6–8.3)
PROTHROM AB SERPL-ACNC: 10.9 SEC — SIGNIFICANT CHANGE UP (ref 9.8–12.7)
RBC # BLD: 3.67 M/UL — LOW (ref 4.2–5.8)
RBC # FLD: 16.2 % — HIGH (ref 10.3–14.5)
SODIUM SERPL-SCNC: 144 MMOL/L — SIGNIFICANT CHANGE UP (ref 135–145)
TROPONIN T SERPL-MCNC: <0.01 NG/ML — SIGNIFICANT CHANGE UP (ref 0–0.06)
WBC # BLD: 6.2 K/UL — SIGNIFICANT CHANGE UP (ref 3.8–10.5)
WBC # FLD AUTO: 6.2 K/UL — SIGNIFICANT CHANGE UP (ref 3.8–10.5)

## 2018-02-13 PROCEDURE — 70486 CT MAXILLOFACIAL W/O DYE: CPT | Mod: 26

## 2018-02-13 PROCEDURE — 73110 X-RAY EXAM OF WRIST: CPT | Mod: 26,LT

## 2018-02-13 PROCEDURE — 70450 CT HEAD/BRAIN W/O DYE: CPT | Mod: 26

## 2018-02-13 PROCEDURE — 73140 X-RAY EXAM OF FINGER(S): CPT | Mod: 26,LT

## 2018-02-13 PROCEDURE — 93010 ELECTROCARDIOGRAM REPORT: CPT

## 2018-02-13 PROCEDURE — 71046 X-RAY EXAM CHEST 2 VIEWS: CPT | Mod: 26

## 2018-02-13 PROCEDURE — 99218: CPT | Mod: 25

## 2018-02-13 RX ORDER — SODIUM CHLORIDE 9 MG/ML
1000 INJECTION INTRAMUSCULAR; INTRAVENOUS; SUBCUTANEOUS ONCE
Qty: 0 | Refills: 0 | Status: COMPLETED | OUTPATIENT
Start: 2018-02-13 | End: 2018-02-13

## 2018-02-13 RX ORDER — SODIUM CHLORIDE 9 MG/ML
1000 INJECTION INTRAMUSCULAR; INTRAVENOUS; SUBCUTANEOUS
Qty: 0 | Refills: 0 | Status: DISCONTINUED | OUTPATIENT
Start: 2018-02-13 | End: 2018-02-17

## 2018-02-13 RX ADMIN — SODIUM CHLORIDE 4000 MILLILITER(S): 9 INJECTION INTRAMUSCULAR; INTRAVENOUS; SUBCUTANEOUS at 22:46

## 2018-02-13 NOTE — ED PROVIDER NOTE - PHYSICAL EXAMINATION
Gen: NAD, AOx3  Head: NCAT  HEENT: PERRL, oral mucosa moist, normal conjunctiva, no pain with EOM no signs of entrapment  Lung: CTAB, no respiratory distress  CV: rrr, no murmurs, Normal perfusion  Abd: soft, NTND, no CVA tenderness  MSK: No edema, bruising and shallow laceration to bridge of nose, no septal hematoma, swelling and bruising to ulnar aspect of left wrist, no snuffbox tenderness bilaterally, soft compartments, all other extremity joints nontender with full ROM; chest wall nontender; pelvis stable nontender; entire spine without midline tenderness and with full ROM, no stepoffs or masses   Neuro: No focal neurologic deficits, CN intact, motor and sensation intact, no cerebellar signs   Skin: No rash   Psych: normal affect Gen: NAD, AOx3  Head: NCAT  HEENT: PERRL, oral mucosa moist, normal conjunctiva, no pain with EOM no signs of entrapment  Lung: CTAB, no respiratory distress  CV: rrr, no murmurs, Normal perfusion  Abd: soft, NTND, no CVA tenderness  MSK: No edema, bruising and shallow laceration to bridge of nose, no nasal septal hematoma, swelling and bruising to ulnar aspect of left wrist, no snuffbox tenderness bilaterally, soft compartments, all other extremity joints nontender with full ROM; chest wall nontender; pelvis stable nontender; entire spine without midline tenderness and with full ROM, no stepoffs or masses   Neuro: No focal neurologic deficits, CN intact, motor and sensation intact, no cerebellar signs   Skin: No rash   Psych: normal affect

## 2018-02-13 NOTE — ED PROVIDER NOTE - ATTENDING CONTRIBUTION TO CARE
I have seen and evaluated this patient with the resident.   I agree with the findings  unless other wise stated.  I have made appropriate changes in documentations where needed, After my face to face bedside evaluation, I am further  noting: Pt with whipple surgery for cholangio carcinoma on ACE inhibitors for HTN episodes of syncope facial trauma no sz non focal neuro exam cardiac exam normal will do CDU obs  --[place]

## 2018-02-13 NOTE — ED PROVIDER NOTE - OBJECTIVE STATEMENT
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 Was sleeping on the couch, got up to move glasses off a table, felt lightheaded and became unconscious, witnessed by wife, fell forward and hit face, was unconscious momentarily, had epistaxis R>L, no anticoagulants. No n/v or confusion. Has pain in left wrist, mild headache, nose,  Has had three episodes of syncope in the past, has been worked up with negative results. tetanus last year. Last echo last month, normal. Chemo ended 1/15, no radiation.    PMD: Homero Melgar  Card: Casey Kerr  Surg: Pravin Medrano  GI: Rolanda  ROS: Denies fever, palpitations, chills, recent sickness, vision changes, cough, SOB, chest pain, abdominal pain, n/v/d/c, dysuria, hematuria, rash, new joint aches, sick contacts, and recent travel.

## 2018-02-13 NOTE — ED ADULT NURSE NOTE - OBJECTIVE STATEMENT
67 y.o male w. PMH of pancreatic lesion, whipples's disease, GERD, HTN came to the ER w. c/o witnessed syncopal episode x 2 hrs ago. Wife states + LOC and hitting head, "was out for a few seconds." Pt states after surgery for whipples 04/2017, pt has had 3 syncopal episodes since. Denies CP, SOB, n/v, fever, chills, h/a, dizziness, weakness. Pt is axox3, PERRLA, lungs CTA, +bs abdomen soft non-distended, ambulates w. steady gait, abrasions and ecchymosis noted to nose and left wrist, no active bleeding noted. Safety and comfort mantained, no acute distress noted at this time.

## 2018-02-14 VITALS
TEMPERATURE: 98 F | RESPIRATION RATE: 16 BRPM | OXYGEN SATURATION: 97 % | HEART RATE: 86 BPM | SYSTOLIC BLOOD PRESSURE: 136 MMHG | DIASTOLIC BLOOD PRESSURE: 84 MMHG

## 2018-02-14 LAB
APPEARANCE UR: CLEAR — SIGNIFICANT CHANGE UP
BILIRUB UR-MCNC: NEGATIVE — SIGNIFICANT CHANGE UP
COLOR SPEC: COLORLESS — SIGNIFICANT CHANGE UP
CULTURE RESULTS: NO GROWTH — SIGNIFICANT CHANGE UP
DIFF PNL FLD: ABNORMAL
GLUCOSE UR QL: NEGATIVE — SIGNIFICANT CHANGE UP
KETONES UR-MCNC: NEGATIVE — SIGNIFICANT CHANGE UP
LEUKOCYTE ESTERASE UR-ACNC: NEGATIVE — SIGNIFICANT CHANGE UP
NITRITE UR-MCNC: NEGATIVE — SIGNIFICANT CHANGE UP
PH UR: 6 — SIGNIFICANT CHANGE UP (ref 5–8)
PROT UR-MCNC: NEGATIVE — SIGNIFICANT CHANGE UP
RBC CASTS # UR COMP ASSIST: ABNORMAL /HPF (ref 0–2)
SP GR SPEC: <1.005 — LOW (ref 1.01–1.02)
SPECIMEN SOURCE: SIGNIFICANT CHANGE UP
TROPONIN T SERPL-MCNC: <0.01 NG/ML — SIGNIFICANT CHANGE UP (ref 0–0.06)
UROBILINOGEN FLD QL: NEGATIVE — SIGNIFICANT CHANGE UP
WBC UR QL: SIGNIFICANT CHANGE UP /HPF (ref 0–5)

## 2018-02-14 PROCEDURE — 70486 CT MAXILLOFACIAL W/O DYE: CPT

## 2018-02-14 PROCEDURE — 70450 CT HEAD/BRAIN W/O DYE: CPT

## 2018-02-14 PROCEDURE — 82553 CREATINE MB FRACTION: CPT

## 2018-02-14 PROCEDURE — 81001 URINALYSIS AUTO W/SCOPE: CPT

## 2018-02-14 PROCEDURE — 82330 ASSAY OF CALCIUM: CPT

## 2018-02-14 PROCEDURE — 99284 EMERGENCY DEPT VISIT MOD MDM: CPT | Mod: 25

## 2018-02-14 PROCEDURE — 93306 TTE W/DOPPLER COMPLETE: CPT

## 2018-02-14 PROCEDURE — 71046 X-RAY EXAM CHEST 2 VIEWS: CPT

## 2018-02-14 PROCEDURE — 85027 COMPLETE CBC AUTOMATED: CPT

## 2018-02-14 PROCEDURE — 73140 X-RAY EXAM OF FINGER(S): CPT

## 2018-02-14 PROCEDURE — 87086 URINE CULTURE/COLONY COUNT: CPT

## 2018-02-14 PROCEDURE — 85730 THROMBOPLASTIN TIME PARTIAL: CPT

## 2018-02-14 PROCEDURE — 84295 ASSAY OF SERUM SODIUM: CPT

## 2018-02-14 PROCEDURE — 82962 GLUCOSE BLOOD TEST: CPT

## 2018-02-14 PROCEDURE — 73110 X-RAY EXAM OF WRIST: CPT

## 2018-02-14 PROCEDURE — 99217: CPT | Mod: 25

## 2018-02-14 PROCEDURE — 85610 PROTHROMBIN TIME: CPT

## 2018-02-14 PROCEDURE — 82947 ASSAY GLUCOSE BLOOD QUANT: CPT

## 2018-02-14 PROCEDURE — 80053 COMPREHEN METABOLIC PANEL: CPT

## 2018-02-14 PROCEDURE — 85014 HEMATOCRIT: CPT

## 2018-02-14 PROCEDURE — 82435 ASSAY OF BLOOD CHLORIDE: CPT

## 2018-02-14 PROCEDURE — 84484 ASSAY OF TROPONIN QUANT: CPT

## 2018-02-14 PROCEDURE — 82803 BLOOD GASES ANY COMBINATION: CPT

## 2018-02-14 PROCEDURE — 93306 TTE W/DOPPLER COMPLETE: CPT | Mod: 26

## 2018-02-14 PROCEDURE — G0378: CPT

## 2018-02-14 PROCEDURE — 93005 ELECTROCARDIOGRAM TRACING: CPT

## 2018-02-14 PROCEDURE — 83605 ASSAY OF LACTIC ACID: CPT

## 2018-02-14 PROCEDURE — 84132 ASSAY OF SERUM POTASSIUM: CPT

## 2018-02-14 RX ORDER — ACETAMINOPHEN 500 MG
975 TABLET ORAL ONCE
Qty: 0 | Refills: 0 | Status: COMPLETED | OUTPATIENT
Start: 2018-02-14 | End: 2018-02-14

## 2018-02-14 RX ORDER — FAMOTIDINE 10 MG/ML
20 INJECTION INTRAVENOUS
Qty: 0 | Refills: 0 | Status: DISCONTINUED | OUTPATIENT
Start: 2018-02-14 | End: 2018-02-17

## 2018-02-14 RX ORDER — AMITRIPTYLINE HCL 25 MG
25 TABLET ORAL AT BEDTIME
Qty: 0 | Refills: 0 | Status: DISCONTINUED | OUTPATIENT
Start: 2018-02-14 | End: 2018-02-17

## 2018-02-14 RX ORDER — DOXAZOSIN MESYLATE 4 MG
1 TABLET ORAL AT BEDTIME
Qty: 0 | Refills: 0 | Status: DISCONTINUED | OUTPATIENT
Start: 2018-02-14 | End: 2018-02-17

## 2018-02-14 RX ORDER — SODIUM CHLORIDE 9 MG/ML
3 INJECTION INTRAMUSCULAR; INTRAVENOUS; SUBCUTANEOUS EVERY 8 HOURS
Qty: 0 | Refills: 0 | Status: DISCONTINUED | OUTPATIENT
Start: 2018-02-14 | End: 2018-02-17

## 2018-02-14 RX ADMIN — Medication 25 MILLIGRAM(S): at 01:15

## 2018-02-14 RX ADMIN — Medication 975 MILLIGRAM(S): at 01:45

## 2018-02-14 RX ADMIN — Medication 10 MILLIGRAM(S): at 01:15

## 2018-02-14 RX ADMIN — Medication 975 MILLIGRAM(S): at 01:15

## 2018-02-14 RX ADMIN — SODIUM CHLORIDE 3 MILLILITER(S): 9 INJECTION INTRAMUSCULAR; INTRAVENOUS; SUBCUTANEOUS at 07:05

## 2018-02-14 RX ADMIN — Medication 1 MILLIGRAM(S): at 01:15

## 2018-02-14 RX ADMIN — SODIUM CHLORIDE 100 MILLILITER(S): 9 INJECTION INTRAMUSCULAR; INTRAVENOUS; SUBCUTANEOUS at 01:15

## 2018-02-14 RX ADMIN — FAMOTIDINE 20 MILLIGRAM(S): 10 INJECTION INTRAVENOUS at 01:16

## 2018-02-14 NOTE — ED CDU PROVIDER DISPOSITION NOTE - PLAN OF CARE
1.  Stay Hydrated  2. Continue taking all current home medications  3.  Please follow up with your Primary care provider in 1-2 (Please bring all of your results with you)       Follow up with your cardiologist, Dr. Kerr, upon discharge  4.  Return to the ER for worsening Chest Pain, Shortness of breath or any other concerning symptoms. 1. Stay Hydrated.  2. Continue taking all current home medications.  3.  Please follow up with your Primary care provider and Cardiologist, Dr. Kerr, in 1-2 (Please bring all of your results with you)       Follow up with your cardiologist, Dr. Kerr, upon discharge  4.  Return to the ER for new or worsening Chest Pain, Shortness of breath, passing out, or any other concerning symptoms.

## 2018-02-14 NOTE — ED CDU PROVIDER INITIAL DAY NOTE - PHYSICAL EXAMINATION
Gen: AAO x 3, NAD  Skin: No rashes or lesions  HEENT: +swelling and ecchymosis to the bridge of the nose.  Dry blood BL nares without septal hematoma, PERRLA, EOMI, MMM  Resp: unlabored CTAB  Cardiac: rrr s1s2, no murmurs, rubs or gallops  GI: ND, +BS, Soft, NT  Ext: no pedal edema, FROM in all extremities  MSK: no midline cspine TTP.  left wrist with mild swelling and TTP over ulna styloid. left 5th digit with minimal swelling and +small abrasion lateral 5th digit.  No snuff box ttp BL.  compartments soft.  Neuro: no focal deficits

## 2018-02-14 NOTE — ED CDU PROVIDER INITIAL DAY NOTE - OBJECTIVE STATEMENT
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 Was sleeping on the couch, got up to move glasses off a table, felt lightheaded and became unconscious, witnessed by wife, fell forward and hit face, was unconscious momentarily, had epistaxis R>L, no anticoagulants. No n/v or confusion. Has pain in left wrist, mild headache, nose,  Has had three episodes of syncope in the past, has been worked up with negative results. tetanus last year. Last echo last month, normal. Chemo ended 1/15, no radiation.  PMD: Homero Melgar  Card: Casey Kerr  Surg: Pravin Medrano  GI: Rolanda    In the ED VSS.  Labs unremarkable.  CT Head/maxillofacial negative for acute pathology.  Patient reports that he currently feels well.  Multiple episodes of syncope in the past with similar presentation.  Syncope work up with holter and echo negative in the past.  Last TTE (one month ago) performed after chemo negative per patient.

## 2018-02-14 NOTE — PATIENT PROFILE ADULT. - CURRENT SWALLOWING
Clear bilaterally, pupils equal, round and reactive to light. (0) swallows foods and liquids w/o difficulty

## 2018-02-14 NOTE — ED CDU PROVIDER INITIAL DAY NOTE - DETAILS
vital signs q4h, monitor on tele, trend cardiac enzymes and EKG, frequent re-evaluations  case d/w Dr. Woodruff

## 2018-02-14 NOTE — ED CDU PROVIDER INITIAL DAY NOTE - MEDICAL DECISION MAKING DETAILS
Pt with whipple surgery for cholangio carcinoma on ACE inhibitors for HTN episodes of syncope facial trauma no sz non focal neuro exam cardiac exam normal will do CDU obs  --Woodruff

## 2018-02-14 NOTE — ED CDU PROVIDER SUBSEQUENT DAY NOTE - PROGRESS NOTE DETAILS
Patient seen and evaluated at bedside, resting in bed comfortably in NAD. Reports pain is controlled at this time. Denies any CP/palpitations prior to syncope or now, denies epistaxis, vision changes, headache. VSS. No events on telemetry monitor. Spoke with patient's cardiologist, Dr. Casey Kerr, regarding previous TTE results 1/9/18 with unchanged EF 60%, mild mitral and tricuspid regurg, normal wall motion. Stress test in 2016 nonischemic. Would like patient to stay for TTE today. Attn - pt only c/o pain in left wrist and face.  NO: h/a, dizzy, slowed mentation.  Nuc Stress unremarkable. TTE unremarkable. Stable for d/c to f/u with PCP and cardiologist. D/w Dr. Condon.

## 2018-02-14 NOTE — ED CDU PROVIDER DISPOSITION NOTE - CLINICAL COURSE
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 Was sleeping on the couch, got up to move glasses off a table, felt lightheaded and became unconscious, witnessed by wife, fell forward and hit face, was unconscious momentarily, had epistaxis R>L, no anticoagulants. No n/v or confusion. Has pain in left wrist, mild headache, nose,  Has had three episodes of syncope in the past, has been worked up with negative results. tetanus last year. Last echo last month, normal. Chemo ended 1/15, no radiation.    In the ED VSS.  Labs unremarkable.  CT Head/maxillofacial negative for acute pathology.  Patient reports that he currently feels well.  Multiple episodes of syncope in the past with similar presentation.  Syncope work up with holter and echo negative in the past.  Last TTE (one month ago) performed after chemo negative per patient.  Patient placed in the CDU overnight.  Troponins trended and negative x 2.  EKG unchanged.  No cardiac events noted on tele.  TTE.................................. 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 Was sleeping on the couch, got up to move glasses off a table, felt lightheaded and became unconscious, witnessed by wife, fell forward and hit face, was unconscious momentarily, had epistaxis R>L, no anticoagulants. No n/v or confusion. Has pain in left wrist, mild headache, nose,  Has had three episodes of syncope in the past, has been worked up with negative results. tetanus last year. Last echo last month, normal. Chemo ended 1/15, no radiation.    In the ED VSS.  Labs unremarkable.  CT Head/maxillofacial negative for acute pathology.  Patient reports that he currently feels well.  Multiple episodes of syncope in the past with similar presentation.  Syncope work up with holter and echo negative in the past.  Last TTE (one month ago) performed after chemo negative per patient.  Patient placed in the CDU overnight.  Troponins trended and negative x 2.  EKG unchanged.  No cardiac events noted on tele.  TTE unremarkable. Stable for d/c to f/u with PCP and cardiology for further evaluation.

## 2018-02-14 NOTE — ED ADULT NURSE REASSESSMENT NOTE - NS ED NURSE REASSESS COMMENT FT1
Pt received from KEKE Verdugo. Pt oriented to CDU & plan of care was discussed. Pt denies any chest pain, SOB, dizziness or palpitations. Pt denies any lightheadedness at the moment. Pt endorses 3/10 headache. Medicated as per MAR. Safety & comfort measures maintained. Call bell in reach. Will continue to monitor.

## 2018-02-14 NOTE — ED CDU PROVIDER SUBSEQUENT DAY NOTE - HISTORY
Patient seen at bedside in NAD.  VSS.  Patient resting comfortably.  Sleeping.  No cardiac events noted on tele. No interval change from initial cdu provider note.  -Javier Youssef PA-C

## 2018-02-22 ENCOUNTER — OUTPATIENT (OUTPATIENT)
Dept: OUTPATIENT SERVICES | Facility: HOSPITAL | Age: 68
LOS: 1 days | Discharge: HOME | End: 2018-02-22

## 2018-02-22 DIAGNOSIS — C24.9 MALIGNANT NEOPLASM OF BILIARY TRACT, UNSPECIFIED: ICD-10-CM

## 2018-02-22 DIAGNOSIS — Z98.890 OTHER SPECIFIED POSTPROCEDURAL STATES: Chronic | ICD-10-CM

## 2018-02-22 DIAGNOSIS — K90.81 WHIPPLE'S DISEASE: Chronic | ICD-10-CM

## 2018-02-22 DIAGNOSIS — Z98.1 ARTHRODESIS STATUS: Chronic | ICD-10-CM

## 2018-02-27 ENCOUNTER — RESULT REVIEW (OUTPATIENT)
Age: 68
End: 2018-02-27

## 2018-02-27 ENCOUNTER — LABORATORY RESULT (OUTPATIENT)
Age: 68
End: 2018-02-27

## 2018-02-27 ENCOUNTER — APPOINTMENT (OUTPATIENT)
Dept: GASTROENTEROLOGY | Facility: CLINIC | Age: 68
End: 2018-02-27
Payer: MEDICARE

## 2018-02-27 ENCOUNTER — APPOINTMENT (OUTPATIENT)
Dept: INFUSION THERAPY | Facility: HOSPITAL | Age: 68
End: 2018-02-27

## 2018-02-27 VITALS
HEIGHT: 70 IN | SYSTOLIC BLOOD PRESSURE: 128 MMHG | OXYGEN SATURATION: 98 % | BODY MASS INDEX: 23.62 KG/M2 | RESPIRATION RATE: 16 BRPM | WEIGHT: 165 LBS | DIASTOLIC BLOOD PRESSURE: 78 MMHG | HEART RATE: 89 BPM

## 2018-02-27 DIAGNOSIS — K83.8 OTHER SPECIFIED DISEASES OF BILIARY TRACT: ICD-10-CM

## 2018-02-27 DIAGNOSIS — R14.0 ABDOMINAL DISTENSION (GASEOUS): ICD-10-CM

## 2018-02-27 LAB
HCT VFR BLD CALC: 33.6 % — LOW (ref 39–50)
HGB BLD-MCNC: 10.9 G/DL — LOW (ref 13–17)
MCHC RBC-ENTMCNC: 29.7 PG — SIGNIFICANT CHANGE UP (ref 27–34)
MCHC RBC-ENTMCNC: 32.3 G/DL — SIGNIFICANT CHANGE UP (ref 32–36)
MCV RBC AUTO: 91.9 FL — SIGNIFICANT CHANGE UP (ref 80–100)
PLATELET # BLD AUTO: 234 K/UL — SIGNIFICANT CHANGE UP (ref 150–400)
RBC # BLD: 3.65 M/UL — LOW (ref 4.2–5.8)
RBC # FLD: 15.7 % — HIGH (ref 10.3–14.5)
WBC # BLD: 4.6 K/UL — SIGNIFICANT CHANGE UP (ref 3.8–10.5)
WBC # FLD AUTO: 4.6 K/UL — SIGNIFICANT CHANGE UP (ref 3.8–10.5)

## 2018-02-27 PROCEDURE — 99214 OFFICE O/P EST MOD 30 MIN: CPT

## 2018-03-20 ENCOUNTER — TRANSCRIPTION ENCOUNTER (OUTPATIENT)
Age: 68
End: 2018-03-20

## 2018-03-27 ENCOUNTER — APPOINTMENT (OUTPATIENT)
Dept: SURGERY | Facility: CLINIC | Age: 68
End: 2018-03-27
Payer: MEDICARE

## 2018-03-27 PROCEDURE — 99212 OFFICE O/P EST SF 10 MIN: CPT

## 2018-04-17 ENCOUNTER — OUTPATIENT (OUTPATIENT)
Dept: OUTPATIENT SERVICES | Facility: HOSPITAL | Age: 68
LOS: 1 days | Discharge: HOME | End: 2018-04-17

## 2018-04-17 DIAGNOSIS — Z98.890 OTHER SPECIFIED POSTPROCEDURAL STATES: Chronic | ICD-10-CM

## 2018-04-17 DIAGNOSIS — Z98.1 ARTHRODESIS STATUS: Chronic | ICD-10-CM

## 2018-04-17 DIAGNOSIS — K90.81 WHIPPLE'S DISEASE: Chronic | ICD-10-CM

## 2018-04-17 DIAGNOSIS — C24.9 MALIGNANT NEOPLASM OF BILIARY TRACT, UNSPECIFIED: ICD-10-CM

## 2018-04-20 ENCOUNTER — RESULT REVIEW (OUTPATIENT)
Age: 68
End: 2018-04-20

## 2018-04-20 ENCOUNTER — LABORATORY RESULT (OUTPATIENT)
Age: 68
End: 2018-04-20

## 2018-04-20 ENCOUNTER — APPOINTMENT (OUTPATIENT)
Dept: INFUSION THERAPY | Facility: HOSPITAL | Age: 68
End: 2018-04-20

## 2018-04-20 LAB
HCT VFR BLD CALC: 35.4 % — LOW (ref 39–50)
HGB BLD-MCNC: 11.9 G/DL — LOW (ref 13–17)
MCHC RBC-ENTMCNC: 28.9 PG — SIGNIFICANT CHANGE UP (ref 27–34)
MCHC RBC-ENTMCNC: 33.5 G/DL — SIGNIFICANT CHANGE UP (ref 32–36)
MCV RBC AUTO: 86.2 FL — SIGNIFICANT CHANGE UP (ref 80–100)
PLATELET # BLD AUTO: 169 K/UL — SIGNIFICANT CHANGE UP (ref 150–400)
RBC # BLD: 4.11 M/UL — LOW (ref 4.2–5.8)
RBC # FLD: 13.6 % — SIGNIFICANT CHANGE UP (ref 10.3–14.5)
WBC # BLD: 4.2 K/UL — SIGNIFICANT CHANGE UP (ref 3.8–10.5)
WBC # FLD AUTO: 4.2 K/UL — SIGNIFICANT CHANGE UP (ref 3.8–10.5)

## 2018-04-27 ENCOUNTER — APPOINTMENT (OUTPATIENT)
Dept: HEMATOLOGY ONCOLOGY | Facility: CLINIC | Age: 68
End: 2018-04-27
Payer: MEDICARE

## 2018-04-27 VITALS
WEIGHT: 169.76 LBS | RESPIRATION RATE: 16 BRPM | DIASTOLIC BLOOD PRESSURE: 72 MMHG | SYSTOLIC BLOOD PRESSURE: 130 MMHG | TEMPERATURE: 98 F | BODY MASS INDEX: 24.36 KG/M2 | HEART RATE: 98 BPM | OXYGEN SATURATION: 100 %

## 2018-04-27 DIAGNOSIS — N40.0 BENIGN PROSTATIC HYPERPLASIA WITHOUT LOWER URINARY TRACT SYMPMS: ICD-10-CM

## 2018-04-27 PROCEDURE — 99215 OFFICE O/P EST HI 40 MIN: CPT

## 2018-05-07 ENCOUNTER — APPOINTMENT (OUTPATIENT)
Dept: GASTROENTEROLOGY | Facility: CLINIC | Age: 68
End: 2018-05-07
Payer: MEDICARE

## 2018-05-07 VITALS
DIASTOLIC BLOOD PRESSURE: 70 MMHG | HEIGHT: 70 IN | BODY MASS INDEX: 23.77 KG/M2 | OXYGEN SATURATION: 99 % | HEART RATE: 73 BPM | SYSTOLIC BLOOD PRESSURE: 130 MMHG | WEIGHT: 166 LBS

## 2018-05-07 DIAGNOSIS — R10.13 EPIGASTRIC PAIN: ICD-10-CM

## 2018-05-07 PROCEDURE — 99214 OFFICE O/P EST MOD 30 MIN: CPT

## 2018-05-29 ENCOUNTER — OUTPATIENT (OUTPATIENT)
Dept: OUTPATIENT SERVICES | Facility: HOSPITAL | Age: 68
LOS: 1 days | Discharge: HOME | End: 2018-05-29

## 2018-05-29 DIAGNOSIS — C24.9 MALIGNANT NEOPLASM OF BILIARY TRACT, UNSPECIFIED: ICD-10-CM

## 2018-05-29 DIAGNOSIS — Z98.890 OTHER SPECIFIED POSTPROCEDURAL STATES: Chronic | ICD-10-CM

## 2018-05-29 DIAGNOSIS — Z98.1 ARTHRODESIS STATUS: Chronic | ICD-10-CM

## 2018-05-29 DIAGNOSIS — K90.81 WHIPPLE'S DISEASE: Chronic | ICD-10-CM

## 2018-05-31 RX ORDER — AMITRIPTYLINE HYDROCHLORIDE 25 MG/1
25 TABLET, FILM COATED ORAL
Qty: 30 | Refills: 0 | Status: DISCONTINUED | COMMUNITY
Start: 2017-02-11 | End: 2018-04-27

## 2018-06-02 PROBLEM — N40.0 BPH (BENIGN PROSTATIC HYPERPLASIA): Status: ACTIVE | Noted: 2017-07-20

## 2018-06-04 ENCOUNTER — APPOINTMENT (OUTPATIENT)
Dept: HEPATOLOGY | Facility: CLINIC | Age: 68
End: 2018-06-04
Payer: MEDICARE

## 2018-06-04 ENCOUNTER — LABORATORY RESULT (OUTPATIENT)
Age: 68
End: 2018-06-04

## 2018-06-04 ENCOUNTER — RESULT REVIEW (OUTPATIENT)
Age: 68
End: 2018-06-04

## 2018-06-04 ENCOUNTER — APPOINTMENT (OUTPATIENT)
Dept: INFUSION THERAPY | Facility: HOSPITAL | Age: 68
End: 2018-06-04

## 2018-06-04 VITALS
HEART RATE: 85 BPM | HEIGHT: 70 IN | TEMPERATURE: 98 F | RESPIRATION RATE: 16 BRPM | WEIGHT: 164 LBS | BODY MASS INDEX: 23.48 KG/M2 | SYSTOLIC BLOOD PRESSURE: 153 MMHG | DIASTOLIC BLOOD PRESSURE: 99 MMHG

## 2018-06-04 LAB
HCT VFR BLD CALC: 34.3 % — LOW (ref 39–50)
HGB BLD-MCNC: 11.6 G/DL — LOW (ref 13–17)
MCHC RBC-ENTMCNC: 27.5 PG — SIGNIFICANT CHANGE UP (ref 27–34)
MCHC RBC-ENTMCNC: 33.8 G/DL — SIGNIFICANT CHANGE UP (ref 32–36)
MCV RBC AUTO: 81.4 FL — SIGNIFICANT CHANGE UP (ref 80–100)
PLATELET # BLD AUTO: 201 K/UL — SIGNIFICANT CHANGE UP (ref 150–400)
RBC # BLD: 4.21 M/UL — SIGNIFICANT CHANGE UP (ref 4.2–5.8)
RBC # FLD: 14.3 % — SIGNIFICANT CHANGE UP (ref 10.3–14.5)
WBC # BLD: 6.6 K/UL — SIGNIFICANT CHANGE UP (ref 3.8–10.5)
WBC # FLD AUTO: 6.6 K/UL — SIGNIFICANT CHANGE UP (ref 3.8–10.5)

## 2018-06-04 PROCEDURE — 99214 OFFICE O/P EST MOD 30 MIN: CPT

## 2018-06-04 RX ORDER — RANITIDINE 150 MG/1
150 TABLET ORAL
Qty: 30 | Refills: 5 | Status: DISCONTINUED | COMMUNITY
Start: 2017-11-21 | End: 2018-06-04

## 2018-06-04 RX ORDER — RANITIDINE 150 MG/1
150 TABLET ORAL
Qty: 60 | Refills: 5 | Status: DISCONTINUED | COMMUNITY
Start: 2017-09-19 | End: 2018-06-04

## 2018-06-05 ENCOUNTER — FORM ENCOUNTER (OUTPATIENT)
Age: 68
End: 2018-06-05

## 2018-06-06 ENCOUNTER — OUTPATIENT (OUTPATIENT)
Dept: OUTPATIENT SERVICES | Facility: HOSPITAL | Age: 68
LOS: 1 days | End: 2018-06-06
Payer: MEDICARE

## 2018-06-06 ENCOUNTER — OTHER (OUTPATIENT)
Age: 68
End: 2018-06-06

## 2018-06-06 ENCOUNTER — APPOINTMENT (OUTPATIENT)
Dept: MRI IMAGING | Facility: CLINIC | Age: 68
End: 2018-06-06
Payer: MEDICARE

## 2018-06-06 DIAGNOSIS — K90.81 WHIPPLE'S DISEASE: Chronic | ICD-10-CM

## 2018-06-06 DIAGNOSIS — Z98.890 OTHER SPECIFIED POSTPROCEDURAL STATES: Chronic | ICD-10-CM

## 2018-06-06 DIAGNOSIS — Z98.1 ARTHRODESIS STATUS: Chronic | ICD-10-CM

## 2018-06-06 DIAGNOSIS — Z00.8 ENCOUNTER FOR OTHER GENERAL EXAMINATION: ICD-10-CM

## 2018-06-06 PROCEDURE — 74183 MRI ABD W/O CNTR FLWD CNTR: CPT

## 2018-06-06 PROCEDURE — A9581: CPT

## 2018-06-06 PROCEDURE — 74183 MRI ABD W/O CNTR FLWD CNTR: CPT | Mod: 26

## 2018-06-11 ENCOUNTER — APPOINTMENT (OUTPATIENT)
Dept: HEMATOLOGY ONCOLOGY | Facility: CLINIC | Age: 68
End: 2018-06-11

## 2018-06-13 ENCOUNTER — APPOINTMENT (OUTPATIENT)
Dept: GASTROENTEROLOGY | Facility: HOSPITAL | Age: 68
End: 2018-06-13

## 2018-06-13 ENCOUNTER — OUTPATIENT (OUTPATIENT)
Dept: OUTPATIENT SERVICES | Facility: HOSPITAL | Age: 68
LOS: 1 days | End: 2018-06-13
Payer: MEDICARE

## 2018-06-13 DIAGNOSIS — Z98.1 ARTHRODESIS STATUS: Chronic | ICD-10-CM

## 2018-06-13 DIAGNOSIS — Z98.890 OTHER SPECIFIED POSTPROCEDURAL STATES: Chronic | ICD-10-CM

## 2018-06-13 DIAGNOSIS — R10.13 EPIGASTRIC PAIN: ICD-10-CM

## 2018-06-13 DIAGNOSIS — K90.81 WHIPPLE'S DISEASE: Chronic | ICD-10-CM

## 2018-06-13 DIAGNOSIS — K21.9 GASTRO-ESOPHAGEAL REFLUX DISEASE WITHOUT ESOPHAGITIS: ICD-10-CM

## 2018-06-13 PROCEDURE — 43235 EGD DIAGNOSTIC BRUSH WASH: CPT | Mod: 52

## 2018-06-13 PROCEDURE — 43239 EGD BIOPSY SINGLE/MULTIPLE: CPT | Mod: 74

## 2018-06-15 ENCOUNTER — RX RENEWAL (OUTPATIENT)
Age: 68
End: 2018-06-15

## 2018-06-20 ENCOUNTER — APPOINTMENT (OUTPATIENT)
Dept: NUCLEAR MEDICINE | Facility: HOSPITAL | Age: 68
End: 2018-06-20

## 2018-06-25 ENCOUNTER — APPOINTMENT (OUTPATIENT)
Dept: ELECTROPHYSIOLOGY | Facility: CLINIC | Age: 68
End: 2018-06-25
Payer: MEDICARE

## 2018-06-25 ENCOUNTER — NON-APPOINTMENT (OUTPATIENT)
Age: 68
End: 2018-06-25

## 2018-06-25 VITALS
BODY MASS INDEX: 22.9 KG/M2 | SYSTOLIC BLOOD PRESSURE: 158 MMHG | WEIGHT: 160 LBS | DIASTOLIC BLOOD PRESSURE: 84 MMHG | HEART RATE: 69 BPM | HEIGHT: 70 IN | OXYGEN SATURATION: 100 %

## 2018-06-25 PROCEDURE — 99205 OFFICE O/P NEW HI 60 MIN: CPT

## 2018-06-25 PROCEDURE — 93000 ELECTROCARDIOGRAM COMPLETE: CPT

## 2018-06-27 ENCOUNTER — FORM ENCOUNTER (OUTPATIENT)
Age: 68
End: 2018-06-27

## 2018-06-28 ENCOUNTER — APPOINTMENT (OUTPATIENT)
Dept: NUCLEAR MEDICINE | Facility: HOSPITAL | Age: 68
End: 2018-06-28
Payer: MEDICARE

## 2018-06-28 ENCOUNTER — OUTPATIENT (OUTPATIENT)
Dept: OUTPATIENT SERVICES | Facility: HOSPITAL | Age: 68
LOS: 1 days | End: 2018-06-28

## 2018-06-28 DIAGNOSIS — Z98.1 ARTHRODESIS STATUS: Chronic | ICD-10-CM

## 2018-06-28 DIAGNOSIS — Z98.890 OTHER SPECIFIED POSTPROCEDURAL STATES: Chronic | ICD-10-CM

## 2018-06-28 DIAGNOSIS — K90.81 WHIPPLE'S DISEASE: Chronic | ICD-10-CM

## 2018-06-28 DIAGNOSIS — K92.89 OTHER SPECIFIED DISEASES OF THE DIGESTIVE SYSTEM: ICD-10-CM

## 2018-06-28 PROCEDURE — 78264 GASTRIC EMPTYING IMG STUDY: CPT | Mod: 26

## 2018-07-03 ENCOUNTER — RESULT REVIEW (OUTPATIENT)
Age: 68
End: 2018-07-03

## 2018-07-12 ENCOUNTER — OUTPATIENT (OUTPATIENT)
Dept: OUTPATIENT SERVICES | Facility: HOSPITAL | Age: 68
LOS: 1 days | Discharge: HOME | End: 2018-07-12

## 2018-07-12 DIAGNOSIS — Z98.890 OTHER SPECIFIED POSTPROCEDURAL STATES: Chronic | ICD-10-CM

## 2018-07-12 DIAGNOSIS — Z98.1 ARTHRODESIS STATUS: Chronic | ICD-10-CM

## 2018-07-12 DIAGNOSIS — K90.81 WHIPPLE'S DISEASE: Chronic | ICD-10-CM

## 2018-07-12 DIAGNOSIS — C24.9 MALIGNANT NEOPLASM OF BILIARY TRACT, UNSPECIFIED: ICD-10-CM

## 2018-07-17 ENCOUNTER — APPOINTMENT (OUTPATIENT)
Dept: INFUSION THERAPY | Facility: HOSPITAL | Age: 68
End: 2018-07-17

## 2018-07-17 ENCOUNTER — APPOINTMENT (OUTPATIENT)
Dept: GASTROENTEROLOGY | Facility: CLINIC | Age: 68
End: 2018-07-17
Payer: MEDICARE

## 2018-07-17 ENCOUNTER — RESULT REVIEW (OUTPATIENT)
Age: 68
End: 2018-07-17

## 2018-07-17 ENCOUNTER — LABORATORY RESULT (OUTPATIENT)
Age: 68
End: 2018-07-17

## 2018-07-17 VITALS
HEART RATE: 78 BPM | TEMPERATURE: 96.9 F | HEIGHT: 70 IN | DIASTOLIC BLOOD PRESSURE: 64 MMHG | WEIGHT: 162 LBS | RESPIRATION RATE: 14 BRPM | OXYGEN SATURATION: 99 % | SYSTOLIC BLOOD PRESSURE: 120 MMHG | BODY MASS INDEX: 23.19 KG/M2

## 2018-07-17 PROBLEM — E78.4 OTHER HYPERLIPIDEMIA: Chronic | Status: ACTIVE | Noted: 2017-02-18

## 2018-07-17 PROBLEM — K86.9 DISEASE OF PANCREAS, UNSPECIFIED: Chronic | Status: ACTIVE | Noted: 2017-03-01

## 2018-07-17 PROBLEM — K83.8 OTHER SPECIFIED DISEASES OF BILIARY TRACT: Chronic | Status: ACTIVE | Noted: 2017-03-01

## 2018-07-17 PROBLEM — I10 ESSENTIAL (PRIMARY) HYPERTENSION: Chronic | Status: ACTIVE | Noted: 2017-02-18

## 2018-07-17 PROBLEM — R21 RASH AND OTHER NONSPECIFIC SKIN ERUPTION: Chronic | Status: ACTIVE | Noted: 2017-03-01

## 2018-07-17 PROBLEM — K83.1 OBSTRUCTION OF BILE DUCT: Chronic | Status: ACTIVE | Noted: 2017-03-01

## 2018-07-17 PROBLEM — K31.7 POLYP OF STOMACH AND DUODENUM: Chronic | Status: ACTIVE | Noted: 2017-03-01

## 2018-07-17 LAB
HCT VFR BLD CALC: 34 % — LOW (ref 39–50)
HGB BLD-MCNC: 10.9 G/DL — LOW (ref 13–17)
MCHC RBC-ENTMCNC: 26.9 PG — LOW (ref 27–34)
MCHC RBC-ENTMCNC: 32.2 G/DL — SIGNIFICANT CHANGE UP (ref 32–36)
MCV RBC AUTO: 83.6 FL — SIGNIFICANT CHANGE UP (ref 80–100)
PLATELET # BLD AUTO: 198 K/UL — SIGNIFICANT CHANGE UP (ref 150–400)
RBC # BLD: 4.07 M/UL — LOW (ref 4.2–5.8)
RBC # FLD: 15.4 % — HIGH (ref 10.3–14.5)
WBC # BLD: 5.1 K/UL — SIGNIFICANT CHANGE UP (ref 3.8–10.5)
WBC # FLD AUTO: 5.1 K/UL — SIGNIFICANT CHANGE UP (ref 3.8–10.5)

## 2018-07-17 PROCEDURE — 99214 OFFICE O/P EST MOD 30 MIN: CPT

## 2018-07-17 RX ORDER — PANTOPRAZOLE 40 MG/1
40 TABLET, DELAYED RELEASE ORAL
Qty: 30 | Refills: 5 | Status: DISCONTINUED | COMMUNITY
Start: 2017-11-21 | End: 2018-07-17

## 2018-07-17 RX ORDER — PANTOPRAZOLE 40 MG/1
40 TABLET, DELAYED RELEASE ORAL DAILY
Qty: 30 | Refills: 5 | Status: DISCONTINUED | COMMUNITY
Start: 2017-02-19 | End: 2018-07-17

## 2018-07-18 ENCOUNTER — CLINICAL ADVICE (OUTPATIENT)
Age: 68
End: 2018-07-18

## 2018-07-19 ENCOUNTER — APPOINTMENT (OUTPATIENT)
Dept: HEMATOLOGY ONCOLOGY | Facility: CLINIC | Age: 68
End: 2018-07-19
Payer: MEDICARE

## 2018-07-19 VITALS
BODY MASS INDEX: 23.24 KG/M2 | HEART RATE: 75 BPM | OXYGEN SATURATION: 100 % | SYSTOLIC BLOOD PRESSURE: 164 MMHG | WEIGHT: 162 LBS | TEMPERATURE: 97.6 F | RESPIRATION RATE: 16 BRPM | DIASTOLIC BLOOD PRESSURE: 84 MMHG

## 2018-07-19 PROCEDURE — 99214 OFFICE O/P EST MOD 30 MIN: CPT

## 2018-08-08 ENCOUNTER — RESULT REVIEW (OUTPATIENT)
Age: 68
End: 2018-08-08

## 2018-08-08 ENCOUNTER — APPOINTMENT (OUTPATIENT)
Dept: GASTROENTEROLOGY | Facility: HOSPITAL | Age: 68
End: 2018-08-08

## 2018-08-08 ENCOUNTER — OUTPATIENT (OUTPATIENT)
Dept: OUTPATIENT SERVICES | Facility: HOSPITAL | Age: 68
LOS: 1 days | End: 2018-08-08
Payer: MEDICARE

## 2018-08-08 DIAGNOSIS — Z98.1 ARTHRODESIS STATUS: Chronic | ICD-10-CM

## 2018-08-08 DIAGNOSIS — Z98.890 OTHER SPECIFIED POSTPROCEDURAL STATES: Chronic | ICD-10-CM

## 2018-08-08 DIAGNOSIS — K21.9 GASTRO-ESOPHAGEAL REFLUX DISEASE WITHOUT ESOPHAGITIS: ICD-10-CM

## 2018-08-08 DIAGNOSIS — K90.81 WHIPPLE'S DISEASE: Chronic | ICD-10-CM

## 2018-08-08 PROCEDURE — 88312 SPECIAL STAINS GROUP 1: CPT

## 2018-08-08 PROCEDURE — 88305 TISSUE EXAM BY PATHOLOGIST: CPT | Mod: 26

## 2018-08-08 PROCEDURE — 88305 TISSUE EXAM BY PATHOLOGIST: CPT

## 2018-08-08 PROCEDURE — 43239 EGD BIOPSY SINGLE/MULTIPLE: CPT

## 2018-08-08 PROCEDURE — C1889: CPT

## 2018-08-08 PROCEDURE — 88312 SPECIAL STAINS GROUP 1: CPT | Mod: 26

## 2018-08-14 ENCOUNTER — OTHER (OUTPATIENT)
Age: 68
End: 2018-08-14

## 2018-08-20 ENCOUNTER — OUTPATIENT (OUTPATIENT)
Dept: OUTPATIENT SERVICES | Facility: HOSPITAL | Age: 68
LOS: 1 days | Discharge: HOME | End: 2018-08-20

## 2018-08-20 DIAGNOSIS — Z98.890 OTHER SPECIFIED POSTPROCEDURAL STATES: Chronic | ICD-10-CM

## 2018-08-20 DIAGNOSIS — C24.9 MALIGNANT NEOPLASM OF BILIARY TRACT, UNSPECIFIED: ICD-10-CM

## 2018-08-20 DIAGNOSIS — Z98.1 ARTHRODESIS STATUS: Chronic | ICD-10-CM

## 2018-08-20 DIAGNOSIS — K90.81 WHIPPLE'S DISEASE: Chronic | ICD-10-CM

## 2018-08-30 ENCOUNTER — APPOINTMENT (OUTPATIENT)
Dept: INFUSION THERAPY | Facility: HOSPITAL | Age: 68
End: 2018-08-30

## 2018-08-30 ENCOUNTER — LABORATORY RESULT (OUTPATIENT)
Age: 68
End: 2018-08-30

## 2018-08-30 ENCOUNTER — RESULT REVIEW (OUTPATIENT)
Age: 68
End: 2018-08-30

## 2018-08-30 ENCOUNTER — CHART COPY (OUTPATIENT)
Age: 68
End: 2018-08-30

## 2018-08-30 LAB
HCT VFR BLD CALC: 33.3 % — LOW (ref 39–50)
HGB BLD-MCNC: 10.9 G/DL — LOW (ref 13–17)
MCHC RBC-ENTMCNC: 27 PG — SIGNIFICANT CHANGE UP (ref 27–34)
MCHC RBC-ENTMCNC: 32.6 G/DL — SIGNIFICANT CHANGE UP (ref 32–36)
MCV RBC AUTO: 82.8 FL — SIGNIFICANT CHANGE UP (ref 80–100)
PLATELET # BLD AUTO: 166 K/UL — SIGNIFICANT CHANGE UP (ref 150–400)
RBC # BLD: 4.02 M/UL — LOW (ref 4.2–5.8)
RBC # FLD: 14.4 % — SIGNIFICANT CHANGE UP (ref 10.3–14.5)
WBC # BLD: 4.5 K/UL — SIGNIFICANT CHANGE UP (ref 3.8–10.5)
WBC # FLD AUTO: 4.5 K/UL — SIGNIFICANT CHANGE UP (ref 3.8–10.5)

## 2018-09-04 ENCOUNTER — CHART COPY (OUTPATIENT)
Age: 68
End: 2018-09-04

## 2018-09-10 ENCOUNTER — OTHER (OUTPATIENT)
Age: 68
End: 2018-09-10

## 2018-09-18 ENCOUNTER — NON-APPOINTMENT (OUTPATIENT)
Age: 68
End: 2018-09-18

## 2018-09-18 ENCOUNTER — APPOINTMENT (OUTPATIENT)
Dept: ELECTROPHYSIOLOGY | Facility: CLINIC | Age: 68
End: 2018-09-18
Payer: MEDICARE

## 2018-09-18 VITALS
HEART RATE: 76 BPM | DIASTOLIC BLOOD PRESSURE: 84 MMHG | SYSTOLIC BLOOD PRESSURE: 151 MMHG | OXYGEN SATURATION: 100 % | WEIGHT: 161 LBS | HEIGHT: 70 IN | BODY MASS INDEX: 23.05 KG/M2

## 2018-09-18 DIAGNOSIS — R55 SYNCOPE AND COLLAPSE: ICD-10-CM

## 2018-09-18 PROCEDURE — 99214 OFFICE O/P EST MOD 30 MIN: CPT

## 2018-09-18 PROCEDURE — 93000 ELECTROCARDIOGRAM COMPLETE: CPT

## 2018-09-18 RX ORDER — METOPROLOL TARTRATE 25 MG/1
25 TABLET, FILM COATED ORAL
Refills: 0 | Status: DISCONTINUED | COMMUNITY
End: 2018-09-18

## 2018-09-18 RX ORDER — METOPROLOL SUCCINATE 25 MG/1
25 TABLET, EXTENDED RELEASE ORAL DAILY
Refills: 0 | Status: ACTIVE | COMMUNITY
Start: 2018-06-25

## 2018-09-20 ENCOUNTER — APPOINTMENT (OUTPATIENT)
Dept: GASTROENTEROLOGY | Facility: CLINIC | Age: 68
End: 2018-09-20
Payer: MEDICARE

## 2018-09-20 VITALS
DIASTOLIC BLOOD PRESSURE: 84 MMHG | HEIGHT: 70 IN | HEART RATE: 68 BPM | RESPIRATION RATE: 16 BRPM | WEIGHT: 160 LBS | OXYGEN SATURATION: 100 % | SYSTOLIC BLOOD PRESSURE: 144 MMHG | TEMPERATURE: 98.2 F | BODY MASS INDEX: 22.9 KG/M2

## 2018-09-20 PROCEDURE — 99214 OFFICE O/P EST MOD 30 MIN: CPT

## 2018-10-02 ENCOUNTER — APPOINTMENT (OUTPATIENT)
Dept: SURGERY | Facility: CLINIC | Age: 68
End: 2018-10-02

## 2018-10-03 ENCOUNTER — OUTPATIENT (OUTPATIENT)
Dept: OUTPATIENT SERVICES | Facility: HOSPITAL | Age: 68
LOS: 1 days | Discharge: HOME | End: 2018-10-03

## 2018-10-03 DIAGNOSIS — Z98.890 OTHER SPECIFIED POSTPROCEDURAL STATES: Chronic | ICD-10-CM

## 2018-10-03 DIAGNOSIS — K90.81 WHIPPLE'S DISEASE: Chronic | ICD-10-CM

## 2018-10-03 DIAGNOSIS — C24.9 MALIGNANT NEOPLASM OF BILIARY TRACT, UNSPECIFIED: ICD-10-CM

## 2018-10-03 DIAGNOSIS — Z98.1 ARTHRODESIS STATUS: Chronic | ICD-10-CM

## 2018-10-11 ENCOUNTER — LABORATORY RESULT (OUTPATIENT)
Age: 68
End: 2018-10-11

## 2018-10-11 ENCOUNTER — APPOINTMENT (OUTPATIENT)
Dept: INFUSION THERAPY | Facility: HOSPITAL | Age: 68
End: 2018-10-11

## 2018-10-11 ENCOUNTER — APPOINTMENT (OUTPATIENT)
Dept: HEMATOLOGY ONCOLOGY | Facility: CLINIC | Age: 68
End: 2018-10-11
Payer: MEDICARE

## 2018-10-11 ENCOUNTER — RESULT REVIEW (OUTPATIENT)
Age: 68
End: 2018-10-11

## 2018-10-11 VITALS
DIASTOLIC BLOOD PRESSURE: 73 MMHG | SYSTOLIC BLOOD PRESSURE: 115 MMHG | RESPIRATION RATE: 16 BRPM | WEIGHT: 161 LBS | BODY MASS INDEX: 23.1 KG/M2 | OXYGEN SATURATION: 100 % | TEMPERATURE: 98.2 F | HEART RATE: 62 BPM

## 2018-10-11 LAB
HCT VFR BLD CALC: 33.9 % — LOW (ref 39–50)
HGB BLD-MCNC: 11.2 G/DL — LOW (ref 13–17)
MCHC RBC-ENTMCNC: 27 PG — SIGNIFICANT CHANGE UP (ref 27–34)
MCHC RBC-ENTMCNC: 32.9 G/DL — SIGNIFICANT CHANGE UP (ref 32–36)
MCV RBC AUTO: 81.9 FL — SIGNIFICANT CHANGE UP (ref 80–100)
PLATELET # BLD AUTO: 189 K/UL — SIGNIFICANT CHANGE UP (ref 150–400)
RBC # BLD: 4.14 M/UL — LOW (ref 4.2–5.8)
RBC # FLD: 14.2 % — SIGNIFICANT CHANGE UP (ref 10.3–14.5)
WBC # BLD: 4.6 K/UL — SIGNIFICANT CHANGE UP (ref 3.8–10.5)
WBC # FLD AUTO: 4.6 K/UL — SIGNIFICANT CHANGE UP (ref 3.8–10.5)

## 2018-10-11 PROCEDURE — 99214 OFFICE O/P EST MOD 30 MIN: CPT

## 2018-10-23 ENCOUNTER — FORM ENCOUNTER (OUTPATIENT)
Age: 68
End: 2018-10-23

## 2018-10-24 ENCOUNTER — OUTPATIENT (OUTPATIENT)
Dept: OUTPATIENT SERVICES | Facility: HOSPITAL | Age: 68
LOS: 1 days | End: 2018-10-24
Payer: MEDICARE

## 2018-10-24 ENCOUNTER — APPOINTMENT (OUTPATIENT)
Dept: CT IMAGING | Facility: CLINIC | Age: 68
End: 2018-10-24
Payer: MEDICARE

## 2018-10-24 DIAGNOSIS — Z98.890 OTHER SPECIFIED POSTPROCEDURAL STATES: Chronic | ICD-10-CM

## 2018-10-24 DIAGNOSIS — Z98.1 ARTHRODESIS STATUS: Chronic | ICD-10-CM

## 2018-10-24 DIAGNOSIS — C24.9 MALIGNANT NEOPLASM OF BILIARY TRACT, UNSPECIFIED: ICD-10-CM

## 2018-10-24 DIAGNOSIS — K90.81 WHIPPLE'S DISEASE: Chronic | ICD-10-CM

## 2018-10-24 PROCEDURE — 74177 CT ABD & PELVIS W/CONTRAST: CPT

## 2018-10-24 PROCEDURE — 71260 CT THORAX DX C+: CPT

## 2018-10-24 PROCEDURE — 71260 CT THORAX DX C+: CPT | Mod: 26

## 2018-10-24 PROCEDURE — 74177 CT ABD & PELVIS W/CONTRAST: CPT | Mod: 26

## 2018-11-09 ENCOUNTER — OUTPATIENT (OUTPATIENT)
Dept: OUTPATIENT SERVICES | Facility: HOSPITAL | Age: 68
LOS: 1 days | Discharge: HOME | End: 2018-11-09

## 2018-11-09 DIAGNOSIS — C24.9 MALIGNANT NEOPLASM OF BILIARY TRACT, UNSPECIFIED: ICD-10-CM

## 2018-11-09 DIAGNOSIS — Z98.890 OTHER SPECIFIED POSTPROCEDURAL STATES: Chronic | ICD-10-CM

## 2018-11-09 DIAGNOSIS — K90.81 WHIPPLE'S DISEASE: Chronic | ICD-10-CM

## 2018-11-09 DIAGNOSIS — Z98.1 ARTHRODESIS STATUS: Chronic | ICD-10-CM

## 2018-11-20 ENCOUNTER — APPOINTMENT (OUTPATIENT)
Dept: INFUSION THERAPY | Facility: HOSPITAL | Age: 68
End: 2018-11-20

## 2018-11-20 ENCOUNTER — RESULT REVIEW (OUTPATIENT)
Age: 68
End: 2018-11-20

## 2018-11-20 ENCOUNTER — LABORATORY RESULT (OUTPATIENT)
Age: 68
End: 2018-11-20

## 2018-11-20 LAB
HCT VFR BLD CALC: 34.4 % — LOW (ref 39–50)
HGB BLD-MCNC: 11 G/DL — LOW (ref 13–17)
MCHC RBC-ENTMCNC: 26.5 PG — LOW (ref 27–34)
MCHC RBC-ENTMCNC: 32 G/DL — SIGNIFICANT CHANGE UP (ref 32–36)
MCV RBC AUTO: 82.8 FL — SIGNIFICANT CHANGE UP (ref 80–100)
PLATELET # BLD AUTO: 198 K/UL — SIGNIFICANT CHANGE UP (ref 150–400)
RBC # BLD: 4.16 M/UL — LOW (ref 4.2–5.8)
RBC # FLD: 14.6 % — HIGH (ref 10.3–14.5)
WBC # BLD: 4.5 K/UL — SIGNIFICANT CHANGE UP (ref 3.8–10.5)
WBC # FLD AUTO: 4.5 K/UL — SIGNIFICANT CHANGE UP (ref 3.8–10.5)

## 2018-12-03 ENCOUNTER — APPOINTMENT (OUTPATIENT)
Dept: HEPATOLOGY | Facility: CLINIC | Age: 68
End: 2018-12-03
Payer: MEDICARE

## 2018-12-03 VITALS
RESPIRATION RATE: 16 BRPM | TEMPERATURE: 97.8 F | BODY MASS INDEX: 22.76 KG/M2 | WEIGHT: 159 LBS | HEART RATE: 73 BPM | SYSTOLIC BLOOD PRESSURE: 132 MMHG | HEIGHT: 70 IN | DIASTOLIC BLOOD PRESSURE: 81 MMHG

## 2018-12-03 PROCEDURE — 99214 OFFICE O/P EST MOD 30 MIN: CPT

## 2018-12-03 RX ORDER — ENALAPRIL MALEATE 2.5 MG/1
2.5 TABLET ORAL
Refills: 0 | Status: ACTIVE | COMMUNITY

## 2018-12-03 RX ORDER — CHOLECALCIFEROL (VITAMIN D3) 10(400)/ML
DROPS ORAL
Refills: 0 | Status: DISCONTINUED | COMMUNITY
Start: 2018-01-21 | End: 2018-12-03

## 2018-12-03 RX ORDER — ENALAPRIL MALEATE 5 MG/1
5 TABLET ORAL
Refills: 0 | Status: DISCONTINUED | COMMUNITY
Start: 2017-02-16 | End: 2018-12-03

## 2018-12-07 ENCOUNTER — OTHER (OUTPATIENT)
Age: 68
End: 2018-12-07

## 2018-12-17 ENCOUNTER — OUTPATIENT (OUTPATIENT)
Dept: OUTPATIENT SERVICES | Facility: HOSPITAL | Age: 68
LOS: 1 days | Discharge: HOME | End: 2018-12-17

## 2018-12-17 DIAGNOSIS — Z98.890 OTHER SPECIFIED POSTPROCEDURAL STATES: Chronic | ICD-10-CM

## 2018-12-17 DIAGNOSIS — Z98.1 ARTHRODESIS STATUS: Chronic | ICD-10-CM

## 2018-12-17 DIAGNOSIS — K90.81 WHIPPLE'S DISEASE: Chronic | ICD-10-CM

## 2018-12-17 DIAGNOSIS — C24.9 MALIGNANT NEOPLASM OF BILIARY TRACT, UNSPECIFIED: ICD-10-CM

## 2018-12-28 ENCOUNTER — LABORATORY RESULT (OUTPATIENT)
Age: 68
End: 2018-12-28

## 2018-12-28 ENCOUNTER — APPOINTMENT (OUTPATIENT)
Dept: INFUSION THERAPY | Facility: HOSPITAL | Age: 68
End: 2018-12-28

## 2018-12-28 ENCOUNTER — RESULT REVIEW (OUTPATIENT)
Age: 68
End: 2018-12-28

## 2018-12-28 LAB
BASOPHILS # BLD AUTO: 0 K/UL — SIGNIFICANT CHANGE UP (ref 0–0.2)
BASOPHILS NFR BLD AUTO: 0.8 % — SIGNIFICANT CHANGE UP (ref 0–2)
EOSINOPHIL # BLD AUTO: 0 K/UL — SIGNIFICANT CHANGE UP (ref 0–0.5)
EOSINOPHIL NFR BLD AUTO: 0.9 % — SIGNIFICANT CHANGE UP (ref 0–6)
HCT VFR BLD CALC: 35.1 % — LOW (ref 39–50)
HGB BLD-MCNC: 11.5 G/DL — LOW (ref 13–17)
LYMPHOCYTES # BLD AUTO: 0.7 K/UL — LOW (ref 1–3.3)
LYMPHOCYTES # BLD AUTO: 22.4 % — SIGNIFICANT CHANGE UP (ref 13–44)
MCHC RBC-ENTMCNC: 26.1 PG — LOW (ref 27–34)
MCHC RBC-ENTMCNC: 32.7 G/DL — SIGNIFICANT CHANGE UP (ref 32–36)
MCV RBC AUTO: 79.8 FL — LOW (ref 80–100)
MONOCYTES # BLD AUTO: 0.5 K/UL — SIGNIFICANT CHANGE UP (ref 0–0.9)
MONOCYTES NFR BLD AUTO: 15.6 % — HIGH (ref 2–14)
NEUTROPHILS # BLD AUTO: 2 K/UL — SIGNIFICANT CHANGE UP (ref 1.8–7.4)
NEUTROPHILS NFR BLD AUTO: 60.3 % — SIGNIFICANT CHANGE UP (ref 43–77)
PLATELET # BLD AUTO: 171 K/UL — SIGNIFICANT CHANGE UP (ref 150–400)
RBC # BLD: 4.39 M/UL — SIGNIFICANT CHANGE UP (ref 4.2–5.8)
RBC # FLD: 14.5 % — SIGNIFICANT CHANGE UP (ref 10.3–14.5)
WBC # BLD: 3.3 K/UL — LOW (ref 3.8–10.5)
WBC # FLD AUTO: 3.3 K/UL — LOW (ref 3.8–10.5)

## 2018-12-31 LAB
ALBUMIN SERPL ELPH-MCNC: 4 G/DL
ALP BLD-CCNC: 108 U/L
ALT SERPL-CCNC: 22 U/L
ANION GAP SERPL CALC-SCNC: 14 MMOL/L
AST SERPL-CCNC: 34 U/L
BILIRUB SERPL-MCNC: 0.3 MG/DL
BUN SERPL-MCNC: 11 MG/DL
CALCIUM SERPL-MCNC: 9.6 MG/DL
CANCER AG19-9 SERPL-ACNC: 21.6 U/ML
CHLORIDE SERPL-SCNC: 106 MMOL/L
CHOLEST SERPL-MCNC: 107 MG/DL
CO2 SERPL-SCNC: 23 MMOL/L
CREAT SERPL-MCNC: 0.78 MG/DL
GLUCOSE SERPL-MCNC: 93 MG/DL
HBV CORE IGG+IGM SER QL: NONREACTIVE
HBV SURFACE AB SER QL: NONREACTIVE
HBV SURFACE AG SER QL: NONREACTIVE
HCV AB SER QL: NONREACTIVE
HCV S/CO RATIO: 0.08 S/CO
INR PPP: 1.04 RATIO
POTASSIUM SERPL-SCNC: 5.4 MMOL/L
PROT SERPL-MCNC: 7 G/DL
PT BLD: 11.8 SEC
SODIUM SERPL-SCNC: 143 MMOL/L

## 2019-01-02 ENCOUNTER — FORM ENCOUNTER (OUTPATIENT)
Age: 69
End: 2019-01-02

## 2019-01-03 ENCOUNTER — OUTPATIENT (OUTPATIENT)
Dept: OUTPATIENT SERVICES | Facility: HOSPITAL | Age: 69
LOS: 1 days | End: 2019-01-03
Payer: MEDICARE

## 2019-01-03 ENCOUNTER — APPOINTMENT (OUTPATIENT)
Dept: CT IMAGING | Facility: CLINIC | Age: 69
End: 2019-01-03
Payer: MEDICARE

## 2019-01-03 DIAGNOSIS — Z98.890 OTHER SPECIFIED POSTPROCEDURAL STATES: Chronic | ICD-10-CM

## 2019-01-03 DIAGNOSIS — K90.81 WHIPPLE'S DISEASE: Chronic | ICD-10-CM

## 2019-01-03 DIAGNOSIS — Z00.8 ENCOUNTER FOR OTHER GENERAL EXAMINATION: ICD-10-CM

## 2019-01-03 DIAGNOSIS — Z98.1 ARTHRODESIS STATUS: Chronic | ICD-10-CM

## 2019-01-03 PROCEDURE — 74177 CT ABD & PELVIS W/CONTRAST: CPT | Mod: 26

## 2019-01-03 PROCEDURE — 74177 CT ABD & PELVIS W/CONTRAST: CPT

## 2019-01-07 ENCOUNTER — APPOINTMENT (OUTPATIENT)
Dept: HEMATOLOGY ONCOLOGY | Facility: CLINIC | Age: 69
End: 2019-01-07
Payer: MEDICARE

## 2019-01-07 VITALS
TEMPERATURE: 97.9 F | BODY MASS INDEX: 23.88 KG/M2 | DIASTOLIC BLOOD PRESSURE: 81 MMHG | OXYGEN SATURATION: 100 % | HEART RATE: 71 BPM | WEIGHT: 166.45 LBS | SYSTOLIC BLOOD PRESSURE: 171 MMHG | RESPIRATION RATE: 16 BRPM

## 2019-01-07 PROCEDURE — 99215 OFFICE O/P EST HI 40 MIN: CPT

## 2019-01-07 RX ORDER — LIDOCAINE AND PRILOCAINE 25; 25 MG/G; MG/G
2.5-2.5 CREAM TOPICAL
Qty: 1 | Refills: 2 | Status: ACTIVE | COMMUNITY
Start: 2017-08-01 | End: 1900-01-01

## 2019-01-10 NOTE — REASON FOR VISIT
[Follow-Up Visit] : a follow-up [Spouse] : spouse [FreeTextEntry2] : cholangiocarcinoma/surveillance visit

## 2019-01-10 NOTE — HISTORY OF PRESENT ILLNESS
[Disease: _____________________] : Disease: [unfilled] [T: ___] : T[unfilled] [N: ___] : N[unfilled] [AJCC Stage: ____] : AJCC Stage: [unfilled] [Treatment Protocol] : Treatment Protocol [de-identified] : 66 Year old gentleman with PMH of IBS-C for many years, hypercholesterolemia, GERD, L4-L5 laminectomy 2016; cervical spine fusion 2014, here for a medical oncology evaluation for treatment of recently diagnosis of the common bile duct. \par HPI below as per the patient and available records\par 1/2017 Presented with painless jaundice to his PMD.  Found to have liver abnormalities, abnormal sonogram.\par 2/17/2017 CT abdomen revealed no definitive pancreatic mass\par 2/18/2017 He presented to the emergency room at [facility] with jaundice and progressive itching.  Total Bilirubin 12.5/Direct 9.1. CT abdomen showed intrabiliary Vs. extra biliary dilation. Started on ursodiol .\par 2/18/2017 MRI Abdomen with contrast: :Intra- as well as extra- hepatic biliary ductal dilatation, with stricturing of the mid to distal common bile duct. No shanna associated pancreatic mass, choledocholithiasis nor shanna mass of the common bile duct.  There is subtle enhancement of the wall of the common bile duct which may be related to inflammation or malignant stricture of the common bile duct. Multiple hepatic hemangiomas are demonstrated.\par \par 2/21/2017 EUS: Subtle 14mm x 15mm hypoechoic, heterogenous area in pancreatic head (at site of transition to biliary dilation) \par ERCP : biliary sphincterotomy, extraction of dark-thick sludge, cholangioscopy, biopsies of exposed ampulla/distal CBD, brushings of distal CBD, and placement of double pigtail biliary stent. No obvious findings seen on cholangioscopy although visualization was limited by thick, dark, sludge throughout biliary tree\par Bile duct brush suspicious for malignancy. \par Pancreas Head FNA :glandular cells with focal atypia in background of inflammatory cells including lymphocytes and fragments of fibrous tissue.\par Ampullary biopsy, ampullary type mucosa, negative for dysplasia\par Gastric body polyps; endoscopic biopsies show gastric fundic gland polyps\par \par 3/2/2017  EUS: Gastric polyps were noted. There was dilation in the common bile duct which measured up to 15 mm. There was small amount of sludge noted in the dilated bile duct. One stent was visualized endosonographically in the common bile duct. Extension of the stent was noted in the common bile duct. Pancreatic parenchymal abnormalities with hypoechoic foci were noted in the pancreatic head and pancreatic body. No obvious discrete mass / lesion was noted hence no FNA was performed.\par \par 3/2/2017 ERCP \par  A severe localized biliary stricture was found in the bile duct with SpyGlass cholangioscopy and biopsied using a cold forceps and also with a spy bite forceps for more directed sampling during cholangioscopy.  The stricture was then restented.\par \par 3/7/2017 Pt continued to experience jaundice and intense pruritus, was seen in outpatient office last week ere he had a metal stent placed, but today when he had a follow up appointment he was once again found to have an elevated bilirubin to 20 and was sent in for admission.\par \par 3/9/2017 EUS/ERCP: Distal biliary stricture with stent removal, cholangioscopy, biopsies, brushings, fully covered metal stent placement\par \par 4/26/2017 Whipple resection, feeding jejunostomy, appendectomy. Pathology:\par Common bile duct - Extra and intra pancreatic Tumor Size: 0.6 x 0.1 cm Histologic Type: Adenocarcinoma (not otherwise characterized) Histologic Grade: Moderate to poorly differentiated (G2 and G3).  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 Pt  admitted for IV Antibiotics with Cipro/Flagyl changed to Meropenem and Vancomycin.. Pt taken to IR and underwent CT guided percutaneous drainage of a right lower quadrant fluid collection on 6/13. \par Pt discharged home with drain to follow up with IR for tube check in 1 week \par Cultures of abscess grew out enterococcus faecalis and enterococcus raffinosus. He has completed antibiotic therapy.\par \par He was evaluated by Dr. Medrano on 7/11 and cleared for medical evaluation.\par \par Received Day 1 Cycle 1 gemcitabine and cisplatin, fatigue for 2 days lynne 5; Day 8 held due to neutropenia (WBC 2.5 ) resolved in 1 week.\par \par 10/12/17 Here for Cycle 4 Day 8 gemcitabine and cisplatin, very fatigued after day . Constipation has been more problematic 2 days since last bm. He has mild mucositis upper palate, improving. Denies paresthesias or subjective changes in hearing.  Plan: Senekot S nightly, Reglan 10 mg Po tid.  Based on current data, will rotate to oral capecitabine for the next 3 months to complete his course of Rx. 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   11/2/17  Capecitabine 1500 mg Po bid 2 weeks on/ 2 weeks off\par  [de-identified] : Surveillance visit.Feels well overall except for some back pain that is chronic and comes and goes as prev reported.Issue is a finding on recent scan, which indicates an enlarging soft tissue density, essentially doubled in size in 3 months adjacent to SMA.

## 2019-01-10 NOTE — HISTORY OF PRESENT ILLNESS
[Disease: _____________________] : Disease: [unfilled] [T: ___] : T[unfilled] [N: ___] : N[unfilled] [AJCC Stage: ____] : AJCC Stage: [unfilled] [Treatment Protocol] : Treatment Protocol [de-identified] : 66 Year old gentleman with PMH of IBS-C for many years, hypercholesterolemia, GERD, L4-L5 laminectomy 2016; cervical spine fusion 2014, here for a medical oncology evaluation for treatment of recently diagnosis of the common bile duct. \par HPI below as per the patient and available records\par 1/2017 Presented with painless jaundice to his PMD.  Found to have liver abnormalities, abnormal sonogram.\par 2/17/2017 CT abdomen revealed no definitive pancreatic mass\par 2/18/2017 He presented to the emergency room at [facility] with jaundice and progressive itching.  Total Bilirubin 12.5/Direct 9.1. CT abdomen showed intrabiliary Vs. extra biliary dilation. Started on ursodiol .\par 2/18/2017 MRI Abdomen with contrast: :Intra- as well as extra- hepatic biliary ductal dilatation, with stricturing of the mid to distal common bile duct. No shanna associated pancreatic mass, choledocholithiasis nor shanna mass of the common bile duct.  There is subtle enhancement of the wall of the common bile duct which may be related to inflammation or malignant stricture of the common bile duct. Multiple hepatic hemangiomas are demonstrated.\par \par 2/21/2017 EUS: Subtle 14mm x 15mm hypoechoic, heterogenous area in pancreatic head (at site of transition to biliary dilation) \par ERCP : biliary sphincterotomy, extraction of dark-thick sludge, cholangioscopy, biopsies of exposed ampulla/distal CBD, brushings of distal CBD, and placement of double pigtail biliary stent. No obvious findings seen on cholangioscopy although visualization was limited by thick, dark, sludge throughout biliary tree\par Bile duct brush suspicious for malignancy. \par Pancreas Head FNA :glandular cells with focal atypia in background of inflammatory cells including lymphocytes and fragments of fibrous tissue.\par Ampullary biopsy, ampullary type mucosa, negative for dysplasia\par Gastric body polyps; endoscopic biopsies show gastric fundic gland polyps\par \par 3/2/2017  EUS: Gastric polyps were noted. There was dilation in the common bile duct which measured up to 15 mm. There was small amount of sludge noted in the dilated bile duct. One stent was visualized endosonographically in the common bile duct. Extension of the stent was noted in the common bile duct. Pancreatic parenchymal abnormalities with hypoechoic foci were noted in the pancreatic head and pancreatic body. No obvious discrete mass / lesion was noted hence no FNA was performed.\par \par 3/2/2017 ERCP \par  A severe localized biliary stricture was found in the bile duct with SpyGlass cholangioscopy and biopsied using a cold forceps and also with a spy bite forceps for more directed sampling during cholangioscopy.  The stricture was then restented.\par \par 3/7/2017 Pt continued to experience jaundice and intense pruritus, was seen in outpatient office last week ere he had a metal stent placed, but today when he had a follow up appointment he was once again found to have an elevated bilirubin to 20 and was sent in for admission.\par \par 3/9/2017 EUS/ERCP: Distal biliary stricture with stent removal, cholangioscopy, biopsies, brushings, fully covered metal stent placement\par \par 4/26/2017 Whipple resection, feeding jejunostomy, appendectomy. Pathology:\par Common bile duct - Extra and intra pancreatic Tumor Size: 0.6 x 0.1 cm Histologic Type: Adenocarcinoma (not otherwise characterized) Histologic Grade: Moderate to poorly differentiated (G2 and G3).  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 Pt  admitted for IV Antibiotics with Cipro/Flagyl changed to Meropenem and Vancomycin.. Pt taken to IR and underwent CT guided percutaneous drainage of a right lower quadrant fluid collection on 6/13. \par Pt discharged home with drain to follow up with IR for tube check in 1 week \par Cultures of abscess grew out enterococcus faecalis and enterococcus raffinosus. He has completed antibiotic therapy.\par \par He was evaluated by Dr. Medrano on 7/11 and cleared for medical evaluation.\par \par Received Day 1 Cycle 1 gemcitabine and cisplatin, fatigue for 2 days lynne 5; Day 8 held due to neutropenia (WBC 2.5 ) resolved in 1 week.\par \par 10/12/17 Here for Cycle 4 Day 8 gemcitabine and cisplatin, very fatigued after day . Constipation has been more problematic 2 days since last bm. He has mild mucositis upper palate, improving. Denies paresthesias or subjective changes in hearing.  Plan: Senekot S nightly, Reglan 10 mg Po tid.  Based on current data, will rotate to oral capecitabine for the next 3 months to complete his course of Rx. 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   11/2/17  Capecitabine 1500 mg Po bid 2 weeks on/ 2 weeks off\par  [de-identified] : Surveillance visit.Feels well overall except for some back pain that is chronic and comes and goes as prev reported.Issue is a finding on recent scan, which indicates an enlarging soft tissue density, essentially doubled in size in 3 months adjacent to SMA.

## 2019-01-10 NOTE — PHYSICAL EXAM
[Restricted in physically strenuous activity but ambulatory and able to carry out work of a light or sedentary nature] : Status 1- Restricted in physically strenuous activity but ambulatory and able to carry out work of a light or sedentary nature, e.g., light house work, office work [Normal] : affect appropriate [de-identified] : hair thinning [de-identified] : lesion on upper palate is resolved, adjacent site of excision without tenderness or erythema  [de-identified] : well healed scar intermittent point tenderness LUQ 3 cm below LCM; no rebound

## 2019-01-10 NOTE — PHYSICAL EXAM
[Restricted in physically strenuous activity but ambulatory and able to carry out work of a light or sedentary nature] : Status 1- Restricted in physically strenuous activity but ambulatory and able to carry out work of a light or sedentary nature, e.g., light house work, office work [Normal] : affect appropriate [de-identified] : hair thinning [de-identified] : lesion on upper palate is resolved, adjacent site of excision without tenderness or erythema  [de-identified] : well healed scar intermittent point tenderness LUQ 3 cm below LCM; no rebound

## 2019-01-10 NOTE — ASSESSMENT
[Supportive] : Goals of care discussed with patient: Supportive [Palliative Care Plan] : not applicable at this time [FreeTextEntry1] : Bile duct cancer, s/p surgical resection.s/p  adjuvant chemo.Doing well.clinically, q 3 Monthly eval/surveillance.Concern re an enlarging soft tissue mass within an operative field.DDx can include an early recurrence.Recent labs are wnl..Will check a PETCT scan

## 2019-01-10 NOTE — REVIEW OF SYSTEMS
[Abdominal Pain] : abdominal pain [Negative] : Allergic/Immunologic [FreeTextEntry2] : see HPI [FreeTextEntry7] : IBS see HPI [FreeTextEntry9] : mild pruritus no rash

## 2019-01-10 NOTE — RESULTS/DATA
[FreeTextEntry1] : CA 19-9 32.1 *23.3 *20.0 *18.5 *18.8 *23.0 \par 1/18/18 *19.0 \par 12/14/17*17.9 \par 10/12/17  *18.5\par \par 7/20/17 *21.6

## 2019-01-14 ENCOUNTER — FORM ENCOUNTER (OUTPATIENT)
Age: 69
End: 2019-01-14

## 2019-01-15 ENCOUNTER — APPOINTMENT (OUTPATIENT)
Dept: NUCLEAR MEDICINE | Facility: CLINIC | Age: 69
End: 2019-01-15
Payer: MEDICARE

## 2019-01-15 ENCOUNTER — OUTPATIENT (OUTPATIENT)
Dept: OUTPATIENT SERVICES | Facility: HOSPITAL | Age: 69
LOS: 1 days | End: 2019-01-15
Payer: MEDICARE

## 2019-01-15 DIAGNOSIS — Z98.890 OTHER SPECIFIED POSTPROCEDURAL STATES: Chronic | ICD-10-CM

## 2019-01-15 DIAGNOSIS — C24.9 MALIGNANT NEOPLASM OF BILIARY TRACT, UNSPECIFIED: ICD-10-CM

## 2019-01-15 DIAGNOSIS — Z98.1 ARTHRODESIS STATUS: Chronic | ICD-10-CM

## 2019-01-15 DIAGNOSIS — K90.81 WHIPPLE'S DISEASE: Chronic | ICD-10-CM

## 2019-01-15 PROCEDURE — 78815 PET IMAGE W/CT SKULL-THIGH: CPT | Mod: 26,PI

## 2019-01-15 PROCEDURE — 78815 PET IMAGE W/CT SKULL-THIGH: CPT

## 2019-01-15 PROCEDURE — A9552: CPT

## 2019-01-22 ENCOUNTER — APPOINTMENT (OUTPATIENT)
Dept: HEPATOLOGY | Facility: CLINIC | Age: 69
End: 2019-01-22
Payer: MEDICARE

## 2019-01-22 PROCEDURE — 91200 LIVER ELASTOGRAPHY: CPT

## 2019-02-01 ENCOUNTER — RESULT REVIEW (OUTPATIENT)
Age: 69
End: 2019-02-01

## 2019-02-01 ENCOUNTER — OTHER (OUTPATIENT)
Age: 69
End: 2019-02-01

## 2019-02-01 ENCOUNTER — APPOINTMENT (OUTPATIENT)
Dept: GASTROENTEROLOGY | Facility: HOSPITAL | Age: 69
End: 2019-02-01

## 2019-02-01 ENCOUNTER — OUTPATIENT (OUTPATIENT)
Dept: OUTPATIENT SERVICES | Facility: HOSPITAL | Age: 69
LOS: 1 days | Discharge: HOME | End: 2019-02-01
Payer: MEDICARE

## 2019-02-01 DIAGNOSIS — Z98.890 OTHER SPECIFIED POSTPROCEDURAL STATES: Chronic | ICD-10-CM

## 2019-02-01 DIAGNOSIS — K90.81 WHIPPLE'S DISEASE: Chronic | ICD-10-CM

## 2019-02-01 DIAGNOSIS — C24.9 MALIGNANT NEOPLASM OF BILIARY TRACT, UNSPECIFIED: ICD-10-CM

## 2019-02-01 DIAGNOSIS — Z98.1 ARTHRODESIS STATUS: Chronic | ICD-10-CM

## 2019-02-01 PROCEDURE — 43242 EGD US FINE NEEDLE BX/ASPIR: CPT | Mod: GC

## 2019-02-01 PROCEDURE — 88305 TISSUE EXAM BY PATHOLOGIST: CPT | Mod: 26

## 2019-02-11 ENCOUNTER — OUTPATIENT (OUTPATIENT)
Dept: OUTPATIENT SERVICES | Facility: HOSPITAL | Age: 69
LOS: 1 days | Discharge: HOME | End: 2019-02-11

## 2019-02-11 DIAGNOSIS — Z98.1 ARTHRODESIS STATUS: Chronic | ICD-10-CM

## 2019-02-11 DIAGNOSIS — Z98.890 OTHER SPECIFIED POSTPROCEDURAL STATES: Chronic | ICD-10-CM

## 2019-02-11 DIAGNOSIS — K90.81 WHIPPLE'S DISEASE: Chronic | ICD-10-CM

## 2019-02-11 DIAGNOSIS — C24.9 MALIGNANT NEOPLASM OF BILIARY TRACT, UNSPECIFIED: ICD-10-CM

## 2019-02-14 ENCOUNTER — LABORATORY RESULT (OUTPATIENT)
Age: 69
End: 2019-02-14

## 2019-02-14 ENCOUNTER — RESULT REVIEW (OUTPATIENT)
Age: 69
End: 2019-02-14

## 2019-02-14 ENCOUNTER — APPOINTMENT (OUTPATIENT)
Dept: INFUSION THERAPY | Facility: HOSPITAL | Age: 69
End: 2019-02-14

## 2019-02-14 LAB
HCT VFR BLD CALC: 35 % — LOW (ref 39–50)
HGB BLD-MCNC: 11 G/DL — LOW (ref 13–17)
MCHC RBC-ENTMCNC: 25 PG — LOW (ref 27–34)
MCHC RBC-ENTMCNC: 31.4 G/DL — LOW (ref 32–36)
MCV RBC AUTO: 79.6 FL — LOW (ref 80–100)
PLATELET # BLD AUTO: 209 K/UL — SIGNIFICANT CHANGE UP (ref 150–400)
RBC # BLD: 4.4 M/UL — SIGNIFICANT CHANGE UP (ref 4.2–5.8)
RBC # FLD: 14.8 % — HIGH (ref 10.3–14.5)
WBC # BLD: 4.4 K/UL — SIGNIFICANT CHANGE UP (ref 3.8–10.5)
WBC # FLD AUTO: 4.4 K/UL — SIGNIFICANT CHANGE UP (ref 3.8–10.5)

## 2019-02-19 ENCOUNTER — APPOINTMENT (OUTPATIENT)
Dept: RADIATION ONCOLOGY | Facility: CLINIC | Age: 69
End: 2019-02-19
Payer: MEDICARE

## 2019-02-19 ENCOUNTER — OUTPATIENT (OUTPATIENT)
Dept: OUTPATIENT SERVICES | Facility: HOSPITAL | Age: 69
LOS: 1 days | Discharge: HOME | End: 2019-02-19
Payer: MEDICARE

## 2019-02-19 VITALS
BODY MASS INDEX: 22.96 KG/M2 | DIASTOLIC BLOOD PRESSURE: 87 MMHG | SYSTOLIC BLOOD PRESSURE: 146 MMHG | WEIGHT: 160 LBS | HEART RATE: 74 BPM | OXYGEN SATURATION: 100 % | RESPIRATION RATE: 16 BRPM

## 2019-02-19 DIAGNOSIS — Z98.890 OTHER SPECIFIED POSTPROCEDURAL STATES: Chronic | ICD-10-CM

## 2019-02-19 DIAGNOSIS — K90.81 WHIPPLE'S DISEASE: Chronic | ICD-10-CM

## 2019-02-19 DIAGNOSIS — Z98.1 ARTHRODESIS STATUS: Chronic | ICD-10-CM

## 2019-02-19 PROCEDURE — 99205 OFFICE O/P NEW HI 60 MIN: CPT | Mod: 25

## 2019-02-19 RX ORDER — METOCLOPRAMIDE 5 MG/1
5 TABLET ORAL
Qty: 90 | Refills: 0 | Status: DISCONTINUED | COMMUNITY
Start: 2018-07-18 | End: 2019-02-19

## 2019-02-19 RX ORDER — METOCLOPRAMIDE 5 MG/1
5 TABLET ORAL
Qty: 270 | Refills: 2 | Status: DISCONTINUED | COMMUNITY
Start: 2018-09-20 | End: 2019-02-19

## 2019-02-26 ENCOUNTER — APPOINTMENT (OUTPATIENT)
Dept: GASTROENTEROLOGY | Facility: HOSPITAL | Age: 69
End: 2019-02-26

## 2019-02-26 ENCOUNTER — OUTPATIENT (OUTPATIENT)
Dept: OUTPATIENT SERVICES | Facility: HOSPITAL | Age: 69
LOS: 1 days | Discharge: HOME | End: 2019-02-26
Payer: MEDICARE

## 2019-02-26 ENCOUNTER — RESULT REVIEW (OUTPATIENT)
Age: 69
End: 2019-02-26

## 2019-02-26 DIAGNOSIS — C24.9 MALIGNANT NEOPLASM OF BILIARY TRACT, UNSPECIFIED: ICD-10-CM

## 2019-02-26 DIAGNOSIS — Z98.890 OTHER SPECIFIED POSTPROCEDURAL STATES: Chronic | ICD-10-CM

## 2019-02-26 DIAGNOSIS — Z98.1 ARTHRODESIS STATUS: Chronic | ICD-10-CM

## 2019-02-26 DIAGNOSIS — K90.81 WHIPPLE'S DISEASE: Chronic | ICD-10-CM

## 2019-02-26 PROCEDURE — 43274 ERCP DUCT STENT PLACEMENT: CPT | Mod: GC

## 2019-02-26 PROCEDURE — 74328 X-RAY BILE DUCT ENDOSCOPY: CPT | Mod: 26,GC

## 2019-02-26 PROCEDURE — 88305 TISSUE EXAM BY PATHOLOGIST: CPT | Mod: 26

## 2019-02-26 PROCEDURE — 43261 ENDO CHOLANGIOPANCREATOGRAPH: CPT | Mod: GC,59

## 2019-02-28 ENCOUNTER — OUTPATIENT (OUTPATIENT)
Dept: OUTPATIENT SERVICES | Facility: HOSPITAL | Age: 69
LOS: 1 days | Discharge: HOME | End: 2019-02-28
Payer: MEDICARE

## 2019-02-28 DIAGNOSIS — K90.81 WHIPPLE'S DISEASE: Chronic | ICD-10-CM

## 2019-02-28 DIAGNOSIS — Z98.890 OTHER SPECIFIED POSTPROCEDURAL STATES: Chronic | ICD-10-CM

## 2019-02-28 DIAGNOSIS — Z98.1 ARTHRODESIS STATUS: Chronic | ICD-10-CM

## 2019-02-28 PROCEDURE — 77263 THER RADIOLOGY TX PLNG CPLX: CPT

## 2019-03-01 ENCOUNTER — TRANSCRIPTION ENCOUNTER (OUTPATIENT)
Age: 69
End: 2019-03-01

## 2019-03-05 PROCEDURE — 77334 RADIATION TREATMENT AID(S): CPT | Mod: 26

## 2019-03-05 PROCEDURE — 77290 THER RAD SIMULAJ FIELD CPLX: CPT | Mod: 26

## 2019-03-05 PROCEDURE — 77333 RADIATION TREATMENT AID(S): CPT | Mod: 26,59

## 2019-03-15 NOTE — ADDENDUM
[FreeTextEntry1] : After discussion with GI, he will undergo endoscopy and possible biopsy of any suspicious areas prior to finalizing radiation recommendations.

## 2019-03-15 NOTE — VITALS
[Maximal Pain Intensity: 8/10] : 8/10 [Least Pain Intensity: 2/10] : 2/10 [Pain Description/Quality: ___] : Pain description/quality: [unfilled] [Pain Duration: ___] : Pain duration: [unfilled] [Pain Location: ___] : Pain Location: [unfilled] [NoTreatment Scheduled] : no treatment scheduled [80: Normal activity with effort; some signs or symptoms of disease.] : 80: Normal activity with effort; some signs or symptoms of disease.  [ECOG Performance Status: 1 - Restricted in physically strenuous activity but ambulatory and able to carry out work of a light or sedentary nature] : Performance Status: 1 - Restricted in physically strenuous activity but ambulatory and able to carry out work of a light or sedentary nature, e.g., light house work, office work [Pain Interferes with ADLs] : Pain does not interfere with activities of daily living

## 2019-03-15 NOTE — DISEASE MANAGEMENT
[Clinical] : TNM Stage: c [FreeTextEntry4] : Recurrent [TTNM] : - [NTNM] : - [MTNM] : - [N/A] : Currently not applicable

## 2019-03-15 NOTE — PHYSICAL EXAM
[Bowel Sounds] : normal bowel sounds [Abdomen Soft] : soft [Nondistended] : nondistended [] : no hepato-splenomegaly [Normal] : oriented to person, place and time, the affect was normal, the mood was normal and not anxious [de-identified] : mild epigastric tenderness on deep palpation

## 2019-03-15 NOTE — HISTORY OF PRESENT ILLNESS
[FreeTextEntry1] : Mr. Medrano is a 67 y/o male with h/o IBS-C for many years, hypercholesterolemia, GERD, L4-L5 laminectomy 2016; cervical spine fusion 2014, Whipple surgery with stents and chemo for common bile duct carcinoma 2017 ([id number], Stage IIB) who now presents for consideration of radiation treatment for an enlarging LN adjacent to the SMA.  Biopsy of the node in Feb 2019 was c/w adenocarcinoma of biliary tract origin.  PET/CT showed uptake in this area and along the intrahepatic CBD and in the extrahepatic portion possibly within the jejunum.\par \par Today he reports low back pain for may years. No abd pain, jaundice, nausea, fever, weight loss. \par

## 2019-03-15 NOTE — REASON FOR VISIT
[Other: ___] : [unfilled] [Spouse] : spouse [Consideration of Curative Therapy] : consideration of curative therapy for

## 2019-03-15 NOTE — REVIEW OF SYSTEMS
[Negative] : Allergic/Immunologic [Constipation: Grade 0] : Constipation: Grade 0 [Diarrhea: Grade 0] : Diarrhea: Grade 0 [Dyspepsia: Grade 0] : Dyspepsia: Grade 0 [Nausea: Grade 0] : Nausea: Grade 0 [FreeTextEntry7] : h/o Whipple surgery with stents 4/2017, h/o chemo for bile common duct cancer

## 2019-03-22 PROCEDURE — 77300 RADIATION THERAPY DOSE PLAN: CPT | Mod: 26

## 2019-03-22 PROCEDURE — 77293 RESPIRATOR MOTION MGMT SIMUL: CPT | Mod: 26

## 2019-03-22 PROCEDURE — 77334 RADIATION TREATMENT AID(S): CPT | Mod: 26

## 2019-03-22 PROCEDURE — 77295 3-D RADIOTHERAPY PLAN: CPT | Mod: 26

## 2019-03-25 ENCOUNTER — OUTPATIENT (OUTPATIENT)
Dept: OUTPATIENT SERVICES | Facility: HOSPITAL | Age: 69
LOS: 1 days | Discharge: HOME | End: 2019-03-25

## 2019-03-25 DIAGNOSIS — Z98.890 OTHER SPECIFIED POSTPROCEDURAL STATES: Chronic | ICD-10-CM

## 2019-03-25 DIAGNOSIS — Z98.1 ARTHRODESIS STATUS: Chronic | ICD-10-CM

## 2019-03-25 DIAGNOSIS — K90.81 WHIPPLE'S DISEASE: Chronic | ICD-10-CM

## 2019-03-25 DIAGNOSIS — C24.9 MALIGNANT NEOPLASM OF BILIARY TRACT, UNSPECIFIED: ICD-10-CM

## 2019-03-27 VITALS
BODY MASS INDEX: 24.33 KG/M2 | WEIGHT: 169.53 LBS | SYSTOLIC BLOOD PRESSURE: 163 MMHG | HEART RATE: 71 BPM | OXYGEN SATURATION: 100 % | DIASTOLIC BLOOD PRESSURE: 90 MMHG | RESPIRATION RATE: 16 BRPM

## 2019-03-27 NOTE — REVIEW OF SYSTEMS
[Constipation: Grade 0] : Constipation: Grade 0 [Diarrhea: Grade 0] : Diarrhea: Grade 0 [Nausea: Grade 0] : Nausea: Grade 0 [Vomiting: Grade 0] : Vomiting: Grade 0 [Fatigue: Grade 0] : Fatigue: Grade 0 [Hematuria: Grade 0] : Hematuria: Grade 0 [Cough: Grade 0] : Cough: Grade 0 [Dyspnea: Grade 1 - Shortness of breath with moderate exertion] : Dyspnea: Grade 1 - Shortness of breath with moderate exertion

## 2019-03-27 NOTE — DISCUSSION/SUMMARY
[FreeTextEntry1] : I spoke with Mr. Medrano to discuss his EGD/Biopsy results.  Both came back negative with no malignancy.  Consequently I offered him SBRT to the known positive lymph node around the SMA.  He agreed and we will schedule him to return for a CT-simulation for SBRT planning shortly.

## 2019-03-27 NOTE — VITALS
[Maximal Pain Intensity: 5/10] : 5/10 [Least Pain Intensity: 2/10] : 2/10 [Pain Description/Quality: ___] : Pain description/quality: [unfilled] [Pain Duration: ___] : Pain duration: [unfilled] [Pain Location: ___] : Pain Location: [unfilled] [Pain Interferes with ADLs] : Pain interferes with activities of daily living. [NoTreatment Scheduled] : no treatment scheduled [80: Normal activity with effort; some signs or symptoms of disease.] : 80: Normal activity with effort; some signs or symptoms of disease.  [ECOG Performance Status: 0 - Fully active, able to carry on all pre-disease performance without restriction] : Performance Status: 0 - Fully active, able to carry on all pre-disease performance without restriction

## 2019-03-29 ENCOUNTER — LABORATORY RESULT (OUTPATIENT)
Age: 69
End: 2019-03-29

## 2019-03-29 ENCOUNTER — APPOINTMENT (OUTPATIENT)
Dept: INFUSION THERAPY | Facility: HOSPITAL | Age: 69
End: 2019-03-29

## 2019-03-29 ENCOUNTER — RESULT REVIEW (OUTPATIENT)
Age: 69
End: 2019-03-29

## 2019-03-29 LAB
HCT VFR BLD CALC: 31.6 % — LOW (ref 39–50)
HGB BLD-MCNC: 10.6 G/DL — LOW (ref 13–17)
MCHC RBC-ENTMCNC: 26.5 PG — LOW (ref 27–34)
MCHC RBC-ENTMCNC: 33.7 G/DL — SIGNIFICANT CHANGE UP (ref 32–36)
MCV RBC AUTO: 78.8 FL — LOW (ref 80–100)
PLATELET # BLD AUTO: 189 K/UL — SIGNIFICANT CHANGE UP (ref 150–400)
RBC # BLD: 4.01 M/UL — LOW (ref 4.2–5.8)
RBC # FLD: 15.1 % — HIGH (ref 10.3–14.5)
WBC # BLD: 4.3 K/UL — SIGNIFICANT CHANGE UP (ref 3.8–10.5)
WBC # FLD AUTO: 4.3 K/UL — SIGNIFICANT CHANGE UP (ref 3.8–10.5)

## 2019-04-03 NOTE — REVIEW OF SYSTEMS
[Constipation: Grade 0] : Constipation: Grade 0 [Diarrhea: Grade 0] : Diarrhea: Grade 0 [Nausea: Grade 0] : Nausea: Grade 0 [Vomiting: Grade 0] : Vomiting: Grade 0 [Fatigue: Grade 1 - Fatigue relieved by rest] : Fatigue: Grade 1 - Fatigue relieved by rest [Hematuria: Grade 0] : Hematuria: Grade 0 [Cough: Grade 0] : Cough: Grade 0 [Dyspnea: Grade 1 - Shortness of breath with moderate exertion] : Dyspnea: Grade 1 - Shortness of breath with moderate exertion

## 2019-04-05 PROCEDURE — 77435 SBRT MANAGEMENT: CPT

## 2019-04-12 DIAGNOSIS — C24.0 MALIGNANT NEOPLASM OF EXTRAHEPATIC BILE DUCT: ICD-10-CM

## 2019-04-12 DIAGNOSIS — C77.2 SECONDARY AND UNSPECIFIED MALIGNANT NEOPLASM OF INTRA-ABDOMINAL LYMPH NODES: ICD-10-CM

## 2019-04-14 NOTE — HISTORY OF PRESENT ILLNESS
[FreeTextEntry1] : 3/27/19:\par -tolerated first treatment well-no complaints of pain at this time\par -pt eating well and moving bowels well

## 2019-04-14 NOTE — DISEASE MANAGEMENT
[Clinical] : TNM Stage: c [TTNM] : - [NTNM] : - [MTNM] : - [N/A] : Currently not applicable [de-identified] : 1 tx/600cGy [de-identified] : 5 tx/3000cGy [de-identified] : abd

## 2019-04-14 NOTE — DISEASE MANAGEMENT
[Clinical] : TNM Stage: c [TTNM] : - [NTNM] : - [MTNM] : - [N/A] : Currently not applicable [de-identified] : 4tx/2200cGy [de-identified] : 5 tx/3000cGy [de-identified] : abd

## 2019-04-14 NOTE — HISTORY OF PRESENT ILLNESS
[FreeTextEntry1] : 4/3/19\par -tolerating tx well\par -no complaints of pain noted\par -eating well-no nausea or vomiting noted\par - mild fatigue. \par -mild headache, has not tried tylenol. reports history of post nasal drip\par -will complete on friday-discharge instructions reviewed and given along with followup appt\par \par 3/27/19\par -tolerated treatment well-no complaints of pain at this time\par -pt eating well and moving bowels well

## 2019-05-02 ENCOUNTER — OUTPATIENT (OUTPATIENT)
Dept: OUTPATIENT SERVICES | Facility: HOSPITAL | Age: 69
LOS: 1 days | Discharge: HOME | End: 2019-05-02

## 2019-05-02 DIAGNOSIS — Z98.890 OTHER SPECIFIED POSTPROCEDURAL STATES: Chronic | ICD-10-CM

## 2019-05-02 DIAGNOSIS — Z98.1 ARTHRODESIS STATUS: Chronic | ICD-10-CM

## 2019-05-02 DIAGNOSIS — C24.9 MALIGNANT NEOPLASM OF BILIARY TRACT, UNSPECIFIED: ICD-10-CM

## 2019-05-02 DIAGNOSIS — K90.81 WHIPPLE'S DISEASE: Chronic | ICD-10-CM

## 2019-05-07 ENCOUNTER — RESULT REVIEW (OUTPATIENT)
Age: 69
End: 2019-05-07

## 2019-05-07 ENCOUNTER — LABORATORY RESULT (OUTPATIENT)
Age: 69
End: 2019-05-07

## 2019-05-07 ENCOUNTER — APPOINTMENT (OUTPATIENT)
Dept: INFUSION THERAPY | Facility: HOSPITAL | Age: 69
End: 2019-05-07

## 2019-05-07 LAB
BASOPHILS # BLD AUTO: 0 K/UL — SIGNIFICANT CHANGE UP (ref 0–0.2)
BASOPHILS NFR BLD AUTO: 1 % — SIGNIFICANT CHANGE UP (ref 0–2)
EOSINOPHIL # BLD AUTO: 0.1 K/UL — SIGNIFICANT CHANGE UP (ref 0–0.5)
EOSINOPHIL NFR BLD AUTO: 3.8 % — SIGNIFICANT CHANGE UP (ref 0–6)
HCT VFR BLD CALC: 31.7 % — LOW (ref 39–50)
HGB BLD-MCNC: 10.7 G/DL — LOW (ref 13–17)
LYMPHOCYTES # BLD AUTO: 1 K/UL — SIGNIFICANT CHANGE UP (ref 1–3.3)
LYMPHOCYTES # BLD AUTO: 29.8 % — SIGNIFICANT CHANGE UP (ref 13–44)
MCHC RBC-ENTMCNC: 26.6 PG — LOW (ref 27–34)
MCHC RBC-ENTMCNC: 33.6 G/DL — SIGNIFICANT CHANGE UP (ref 32–36)
MCV RBC AUTO: 79.3 FL — LOW (ref 80–100)
MONOCYTES # BLD AUTO: 0.5 K/UL — SIGNIFICANT CHANGE UP (ref 0–0.9)
MONOCYTES NFR BLD AUTO: 15.5 % — HIGH (ref 2–14)
NEUTROPHILS # BLD AUTO: 1.7 K/UL — LOW (ref 1.8–7.4)
NEUTROPHILS NFR BLD AUTO: 49.9 % — SIGNIFICANT CHANGE UP (ref 43–77)
PLATELET # BLD AUTO: 206 K/UL — SIGNIFICANT CHANGE UP (ref 150–400)
RBC # BLD: 4 M/UL — LOW (ref 4.2–5.8)
RBC # FLD: 15.3 % — HIGH (ref 10.3–14.5)
WBC # BLD: 3.4 K/UL — LOW (ref 3.8–10.5)
WBC # FLD AUTO: 3.4 K/UL — LOW (ref 3.8–10.5)

## 2019-05-15 PROBLEM — I47.1 PAT (PAROXYSMAL ATRIAL TACHYCARDIA): Status: ACTIVE | Noted: 2018-06-25

## 2019-05-15 PROBLEM — Z86.79 HISTORY OF HYPERTENSION: Status: ACTIVE | Noted: 2017-07-20

## 2019-05-16 ENCOUNTER — APPOINTMENT (OUTPATIENT)
Dept: RADIATION ONCOLOGY | Facility: CLINIC | Age: 69
End: 2019-05-16
Payer: MEDICARE

## 2019-05-16 ENCOUNTER — APPOINTMENT (OUTPATIENT)
Dept: HEMATOLOGY ONCOLOGY | Facility: CLINIC | Age: 69
End: 2019-05-16
Payer: MEDICARE

## 2019-05-16 VITALS
HEART RATE: 78 BPM | SYSTOLIC BLOOD PRESSURE: 160 MMHG | RESPIRATION RATE: 16 BRPM | OXYGEN SATURATION: 100 % | BODY MASS INDEX: 23.68 KG/M2 | WEIGHT: 165 LBS | DIASTOLIC BLOOD PRESSURE: 75 MMHG

## 2019-05-16 VITALS
RESPIRATION RATE: 16 BRPM | SYSTOLIC BLOOD PRESSURE: 125 MMHG | DIASTOLIC BLOOD PRESSURE: 83 MMHG | HEART RATE: 71 BPM | BODY MASS INDEX: 23.68 KG/M2 | OXYGEN SATURATION: 99 % | WEIGHT: 164.99 LBS | TEMPERATURE: 97.8 F

## 2019-05-16 DIAGNOSIS — Z86.79 PERSONAL HISTORY OF OTHER DISEASES OF THE CIRCULATORY SYSTEM: ICD-10-CM

## 2019-05-16 DIAGNOSIS — I47.1 SUPRAVENTRICULAR TACHYCARDIA: ICD-10-CM

## 2019-05-16 PROCEDURE — 99024 POSTOP FOLLOW-UP VISIT: CPT | Mod: GC

## 2019-05-16 PROCEDURE — 99215 OFFICE O/P EST HI 40 MIN: CPT

## 2019-05-16 NOTE — REVIEW OF SYSTEMS
[SOB on Exertion] : shortness of breath during exertion [Joint Pain] : joint pain [Negative] : Heme/Lymph [Constipation: Grade 0] : Constipation: Grade 0 [Nausea: Grade 0] : Nausea: Grade 0 [Diarrhea: Grade 0] : Diarrhea: Grade 0 [Vomiting: Grade 0] : Vomiting: Grade 0 [Fatigue: Grade 0] : Fatigue: Grade 0 [Hematuria: Grade 0] : Hematuria: Grade 0 [Dermatitis Radiation: Grade 0] : Dermatitis Radiation: Grade 0

## 2019-05-20 NOTE — HISTORY OF PRESENT ILLNESS
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 \par HPI below as per the patient and available records\par 1/2017 Presented with painless jaundice to his PMD.  Found to have liver abnormalities, abnormal sonogram.\par 2/17/2017 CT abdomen revealed no definitive pancreatic mass\par 2/18/2017 He presented to the emergency room at [facility] with jaundice and progressive itching.  Total Bilirubin 12.5/Direct 9.1. CT abdomen showed intrabiliary Vs. extra biliary dilation. Started on ursodiol .\par 2/18/2017 MRI Abdomen with contrast: :Intra- as well as extra- hepatic biliary ductal dilatation, with stricturing of the mid to distal common bile duct. No shanna associated pancreatic mass, choledocholithiasis nor shanna mass of the common bile duct.  There is subtle enhancement of the wall of the common bile duct which may be related to inflammation or malignant stricture of the common bile duct. Multiple hepatic hemangiomas are demonstrated.\par \par 2/21/2017 EUS: Subtle 14mm x 15mm hypoechoic, heterogenous area in pancreatic head (at site of transition to biliary dilation) \par ERCP : biliary sphincterotomy, extraction of dark-thick sludge, cholangioscopy, biopsies of exposed ampulla/distal CBD, brushings of distal CBD, and placement of double pigtail biliary stent. No obvious findings seen on cholangioscopy although visualization was limited by thick, dark, sludge throughout biliary tree\par Bile duct brush suspicious for malignancy. \par Pancreas Head FNA :glandular cells with focal atypia in background of inflammatory cells including lymphocytes and fragments of fibrous tissue.\par Ampullary biopsy, ampullary type mucosa, negative for dysplasia\par Gastric body polyps; endoscopic biopsies show gastric fundic gland polyps\par \par 3/2/2017  EUS: Gastric polyps were noted. There was dilation in the common bile duct which measured up to 15 mm. There was small amount of sludge noted in the dilated bile duct. One stent was visualized endosonographically in the common bile duct. Extension of the stent was noted in the common bile duct. Pancreatic parenchymal abnormalities with hypoechoic foci were noted in the pancreatic head and pancreatic body. No obvious discrete mass / lesion was noted hence no FNA was performed.\par \par 3/2/2017 ERCP \par  A severe localized biliary stricture was found in the bile duct with SpyGlass cholangioscopy and biopsied using a cold forceps and also with a spy bite forceps for more directed sampling during cholangioscopy.  The stricture was then restented.\par \par 3/7/2017 Pt continued to experience jaundice and intense pruritus, was seen in outpatient office last week ere he had a metal stent placed, but today when he had a follow up appointment he was once again found to have an elevated bilirubin to 20 and was sent in for admission.\par \par 3/9/2017 EUS/ERCP: Distal biliary stricture with stent removal, cholangioscopy, biopsies, brushings, fully covered metal stent placement\par \par 4/26/2017 Whipple resection, feeding jejunostomy, appendectomy. Pathology:\par Common bile duct - Extra and intra pancreatic Tumor Size: 0.6 x 0.1 cm Histologic Type: Adenocarcinoma (not otherwise characterized) Histologic Grade: Moderate to poorly differentiated (G2 and G3).  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 Pt  admitted for IV Antibiotics with Cipro/Flagyl changed to Meropenem and Vancomycin.. Pt taken to IR and underwent CT guided percutaneous drainage of a right lower quadrant fluid collection on 6/13. \par Pt discharged home with drain to follow up with IR for tube check in 1 week \par Cultures of abscess grew out enterococcus faecalis and enterococcus raffinosus. He has completed antibiotic therapy.\par \par He was evaluated by Dr. Medrano on 7/11 and cleared for medical evaluation.\par \par Received Day 1 Cycle 1 gemcitabine and cisplatin, fatigue for 2 days lynne 5; Day 8 held due to neutropenia (WBC 2.5 ) resolved in 1 week.\par \par 10/12/17 Here for Cycle 4 Day 8 gemcitabine and cisplatin, very fatigued after day . Constipation has been more problematic 2 days since last bm. He has mild mucositis upper palate, improving. Denies paresthesias or subjective changes in hearing.  Plan: Senekot S nightly, Reglan 10 mg Po tid.  Based on current data, will rotate to oral capecitabine for the next 3 months to complete his course of Rx.\par \par \par 1/22/18 Fibroscan test:median liver stiffness of 5.0 kPa which is consistent with F0-F1 disease,  dB/m (S 2).\par \par 1/19/18  F/U visit: some back pain that is chronic and comes and goes as prev reported. Issue is a finding on recent scan, which indicates an enlarging soft tissue density, essentially doubled in size in 3 months adjacent to SMA. \par \par 2/9/18  Elevated alk phosphatase of 170.  As per discussion among DRs Mary, Rolanda, Alejandra and Mitchell regarding follow up plan after elevated alk phos and CT/MRI. No evidence of cancer recurrence on liver MRI, will continue surveillance q 3 months as planned with reimaging (MRI) as recommnded. F/U CMP in one month (week of 2/19) patient to obtain at [facility], orders entered and requisitions sent to patient. NO new symptoms. Advised to call sooner if he notes new symptoms. \par \par 4/27/18 Completed Capecitabine in January 2018. He experienced 3 episodes syncope, admitted with the last episode on 2/13. He sustained a fall on his face. Initial EKG showed accelerated junctional rhythm.  No events on telemetry, negative troponins, unremarkable TTE (2/14/18). Results from outpatient studies provided by his cardiologist, Dr. Casey Kerr: TTE results 1/9/18 with unchanged EF 60%, mild mitral and tricuspid regurgitation, normal wall motion. Stress test in 2016 nonischemic. \par \par 8/8/18 Upper Endoscopy :erythematous mucosa in stomach\par \par 10/11/18 Cardiology-PATs with no associated symptoms. Lifestyle changes recommended and beta blocker started. Told by Dr. Turner he would not need ablation\par \par 10/18/18 CT abdomen and pelvis:  Addendum 11/12/18 Upon further review, a 9 mm nodular focus is seen adjacent to the SMA on the left (2:77) new from prior imaging. Subtle recurrent disease is not excluded. Short-term follow-up imaging is recommended.  \par CA 19-9 wnl- FU in 3 mos, otherwise no new symptoms, chronic back pain\par \par 1/3/19 CT abdomen and pelvis showed increase in size of soft tissue density adjacent to the superior mesenteric \par artery today measuring 1.4 x 1.5 cm in size. \par 1/15/19 Pet CT obtained FDG avid lymph node adjacent to the SMA, suspicious for \par recurrent/metastatic disease.; FDG avidity along the intrahepatic CBD stent, with focal FDG avidity in the extrahepatic portion possibly within the jejunum, possibly recurrent \par disease. Consider evaluation with EUS/ERCP for further characterization.\par 2/1/19 Upper EUS Sono guided core needle Bx of “patricia SMA” LN 1 cm: Poorly-differentiated adenocarcinoma,\par 2/26/19 Altered Anatomy ERCP Mild hyperplasia at hepatic jejunostomy anastomosis felt to be stent related. Biopsy of Bile duct anastamosis: - Small intestinal type mucosa with preserved villous architecture and no significant intraepithelial lymphocytosis - Negative for dysplasia, Negative for malignancy, (multiple levels examined) \par SBRT offered to known positive lymph node around the SMA: 3/27/19-4/5/19 for a total of 3,000cGy in 5 fractions.\par \par \par  [FreeTextEntry1] : 8/3/17 Cycle 1 day 1 cisplatin 25 mg/m2 and gemcitabine 1000 mg/m2; held day  8 ; Cycle 2 dose reduction to 750 mg/m2 and 18.75 mg/m2 ; cycle 4 day 1 10/5/17 Day 8 held).   11/2/17  Capecitabine 1500 mg Po bid 2 weeks on/ 2 weeks off completed 1/15/18 \par  [de-identified] : feeling well post RT 3 weeks ago.As per Dr Cox, to be re-scanned in June.

## 2019-05-20 NOTE — RESULTS/DATA
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  While the findings are compatible with metastasis\par from this known primary, morphologic comparison will be performed\par upon receipt of the slides and the findings will be reported in\par an addendum.\par \par The carcinoma is present in the form of detached clusters as well\par as infiltrative into dense fibrous tissue showing desmoplastic\par type response.  Focal small lymphoid aggregates are present but\par convincing features of lymph node parenchyma are not seen.  It is\par not entirely certain in the sampling whether the findings\par represent near-total effacement of a lymph node versus invasion\par of soft tissue or other structures by carcinoma.  Correlation\par with clinical/radiographic impression may be helpful.\par \par This case was reviewed for  at the Gastrointestinal/Hepatobiliary intradepartmental consensus\par conference on 2/5/2019.\par \par Verified by: Farhat Baker DO\par (Electronic Signature)\par Reported on: 02/05/19 14:55 EST, [address] [place] [address], [place], [place]\par [id number]\par \par \par EXAM: PETCT SKUL-THI ONC FDG INIT \par PROCEDURE DATE: 01/15/2019 \par INTERPRETATION: PROCEDURE: PET/CT SKULL BASE-MID THIGH IMAGING \par \par RADIOPHARMACEUTICAL: 13.5 mCi F-18, FDG, I.V. 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 PET/CT is done as part of the initial treatment strategy evaluation. \par \par TECHNIQUE: Fasting blood sugar measured prior to injection of \par radiopharmaceutical was 85 mg/dl. Following intravenous injection of the \par radiopharmaceutical and an uptake period of approximately 75 minutes, \par FDG-PET/CT was obtained on a Siemens Biograph mCT 64 scanner from the skull \par base to mid thigh. Oral contrast was administered during the uptake period. \par CT was performed during shallow respiration. The CT protocol was optimized \par for PET attenuation correction and to provide anatomic detail for \par localization of PET abnormalities. The CT protocol was not designed to \par produce and cannot replace state-of-the-art diagnostic CT images with \par specific imaging protocols for different body parts and indications. Images \par were reviewed on a dedicated workstation using multiplanar reconstruction. \par \par The standardized uptake values (SUV) are normalized to patient body weight \par and indicate the highest activity concentration (SUVmax) in a given disease \par site. All image numbers refer to axial image number. \par \par COMPARISON: No prior PET/CT \par \par OTHER STUDIES USED FOR CORRELATION: CT abdomen pelvis January 3, 2019; CT \par chest, abdomen/pelvis October 24, 2018; MRI abdomen June 6, 2018 \par \par FINDINGS: \par \par HEAD/NECK: No abnormal avidity. Physiologic FDG activity seen in the \par visualized portions of the brain, major salivary glands and neck muscles. \par \par THORAX: Right chest wall port catheter tip cavoatrial junction. No abnormal \par avidity.Physiologic FDG activity in the myocardium and blood pool. \par \par LUNGS: No FDG avid foci. No pulmonary nodules. \par \par PLEURA/PERICARDIUM: No abnormal avidity. No pericardial or pleural effusions. \par \par HEPATOBILIARY/PANCREAS: Liver background SUV mean as a reference for \par comparing studies is 2.5. Hepatic steatosis. Postsurgical changes of Whipple \par procedure and hepaticojejunostomy. Common bile duct stent, with mild FDG \par avidity along intrahepatic course of the stent (SUV 4.9; image 139) and \par focal intense FDG avidity in the extrahepatic portion of the stent localized \par to the jejunum (SUV 9.2; image 152). \par \par SPLEEN: No abnormal avidity. \par \par ADRENAL GLANDS: No abnormal avidity. \par \par KIDNEYS/URINARY BLADDER: Excreted activity is seen. No abnormal avidity. \par \par ABDOMINOPELVIC NODES: FDG avid lymph node adjacent to the SMA 1.6 x 1.0 cm \par (SUV 8.7; image 156) \par \par BOWEL/PERITONEUM/MESENTERY: No abnormal avidity. \par \par PELVIC ORGANS: No abnormal avidity. \par \par BONES: Anterior cervical discectomy and fusion C4-C6. Mild degenerative \par changes thoracolumbar spine. \par \par SOFT TISSUES: No abnormal avidity. \par \par IMPRESSION: \par \par 1. FDG avid lymph node adjacent to the SMA, suspicious for \par recurrent/metastatic disease. \par \par 2. FDG avidity along the intrahepatic CBD stent, with focal FDG avidity in \par the extrahepatic portion possibly within the jejunum, possibly recurrent \par disease. Consider evaluation with EUS/ERCP for further characterization. \par \par FIONA SALAZAR M.D., ATTENDING RADIOLOGIST \par This document has been electronically signed. John 15 2019 3:22PM \par \par \par EXAM: CT ABDOMEN AND PELVIS OC IC \par PROCEDURE DATE: 01/03/2019 \par INTERPRETATION: CLINICAL INFORMATION: History gallbladder cancer with \par possible developing retroperitoneal adenopathy \par \par COMPARISON: 10/18 7/17 MRI dated 6/18 \par \par PROCEDURE: \par CT of the Abdomen and Pelvis was performed with intravenous contrast. \par Intravenous contrast: 90 ml Omnipaque 350. 10 ml discarded. \par Oral contrast: positive contrast was administered. \par Sagittal and coronal reformats were performed. \par \par FINDINGS: \par \par LOWER CHEST: Within normal limits. \par \par LIVER: Diffuse fatty infiltration of the liver. Previously described \par hemangioma and cysts are stable in their radiographic appearance \par BILE DUCTS: Enteric anastomosis appears intact. No intrahepatic ductal \par dilatation seen \par GALLBLADDER: Removed \par SPLEEN: Within normal limits. \par PANCREAS: Postop changes to the pancreatic head stable in its radiographic \par appearance \par ADRENALS: Within normal limits. \par KIDNEYS/URETERS: Within normal limits. \par \par BLADDER: Within normal limits. \par REPRODUCTIVE ORGANS: Unremarkable \par \par BOWEL: Gastrojejunostomy \par PERITONEUM: No ascites. \par VESSELS: Atherosclerotic changes \par RETROPERITONEUM: Previously seen soft tissue density adjacent to the \par superior mesenteric artery appears to be increased in size measuring 1.4 x \par 1.5 cm in size series 2 image 30 \par ABDOMINAL WALL: Within normal limits. \par BONES: Within normal limits. \par \par IMPRESSION: \par \par Increase in size of soft tissue density adjacent to the superior mesenteric \par artery today measuring 1.4 x 1.5 cm in size. \par \par SIRENA PETERSON M.D., ATTENDING RADIOLOGIST \par This document has been electronically signed. John 3 2019 10:54AM \par

## 2019-05-20 NOTE — ASSESSMENT
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 All in field.Will follow scans.Pt will likely need additional treatment post Rt but will re-assess expectantly. [Palliative Care Plan] : not applicable at this time [Palliative] : Goals of care discussed with patient: Palliative

## 2019-06-13 ENCOUNTER — FORM ENCOUNTER (OUTPATIENT)
Age: 69
End: 2019-06-13

## 2019-06-14 ENCOUNTER — OUTPATIENT (OUTPATIENT)
Dept: OUTPATIENT SERVICES | Facility: HOSPITAL | Age: 69
LOS: 1 days | End: 2019-06-14
Payer: MEDICARE

## 2019-06-14 ENCOUNTER — APPOINTMENT (OUTPATIENT)
Dept: CT IMAGING | Facility: CLINIC | Age: 69
End: 2019-06-14

## 2019-06-14 DIAGNOSIS — Z98.890 OTHER SPECIFIED POSTPROCEDURAL STATES: Chronic | ICD-10-CM

## 2019-06-14 DIAGNOSIS — Z00.8 ENCOUNTER FOR OTHER GENERAL EXAMINATION: ICD-10-CM

## 2019-06-14 DIAGNOSIS — Z98.1 ARTHRODESIS STATUS: Chronic | ICD-10-CM

## 2019-06-14 DIAGNOSIS — K90.81 WHIPPLE'S DISEASE: Chronic | ICD-10-CM

## 2019-06-14 PROCEDURE — 71260 CT THORAX DX C+: CPT

## 2019-06-14 PROCEDURE — 74160 CT ABDOMEN W/CONTRAST: CPT

## 2019-06-14 PROCEDURE — 74160 CT ABDOMEN W/CONTRAST: CPT | Mod: 26

## 2019-06-14 PROCEDURE — 82565 ASSAY OF CREATININE: CPT

## 2019-06-14 PROCEDURE — 71260 CT THORAX DX C+: CPT | Mod: 26

## 2019-06-20 ENCOUNTER — OUTPATIENT (OUTPATIENT)
Dept: OUTPATIENT SERVICES | Facility: HOSPITAL | Age: 69
LOS: 1 days | Discharge: HOME | End: 2019-06-20

## 2019-06-20 DIAGNOSIS — C24.9 MALIGNANT NEOPLASM OF BILIARY TRACT, UNSPECIFIED: ICD-10-CM

## 2019-06-20 DIAGNOSIS — Z98.1 ARTHRODESIS STATUS: Chronic | ICD-10-CM

## 2019-06-20 DIAGNOSIS — K90.81 WHIPPLE'S DISEASE: Chronic | ICD-10-CM

## 2019-06-20 DIAGNOSIS — Z98.890 OTHER SPECIFIED POSTPROCEDURAL STATES: Chronic | ICD-10-CM

## 2019-06-25 ENCOUNTER — LABORATORY RESULT (OUTPATIENT)
Age: 69
End: 2019-06-25

## 2019-06-25 ENCOUNTER — RESULT REVIEW (OUTPATIENT)
Age: 69
End: 2019-06-25

## 2019-06-25 ENCOUNTER — APPOINTMENT (OUTPATIENT)
Dept: INFUSION THERAPY | Facility: HOSPITAL | Age: 69
End: 2019-06-25

## 2019-06-25 LAB
BASOPHILS # BLD AUTO: 0 K/UL — SIGNIFICANT CHANGE UP (ref 0–0.2)
BASOPHILS NFR BLD AUTO: 0.3 % — SIGNIFICANT CHANGE UP (ref 0–2)
EOSINOPHIL # BLD AUTO: 0 K/UL — SIGNIFICANT CHANGE UP (ref 0–0.5)
EOSINOPHIL NFR BLD AUTO: 0.2 % — SIGNIFICANT CHANGE UP (ref 0–6)
HCT VFR BLD CALC: 34.8 % — LOW (ref 39–50)
HGB BLD-MCNC: 11.1 G/DL — LOW (ref 13–17)
LYMPHOCYTES # BLD AUTO: 0.9 K/UL — LOW (ref 1–3.3)
LYMPHOCYTES # BLD AUTO: 16.8 % — SIGNIFICANT CHANGE UP (ref 13–44)
MCHC RBC-ENTMCNC: 25.6 PG — LOW (ref 27–34)
MCHC RBC-ENTMCNC: 31.8 G/DL — LOW (ref 32–36)
MCV RBC AUTO: 80.3 FL — SIGNIFICANT CHANGE UP (ref 80–100)
MONOCYTES # BLD AUTO: 0.6 K/UL — SIGNIFICANT CHANGE UP (ref 0–0.9)
MONOCYTES NFR BLD AUTO: 11.4 % — SIGNIFICANT CHANGE UP (ref 2–14)
NEUTROPHILS # BLD AUTO: 3.8 K/UL — SIGNIFICANT CHANGE UP (ref 1.8–7.4)
NEUTROPHILS NFR BLD AUTO: 71.3 % — SIGNIFICANT CHANGE UP (ref 43–77)
PLATELET # BLD AUTO: 187 K/UL — SIGNIFICANT CHANGE UP (ref 150–400)
RBC # BLD: 4.34 M/UL — SIGNIFICANT CHANGE UP (ref 4.2–5.8)
RBC # FLD: 15 % — HIGH (ref 10.3–14.5)
WBC # BLD: 5.3 K/UL — SIGNIFICANT CHANGE UP (ref 3.8–10.5)
WBC # FLD AUTO: 5.3 K/UL — SIGNIFICANT CHANGE UP (ref 3.8–10.5)

## 2019-06-28 ENCOUNTER — OTHER (OUTPATIENT)
Age: 69
End: 2019-06-28

## 2019-06-28 ENCOUNTER — RESULT REVIEW (OUTPATIENT)
Age: 69
End: 2019-06-28

## 2019-07-18 ENCOUNTER — OUTPATIENT (OUTPATIENT)
Dept: OUTPATIENT SERVICES | Facility: HOSPITAL | Age: 69
LOS: 1 days | Discharge: HOME | End: 2019-07-18

## 2019-07-18 DIAGNOSIS — Z98.890 OTHER SPECIFIED POSTPROCEDURAL STATES: Chronic | ICD-10-CM

## 2019-07-18 DIAGNOSIS — C24.9 MALIGNANT NEOPLASM OF BILIARY TRACT, UNSPECIFIED: ICD-10-CM

## 2019-07-18 DIAGNOSIS — Z98.1 ARTHRODESIS STATUS: Chronic | ICD-10-CM

## 2019-07-18 DIAGNOSIS — K90.81 WHIPPLE'S DISEASE: Chronic | ICD-10-CM

## 2019-07-22 ENCOUNTER — APPOINTMENT (OUTPATIENT)
Dept: HEMATOLOGY ONCOLOGY | Facility: CLINIC | Age: 69
End: 2019-07-22
Payer: MEDICARE

## 2019-07-22 VITALS
OXYGEN SATURATION: 99 % | HEART RATE: 76 BPM | TEMPERATURE: 98.2 F | DIASTOLIC BLOOD PRESSURE: 96 MMHG | WEIGHT: 156 LBS | RESPIRATION RATE: 16 BRPM | SYSTOLIC BLOOD PRESSURE: 158 MMHG | BODY MASS INDEX: 22.38 KG/M2

## 2019-07-22 PROCEDURE — 99215 OFFICE O/P EST HI 40 MIN: CPT

## 2019-07-29 NOTE — HISTORY OF PRESENT ILLNESS
[N: ___] : N[unfilled] [Disease: _____________________] : Disease: [unfilled] [T: ___] : T[unfilled] [AJCC Stage: ____] : AJCC Stage: [unfilled] [Treatment Protocol] : Treatment Protocol [de-identified] : 66 Year old gentleman with PMH of IBS-C for many years, hypercholesterolemia, GERD, L4-L5 laminectomy 2016; cervical spine fusion 2014, here for a medical oncology evaluation for treatment of recently diagnosis of the common bile duct. \par HPI below as per the patient and available records\par 1/2017 Presented with painless jaundice to his PMD.  Found to have liver abnormalities, abnormal sonogram.\par 2/17/2017 CT abdomen revealed no definitive pancreatic mass\par 2/18/2017 He presented to the emergency room at [facility] with jaundice and progressive itching.  Total Bilirubin 12.5/Direct 9.1. CT abdomen showed intrabiliary Vs. extra biliary dilation. Started on ursodiol .\par 2/18/2017 MRI Abdomen with contrast: :Intra- as well as extra- hepatic biliary ductal dilatation, with stricturing of the mid to distal common bile duct. No shanna associated pancreatic mass, choledocholithiasis nor shanna mass of the common bile duct.  There is subtle enhancement of the wall of the common bile duct which may be related to inflammation or malignant stricture of the common bile duct. Multiple hepatic hemangiomas are demonstrated.\par \par 2/21/2017 EUS: Subtle 14mm x 15mm hypoechoic, heterogenous area in pancreatic head (at site of transition to biliary dilation) \par ERCP : biliary sphincterotomy, extraction of dark-thick sludge, cholangioscopy, biopsies of exposed ampulla/distal CBD, brushings of distal CBD, and placement of double pigtail biliary stent. No obvious findings seen on cholangioscopy although visualization was limited by thick, dark, sludge throughout biliary tree\par Bile duct brush suspicious for malignancy. \par Pancreas Head FNA :glandular cells with focal atypia in background of inflammatory cells including lymphocytes and fragments of fibrous tissue.\par Ampullary biopsy, ampullary type mucosa, negative for dysplasia\par Gastric body polyps; endoscopic biopsies show gastric fundic gland polyps\par \par 3/2/2017  EUS: Gastric polyps were noted. There was dilation in the common bile duct which measured up to 15 mm. There was small amount of sludge noted in the dilated bile duct. One stent was visualized endosonographically in the common bile duct. Extension of the stent was noted in the common bile duct. Pancreatic parenchymal abnormalities with hypoechoic foci were noted in the pancreatic head and pancreatic body. No obvious discrete mass / lesion was noted hence no FNA was performed.\par \par 3/2/2017 ERCP \par  A severe localized biliary stricture was found in the bile duct with SpyGlass cholangioscopy and biopsied using a cold forceps and also with a spy bite forceps for more directed sampling during cholangioscopy.  The stricture was then restented.\par \par 3/7/2017 Pt continued to experience jaundice and intense pruritus, was seen in outpatient office last week ere he had a metal stent placed, but today when he had a follow up appointment he was once again found to have an elevated bilirubin to 20 and was sent in for admission.\par \par 3/9/2017 EUS/ERCP: Distal biliary stricture with stent removal, cholangioscopy, biopsies, brushings, fully covered metal stent placement\par \par 4/26/2017 Whipple resection, feeding jejunostomy, appendectomy. Pathology:\par Common bile duct - Extra and intra pancreatic Tumor Size: 0.6 x 0.1 cm Histologic Type: Adenocarcinoma (not otherwise characterized) Histologic Grade: Moderate to poorly differentiated (G2 and G3).  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 Pt  admitted for IV Antibiotics with Cipro/Flagyl changed to Meropenem and Vancomycin.. Pt taken to IR and underwent CT guided percutaneous drainage of a right lower quadrant fluid collection on 6/13. \par Pt discharged home with drain to follow up with IR for tube check in 1 week \par Cultures of abscess grew out enterococcus faecalis and enterococcus raffinosus. He has completed antibiotic therapy.\par \par He was evaluated by Dr. Medrano on 7/11 and cleared for medical evaluation.\par \par Received Day 1 Cycle 1 gemcitabine and cisplatin, fatigue for 2 days lynne 5; Day 8 held due to neutropenia (WBC 2.5 ) resolved in 1 week.\par \par 10/12/17 Here for Cycle 4 Day 8 gemcitabine and cisplatin, very fatigued after day . Constipation has been more problematic 2 days since last bm. He has mild mucositis upper palate, improving. Denies paresthesias or subjective changes in hearing.  Plan: Senekot S nightly, Reglan 10 mg Po tid.  Based on current data, will rotate to oral capecitabine for the next 3 months to complete his course of Rx.\par \par \par 1/22/18 Fibroscan test:median liver stiffness of 5.0 kPa which is consistent with F0-F1 disease,  dB/m (S 2).\par \par 1/19/18  F/U visit: some back pain that is chronic and comes and goes as prev reported. Issue is a finding on recent scan, which indicates an enlarging soft tissue density, essentially doubled in size in 3 months adjacent to SMA. \par \par 2/9/18  Elevated alk phosphatase of 170.  As per discussion among DRs Mary, Rolanda, Alejandra and Mitchell regarding follow up plan after elevated alk phos and CT/MRI. No evidence of cancer recurrence on liver MRI, will continue surveillance q 3 months as planned with reimaging (MRI) as recommnded. F/U CMP in one month (week of 2/19) patient to obtain at [facility], orders entered and requisitions sent to patient. NO new symptoms. Advised to call sooner if he notes new symptoms. \par \par 4/27/18 Completed Capecitabine in January 2018. He experienced 3 episodes syncope, admitted with the last episode on 2/13. He sustained a fall on his face. Initial EKG showed accelerated junctional rhythm.  No events on telemetry, negative troponins, unremarkable TTE (2/14/18). Results from outpatient studies provided by his cardiologist, Dr. Casey Kerr: TTE results 1/9/18 with unchanged EF 60%, mild mitral and tricuspid regurgitation, normal wall motion. Stress test in 2016 nonischemic. \par \par 8/8/18 Upper Endoscopy :erythematous mucosa in stomach\par \par 10/11/18 Cardiology-PATs with no associated symptoms. Lifestyle changes recommended and beta blocker started. Told by Dr. Turner he would not need ablation\par \par 10/18/18 CT abdomen and pelvis:  Addendum 11/12/18 Upon further review, a 9 mm nodular focus is seen adjacent to the SMA on the left (2:77) new from prior imaging. Subtle recurrent disease is not excluded. Short-term follow-up imaging is recommended.  \par CA 19-9 wnl- FU in 3 mos, otherwise no new symptoms, chronic back pain\par \par 1/3/19 CT abdomen and pelvis showed increase in size of soft tissue density adjacent to the superior mesenteric \par artery today measuring 1.4 x 1.5 cm in size. \par 1/15/19 Pet CT obtained FDG avid lymph node adjacent to the SMA, suspicious for \par recurrent/metastatic disease.; FDG avidity along the intrahepatic CBD stent, with focal FDG avidity in the extrahepatic portion possibly within the jejunum, possibly recurrent \par disease. Consider evaluation with EUS/ERCP for further characterization.\par 2/1/19 Upper EUS Sono guided core needle Bx of “patricia SMA” LN 1 cm: Poorly-differentiated adenocarcinoma,\par 2/26/19 Altered Anatomy ERCP Mild hyperplasia at hepatic jejunostomy anastomosis felt to be stent related. 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   11/2/17  Capecitabine 1500 mg Po bid 2 weeks on/ 2 weeks off completed 1/15/18 \par  [de-identified] : Having some dull "bloating like' pains, otherwise, feeling well post RT 3mos.As per Dr Cox,was rescanned, no overt changes [de-identified] : Lymph-Vascular Invasion: Identified\par Perineural Invasion: Identified\par pT3: Tumor invades pancreas and duodenum \par Regional Lymph Nodes  N1: 4/18 positive lymph nodes \par \par ALSO\par Pancreatic intraepithelial neoplasia - highest grade 3 (PanIN-3)\par \par Acute and chronic cholecystitis

## 2019-07-29 NOTE — RESULTS/DATA
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  While the findings are compatible with metastasis\par from this known primary, morphologic comparison will be performed\par upon receipt of the slides and the findings will be reported in\par an addendum.\par \par The carcinoma is present in the form of detached clusters as well\par as infiltrative into dense fibrous tissue showing desmoplastic\par type response.  Focal small lymphoid aggregates are present but\par convincing features of lymph node parenchyma are not seen.  It is\par not entirely certain in the sampling whether the findings\par represent near-total effacement of a lymph node versus invasion\par of soft tissue or other structures by carcinoma.  Correlation\par with clinical/radiographic impression may be helpful.\par \par This case was reviewed for  at the Gastrointestinal/Hepatobiliary intradepartmental consensus\par conference on 2/5/2019.\par \par Verified by: Farhat Baker DO\par (Electronic Signature)\par Reported on: 02/05/19 14:55 EST, [address] [place] [address], [place], [place]\par [address]\par \par \par EXAM: PETCT SKUL-THI ONC FDG INIT \par PROCEDURE DATE: 01/15/2019 \par INTERPRETATION: PROCEDURE: PET/CT SKULL BASE-MID THIGH IMAGING \par \par RADIOPHARMACEUTICAL: 13.5 mCi F-18, FDG, I.V. 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 PET/CT is done as part of the initial treatment strategy evaluation. \par \par TECHNIQUE: Fasting blood sugar measured prior to injection of \par radiopharmaceutical was 85 mg/dl. Following intravenous injection of the \par radiopharmaceutical and an uptake period of approximately 75 minutes, \par FDG-PET/CT was obtained on a Siemens Biograph mCT 64 scanner from the skull \par base to mid thigh. Oral contrast was administered during the uptake period. \par CT was performed during shallow respiration. The CT protocol was optimized \par for PET attenuation correction and to provide anatomic detail for \par localization of PET abnormalities. The CT protocol was not designed to \par produce and cannot replace state-of-the-art diagnostic CT images with \par specific imaging protocols for different body parts and indications. Images \par were reviewed on a dedicated workstation using multiplanar reconstruction. \par \par The standardized uptake values (SUV) are normalized to patient body weight \par and indicate the highest activity concentration (SUVmax) in a given disease \par site. All image numbers refer to axial image number. \par \par COMPARISON: No prior PET/CT \par \par OTHER STUDIES USED FOR CORRELATION: CT abdomen pelvis January 3, 2019; CT \par chest, abdomen/pelvis October 24, 2018; MRI abdomen June 6, 2018 \par \par FINDINGS: \par \par HEAD/NECK: No abnormal avidity. Physiologic FDG activity seen in the \par visualized portions of the brain, major salivary glands and neck muscles. \par \par THORAX: Right chest wall port catheter tip cavoatrial junction. No abnormal \par avidity.Physiologic FDG activity in the myocardium and blood pool. \par \par LUNGS: No FDG avid foci. No pulmonary nodules. \par \par PLEURA/PERICARDIUM: No abnormal avidity. No pericardial or pleural effusions. \par \par HEPATOBILIARY/PANCREAS: Liver background SUV mean as a reference for \par comparing studies is 2.5. Hepatic steatosis. Postsurgical changes of Whipple \par procedure and hepaticojejunostomy. Common bile duct stent, with mild FDG \par avidity along intrahepatic course of the stent (SUV 4.9; image 139) and \par focal intense FDG avidity in the extrahepatic portion of the stent localized \par to the jejunum (SUV 9.2; image 152). \par \par SPLEEN: No abnormal avidity. \par \par ADRENAL GLANDS: No abnormal avidity. \par \par KIDNEYS/URINARY BLADDER: Excreted activity is seen. No abnormal avidity. \par \par ABDOMINOPELVIC NODES: FDG avid lymph node adjacent to the SMA 1.6 x 1.0 cm \par (SUV 8.7; image 156) \par \par BOWEL/PERITONEUM/MESENTERY: No abnormal avidity. \par \par PELVIC ORGANS: No abnormal avidity. \par \par BONES: Anterior cervical discectomy and fusion C4-C6. Mild degenerative \par changes thoracolumbar spine. \par \par SOFT TISSUES: No abnormal avidity. \par \par IMPRESSION: \par \par 1. FDG avid lymph node adjacent to the SMA, suspicious for \par recurrent/metastatic disease. \par \par 2. FDG avidity along the intrahepatic CBD stent, with focal FDG avidity in \par the extrahepatic portion possibly within the jejunum, possibly recurrent \par disease. Consider evaluation with EUS/ERCP for further characterization. \par \par FIONA SALAZAR M.D., ATTENDING RADIOLOGIST \par This document has been electronically signed. John 15 2019 3:22PM \par \par \par EXAM: CT ABDOMEN AND PELVIS OC IC \par PROCEDURE DATE: 01/03/2019 \par INTERPRETATION: CLINICAL INFORMATION: History gallbladder cancer with \par possible developing retroperitoneal adenopathy \par \par COMPARISON: 10/18 7/17 MRI dated 6/18 \par \par PROCEDURE: \par CT of the Abdomen and Pelvis was performed with intravenous contrast. \par Intravenous contrast: 90 ml Omnipaque 350. 10 ml discarded. \par Oral contrast: positive contrast was administered. \par Sagittal and coronal reformats were performed. \par \par FINDINGS: \par \par LOWER CHEST: Within normal limits. \par \par LIVER: Diffuse fatty infiltration of the liver. Previously described \par hemangioma and cysts are stable in their radiographic appearance \par BILE DUCTS: Enteric anastomosis appears intact. No intrahepatic ductal \par dilatation seen \par GALLBLADDER: Removed \par SPLEEN: Within normal limits. \par PANCREAS: Postop changes to the pancreatic head stable in its radiographic \par appearance \par ADRENALS: Within normal limits. \par KIDNEYS/URETERS: Within normal limits. \par \par BLADDER: Within normal limits. \par REPRODUCTIVE ORGANS: Unremarkable \par \par BOWEL: Gastrojejunostomy \par PERITONEUM: No ascites. \par VESSELS: Atherosclerotic changes \par RETROPERITONEUM: Previously seen soft tissue density adjacent to the \par superior mesenteric artery appears to be increased in size measuring 1.4 x \par 1.5 cm in size series 2 image 30 \par ABDOMINAL WALL: Within normal limits. \par BONES: Within normal limits. \par \par IMPRESSION: \par \par Increase in size of soft tissue density adjacent to the superior mesenteric \par artery today measuring 1.4 x 1.5 cm in size. \par \par SIRENA PETERSON M.D., ATTENDING RADIOLOGIST \par This document has been electronically signed. John 3 2019 10:54AM \par

## 2019-07-29 NOTE — ASSESSMENT
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 All in field.Will follow scans.Additional treatment possible, if any change

## 2019-08-01 ENCOUNTER — LABORATORY RESULT (OUTPATIENT)
Age: 69
End: 2019-08-01

## 2019-08-01 ENCOUNTER — APPOINTMENT (OUTPATIENT)
Dept: HEPATOLOGY | Facility: CLINIC | Age: 69
End: 2019-08-01
Payer: MEDICARE

## 2019-08-01 ENCOUNTER — RESULT REVIEW (OUTPATIENT)
Age: 69
End: 2019-08-01

## 2019-08-01 ENCOUNTER — APPOINTMENT (OUTPATIENT)
Dept: INFUSION THERAPY | Facility: HOSPITAL | Age: 69
End: 2019-08-01

## 2019-08-01 VITALS
WEIGHT: 157 LBS | DIASTOLIC BLOOD PRESSURE: 79 MMHG | RESPIRATION RATE: 16 BRPM | HEART RATE: 71 BPM | TEMPERATURE: 97.9 F | SYSTOLIC BLOOD PRESSURE: 129 MMHG | HEIGHT: 70 IN | BODY MASS INDEX: 22.48 KG/M2

## 2019-08-01 DIAGNOSIS — K76.0 FATTY (CHANGE OF) LIVER, NOT ELSEWHERE CLASSIFIED: ICD-10-CM

## 2019-08-01 LAB
BASOPHILS # BLD AUTO: 0 K/UL — SIGNIFICANT CHANGE UP (ref 0–0.2)
BASOPHILS NFR BLD AUTO: 1.1 % — SIGNIFICANT CHANGE UP (ref 0–2)
EOSINOPHIL # BLD AUTO: 0.1 K/UL — SIGNIFICANT CHANGE UP (ref 0–0.5)
EOSINOPHIL NFR BLD AUTO: 1.9 % — SIGNIFICANT CHANGE UP (ref 0–6)
HCT VFR BLD CALC: 34.2 % — LOW (ref 39–50)
HGB BLD-MCNC: 10.9 G/DL — LOW (ref 13–17)
LYMPHOCYTES # BLD AUTO: 1.3 K/UL — SIGNIFICANT CHANGE UP (ref 1–3.3)
LYMPHOCYTES # BLD AUTO: 30.5 % — SIGNIFICANT CHANGE UP (ref 13–44)
MCHC RBC-ENTMCNC: 26.4 PG — LOW (ref 27–34)
MCHC RBC-ENTMCNC: 32 G/DL — SIGNIFICANT CHANGE UP (ref 32–36)
MCV RBC AUTO: 82.7 FL — SIGNIFICANT CHANGE UP (ref 80–100)
MONOCYTES # BLD AUTO: 0.5 K/UL — SIGNIFICANT CHANGE UP (ref 0–0.9)
MONOCYTES NFR BLD AUTO: 11.1 % — SIGNIFICANT CHANGE UP (ref 2–14)
NEUTROPHILS # BLD AUTO: 2.3 K/UL — SIGNIFICANT CHANGE UP (ref 1.8–7.4)
NEUTROPHILS NFR BLD AUTO: 55.5 % — SIGNIFICANT CHANGE UP (ref 43–77)
PLATELET # BLD AUTO: 209 K/UL — SIGNIFICANT CHANGE UP (ref 150–400)
RBC # BLD: 4.14 M/UL — LOW (ref 4.2–5.8)
RBC # FLD: 15.4 % — HIGH (ref 10.3–14.5)
WBC # BLD: 4.2 K/UL — SIGNIFICANT CHANGE UP (ref 3.8–10.5)
WBC # FLD AUTO: 4.2 K/UL — SIGNIFICANT CHANGE UP (ref 3.8–10.5)

## 2019-08-01 PROCEDURE — 99214 OFFICE O/P EST MOD 30 MIN: CPT

## 2019-08-02 ENCOUNTER — RESULT REVIEW (OUTPATIENT)
Age: 69
End: 2019-08-02

## 2019-08-13 ENCOUNTER — OTHER (OUTPATIENT)
Age: 69
End: 2019-08-13

## 2019-08-15 ENCOUNTER — OTHER (OUTPATIENT)
Age: 69
End: 2019-08-15

## 2019-08-16 ENCOUNTER — OTHER (OUTPATIENT)
Age: 69
End: 2019-08-16

## 2019-08-19 ENCOUNTER — OTHER (OUTPATIENT)
Age: 69
End: 2019-08-19

## 2019-08-20 ENCOUNTER — APPOINTMENT (OUTPATIENT)
Dept: CT IMAGING | Facility: CLINIC | Age: 69
End: 2019-08-20
Payer: MEDICARE

## 2019-08-20 ENCOUNTER — OUTPATIENT (OUTPATIENT)
Dept: OUTPATIENT SERVICES | Facility: HOSPITAL | Age: 69
LOS: 1 days | End: 2019-08-20
Payer: MEDICARE

## 2019-08-20 DIAGNOSIS — Z98.890 OTHER SPECIFIED POSTPROCEDURAL STATES: Chronic | ICD-10-CM

## 2019-08-20 DIAGNOSIS — Z98.1 ARTHRODESIS STATUS: Chronic | ICD-10-CM

## 2019-08-20 DIAGNOSIS — K59.00 CONSTIPATION, UNSPECIFIED: ICD-10-CM

## 2019-08-20 DIAGNOSIS — K90.81 WHIPPLE'S DISEASE: Chronic | ICD-10-CM

## 2019-08-20 DIAGNOSIS — R10.32 LEFT LOWER QUADRANT PAIN: ICD-10-CM

## 2019-08-20 PROCEDURE — 74177 CT ABD & PELVIS W/CONTRAST: CPT | Mod: 26

## 2019-08-20 PROCEDURE — 74177 CT ABD & PELVIS W/CONTRAST: CPT

## 2019-08-23 ENCOUNTER — OTHER (OUTPATIENT)
Age: 69
End: 2019-08-23

## 2019-08-23 NOTE — HISTORY OF PRESENT ILLNESS
[FreeTextEntry1] : Mr. Medrano is a 67 y/o male with h/o IBS-C for many years, hypercholesterolemia, GERD, L4-L5 laminectomy 2016; cervical spine fusion 2014, Whipple surgery with stents and chemo for common bile duct carcinoma 2017 ([id number], Stage IIB) who recently presented for consideration of radiation treatment for an enlarging LN adjacent to the SMA. Biopsy of the node in 2/19 was c/w adenocarcinoma of bilary tract origin. He then received SBRT from 3/27/19-4/5/19 for a total of 3,000cGy in 5 fractions. He tolerated it well without unscheduled breaks.  Pt here today for PTE.\par \par He is doing well. His energy and appetite is good and he is maintaining his weight. No nausea, vomiting, abdominal pain, or diarrhea\par \par \par

## 2019-08-23 NOTE — PHYSICAL EXAM
[Abdomen Soft] : soft [Nondistended] : nondistended [Abdomen Tenderness] : non-tender [Normal] : normal skin color and pigmentation and no rash

## 2019-08-29 ENCOUNTER — APPOINTMENT (OUTPATIENT)
Dept: RADIATION ONCOLOGY | Facility: CLINIC | Age: 69
End: 2019-08-29
Payer: MEDICARE

## 2019-08-29 VITALS
SYSTOLIC BLOOD PRESSURE: 138 MMHG | RESPIRATION RATE: 18 BRPM | BODY MASS INDEX: 22.13 KG/M2 | HEART RATE: 89 BPM | TEMPERATURE: 36.5 F | WEIGHT: 154.21 LBS | DIASTOLIC BLOOD PRESSURE: 92 MMHG | OXYGEN SATURATION: 100 %

## 2019-08-29 PROCEDURE — 99213 OFFICE O/P EST LOW 20 MIN: CPT

## 2019-08-29 NOTE — VITALS
[Maximal Pain Intensity: 6/10] : 6/10 [Least Pain Intensity: 0/10] : 0/10 [Pain Duration: ___] : Pain duration: [unfilled] [Pain Description/Quality: ___] : Pain description/quality: [unfilled] [Pain Location: ___] : Pain Location: [unfilled] [NSAID/Non-Opioid] : NSAID/Non-Opioid [Pain Interferes with ADLs] : Pain interferes with activities of daily living. [ECOG Performance Status: 0 - Fully active, able to carry on all pre-disease performance without restriction] : Performance Status: 0 - Fully active, able to carry on all pre-disease performance without restriction [80: Normal activity with effort; some signs or symptoms of disease.] : 80: Normal activity with effort; some signs or symptoms of disease.

## 2019-08-29 NOTE — REVIEW OF SYSTEMS
[SOB on Exertion] : shortness of breath during exertion [Abdominal Pain] : abdominal pain [Joint Pain] : joint pain [Muscle Pain] : muscle pain [Negative] : Neurological [Constipation: Grade 0] : Constipation: Grade 0 [Diarrhea: Grade 0] : Diarrhea: Grade 0 [Dysphagia: Grade 0] : Dysphagia: Grade 0 [Nausea: Grade 0] : Nausea: Grade 0 [Vomiting: Grade 0] : Vomiting: Grade 0 [Fatigue: Grade 0] : Fatigue: Grade 0 [Dyspnea: Grade 1 - Shortness of breath with moderate exertion] : Dyspnea: Grade 1 - Shortness of breath with moderate exertion

## 2019-09-10 ENCOUNTER — OUTPATIENT (OUTPATIENT)
Dept: OUTPATIENT SERVICES | Facility: HOSPITAL | Age: 69
LOS: 1 days | Discharge: HOME | End: 2019-09-10

## 2019-09-10 DIAGNOSIS — K90.81 WHIPPLE'S DISEASE: Chronic | ICD-10-CM

## 2019-09-10 DIAGNOSIS — Z98.890 OTHER SPECIFIED POSTPROCEDURAL STATES: Chronic | ICD-10-CM

## 2019-09-10 DIAGNOSIS — Z98.1 ARTHRODESIS STATUS: Chronic | ICD-10-CM

## 2019-09-10 DIAGNOSIS — C24.9 MALIGNANT NEOPLASM OF BILIARY TRACT, UNSPECIFIED: ICD-10-CM

## 2019-09-12 ENCOUNTER — LABORATORY RESULT (OUTPATIENT)
Age: 69
End: 2019-09-12

## 2019-09-12 ENCOUNTER — OTHER (OUTPATIENT)
Age: 69
End: 2019-09-12

## 2019-09-12 ENCOUNTER — RESULT REVIEW (OUTPATIENT)
Age: 69
End: 2019-09-12

## 2019-09-12 ENCOUNTER — APPOINTMENT (OUTPATIENT)
Dept: INFUSION THERAPY | Facility: HOSPITAL | Age: 69
End: 2019-09-12

## 2019-09-12 LAB
BASOPHILS # BLD AUTO: 0 K/UL — SIGNIFICANT CHANGE UP (ref 0–0.2)
BASOPHILS NFR BLD AUTO: 0.6 % — SIGNIFICANT CHANGE UP (ref 0–2)
EOSINOPHIL # BLD AUTO: 0.2 K/UL — SIGNIFICANT CHANGE UP (ref 0–0.5)
EOSINOPHIL NFR BLD AUTO: 2.6 % — SIGNIFICANT CHANGE UP (ref 0–6)
HCT VFR BLD CALC: 36.9 % — LOW (ref 39–50)
HGB BLD-MCNC: 11.6 G/DL — LOW (ref 13–17)
LYMPHOCYTES # BLD AUTO: 1.1 K/UL — SIGNIFICANT CHANGE UP (ref 1–3.3)
LYMPHOCYTES # BLD AUTO: 19.3 % — SIGNIFICANT CHANGE UP (ref 13–44)
MCHC RBC-ENTMCNC: 26.6 PG — LOW (ref 27–34)
MCHC RBC-ENTMCNC: 31.5 G/DL — LOW (ref 32–36)
MCV RBC AUTO: 84.3 FL — SIGNIFICANT CHANGE UP (ref 80–100)
MONOCYTES # BLD AUTO: 0.6 K/UL — SIGNIFICANT CHANGE UP (ref 0–0.9)
MONOCYTES NFR BLD AUTO: 9.9 % — SIGNIFICANT CHANGE UP (ref 2–14)
NEUTROPHILS # BLD AUTO: 4 K/UL — SIGNIFICANT CHANGE UP (ref 1.8–7.4)
NEUTROPHILS NFR BLD AUTO: 67.5 % — SIGNIFICANT CHANGE UP (ref 43–77)
PLATELET # BLD AUTO: 213 K/UL — SIGNIFICANT CHANGE UP (ref 150–400)
RBC # BLD: 4.38 M/UL — SIGNIFICANT CHANGE UP (ref 4.2–5.8)
RBC # FLD: 15.5 % — HIGH (ref 10.3–14.5)
WBC # BLD: 5.9 K/UL — SIGNIFICANT CHANGE UP (ref 3.8–10.5)
WBC # FLD AUTO: 5.9 K/UL — SIGNIFICANT CHANGE UP (ref 3.8–10.5)

## 2019-09-13 ENCOUNTER — APPOINTMENT (OUTPATIENT)
Dept: GASTROENTEROLOGY | Facility: CLINIC | Age: 69
End: 2019-09-13
Payer: MEDICARE

## 2019-09-13 VITALS
RESPIRATION RATE: 14 BRPM | HEIGHT: 70 IN | DIASTOLIC BLOOD PRESSURE: 76 MMHG | OXYGEN SATURATION: 98 % | BODY MASS INDEX: 22.19 KG/M2 | HEART RATE: 69 BPM | TEMPERATURE: 97.7 F | SYSTOLIC BLOOD PRESSURE: 122 MMHG | WEIGHT: 155 LBS

## 2019-09-13 DIAGNOSIS — T18.2XXA FOREIGN BODY IN STOMACH, INITIAL ENCOUNTER: ICD-10-CM

## 2019-09-13 DIAGNOSIS — Z87.19 PERSONAL HISTORY OF OTHER DISEASES OF THE DIGESTIVE SYSTEM: ICD-10-CM

## 2019-09-13 PROCEDURE — 99214 OFFICE O/P EST MOD 30 MIN: CPT

## 2019-09-16 ENCOUNTER — OTHER (OUTPATIENT)
Age: 69
End: 2019-09-16

## 2019-09-18 ENCOUNTER — OUTPATIENT (OUTPATIENT)
Dept: OUTPATIENT SERVICES | Facility: HOSPITAL | Age: 69
LOS: 1 days | End: 2019-09-18
Payer: MEDICARE

## 2019-09-18 ENCOUNTER — APPOINTMENT (OUTPATIENT)
Dept: GASTROENTEROLOGY | Facility: HOSPITAL | Age: 69
End: 2019-09-18

## 2019-09-18 ENCOUNTER — RESULT REVIEW (OUTPATIENT)
Age: 69
End: 2019-09-18

## 2019-09-18 DIAGNOSIS — Z98.890 OTHER SPECIFIED POSTPROCEDURAL STATES: Chronic | ICD-10-CM

## 2019-09-18 DIAGNOSIS — R11.0 NAUSEA: ICD-10-CM

## 2019-09-18 DIAGNOSIS — R10.32 LEFT LOWER QUADRANT PAIN: ICD-10-CM

## 2019-09-18 DIAGNOSIS — Z98.1 ARTHRODESIS STATUS: Chronic | ICD-10-CM

## 2019-09-18 DIAGNOSIS — K90.81 WHIPPLE'S DISEASE: Chronic | ICD-10-CM

## 2019-09-18 PROBLEM — T18.2XXA GASTRIC BEZOAR: Status: RESOLVED | Noted: 2018-06-13 | Resolved: 2019-09-18

## 2019-09-18 PROBLEM — Z87.19 HISTORY OF GASTRIC POLYP: Status: RESOLVED | Noted: 2017-09-20 | Resolved: 2019-09-18

## 2019-09-18 PROCEDURE — 88312 SPECIAL STAINS GROUP 1: CPT

## 2019-09-18 PROCEDURE — 88312 SPECIAL STAINS GROUP 1: CPT | Mod: 26

## 2019-09-18 PROCEDURE — 88305 TISSUE EXAM BY PATHOLOGIST: CPT

## 2019-09-18 PROCEDURE — 43239 EGD BIOPSY SINGLE/MULTIPLE: CPT

## 2019-09-18 PROCEDURE — 45378 DIAGNOSTIC COLONOSCOPY: CPT

## 2019-09-18 PROCEDURE — 88305 TISSUE EXAM BY PATHOLOGIST: CPT | Mod: 26

## 2019-09-18 NOTE — PHYSICAL EXAM
[General Appearance - Alert] : alert [General Appearance - Well Nourished] : well nourished [General Appearance - In No Acute Distress] : in no acute distress [Sclera] : the sclera and conjunctiva were normal [Extraocular Movements] : extraocular movements were intact [Strabismus] : no strabismus was seen [Outer Ear] : the ears and nose were normal in appearance [Examination Of The Oral Cavity] : the lips and gums were normal [Oropharynx] : the oropharynx was normal [Neck Appearance] : the appearance of the neck was normal [Neck Cervical Mass (___cm)] : no neck mass was observed [Auscultation Breath Sounds / Voice Sounds] : lungs were clear to auscultation bilaterally [Respiration, Rhythm And Depth] : normal respiratory rhythm and effort [Exaggerated Use Of Accessory Muscles For Inspiration] : no accessory muscle use [Heart Rate And Rhythm] : heart rate was normal and rhythm regular [Murmurs] : no murmurs [Heart Sounds] : normal S1 and S2 [Edema] : there was no peripheral edema [Bowel Sounds] : normal bowel sounds [Abdomen Soft] : soft [Cervical Lymph Nodes Enlarged Posterior Bilaterally] : posterior cervical [Cervical Lymph Nodes Enlarged Anterior Bilaterally] : anterior cervical [Supraclavicular Lymph Nodes Enlarged Bilaterally] : supraclavicular [No CVA Tenderness] : no ~M costovertebral angle tenderness [No Spinal Tenderness] : no spinal tenderness [Abnormal Walk] : normal gait [] : no rash [Involuntary Movements] : no involuntary movements were seen [No Focal Deficits] : no focal deficits [Affect] : the affect was normal [Impaired Insight] : insight and judgment were intact [FreeTextEntry1] : aox3

## 2019-09-18 NOTE — HISTORY OF PRESENT ILLNESS
[FreeTextEntry1] : Gastroparesis (536.3) (K31.84)\par GERD (gastroesophageal reflux disease) (530.81) (K21.9)\par \par 67 yo M pmh cholangiocarcinoma s/p multiple ERCP then ultimately Whipple procedure s/p chemo presenting for f/u of GERD, IBS-C, and gastroparesis. Has had improvement with 1-2x/day Reglan. Still with minimal regurgitation but negative pH study when on therapy. Ultimately he is doing well and we ought to start weening down on the PPI to minimal effective dose. Pending response, we will attempt to ween off Reglan as well.\par \par Impression:\par 1) Delay in gastric emptying - symptomatically improved on Reglan\par 2) GERD - still with some regurgitation\par 3) IBS-C - improved\par 4) Cholangio s/p Whipple \par 5) Gastric polyps\par 6) Elevated LAEs - followed by Dr. Freeman\par \par Plan:\par 1) Continue with Reglan as is\par 2) Decrease PPI to daily and add H2 blocker in evening\par 3) Continue with bowel regimen - MIRALAX and Dulcolax standing. He is self titrating\par 4) All discussed at length. Plan agreed upon. All questions answered\par 5) RV 3-6 months. \par -----------------------------------------------------\par \par Since last visit, has been having abdominal discomfort\par First associated with constipation - improved with treatment\par Focused into LLQ\par Now more spread out across abdomen.  No fevers, chills, nausea, vomiting\par Is intermittent, but definitely bothersome\par Some early satiety and decreased appetite\par Some looser stools consistently now despite treatment\par \par Had CT scan  -> approximately stable LAD as per report (JEAN'Olwalter aware)\par Failed trial of bowel regimen, antispasmodic\par \par

## 2019-09-18 NOTE — ASSESSMENT
[FreeTextEntry1] : 69 yo M pmh cholangiocarcinoma s/p multiple ERCP then ultimately Whipple procedure s/p chemo presenting for f/u of GERD, IBS-C, and gastroparesis. Now with abdominal pain and some altered bowel habits.  Has multiple vague complaints including bloating, altered bowel habits, some possible fullness.  Will start with endoscopic evaluation and if negative, then can consider empiric tx for SIBO given post surgical anatomy (increased risk).  He is amenable to plan.  Will also loop in Oncology team.\par \par Impression:\par 1) Abdominal Pain\par 2) Delay in gastric emptying - symptomatically improved on Reglan\par 3) GERD - rare regurgitation despite PPI - over all improved\par 4) IBS-C - Now with altered bowel habits\par 5) Cholangio s/p Whipple with persistently present LAD\par 6) Gastric polyps\par 7) Elevated LAEs - followed by Dr. Freeman\par \par Plan:\par 1) EGD/Colon\par 2) Consider empiric treatment for SIBO pending above\par 3) Hold on bowel regimen given looser stools\par 4) Continue with Reglan as is - on low dose, tolerating well\par 5) PPI, H2 blocker as is\par 6) All discussed at length with wife and patient. Plan agreed upon. All questions answered\par 7) RV pending above

## 2019-09-18 NOTE — REVIEW OF SYSTEMS
[Feeling Poorly] : feeling poorly [As Noted in HPI] : as noted in HPI [Negative] : Heme/Lymph [Recent Weight Gain (___ Lbs)] : no recent weight gain

## 2019-09-19 ENCOUNTER — RESULT REVIEW (OUTPATIENT)
Age: 69
End: 2019-09-19

## 2019-09-19 ENCOUNTER — OTHER (OUTPATIENT)
Age: 69
End: 2019-09-19

## 2019-09-19 ENCOUNTER — APPOINTMENT (OUTPATIENT)
Dept: HEMATOLOGY ONCOLOGY | Facility: CLINIC | Age: 69
End: 2019-09-19
Payer: MEDICARE

## 2019-09-19 VITALS
HEART RATE: 96 BPM | BODY MASS INDEX: 21.67 KG/M2 | DIASTOLIC BLOOD PRESSURE: 88 MMHG | TEMPERATURE: 97.6 F | RESPIRATION RATE: 17 BRPM | WEIGHT: 150.99 LBS | SYSTOLIC BLOOD PRESSURE: 127 MMHG | OXYGEN SATURATION: 98 %

## 2019-09-19 LAB
BASOPHILS # BLD AUTO: 0 K/UL — SIGNIFICANT CHANGE UP (ref 0–0.2)
BASOPHILS NFR BLD AUTO: 0.7 % — SIGNIFICANT CHANGE UP (ref 0–2)
EOSINOPHIL # BLD AUTO: 0.1 K/UL — SIGNIFICANT CHANGE UP (ref 0–0.5)
EOSINOPHIL NFR BLD AUTO: 2.8 % — SIGNIFICANT CHANGE UP (ref 0–6)
HCT VFR BLD CALC: 40.7 % — SIGNIFICANT CHANGE UP (ref 39–50)
HGB BLD-MCNC: 12.5 G/DL — LOW (ref 13–17)
LYMPHOCYTES # BLD AUTO: 1.2 K/UL — SIGNIFICANT CHANGE UP (ref 1–3.3)
LYMPHOCYTES # BLD AUTO: 22.8 % — SIGNIFICANT CHANGE UP (ref 13–44)
MCHC RBC-ENTMCNC: 26.2 PG — LOW (ref 27–34)
MCHC RBC-ENTMCNC: 30.8 G/DL — LOW (ref 32–36)
MCV RBC AUTO: 85 FL — SIGNIFICANT CHANGE UP (ref 80–100)
MONOCYTES # BLD AUTO: 0.5 K/UL — SIGNIFICANT CHANGE UP (ref 0–0.9)
MONOCYTES NFR BLD AUTO: 10.5 % — SIGNIFICANT CHANGE UP (ref 2–14)
NEUTROPHILS # BLD AUTO: 3.2 K/UL — SIGNIFICANT CHANGE UP (ref 1.8–7.4)
NEUTROPHILS NFR BLD AUTO: 63.1 % — SIGNIFICANT CHANGE UP (ref 43–77)
PLATELET # BLD AUTO: 210 K/UL — SIGNIFICANT CHANGE UP (ref 150–400)
RBC # BLD: 4.79 M/UL — SIGNIFICANT CHANGE UP (ref 4.2–5.8)
RBC # FLD: 15.4 % — HIGH (ref 10.3–14.5)
WBC # BLD: 5 K/UL — SIGNIFICANT CHANGE UP (ref 3.8–10.5)
WBC # FLD AUTO: 5 K/UL — SIGNIFICANT CHANGE UP (ref 3.8–10.5)

## 2019-09-19 PROCEDURE — 99215 OFFICE O/P EST HI 40 MIN: CPT

## 2019-09-20 ENCOUNTER — OTHER (OUTPATIENT)
Age: 69
End: 2019-09-20

## 2019-09-24 ENCOUNTER — APPOINTMENT (OUTPATIENT)
Dept: GASTROENTEROLOGY | Facility: CLINIC | Age: 69
End: 2019-09-24
Payer: MEDICARE

## 2019-09-24 VITALS
BODY MASS INDEX: 21.19 KG/M2 | WEIGHT: 148 LBS | OXYGEN SATURATION: 98 % | HEART RATE: 87 BPM | HEIGHT: 70 IN | RESPIRATION RATE: 16 BRPM | TEMPERATURE: 98.1 F | SYSTOLIC BLOOD PRESSURE: 122 MMHG | DIASTOLIC BLOOD PRESSURE: 60 MMHG

## 2019-09-24 DIAGNOSIS — G89.29 DORSALGIA, UNSPECIFIED: ICD-10-CM

## 2019-09-24 DIAGNOSIS — R10.9 UNSPECIFIED ABDOMINAL PAIN: ICD-10-CM

## 2019-09-24 DIAGNOSIS — M54.9 DORSALGIA, UNSPECIFIED: ICD-10-CM

## 2019-09-24 DIAGNOSIS — K58.9 IRRITABLE BOWEL SYNDROME W/OUT DIARRHEA: ICD-10-CM

## 2019-09-24 DIAGNOSIS — A04.8 OTHER SPECIFIED BACTERIAL INTESTINAL INFECTIONS: ICD-10-CM

## 2019-09-24 PROCEDURE — 99214 OFFICE O/P EST MOD 30 MIN: CPT

## 2019-09-24 NOTE — RESULTS/DATA
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  While the findings are compatible with metastasis\par from this known primary, morphologic comparison will be performed\par upon receipt of the slides and the findings will be reported in\par an addendum.\par \par The carcinoma is present in the form of detached clusters as well\par as infiltrative into dense fibrous tissue showing desmoplastic\par type response.  Focal small lymphoid aggregates are present but\par convincing features of lymph node parenchyma are not seen.  It is\par not entirely certain in the sampling whether the findings\par represent near-total effacement of a lymph node versus invasion\par of soft tissue or other structures by carcinoma.  Correlation\par with clinical/radiographic impression may be helpful.\par \par This case was reviewed for  at the Gastrointestinal/Hepatobiliary intradepartmental consensus\par conference on 2/5/2019.\par \par Verified by: Farhat Baker DO\par (Electronic Signature)\par Reported on: 02/05/19 14:55 EST, [address] [place] [address], [place], [place]\par [id number]\par \par \par EXAM: PETCT SKUL-THI ONC FDG INIT \par PROCEDURE DATE: 01/15/2019 \par INTERPRETATION: PROCEDURE: PET/CT SKULL BASE-MID THIGH IMAGING \par \par RADIOPHARMACEUTICAL: 13.5 mCi F-18, FDG, I.V. 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 PET/CT is done as part of the initial treatment strategy evaluation. \par \par TECHNIQUE: Fasting blood sugar measured prior to injection of \par radiopharmaceutical was 85 mg/dl. Following intravenous injection of the \par radiopharmaceutical and an uptake period of approximately 75 minutes, \par FDG-PET/CT was obtained on a Siemens Biograph mCT 64 scanner from the skull \par base to mid thigh. Oral contrast was administered during the uptake period. \par CT was performed during shallow respiration. The CT protocol was optimized \par for PET attenuation correction and to provide anatomic detail for \par localization of PET abnormalities. The CT protocol was not designed to \par produce and cannot replace state-of-the-art diagnostic CT images with \par specific imaging protocols for different body parts and indications. Images \par were reviewed on a dedicated workstation using multiplanar reconstruction. \par \par The standardized uptake values (SUV) are normalized to patient body weight \par and indicate the highest activity concentration (SUVmax) in a given disease \par site. All image numbers refer to axial image number. \par \par COMPARISON: No prior PET/CT \par \par OTHER STUDIES USED FOR CORRELATION: CT abdomen pelvis January 3, 2019; CT \par chest, abdomen/pelvis October 24, 2018; MRI abdomen June 6, 2018 \par \par FINDINGS: \par \par HEAD/NECK: No abnormal avidity. Physiologic FDG activity seen in the \par visualized portions of the brain, major salivary glands and neck muscles. \par \par THORAX: Right chest wall port catheter tip cavoatrial junction. No abnormal \par avidity.Physiologic FDG activity in the myocardium and blood pool. \par \par LUNGS: No FDG avid foci. No pulmonary nodules. \par \par PLEURA/PERICARDIUM: No abnormal avidity. No pericardial or pleural effusions. \par \par HEPATOBILIARY/PANCREAS: Liver background SUV mean as a reference for \par comparing studies is 2.5. Hepatic steatosis. Postsurgical changes of Whipple \par procedure and hepaticojejunostomy. Common bile duct stent, with mild FDG \par avidity along intrahepatic course of the stent (SUV 4.9; image 139) and \par focal intense FDG avidity in the extrahepatic portion of the stent localized \par to the jejunum (SUV 9.2; image 152). \par \par SPLEEN: No abnormal avidity. \par \par ADRENAL GLANDS: No abnormal avidity. \par \par KIDNEYS/URINARY BLADDER: Excreted activity is seen. No abnormal avidity. \par \par ABDOMINOPELVIC NODES: FDG avid lymph node adjacent to the SMA 1.6 x 1.0 cm \par (SUV 8.7; image 156) \par \par BOWEL/PERITONEUM/MESENTERY: No abnormal avidity. \par \par PELVIC ORGANS: No abnormal avidity. \par \par BONES: Anterior cervical discectomy and fusion C4-C6. Mild degenerative \par changes thoracolumbar spine. \par \par SOFT TISSUES: No abnormal avidity. \par \par IMPRESSION: \par \par 1. FDG avid lymph node adjacent to the SMA, suspicious for \par recurrent/metastatic disease. \par \par 2. FDG avidity along the intrahepatic CBD stent, with focal FDG avidity in \par the extrahepatic portion possibly within the jejunum, possibly recurrent \par disease. Consider evaluation with EUS/ERCP for further characterization. \par \par FIONA SALAZAR M.D., ATTENDING RADIOLOGIST \par This document has been electronically signed. John 15 2019 3:22PM \par \par \par EXAM: CT ABDOMEN AND PELVIS OC IC \par PROCEDURE DATE: 01/03/2019 \par INTERPRETATION: CLINICAL INFORMATION: History gallbladder cancer with \par possible developing retroperitoneal adenopathy \par \par COMPARISON: 10/18 7/17 MRI dated 6/18 \par \par PROCEDURE: \par CT of the Abdomen and Pelvis was performed with intravenous contrast. \par Intravenous contrast: 90 ml Omnipaque 350. 10 ml discarded. \par Oral contrast: positive contrast was administered. \par Sagittal and coronal reformats were performed. \par \par FINDINGS: \par \par LOWER CHEST: Within normal limits. \par \par LIVER: Diffuse fatty infiltration of the liver. Previously described \par hemangioma and cysts are stable in their radiographic appearance \par BILE DUCTS: Enteric anastomosis appears intact. No intrahepatic ductal \par dilatation seen \par GALLBLADDER: Removed \par SPLEEN: Within normal limits. \par PANCREAS: Postop changes to the pancreatic head stable in its radiographic \par appearance \par ADRENALS: Within normal limits. \par KIDNEYS/URETERS: Within normal limits. \par \par BLADDER: Within normal limits. \par REPRODUCTIVE ORGANS: Unremarkable \par \par BOWEL: Gastrojejunostomy \par PERITONEUM: No ascites. \par VESSELS: Atherosclerotic changes \par RETROPERITONEUM: Previously seen soft tissue density adjacent to the \par superior mesenteric artery appears to be increased in size measuring 1.4 x \par 1.5 cm in size series 2 image 30 \par ABDOMINAL WALL: Within normal limits. \par BONES: Within normal limits. \par \par IMPRESSION: \par \par Increase in size of soft tissue density adjacent to the superior mesenteric \par artery today measuring 1.4 x 1.5 cm in size. \par \par SIRENA PETERSON M.D., ATTENDING RADIOLOGIST \par This document has been electronically signed. John 3 2019 10:54AM \par

## 2019-09-24 NOTE — ASSESSMENT
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 All in field.Will follow scans.Additional treatment possible, if any change.Follow TM's as well

## 2019-09-24 NOTE — HISTORY OF PRESENT ILLNESS
[Disease: _____________________] : Disease: [unfilled] [T: ___] : T[unfilled] [N: ___] : N[unfilled] [AJCC Stage: ____] : AJCC Stage: [unfilled] [Treatment Protocol] : Treatment Protocol [de-identified] : 66 Year old gentleman with PMH of IBS-C for many years, hypercholesterolemia, GERD, L4-L5 laminectomy 2016; cervical spine fusion 2014, here for a medical oncology evaluation for treatment of recently diagnosis of the common bile duct. \par HPI below as per the patient and available records\par 1/2017 Presented with painless jaundice to his PMD.  Found to have liver abnormalities, abnormal sonogram.\par 2/17/2017 CT abdomen revealed no definitive pancreatic mass\par 2/18/2017 He presented to the emergency room at [facility] with jaundice and progressive itching.  Total Bilirubin 12.5/Direct 9.1. CT abdomen showed intrabiliary Vs. extra biliary dilation. Started on ursodiol .\par 2/18/2017 MRI Abdomen with contrast: :Intra- as well as extra- hepatic biliary ductal dilatation, with stricturing of the mid to distal common bile duct. No shanna associated pancreatic mass, choledocholithiasis nor shanna mass of the common bile duct.  There is subtle enhancement of the wall of the common bile duct which may be related to inflammation or malignant stricture of the common bile duct. Multiple hepatic hemangiomas are demonstrated.\par \par 2/21/2017 EUS: Subtle 14mm x 15mm hypoechoic, heterogenous area in pancreatic head (at site of transition to biliary dilation) \par ERCP : biliary sphincterotomy, extraction of dark-thick sludge, cholangioscopy, biopsies of exposed ampulla/distal CBD, brushings of distal CBD, and placement of double pigtail biliary stent. No obvious findings seen on cholangioscopy although visualization was limited by thick, dark, sludge throughout biliary tree\par Bile duct brush suspicious for malignancy. \par Pancreas Head FNA :glandular cells with focal atypia in background of inflammatory cells including lymphocytes and fragments of fibrous tissue.\par Ampullary biopsy, ampullary type mucosa, negative for dysplasia\par Gastric body polyps; endoscopic biopsies show gastric fundic gland polyps\par \par 3/2/2017  EUS: Gastric polyps were noted. There was dilation in the common bile duct which measured up to 15 mm. There was small amount of sludge noted in the dilated bile duct. One stent was visualized endosonographically in the common bile duct. Extension of the stent was noted in the common bile duct. Pancreatic parenchymal abnormalities with hypoechoic foci were noted in the pancreatic head and pancreatic body. No obvious discrete mass / lesion was noted hence no FNA was performed.\par \par 3/2/2017 ERCP \par  A severe localized biliary stricture was found in the bile duct with SpyGlass cholangioscopy and biopsied using a cold forceps and also with a spy bite forceps for more directed sampling during cholangioscopy.  The stricture was then restented.\par \par 3/7/2017 Pt continued to experience jaundice and intense pruritus, was seen in outpatient office last week ere he had a metal stent placed, but today when he had a follow up appointment he was once again found to have an elevated bilirubin to 20 and was sent in for admission.\par \par 3/9/2017 EUS/ERCP: Distal biliary stricture with stent removal, cholangioscopy, biopsies, brushings, fully covered metal stent placement\par \par 4/26/2017 Whipple resection, feeding jejunostomy, appendectomy. Pathology:\par Common bile duct - Extra and intra pancreatic Tumor Size: 0.6 x 0.1 cm Histologic Type: Adenocarcinoma (not otherwise characterized) Histologic Grade: Moderate to poorly differentiated (G2 and G3).  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 Pt  admitted for IV Antibiotics with Cipro/Flagyl changed to Meropenem and Vancomycin.. Pt taken to IR and underwent CT guided percutaneous drainage of a right lower quadrant fluid collection on 6/13. \par Pt discharged home with drain to follow up with IR for tube check in 1 week \par Cultures of abscess grew out enterococcus faecalis and enterococcus raffinosus. He has completed antibiotic therapy.\par \par He was evaluated by Dr. Medrano on 7/11 and cleared for medical evaluation.\par \par Received Day 1 Cycle 1 gemcitabine and cisplatin, fatigue for 2 days lynne 5; Day 8 held due to neutropenia (WBC 2.5 ) resolved in 1 week.\par \par 10/12/17 Here for Cycle 4 Day 8 gemcitabine and cisplatin, very fatigued after day . Constipation has been more problematic 2 days since last bm. He has mild mucositis upper palate, improving. Denies paresthesias or subjective changes in hearing.  Plan: Senekot S nightly, Reglan 10 mg Po tid.  Based on current data, will rotate to oral capecitabine for the next 3 months to complete his course of Rx.\par \par \par 1/22/18 Fibroscan test:median liver stiffness of 5.0 kPa which is consistent with F0-F1 disease,  dB/m (S 2).\par \par 1/19/18  F/U visit: some back pain that is chronic and comes and goes as prev reported. Issue is a finding on recent scan, which indicates an enlarging soft tissue density, essentially doubled in size in 3 months adjacent to SMA. \par \par 2/9/18  Elevated alk phosphatase of 170.  As per discussion among DRs Mary, Rolanda, Alejandra and Mitchell regarding follow up plan after elevated alk phos and CT/MRI. No evidence of cancer recurrence on liver MRI, will continue surveillance q 3 months as planned with reimaging (MRI) as recommnded. F/U CMP in one month (week of 2/19) patient to obtain at [facility], orders entered and requisitions sent to patient. NO new symptoms. Advised to call sooner if he notes new symptoms. \par \par 4/27/18 Completed Capecitabine in January 2018. He experienced 3 episodes syncope, admitted with the last episode on 2/13. He sustained a fall on his face. Initial EKG showed accelerated junctional rhythm.  No events on telemetry, negative troponins, unremarkable TTE (2/14/18). Results from outpatient studies provided by his cardiologist, Dr. Casey Kerr: TTE results 1/9/18 with unchanged EF 60%, mild mitral and tricuspid regurgitation, normal wall motion. Stress test in 2016 nonischemic. \par \par 8/8/18 Upper Endoscopy :erythematous mucosa in stomach\par \par 10/11/18 Cardiology-PATs with no associated symptoms. Lifestyle changes recommended and beta blocker started. Told by Dr. Turner he would not need ablation\par \par 10/18/18 CT abdomen and pelvis:  Addendum 11/12/18 Upon further review, a 9 mm nodular focus is seen adjacent to the SMA on the left (2:77) new from prior imaging. Subtle recurrent disease is not excluded. Short-term follow-up imaging is recommended.  \par CA 19-9 wnl- FU in 3 mos, otherwise no new symptoms, chronic back pain\par \par 1/3/19 CT abdomen and pelvis showed increase in size of soft tissue density adjacent to the superior mesenteric \par artery today measuring 1.4 x 1.5 cm in size. \par 1/15/19 Pet CT obtained FDG avid lymph node adjacent to the SMA, suspicious for \par recurrent/metastatic disease.; FDG avidity along the intrahepatic CBD stent, with focal FDG avidity in the extrahepatic portion possibly within the jejunum, possibly recurrent \par disease. Consider evaluation with EUS/ERCP for further characterization.\par 2/1/19 Upper EUS Sono guided core needle Bx of “patricia SMA” LN 1 cm: Poorly-differentiated adenocarcinoma,\par 2/26/19 Altered Anatomy ERCP Mild hyperplasia at hepatic jejunostomy anastomosis felt to be stent related. 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   11/2/17  Capecitabine 1500 mg Po bid 2 weeks on/ 2 weeks off completed 1/15/18 \par  [de-identified] : Still having similar dull "bloating like' pains, otherwise, feeling well post RT 3mos.However, has lost about 10 lbs and occ has severe cramping like pains in mid abd occ rad to back...recent scans read by Dr Coronel and Kenny, so far there is no convincing evidence of disease recurrence.

## 2019-09-25 ENCOUNTER — OTHER (OUTPATIENT)
Age: 69
End: 2019-09-25

## 2019-10-01 ENCOUNTER — APPOINTMENT (OUTPATIENT)
Dept: PHYSICAL MEDICINE AND REHAB | Facility: CLINIC | Age: 69
End: 2019-10-01
Payer: MEDICARE

## 2019-10-01 VITALS
BODY MASS INDEX: 20.76 KG/M2 | HEIGHT: 70 IN | HEART RATE: 98 BPM | DIASTOLIC BLOOD PRESSURE: 86 MMHG | WEIGHT: 145 LBS | SYSTOLIC BLOOD PRESSURE: 125 MMHG

## 2019-10-01 DIAGNOSIS — M96.1 POSTLAMINECTOMY SYNDROME, NOT ELSEWHERE CLASSIFIED: ICD-10-CM

## 2019-10-01 DIAGNOSIS — Z98.890 OTHER SPECIFIED POSTPROCEDURAL STATES: ICD-10-CM

## 2019-10-01 DIAGNOSIS — M54.5 LOW BACK PAIN: ICD-10-CM

## 2019-10-01 PROCEDURE — 99204 OFFICE O/P NEW MOD 45 MIN: CPT | Mod: 25

## 2019-10-01 PROCEDURE — 20552 NJX 1/MLT TRIGGER POINT 1/2: CPT

## 2019-10-01 PROCEDURE — 72100 X-RAY EXAM L-S SPINE 2/3 VWS: CPT

## 2019-10-01 RX ORDER — PANTOPRAZOLE 40 MG/1
40 TABLET, DELAYED RELEASE ORAL
Qty: 90 | Refills: 0 | Status: DISCONTINUED | COMMUNITY
Start: 2018-05-07 | End: 2019-10-01

## 2019-10-01 RX ORDER — POLYETHYLENE GLYCOL 3350, SODIUM SULFATE, SODIUM CHLORIDE, POTASSIUM CHLORIDE, ASCORBIC ACID, SODIUM ASCORBATE 7.5-2.691G
100 KIT ORAL
Qty: 1 | Refills: 0 | Status: DISCONTINUED | COMMUNITY
Start: 2019-09-16 | End: 2019-10-01

## 2019-10-01 RX ORDER — OXYCODONE AND ACETAMINOPHEN 10; 325 MG/1; MG/1
10-325 TABLET ORAL AT BEDTIME
Qty: 14 | Refills: 0 | Status: ACTIVE | COMMUNITY
Start: 2019-10-01 | End: 1900-01-01

## 2019-10-01 RX ORDER — ACETAMINOPHEN AND CODEINE 300; 30 MG/1; MG/1
300-30 TABLET ORAL
Qty: 30 | Refills: 0 | Status: DISCONTINUED | COMMUNITY
Start: 2019-08-29 | End: 2019-10-01

## 2019-10-01 RX ORDER — DICYCLOMINE HYDROCHLORIDE 10 MG/1
10 CAPSULE ORAL
Qty: 60 | Refills: 0 | Status: DISCONTINUED | COMMUNITY
Start: 2019-08-23 | End: 2019-10-01

## 2019-10-01 RX ORDER — TRAMADOL HYDROCHLORIDE 50 MG/1
50 TABLET, COATED ORAL
Qty: 120 | Refills: 0 | Status: DISCONTINUED | COMMUNITY
Start: 2019-09-19 | End: 2019-10-01

## 2019-10-01 RX ORDER — LORATADINE 5 MG
17 TABLET,CHEWABLE ORAL
Qty: 1 | Refills: 3 | Status: DISCONTINUED | COMMUNITY
Start: 2017-11-21 | End: 2019-10-01

## 2019-10-01 RX ORDER — HYOSCYAMINE SULFATE 0.12 MG/1
0.12 TABLET ORAL 4 TIMES DAILY
Qty: 30 | Refills: 0 | Status: DISCONTINUED | COMMUNITY
Start: 2019-08-13 | End: 2019-10-01

## 2019-10-01 NOTE — ASSESSMENT
[FreeTextEntry1] : 68 y.o. M s/p L4-S1 lami/decomp (2017) presents w/ axial LBP w/o prominent radicular sxs.  No abnormal motion on lateral flex/ext films.  I deferred new MRI L-spine w/ and w/o as patient does not have significant radicular pain and has no focal motor deficit (also pending new PET CT soon).  Administered TPI to right lower lumbar paravertebral muscles which patient tolerated well.  Will start new course of P.T. for modalities, gentle ROM, stretching, strengthening and stabilization exercises.  May consider repeat LESI if necessary.  Advised caution w/ PO NSAIDs 2' GI/cardiac/renal toxicities.  Will Rx short course of Percocet 10/325 mg; 1/2 - 1 tab po qhs prn.  #14. I-STOP database checked.  RTC 6 weeks.

## 2019-10-01 NOTE — HISTORY OF PRESENT ILLNESS
[FreeTextEntry1] : Mr. WOODY CEVALLOS is a 68 year old male here for initial evaluation of acute on chronic back pain but worsen.  Noted with increased low back pain while being treated on  H. pylori.  Had right radicular symptoms in the past which resolved after lumbar surgery.  Reports poor appetite from GI issues (nausea and diarrhea) with antibiotic treatment - 15-20 lb weight lost\par \par History of cholangiocarcinoma s/p chemo and whipple (2017)\par \par Location: axial lumbar\par Inciting Event: denies inciting event or trauma\par Duration: acute on chronic\par Onset/timin% of the day\par Quality: shoot, piercing \par Severity: -12/10\par Exacerbating Factor: spontaneous\par Relieving factor: heat compress, massage\par Radiation: denies to low extremities, radiates to mid thoracic spine\par Numbness/Tingling: denies\par Cough/Sneezing: denies - but Valsalva maneuver increase pain \par Bowel/Bladder incontinence: denies\par Extremity weakness: denies\par \par Prior Treatments\par Injections: facet injection (2019 no effect), YENY L3/4 (2019 effect x 3m) - Dr. Burt ([facility]); Trigger point (without benefit prior to surgery)\par Surgery: lami L4/5 L5/S1 (- Dr Randle);  ACDF C4-6 (2014)\par PT/OT: PT () - no benefit\par Medications: Tylenol, ibuprofen; tried Tramadol and T#3 (no effect - GI side effects)

## 2019-10-01 NOTE — PROCEDURE
[de-identified] : PROCEDURE NOTE: R/B/A to right lower lumbar TPI reviewed w/ patient.  Pt. is agreeable and wishes to proceed.  Signed consent form to be scanned into EMR.  After sterile prep, a total of 3 cc 1% lidocaine + 20 mg depomedrol was injected through a 22-G, 1.5" spinal needle.  The patient tolerated the procedure well w/o adverse effects.  A band aid was applied.  Post-injection instructions given.   98

## 2019-10-01 NOTE — DATA REVIEWED
[Plain X-Rays] : plain X-Rays [FreeTextEntry1] : L-spine x-rays (3 views) taken in office today: s/p L4-S1 lami; no listhesis on flex/ext.\par \par MRI L-spine w/o (2017): post-op, well decompressed centrally L4-S1.  No significant NF stenosis; L3-4 DDD.

## 2019-10-01 NOTE — PHYSICAL EXAM
[FreeTextEntry1] : General: Awake and alert in no acute distress\par Psych: normal mood and affect\par HEENT: NC/AT, normal visual tracking\par Pulmonary: no resp distress, chest expansion appears symmetrical\par CV: extremities are warm and perfused\par Abd: non-distended\par Ext: no c/c/e\par Skin: normal color, appearance, and temperature\par \par Lumbar/Hip Spine:\par Gait - non-antalgic, able to heel and toe walk\par Inspection: normal muscle bulk without asymmetry\par Tenderness to palpation: TTP over left PSIS, along lower lumbar paraspinal (R>L), no TTP over greater trochanter, sacroiliac joints, piriformis\par ROM: within functional limits\par MMT: 5/5 bilateral lower extremities (HF, KE, KF, DF, PF, EHL)\par Reflexes: Left: 1+ achilles, patella; right: 2+ to achilles, patella\par Sensory: intact to light touch in all dermatomes of the bilateral lower extremities\par Provocative testing:\par Mantilla's - negative\par Seated slump - negative

## 2019-10-01 NOTE — REVIEW OF SYSTEMS
[Recent Change In Weight] : ~T recent weight change [Abdominal Pain] : abdominal pain [Constipation] : constipation [Insomnia] : insomnia [Joint Pain] : joint pain [Negative] : Psychiatric [FreeTextEntry9] : L-spine

## 2019-10-03 ENCOUNTER — OTHER (OUTPATIENT)
Age: 69
End: 2019-10-03

## 2019-10-03 ENCOUNTER — TRANSCRIPTION ENCOUNTER (OUTPATIENT)
Age: 69
End: 2019-10-03

## 2019-10-04 ENCOUNTER — OTHER (OUTPATIENT)
Age: 69
End: 2019-10-04

## 2019-10-08 ENCOUNTER — CLINICAL ADVICE (OUTPATIENT)
Age: 69
End: 2019-10-08

## 2019-10-10 RX ORDER — OXYCODONE AND ACETAMINOPHEN 10; 325 MG/1; MG/1
10-325 TABLET ORAL
Qty: 20 | Refills: 0 | Status: ACTIVE | COMMUNITY
Start: 2019-10-10 | End: 1900-01-01

## 2019-10-11 ENCOUNTER — OTHER (OUTPATIENT)
Age: 69
End: 2019-10-11

## 2019-10-11 RX ORDER — SIMETHICONE 80 MG/1
80 TABLET, CHEWABLE ORAL
Qty: 120 | Refills: 1 | Status: ACTIVE | COMMUNITY
Start: 2019-10-11 | End: 1900-01-01

## 2019-10-15 ENCOUNTER — OUTPATIENT (OUTPATIENT)
Dept: OUTPATIENT SERVICES | Facility: HOSPITAL | Age: 69
LOS: 1 days | Discharge: HOME | End: 2019-10-15

## 2019-10-15 DIAGNOSIS — C24.9 MALIGNANT NEOPLASM OF BILIARY TRACT, UNSPECIFIED: ICD-10-CM

## 2019-10-15 DIAGNOSIS — Z98.890 OTHER SPECIFIED POSTPROCEDURAL STATES: Chronic | ICD-10-CM

## 2019-10-15 DIAGNOSIS — Z98.1 ARTHRODESIS STATUS: Chronic | ICD-10-CM

## 2019-10-15 DIAGNOSIS — K90.81 WHIPPLE'S DISEASE: Chronic | ICD-10-CM

## 2019-10-15 LAB
ALBUMIN SERPL ELPH-MCNC: 4.5 G/DL
ALP BLD-CCNC: 105 U/L
ALT SERPL-CCNC: 14 U/L
ANION GAP SERPL CALC-SCNC: 13 MMOL/L
AST SERPL-CCNC: 19 U/L
BILIRUB SERPL-MCNC: 0.4 MG/DL
BUN SERPL-MCNC: 10 MG/DL
CALCIUM SERPL-MCNC: 9.4 MG/DL
CANCER AG19-9 SERPL-ACNC: 39 U/ML
CEA SERPL-MCNC: 3.7 NG/ML
CHLORIDE SERPL-SCNC: 105 MMOL/L
CHOLEST SERPL-MCNC: 147 MG/DL
CHOLEST/HDLC SERPL: 2.5 RATIO
CO2 SERPL-SCNC: 24 MMOL/L
CREAT SERPL-MCNC: 0.98 MG/DL
GLUCOSE SERPL-MCNC: 107 MG/DL
HDLC SERPL-MCNC: 60 MG/DL
LDLC SERPL CALC-MCNC: 69 MG/DL
LPL SERPL-CCNC: 12 U/L
POTASSIUM SERPL-SCNC: 4.1 MMOL/L
PROT SERPL-MCNC: 7 G/DL
SODIUM SERPL-SCNC: 142 MMOL/L
TRIGL SERPL-MCNC: 90 MG/DL

## 2019-10-15 NOTE — HISTORY OF PRESENT ILLNESS
[FreeTextEntry1] : Mr. Medrano is a 69 y/o male with h/o IBS-C for many years, hypercholesterolemia, GERD, L4-L5 laminectomy 2016; cervical spine fusion 2014, Whipple surgery with stents and chemo for common bile duct carcinoma 2017 ([id number], Stage IIB) who recently presented for consideration of radiation treatment for an enlarging LN adjacent to the SMA. Biopsy of the node in 2/19 was c/w adenocarcinoma of bilary tract origin. He then received SBRT from 3/27/19-4/5/19 for a total of 3,000cGy in 5 fractions. He tolerated it well without unscheduled breaks. \par \par Pt here today for routine follow-up. Relevant imaging done since last visit includes a CT chest and abdomen w/wo contrast done on 6/14/19, indicative of stable disease near the SMA, and CT abdomen done 8/7/19 indicative of no signs of obstruction, and notable for the presence of slightly enlarged mesenteric lymph nodes. \par \par He states he was doing well until late July, when he had sudden onset of constipation, cramping and bloating, as well as abdominal pain radiating to the back, and ranging from 0-6/10 in severity. His constipation has responded to miralax. However he has had a 10 lb weight loss since then, which he attributes to poor appetite, increased physical activity because of summer and also to miralax.\par

## 2019-10-17 ENCOUNTER — APPOINTMENT (OUTPATIENT)
Dept: HEMATOLOGY ONCOLOGY | Facility: CLINIC | Age: 69
End: 2019-10-17
Payer: MEDICARE

## 2019-10-17 VITALS
HEART RATE: 98 BPM | OXYGEN SATURATION: 98 % | DIASTOLIC BLOOD PRESSURE: 78 MMHG | WEIGHT: 145.99 LBS | TEMPERATURE: 99 F | RESPIRATION RATE: 17 BRPM | SYSTOLIC BLOOD PRESSURE: 119 MMHG | BODY MASS INDEX: 20.95 KG/M2

## 2019-10-17 DIAGNOSIS — Z85.09 ENCOUNTER FOR FOLLOW-UP EXAMINATION AFTER COMPLETED TREATMENT FOR MALIGNANT NEOPLASM: ICD-10-CM

## 2019-10-17 DIAGNOSIS — K92.89 OTHER SPECIFIED DISEASES OF THE DIGESTIVE SYSTEM: ICD-10-CM

## 2019-10-17 DIAGNOSIS — K31.84 GASTROPARESIS: ICD-10-CM

## 2019-10-17 DIAGNOSIS — Z08 ENCOUNTER FOR FOLLOW-UP EXAMINATION AFTER COMPLETED TREATMENT FOR MALIGNANT NEOPLASM: ICD-10-CM

## 2019-10-17 DIAGNOSIS — R10.32 LEFT LOWER QUADRANT PAIN: ICD-10-CM

## 2019-10-17 PROCEDURE — 99215 OFFICE O/P EST HI 40 MIN: CPT

## 2019-10-17 RX ORDER — GABAPENTIN 300 MG/1
300 CAPSULE ORAL
Qty: 90 | Refills: 3 | Status: ACTIVE | COMMUNITY
Start: 2019-10-17 | End: 1900-01-01

## 2019-10-18 ENCOUNTER — APPOINTMENT (OUTPATIENT)
Dept: INFUSION THERAPY | Facility: HOSPITAL | Age: 69
End: 2019-10-18

## 2019-10-18 PROBLEM — R10.32 ABDOMINAL PAIN, LLQ: Status: ACTIVE | Noted: 2019-08-13

## 2019-10-18 PROBLEM — K92.89 GASTROINTESTINAL DYSMOTILITY: Status: ACTIVE | Noted: 2017-07-20

## 2019-10-18 PROBLEM — K31.84 GASTROPARESIS: Status: ACTIVE | Noted: 2018-07-18

## 2019-10-18 PROBLEM — Z08: Status: ACTIVE | Noted: 2018-10-14

## 2019-10-18 NOTE — HISTORY OF PRESENT ILLNESS
[Disease: _____________________] : Disease: [unfilled] [N: ___] : N[unfilled] [T: ___] : T[unfilled] [AJCC Stage: ____] : AJCC Stage: [unfilled] [de-identified] : 66 Year old gentleman with PMH of IBS-C for many years, hypercholesterolemia, GERD, L4-L5 laminectomy 2016; cervical spine fusion 2014, here for a medical oncology evaluation for treatment of recently diagnosis of the common bile duct. \par HPI below as per the patient and available records\par 1/2017 Presented with painless jaundice to his PMD.  Found to have liver abnormalities, abnormal sonogram.\par 2/17/2017 CT abdomen revealed no definitive pancreatic mass\par 2/18/2017 He presented to the emergency room at [facility] with jaundice and progressive itching.  Total Bilirubin 12.5/Direct 9.1. CT abdomen showed intrabiliary Vs. extra biliary dilation. Started on ursodiol .\par 2/18/2017 MRI Abdomen with contrast: :Intra- as well as extra- hepatic biliary ductal dilatation, with stricturing of the mid to distal common bile duct. No shanna associated pancreatic mass, choledocholithiasis nor shanna mass of the common bile duct.  There is subtle enhancement of the wall of the common bile duct which may be related to inflammation or malignant stricture of the common bile duct. Multiple hepatic hemangiomas are demonstrated.\par \par 2/21/2017 EUS: Subtle 14mm x 15mm hypoechoic, heterogenous area in pancreatic head (at site of transition to biliary dilation) \par ERCP : biliary sphincterotomy, extraction of dark-thick sludge, cholangioscopy, biopsies of exposed ampulla/distal CBD, brushings of distal CBD, and placement of double pigtail biliary stent. No obvious findings seen on cholangioscopy although visualization was limited by thick, dark, sludge throughout biliary tree\par Bile duct brush suspicious for malignancy. \par Pancreas Head FNA :glandular cells with focal atypia in background of inflammatory cells including lymphocytes and fragments of fibrous tissue.\par Ampullary biopsy, ampullary type mucosa, negative for dysplasia\par Gastric body polyps; endoscopic biopsies show gastric fundic gland polyps\par \par 3/2/2017  EUS: Gastric polyps were noted. There was dilation in the common bile duct which measured up to 15 mm. There was small amount of sludge noted in the dilated bile duct. One stent was visualized endosonographically in the common bile duct. Extension of the stent was noted in the common bile duct. Pancreatic parenchymal abnormalities with hypoechoic foci were noted in the pancreatic head and pancreatic body. No obvious discrete mass / lesion was noted hence no FNA was performed.\par \par 3/2/2017 ERCP \par  A severe localized biliary stricture was found in the bile duct with SpyGlass cholangioscopy and biopsied using a cold forceps and also with a spy bite forceps for more directed sampling during cholangioscopy.  The stricture was then restented.\par \par 3/7/2017 Pt continued to experience jaundice and intense pruritus, was seen in outpatient office last week ere he had a metal stent placed, but today when he had a follow up appointment he was once again found to have an elevated bilirubin to 20 and was sent in for admission.\par \par 3/9/2017 EUS/ERCP: Distal biliary stricture with stent removal, cholangioscopy, biopsies, brushings, fully covered metal stent placement\par \par 4/26/2017 Whipple resection, feeding jejunostomy, appendectomy. Pathology:\par Common bile duct - Extra and intra pancreatic Tumor Size: 0.6 x 0.1 cm Histologic Type: Adenocarcinoma (not otherwise characterized) Histologic Grade: Moderate to poorly differentiated (G2 and G3).  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 Pt  admitted for IV Antibiotics with Cipro/Flagyl changed to Meropenem and Vancomycin.. Pt taken to IR and underwent CT guided percutaneous drainage of a right lower quadrant fluid collection on 6/13. \par Pt discharged home with drain to follow up with IR for tube check in 1 week \par Cultures of abscess grew out enterococcus faecalis and enterococcus raffinosus. He has completed antibiotic therapy.\par \par He was evaluated by Dr. Medrano on 7/11 and cleared for medical evaluation.\par \par Received Day 1 Cycle 1 gemcitabine and cisplatin, fatigue for 2 days lynne 5; Day 8 held due to neutropenia (WBC 2.5 ) resolved in 1 week.\par \par 10/12/17 Here for Cycle 4 Day 8 gemcitabine and cisplatin, very fatigued after day . Constipation has been more problematic 2 days since last bm. He has mild mucositis upper palate, improving. Denies paresthesias or subjective changes in hearing.  Plan: Senekot S nightly, Reglan 10 mg Po tid.  Based on current data, will rotate to oral capecitabine for the next 3 months to complete his course of Rx.\par \par \par 1/22/18 Fibroscan test:median liver stiffness of 5.0 kPa which is consistent with F0-F1 disease,  dB/m (S 2).\par \par 1/19/18  F/U visit: some back pain that is chronic and comes and goes as prev reported. Issue is a finding on recent scan, which indicates an enlarging soft tissue density, essentially doubled in size in 3 months adjacent to SMA. \par \par 2/9/18  Elevated alk phosphatase of 170.  As per discussion among DRs Mary, Rolanda, Alejandra and Mitchell regarding follow up plan after elevated alk phos and CT/MRI. No evidence of cancer recurrence on liver MRI, will continue surveillance q 3 months as planned with reimaging (MRI) as recommnded. F/U CMP in one month (week of 2/19) patient to obtain at [facility], orders entered and requisitions sent to patient. NO new symptoms. Advised to call sooner if he notes new symptoms. \par \par 4/27/18 Completed Capecitabine in January 2018. He experienced 3 episodes syncope, admitted with the last episode on 2/13. He sustained a fall on his face. Initial EKG showed accelerated junctional rhythm.  No events on telemetry, negative troponins, unremarkable TTE (2/14/18). Results from outpatient studies provided by his cardiologist, Dr. Casey Kerr: TTE results 1/9/18 with unchanged EF 60%, mild mitral and tricuspid regurgitation, normal wall motion. Stress test in 2016 nonischemic. \par \par 8/8/18 Upper Endoscopy :erythematous mucosa in stomach\par \par 10/11/18 Cardiology-PATs with no associated symptoms. Lifestyle changes recommended and beta blocker started. Told by Dr. Turner he would not need ablation\par \par 10/18/18 CT abdomen and pelvis:  Addendum 11/12/18 Upon further review, a 9 mm nodular focus is seen adjacent to the SMA on the left (2:77) new from prior imaging. Subtle recurrent disease is not excluded. Short-term follow-up imaging is recommended.  \par CA 19-9 wnl- FU in 3 mos, otherwise no new symptoms, chronic back pain\par \par 1/3/19 CT abdomen and pelvis showed increase in size of soft tissue density adjacent to the superior mesenteric \par artery today measuring 1.4 x 1.5 cm in size. \par 1/15/19 Pet CT obtained FDG avid lymph node adjacent to the SMA, suspicious for \par recurrent/metastatic disease.; FDG avidity along the intrahepatic CBD stent, with focal FDG avidity in the extrahepatic portion possibly within the jejunum, possibly recurrent \par disease. Consider evaluation with EUS/ERCP for further characterization.\par 2/1/19 Upper EUS Sono guided core needle Bx of “patricia SMA” LN 1 cm: Poorly-differentiated adenocarcinoma,\par 2/26/19 Altered Anatomy ERCP Mild hyperplasia at hepatic jejunostomy anastomosis felt to be stent related. Biopsy of Bile duct anastamosis: - Small intestinal type mucosa with preserved villous architecture and no significant intraepithelial lymphocytosis - Negative for dysplasia, Negative for malignancy, (multiple levels examined) \par SBRT offered to known positive lymph node around the SMA: 3/27/19-4/5/19 for a total of 3,000cGy in 5 fractions.\par \par \par  [Treatment Protocol] : Treatment Protocol [de-identified] : Lymph-Vascular Invasion: Identified\par Perineural Invasion: Identified\par pT3: Tumor invades pancreas and duodenum \par Regional Lymph Nodes  N1: 4/18 positive lymph nodes \par \par ALSO\par Pancreatic intraepithelial neoplasia - highest grade 3 (PanIN-3)\par \par Acute and chronic cholecystitis [de-identified] : moderate 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   11/2/17  Capecitabine 1500 mg Po bid 2 weeks on/ 2 weeks off completed 1/15/18 \par  [de-identified] : Ongoing frequent surveillance in setting of high index of suspicion of recurrene in evolution.Still having similar dull "bloating like' pains,Recently dx'd and treated for HPylori.Still w lower back pain and occ abd pain/gastritis like.

## 2019-10-18 NOTE — RESULTS/DATA
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  While the findings are compatible with metastasis\par from this known primary, morphologic comparison will be performed\par upon receipt of the slides and the findings will be reported in\par an addendum.\par \par The carcinoma is present in the form of detached clusters as well\par as infiltrative into dense fibrous tissue showing desmoplastic\par type response.  Focal small lymphoid aggregates are present but\par convincing features of lymph node parenchyma are not seen.  It is\par not entirely certain in the sampling whether the findings\par represent near-total effacement of a lymph node versus invasion\par of soft tissue or other structures by carcinoma.  Correlation\par with clinical/radiographic impression may be helpful.\par \par This case was reviewed for  at the Gastrointestinal/Hepatobiliary intradepartmental consensus\par conference on 2/5/2019.\par \par Verified by: Farhat Baker DO\par (Electronic Signature)\par Reported on: 02/05/19 14:55 EST, [address] [place] [address], [place], [place]\par [id number]\par \par \par EXAM: PETCT SKUL-THI ONC FDG INIT \par PROCEDURE DATE: 01/15/2019 \par INTERPRETATION: PROCEDURE: PET/CT SKULL BASE-MID THIGH IMAGING \par \par RADIOPHARMACEUTICAL: 13.5 mCi F-18, FDG, I.V. 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 PET/CT is done as part of the initial treatment strategy evaluation. \par \par TECHNIQUE: Fasting blood sugar measured prior to injection of \par radiopharmaceutical was 85 mg/dl. Following intravenous injection of the \par radiopharmaceutical and an uptake period of approximately 75 minutes, \par FDG-PET/CT was obtained on a Siemens Biograph mCT 64 scanner from the skull \par base to mid thigh. Oral contrast was administered during the uptake period. \par CT was performed during shallow respiration. The CT protocol was optimized \par for PET attenuation correction and to provide anatomic detail for \par localization of PET abnormalities. The CT protocol was not designed to \par produce and cannot replace state-of-the-art diagnostic CT images with \par specific imaging protocols for different body parts and indications. Images \par were reviewed on a dedicated workstation using multiplanar reconstruction. \par \par The standardized uptake values (SUV) are normalized to patient body weight \par and indicate the highest activity concentration (SUVmax) in a given disease \par site. All image numbers refer to axial image number. \par \par COMPARISON: No prior PET/CT \par \par OTHER STUDIES USED FOR CORRELATION: CT abdomen pelvis January 3, 2019; CT \par chest, abdomen/pelvis October 24, 2018; MRI abdomen June 6, 2018 \par \par FINDINGS: \par \par HEAD/NECK: No abnormal avidity. Physiologic FDG activity seen in the \par visualized portions of the brain, major salivary glands and neck muscles. \par \par THORAX: Right chest wall port catheter tip cavoatrial junction. No abnormal \par avidity.Physiologic FDG activity in the myocardium and blood pool. \par \par LUNGS: No FDG avid foci. No pulmonary nodules. \par \par PLEURA/PERICARDIUM: No abnormal avidity. No pericardial or pleural effusions. \par \par HEPATOBILIARY/PANCREAS: Liver background SUV mean as a reference for \par comparing studies is 2.5. Hepatic steatosis. Postsurgical changes of Whipple \par procedure and hepaticojejunostomy. Common bile duct stent, with mild FDG \par avidity along intrahepatic course of the stent (SUV 4.9; image 139) and \par focal intense FDG avidity in the extrahepatic portion of the stent localized \par to the jejunum (SUV 9.2; image 152). \par \par SPLEEN: No abnormal avidity. \par \par ADRENAL GLANDS: No abnormal avidity. \par \par KIDNEYS/URINARY BLADDER: Excreted activity is seen. No abnormal avidity. \par \par ABDOMINOPELVIC NODES: FDG avid lymph node adjacent to the SMA 1.6 x 1.0 cm \par (SUV 8.7; image 156) \par \par BOWEL/PERITONEUM/MESENTERY: No abnormal avidity. \par \par PELVIC ORGANS: No abnormal avidity. \par \par BONES: Anterior cervical discectomy and fusion C4-C6. Mild degenerative \par changes thoracolumbar spine. \par \par SOFT TISSUES: No abnormal avidity. \par \par IMPRESSION: \par \par 1. FDG avid lymph node adjacent to the SMA, suspicious for \par recurrent/metastatic disease. \par \par 2. FDG avidity along the intrahepatic CBD stent, with focal FDG avidity in \par the extrahepatic portion possibly within the jejunum, possibly recurrent \par disease. Consider evaluation with EUS/ERCP for further characterization. \par \par FIONA SALAZAR M.D., ATTENDING RADIOLOGIST \par This document has been electronically signed. John 15 2019 3:22PM \par \par \par EXAM: CT ABDOMEN AND PELVIS OC IC \par PROCEDURE DATE: 01/03/2019 \par INTERPRETATION: CLINICAL INFORMATION: History gallbladder cancer with \par possible developing retroperitoneal adenopathy \par \par COMPARISON: 10/18 7/17 MRI dated 6/18 \par \par PROCEDURE: \par CT of the Abdomen and Pelvis was performed with intravenous contrast. \par Intravenous contrast: 90 ml Omnipaque 350. 10 ml discarded. \par Oral contrast: positive contrast was administered. \par Sagittal and coronal reformats were performed. \par \par FINDINGS: \par \par LOWER CHEST: Within normal limits. \par \par LIVER: Diffuse fatty infiltration of the liver. Previously described \par hemangioma and cysts are stable in their radiographic appearance \par BILE DUCTS: Enteric anastomosis appears intact. No intrahepatic ductal \par dilatation seen \par GALLBLADDER: Removed \par SPLEEN: Within normal limits. \par PANCREAS: Postop changes to the pancreatic head stable in its radiographic \par appearance \par ADRENALS: Within normal limits. \par KIDNEYS/URETERS: Within normal limits. \par \par BLADDER: Within normal limits. \par REPRODUCTIVE ORGANS: Unremarkable \par \par BOWEL: Gastrojejunostomy \par PERITONEUM: No ascites. \par VESSELS: Atherosclerotic changes \par RETROPERITONEUM: Previously seen soft tissue density adjacent to the \par superior mesenteric artery appears to be increased in size measuring 1.4 x \par 1.5 cm in size series 2 image 30 \par ABDOMINAL WALL: Within normal limits. \par BONES: Within normal limits. \par \par IMPRESSION: \par \par Increase in size of soft tissue density adjacent to the superior mesenteric \par artery today measuring 1.4 x 1.5 cm in size. \par \par SIRENA PETERSON M.D., ATTENDING RADIOLOGIST \par This document has been electronically signed. Ojhn 3 2019 10:54AM \par

## 2019-10-18 NOTE — ASSESSMENT
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 All in field.Will follow scans.Additional treatment possible, if any change.Follow TM's as well, as they were inching up in September ( recall h/o bile duct stricture).Rept labs next week;repeat scans ( PETCT) in November. [Palliative Care Plan] : not applicable at this time

## 2019-10-18 NOTE — REASON FOR VISIT
[Follow-Up Visit] : a follow-up [FreeTextEntry2] : cholangiocarcinoma/surveillance visit [Spouse] : spouse

## 2019-10-22 ENCOUNTER — OTHER (OUTPATIENT)
Age: 69
End: 2019-10-22

## 2019-10-22 DIAGNOSIS — R19.7 DIARRHEA, UNSPECIFIED: ICD-10-CM

## 2019-10-23 ENCOUNTER — LABORATORY RESULT (OUTPATIENT)
Age: 69
End: 2019-10-23

## 2019-10-23 ENCOUNTER — RESULT REVIEW (OUTPATIENT)
Age: 69
End: 2019-10-23

## 2019-10-23 ENCOUNTER — APPOINTMENT (OUTPATIENT)
Dept: INFUSION THERAPY | Facility: HOSPITAL | Age: 69
End: 2019-10-23

## 2019-10-23 PROBLEM — A04.8 HELICOBACTER PYLORI (H. PYLORI) INFECTION: Status: ACTIVE | Noted: 2019-10-23

## 2019-10-23 PROBLEM — M54.9 CHRONIC BACK PAIN: Status: ACTIVE | Noted: 2019-10-23

## 2019-10-23 PROBLEM — K58.9 IBS (IRRITABLE BOWEL SYNDROME): Status: ACTIVE | Noted: 2017-07-20

## 2019-10-23 PROBLEM — R10.9 ACUTE ABDOMINAL PAIN: Status: ACTIVE | Noted: 2019-10-23

## 2019-10-23 LAB
BASOPHILS # BLD AUTO: 0 K/UL — SIGNIFICANT CHANGE UP (ref 0–0.2)
BASOPHILS NFR BLD AUTO: 0.7 % — SIGNIFICANT CHANGE UP (ref 0–2)
EOSINOPHIL # BLD AUTO: 0.1 K/UL — SIGNIFICANT CHANGE UP (ref 0–0.5)
EOSINOPHIL NFR BLD AUTO: 1.4 % — SIGNIFICANT CHANGE UP (ref 0–6)
HCT VFR BLD CALC: 35.4 % — LOW (ref 39–50)
HGB BLD-MCNC: 11.5 G/DL — LOW (ref 13–17)
LYMPHOCYTES # BLD AUTO: 1 K/UL — SIGNIFICANT CHANGE UP (ref 1–3.3)
LYMPHOCYTES # BLD AUTO: 18.5 % — SIGNIFICANT CHANGE UP (ref 13–44)
MCHC RBC-ENTMCNC: 28.2 PG — SIGNIFICANT CHANGE UP (ref 27–34)
MCHC RBC-ENTMCNC: 32.5 G/DL — SIGNIFICANT CHANGE UP (ref 32–36)
MCV RBC AUTO: 86.9 FL — SIGNIFICANT CHANGE UP (ref 80–100)
MONOCYTES # BLD AUTO: 0.6 K/UL — SIGNIFICANT CHANGE UP (ref 0–0.9)
MONOCYTES NFR BLD AUTO: 10.3 % — SIGNIFICANT CHANGE UP (ref 2–14)
NEUTROPHILS # BLD AUTO: 3.9 K/UL — SIGNIFICANT CHANGE UP (ref 1.8–7.4)
NEUTROPHILS NFR BLD AUTO: 69.1 % — SIGNIFICANT CHANGE UP (ref 43–77)
PLATELET # BLD AUTO: 182 K/UL — SIGNIFICANT CHANGE UP (ref 150–400)
RBC # BLD: 4.07 M/UL — LOW (ref 4.2–5.8)
RBC # FLD: 16.2 % — HIGH (ref 10.3–14.5)
WBC # BLD: 5.6 K/UL — SIGNIFICANT CHANGE UP (ref 3.8–10.5)
WBC # FLD AUTO: 5.6 K/UL — SIGNIFICANT CHANGE UP (ref 3.8–10.5)

## 2019-10-23 NOTE — HISTORY OF PRESENT ILLNESS
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 Now with abdominal pain and some altered bowel habits. Has multiple vague complaints including bloating, altered bowel habits, some possible fullness. Will start with endoscopic evaluation and if negative, then can consider empiric tx for SIBO given post surgical anatomy (increased risk). He is amenable to plan. Will also loop in Oncology team.\par \par Impression:\par 1) Abdominal Pain\par 2) Delay in gastric emptying - symptomatically improved on Reglan\par 3) GERD - rare regurgitation despite PPI - over all improved\par 4) IBS-C - Now with altered bowel habits\par 5) Cholangio s/p Whipple with persistently present LAD\par 6) Gastric polyps\par 7) Elevated LAEs - followed by Dr. Freeman\par \par Plan:\par 1) EGD/Colon\par 2) Consider empiric treatment for SIBO pending above\par 3) Hold on bowel regimen given looser stools\par 4) Continue with Reglan as is - on low dose, tolerating well\par 5) PPI, H2 blocker as is\par 6) All discussed at length with wife and patient. Plan agreed upon. All questions answered\par 7) RV pending above. \par --------------------------------------------------------------\par Since last visit:\par Has had CT scan, EGD, Colon - all largely normal\par Did find HP and start treatment since last visit\par \par Currently:\par Current pain regimen: 2 tylenol, 3-4 advil, tramadol PRN\par Still with bloating and cramping\par Predominantly LLQ pain -> this does correlate with his back pain and possible radiates from back to abdomen\par This is still impacting his daily life\par HP treatment started with [facility]\par \par Previous Workup:\par EGD - s/p billroth\par Bx showed HP\par \par Colon - normal\par

## 2019-10-23 NOTE — PHYSICAL EXAM
[General Appearance - In No Acute Distress] : in no acute distress [General Appearance - Alert] : alert [General Appearance - Well Nourished] : well nourished [Sclera] : the sclera and conjunctiva were normal [Extraocular Movements] : extraocular movements were intact [Strabismus] : no strabismus was seen [Outer Ear] : the ears and nose were normal in appearance [Examination Of The Oral Cavity] : the lips and gums were normal [Oropharynx] : the oropharynx was normal [Neck Appearance] : the appearance of the neck was normal [Neck Cervical Mass (___cm)] : no neck mass was observed [Respiration, Rhythm And Depth] : normal respiratory rhythm and effort [Exaggerated Use Of Accessory Muscles For Inspiration] : no accessory muscle use [Auscultation Breath Sounds / Voice Sounds] : lungs were clear to auscultation bilaterally [Heart Rate And Rhythm] : heart rate was normal and rhythm regular [Heart Sounds] : normal S1 and S2 [Murmurs] : no murmurs [Edema] : there was no peripheral edema [Abdomen Soft] : soft [Bowel Sounds] : normal bowel sounds [Cervical Lymph Nodes Enlarged Posterior Bilaterally] : posterior cervical [Cervical Lymph Nodes Enlarged Anterior Bilaterally] : anterior cervical [Supraclavicular Lymph Nodes Enlarged Bilaterally] : supraclavicular [No CVA Tenderness] : no ~M costovertebral angle tenderness [No Spinal Tenderness] : no spinal tenderness [Abnormal Walk] : normal gait [] : no rash [Involuntary Movements] : no involuntary movements were seen [No Focal Deficits] : no focal deficits [FreeTextEntry1] : aox3 [Impaired Insight] : insight and judgment were intact [Affect] : the affect was normal

## 2019-10-23 NOTE — ASSESSMENT
[FreeTextEntry1] : 69 yo M pmh cholangiocarcinoma s/p multiple ERCP then ultimately Whipple procedure s/p chemo presenting for f/u of GERD, IBS-C, and gastroparesis.   Still with abdominal pain despite negative endoscopic and radiologic testing.  He does have some continuation of back pain that may be playing role.  Certainly HP may be worsening more diffuse complaints, but less likely impacting the LLQ pain.\par \par Impression:\par 1) Abdominal Pain\par 2) Delay in gastric emptying - symptomatically improved on Reglan\par 3) GERD - rare regurgitation despite PPI - stable\par 4) IBS\par 5) Cholangio s/p Whipple with persistently present LAD\par 6) Gastric polyps\par 7) Elevated LAEs - followed by Dr. Freeman\par 8) HP infection\par \par Plan:\par 1) Continue with current HP treatment regimen\par 2) Probiotic after completion of above\par 3) Will reassess after the above\par 4) Advised to f/u Back specialist to see if this is playing a role in his radiating pain\par 5) F/u Onc as planned\par 6) Can try holding on reglan to see of benefit\par 7) PPI, H2 blocker as is\par 8) All discussed at length with wife and patient. Plan agreed upon. All questions answered\par 9) RV 1 month

## 2019-10-24 ENCOUNTER — LABORATORY RESULT (OUTPATIENT)
Age: 69
End: 2019-10-24

## 2019-10-25 ENCOUNTER — OTHER (OUTPATIENT)
Age: 69
End: 2019-10-25

## 2019-10-25 RX ORDER — RIFAXIMIN 550 MG/1
550 TABLET ORAL TWICE DAILY
Qty: 20 | Refills: 0 | Status: ACTIVE | COMMUNITY
Start: 2019-10-25 | End: 1900-01-01

## 2019-10-29 ENCOUNTER — APPOINTMENT (OUTPATIENT)
Dept: GASTROENTEROLOGY | Facility: CLINIC | Age: 69
End: 2019-10-29
Payer: MEDICARE

## 2019-10-29 VITALS — SYSTOLIC BLOOD PRESSURE: 120 MMHG | DIASTOLIC BLOOD PRESSURE: 68 MMHG

## 2019-10-29 VITALS
WEIGHT: 145 LBS | OXYGEN SATURATION: 99 % | BODY MASS INDEX: 20.76 KG/M2 | SYSTOLIC BLOOD PRESSURE: 90 MMHG | RESPIRATION RATE: 16 BRPM | HEART RATE: 91 BPM | DIASTOLIC BLOOD PRESSURE: 68 MMHG | HEIGHT: 70 IN

## 2019-10-29 DIAGNOSIS — Z87.19 PERSONAL HISTORY OF OTHER DISEASES OF THE DIGESTIVE SYSTEM: ICD-10-CM

## 2019-10-29 DIAGNOSIS — K58.0 IRRITABLE BOWEL SYNDROME WITH DIARRHEA: ICD-10-CM

## 2019-10-29 DIAGNOSIS — A04.8 OTHER SPECIFIED BACTERIAL INTESTINAL INFECTIONS: ICD-10-CM

## 2019-10-29 DIAGNOSIS — R74.8 ABNORMAL LEVELS OF OTHER SERUM ENZYMES: ICD-10-CM

## 2019-10-29 DIAGNOSIS — R10.11 RIGHT UPPER QUADRANT PAIN: ICD-10-CM

## 2019-10-29 PROCEDURE — 99215 OFFICE O/P EST HI 40 MIN: CPT

## 2019-10-29 RX ORDER — BISMUTH SUBSALICYLATE 262 MG/1
262 TABLET, CHEWABLE ORAL 4 TIMES DAILY
Qty: 56 | Refills: 0 | Status: DISCONTINUED | COMMUNITY
Start: 2019-09-20 | End: 2019-10-29

## 2019-10-29 RX ORDER — DOXYCYCLINE HYCLATE 100 MG/1
100 CAPSULE ORAL
Qty: 28 | Refills: 0 | Status: DISCONTINUED | COMMUNITY
Start: 2019-09-20 | End: 2019-10-29

## 2019-10-29 RX ORDER — POLYETHYLENE GLYCOL 3350, SODIUM SULFATE, SODIUM CHLORIDE, POTASSIUM CHLORIDE, ASCORBIC ACID, SODIUM ASCORBATE 7.5-2.691G
100 KIT ORAL
Qty: 1 | Refills: 0 | Status: DISCONTINUED | COMMUNITY
Start: 2019-09-16 | End: 2019-10-29

## 2019-10-29 RX ORDER — OMEPRAZOLE 20 MG/1
20 CAPSULE, DELAYED RELEASE ORAL TWICE DAILY
Qty: 28 | Refills: 0 | Status: DISCONTINUED | COMMUNITY
Start: 2019-09-20 | End: 2019-10-29

## 2019-10-29 RX ORDER — METRONIDAZOLE 250 MG/1
250 TABLET ORAL EVERY 6 HOURS
Qty: 56 | Refills: 0 | Status: DISCONTINUED | COMMUNITY
Start: 2019-09-20 | End: 2019-10-29

## 2019-10-29 NOTE — PHYSICAL EXAM
[General Appearance - Alert] : alert [General Appearance - In No Acute Distress] : in no acute distress [General Appearance - Well Nourished] : well nourished [Sclera] : the sclera and conjunctiva were normal [Extraocular Movements] : extraocular movements were intact [Strabismus] : no strabismus was seen [Outer Ear] : the ears and nose were normal in appearance [Examination Of The Oral Cavity] : the lips and gums were normal [Oropharynx] : the oropharynx was normal [Neck Appearance] : the appearance of the neck was normal [Neck Cervical Mass (___cm)] : no neck mass was observed [Respiration, Rhythm And Depth] : normal respiratory rhythm and effort [Exaggerated Use Of Accessory Muscles For Inspiration] : no accessory muscle use [Auscultation Breath Sounds / Voice Sounds] : lungs were clear to auscultation bilaterally [Heart Rate And Rhythm] : heart rate was normal and rhythm regular [Heart Sounds] : normal S1 and S2 [Murmurs] : no murmurs [Edema] : there was no peripheral edema [Bowel Sounds] : normal bowel sounds [Abdomen Soft] : soft [Cervical Lymph Nodes Enlarged Posterior Bilaterally] : posterior cervical [Cervical Lymph Nodes Enlarged Anterior Bilaterally] : anterior cervical [Supraclavicular Lymph Nodes Enlarged Bilaterally] : supraclavicular [No CVA Tenderness] : no ~M costovertebral angle tenderness [No Spinal Tenderness] : no spinal tenderness [Abnormal Walk] : normal gait [Involuntary Movements] : no involuntary movements were seen [] : no rash [No Focal Deficits] : no focal deficits [Impaired Insight] : insight and judgment were intact [Affect] : the affect was normal [FreeTextEntry1] : + anxious regarding test results

## 2019-10-29 NOTE — REVIEW OF SYSTEMS
[Fever] : no fever [Chills] : no chills [Feeling Poorly] : feeling poorly [Feeling Tired] : feeling tired [Recent Weight Loss (___ Lbs)] : recent [unfilled] ~Ulb weight loss [As Noted in HPI] : as noted in HPI [Skin Lesions] : no skin lesions [Skin Wound] : no skin wound [Itching] : no itching [Dizziness] : dizziness [Fainting] : no fainting [Sleep Disturbances] : sleep disturbances [Anxiety] : anxiety [Depression] : no depression [Negative] : Heme/Lymph

## 2019-10-29 NOTE — ASSESSMENT
[FreeTextEntry1] : 70 yo M pmh cholangiocarcinoma s/p multiple ERCP then ultimately Whipple procedure s/p chemo presenting for f/u of GERD, IBS-C, and gastroparesis.  His LLQ pain and back pain are improving; His bowel issues are improving.  His major complaint is now the persistent RUQ discomfort that he thinks is impacting his weight and ability to tolerate PO.  Given increase in alk p - concern there may be obstruction brewing.  No fever today and though initial BP was borderline low, repeat was fine.  Will repeat labs today and get MRI of abdomen (will confirm with Dr. Marsh and Dr. Atkinson) to r/o obstruction.  Otherwise can finish course of xifax.  If repeat labs resolved and no structural issue on imaging - will consider trial of Remeron\par \par Impression:\par 1) Abdominal Pain - persisting in RUQ\par 2) Altered bowel habits - improving on day 3 xifaxan\par 3) Delay in gastric emptying - symptomatically improved on Reglan\par 4) GERD - rare regurgitation despite PPI - stable\par 5) Cholangio s/p Whipple with persistently present LAD\par 6) Gastric polyps\par 7) Elevated Alk P\par 8) HP infections s/p treatment\par \par Plan:\par 1) Labs today\par 2) MRI Abdomen now; Okay with PET CT in 2 weeks as planned\par 3) Finish course of Xifaxan - to restart probiotic tomm\par 4) Consider trial of Remeron pending above\par 5) Will need HP breath test vs Stool Ag in 4-6 weeks\par 6) PPI, H2 blocker as is\par 7) CC: Maximo, CC: [place]\par 8) All discussed at length with wife and patient. Plan agreed upon. All questions answered\par 9) RV pending above

## 2019-10-29 NOTE — HISTORY OF PRESENT ILLNESS
[FreeTextEntry1] : Last visit 9/2019\par Plan after last visit:\par IBS (irritable bowel syndrome) (564.1) (K58.9)\par Acute abdominal pain (789.00,338.19) (R10.9)\par Chronic back pain (724.5,338.29) (M54.9,G89.29)\par Helicobacter pylori (H. pylori) infection (041.86) (A04.8)\par \par 67 yo M pmh cholangiocarcinoma s/p multiple ERCP then ultimately Whipple procedure s/p chemo presenting for f/u of GERD, IBS-C, and gastroparesis. Still with abdominal pain despite negative endoscopic and radiologic testing. He does have some continuation of back pain that may be playing role. Certainly HP may be worsening more diffuse complaints, but less likely impacting the LLQ pain.\par \par Impression:\par 1) Abdominal Pain\par 2) Delay in gastric emptying - symptomatically improved on Reglan\par 3) GERD - rare regurgitation despite PPI - stable\par 4) IBS\par 5) Cholangio s/p Whipple with persistently present LAD\par 6) Gastric polyps\par 7) Elevated LAEs - followed by Dr. Freeman\par 8) HP infection\par \par Plan:\par 1) Continue with current HP treatment regimen\par 2) Probiotic after completion of above\par 3) Will reassess after the above\par 4) Advised to f/u Back specialist to see if this is playing a role in his radiating pain\par 5) F/u Onc as planned\par 6) Can try holding on Reglan to see of benefit\par 7) PPI, H2 blocker as is\par 8) All discussed at length with wife and patient. Plan agreed upon. All questions answered\par 9) RV 1 month. \par --------------------------------------------------------------------------\par \par Patient saw Dr. Atkinson in the office and had blood work\par Now with Alk P borderline elevated\par Tumor markers increasing (CEA and CA 19-9) with planned PET CT in 2 weeks\par Patient now with intermittent but persistent RUQ abdominal pain at this time that is impacting quality of life\par Decreasing appetite and + weight loss despite treatment for HP\par \par Stools are improving on Xifaxan but are not 100%.  On Day 3 today\par No blood in stool, melena, n/v, dysphagia, constipation

## 2019-10-30 ENCOUNTER — OTHER (OUTPATIENT)
Age: 69
End: 2019-10-30

## 2019-10-30 LAB
ALBUMIN SERPL ELPH-MCNC: 3.9 G/DL
ALP BLD-CCNC: 165 U/L
ALT SERPL-CCNC: 25 U/L
ANION GAP SERPL CALC-SCNC: 14 MMOL/L
AST SERPL-CCNC: 42 U/L
BASOPHILS # BLD AUTO: 0.03 K/UL
BASOPHILS NFR BLD AUTO: 0.6 %
BILIRUB SERPL-MCNC: 0.3 MG/DL
BUN SERPL-MCNC: 24 MG/DL
CALCIUM SERPL-MCNC: 9.2 MG/DL
CHLORIDE SERPL-SCNC: 107 MMOL/L
CO2 SERPL-SCNC: 22 MMOL/L
CREAT SERPL-MCNC: 0.81 MG/DL
EOSINOPHIL # BLD AUTO: 0.14 K/UL
EOSINOPHIL NFR BLD AUTO: 3 %
GGT SERPL-CCNC: 52 U/L
GLUCOSE SERPL-MCNC: 90 MG/DL
HCT VFR BLD CALC: 36.4 %
HGB BLD-MCNC: 11 G/DL
IMM GRANULOCYTES NFR BLD AUTO: 0.2 %
LYMPHOCYTES # BLD AUTO: 1.09 K/UL
LYMPHOCYTES NFR BLD AUTO: 23.2 %
MAN DIFF?: NORMAL
MCHC RBC-ENTMCNC: 26.8 PG
MCHC RBC-ENTMCNC: 30.2 GM/DL
MCV RBC AUTO: 88.6 FL
MONOCYTES # BLD AUTO: 0.5 K/UL
MONOCYTES NFR BLD AUTO: 10.6 %
NEUTROPHILS # BLD AUTO: 2.93 K/UL
NEUTROPHILS NFR BLD AUTO: 62.4 %
PLATELET # BLD AUTO: 185 K/UL
POTASSIUM SERPL-SCNC: 4.5 MMOL/L
PROT SERPL-MCNC: 6.5 G/DL
RBC # BLD: 4.11 M/UL
RBC # FLD: 17.4 %
SODIUM SERPL-SCNC: 143 MMOL/L
WBC # FLD AUTO: 4.7 K/UL

## 2019-10-31 ENCOUNTER — OTHER (OUTPATIENT)
Age: 69
End: 2019-10-31

## 2019-11-03 ENCOUNTER — FORM ENCOUNTER (OUTPATIENT)
Age: 69
End: 2019-11-03

## 2019-11-04 ENCOUNTER — APPOINTMENT (OUTPATIENT)
Dept: MRI IMAGING | Facility: CLINIC | Age: 69
End: 2019-11-04
Payer: MEDICARE

## 2019-11-04 ENCOUNTER — OUTPATIENT (OUTPATIENT)
Dept: OUTPATIENT SERVICES | Facility: HOSPITAL | Age: 69
LOS: 1 days | End: 2019-11-04
Payer: MEDICARE

## 2019-11-04 DIAGNOSIS — K90.81 WHIPPLE'S DISEASE: Chronic | ICD-10-CM

## 2019-11-04 DIAGNOSIS — C24.9 MALIGNANT NEOPLASM OF BILIARY TRACT, UNSPECIFIED: ICD-10-CM

## 2019-11-04 DIAGNOSIS — R74.8 ABNORMAL LEVELS OF OTHER SERUM ENZYMES: ICD-10-CM

## 2019-11-04 DIAGNOSIS — R10.11 RIGHT UPPER QUADRANT PAIN: ICD-10-CM

## 2019-11-04 DIAGNOSIS — Z98.890 OTHER SPECIFIED POSTPROCEDURAL STATES: Chronic | ICD-10-CM

## 2019-11-04 DIAGNOSIS — Z98.1 ARTHRODESIS STATUS: Chronic | ICD-10-CM

## 2019-11-04 PROCEDURE — A9585: CPT

## 2019-11-04 PROCEDURE — 74183 MRI ABD W/O CNTR FLWD CNTR: CPT | Mod: 26

## 2019-11-04 PROCEDURE — 74183 MRI ABD W/O CNTR FLWD CNTR: CPT

## 2019-11-06 ENCOUNTER — OTHER (OUTPATIENT)
Age: 69
End: 2019-11-06

## 2019-11-07 ENCOUNTER — RX RENEWAL (OUTPATIENT)
Age: 69
End: 2019-11-07

## 2019-11-07 RX ORDER — DIAZEPAM 5 MG/1
5 TABLET ORAL
Qty: 1 | Refills: 0 | Status: ACTIVE | COMMUNITY
Start: 2019-10-31 | End: 1900-01-01

## 2019-11-11 ENCOUNTER — OUTPATIENT (OUTPATIENT)
Dept: OUTPATIENT SERVICES | Facility: HOSPITAL | Age: 69
LOS: 1 days | Discharge: HOME | End: 2019-11-11

## 2019-11-11 DIAGNOSIS — Z98.890 OTHER SPECIFIED POSTPROCEDURAL STATES: Chronic | ICD-10-CM

## 2019-11-11 DIAGNOSIS — Z98.1 ARTHRODESIS STATUS: Chronic | ICD-10-CM

## 2019-11-11 DIAGNOSIS — C24.9 MALIGNANT NEOPLASM OF BILIARY TRACT, UNSPECIFIED: ICD-10-CM

## 2019-11-11 DIAGNOSIS — K90.81 WHIPPLE'S DISEASE: Chronic | ICD-10-CM

## 2019-11-12 ENCOUNTER — FORM ENCOUNTER (OUTPATIENT)
Age: 69
End: 2019-11-12

## 2019-11-12 ENCOUNTER — APPOINTMENT (OUTPATIENT)
Dept: PHYSICAL MEDICINE AND REHAB | Facility: CLINIC | Age: 69
End: 2019-11-12

## 2019-11-13 ENCOUNTER — OTHER (OUTPATIENT)
Age: 69
End: 2019-11-13

## 2019-11-13 ENCOUNTER — OUTPATIENT (OUTPATIENT)
Dept: OUTPATIENT SERVICES | Facility: HOSPITAL | Age: 69
LOS: 1 days | End: 2019-11-13
Payer: MEDICARE

## 2019-11-13 ENCOUNTER — APPOINTMENT (OUTPATIENT)
Dept: NUCLEAR MEDICINE | Facility: CLINIC | Age: 69
End: 2019-11-13
Payer: MEDICARE

## 2019-11-13 DIAGNOSIS — Z98.890 OTHER SPECIFIED POSTPROCEDURAL STATES: Chronic | ICD-10-CM

## 2019-11-13 DIAGNOSIS — K90.81 WHIPPLE'S DISEASE: Chronic | ICD-10-CM

## 2019-11-13 DIAGNOSIS — R10.32 LEFT LOWER QUADRANT PAIN: ICD-10-CM

## 2019-11-13 DIAGNOSIS — Z98.1 ARTHRODESIS STATUS: Chronic | ICD-10-CM

## 2019-11-13 PROCEDURE — A9552: CPT

## 2019-11-13 PROCEDURE — 78815 PET IMAGE W/CT SKULL-THIGH: CPT

## 2019-11-13 PROCEDURE — 78815 PET IMAGE W/CT SKULL-THIGH: CPT | Mod: 26,PS

## 2019-11-15 ENCOUNTER — APPOINTMENT (OUTPATIENT)
Dept: HEMATOLOGY ONCOLOGY | Facility: CLINIC | Age: 69
End: 2019-11-15
Payer: MEDICARE

## 2019-11-15 ENCOUNTER — LABORATORY RESULT (OUTPATIENT)
Age: 69
End: 2019-11-15

## 2019-11-15 ENCOUNTER — RESULT REVIEW (OUTPATIENT)
Age: 69
End: 2019-11-15

## 2019-11-15 VITALS
DIASTOLIC BLOOD PRESSURE: 76 MMHG | OXYGEN SATURATION: 99 % | SYSTOLIC BLOOD PRESSURE: 113 MMHG | HEART RATE: 82 BPM | TEMPERATURE: 98.2 F | WEIGHT: 147.91 LBS | BODY MASS INDEX: 21.22 KG/M2 | RESPIRATION RATE: 16 BRPM

## 2019-11-15 DIAGNOSIS — R10.84 GENERALIZED ABDOMINAL PAIN: ICD-10-CM

## 2019-11-15 DIAGNOSIS — K83.1 OBSTRUCTION OF BILE DUCT: ICD-10-CM

## 2019-11-15 LAB
HCT VFR BLD CALC: 34.5 % — LOW (ref 39–50)
HGB BLD-MCNC: 11.4 G/DL — LOW (ref 13–17)
MCHC RBC-ENTMCNC: 28.7 PG — SIGNIFICANT CHANGE UP (ref 27–34)
MCHC RBC-ENTMCNC: 33 G/DL — SIGNIFICANT CHANGE UP (ref 32–36)
MCV RBC AUTO: 87 FL — SIGNIFICANT CHANGE UP (ref 80–100)
PLATELET # BLD AUTO: 205 K/UL — SIGNIFICANT CHANGE UP (ref 150–400)
RBC # BLD: 3.96 M/UL — LOW (ref 4.2–5.8)
RBC # FLD: 15.7 % — HIGH (ref 10.3–14.5)
WBC # BLD: 6.1 K/UL — SIGNIFICANT CHANGE UP (ref 3.8–10.5)
WBC # FLD AUTO: 6.1 K/UL — SIGNIFICANT CHANGE UP (ref 3.8–10.5)

## 2019-11-15 PROCEDURE — 99215 OFFICE O/P EST HI 40 MIN: CPT

## 2019-11-15 RX ORDER — CAPECITABINE 500 MG/1
500 TABLET ORAL
Qty: 56 | Refills: 6 | Status: ACTIVE | COMMUNITY
Start: 2019-11-15 | End: 1900-01-01

## 2019-11-15 RX ORDER — APIXABAN 5 MG/1
5 TABLET, FILM COATED ORAL
Qty: 60 | Refills: 0 | Status: ACTIVE | COMMUNITY
Start: 2019-11-15 | End: 1900-01-01

## 2019-11-18 ENCOUNTER — MESSAGE (OUTPATIENT)
Age: 69
End: 2019-11-18

## 2019-11-18 ENCOUNTER — INPATIENT (INPATIENT)
Facility: HOSPITAL | Age: 69
LOS: 3 days | Discharge: HOME | DRG: 435 | End: 2019-11-22
Attending: STUDENT IN AN ORGANIZED HEALTH CARE EDUCATION/TRAINING PROGRAM | Admitting: HOSPITALIST
Payer: MEDICARE

## 2019-11-18 VITALS
OXYGEN SATURATION: 99 % | TEMPERATURE: 100 F | SYSTOLIC BLOOD PRESSURE: 142 MMHG | HEIGHT: 70 IN | RESPIRATION RATE: 16 BRPM | HEART RATE: 128 BPM | WEIGHT: 143.08 LBS | DIASTOLIC BLOOD PRESSURE: 77 MMHG

## 2019-11-18 DIAGNOSIS — Z98.1 ARTHRODESIS STATUS: Chronic | ICD-10-CM

## 2019-11-18 DIAGNOSIS — Z98.890 OTHER SPECIFIED POSTPROCEDURAL STATES: Chronic | ICD-10-CM

## 2019-11-18 DIAGNOSIS — K90.81 WHIPPLE'S DISEASE: Chronic | ICD-10-CM

## 2019-11-18 DIAGNOSIS — R50.9 FEVER, UNSPECIFIED: ICD-10-CM

## 2019-11-18 PROBLEM — K83.1 BILE DUCT STRICTURE: Status: ACTIVE | Noted: 2017-03-26

## 2019-11-18 PROBLEM — R10.84 ABDOMINAL PAIN, DIFFUSE: Status: ACTIVE | Noted: 2019-10-29

## 2019-11-18 LAB
ALBUMIN SERPL ELPH-MCNC: 3.6 G/DL — SIGNIFICANT CHANGE UP (ref 3.3–5)
ALP SERPL-CCNC: 294 U/L — HIGH (ref 40–120)
ALT FLD-CCNC: 17 U/L — SIGNIFICANT CHANGE UP (ref 10–45)
ANION GAP SERPL CALC-SCNC: 15 MMOL/L — SIGNIFICANT CHANGE UP (ref 5–17)
APPEARANCE UR: CLEAR — SIGNIFICANT CHANGE UP
APTT BLD: 29.3 SEC — SIGNIFICANT CHANGE UP (ref 27.5–36.3)
AST SERPL-CCNC: 17 U/L — SIGNIFICANT CHANGE UP (ref 10–40)
BACTERIA # UR AUTO: 0 — SIGNIFICANT CHANGE UP
BASOPHILS # BLD AUTO: 0.04 K/UL — SIGNIFICANT CHANGE UP (ref 0–0.2)
BASOPHILS NFR BLD AUTO: 0.4 % — SIGNIFICANT CHANGE UP (ref 0–2)
BILIRUB SERPL-MCNC: 0.6 MG/DL — SIGNIFICANT CHANGE UP (ref 0.2–1.2)
BILIRUB UR-MCNC: NEGATIVE — SIGNIFICANT CHANGE UP
BUN SERPL-MCNC: 12 MG/DL — SIGNIFICANT CHANGE UP (ref 7–23)
CALCIUM SERPL-MCNC: 8.6 MG/DL — SIGNIFICANT CHANGE UP (ref 8.4–10.5)
CHLORIDE SERPL-SCNC: 106 MMOL/L — SIGNIFICANT CHANGE UP (ref 96–108)
CO2 SERPL-SCNC: 20 MMOL/L — LOW (ref 22–31)
COLOR SPEC: SIGNIFICANT CHANGE UP
CREAT SERPL-MCNC: 0.82 MG/DL — SIGNIFICANT CHANGE UP (ref 0.5–1.3)
DIFF PNL FLD: NEGATIVE — SIGNIFICANT CHANGE UP
EOSINOPHIL # BLD AUTO: 0.04 K/UL — SIGNIFICANT CHANGE UP (ref 0–0.5)
EOSINOPHIL NFR BLD AUTO: 0.4 % — SIGNIFICANT CHANGE UP (ref 0–6)
EPI CELLS # UR: 0 /HPF — SIGNIFICANT CHANGE UP
GAS PNL BLDV: SIGNIFICANT CHANGE UP
GAS PNL BLDV: SIGNIFICANT CHANGE UP
GLUCOSE SERPL-MCNC: 136 MG/DL — HIGH (ref 70–99)
GLUCOSE UR QL: NEGATIVE — SIGNIFICANT CHANGE UP
HCT VFR BLD CALC: 31.6 % — LOW (ref 39–50)
HGB BLD-MCNC: 10.3 G/DL — LOW (ref 13–17)
HYALINE CASTS # UR AUTO: 0 /LPF — SIGNIFICANT CHANGE UP (ref 0–2)
IMM GRANULOCYTES NFR BLD AUTO: 0.7 % — SIGNIFICANT CHANGE UP (ref 0–1.5)
INR BLD: 1.26 RATIO — HIGH (ref 0.88–1.16)
KETONES UR-MCNC: NEGATIVE — SIGNIFICANT CHANGE UP
LEUKOCYTE ESTERASE UR-ACNC: NEGATIVE — SIGNIFICANT CHANGE UP
LYMPHOCYTES # BLD AUTO: 0.66 K/UL — LOW (ref 1–3.3)
LYMPHOCYTES # BLD AUTO: 6.7 % — LOW (ref 13–44)
MAGNESIUM SERPL-MCNC: 1.8 MG/DL — SIGNIFICANT CHANGE UP (ref 1.6–2.6)
MCHC RBC-ENTMCNC: 26.9 PG — LOW (ref 27–34)
MCHC RBC-ENTMCNC: 32.6 GM/DL — SIGNIFICANT CHANGE UP (ref 32–36)
MCV RBC AUTO: 82.5 FL — SIGNIFICANT CHANGE UP (ref 80–100)
MONOCYTES # BLD AUTO: 1.14 K/UL — HIGH (ref 0–0.9)
MONOCYTES NFR BLD AUTO: 11.5 % — SIGNIFICANT CHANGE UP (ref 2–14)
NEUTROPHILS # BLD AUTO: 7.93 K/UL — HIGH (ref 1.8–7.4)
NEUTROPHILS NFR BLD AUTO: 80.3 % — HIGH (ref 43–77)
NITRITE UR-MCNC: NEGATIVE — SIGNIFICANT CHANGE UP
NRBC # BLD: 0 /100 WBCS — SIGNIFICANT CHANGE UP (ref 0–0)
PH UR: 6 — SIGNIFICANT CHANGE UP (ref 5–8)
PHOSPHATE SERPL-MCNC: 1.9 MG/DL — LOW (ref 2.5–4.5)
PLATELET # BLD AUTO: 224 K/UL — SIGNIFICANT CHANGE UP (ref 150–400)
POTASSIUM SERPL-MCNC: 3.8 MMOL/L — SIGNIFICANT CHANGE UP (ref 3.5–5.3)
POTASSIUM SERPL-SCNC: 3.8 MMOL/L — SIGNIFICANT CHANGE UP (ref 3.5–5.3)
PROT SERPL-MCNC: 6.6 G/DL — SIGNIFICANT CHANGE UP (ref 6–8.3)
PROT UR-MCNC: NEGATIVE — SIGNIFICANT CHANGE UP
PROTHROM AB SERPL-ACNC: 14.6 SEC — HIGH (ref 10–12.9)
RAPID RVP RESULT: SIGNIFICANT CHANGE UP
RBC # BLD: 3.83 M/UL — LOW (ref 4.2–5.8)
RBC # FLD: 17.3 % — HIGH (ref 10.3–14.5)
RBC CASTS # UR COMP ASSIST: 4 /HPF — SIGNIFICANT CHANGE UP (ref 0–4)
SODIUM SERPL-SCNC: 141 MMOL/L — SIGNIFICANT CHANGE UP (ref 135–145)
SP GR SPEC: 1.03 — HIGH (ref 1.01–1.02)
TROPONIN T, HIGH SENSITIVITY RESULT: 18 NG/L — SIGNIFICANT CHANGE UP (ref 0–51)
UROBILINOGEN FLD QL: NEGATIVE — SIGNIFICANT CHANGE UP
WBC # BLD: 9.88 K/UL — SIGNIFICANT CHANGE UP (ref 3.8–10.5)
WBC # FLD AUTO: 9.88 K/UL — SIGNIFICANT CHANGE UP (ref 3.8–10.5)
WBC UR QL: 0 /HPF — SIGNIFICANT CHANGE UP (ref 0–5)

## 2019-11-18 PROCEDURE — 49082 ABD PARACENTESIS: CPT

## 2019-11-18 PROCEDURE — 88108 CYTOPATH CONCENTRATE TECH: CPT | Mod: 26

## 2019-11-18 PROCEDURE — 74177 CT ABD & PELVIS W/CONTRAST: CPT | Mod: 26

## 2019-11-18 PROCEDURE — 99285 EMERGENCY DEPT VISIT HI MDM: CPT | Mod: 25

## 2019-11-18 PROCEDURE — 71045 X-RAY EXAM CHEST 1 VIEW: CPT | Mod: 26

## 2019-11-18 PROCEDURE — 93010 ELECTROCARDIOGRAM REPORT: CPT | Mod: 59

## 2019-11-18 PROCEDURE — 99223 1ST HOSP IP/OBS HIGH 75: CPT | Mod: AI,GC

## 2019-11-18 RX ORDER — POTASSIUM PHOSPHATE, MONOBASIC POTASSIUM PHOSPHATE, DIBASIC 236; 224 MG/ML; MG/ML
15 INJECTION, SOLUTION INTRAVENOUS ONCE
Refills: 0 | Status: COMPLETED | OUTPATIENT
Start: 2019-11-18 | End: 2019-11-18

## 2019-11-18 RX ORDER — SODIUM CHLORIDE 9 MG/ML
2000 INJECTION INTRAMUSCULAR; INTRAVENOUS; SUBCUTANEOUS ONCE
Refills: 0 | Status: COMPLETED | OUTPATIENT
Start: 2019-11-18 | End: 2019-11-18

## 2019-11-18 RX ORDER — CEFEPIME 1 G/1
2000 INJECTION, POWDER, FOR SOLUTION INTRAMUSCULAR; INTRAVENOUS ONCE
Refills: 0 | Status: COMPLETED | OUTPATIENT
Start: 2019-11-18 | End: 2019-11-18

## 2019-11-18 RX ORDER — VANCOMYCIN HCL 1 G
1000 VIAL (EA) INTRAVENOUS ONCE
Refills: 0 | Status: COMPLETED | OUTPATIENT
Start: 2019-11-18 | End: 2019-11-18

## 2019-11-18 RX ORDER — ACETAMINOPHEN 500 MG
650 TABLET ORAL ONCE
Refills: 0 | Status: COMPLETED | OUTPATIENT
Start: 2019-11-18 | End: 2019-11-18

## 2019-11-18 RX ADMIN — SODIUM CHLORIDE 2000 MILLILITER(S): 9 INJECTION INTRAMUSCULAR; INTRAVENOUS; SUBCUTANEOUS at 22:06

## 2019-11-18 RX ADMIN — CEFEPIME 100 MILLIGRAM(S): 1 INJECTION, POWDER, FOR SOLUTION INTRAMUSCULAR; INTRAVENOUS at 20:33

## 2019-11-18 RX ADMIN — Medication 250 MILLIGRAM(S): at 22:06

## 2019-11-18 RX ADMIN — Medication 650 MILLIGRAM(S): at 22:11

## 2019-11-18 RX ADMIN — Medication 650 MILLIGRAM(S): at 18:36

## 2019-11-18 RX ADMIN — CEFEPIME 2000 MILLIGRAM(S): 1 INJECTION, POWDER, FOR SOLUTION INTRAMUSCULAR; INTRAVENOUS at 22:06

## 2019-11-18 RX ADMIN — POTASSIUM PHOSPHATE, MONOBASIC POTASSIUM PHOSPHATE, DIBASIC 62.5 MILLIMOLE(S): 236; 224 INJECTION, SOLUTION INTRAVENOUS at 21:06

## 2019-11-18 RX ADMIN — SODIUM CHLORIDE 2000 MILLILITER(S): 9 INJECTION INTRAMUSCULAR; INTRAVENOUS; SUBCUTANEOUS at 18:37

## 2019-11-18 NOTE — HISTORY OF PRESENT ILLNESS
[Disease: _____________________] : Disease: [unfilled] [T: ___] : T[unfilled] [N: ___] : N[unfilled] [AJCC Stage: ____] : AJCC Stage: [unfilled] [Treatment Protocol] : Treatment Protocol [de-identified] : 66 Year old gentleman with PMH of IBS-C for many years, hypercholesterolemia, GERD, L4-L5 laminectomy 2016; cervical spine fusion 2014, here for a medical oncology evaluation for treatment of recently diagnosis of the common bile duct. \par HPI below as per the patient and available records\par 1/2017 Presented with painless jaundice to his PMD.  Found to have liver abnormalities, abnormal sonogram.\par 2/17/2017 CT abdomen revealed no definitive pancreatic mass\par 2/18/2017 He presented to the emergency room at [facility] with jaundice and progressive itching.  Total Bilirubin 12.5/Direct 9.1. CT abdomen showed intrabiliary Vs. extra biliary dilation. Started on ursodiol .\par 2/18/2017 MRI Abdomen with contrast: :Intra- as well as extra- hepatic biliary ductal dilatation, with stricturing of the mid to distal common bile duct. No shanna associated pancreatic mass, choledocholithiasis nor shanna mass of the common bile duct.  There is subtle enhancement of the wall of the common bile duct which may be related to inflammation or malignant stricture of the common bile duct. Multiple hepatic hemangiomas are demonstrated.\par \par 2/21/2017 EUS: Subtle 14mm x 15mm hypoechoic, heterogenous area in pancreatic head (at site of transition to biliary dilation) \par ERCP : biliary sphincterotomy, extraction of dark-thick sludge, cholangioscopy, biopsies of exposed ampulla/distal CBD, brushings of distal CBD, and placement of double pigtail biliary stent. No obvious findings seen on cholangioscopy although visualization was limited by thick, dark, sludge throughout biliary tree\par Bile duct brush suspicious for malignancy. \par Pancreas Head FNA :glandular cells with focal atypia in background of inflammatory cells including lymphocytes and fragments of fibrous tissue.\par Ampullary biopsy, ampullary type mucosa, negative for dysplasia\par Gastric body polyps; endoscopic biopsies show gastric fundic gland polyps\par \par 3/2/2017  EUS: Gastric polyps were noted. There was dilation in the common bile duct which measured up to 15 mm. There was small amount of sludge noted in the dilated bile duct. One stent was visualized endosonographically in the common bile duct. Extension of the stent was noted in the common bile duct. Pancreatic parenchymal abnormalities with hypoechoic foci were noted in the pancreatic head and pancreatic body. No obvious discrete mass / lesion was noted hence no FNA was performed.\par \par 3/2/2017 ERCP \par  A severe localized biliary stricture was found in the bile duct with SpyGlass cholangioscopy and biopsied using a cold forceps and also with a spy bite forceps for more directed sampling during cholangioscopy.  The stricture was then restented.\par \par 3/7/2017 Pt continued to experience jaundice and intense pruritus, was seen in outpatient office last week ere he had a metal stent placed, but today when he had a follow up appointment he was once again found to have an elevated bilirubin to 20 and was sent in for admission.\par \par 3/9/2017 EUS/ERCP: Distal biliary stricture with stent removal, cholangioscopy, biopsies, brushings, fully covered metal stent placement\par \par 4/26/2017 Whipple resection, feeding jejunostomy, appendectomy. Pathology:\par Common bile duct - Extra and intra pancreatic Tumor Size: 0.6 x 0.1 cm Histologic Type: Adenocarcinoma (not otherwise characterized) Histologic Grade: Moderate to poorly differentiated (G2 and G3).  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 Pt  admitted for IV Antibiotics with Cipro/Flagyl changed to Meropenem and Vancomycin.. Pt taken to IR and underwent CT guided percutaneous drainage of a right lower quadrant fluid collection on 6/13. \par Pt discharged home with drain to follow up with IR for tube check in 1 week \par Cultures of abscess grew out enterococcus faecalis and enterococcus raffinosus. He has completed antibiotic therapy.\par \par He was evaluated by Dr. Medrano on 7/11 and cleared for medical evaluation.\par \par Received Day 1 Cycle 1 gemcitabine and cisplatin, fatigue for 2 days lynne 5; Day 8 held due to neutropenia (WBC 2.5 ) resolved in 1 week.\par \par 10/12/17 Here for Cycle 4 Day 8 gemcitabine and cisplatin, very fatigued after day . Constipation has been more problematic 2 days since last bm. He has mild mucositis upper palate, improving. Denies paresthesias or subjective changes in hearing.  Plan: Senekot S nightly, Reglan 10 mg Po tid.  Based on current data, will rotate to oral capecitabine for the next 3 months to complete his course of Rx.\par \par \par 1/22/18 Fibroscan test:median liver stiffness of 5.0 kPa which is consistent with F0-F1 disease,  dB/m (S 2).\par \par 1/19/18  F/U visit: some back pain that is chronic and comes and goes as prev reported. Issue is a finding on recent scan, which indicates an enlarging soft tissue density, essentially doubled in size in 3 months adjacent to SMA. \par \par 2/9/18  Elevated alk phosphatase of 170.  As per discussion among DRs Mary, Rolanda, Alejandra and Mitchell regarding follow up plan after elevated alk phos and CT/MRI. No evidence of cancer recurrence on liver MRI, will continue surveillance q 3 months as planned with reimaging (MRI) as recommnded. F/U CMP in one month (week of 2/19) patient to obtain at [facility], orders entered and requisitions sent to patient. NO new symptoms. Advised to call sooner if he notes new symptoms. \par \par 4/27/18 Completed Capecitabine in January 2018. He experienced 3 episodes syncope, admitted with the last episode on 2/13. He sustained a fall on his face. Initial EKG showed accelerated junctional rhythm.  No events on telemetry, negative troponins, unremarkable TTE (2/14/18). Results from outpatient studies provided by his cardiologist, Dr. Casey Kerr: TTE results 1/9/18 with unchanged EF 60%, mild mitral and tricuspid regurgitation, normal wall motion. Stress test in 2016 nonischemic. \par \par 8/8/18 Upper Endoscopy :erythematous mucosa in stomach\par \par 10/11/18 Cardiology-PATs with no associated symptoms. Lifestyle changes recommended and beta blocker started. Told by Dr. Turner he would not need ablation\par \par 10/18/18 CT abdomen and pelvis:  Addendum 11/12/18 Upon further review, a 9 mm nodular focus is seen adjacent to the SMA on the left (2:77) new from prior imaging. Subtle recurrent disease is not excluded. Short-term follow-up imaging is recommended.  \par CA 19-9 wnl- FU in 3 mos, otherwise no new symptoms, chronic back pain\par \par 1/3/19 CT abdomen and pelvis showed increase in size of soft tissue density adjacent to the superior mesenteric \par artery today measuring 1.4 x 1.5 cm in size. \par 1/15/19 Pet CT obtained FDG avid lymph node adjacent to the SMA, suspicious for \par recurrent/metastatic disease.; FDG avidity along the intrahepatic CBD stent, with focal FDG avidity in the extrahepatic portion possibly within the jejunum, possibly recurrent \par disease. Consider evaluation with EUS/ERCP for further characterization.\par 2/1/19 Upper EUS Sono guided core needle Bx of “patricia SMA” LN 1 cm: Poorly-differentiated adenocarcinoma,\par 2/26/19 Altered Anatomy ERCP Mild hyperplasia at hepatic jejunostomy anastomosis felt to be stent related. 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   11/2/17  Capecitabine 1500 mg Po bid 2 weeks on/ 2 weeks off completed 1/15/18 \par  [de-identified] : Here to discuss next steps and results of imaging after review in TB on Wednesday.He clearly has a recurrence, tho somewhat illdefined.Lesions appear in liver and in field from prev recurrence.Additional issue of a intrahepatic  thrombosis.As per consensus of TB, pt to receive chemotherapy ( Oxali based) .Path sent to [facility] as well, resultant prior to TB discussion.

## 2019-11-18 NOTE — ED ADULT NURSE NOTE - ED STAT RN HANDOFF DETAILS
Bedside report given to on coming nurse Sabra. Understands pmh, medications given and plan of care for patient. Patient in stable condition, vital signs updated, has no complaints at this time and has been updated on care plan. Explained to patient and family that it is change of shift and new nurse is taking over, verbalized understanding.

## 2019-11-18 NOTE — RESULTS/DATA
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  While the findings are compatible with metastasis\par from this known primary, morphologic comparison will be performed\par upon receipt of the slides and the findings will be reported in\par an addendum.\par \par The carcinoma is present in the form of detached clusters as well\par as infiltrative into dense fibrous tissue showing desmoplastic\par type response.  Focal small lymphoid aggregates are present but\par convincing features of lymph node parenchyma are not seen.  It is\par not entirely certain in the sampling whether the findings\par represent near-total effacement of a lymph node versus invasion\par of soft tissue or other structures by carcinoma.  Correlation\par with clinical/radiographic impression may be helpful.\par \par This case was reviewed for  at the Gastrointestinal/Hepatobiliary intradepartmental consensus\par conference on 2/5/2019.\par \par Verified by: Farhat Baker DO\par (Electronic Signature)\par Reported on: 02/05/19 14:55 EST, [address] [place] [address], [place], [place]\par [address]\par \par \par EXAM: PETCT SKUL-THI ONC FDG INIT \par PROCEDURE DATE: 01/15/2019 \par INTERPRETATION: PROCEDURE: PET/CT SKULL BASE-MID THIGH IMAGING \par \par RADIOPHARMACEUTICAL: 13.5 mCi F-18, FDG, I.V. 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 PET/CT is done as part of the initial treatment strategy evaluation. \par \par TECHNIQUE: Fasting blood sugar measured prior to injection of \par radiopharmaceutical was 85 mg/dl. Following intravenous injection of the \par radiopharmaceutical and an uptake period of approximately 75 minutes, \par FDG-PET/CT was obtained on a Siemens Biograph mCT 64 scanner from the skull \par base to mid thigh. Oral contrast was administered during the uptake period. \par CT was performed during shallow respiration. The CT protocol was optimized \par for PET attenuation correction and to provide anatomic detail for \par localization of PET abnormalities. The CT protocol was not designed to \par produce and cannot replace state-of-the-art diagnostic CT images with \par specific imaging protocols for different body parts and indications. Images \par were reviewed on a dedicated workstation using multiplanar reconstruction. \par \par The standardized uptake values (SUV) are normalized to patient body weight \par and indicate the highest activity concentration (SUVmax) in a given disease \par site. All image numbers refer to axial image number. \par \par COMPARISON: No prior PET/CT \par \par OTHER STUDIES USED FOR CORRELATION: CT abdomen pelvis January 3, 2019; CT \par chest, abdomen/pelvis October 24, 2018; MRI abdomen June 6, 2018 \par \par FINDINGS: \par \par HEAD/NECK: No abnormal avidity. Physiologic FDG activity seen in the \par visualized portions of the brain, major salivary glands and neck muscles. \par \par THORAX: Right chest wall port catheter tip cavoatrial junction. No abnormal \par avidity.Physiologic FDG activity in the myocardium and blood pool. \par \par LUNGS: No FDG avid foci. No pulmonary nodules. \par \par PLEURA/PERICARDIUM: No abnormal avidity. No pericardial or pleural effusions. \par \par HEPATOBILIARY/PANCREAS: Liver background SUV mean as a reference for \par comparing studies is 2.5. Hepatic steatosis. Postsurgical changes of Whipple \par procedure and hepaticojejunostomy. Common bile duct stent, with mild FDG \par avidity along intrahepatic course of the stent (SUV 4.9; image 139) and \par focal intense FDG avidity in the extrahepatic portion of the stent localized \par to the jejunum (SUV 9.2; image 152). \par \par SPLEEN: No abnormal avidity. \par \par ADRENAL GLANDS: No abnormal avidity. \par \par KIDNEYS/URINARY BLADDER: Excreted activity is seen. No abnormal avidity. \par \par ABDOMINOPELVIC NODES: FDG avid lymph node adjacent to the SMA 1.6 x 1.0 cm \par (SUV 8.7; image 156) \par \par BOWEL/PERITONEUM/MESENTERY: No abnormal avidity. \par \par PELVIC ORGANS: No abnormal avidity. \par \par BONES: Anterior cervical discectomy and fusion C4-C6. Mild degenerative \par changes thoracolumbar spine. \par \par SOFT TISSUES: No abnormal avidity. \par \par IMPRESSION: \par \par 1. FDG avid lymph node adjacent to the SMA, suspicious for \par recurrent/metastatic disease. \par \par 2. FDG avidity along the intrahepatic CBD stent, with focal FDG avidity in \par the extrahepatic portion possibly within the jejunum, possibly recurrent \par disease. Consider evaluation with EUS/ERCP for further characterization. \par \par FIONA SALAZAR M.D., ATTENDING RADIOLOGIST \par This document has been electronically signed. John 15 2019 3:22PM \par \par \par EXAM: CT ABDOMEN AND PELVIS OC IC \par PROCEDURE DATE: 01/03/2019 \par INTERPRETATION: CLINICAL INFORMATION: History gallbladder cancer with \par possible developing retroperitoneal adenopathy \par \par COMPARISON: 10/18 7/17 MRI dated 6/18 \par \par PROCEDURE: \par CT of the Abdomen and Pelvis was performed with intravenous contrast. \par Intravenous contrast: 90 ml Omnipaque 350. 10 ml discarded. \par Oral contrast: positive contrast was administered. \par Sagittal and coronal reformats were performed. \par \par FINDINGS: \par \par LOWER CHEST: Within normal limits. \par \par LIVER: Diffuse fatty infiltration of the liver. Previously described \par hemangioma and cysts are stable in their radiographic appearance \par BILE DUCTS: Enteric anastomosis appears intact. No intrahepatic ductal \par dilatation seen \par GALLBLADDER: Removed \par SPLEEN: Within normal limits. \par PANCREAS: Postop changes to the pancreatic head stable in its radiographic \par appearance \par ADRENALS: Within normal limits. \par KIDNEYS/URETERS: Within normal limits. \par \par BLADDER: Within normal limits. \par REPRODUCTIVE ORGANS: Unremarkable \par \par BOWEL: Gastrojejunostomy \par PERITONEUM: No ascites. \par VESSELS: Atherosclerotic changes \par RETROPERITONEUM: Previously seen soft tissue density adjacent to the \par superior mesenteric artery appears to be increased in size measuring 1.4 x \par 1.5 cm in size series 2 image 30 \par ABDOMINAL WALL: Within normal limits. \par BONES: Within normal limits. \par \par IMPRESSION: \par \par Increase in size of soft tissue density adjacent to the superior mesenteric \par artery today measuring 1.4 x 1.5 cm in size. \par \par SIRENA PETERSON M.D., ATTENDING RADIOLOGIST \par This document has been electronically signed. John 3 2019 10:54AM \par

## 2019-11-18 NOTE — ED ADULT NURSE NOTE - OBJECTIVE STATEMENT
patient is a 69 year old male with a PMH of HTN and GERD and new diagnosis liver cancer who presents to the ED complaining of fever and diarrhea since yesterday. patient states that this diarrhea has been occurring for one month. patient has had two stool samples in the past month. the first sample came back negative for c-diff and the other one was done Thursday, results pending as per patient. patient states he goes about 8-12 times a day. patient denies blood/mucus in the diarrhea. patient states that over the past month he has lost 20lbs. patient is new diagnosis CA. chemo was supposed to start tomorrow. patient rectal temp was 101.6. MD made aware. patient is aaox3, lung sounds CTA bilaterally, abdomen soft, nondistended, nontender. patient denies chest pain, SOB, HA, n/v, cough, back pain, abdominal pain, numbness or tingling, weakness, dysuria, hematuria. MD at bedside. patient is aware of plan of care. will continue to monitor. EKG done. patient is a 69 year old male with a PMH of HTN and GERD and new diagnosis liver cancer who presents to the ED complaining of fever and diarrhea since yesterday. patient states that this diarrhea has been occurring for one month. patient has had two stool samples in the past month. the first sample came back negative for c-diff and the other one was done Thursday, results pending as per patient. patient states he goes about 8-12 times a day. patient denies blood/mucus in the diarrhea. patient states that over the past month he has lost 20lbs. patient is new diagnosis CA. chemo was supposed to start tomorrow. patient rectal temp was 101.6. MD made aware. patient is aaox3, lung sounds CTA bilaterally, abdomen soft, nondistended, nontender. patient denies chest pain, SOB, HA, n/v, cough, back pain, abdominal pain, numbness or tingling, weakness, dysuria, hematuria. MD at bedside. patient is aware of plan of care. will continue to monitor. EKG done. Patient has right chest wall mediport.

## 2019-11-18 NOTE — ED PROVIDER NOTE - ATTENDING CONTRIBUTION TO CARE
68yo male pmh cholangiocarcinoma s/p Whipple 2017 with recurrence of disease now with mets to the liver and last IV chemo 7/2019, portal vein thrombus not on AC yet scheduled for chemo tomorrow p/w watery diarrhea x 3 months started after trip to [place] and triple therapy for H pylori followed by tx with Rifaxin. Developed fever 102 yesterday. Diarrhea improved in ED, no BM in 7 hours. Denies BRBPR, cough, vomiting, chest pain, dyspnea, LE swelling, rash, insect bites, foreign travel or sick contacts. Abdominal bloating x 5 months, recent diagnosed with recurrence of cholangiocarcinoma initially found in LN ~8 months ago. Pt has hot skin, lungs clear, abd soft, non distended with mild TTP LUQ, RLQ. Labs with lipase. EKG with new TWI V3-4, aVL, will add troponin.

## 2019-11-18 NOTE — ED ADULT NURSE REASSESSMENT NOTE - NS ED NURSE REASSESS COMMENT FT1
Patient placed on contact isolation to "r/o c-diff", appropriate measures in place. Family members at the bedside, educated on proper contact isolation attire to wear. Family aware

## 2019-11-18 NOTE — ED PROVIDER NOTE - OBJECTIVE STATEMENT
68 y/o male PMHx cholangiocarcinoma s/p Whipple 2017 with recurrence of disease now with mets to the liver and last IV chemo 7/2019, portal vein thrombus not on AC according to MR abdomen 11/5/19, R sided Port, HTN, HLD now presenting with fever x1 day and diarrhea x2 months. Patient reported he has watery diarrhea >10 episodes daily since 9/2019. Patient had negative C-scope and EGD in 9/2019 + H.Pylori treated with a two week course of triple therapy with no improvement of symptoms and then prescribed another course of antibiotics with minimal improvement. [facility] and [facility] test was on 11/15/19. Patient now having stool incontinence since yesterday. Patient reported the diarrhea is melanous however has been taking Pepto Bismol (last dose at 3pm today) and pt was febrile 102 then treated with Tylenol. Patient denied CP, SOB, N/V, abdominal pain, cough, URI symptoms, urinary complaints. Patient has had 20 pound unintentional weight loss since 7/2019

## 2019-11-18 NOTE — ED PROVIDER NOTE - PROGRESS NOTE DETAILS
35mL cloudy fluid  from paracentesis sent to lab. 18g needle used for RLQ entry. No complications. Pt now being sent to room

## 2019-11-18 NOTE — CHART NOTE - NSCHARTNOTEFT_GEN_A_CORE
[facility] PGY-3  MAR  Dept. Internal Medicine    TO BE COMPLETED WITHIN 6 HOURS OF INITIAL ASSESSMENT:    For use in patients that have 2 sepsis criteria and new organ dysfunction   •	New or increased oxygen requirement  •	Creatinine >2mg/dL  •	Bilirubin>2mg/dL  •	Platelet <100,00/mm3  •	INR >1.5, PTT>60  •	Lactate >2    If patient persistent hypotension (SBP<90) or any lactate >4 then provider evaluation (Physician/PA/NP) within 30 minutes of bolus completion is required.    Vital Signs Last 24 Hrs  T(C): 37.1 (18 Nov 2019 22:11), Max: 38.7 (18 Nov 2019 18:25)  T(F): 98.8 (18 Nov 2019 22:11), Max: 101.6 (18 Nov 2019 18:25)  HR: 90 (18 Nov 2019 22:11) (90 - 128)  BP: 118/72 (18 Nov 2019 22:11) (118/72 - 142/77)  BP(mean): --  RR: 16 (18 Nov 2019 22:11) (16 - 17)  SpO2: 100% (18 Nov 2019 22:11) (98% - 100%)  		  LUNGS:  [ x ]Clear bilaterally [  ] Wheeze [  ] Rhonchi [  ] Rales [  ] Crackles; Other:  HEART: [ x ]RRR [  ] No murmur[  ]  Normal S1S2[  ] Tachycardia;  Other:  CAPILLARY REFULL:  	Fingers: [ x ] less than 2 seconds [  ] more than 2 seconds                                           Toes: [  ]  less than 2 seconds [  ] more than 2 seconds   PERIPHERAL PULSES:  Radial: [ x ] Palpable  [  ]  non-palpable                                         Dorsalis Pedis: [ x ] Palpable  [  ] non-palpable                                         Posterior Tibial: [ x ] Palpable  [  ] non-palpable                                          Other:  SKIN:   [  ]  Diaphoretic  [  ]  mottling  [  ]  Cold extremities  [ x ]  Warm [  ]  Dry                      Other:    BEDSIDE ULTRASOUND FINDINGS (IF APPLICABLE):    Labs:  18 Nov 2019 18:38    141    |  106    |  12     ----------------------------<  136    3.8     |  20     |  0.82     Ca    8.6        18 Nov 2019 18:38  Phos  1.9       18 Nov 2019 18:38  Mg     1.8       18 Nov 2019 18:38    TPro  6.6    /  Alb  3.6    /  TBili  0.6    /  DBili  x      /  AST  17     /  ALT  17     /  AlkPhos  294    18 Nov 2019 18:38                          10.3   9.88  )-----------( 224      ( 18 Nov 2019 18:40 )             31.6     PT/INR - ( 18 Nov 2019 18:40 )   PT: 14.6 sec;   INR: 1.26 ratio         PTT - ( 18 Nov 2019 18:40 )  PTT:29.3 sec  Lactate:    Plan (orders must be placed in EMR):     [  ]  Check Repeat Lactate   [ x ]  No change in current plan  [  ]  Start Vasopressors:  [  ]  Repeat Fluid Bolus:  [  ] other:    Care Discussed with Consultants/Other Providers [ ] YES  [ ] NO

## 2019-11-18 NOTE — ED ADULT NURSE REASSESSMENT NOTE - NS ED NURSE REASSESS COMMENT FT1
Report received from KEKE BELL Pt resting comfortably with family at bedside. Awaiting lab/radiology results. VSS. Safety maintained at all times, bed in lowest position, call bell in reach. Will continue to monitor closely.

## 2019-11-18 NOTE — ASSESSMENT
[Palliative] : Goals of care discussed with patient: Palliative [Palliative Care Plan] : not applicable at this time [FreeTextEntry1] : Abd pain, ( chronic) back pain in setting of cholangicarcinoma,now  metatstatic to liver and LN per mRI and PETCT.Will start CAPEOX chemo in anticipation of genomic testing

## 2019-11-19 ENCOUNTER — OTHER (OUTPATIENT)
Age: 69
End: 2019-11-19

## 2019-11-19 ENCOUNTER — APPOINTMENT (OUTPATIENT)
Dept: INFUSION THERAPY | Facility: HOSPITAL | Age: 69
End: 2019-11-19

## 2019-11-19 DIAGNOSIS — E78.5 HYPERLIPIDEMIA, UNSPECIFIED: ICD-10-CM

## 2019-11-19 DIAGNOSIS — K21.9 GASTRO-ESOPHAGEAL REFLUX DISEASE WITHOUT ESOPHAGITIS: ICD-10-CM

## 2019-11-19 DIAGNOSIS — K65.2 SPONTANEOUS BACTERIAL PERITONITIS: ICD-10-CM

## 2019-11-19 DIAGNOSIS — Z02.9 ENCOUNTER FOR ADMINISTRATIVE EXAMINATIONS, UNSPECIFIED: ICD-10-CM

## 2019-11-19 DIAGNOSIS — I10 ESSENTIAL (PRIMARY) HYPERTENSION: ICD-10-CM

## 2019-11-19 DIAGNOSIS — A41.9 SEPSIS, UNSPECIFIED ORGANISM: ICD-10-CM

## 2019-11-19 DIAGNOSIS — Z29.9 ENCOUNTER FOR PROPHYLACTIC MEASURES, UNSPECIFIED: ICD-10-CM

## 2019-11-19 DIAGNOSIS — R19.7 DIARRHEA, UNSPECIFIED: ICD-10-CM

## 2019-11-19 DIAGNOSIS — I81 PORTAL VEIN THROMBOSIS: ICD-10-CM

## 2019-11-19 DIAGNOSIS — C22.1 INTRAHEPATIC BILE DUCT CARCINOMA: ICD-10-CM

## 2019-11-19 DIAGNOSIS — D64.9 ANEMIA, UNSPECIFIED: ICD-10-CM

## 2019-11-19 DIAGNOSIS — R65.10 SYSTEMIC INFLAMMATORY RESPONSE SYNDROME (SIRS) OF NON-INFECTIOUS ORIGIN WITHOUT ACUTE ORGAN DYSFUNCTION: ICD-10-CM

## 2019-11-19 DIAGNOSIS — E78.49 OTHER HYPERLIPIDEMIA: ICD-10-CM

## 2019-11-19 DIAGNOSIS — Z79.899 OTHER LONG TERM (CURRENT) DRUG THERAPY: ICD-10-CM

## 2019-11-19 LAB
ALBUMIN SERPL ELPH-MCNC: 2.8 G/DL — LOW (ref 3.3–5)
ALP SERPL-CCNC: 224 U/L — HIGH (ref 40–120)
ALT FLD-CCNC: 12 U/L — SIGNIFICANT CHANGE UP (ref 10–45)
ANION GAP SERPL CALC-SCNC: 11 MMOL/L — SIGNIFICANT CHANGE UP (ref 5–17)
AST SERPL-CCNC: 15 U/L — SIGNIFICANT CHANGE UP (ref 10–40)
B PERT IGG+IGM PNL SER: ABNORMAL
BASOPHILS # BLD AUTO: 0.03 K/UL — SIGNIFICANT CHANGE UP (ref 0–0.2)
BASOPHILS NFR BLD AUTO: 0.4 % — SIGNIFICANT CHANGE UP (ref 0–2)
BILIRUB SERPL-MCNC: 0.8 MG/DL — SIGNIFICANT CHANGE UP (ref 0.2–1.2)
BUN SERPL-MCNC: 8 MG/DL — SIGNIFICANT CHANGE UP (ref 7–23)
CALCIUM SERPL-MCNC: 7.6 MG/DL — LOW (ref 8.4–10.5)
CHLORIDE SERPL-SCNC: 111 MMOL/L — HIGH (ref 96–108)
CO2 SERPL-SCNC: 19 MMOL/L — LOW (ref 22–31)
COLOR FLD: SIGNIFICANT CHANGE UP
COMMENT - FLUIDS: SIGNIFICANT CHANGE UP
CREAT SERPL-MCNC: 0.7 MG/DL — SIGNIFICANT CHANGE UP (ref 0.5–1.3)
CULTURE RESULTS: NO GROWTH — SIGNIFICANT CHANGE UP
CULTURE RESULTS: SIGNIFICANT CHANGE UP
EOSINOPHIL # BLD AUTO: 0.07 K/UL — SIGNIFICANT CHANGE UP (ref 0–0.5)
EOSINOPHIL NFR BLD AUTO: 0.9 % — SIGNIFICANT CHANGE UP (ref 0–6)
FLUID INTAKE SUBSTANCE CLASS: SIGNIFICANT CHANGE UP
FLUID SEGMENTED GRANULOCYTES: 28 % — SIGNIFICANT CHANGE UP
GLUCOSE FLD-MCNC: 135 MG/DL — SIGNIFICANT CHANGE UP
GLUCOSE SERPL-MCNC: 102 MG/DL — HIGH (ref 70–99)
GRAM STN FLD: SIGNIFICANT CHANGE UP
HCT VFR BLD CALC: 24.9 % — LOW (ref 39–50)
HGB BLD-MCNC: 8.1 G/DL — LOW (ref 13–17)
IMM GRANULOCYTES NFR BLD AUTO: 0.5 % — SIGNIFICANT CHANGE UP (ref 0–1.5)
LDH SERPL L TO P-CCNC: 50 U/L — SIGNIFICANT CHANGE UP
LYMPHOCYTES # BLD AUTO: 0.81 K/UL — LOW (ref 1–3.3)
LYMPHOCYTES # BLD AUTO: 10.7 % — LOW (ref 13–44)
LYMPHOCYTES # FLD: 34 % — SIGNIFICANT CHANGE UP
MAGNESIUM SERPL-MCNC: 1.5 MG/DL — LOW (ref 1.6–2.6)
MCHC RBC-ENTMCNC: 26.6 PG — LOW (ref 27–34)
MCHC RBC-ENTMCNC: 32.5 GM/DL — SIGNIFICANT CHANGE UP (ref 32–36)
MCV RBC AUTO: 81.9 FL — SIGNIFICANT CHANGE UP (ref 80–100)
MESOTHL CELL # FLD: 1 % — SIGNIFICANT CHANGE UP
MONOCYTES # BLD AUTO: 1.07 K/UL — HIGH (ref 0–0.9)
MONOCYTES NFR BLD AUTO: 14.2 % — HIGH (ref 2–14)
MONOS+MACROS # FLD: 35 % — SIGNIFICANT CHANGE UP
NEUTROPHILS # BLD AUTO: 5.54 K/UL — SIGNIFICANT CHANGE UP (ref 1.8–7.4)
NEUTROPHILS NFR BLD AUTO: 73.3 % — SIGNIFICANT CHANGE UP (ref 43–77)
NRBC # BLD: 0 /100 WBCS — SIGNIFICANT CHANGE UP (ref 0–0)
OB PNL STL: NEGATIVE — SIGNIFICANT CHANGE UP
OTHER CELLS FLD MANUAL: 2 % — SIGNIFICANT CHANGE UP
PHOSPHATE SERPL-MCNC: 2.6 MG/DL — SIGNIFICANT CHANGE UP (ref 2.5–4.5)
PLATELET # BLD AUTO: 170 K/UL — SIGNIFICANT CHANGE UP (ref 150–400)
POTASSIUM SERPL-MCNC: 3.6 MMOL/L — SIGNIFICANT CHANGE UP (ref 3.5–5.3)
POTASSIUM SERPL-SCNC: 3.6 MMOL/L — SIGNIFICANT CHANGE UP (ref 3.5–5.3)
PROT FLD-MCNC: 1.7 G/DL — SIGNIFICANT CHANGE UP
PROT SERPL-MCNC: 5.4 G/DL — LOW (ref 6–8.3)
RBC # BLD: 3.04 M/UL — LOW (ref 4.2–5.8)
RBC # FLD: 17.2 % — HIGH (ref 10.3–14.5)
RCV VOL RI: 640 /UL — HIGH (ref 0–5)
SODIUM SERPL-SCNC: 141 MMOL/L — SIGNIFICANT CHANGE UP (ref 135–145)
SPECIMEN SOURCE: SIGNIFICANT CHANGE UP
TOTAL NUCLEATED CELL COUNT, BODY FLUID: 290 /UL — HIGH (ref 0–5)
TUBE TYPE: SIGNIFICANT CHANGE UP
WBC # BLD: 7.56 K/UL — SIGNIFICANT CHANGE UP (ref 3.8–10.5)
WBC # FLD AUTO: 7.56 K/UL — SIGNIFICANT CHANGE UP (ref 3.8–10.5)

## 2019-11-19 PROCEDURE — 99223 1ST HOSP IP/OBS HIGH 75: CPT | Mod: GC

## 2019-11-19 PROCEDURE — 99233 SBSQ HOSP IP/OBS HIGH 50: CPT | Mod: GC

## 2019-11-19 PROCEDURE — 99222 1ST HOSP IP/OBS MODERATE 55: CPT | Mod: GC

## 2019-11-19 RX ORDER — METOPROLOL TARTRATE 50 MG
25 TABLET ORAL DAILY
Refills: 0 | Status: DISCONTINUED | OUTPATIENT
Start: 2019-11-19 | End: 2019-11-22

## 2019-11-19 RX ORDER — OXYCODONE HYDROCHLORIDE 5 MG/1
5 TABLET ORAL EVERY 6 HOURS
Refills: 0 | Status: DISCONTINUED | OUTPATIENT
Start: 2019-11-19 | End: 2019-11-19

## 2019-11-19 RX ORDER — CEFTRIAXONE 500 MG/1
1000 INJECTION, POWDER, FOR SOLUTION INTRAMUSCULAR; INTRAVENOUS EVERY 24 HOURS
Refills: 0 | Status: DISCONTINUED | OUTPATIENT
Start: 2019-11-19 | End: 2019-11-19

## 2019-11-19 RX ORDER — OXYCODONE HYDROCHLORIDE 5 MG/1
5 TABLET ORAL EVERY 6 HOURS
Refills: 0 | Status: DISCONTINUED | OUTPATIENT
Start: 2019-11-19 | End: 2019-11-22

## 2019-11-19 RX ORDER — FAMOTIDINE 10 MG/ML
20 INJECTION INTRAVENOUS ONCE
Refills: 0 | Status: COMPLETED | OUTPATIENT
Start: 2019-11-19 | End: 2019-11-19

## 2019-11-19 RX ORDER — PANTOPRAZOLE SODIUM 20 MG/1
40 TABLET, DELAYED RELEASE ORAL
Refills: 0 | Status: DISCONTINUED | OUTPATIENT
Start: 2019-11-19 | End: 2019-11-22

## 2019-11-19 RX ORDER — AMITRIPTYLINE HCL 25 MG
1 TABLET ORAL
Qty: 0 | Refills: 0 | DISCHARGE

## 2019-11-19 RX ORDER — CEFTRIAXONE 500 MG/1
2000 INJECTION, POWDER, FOR SOLUTION INTRAMUSCULAR; INTRAVENOUS EVERY 24 HOURS
Refills: 0 | Status: DISCONTINUED | OUTPATIENT
Start: 2019-11-20 | End: 2019-11-20

## 2019-11-19 RX ORDER — DOXAZOSIN MESYLATE 4 MG
1 TABLET ORAL AT BEDTIME
Refills: 0 | Status: DISCONTINUED | OUTPATIENT
Start: 2019-11-19 | End: 2019-11-22

## 2019-11-19 RX ORDER — CEFTRIAXONE 500 MG/1
2000 INJECTION, POWDER, FOR SOLUTION INTRAMUSCULAR; INTRAVENOUS EVERY 24 HOURS
Refills: 0 | Status: DISCONTINUED | OUTPATIENT
Start: 2019-11-19 | End: 2019-11-19

## 2019-11-19 RX ORDER — CHOLESTYRAMINE 4 G/9G
4 POWDER, FOR SUSPENSION ORAL
Refills: 0 | Status: DISCONTINUED | OUTPATIENT
Start: 2019-11-19 | End: 2019-11-22

## 2019-11-19 RX ORDER — CEFTRIAXONE 500 MG/1
1000 INJECTION, POWDER, FOR SOLUTION INTRAMUSCULAR; INTRAVENOUS ONCE
Refills: 0 | Status: COMPLETED | OUTPATIENT
Start: 2019-11-19 | End: 2019-11-19

## 2019-11-19 RX ORDER — SIMETHICONE 80 MG/1
80 TABLET, CHEWABLE ORAL
Refills: 0 | Status: DISCONTINUED | OUTPATIENT
Start: 2019-11-19 | End: 2019-11-22

## 2019-11-19 RX ORDER — ACETAMINOPHEN 500 MG
650 TABLET ORAL EVERY 6 HOURS
Refills: 0 | Status: DISCONTINUED | OUTPATIENT
Start: 2019-11-19 | End: 2019-11-22

## 2019-11-19 RX ORDER — TRAMADOL HYDROCHLORIDE 50 MG/1
25 TABLET ORAL ONCE
Refills: 0 | Status: DISCONTINUED | OUTPATIENT
Start: 2019-11-19 | End: 2019-11-19

## 2019-11-19 RX ORDER — GABAPENTIN 400 MG/1
300 CAPSULE ORAL THREE TIMES A DAY
Refills: 0 | Status: DISCONTINUED | OUTPATIENT
Start: 2019-11-19 | End: 2019-11-22

## 2019-11-19 RX ORDER — APIXABAN 2.5 MG/1
5 TABLET, FILM COATED ORAL EVERY 12 HOURS
Refills: 0 | Status: DISCONTINUED | OUTPATIENT
Start: 2019-11-19 | End: 2019-11-21

## 2019-11-19 RX ADMIN — OXYCODONE HYDROCHLORIDE 5 MILLIGRAM(S): 5 TABLET ORAL at 21:56

## 2019-11-19 RX ADMIN — Medication 650 MILLIGRAM(S): at 06:30

## 2019-11-19 RX ADMIN — APIXABAN 5 MILLIGRAM(S): 2.5 TABLET, FILM COATED ORAL at 05:04

## 2019-11-19 RX ADMIN — CHOLESTYRAMINE 4 GRAM(S): 4 POWDER, FOR SUSPENSION ORAL at 09:12

## 2019-11-19 RX ADMIN — Medication 650 MILLIGRAM(S): at 22:19

## 2019-11-19 RX ADMIN — APIXABAN 5 MILLIGRAM(S): 2.5 TABLET, FILM COATED ORAL at 17:20

## 2019-11-19 RX ADMIN — OXYCODONE HYDROCHLORIDE 5 MILLIGRAM(S): 5 TABLET ORAL at 16:30

## 2019-11-19 RX ADMIN — OXYCODONE HYDROCHLORIDE 5 MILLIGRAM(S): 5 TABLET ORAL at 22:26

## 2019-11-19 RX ADMIN — Medication 650 MILLIGRAM(S): at 15:00

## 2019-11-19 RX ADMIN — PANTOPRAZOLE SODIUM 40 MILLIGRAM(S): 20 TABLET, DELAYED RELEASE ORAL at 05:04

## 2019-11-19 RX ADMIN — CEFTRIAXONE 100 MILLIGRAM(S): 500 INJECTION, POWDER, FOR SOLUTION INTRAMUSCULAR; INTRAVENOUS at 09:12

## 2019-11-19 RX ADMIN — OXYCODONE HYDROCHLORIDE 5 MILLIGRAM(S): 5 TABLET ORAL at 15:46

## 2019-11-19 RX ADMIN — GABAPENTIN 300 MILLIGRAM(S): 400 CAPSULE ORAL at 21:49

## 2019-11-19 RX ADMIN — Medication 650 MILLIGRAM(S): at 05:03

## 2019-11-19 RX ADMIN — FAMOTIDINE 20 MILLIGRAM(S): 10 INJECTION INTRAVENOUS at 21:57

## 2019-11-19 RX ADMIN — CHOLESTYRAMINE 4 GRAM(S): 4 POWDER, FOR SUSPENSION ORAL at 21:21

## 2019-11-19 RX ADMIN — CEFTRIAXONE 100 MILLIGRAM(S): 500 INJECTION, POWDER, FOR SOLUTION INTRAMUSCULAR; INTRAVENOUS at 06:22

## 2019-11-19 RX ADMIN — GABAPENTIN 300 MILLIGRAM(S): 400 CAPSULE ORAL at 05:04

## 2019-11-19 RX ADMIN — Medication 650 MILLIGRAM(S): at 21:49

## 2019-11-19 RX ADMIN — Medication 1 MILLIGRAM(S): at 21:50

## 2019-11-19 RX ADMIN — GABAPENTIN 300 MILLIGRAM(S): 400 CAPSULE ORAL at 13:18

## 2019-11-19 RX ADMIN — Medication 25 MILLIGRAM(S): at 05:04

## 2019-11-19 NOTE — DIETITIAN INITIAL EVALUATION ADULT. - PROBLEM SELECTOR PLAN 3
Pt w/ fever and tachycardia in the setting of likely SBP meeting the criteria for sepsis  c/w Ceftriaxone IV for SBP treatment  f/u BCX

## 2019-11-19 NOTE — H&P ADULT - PROBLEM SELECTOR PLAN 6
Pt unsure of what meds he takes, prelim med rec done based on [facility] records but some of the meds listed there have not bene renewed for over a year and others that he said he is taking(pantoprazole) is not listed, please follow up med rec in AM c/w elequgilma

## 2019-11-19 NOTE — PROGRESS NOTE ADULT - PROBLEM SELECTOR PLAN 6
-Continue Eliquis Stage 1 hypertension (BP: 130/69 taken on 11/19/2019)   -Continue metoprolol and doxazosin   -DASH/TLC Diet (w/Cholesterol/sodium restricted)

## 2019-11-19 NOTE — PROGRESS NOTE ADULT - SUBJECTIVE AND OBJECTIVE BOX
MART WOODY  69y  Male    Chief Complaints: Fever/Chills, chronic diarrhea   Subjective:   69M PMH of IBS-C, Cholangiocarcinoma s/p Whipple in 2007 w/recurrance (mets to liver) and last chemotherapy 07/2019, portal vein thrombosis on MR abdomen on 11/05/2019 (on eloquis), right sided port, HTN, HLD, GERD. Presented to the ED on 11/18/2019 with 2 months of diarrhea (~10/day) and 2 days of fever on advice of his oncologist (Dr. Guy).  Received paracentesis in the ED after CT abdomen showed ascitic fluid accumulation.  His GI physician (Dr. Orantes) started him on Rifaximin 550mg BID 3 weeks ago for epimeric treatment of SIBO without beneficial response.  At home, diarrhea was dark tea color and has since turned bilious green.  Patient denies nausea, vomiting.      Vital Signs Last 24 Hrs  T(C): 36.9 (19 Nov 2019 06:30), Max: 38.7 (18 Nov 2019 18:25)  T(F): 98.5 (19 Nov 2019 06:30), Max: 101.6 (18 Nov 2019 18:25)  HR: 107 (19 Nov 2019 04:36) (90 - 128)  BP: 130/69 (19 Nov 2019 04:36) (118/72 - 145/79)  BP(mean): --  RR: 17 (19 Nov 2019 04:36) (16 - 17)  SpO2: 92% (19 Nov 2019 04:36) (92% - 100%)    PHYSICAL EXAM:  GENERAL: NAD, well-groomed, well-developed  HEENT - NC/AT, Anicteric    NECK: Supple, No JVD  CHEST/LUNG: Clear to auscultation bilaterally; No rales, rhonchi, wheezing  HEART: Regular rate and rhythm; No murmurs, rubs, or gallops  ABDOMEN: Soft, mildly tender to palpation, bowel sounds hyperactive  EXTREMITIES:  No edema  NEURO:  No Focal deficits, sensory and motor intact  SKIN: No rashes or lesions, no jaundice appreciated    Consultant(s) Notes Reviewed:  [x ] YES  [ ] NO  Care Discussed with Consultants/Other Providers [ x] YES  [ ] NO    LABS:                        8.1    7.56  )-----------( 170      ( 19 Nov 2019 06:56 )             24.9     11-19    141  |  111<H>  |  8   ----------------------------<  102<H>  3.6   |  19<L>  |  0.70    Ca    7.6<L>      19 Nov 2019 06:54  Phos  2.6     11-19  Mg     1.5     11-19    TPro  5.4<L>  /  Alb  2.8<L>  /  TBili  0.8  /  DBili  x   /  AST  15  /  ALT  12  /  AlkPhos  224<H>  11-19      Body Fluid Peritoneal: >250 PMN, culture negative  (19 Nov 2019 05:27)  Peritoneal fluid gram stain: Few RBC seen, moderate RBC seen, No organisms seen   Stool PCR: Negative (19 Nov 2019 02:22)    RADIOLOGY & ADDITIONAL TESTS:    Imaging Personally Reviewed:  [ ] YES  [ ] NO  MedsMEDICATIONS  (STANDING):  apixaban 5 milliGRAM(s) Oral every 12 hours  cholestyramine Powder (Sugar-Free) 4 Gram(s) Oral two times a day  doxazosin 1 milliGRAM(s) Oral at bedtime  gabapentin 300 milliGRAM(s) Oral three times a day  metoprolol succinate ER 25 milliGRAM(s) Oral daily  pantoprazole    Tablet 40 milliGRAM(s) Oral before breakfast  rifAXIMin 550 milliGRAM(s) Oral two times a day  simethicone 80 milliGRAM(s) Chew four times a day    MEDICATIONS  (PRN):  acetaminophen   Tablet .. 650 milliGRAM(s) Oral every 6 hours PRN Temp greater or equal to 38C (100.4F), Moderate Pain (4 - 6)      HEALTH ISSUES - PROBLEM Dx:  GERD (gastroesophageal reflux disease): GERD (gastroesophageal reflux disease)  Portal vein thrombosis: Portal vein thrombosis  Medication management: Medication management  SBP (spontaneous bacterial peritonitis): SBP (spontaneous bacterial peritonitis)  SIRS (systemic inflammatory response syndrome): SIRS (systemic inflammatory response syndrome)  Need for prophylactic measure: Need for prophylactic measure  HLD (hyperlipidemia): HLD (hyperlipidemia)  HTN (hypertension): HTN (hypertension)  Cholangiocarcinoma: Cholangiocarcinoma  Sepsis: Sepsis  Diarrhea: Diarrhea    Assessment and Plan:  This is a 69 M with a chief complaint of chills and chronic diarrhea. PMH of IBS-C, Cholangiocarcinoma s/p Whipple in 2007 w/recurrence (mets to liver) and last chemotherapy 07/2019, portal vein thrombosis on MR abdomen on 11/05/2019 (on Eliquis right sided port, HTN, HLD. Paracentesis fluid analysis revealed Cx-negative neutrocytic ascites.    #SBP (Spontaneous Bacterial Peritonitis): Culture-negative neutrocytic ascites   -Continue to monitor for ascites accumulation   -PCN allergy. Continue Ceftriaxone 2g/day for a total of 5 days. If possible, switch to cefotaxime 2g q8 hours for greater peritoneal penetration.   -Monitor culture of peritoneal fluid for final results   -F/u Blood Cx    #Chronic diarrhea   -Etiology: inflammatory vs. secretory vs. osmotic vs. malabsorption   -Possible SIBO: Rifaximin 550mg BID used for recommended time (14 days). Recommend discontinue.   -GI consult/appreciate recs   -Oncology consult/appreciate recs   -Stool PCR negative for invasive organisms   -Continue to hydrate    #HLD   -DASH/TLC Diet (w/Cholesterol/sodium restricted)    #HTN   -Continue metoprolol and doxazosin   -DASH/TLC Diet (w/Cholesterol/sodium restricted)    #Portal Vein Thrombosis   -Continue Eliquis    #GERD   -Continue Pantoprazole MART WOODY  69y  Male    Chief Complaints: Fever/Chills, chronic diarrhea   Subjective:   69M PMH of IBS-C, Cholangiocarcinoma s/p Whipple in 2007 w/recurrance (mets to liver) and last chemotherapy 07/2019, portal vein thrombosis on MR abdomen on 11/05/2019 (on eloquis), right sided port, HTN, HLD, GERD. Presented to the ED on 11/18/2019 with 2 months of diarrhea (~10/day) and 2 days of fever on advice of his oncologist (Dr. Guy).  Received paracentesis in the ED after CT abdomen showed ascitic fluid accumulation.  His GI physician (Dr. Orantes) started him on Rifaximin 550mg BID 3 weeks ago for epimeric treatment of SIBO without beneficial response.  At home, diarrhea was dark tea color and has since turned bilious green.  Patient denies nausea, vomiting.      Vital Signs Last 24 Hrs  T(C): 36.9 (19 Nov 2019 06:30), Max: 38.7 (18 Nov 2019 18:25)  T(F): 98.5 (19 Nov 2019 06:30), Max: 101.6 (18 Nov 2019 18:25)  HR: 107 (19 Nov 2019 04:36) (90 - 128)  BP: 130/69 (19 Nov 2019 04:36) (118/72 - 145/79)  BP(mean): --  RR: 17 (19 Nov 2019 04:36) (16 - 17)  SpO2: 92% (19 Nov 2019 04:36) (92% - 100%)    PHYSICAL EXAM:  GENERAL: NAD, well-groomed, well-developed  HEENT - NC/AT, Anicteric    NECK: Supple, No JVD  CHEST/LUNG: Clear to auscultation bilaterally; No rales, rhonchi, wheezing  HEART: Regular rate and rhythm; No murmurs, rubs, or gallops  ABDOMEN: Soft, mildly tender to palpation, bowel sounds hyperactive  EXTREMITIES:  No edema  NEURO:  No Focal deficits, sensory and motor intact  SKIN: No rashes or lesions, no jaundice appreciated    Consultant(s) Notes Reviewed:  [x ] YES  [ ] NO  Care Discussed with Consultants/Other Providers [ x] YES  [ ] NO    LABS:                        8.1    7.56  )-----------( 170      ( 19 Nov 2019 06:56 )             24.9     11-19    141  |  111<H>  |  8   ----------------------------<  102<H>  3.6   |  19<L>  |  0.70    Ca    7.6<L>      19 Nov 2019 06:54  Phos  2.6     11-19  Mg     1.5     11-19    TPro  5.4<L>  /  Alb  2.8<L>  /  TBili  0.8  /  DBili  x   /  AST  15  /  ALT  12  /  AlkPhos  224<H>  11-19      Body Fluid Peritoneal: >250 PMN, culture negative  (19 Nov 2019 05:27)  Peritoneal fluid gram stain: Few RBC seen, moderate RBC seen, No organisms seen   Stool PCR: Negative (19 Nov 2019 02:22)    RADIOLOGY & ADDITIONAL TESTS:    Imaging Personally Reviewed:  [ ] YES  [ ] NO  MedsMEDICATIONS  (STANDING):  apixaban 5 milliGRAM(s) Oral every 12 hours  cholestyramine Powder (Sugar-Free) 4 Gram(s) Oral two times a day  doxazosin 1 milliGRAM(s) Oral at bedtime  gabapentin 300 milliGRAM(s) Oral three times a day  metoprolol succinate ER 25 milliGRAM(s) Oral daily  pantoprazole    Tablet 40 milliGRAM(s) Oral before breakfast  rifAXIMin 550 milliGRAM(s) Oral two times a day  simethicone 80 milliGRAM(s) Chew four times a day    MEDICATIONS  (PRN):  acetaminophen   Tablet .. 650 milliGRAM(s) Oral every 6 hours PRN Temp greater or equal to 38C (100.4F), Moderate Pain (4 - 6)      HEALTH ISSUES - PROBLEM Dx:  GERD (gastroesophageal reflux disease): GERD (gastroesophageal reflux disease)  Portal vein thrombosis: Portal vein thrombosis  Medication management: Medication management  SBP (spontaneous bacterial peritonitis): SBP (spontaneous bacterial peritonitis)  SIRS (systemic inflammatory response syndrome): SIRS (systemic inflammatory response syndrome)  Need for prophylactic measure: Need for prophylactic measure  HLD (hyperlipidemia): HLD (hyperlipidemia)  HTN (hypertension): HTN (hypertension)  Cholangiocarcinoma: Cholangiocarcinoma  Sepsis: Sepsis  Diarrhea: Diarrhea MART WOODY  69y  Male    Chief Complaints: Fever/Chills, chronic diarrhea   Subjective:   69M PMH of IBS-C, Cholangiocarcinoma s/p Whipple in 2007 w/recurrance (mets to liver) and last chemotherapy 07/2019, portal vein thrombosis on MR abdomen on 11/05/2019 (on eloquis), right sided port, HTN, HLD, GERD. Presented to the ED on 11/18/2019 with 2 months of diarrhea (~10/day) and 2 days of fever on advice of his oncologist (Dr. Guy).  Received paracentesis in the ED after CT abdomen showed ascitic fluid accumulation.  His GI physician (Dr. Orantes) started him on Rifaximin 550mg BID 3 weeks ago for epimeric treatment of SIBO without beneficial response.  At home, diarrhea was dark tea color and has since turned bilious green.  Patient denies nausea, vomiting.      Vital Signs Last 24 Hrs  T(C): 36.9 (19 Nov 2019 06:30), Max: 38.7 (18 Nov 2019 18:25)  T(F): 98.5 (19 Nov 2019 06:30), Max: 101.6 (18 Nov 2019 18:25)  HR: 107 (19 Nov 2019 04:36) (90 - 128)  BP: 130/69 (19 Nov 2019 04:36) (118/72 - 145/79)  BP(mean): --  RR: 17 (19 Nov 2019 04:36) (16 - 17)  SpO2: 92% (19 Nov 2019 04:36) (92% - 100%)    PHYSICAL EXAM:  GENERAL: NAD, well-groomed, well-developed  HEENT - NC/AT, Anicteric sclera  NECK: Supple, No JVD  CHEST/LUNG: Clear to auscultation bilaterally; No rales, rhonchi, wheezing  HEART: Regular rate and rhythm; No murmurs, rubs, or gallops  ABDOMEN: Soft, mildly tender to palpation, bowel sounds hyperactive  EXTREMITIES:  No edema  NEURO:  No Focal deficits, sensory and motor intact  SKIN: No rashes or lesions, no jaundice appreciated    Consultant(s) Notes Reviewed:  [x ] YES  [ ] NO  Care Discussed with Consultants/Other Providers [ x] YES  [ ] NO    LABS:                        8.1    7.56  )-----------( 170      ( 19 Nov 2019 06:56 )             24.9     11-19    141  |  111<H>  |  8   ----------------------------<  102<H>  3.6   |  19<L>  |  0.70    Ca    7.6<L>      19 Nov 2019 06:54  Phos  2.6     11-19  Mg     1.5     11-19    TPro  5.4<L>  /  Alb  2.8<L>  /  TBili  0.8  /  DBili  x   /  AST  15  /  ALT  12  /  AlkPhos  224<H>  11-19      Body Fluid Peritoneal: >250 PMN, culture negative  (19 Nov 2019 05:27)  Peritoneal fluid gram stain: Few RBC seen, moderate RBC seen, No organisms seen   Stool PCR: Negative (19 Nov 2019 02:22)    RADIOLOGY & ADDITIONAL TESTS:    Imaging Personally Reviewed:  [ ] YES  [ ] NO  MedsMEDICATIONS  (STANDING):  apixaban 5 milliGRAM(s) Oral every 12 hours  cholestyramine Powder (Sugar-Free) 4 Gram(s) Oral two times a day  doxazosin 1 milliGRAM(s) Oral at bedtime  gabapentin 300 milliGRAM(s) Oral three times a day  metoprolol succinate ER 25 milliGRAM(s) Oral daily  pantoprazole    Tablet 40 milliGRAM(s) Oral before breakfast  rifAXIMin 550 milliGRAM(s) Oral two times a day  simethicone 80 milliGRAM(s) Chew four times a day    MEDICATIONS  (PRN):  acetaminophen   Tablet .. 650 milliGRAM(s) Oral every 6 hours PRN Temp greater or equal to 38C (100.4F), Moderate Pain (4 - 6)      HEALTH ISSUES - PROBLEM Dx:  GERD (gastroesophageal reflux disease): GERD (gastroesophageal reflux disease)  Portal vein thrombosis: Portal vein thrombosis  Medication management: Medication management  SBP (spontaneous bacterial peritonitis): SBP (spontaneous bacterial peritonitis)  SIRS (systemic inflammatory response syndrome): SIRS (systemic inflammatory response syndrome)  Need for prophylactic measure: Need for prophylactic measure  HLD (hyperlipidemia): HLD (hyperlipidemia)  HTN (hypertension): HTN (hypertension)  Cholangiocarcinoma: Cholangiocarcinoma  Sepsis: Sepsis  Diarrhea: Diarrhea MARTWOODY  69y  Male    Chief Complaints: Fever/Chills, chronic diarrhea   Subjective:   69M PMH of IBS-C, Cholangiocarcinoma s/p Whipple in 2007 w/recurrance (mets to liver) and last chemotherapy 07/2019, portal vein thrombosis on MR abdomen on 11/05/2019 (on eloquis), right sided port, HTN, HLD, GERD. Presented to the ED on 11/18/2019 with 2 months of diarrhea (~10/day) and 2 days of fever on advice of his oncologist (Dr. Terry).  Received paracentesis in the ED after CT abdomen showed ascitic fluid accumulation.  His GI physician (Dr. Orantes) started him on Rifaximin 550mg BID 3 weeks ago for epimeric treatment of SIBO without beneficial response.  At home, diarrhea was dark tea color and has since turned bilious green.  Patient denies nausea, vomiting.      Vital Signs Last 24 Hrs  T(C): 36.9 (19 Nov 2019 06:30), Max: 38.7 (18 Nov 2019 18:25)  T(F): 98.5 (19 Nov 2019 06:30), Max: 101.6 (18 Nov 2019 18:25)  HR: 107 (19 Nov 2019 04:36) (90 - 128)  BP: 130/69 (19 Nov 2019 04:36) (118/72 - 145/79)  BP(mean): --  RR: 17 (19 Nov 2019 04:36) (16 - 17)  SpO2: 92% (19 Nov 2019 04:36) (92% - 100%)    PHYSICAL EXAM:  GENERAL: NAD, well-groomed, well-developed  HEENT - NC/AT, Anicteric sclera  NECK: Supple, No JVD  CHEST/LUNG: Clear to auscultation bilaterally; No rales, rhonchi, wheezing  HEART: Regular rate and rhythm; No murmurs, rubs, or gallops  ABDOMEN: Soft, mildly tender to palpation, bowel sounds hyperactive  EXTREMITIES:  No edema  NEURO:  No Focal deficits, sensory and motor intact  SKIN: No rashes or lesions, no jaundice appreciated    Consultant(s) Notes Reviewed:  [x ] YES  [ ] NO  Care Discussed with Consultants/Other Providers [ x] YES  [ ] NO    LABS:                        8.1    7.56  )-----------( 170      ( 19 Nov 2019 06:56 )             24.9     11-19    141  |  111<H>  |  8   ----------------------------<  102<H>  3.6   |  19<L>  |  0.70    Ca    7.6<L>      19 Nov 2019 06:54  Phos  2.6     11-19  Mg     1.5     11-19    TPro  5.4<L>  /  Alb  2.8<L>  /  TBili  0.8  /  DBili  x   /  AST  15  /  ALT  12  /  AlkPhos  224<H>  11-19      Body Fluid Peritoneal: >250 PMN, culture negative  (19 Nov 2019 05:27)  Peritoneal fluid gram stain: Few RBC seen, moderate RBC seen, No organisms seen   Stool PCR: Negative (19 Nov 2019 02:22)    RADIOLOGY & ADDITIONAL TESTS:    Imaging Personally Reviewed:  [ ] YES  [ ] NO  MedsMEDICATIONS  (STANDING):  apixaban 5 milliGRAM(s) Oral every 12 hours  cholestyramine Powder (Sugar-Free) 4 Gram(s) Oral two times a day  doxazosin 1 milliGRAM(s) Oral at bedtime  gabapentin 300 milliGRAM(s) Oral three times a day  metoprolol succinate ER 25 milliGRAM(s) Oral daily  pantoprazole    Tablet 40 milliGRAM(s) Oral before breakfast  rifAXIMin 550 milliGRAM(s) Oral two times a day  simethicone 80 milliGRAM(s) Chew four times a day    MEDICATIONS  (PRN):  acetaminophen   Tablet .. 650 milliGRAM(s) Oral every 6 hours PRN Temp greater or equal to 38C (100.4F), Moderate Pain (4 - 6)      HEALTH ISSUES - PROBLEM Dx:  GERD (gastroesophageal reflux disease): GERD (gastroesophageal reflux disease)  Portal vein thrombosis: Portal vein thrombosis  Medication management: Medication management  SBP (spontaneous bacterial peritonitis): SBP (spontaneous bacterial peritonitis)  SIRS (systemic inflammatory response syndrome): SIRS (systemic inflammatory response syndrome)  Need for prophylactic measure: Need for prophylactic measure  HLD (hyperlipidemia): HLD (hyperlipidemia)  HTN (hypertension): HTN (hypertension)  Cholangiocarcinoma: Cholangiocarcinoma  Sepsis: Sepsis  Diarrhea: Diarrhea

## 2019-11-19 NOTE — DIETITIAN INITIAL EVALUATION ADULT. - PROBLEM SELECTOR PLAN 1
CT A/P: Moderate ascites is increased from 11/13/2019 of indeterminate etiology.  Ascitic fluid >250 cells in the setting of fevers concerning for SBP  pt allergic to penicillin, reaction was rash, tolerated Cefepime in ED, will treat with Ceftriaxone 2gm wd for now  - f/u BCx

## 2019-11-19 NOTE — CHART NOTE - NSCHARTNOTEFT_GEN_A_CORE
Others' Prescriptions  Patient Name:	Poncho Medrano	YOB: 1950  Address:	[address] [place] [address]	Sex:	Male  Rx Written	Rx Dispensed	Drug	Quantity	Days Supply	Prescriber Name  11/07/2019	11/09/2019	diazepam 5 mg tablet	1	1	Korey Atkinson MD  10/31/2019	11/03/2019	diazepam 5 mg tablet	1	1	Iraida Foote K  10/10/2019	10/12/2019	oxycodone-acetaminophen  mg tab	20	10	Jonathon Alcanatra  10/01/2019	10/01/2019	oxycodone-acetaminophen  mg tab	14	14	Jory Almazan  09/19/2019	09/19/2019	tramadol hcl 50 mg tablet	120	30	Korey Atkinson MD  08/29/2019	08/29/2019	acetaminophen-cod #3 tablet	30	4	Richard Cox

## 2019-11-19 NOTE — PROGRESS NOTE ADULT - PROBLEM SELECTOR PLAN 2
Pt continues to have bilious diarrhea.   -Etiology: inflammatory vs. secretory vs. osmotic vs. malabsorption   -Possible SIBO: Rifaximin 550mg BID used for recommended time (14 days). Recommend discontinue.   -GI consult/appreciate recs   -Oncology consult/appreciate recs   -Stool PCR negative for invasive organisms   -Continue to hydrate well Pt continues to have diarrhea.   -Etiology: inflammatory vs. secretory vs. osmotic   -Possible SIBO: Rifaximin 550mg BID used for recommended time (14 days). Recommend discontinue.   -GI consult/appreciate recs   -Oncology consult/appreciate recs   -Stool PCR negative for invasive organisms   -Continue to hydrate Pts hgb on presentation 8.1, baseline 10 - 12. Pt reports having blood in stools, however no signs or symptoms of active bleeding on exam.   - will check FOBT, B12, folate, iron studies, ferritin  - cont to monitor for signs/sx of active bleeding  - maintain active type and screen

## 2019-11-19 NOTE — DIETITIAN INITIAL EVALUATION ADULT. - PERTINENT LABORATORY DATA
11-19    141  |  111<H>  |  8   ----------------------------<  102<H>  3.6   |  19<L>  |  0.70    Ca    7.6<L>      19 Nov 2019 06:54  Phos  2.6     11-19  Mg     1.5     11-19

## 2019-11-19 NOTE — H&P ADULT - HISTORY OF PRESENT ILLNESS
70 y/o male PMHx cholangiocarcinoma s/p Whipple 2017 with recurrence of disease now with mets to the liver and last IV chemo 7/2019, portal vein thrombus not on AC according to MR abdomen 11/5/19, R sided Port, HTN, HLD now presenting with fever x2 days and diarrhea x2 months. In addition to having the fevers, the pt has experienced chills for a few weeks. The pt denies URI symptoms, including cough, runny nose, sore throat or other issues. The pt conveys that he has bene having non bloody diarrhea since Spetmeber which got worse recently in Octoober; the pt reports to having about 10 water BM per day. The pt has been following with Dr. Orantes from GI 70 y/o male PMHx cholangiocarcinoma s/p Whipple 2017 with recurrence of disease now with mets to the liver and last IV chemo 7/2019, portal vein thrombus not on AC according to MR abdomen 11/5/19, R sided Port, HTN, HLD now presenting with fever x2 days and diarrhea x2 months. In addition to having the fevers, the pt has experienced chills for a few weeks. The pt denies URI symptoms, including cough, runny nose, sore throat or other issues. The pt conveys that he has bene having non bloody diarrhea since Septmeber which got worse recently in Octoober; the pt reports to having about 10 water BM per day. The pt has been following with Dr. Orantes from GI who started the pt on xifaxan for IBD; this helped the pt's symptoms but did not fully resolve them. The pt denies abdominal pain, nausea or precipitation of diarrhal episodes with food. 68 y/o male PMHx cholangiocarcinoma s/p Whipple 2017 with recurrence of disease now with mets to the liver and last IV chemo 7/2019, portal vein thrombus  according to MR abdomen 11/5/19 on elequis, R sided Port, HTN, HLD now presenting with fever x2 days and diarrhea x2 months. In addition to having the fevers, the pt has experienced chills for a few weeks. The pt denies URI symptoms, including cough, runny nose, sore throat or other issues. The pt conveys that he has bene having non bloody diarrhea since Septmeber which got worse recently in Octoober; the pt reports to having about 10 water BM per day. The pt has been following with Dr. Orantes from GI who started the pt on xifaxan for IBD; this helped the pt's symptoms but did not fully resolve them. The pt denies abdominal pain, nausea or precipitation of diarrhal episodes with food. 68 y/o male PMHx cholangiocarcinoma s/p Whipple 2017 with recurrence of disease now with mets to the liver and last IV chemo 7/2019, portal vein thrombus  according to MR abdomen 11/5/19 on elequis, R sided Port, HTN, HLD now presenting with fever x2 days and diarrhea x2 months. In addition to having the fevers, the pt has experienced chills for a few weeks. The pt denies URI symptoms, including cough, runny nose, sore throat or other issues. The pt conveys that he has been having non bloody diarrhea since Septmeber which got worse recently in Octoober; the pt reports to having about 10 water BM per day. The pt has been following with Dr. Orantes from GI who started the pt on xifaxan for IBD; this helped the pt's symptoms but did not fully resolve them. The pt denies abdominal pain, nausea or precipitation of diarrhal episodes by food.

## 2019-11-19 NOTE — PROGRESS NOTE ADULT - PROBLEM SELECTOR PLAN 10
Monitor patient for increasing abdominal ascites, fever, and other symptoms of SBP. If possible, manage outpatient for followup GI on diarrheal symptoms.

## 2019-11-19 NOTE — H&P ADULT - NSHPREVIEWOFSYSTEMS_GEN_ALL_CORE
REVIEW OF SYSTEMS:    CONSTITUTIONAL: +fevers.chills  EYES/ENT: No visual changes;  No vertigo or throat pain   NECK: No pain or stiffness  RESPIRATORY: No cough, wheezing, hemoptysis; No shortness of breath  CARDIOVASCULAR: No chest pain or palpitations  GASTROINTESTINAL: as above  GENITOURINARY: No dysuria, frequency or hematuria  NEUROLOGICAL: No numbness or weakness  SKIN: No itching, burning, rashes, or lesions   All other review of systems is negative unless indicated above.

## 2019-11-19 NOTE — H&P ADULT - NSICDXPASTMEDICALHX_GEN_ALL_CORE_FT
PAST MEDICAL HISTORY:  Biliary stricture s/p biliary stent    Essential hypertension     Gastric polyp     GERD (gastroesophageal reflux disease)     IBS (irritable bowel syndrome)     Obstructive jaundice     Other hyperlipidemia     Pancreatic lesion     Rash

## 2019-11-19 NOTE — H&P ADULT - NSHPPHYSICALEXAM_GEN_ALL_CORE
Respiratory VITAL SIGNS (Last 24 hrs):  T(C): 37.4 (11-19-19 @ 00:15), Max: 38.7 (11-18-19 @ 18:25)  HR: 98 (11-19-19 @ 00:15) (90 - 128)  BP: 145/79 (11-19-19 @ 00:15) (118/72 - 145/79)  RR: 16 (11-19-19 @ 00:15) (16 - 17)  SpO2: 99% (11-19-19 @ 00:15) (98% - 100%)  Wt(kg): --  Daily Height in cm: 177.8 (19 Nov 2019 00:15)    Daily     I&O's Summary    PHYSICAL EXAM:    GENERAL: Comfortable, no acute distress   HEAD:  Normocephalic, atraumatic  HEENT: Moist mucous membranes  NECK: Supple, No JVD  NERVOUS SYSTEM:  Alert & Oriented X3, Motor Strength 5/5 B/L upper and lower extremities  CHEST/LUNG: Clear to auscultation bilaterally  HEART: Regular rate and rhythm, no murmur   ABDOMEN: Soft, Nontender, Nondistended, Bowel sounds present  EXTREMITIES:   No clubbing, cyanosis, or edema  MUSCULOSKELETAL- No muscle tenderness, no joint tenderness  SKIN- warm and dry, no rash VITAL SIGNS (Last 24 hrs):  T(C): 37.4 (11-19-19 @ 00:15), Max: 38.7 (11-18-19 @ 18:25)  HR: 98 (11-19-19 @ 00:15) (90 - 128)  BP: 145/79 (11-19-19 @ 00:15) (118/72 - 145/79)  RR: 16 (11-19-19 @ 00:15) (16 - 17)  SpO2: 99% (11-19-19 @ 00:15) (98% - 100%)  Wt(kg): --  Daily Height in cm: 177.8 (19 Nov 2019 00:15)    Daily     I&O's Summary    PHYSICAL EXAM:    GENERAL: Comfortable, no acute distress   HEAD:  Normocephalic, atraumatic  HEENT: Moist mucous membranes  NECK: Supple, No JVD  NERVOUS SYSTEM:  Alert & Oriented X3, Motor Strength 5/5 B/L upper and lower extremities  CHEST/LUNG: Clear to auscultation bilaterally  HEART: Regular rate and rhythm, no murmur   ABDOMEN: +distended abdomen that is non tender  EXTREMITIES:   No clubbing, cyanosis, or edema  MUSCULOSKELETAL- No muscle tenderness, no joint tenderness  SKIN- warm and dry, no rash

## 2019-11-19 NOTE — PROGRESS NOTE ADULT - PROBLEM SELECTOR PLAN 3
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   -Oncology consult/appreciate recs.   -GI consult/appreciate recs Pt continues to have diarrhea.   -Etiology: inflammatory vs. secretory vs. osmotic   -Possible SIBO: Rifaximin 550mg BID used for recommended time (14 days), prescribed in 10/2019 as outpatient, will discontinue unless recommended by GI.   -GI consult/appreciate recs   -Oncology consult/appreciate recs   -Stool PCR negative for invasive organisms, outpatient c diff testing negative 10/2019   -Continue to hydrate

## 2019-11-19 NOTE — PROGRESS NOTE ADULT - PROBLEM SELECTOR PLAN 4
Pt claims to have IBS.   -Supportive management Pt dx w cholangio in 2017 now s/p whipple with recurrent disease, last chemo in 7/2019.    -Ab CT (11/18/2019) did not suggest mass contributing to diarrhea   -Ab MRI (11/04/2019) showed 3.6cm retroperitoneal mass with involvement of the small bowel, pancreas, SMA, VALENTE, and portal vein.   -Oncology consult/appreciate recs.   -GI consult/appreciate recs

## 2019-11-19 NOTE — H&P ADULT - PROBLEM SELECTOR PLAN 7
DVT ppx: c/w elequis  Diet: DASH/TLC DVT ppx: c/w Eliquis  Diet: DASH/TLC Pt unsure of what meds he takes, prelim med rec done based on [facility] records but some of the meds listed there have not bene renewed for over a year and others that he said he is taking(pantoprazole) is not listed, please follow up med rec in AM

## 2019-11-19 NOTE — H&P ADULT - NSHPSOCIALHISTORY_GEN_ALL_CORE
Pt lives with wife, ambulates independently/carries out ADLs. Pt smoked socially for 15 years; a few cigarettes per week; his last cigarette was in 2014

## 2019-11-19 NOTE — DIETITIAN INITIAL EVALUATION ADULT. - ADD RECOMMEND
1) continue DASH diet 2) Ensure Enlive 3x/d to help meet nutritional needs and wt repletion 3) Monitor po intake, weights, skin, and labs

## 2019-11-19 NOTE — H&P ADULT - PROBLEM SELECTOR PLAN 9
DVT ppx: c/w Eliquis  Diet: DASH/TLC DVT ppx: c/w Eliquis  Diet: DASH/TLC    Istop  Reference #: [id number]

## 2019-11-19 NOTE — CONSULT NOTE ADULT - ASSESSMENT
70 y/o male w/ cholangiocarcinoma s/p Whipple in 2017 w/ recurrence of disease w/ mets to liver, portal vein thrombosis (seen on MR 11/5) treated with Eliquis, now presenting for 2 day history of fever and 2 month hx of watery stools.       #Ascites: possible causes include malignant ascites 2/2 cholangiocarcinoma, portal vein thrombosis, portal hypertension  -please add on albumin to ascitic fluid   -pt will require cytology of ascitic fluid  -Pt does not meet SBP criteria, no need for ceftriaxone at this time  -Con't eliquis per primary team    #Diarrhea  -maintain electrolytes    Will continue to follow     Alice Stacy, PGY-1  Internal Medicine

## 2019-11-19 NOTE — PROGRESS NOTE ADULT - ASSESSMENT
This is a 69 M with a chief complaint of chills and chronic diarrhea. PMH of IBS-C, Cholangiocarcinoma s/p Whipple in 2007 w/recurrence (mets to liver) and last chemotherapy 07/2019, portal vein thrombosis on MR abdomen on 11/05/2019 (on Eliquis) right sided port, HTN, HLD. Paracentesis fluid analysis revealed Cx-negative neutrocytic ascites. This is a 69 M with PMHx of IBS-C, Cholangiocarcinoma s/p Whipple in 2007 w/recurrence (mets to liver) and last chemotherapy 07/2019, portal vein thrombosis (on Eliquis), s/p right sided port, HTN, HLD who p/w chills and chronic diarrhea found to have sepsis most likely 2/2 SBP; Ddx incl tumor fever, bacteremia, clot burden

## 2019-11-19 NOTE — DIETITIAN INITIAL EVALUATION ADULT. - OTHER INFO
Pt reported decreased appetite and po intake x 2 months PTA. Stated he followed a Regular, ate 3 meals per day (gradually less over the past 2 months), and sometimes supplemented his diet with an Ensure a day. Stated he had diarrhea (3-4 BM/d) for the past 2 months. Tried to eat more binding foods; rice, bananas, potatoes. Reported 20#/12% wt loss x 4 months, stated wt 74 kg/163# in July, and current wt 65 kg/143#. Offered Ensure during hospital stay, pt accepted. Oncology came to see pt, writer left the room. When writer returned, pt was resting but arousable. Pt declined Nutrition focused physical exam, stating he wanted to rest. Diet education deferred. No edema or no pressure injuries noted at this time per flow sheet.

## 2019-11-19 NOTE — H&P ADULT - ASSESSMENT
68 y/o male PMHx cholangiocarcinoma s/p Whipple 2017 with recurrence of disease now with mets to the liver and last IV chemo 7/2019, portal vein thrombus not on AC according to MR abdomen 11/5/19, R sided Port, HTN, HLD now presenting with fever x1 day and diarrhea x2 months. Patient reported he has watery diarrhea >10 episodes daily since 9/2019. Patient had negative C-scope and EGD in 9/2019 + H.Pylori treated with a two week course of triple therapy with no improvement of symptoms and then prescribed another course of antibiotics with minimal improvement. [facility] and last test was on 11/15/19. Patient now having stool incontinence since yesterday. Patient reported the diarrhea is melanous however has been taking Pepto Bismol (last dose at 3pm today) and pt was febrile 102 then treated with Tylenol. Patient denied CP, SOB, N/V, abdominal pain, cough, URI symptoms, urinary complaints. Patient has had 20 pound unintentional weight loss since 7/2019 68 y/o male PMHx cholangiocarcinoma s/p Whipple 2017 with recurrence of disease now with mets to the liver and last IV chemo 7/2019, portal vein thrombus not on AC according to MR abdomen 11/5/19, R sided Port, HTN, HLD admitted for sepsis likely 2/2 to SBP complicated by recurrent diarrhea 70 y/o male PMHx cholangiocarcinoma s/p Whipple 2017 with recurrence of disease now with mets to the liver and last IV chemo 7/2019, portal vein thrombus  according to MR abdomen 11/5/19 on Eliquis, R sided Port, HTN, HLD admitted for sepsis likely 2/2 to SBP complicated by recurrent diarrhea

## 2019-11-19 NOTE — H&P ADULT - PROBLEM SELECTOR PLAN 2
Pt w/ fever and tcahycardia meeting SIRS criteria wthout clear source IBS vs Infectious vs malabsorption vs inflammatory  c/w xifaxan for IBS since this has partially relieved the pt's symptoms  Send GI PCR  GI consult   Ensure adequate hydration IBS vs Infectious vs malabsorption vs inflammatory  c/w xifaxan for IBS since this has partially relieved the pt's symptoms  Sent GI PCR/ova and parasites  GI consult   Ensure adequate hydration IBS vs Infectious vs malabsorption vs inflammatory  c/w xifaxan for IBS since this has partially relieved the pt's symptoms  Sent GI PCR/ova and parasites, stool cx  GI consult   Ensure adequate hydration

## 2019-11-19 NOTE — H&P ADULT - PROBLEM SELECTOR PLAN 3
s/p Whipple 2017 with recurrence of disease now with mets to the liver and last IV chemo 7/2019, Pt w/ fever and tachycardia in the setting of likely SBP meeting the criteria for sepsis  c/w ceftriaxone for SBP treatment  f/u BCX Pt w/ fever and tachycardia in the setting of likely SBP meeting the criteria for sepsis  c/w cipro IV for SBP treatment  f/u BCX Pt w/ fever and tachycardia in the setting of likely SBP meeting the criteria for sepsis  c/w Ceftriaxone IV for SBP treatment  f/u BCX

## 2019-11-19 NOTE — DIETITIAN INITIAL EVALUATION ADULT. - PERTINENT MEDS FT
MEDICATIONS  (STANDING):  apixaban 5 milliGRAM(s) Oral every 12 hours  cholestyramine Powder (Sugar-Free) 4 Gram(s) Oral two times a day  doxazosin 1 milliGRAM(s) Oral at bedtime  gabapentin 300 milliGRAM(s) Oral three times a day  metoprolol succinate ER 25 milliGRAM(s) Oral daily  pantoprazole    Tablet 40 milliGRAM(s) Oral before breakfast  simethicone 80 milliGRAM(s) Chew four times a day    MEDICATIONS  (PRN):  acetaminophen   Tablet .. 650 milliGRAM(s) Oral every 6 hours PRN Temp greater or equal to 38C (100.4F), Moderate Pain (4 - 6)  oxyCODONE    IR 5 milliGRAM(s) Oral every 6 hours PRN Severe Pain (7 - 10)

## 2019-11-19 NOTE — CHART NOTE - NSCHARTNOTEFT_GEN_A_CORE
Upon Nutritional Assessment by the Registered Dietitian your patient was determined to meet criteria / has evidence of the following diagnosis/diagnoses:          [ ]  Mild Protein Calorie Malnutrition        [ ]  Moderate Protein Calorie Malnutrition        [X] Severe Protein Calorie Malnutrition        [ ] Unspecified Protein Calorie Malnutrition        [ ] Underweight / BMI <19        [ ] Morbid Obesity / BMI > 40      Findings as based on:  [X] Comprehensive nutrition assessment   [ ] Nutrition Focused Physical Exam  [ ] Other: Severe malnutrition related to decreased po intake As evidenced by 12% wt loss x 4 months, consuming <75% of nutrition needs > 1 month.       Nutrition Plan/Recommendations:    1) continue DASH diet   2) Ensure Enlive 3x/d to help meet nutritional needs and wt repletion   3) Monitor po intake, weights, skin, and labs      PROVIDER Section:     By signing this assessment you are acknowledging and agree with the diagnosis/diagnoses assigned by the Registered Dietitian    Comments:

## 2019-11-19 NOTE — H&P ADULT - NSHPLABSRESULTS_GEN_ALL_CORE
10.3   9.88  )-----------( 224      ( 2019 18:40 )             31.6           141  |  106  |  12  ----------------------------<  136<H>  3.8   |  20<L>  |  0.82    Ca    8.6      2019 18:38  Phos  1.9       Mg     1.8         TPro  6.6  /  Alb  3.6  /  TBili  0.6  /  DBili  x   /  AST  17  /  ALT  17  /  AlkPhos  294<H>                Urinalysis Basic - ( 2019 21:43 )    Color: Light Yellow / Appearance: Clear / S.030 / pH: x  Gluc: x / Ketone: Negative  / Bili: Negative / Urobili: Negative   Blood: x / Protein: Negative / Nitrite: Negative   Leuk Esterase: Negative / RBC: 4 /hpf / WBC 0 /HPF   Sq Epi: x / Non Sq Epi: 0 /hpf / Bacteria: 0.0        PT/INR - ( 2019 18:40 )   PT: 14.6 sec;   INR: 1.26 ratio         PTT - ( 2019 18:40 )  PTT:29.3 sec    Lactate Trend            CAPILLARY BLOOD GLUCOSE            < from: CT Abdomen and Pelvis w/ IV Cont (19 @ 20:29) >    Moderate ascites is increased from 2019 of indeterminate etiology.    Status post Whipple with unchanged soft tissue mass adjacent to the   proximal SMA.    Unchanged moderate intrahepatic biliary ductal dilatation to the level of   hepaticojejunostomy.      < end of copied text >

## 2019-11-19 NOTE — PROGRESS NOTE ADULT - PROBLEM SELECTOR PLAN 5
Stage 1 hypertension (BP: 130/69 taken on 11/19/2019)   -Continue metoprolol and doxazosin   -DASH/TLC Diet (w/Cholesterol/sodium restricted) Pt undergoing outpatient working for ?IBS.  - workup of diarrheal symptoms as above   -Supportive management

## 2019-11-19 NOTE — DIETITIAN INITIAL EVALUATION ADULT. - PROBLEM SELECTOR PLAN 2
IBS vs Infectious vs malabsorption vs inflammatory  c/w xifaxan for IBS since this has partially relieved the pt's symptoms  Sent GI PCR/ova and parasites, stool cx  GI consult   Ensure adequate hydration

## 2019-11-19 NOTE — PROGRESS NOTE ADULT - PROBLEM SELECTOR PLAN 9
Monitor patient for increasing abdominal ascites, fever, and other symptoms of SBP. If possible, manage outpatient for followup GI on diarrheal symptoms. DASH/TLC Diet (w/Cholesterol/sodium restricted)

## 2019-11-19 NOTE — H&P ADULT - PROBLEM SELECTOR PLAN 4
s/p Whipple 2017 with recurrence of disease now with mets to the liver and last IV chemo 7/2019, s/p Whipple 2017 with recurrence of disease now with mets to the liver and last IV chemo 7/2019;  last seen by heme onc on 15/11/19 w/ plan to start CAPEOX in preparation for genomic testing s/p Whipple 2017 with recurrence of disease now with mets to the liver and last IV chemo 7/2019;  last seen by heme onc on 15/11/19 w/ plan to start CAPEOX in preparation for genomic testing; heme onc Dr. Lyman aware of pt's admission s/p Whipple 2017 with recurrence of disease now with mets to the liver and last IV chemo 7/2019;  last seen by heme onc on 11/15/19 w/ plan to start CAPEOX in preparation for genomic testing;   - heme onc Dr. Lyman aware of pt's admission

## 2019-11-19 NOTE — DIETITIAN INITIAL EVALUATION ADULT. - PROBLEM SELECTOR PLAN 4
s/p Whipple 2017 with recurrence of disease now with mets to the liver and last IV chemo 7/2019;  last seen by heme onc on 11/15/19 w/ plan to start CAPEOX in preparation for genomic testing;   - heme onc Dr. Lyman aware of pt's admission

## 2019-11-19 NOTE — PATIENT PROFILE ADULT - BRADEN MOISTURE
89 y.o. male with PMH HTN, HLD, nonobstructive CAD, carotid stenosis 99%, skin ca s/p surgery, anxiety, BPH, prostate ca with injection (25 yr ago), hypothyroidism presents with slurred speech and difficulty talking.     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  -CT head - no hemorrhage   -Slurred speech resolved     #Carotid stenosis  -MRA neck- high grade focal stenosis of proximal R ICA   -Cardio consult appreciated - no surgical intervention    #NEVA  -possibly 2/2 dehydration   -s/p fluids  -Monitor BUN/Cr daily  -Hold Nephrotoxic agents     #CAD  -Continue ASA and statin  -echo- 1/26- severe AS - follow up with cardio outpatient     #Aflutter  -on tele   -resolved  -discussed with Cardio  -No AC to be given- discussed with family     #Elevated troponin  ~likely 2/2 NEVA vs demand ischemia   -Cardio consult appreciated  -Continue ASA and statin    #Hypothyroidism  -Continue synthroid     #Anemia  -likely dilutional   -monitor H/H    #DVT PPx  -Hep 5000 BID SQ (4) rarely moist

## 2019-11-19 NOTE — CONSULT NOTE ADULT - CONSULT REQUESTED DATE/TIME
[facility]      Triage Note    Milena Ross Patient Status:  Observation    1991 MRN [id number]   Location [address] Attending Venita Angelo, [address] Day # 0 PCP DO Dennis Yang 19-Nov-2019 14:07 Test Second Interpretation: Reactive                      Additional Comments       Reason for visit: Pt evaluated for c/o decreased fetal movement. PO fluids given. NST reactive with pt stating improved fetal movement. Jinny Sylvester informed.  Discharge

## 2019-11-19 NOTE — H&P ADULT - NSICDXPASTSURGICALHX_GEN_ALL_CORE_FT
PAST SURGICAL HISTORY:  History of biliary stent insertion 2/21/2017    History of lumbar laminectomy     Intestinal Whipple's disease     S/P cervical spinal fusion     S/P tendon repair hand    S/P unilateral inguinal hernia repair

## 2019-11-19 NOTE — PROGRESS NOTE ADULT - PROBLEM SELECTOR PLAN 1
Pt had paracentesis on 11/ which revealed no culture growth as of yet with >250 PMN.   -Continue to monitor for ascites accumulation   -PCN allergy. Continue Ceftriaxone 2g/day for a total of 5 days. If possible, switch to cefotaxime 2g q8 hours for greater peritoneal penetration.   -Monitor culture of peritoneal fluid for final results   -F/u Blood Cx Pt had paracentesis on 11/18/2019 which revealed no culture growth as of yet with >250 PMN.   -Continue to monitor for ascites accumulation   -PCN allergy. Continue Ceftriaxone 2g/day for a total of 5 days. If possible, switch to cefotaxime 2g q8 hours for greater peritoneal penetration.   -Monitor culture of peritoneal fluid for final results   -F/u Blood Cx Pt met sepsis criteria on admission with fever to 101.6 and HR > 90. Likely source of SBP (culture negative neutrocytic ascites) as pt c/o abd pain and underwent paracentesis in ED and ascitic fluid had > 250 PMN. Fevers also possibly 2/2 malignancy, clot burden, atelectasis.    -Continue to monitor for ascites accumulation   -PCN allergy. Continue Ceftriaxone 2g/day for a total of 5 days. If possible, switch to cefotaxime 2g q8 hours for greater peritoneal penetration. Follow-up GI recs   -Monitor culture of peritoneal fluid for final results  - RVP neg, UA neg, CT a/p showed mod ascites, but no other source of acute infection   -F/u Blood Cx, stool cx, and urine cx from 11/18  - tylenol PRN fever

## 2019-11-19 NOTE — CONSULT NOTE ADULT - ASSESSMENT
70 y/o male PMHx cholangiocarcinoma s/p Whipple 2017 s/p cis/[place] and capcitabine (from 8/2017-1/2018)  with recurrence of disease now with mets to the liver and last IV chemo 7/2019, portal vein thrombus  according to MR abdomen 11/5/19 on eliquis, R sided Port, HTN, HLD now presenting with fever x2 days and diarrhea x2 months.      # Fever    # recurrent Cholangiocarcinoma  -    # Diarrhae    # Anemia 70 y/o male PMHx cholangiocarcinoma s/p Whipple 2017 s/p cis/[place] and capcitabine (from 8/2017-1/2018)  with recurrence of disease now with mets to the liver, portal vein thrombus  according to MR abdomen 11/5/19 on eliquis, R sided Port, HTN, HLD now presenting with fever x2 days and diarrhea x2 months.    # Fever  -likely due to SBP  -CT A/P: Moderate ascites is increased from 11/13/2019   -pt had portal ve  -Ascitic fluid >250 cells in the setting of fevers concerning for SBP  -pt allergic to penicillin, reaction was rash, tolerated Cefepime in ED, will treat with Ceftriaxone 2gm wd for now  - f/u BCx.    # recurrent Cholangiocarcinoma  -cholangiocarcinoma s/p Whipple 2017 s/p cis/[place] and capecitabine (from 8/2017-1/2018)  - found to have recurrent dz via periSMA LN bx in feb 2019 showed poorly-differentiated adenocarcinoma,  -s/p SBRT x 5 fractions 3/27/19-4/5/19   -Dr. Terry planning to start CAPEOX after infection resolves.    # Diarrhea  -on going for 2 months, seen by GI and on rifaximin for presume IBS  -previous stool cx has been negative  -f/u cx again  -continue supportive care    # Anemia  -Hgb dropped to 8.1 from baseline of 10-12  -pt on Eliquis for portal vein thrombosis  -pt mentioned blood in stool  -recent H-pylori treatment with abx   -please check FOBT  -check B12, folate, iron studies, ferritin      Shane West MD. PGY 5  [facility] fellow  [phone]; [id number] 70 y/o male PMHx cholangiocarcinoma s/p Whipple 2017 s/p cis/[place] and capcitabine (from 8/2017-1/2018)  with recurrence of disease now with mets to the liver, portal vein thrombus  according to MR abdomen 11/5/19 on eliquis, R sided Port, HTN, HLD now presenting with fever x2 days and diarrhea x2 months.    # Fever  -likely due to SBP  -CT A/P: Moderate ascites is increased from 11/13/2019   -pt had portal ve  -Ascitic fluid >250 cells in the setting of fevers concerning for SBP, fluid culture pending result  -pt allergic to penicillin, reaction was rash, tolerated Cefepime in ED, will treat with Ceftriaxone 2gm wd for now  - f/u BCx.    # recurrent Cholangiocarcinoma  -cholangiocarcinoma s/p Whipple 2017 s/p cis/[place] and capecitabine (from 8/2017-1/2018)  - found to have recurrent dz via patricia-SMA LN bx in feb 2019 showed poorly-differentiated adenocarcinoma,  -s/p SBRT x 5 fractions 3/27/19-4/5/19   -Dr. Terry planning to start CAPEOX after infection resolves.    # Diarrhea  -on going for 2 months, seen by GI and on rifaximin for presume IBS  -previous stool cx has been negative  -f/u cx again  -continue supportive care    # Anemia  -Hgb dropped to 8.1 from baseline of 10-12, likely dilutional vs GIB  -pt on Eliquis for portal vein thrombosis  -pt mentioned blood in stool  -please check FOBT  -check B12, folate, iron studies, ferritin      Shane West MD. PGY 5  [facility]-Onc fellow  [phone]; [id number]

## 2019-11-19 NOTE — CONSULT NOTE ADULT - SUBJECTIVE AND OBJECTIVE BOX
Chief Complaint:  Patient is a 69y old  Male who presents with a chief complaint of Diarrhea (2019 11:14)      HPI:  70 y/o male w/ cholangiocarcinoma s/p Whipple in 2017 w/ recurrence of disease w/ mets to liver, portal vein thrombosis (seen on MR 11/5) treated with Eliquis, now presenting for 2 day history of fever and 2 month hx of watery stools. Patient endorses chills for the past few weeks. Reports that he has been having nonbloody diarrhea since September which has been getting worse and now has about 10 BMs per day. He is followed by Dr. Orantes (GI) who had started patient on xifaxan for IBS-C. Otherwise denies abdominal pain unless abdomen palpated and nausea.    Hepatology service consulted for recurrent diarrhea and abdominal distension/ascites. Patient seen and examined this AM. Patient reported that he continues to have multiple watery stools unchanged in frequency and has some abdominal pain upon palpation. Otherwise he states that he has not noticed any significant abdominal distension.     Allergies:  penicillin G benzathine (Rash)  sulfa drugs (Rash)      Home Medications:    Hospital Medications:  acetaminophen   Tablet .. 650 milliGRAM(s) Oral every 6 hours PRN  apixaban 5 milliGRAM(s) Oral every 12 hours  cholestyramine Powder (Sugar-Free) 4 Gram(s) Oral two times a day  doxazosin 1 milliGRAM(s) Oral at bedtime  gabapentin 300 milliGRAM(s) Oral three times a day  metoprolol succinate ER 25 milliGRAM(s) Oral daily  pantoprazole    Tablet 40 milliGRAM(s) Oral before breakfast  rifAXIMin 550 milliGRAM(s) Oral two times a day  simethicone 80 milliGRAM(s) Chew four times a day      PMHX/PSHX:  Rash  Biliary stricture  Pancreatic lesion  Gastric polyp  Obstructive jaundice  Other hyperlipidemia  Essential hypertension  IBS (irritable bowel syndrome)  GERD (gastroesophageal reflux disease)  Intestinal Whipple's disease  S/P unilateral inguinal hernia repair  S/P tendon repair  History of lumbar laminectomy  History of biliary stent insertion  S/P lumbar spinal fusion  S/P cervical spinal fusion  No significant past surgical history      Family history:  FH: CHF (congestive heart failure)  FH: prostate cancer  Family history of colitis  No pertinent family history in first degree relatives  Family history of prostate cancer  Family history of coronary artery disease      Denies family history of colon cancer/polyps, stomach cancer/polyps, pancreatic cancer/masses, liver cancer/disease, ovarian cancer and endometrial cancer.    Social History:     Tob: Denies  EtOH: Denies  Illicit Drugs: Denies    ROS:     General: +fevers, chills, no night sweats, fatigue  Eyes:  Good vision, no reported pain  ENT:  No sore throat, pain, runny nose, dysphagia  CV:  No pain, palpitations, hypo/hypertension  Pulm:  No dyspnea, cough, tachypnea, wheezing  GI:  No pain, No nausea, No vomiting, +diarrhea, No constipation, No weight loss, No pruritis, No rectal bleeding, No tarry stools, No dysphagia,  :  No pain, bleeding, incontinence, nocturia  Muscle:  No pain, weakness  Neuro:  No weakness, tingling, memory problems  Psych:  No fatigue, insomnia, mood problems, depression  Endocrine:  No polyuria, polydipsia, cold/heat intolerance  Heme:  No petechiae, ecchymosis, easy bruisability    PHYSICAL EXAM:     GENERAL:  No acute distress  HEENT:  Normocephalic/atraumatic, no scleral icterus  CHEST:  Clear to auscultation bilaterally, no wheezes/rales/ronchi, no accessory muscle use  HEART:  Regular rate and rhythm, no murmurs/rubs/gallops  ABDOMEN:  Soft, non-tender, non-distended, normoactive bowel sounds,  no masses, no hepato-splenomegaly, no signs of chronic liver disease  EXTREMITIES: No cyanosis, clubbing, or edema  SKIN:  No rash/erythema/ecchymoses/petechiae/wounds/abscess/warm/dry  NEURO:  Alert and oriented x 3, no asterixis    Vital Signs:  Vital Signs Last 24 Hrs  T(C): 36.9 (2019 06:30), Max: 38.7 (2019 18:25)  T(F): 98.5 (2019 06:30), Max: 101.6 (2019 18:25)  HR: 107 (2019 04:36) (90 - 128)  BP: 130/69 (2019 04:36) (118/72 - 145/79)  BP(mean): --  RR: 17 (2019 04:36) (16 - 17)  SpO2: 92% (2019 04:36) (92% - 100%)  Daily Height in cm: 177.8 (2019 00:15)    Daily     LABS:                        8.1    7.56  )-----------( 170      ( 2019 06:56 )             24.9     Mean Cell Volume: 81.9 fl (19 @ 06:56)        141  |  111<H>  |  8   ----------------------------<  102<H>  3.6   |  19<L>  |  0.70    Ca    7.6<L>      2019 06:54  Phos  2.6       Mg     1.5         TPro  5.4<L>  /  Alb  2.8<L>  /  TBili  0.8  /  DBili  x   /  AST  15  /  ALT  12  /  AlkPhos  224<H>      LIVER FUNCTIONS - ( 2019 06:54 )  Alb: 2.8 g/dL / Pro: 5.4 g/dL / ALK PHOS: 224 U/L / ALT: 12 U/L / AST: 15 U/L / GGT: x           PT/INR - ( 2019 18:40 )   PT: 14.6 sec;   INR: 1.26 ratio         PTT - ( 2019 18:40 )  PTT:29.3 sec  Urinalysis Basic - ( 2019 21:43 )    Color: Light Yellow / Appearance: Clear / S.030 / pH: x  Gluc: x / Ketone: Negative  / Bili: Negative / Urobili: Negative   Blood: x / Protein: Negative / Nitrite: Negative   Leuk Esterase: Negative / RBC: 4 /hpf / WBC 0 /HPF   Sq Epi: x / Non Sq Epi: 0 /hpf / Bacteria: 0.0      Amylase Serum--      Lipase serum69       Ammonia--                          8.1    7.56  )-----------( 170      ( 2019 06:56 )             24.9                         10.3   9.88  )-----------( 224      ( 2019 18:40 )             31.6       Imaging: Chief Complaint:  Patient is a 69y old  Male who presents with a chief complaint of Diarrhea (2019 11:14)      HPI:  70 y/o male w/ cholangiocarcinoma s/p Whipple in 2017 w/ recurrence of disease w/ mets to liver, portal vein thrombosis (seen on MR 11/5) treated with Eliquis, now presenting for 2 day history of fever and 2 month hx of watery stools. Patient endorses chills for the past few weeks. Reports that he has been having nonbloody diarrhea since September which has been getting worse and now has about 10 BMs per day. He is followed by Dr. Orantes (GI) who had started patient on xifaxan for IBS-C. Otherwise denies abdominal pain unless abdomen palpated and nausea.    Hepatology service consulted for recurrent diarrhea and abdominal distension/ascites. Patient seen and examined this AM. Patient reported that he continues to have multiple watery stools unchanged in frequency and has some abdominal pain upon palpation. Otherwise he states that he has not noticed any significant abdominal distension.     Allergies:  penicillin G benzathine (Rash)  sulfa drugs (Rash)      Home Medications:    Hospital Medications:  acetaminophen   Tablet .. 650 milliGRAM(s) Oral every 6 hours PRN  apixaban 5 milliGRAM(s) Oral every 12 hours  cholestyramine Powder (Sugar-Free) 4 Gram(s) Oral two times a day  doxazosin 1 milliGRAM(s) Oral at bedtime  gabapentin 300 milliGRAM(s) Oral three times a day  metoprolol succinate ER 25 milliGRAM(s) Oral daily  pantoprazole    Tablet 40 milliGRAM(s) Oral before breakfast  rifAXIMin 550 milliGRAM(s) Oral two times a day  simethicone 80 milliGRAM(s) Chew four times a day      PMHX/PSHX:  Rash  Biliary stricture  Pancreatic lesion  Gastric polyp  Obstructive jaundice  Other hyperlipidemia  Essential hypertension  IBS (irritable bowel syndrome)  GERD (gastroesophageal reflux disease)  Intestinal Whipple's disease  S/P unilateral inguinal hernia repair  S/P tendon repair  History of lumbar laminectomy  History of biliary stent insertion  S/P lumbar spinal fusion  S/P cervical spinal fusion  No significant past surgical history      Family history:  FH: CHF (congestive heart failure)  FH: prostate cancer  Family history of colitis  No pertinent family history in first degree relatives  Family history of prostate cancer  Family history of coronary artery disease      Denies family history of colon cancer/polyps, stomach cancer/polyps, pancreatic cancer/masses, liver cancer/disease, ovarian cancer and endometrial cancer.    Social History:     Tob: Denies  EtOH: Denies  Illicit Drugs: Denies    ROS:     General: +fevers, chills, no night sweats, fatigue  Eyes:  Good vision, no reported pain  ENT:  No sore throat, pain, runny nose, dysphagia  CV:  No pain, palpitations, hypo/hypertension  Pulm:  No dyspnea, cough, tachypnea, wheezing  GI:  No pain, No nausea, No vomiting, +diarrhea, No constipation, No weight loss, No pruritis, No rectal bleeding, No tarry stools, No dysphagia,  :  No pain, bleeding, incontinence, nocturia  Muscle:  No pain, weakness  Neuro:  No weakness, tingling, memory problems  Psych:  No fatigue, insomnia, mood problems, depression  Endocrine:  No polyuria, polydipsia, cold/heat intolerance  Heme:  No petechiae, ecchymosis, easy bruisability    PHYSICAL EXAM:     GENERAL:  No acute distress  HEENT:  Normocephalic/atraumatic, no scleral icterus  CHEST:  Clear to auscultation bilaterally, no wheezes/rales/ronchi, no accessory muscle use  HEART:  Regular rate and rhythm, no murmurs/rubs/gallops  ABDOMEN:  Soft with some firmness on the L side, mildly tender to palpation, mild distension, +BS  EXTREMITIES: No cyanosis, clubbing, or edema  SKIN:  No rash/erythema/ecchymoses/petechiae/wounds/abscess/warm/dry  NEURO:  Alert and oriented x 3, no asterixis    Vital Signs:  Vital Signs Last 24 Hrs  T(C): 36.9 (2019 06:30), Max: 38.7 (2019 18:25)  T(F): 98.5 (2019 06:30), Max: 101.6 (2019 18:25)  HR: 107 (2019 04:36) (90 - 128)  BP: 130/69 (2019 04:36) (118/72 - 145/79)  BP(mean): --  RR: 17 (2019 04:36) (16 - 17)  SpO2: 92% (2019 04:36) (92% - 100%)  Daily Height in cm: 177.8 (2019 00:15)    Daily     LABS:                        8.1    7.56  )-----------( 170      ( 2019 06:56 )             24.9     Mean Cell Volume: 81.9 fl (19 @ 06:56)        141  |  111<H>  |  8   ----------------------------<  102<H>  3.6   |  19<L>  |  0.70    Ca    7.6<L>      2019 06:54  Phos  2.6       Mg     1.5         TPro  5.4<L>  /  Alb  2.8<L>  /  TBili  0.8  /  DBili  x   /  AST  15  /  ALT  12  /  AlkPhos  224<H>      LIVER FUNCTIONS - ( 2019 06:54 )  Alb: 2.8 g/dL / Pro: 5.4 g/dL / ALK PHOS: 224 U/L / ALT: 12 U/L / AST: 15 U/L / GGT: x           PT/INR - ( 2019 18:40 )   PT: 14.6 sec;   INR: 1.26 ratio         PTT - ( 2019 18:40 )  PTT:29.3 sec  Urinalysis Basic - ( 2019 21:43 )    Color: Light Yellow / Appearance: Clear / S.030 / pH: x  Gluc: x / Ketone: Negative  / Bili: Negative / Urobili: Negative   Blood: x / Protein: Negative / Nitrite: Negative   Leuk Esterase: Negative / RBC: 4 /hpf / WBC 0 /HPF   Sq Epi: x / Non Sq Epi: 0 /hpf / Bacteria: 0.0      Amylase Serum--      Lipase serum69       Ammonia--                          8.1    7.56  )-----------( 170      ( 2019 06:56 )             24.9                         10.3   9.88  )-----------( 224      ( 2019 18:40 )             31.6       Imaging:

## 2019-11-19 NOTE — DIETITIAN INITIAL EVALUATION ADULT. - PROBLEM SELECTOR PLAN 7
Pt unsure of what meds he takes, prelim med rec done based on [facility] records but some of the meds listed there have not bene renewed for over a year and others that he said he is taking(pantoprazole) is not listed, please follow up med rec in AM

## 2019-11-19 NOTE — CONSULT NOTE ADULT - SUBJECTIVE AND OBJECTIVE BOX
70 y/o male PMHx cholangiocarcinoma s/p Whipple 2017 s/p cis/[place] and capcitabine (from 8/2017-1/2018)  with recurrence of disease now with mets to the liver and last IV chemo 7/2019, portal vein thrombus  according to MR abdomen 11/5/19 on elequis, R sided Port, HTN, HLD now presenting with fever x2 days and diarrhea x2 months. In addition to having the fevers, the pt has experienced chills for a few weeks. The pt denies URI symptoms, including cough, runny nose, sore throat or other issues. The pt conveys that he has been having non bloody diarrhea since Septmeber which got worse recently in Octoober; the pt reports to having about 10 watery BM per day. The pt has been following with Dr. Orantes from GI who started the pt on xifaxan for IBD; this helped the pt's symptoms but did not fully resolve them. The pt denies abdominal pain, nausea or precipitation of diarrhal episodes by food. (19 Nov 2019 01:14)        Allergies  penicillin G benzathine (Rash)  sulfa drugs (Rash)            PAST MEDICAL & SURGICAL HISTORY:  Rash  Biliary stricture: s/p biliary stent  Pancreatic lesion  Gastric polyp  Obstructive jaundice  Other hyperlipidemia  Essential hypertension  IBS (irritable bowel syndrome)  GERD (gastroesophageal reflux disease)  Intestinal Whipple's disease  S/P unilateral inguinal hernia repair  S/P tendon repair: hand  History of lumbar laminectomy  History of biliary stent insertion: 2/21/2017  S/P cervical spinal fusion      FAMILY HISTORY:  FH: CHF (congestive heart failure)  FH: prostate cancer  Family history of colitis      Social History:      REVIEW OF SYSTEMS:  CONSTITUTIONAL: No weakness, + fevers or chills  EYES/ENT: No visual changes, no throat pain   RESPIRATORY: No cough, wheezing, hemoptysis; No shortness of breath  CARDIOVASCULAR: No chest pain or palpitations  GASTROINTESTINAL: No abdominal pain, nausea, vomiting, or hematemesis; +diarrhea, Noconstipation. No melena or hematochezia.  GENITOURINARY: No dysuria, frequency or hematuria  MUSCULOSKELETAL: No joint pain.  NEUROLOGICAL: No dizziness, numbness, or weakness  SKIN: No itching, burning, rashes, or lesions   All other review of systems is negative unless indicated above.    VITAL SIGNS:  Vital Signs Last 24 Hrs  T(C): 37 (19 Nov 2019 13:32), Max: 38.7 (18 Nov 2019 18:25)  T(F): 98.6 (19 Nov 2019 13:32), Max: 101.6 (18 Nov 2019 18:25)  HR: 93 (19 Nov 2019 13:32) (90 - 128)  BP: 150/84 (19 Nov 2019 13:32) (118/72 - 150/84)  BP(mean): --  RR: 16 (19 Nov 2019 13:32) (16 - 17)  SpO2: 96% (19 Nov 2019 13:32) (92% - 100%)      PHYSICAL EXAM:     GENERAL: no acute distress  HEENT: EOMI, neck supple  RESPIRATORY: LCTAB/L, no rhonchi, rales, or wheezing  CARDIOVASCULAR: RRR, no murmurs, gallops, rubs  ABDOMINAL: soft, non-tender, non-distended, positive bowel sounds   EXTREMITIES: no clubbing, cyanosis, or edema  NEUROLOGICAL: alert and oriented x 3, non-focal  SKIN: no rashes or lesions   MUSCULOSKELETAL: no gross joint deformity                          8.1    7.56  )-----------( 170      ( 19 Nov 2019 06:56 )             24.9     11-19    141  |  111<H>  |  8   ----------------------------<  102<H>  3.6   |  19<L>  |  0.70    Ca    7.6<L>      19 Nov 2019 06:54  Phos  2.6     11-19  Mg     1.5     11-19    TPro  5.4<L>  /  Alb  2.8<L>  /  TBili  0.8  /  DBili  x   /  AST  15  /  ALT  12  /  AlkPhos  224<H>  11-19      MEDICATIONS  (STANDING):  apixaban 5 milliGRAM(s) Oral every 12 hours  cholestyramine Powder (Sugar-Free) 4 Gram(s) Oral two times a day  doxazosin 1 milliGRAM(s) Oral at bedtime  gabapentin 300 milliGRAM(s) Oral three times a day  metoprolol succinate ER 25 milliGRAM(s) Oral daily  pantoprazole    Tablet 40 milliGRAM(s) Oral before breakfast  rifAXIMin 550 milliGRAM(s) Oral two times a day  simethicone 80 milliGRAM(s) Chew four times a day 68 y/o male PMHx cholangiocarcinoma s/p Whipple 2017 s/p cis/[place] and capcitabine (from 8/2017-1/2018)  with recurrence of disease now with mets to the liver, portal vein thrombus  according to MR abdomen 11/5/19 on elequis, R sided Port, HTN, HLD now presenting with fever x2 days and diarrhea x2 months. In addition to having the fevers, the pt has experienced chills for a few weeks. The pt denies URI symptoms, including cough, runny nose, sore throat or other issues. The pt conveys that he has been having non bloody diarrhea since Septmeber which got worse recently in Octoober; the pt reports to having about 10 watery BM per day. The pt has been following with Dr. Orantes from GI who started the pt on xifaxan for IBD; this helped the pt's symptoms but did not fully resolve them. The pt denies abdominal pain, nausea or precipitation of diarrhal episodes by food. (19 Nov 2019 01:14)    Allergies  penicillin G benzathine (Rash)  sulfa drugs (Rash)      PAST MEDICAL & SURGICAL HISTORY:  Rash  Biliary stricture: s/p biliary stent  Pancreatic lesion  Gastric polyp  Obstructive jaundice  Other hyperlipidemia  Essential hypertension  IBS (irritable bowel syndrome)  GERD (gastroesophageal reflux disease)  Intestinal Whipple's disease  S/P unilateral inguinal hernia repair  S/P tendon repair: hand  History of lumbar laminectomy  History of biliary stent insertion: 2/21/2017  S/P cervical spinal fusion      FAMILY HISTORY:  FH: CHF (congestive heart failure)  FH: prostate cancer  Family history of colitis      Social History:   Pt lives with wife, ambulates independently/carries out ADLs. Pt smoked socially for 15 years; a few cigarettes per week; his last cigarette was in 2014    REVIEW OF SYSTEMS:  CONSTITUTIONAL: No weakness, + fevers or chills  EYES/ENT: No visual changes, no throat pain   RESPIRATORY: No cough, wheezing, hemoptysis; No shortness of breath  CARDIOVASCULAR: No chest pain or palpitations  GASTROINTESTINAL: No abdominal pain, nausea, vomiting, or hematemesis; +diarrhea, Noconstipation. No melena or hematochezia.  GENITOURINARY: No dysuria, frequency or hematuria  MUSCULOSKELETAL: No joint pain.  NEUROLOGICAL: No dizziness, numbness, or weakness  SKIN: No itching, burning, rashes, or lesions   All other review of systems is negative unless indicated above.    VITAL SIGNS:  Vital Signs Last 24 Hrs  T(C): 37 (19 Nov 2019 13:32), Max: 38.7 (18 Nov 2019 18:25)  T(F): 98.6 (19 Nov 2019 13:32), Max: 101.6 (18 Nov 2019 18:25)  HR: 93 (19 Nov 2019 13:32) (90 - 128)  BP: 150/84 (19 Nov 2019 13:32) (118/72 - 150/84)  BP(mean): --  RR: 16 (19 Nov 2019 13:32) (16 - 17)  SpO2: 96% (19 Nov 2019 13:32) (92% - 100%)      PHYSICAL EXAM:     GENERAL: no acute distress  HEENT: EOMI, neck supple  RESPIRATORY: LCTAB/L, no rhonchi, rales, or wheezing  CARDIOVASCULAR: RRR, no murmurs, gallops, rubs  ABDOMINAL: soft, non-tender, non-distended, positive bowel sounds   EXTREMITIES: no clubbing, cyanosis, or edema  NEUROLOGICAL: alert and oriented x 3, non-focal  SKIN: no rashes or lesions   MUSCULOSKELETAL: no gross joint deformity                          8.1    7.56  )-----------( 170      ( 19 Nov 2019 06:56 )             24.9     11-19    141  |  111<H>  |  8   ----------------------------<  102<H>  3.6   |  19<L>  |  0.70    Ca    7.6<L>      19 Nov 2019 06:54  Phos  2.6     11-19  Mg     1.5     11-19    TPro  5.4<L>  /  Alb  2.8<L>  /  TBili  0.8  /  DBili  x   /  AST  15  /  ALT  12  /  AlkPhos  224<H>  11-19      MEDICATIONS  (STANDING):  apixaban 5 milliGRAM(s) Oral every 12 hours  cholestyramine Powder (Sugar-Free) 4 Gram(s) Oral two times a day  doxazosin 1 milliGRAM(s) Oral at bedtime  gabapentin 300 milliGRAM(s) Oral three times a day  metoprolol succinate ER 25 milliGRAM(s) Oral daily  pantoprazole    Tablet 40 milliGRAM(s) Oral before breakfast  rifAXIMin 550 milliGRAM(s) Oral two times a day  simethicone 80 milliGRAM(s) Chew four times a day

## 2019-11-20 LAB
ALBUMIN FLD-MCNC: 0.8 G/DL — SIGNIFICANT CHANGE UP
ALBUMIN SERPL ELPH-MCNC: 2.6 G/DL — LOW (ref 3.3–5)
ALP SERPL-CCNC: 206 U/L — HIGH (ref 40–120)
ALT FLD-CCNC: 9 U/L — LOW (ref 10–45)
ANION GAP SERPL CALC-SCNC: 12 MMOL/L — SIGNIFICANT CHANGE UP (ref 5–17)
APPEARANCE UR: CLEAR — SIGNIFICANT CHANGE UP
AST SERPL-CCNC: 13 U/L — SIGNIFICANT CHANGE UP (ref 10–40)
BACTERIA # UR AUTO: NEGATIVE — SIGNIFICANT CHANGE UP
BILIRUB SERPL-MCNC: 0.5 MG/DL — SIGNIFICANT CHANGE UP (ref 0.2–1.2)
BILIRUB UR-MCNC: NEGATIVE — SIGNIFICANT CHANGE UP
BUN SERPL-MCNC: 7 MG/DL — SIGNIFICANT CHANGE UP (ref 7–23)
CALCIUM SERPL-MCNC: 7.9 MG/DL — LOW (ref 8.4–10.5)
CHLORIDE SERPL-SCNC: 107 MMOL/L — SIGNIFICANT CHANGE UP (ref 96–108)
CO2 SERPL-SCNC: 20 MMOL/L — LOW (ref 22–31)
COLOR SPEC: YELLOW — SIGNIFICANT CHANGE UP
CREAT SERPL-MCNC: 0.78 MG/DL — SIGNIFICANT CHANGE UP (ref 0.5–1.3)
CULTURE RESULTS: SIGNIFICANT CHANGE UP
CULTURE RESULTS: SIGNIFICANT CHANGE UP
DIFF PNL FLD: NEGATIVE — SIGNIFICANT CHANGE UP
EPI CELLS # UR: 1 /HPF — SIGNIFICANT CHANGE UP
GLUCOSE SERPL-MCNC: 108 MG/DL — HIGH (ref 70–99)
GLUCOSE UR QL: NEGATIVE — SIGNIFICANT CHANGE UP
HCT VFR BLD CALC: 25.5 % — LOW (ref 39–50)
HCV AB S/CO SERPL IA: 0.17 S/CO — SIGNIFICANT CHANGE UP (ref 0–0.99)
HCV AB SERPL-IMP: SIGNIFICANT CHANGE UP
HGB BLD-MCNC: 8.1 G/DL — LOW (ref 13–17)
HYALINE CASTS # UR AUTO: 5 /LPF — HIGH (ref 0–2)
KETONES UR-MCNC: NEGATIVE — SIGNIFICANT CHANGE UP
LEUKOCYTE ESTERASE UR-ACNC: NEGATIVE — SIGNIFICANT CHANGE UP
MAGNESIUM SERPL-MCNC: 1.7 MG/DL — SIGNIFICANT CHANGE UP (ref 1.6–2.6)
MCHC RBC-ENTMCNC: 26.4 PG — LOW (ref 27–34)
MCHC RBC-ENTMCNC: 31.8 GM/DL — LOW (ref 32–36)
MCV RBC AUTO: 83.1 FL — SIGNIFICANT CHANGE UP (ref 80–100)
NITRITE UR-MCNC: NEGATIVE — SIGNIFICANT CHANGE UP
NRBC # BLD: 0 /100 WBCS — SIGNIFICANT CHANGE UP (ref 0–0)
PH UR: 5.5 — SIGNIFICANT CHANGE UP (ref 5–8)
PHOSPHATE SERPL-MCNC: 2.6 MG/DL — SIGNIFICANT CHANGE UP (ref 2.5–4.5)
PLATELET # BLD AUTO: 186 K/UL — SIGNIFICANT CHANGE UP (ref 150–400)
POTASSIUM SERPL-MCNC: 3.5 MMOL/L — SIGNIFICANT CHANGE UP (ref 3.5–5.3)
POTASSIUM SERPL-SCNC: 3.5 MMOL/L — SIGNIFICANT CHANGE UP (ref 3.5–5.3)
PROT SERPL-MCNC: 5.4 G/DL — LOW (ref 6–8.3)
PROT UR-MCNC: ABNORMAL
RBC # BLD: 3.07 M/UL — LOW (ref 4.2–5.8)
RBC # FLD: 17.2 % — HIGH (ref 10.3–14.5)
RBC CASTS # UR COMP ASSIST: 7 /HPF — HIGH (ref 0–4)
SODIUM SERPL-SCNC: 139 MMOL/L — SIGNIFICANT CHANGE UP (ref 135–145)
SP GR SPEC: 1.03 — HIGH (ref 1.01–1.02)
SPECIMEN SOURCE: SIGNIFICANT CHANGE UP
SPECIMEN SOURCE: SIGNIFICANT CHANGE UP
UROBILINOGEN FLD QL: NEGATIVE — SIGNIFICANT CHANGE UP
WBC # BLD: 6.32 K/UL — SIGNIFICANT CHANGE UP (ref 3.8–10.5)
WBC # FLD AUTO: 6.32 K/UL — SIGNIFICANT CHANGE UP (ref 3.8–10.5)
WBC UR QL: 3 /HPF — SIGNIFICANT CHANGE UP (ref 0–5)

## 2019-11-20 PROCEDURE — 99223 1ST HOSP IP/OBS HIGH 75: CPT | Mod: GC

## 2019-11-20 PROCEDURE — 99232 SBSQ HOSP IP/OBS MODERATE 35: CPT | Mod: GC

## 2019-11-20 PROCEDURE — 99233 SBSQ HOSP IP/OBS HIGH 50: CPT | Mod: GC

## 2019-11-20 RX ADMIN — CHOLESTYRAMINE 4 GRAM(S): 4 POWDER, FOR SUSPENSION ORAL at 09:42

## 2019-11-20 RX ADMIN — Medication 650 MILLIGRAM(S): at 05:40

## 2019-11-20 RX ADMIN — Medication 30 MILLILITER(S): at 05:08

## 2019-11-20 RX ADMIN — OXYCODONE HYDROCHLORIDE 5 MILLIGRAM(S): 5 TABLET ORAL at 22:18

## 2019-11-20 RX ADMIN — GABAPENTIN 300 MILLIGRAM(S): 400 CAPSULE ORAL at 06:23

## 2019-11-20 RX ADMIN — Medication 650 MILLIGRAM(S): at 17:41

## 2019-11-20 RX ADMIN — GABAPENTIN 300 MILLIGRAM(S): 400 CAPSULE ORAL at 13:51

## 2019-11-20 RX ADMIN — APIXABAN 5 MILLIGRAM(S): 2.5 TABLET, FILM COATED ORAL at 05:14

## 2019-11-20 RX ADMIN — OXYCODONE HYDROCHLORIDE 5 MILLIGRAM(S): 5 TABLET ORAL at 22:48

## 2019-11-20 RX ADMIN — PANTOPRAZOLE SODIUM 40 MILLIGRAM(S): 20 TABLET, DELAYED RELEASE ORAL at 06:23

## 2019-11-20 RX ADMIN — SIMETHICONE 80 MILLIGRAM(S): 80 TABLET, CHEWABLE ORAL at 13:51

## 2019-11-20 RX ADMIN — SIMETHICONE 80 MILLIGRAM(S): 80 TABLET, CHEWABLE ORAL at 05:14

## 2019-11-20 RX ADMIN — Medication 650 MILLIGRAM(S): at 05:05

## 2019-11-20 RX ADMIN — GABAPENTIN 300 MILLIGRAM(S): 400 CAPSULE ORAL at 22:18

## 2019-11-20 RX ADMIN — CEFTRIAXONE 100 MILLIGRAM(S): 500 INJECTION, POWDER, FOR SOLUTION INTRAMUSCULAR; INTRAVENOUS at 05:08

## 2019-11-20 RX ADMIN — Medication 25 MILLIGRAM(S): at 05:14

## 2019-11-20 RX ADMIN — SIMETHICONE 80 MILLIGRAM(S): 80 TABLET, CHEWABLE ORAL at 17:38

## 2019-11-20 RX ADMIN — Medication 1 MILLIGRAM(S): at 22:18

## 2019-11-20 RX ADMIN — CHOLESTYRAMINE 4 GRAM(S): 4 POWDER, FOR SUSPENSION ORAL at 21:11

## 2019-11-20 RX ADMIN — APIXABAN 5 MILLIGRAM(S): 2.5 TABLET, FILM COATED ORAL at 17:38

## 2019-11-20 NOTE — PROGRESS NOTE ADULT - PROBLEM SELECTOR PLAN 2
Pts hgb on presentation 8.1, baseline 10 - 12. Pt reports having blood in stools, however no signs or symptoms of active bleeding on exam.   - will check FOBT, B12, folate, iron studies, ferritin  - cont to monitor for signs/sx of active bleeding  - maintain active type and screen

## 2019-11-20 NOTE — CONSULT NOTE ADULT - ATTENDING COMMENTS
Patient with recurrent metastatic cholangiocarcinoma now with diarrhea and ascites.  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  Now on anticoagulant.  Ascites fluid does not look like SBP by cell count. Suggest paracentesis for cytology.  Likely cause of ascites is portal vein thrombosis.  Assess response to anticoagulation and r/o malignant etiology.
Patient interviewed/examined on rounds.    Agree with history, ROS, PE, A/P as above.      Cheko Ojeda MD
Patient seen and examined. Agree with above. Would check C diff and stool studies, and give trial of Imodium if no infectious etiology found. If diarrhea continues, consider colonic biopsies to rule out microscopic colitis.

## 2019-11-20 NOTE — PROGRESS NOTE ADULT - PROBLEM SELECTOR PLAN 5
Pt undergoing outpatient working for ?IBS.  - workup of diarrheal symptoms as above   -Supportive management

## 2019-11-20 NOTE — PROVIDER CONTACT NOTE (OTHER) - SITUATION
Patient febrile
Patient febrile 100.7, received Tylenol at 3pm for fever of 100.4
Patient remains febrile
T 100.5

## 2019-11-20 NOTE — PROGRESS NOTE ADULT - SUBJECTIVE AND OBJECTIVE BOX
Chief Complaint:  Patient is a 69y old  Male who presents with a chief complaint of Diarrhea (2019 09:55)      Interval Events: Patient febrile overnight to 101.4 but otherwise no acute events. Seen and examined this AM, patient has no complaints, states that the frequency of his watery BMs is decreasing. He reports that he has some abdominal discomfort in the periumbilical region that bothers him when he has BMs.     Allergies:  penicillin G benzathine (Rash)  sulfa drugs (Rash)        Hospital Medications:  acetaminophen   Tablet .. 650 milliGRAM(s) Oral every 6 hours PRN  aluminum hydroxide/magnesium hydroxide/simethicone Suspension 30 milliLiter(s) Oral every 4 hours PRN  apixaban 5 milliGRAM(s) Oral every 12 hours  cefTRIAXone   IVPB 2000 milliGRAM(s) IV Intermittent every 24 hours  cholestyramine Powder (Sugar-Free) 4 Gram(s) Oral two times a day  doxazosin 1 milliGRAM(s) Oral at bedtime  gabapentin 300 milliGRAM(s) Oral three times a day  metoprolol succinate ER 25 milliGRAM(s) Oral daily  oxyCODONE    IR 5 milliGRAM(s) Oral every 6 hours PRN  pantoprazole    Tablet 40 milliGRAM(s) Oral before breakfast  simethicone 80 milliGRAM(s) Chew four times a day      PMHX/PSHX:  Rash  Biliary stricture  Pancreatic lesion  Gastric polyp  Obstructive jaundice  Other hyperlipidemia  Essential hypertension  IBS (irritable bowel syndrome)  GERD (gastroesophageal reflux disease)  Intestinal Whipple's disease  S/P unilateral inguinal hernia repair  S/P tendon repair  History of lumbar laminectomy  History of biliary stent insertion  S/P lumbar spinal fusion  S/P cervical spinal fusion  No significant past surgical history      Family history:  FH: CHF (congestive heart failure)  FH: prostate cancer  Family history of colitis  No pertinent family history in first degree relatives  Family history of prostate cancer  Family history of coronary artery disease      ROS:     General:  No wt loss, fevers, chills, night sweats, fatigue,   Eyes:  Good vision, no reported pain  ENT:  No sore throat, pain, runny nose, dysphagia  CV:  No pain, palpitations, hypo/hypertension  Pulm:  No dyspnea, cough, tachypnea, wheezing  GI:  No pain, No nausea, No vomiting, +diarrhea, No constipation, No weight loss, +fever, No pruritis, No rectal bleeding, No tarry stools, No dysphagia  :  No pain, bleeding, incontinence, nocturia  Muscle:  No pain, weakness  Neuro:  No weakness, tingling, memory problems  Psych:  No fatigue, insomnia, mood problems, depression  Endocrine:  No polyuria, polydipsia, cold/heat intolerance  Heme:  No petechiae, ecchymosis, easy bruisability  Skin:  No rash, tattoos, scars, edema      PHYSICAL EXAM:   Vital Signs:  Vital Signs Last 24 Hrs  T(C): 36.9 (2019 09:46), Max: 38.6 (2019 21:40)  T(F): 98.5 (2019 09:46), Max: 101.4 (2019 21:40)  HR: 87 (2019 09:46) (87 - 106)  BP: 115/71 (2019 09:46) (115/71 - 150/84)  BP(mean): --  RR: 16 (2019 09:46) (16 - 18)  SpO2: 95% (2019 05:00) (95% - 98%)  Daily     Daily Weight in k.3 (2019 09:24)    GENERAL:  No acute distress  HEENT:  Normocephalic/atraumatic, no scleral icterus  CHEST:  Clear to auscultation bilaterally, no wheezes/rales/ronchi, no accessory muscle use  HEART:  Regular rate and rhythm, no murmurs/rubs/gallops  ABDOMEN:  Soft with some firmness on the L side, mildly tender to palpation, mild distension, +BS  EXTREMITIES: No cyanosis, clubbing, or edema  SKIN:  No rash/erythema/ecchymoses/petechiae/wounds/abscess/warm/dry  NEURO:  Alert and oriented x 3, no asterixis    LABS:                        8.1    6.32  )-----------( 186      ( 2019 07:26 )             25.5     Mean Cell Volume: 83.1 fl (19 @ 07:26)        139  |  107  |  7   ----------------------------<  108<H>  3.5   |  20<L>  |  0.78    Ca    7.9<L>      2019 07:26  Phos  2.6     -  Mg     1.7     -    TPro  5.4<L>  /  Alb  2.6<L>  /  TBili  0.5  /  DBili  x   /  AST  13  /  ALT  9<L>  /  AlkPhos  206<H>  -20    LIVER FUNCTIONS - ( 2019 07:26 )  Alb: 2.6 g/dL / Pro: 5.4 g/dL / ALK PHOS: 206 U/L / ALT: 9 U/L / AST: 13 U/L / GGT: x           PT/INR - ( 2019 18:40 )   PT: 14.6 sec;   INR: 1.26 ratio         PTT - ( 2019 18:40 )  PTT:29.3 sec  Urinalysis Basic - ( 2019 21:43 )    Color: Light Yellow / Appearance: Clear / S.030 / pH: x  Gluc: x / Ketone: Negative  / Bili: Negative / Urobili: Negative   Blood: x / Protein: Negative / Nitrite: Negative   Leuk Esterase: Negative / RBC: 4 /hpf / WBC 0 /HPF   Sq Epi: x / Non Sq Epi: 0 /hpf / Bacteria: 0.0                              8.1    6.32  )-----------( 186      ( 2019 07:26 )             25.5                         8.1    7.56  )-----------( 170      ( 2019 06:56 )             24.9                         10.3   9.88  )-----------( 224      ( 2019 18:40 )             31.6       Imaging:

## 2019-11-20 NOTE — PROVIDER CONTACT NOTE (OTHER) - ASSESSMENT
No s/s of distress, febrile, remaining vitals documented
No s/s of distress, febrile, remaining vitals documented  
Pt remains A&Ox4, VSS, no acute distress noted

## 2019-11-20 NOTE — PROGRESS NOTE ADULT - SUBJECTIVE AND OBJECTIVE BOX
WOODY CEVALLOS  69y  Male    Katie Fink MD/PhD, PGY-1  [phone]  Dept of Internal Medicine    After 7pm, please contact night float      Subjective: No acute events ON. Patient with bilious diarrhea early morning. Periumbilical pain. No n/v. Tolerating PO intake. Denies chest pain, dyspnea.       Vital Signs Last 24 Hrs  T(C): 36.9 (20 Nov 2019 09:46), Max: 38.6 (19 Nov 2019 21:40)  T(F): 98.5 (20 Nov 2019 09:46), Max: 101.4 (19 Nov 2019 21:40)  HR: 87 (20 Nov 2019 09:46) (87 - 106)  BP: 115/71 (20 Nov 2019 09:46) (115/71 - 150/84)  BP(mean): --  RR: 16 (20 Nov 2019 09:46) (16 - 18)  SpO2: 95% (20 Nov 2019 05:00) (95% - 98%)    PHYSICAL EXAM:  GENERAL: NAD, well-groomed, well-developed  HEENT - NC/AT, pupils equal and reactive to light, Moist mucous membranes, Good dentition, No lesions  NECK: Supple, No JVD  CHEST/LUNG: Clear to auscultation bilaterally; No rales, rhonchi, wheezing  HEART: Regular rate and rhythm; No murmurs, rubs, or gallops  ABDOMEN: Soft, mildly tender in periumbilical area, Nondistended; hyperactive bowels  EXTREMITIES:  2+ Peripheral Pulses, No clubbing, cyanosis, or edema  NEURO:  No Focal deficits, sensory and motor intact  SKIN: No rashes or lesions    Consultant(s) Notes Reviewed:  [x ] YES  [ ] NO  Care Discussed with Consultants/Other Providers [ x] YES  [ ] NO    LABS:                        8.1    6.32  )-----------( 186      ( 20 Nov 2019 07:26 )             25.5     11-20    139  |  107  |  7   ----------------------------<  108<H>  3.5   |  20<L>  |  0.78    Ca    7.9<L>      20 Nov 2019 07:26  Phos  2.6     11-20  Mg     1.7     11-20    TPro  5.4<L>  /  Alb  2.6<L>  /  TBili  0.5  /  DBili  x   /  AST  13  /  ALT  9<L>  /  AlkPhos  206<H>  11-20        MEDICATIONS  (STANDING):  apixaban 5 milliGRAM(s) Oral every 12 hours  cholestyramine Powder (Sugar-Free) 4 Gram(s) Oral two times a day  doxazosin 1 milliGRAM(s) Oral at bedtime  gabapentin 300 milliGRAM(s) Oral three times a day  metoprolol succinate ER 25 milliGRAM(s) Oral daily  pantoprazole    Tablet 40 milliGRAM(s) Oral before breakfast  simethicone 80 milliGRAM(s) Chew four times a day    MEDICATIONS  (PRN):  acetaminophen   Tablet .. 650 milliGRAM(s) Oral every 6 hours PRN Temp greater or equal to 38C (100.4F), Moderate Pain (4 - 6)  aluminum hydroxide/magnesium hydroxide/simethicone Suspension 30 milliLiter(s) Oral every 4 hours PRN Dyspepsia  oxyCODONE    IR 5 milliGRAM(s) Oral every 6 hours PRN Severe Pain (7 - 10)

## 2019-11-20 NOTE — PROGRESS NOTE ADULT - ASSESSMENT
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

## 2019-11-20 NOTE — PROGRESS NOTE ADULT - ASSESSMENT
70 y/o male w/ cholangiocarcinoma s/p Whipple in 2017 w/ recurrence of disease w/ mets to liver, portal vein thrombosis (seen on MR 11/5) treated with Eliquis, now presenting for 2 day history of fever and 2 month hx of watery stools.       #Ascites: possible causes include malignant ascites 2/2 cholangiocarcinoma, portal vein thrombosis, portal hypertension  -SAAG on admission was around 2, indicative of portal hypertension  -Pt does not meet SBP criteria, no need for ceftriaxone at this time  -Con't eliquis per primary team  -send cdiff  -continue infectious workup per primary team    #Diarrhea  -maintain electrolytes    Will continue to follow     Alice Stacy, PGY-1  Internal Medicine

## 2019-11-20 NOTE — PROGRESS NOTE ADULT - PROBLEM SELECTOR PLAN 4
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    -Ab CT (11/18/2019) did not suggest mass contributing to diarrhea   -Ab MRI (11/04/2019) showed 3.6cm retroperitoneal mass with involvement of the small bowel, pancreas, SMA, VALENTE, and portal vein.  -Oncology on board; CAPEOX when no evidence on infection

## 2019-11-20 NOTE — CONSULT NOTE ADULT - SUBJECTIVE AND OBJECTIVE BOX
Chief Complaint:  Patient is a 69y old  Male who presents with a chief complaint of Diarrhea (2019 14:06)      HPI: Pt is a 68 yo male w/ cholangiocarcinoma s/p Whipple in 2017 w/ recurrence of disease w/ mets to liver, portal vein thrombosis (seen on MR 11/5) treated with Eliquis, now presenting for 2 day history of fever. GI consulted for diarrhea for 2 month. Pt reports persistent watery stools, at times as much as 10x/day. Occasionally has days with on 3-5 BM. States he has had extensive workup including colonoscopy which were unremarkable. He was trialed on xiafaxin without improvement. Since being in hospital he does not less volume and frequency possibly related to cholestyramine. Denies abdominal pain, n/v.     Allergies:  penicillin G benzathine (Rash)  sulfa drugs (Rash)      Home Medications:    Hospital Medications:  acetaminophen   Tablet .. 650 milliGRAM(s) Oral every 6 hours PRN  aluminum hydroxide/magnesium hydroxide/simethicone Suspension 30 milliLiter(s) Oral every 4 hours PRN  apixaban 5 milliGRAM(s) Oral every 12 hours  cefTRIAXone   IVPB 2000 milliGRAM(s) IV Intermittent every 24 hours  cholestyramine Powder (Sugar-Free) 4 Gram(s) Oral two times a day  doxazosin 1 milliGRAM(s) Oral at bedtime  gabapentin 300 milliGRAM(s) Oral three times a day  metoprolol succinate ER 25 milliGRAM(s) Oral daily  oxyCODONE    IR 5 milliGRAM(s) Oral every 6 hours PRN  pantoprazole    Tablet 40 milliGRAM(s) Oral before breakfast  simethicone 80 milliGRAM(s) Chew four times a day      PMHX/PSHX:  Rash  Biliary stricture  Pancreatic lesion  Gastric polyp  Obstructive jaundice  Other hyperlipidemia  Essential hypertension  IBS (irritable bowel syndrome)  GERD (gastroesophageal reflux disease)  Intestinal Whipple's disease  S/P unilateral inguinal hernia repair  S/P tendon repair  History of lumbar laminectomy  History of biliary stent insertion  S/P lumbar spinal fusion  S/P cervical spinal fusion  No significant past surgical history      Family history:  FH: CHF (congestive heart failure)  FH: prostate cancer  Family history of colitis  No pertinent family history in first degree relatives  Family history of prostate cancer  Family history of coronary artery disease      Social History:     ROS:     General:  No wt loss, fevers, chills, night sweats, fatigue,   Eyes:  Good vision, no reported pain  ENT:  No sore throat, pain, runny nose, dysphagia  CV:  No pain, palpitations, hypo/hypertension  Resp:  No dyspnea, cough, tachypnea, wheezing  GI:  See HPI  :  No pain, bleeding, incontinence, nocturia  Muscle:  No pain, weakness  Neuro:  No weakness, tingling, memory problems  Psych:  No fatigue, insomnia, mood problems, depression  Endocrine:  No polyuria, polydipsia, cold/heat intolerance  Heme:  No petechiae, ecchymosis, easy bruisability  Skin:  No rash, edema      PHYSICAL EXAM:     Vital Signs:  Vital Signs Last 24 Hrs  T(C): 36.9 (2019 09:46), Max: 38.6 (2019 21:40)  T(F): 98.5 (2019 09:46), Max: 101.4 (2019 21:40)  HR: 87 (2019 09:46) (87 - 106)  BP: 115/71 (2019 09:46) (115/71 - 150/84)  BP(mean): --  RR: 16 (2019 09:46) (16 - 18)  SpO2: 95% (2019 05:00) (95% - 98%)  Daily     Daily Weight in k.3 (2019 09:24)    GENERAL:  Appears stated age, well-groomed, well-nourished, no distress  HEENT:  NC/AT,  conjunctivae clear and pink  CHEST:  Full & symmetric excursion, no increased effort, breath sounds clear  HEART:  Regular rhythm, S1, S2, no murmur/rub/S3/S4, no abdominal bruit, no edema, no JVD  ABDOMEN:  Soft, non-tender, non-distended, normoactive bowel sounds,  no masses , no hepatosplenomegaly  EXTREMITIES:  no cyanosis, clubbing or edema  SKIN:  No rash/erythema/ecchymoses/petechiae/wounds/abscess/warm/dry  NEURO:  Alert, oriented    LABS:                        8.1    6.32  )-----------( 186      ( 2019 07:26 )             25.5     11-20    139  |  107  |  7   ----------------------------<  108<H>  3.5   |  20<L>  |  0.78    Ca    7.9<L>      2019 07:26  Phos  2.6       Mg     1.7         TPro  5.4<L>  /  Alb  2.6<L>  /  TBili  0.5  /  DBili  x   /  AST  13  /  ALT  9<L>  /  AlkPhos  206<H>      LIVER FUNCTIONS - ( 2019 07:26 )  Alb: 2.6 g/dL / Pro: 5.4 g/dL / ALK PHOS: 206 U/L / ALT: 9 U/L / AST: 13 U/L / GGT: x           PT/INR - ( 2019 18:40 )   PT: 14.6 sec;   INR: 1.26 ratio         PTT - ( 2019 18:40 )  PTT:29.3 sec  Urinalysis Basic - ( 2019 21:43 )    Color: Light Yellow / Appearance: Clear / S.030 / pH: x  Gluc: x / Ketone: Negative  / Bili: Negative / Urobili: Negative   Blood: x / Protein: Negative / Nitrite: Negative   Leuk Esterase: Negative / RBC: 4 /hpf / WBC 0 /HPF   Sq Epi: x / Non Sq Epi: 0 /hpf / Bacteria: 0.0          Imaging:  < from: CT Abdomen and Pelvis w/ IV Cont (19 @ 20:29) >  NDINGS:    LOWER CHEST: Bibasilar linear atelectasis.    LIVER: Hepatic steatosis. Few subcentimeter hypodense lesion. For   reference, a 1.0 cm right hepatic dome hypodense lesion, previously   characterized as a suspicious metastatic lesion. Hepatic dome cyst.   Unchanged bilobar hemangioma.  BILE DUCTS: Unchanged moderate intrahepatic biliary ductal dilatation to   the level of hepaticojejunostomy.  GALLBLADDER: Cholecystectomy.  SPLEEN: Within normal limits.  PANCREAS: Status post Whipple. No pancreatic ductal dilatation.  ADRENALS: Within normal limits.  KIDNEYS/URETERS: Within normal limits.    BLADDER: Within normal limits.  REPRODUCTIVE ORGANS: Prostate is enlarged.    BOWEL: Migrated biliary stent in the jejunal loop, unchanged. No bowel   obstruction. Appendix is normal. Colonic diverticulosis.  PERITONEUM: Unchanged soft tissue mass adjacent to the proximal SMA   measuring approximately 3.6 cm, unchanged. Moderate ascites.  VESSELS: Within normal limits.  RETROPERITONEUM/LYMPH NODES: No lymphadenopathy.    ABDOMINAL WALL: Within normal limits.  BONES: Within normal limits.    IMPRESSION:     Moderate ascites is increased from 2019 of indeterminate etiology.    Status post Whipple with unchanged soft tissue mass adjacent to the   proximal SMA.    Unchanged moderate intrahepatic biliary ductal dilatation to the level of   hepaticojejunostomy.        < end of copied text > Chief Complaint:  Patient is a 69y old  Male who presents with a chief complaint of Diarrhea (2019 14:06)      HPI: Pt is a 68 yo male w/ cholangiocarcinoma s/p Whipple in 2017 w/ recurrence of disease w/ mets to liver, portal vein thrombosis (seen on MR 11/5) treated with Eliquis, now presenting for 2 day history of fever. GI consulted for diarrhea for 2 month. Pt reports persistent watery stools, at times as much as 10x/day. Occasionally has days with on 3-5 BM. States he has had extensive workup including colonoscopy which were unremarkable. He was trialed on xiafaxin without improvement. Since being in hospital he does not less volume and frequency possibly related to cholestyramine. Denies abdominal pain, n/v. He has tried probiotics also without improvement. Has not been on loperamide. He was started on cholecystyramine yesterday which slowed the diarrhea initially, but today the watery stools returned in its usual amount. The onset of diarrhea was after he was treated for H pylori.    Allergies:  penicillin G benzathine (Rash)  sulfa drugs (Rash)      Home Medications:    Hospital Medications:  acetaminophen   Tablet .. 650 milliGRAM(s) Oral every 6 hours PRN  aluminum hydroxide/magnesium hydroxide/simethicone Suspension 30 milliLiter(s) Oral every 4 hours PRN  apixaban 5 milliGRAM(s) Oral every 12 hours  cefTRIAXone   IVPB 2000 milliGRAM(s) IV Intermittent every 24 hours  cholestyramine Powder (Sugar-Free) 4 Gram(s) Oral two times a day  doxazosin 1 milliGRAM(s) Oral at bedtime  gabapentin 300 milliGRAM(s) Oral three times a day  metoprolol succinate ER 25 milliGRAM(s) Oral daily  oxyCODONE    IR 5 milliGRAM(s) Oral every 6 hours PRN  pantoprazole    Tablet 40 milliGRAM(s) Oral before breakfast  simethicone 80 milliGRAM(s) Chew four times a day      PMHX/PSHX:  Rash  Biliary stricture  Pancreatic lesion  Gastric polyp  Obstructive jaundice  Other hyperlipidemia  Essential hypertension  IBS (irritable bowel syndrome)  GERD (gastroesophageal reflux disease)  Intestinal Whipple's disease  S/P unilateral inguinal hernia repair  S/P tendon repair  History of lumbar laminectomy  History of biliary stent insertion  S/P lumbar spinal fusion  S/P cervical spinal fusion  No significant past surgical history      Family history:  FH: CHF (congestive heart failure)  FH: prostate cancer  Family history of colitis  No pertinent family history in first degree relatives  Family history of prostate cancer  Family history of coronary artery disease      Social History: No illicit drugs, no ETOH    ROS:     General:  No wt loss, fevers, chills, night sweats, fatigue,   Eyes:  Good vision, no reported pain  ENT:  No sore throat, pain, runny nose, dysphagia  CV:  No pain, palpitations, hypo/hypertension  Resp:  No dyspnea, cough, tachypnea, wheezing  GI:  See HPI  :  No pain, bleeding, incontinence, nocturia  Muscle:  No pain, weakness  Neuro:  No weakness, tingling, memory problems  Psych:  No fatigue, insomnia, mood problems, depression  Endocrine:  No polyuria, polydipsia, cold/heat intolerance  Heme:  No petechiae, ecchymosis, easy bruisability  Skin:  No rash, edema      PHYSICAL EXAM:     Vital Signs:  Vital Signs Last 24 Hrs  T(C): 36.9 (2019 09:46), Max: 38.6 (2019 21:40)  T(F): 98.5 (2019 09:46), Max: 101.4 (2019 21:40)  HR: 87 (2019 09:46) (87 - 106)  BP: 115/71 (2019 09:46) (115/71 - 150/84)  BP(mean): --  RR: 16 (2019 09:46) (16 - 18)  SpO2: 95% (2019 05:00) (95% - 98%)  Daily     Daily Weight in k.3 (2019 09:24)    GENERAL:  Appears stated age, well-groomed, well-nourished, no distress  HEENT:  NC/AT,  conjunctivae clear and pink  CHEST:  Full & symmetric excursion, no increased effort, breath sounds clear  HEART:  Regular rhythm, S1, S2, no murmur/rub/S3/S4, no abdominal bruit, no edema, no JVD  ABDOMEN:  Soft, non-tender, non-distended, normoactive bowel sounds,  no masses , no hepatosplenomegaly  EXTREMITIES:  no cyanosis, clubbing or edema  SKIN:  No rash/erythema  NEURO:  Alert, oriented    LABS:                        8.1    6.32  )-----------( 186      ( 2019 07:26 )             25.5     -20    139  |  107  |  7   ----------------------------<  108<H>  3.5   |  20<L>  |  0.78    Ca    7.9<L>      2019 07:26  Phos  2.6       Mg     1.7         TPro  5.4<L>  /  Alb  2.6<L>  /  TBili  0.5  /  DBili  x   /  AST  13  /  ALT  9<L>  /  AlkPhos  206<H>      LIVER FUNCTIONS - ( 2019 07:26 )  Alb: 2.6 g/dL / Pro: 5.4 g/dL / ALK PHOS: 206 U/L / ALT: 9 U/L / AST: 13 U/L / GGT: x           PT/INR - ( 2019 18:40 )   PT: 14.6 sec;   INR: 1.26 ratio         PTT - ( 2019 18:40 )  PTT:29.3 sec  Urinalysis Basic - ( 2019 21:43 )    Color: Light Yellow / Appearance: Clear / S.030 / pH: x  Gluc: x / Ketone: Negative  / Bili: Negative / Urobili: Negative   Blood: x / Protein: Negative / Nitrite: Negative   Leuk Esterase: Negative / RBC: 4 /hpf / WBC 0 /HPF   Sq Epi: x / Non Sq Epi: 0 /hpf / Bacteria: 0.0          Imaging:  < from: CT Abdomen and Pelvis w/ IV Cont (19 @ 20:29) >  NDINGS:    LOWER CHEST: Bibasilar linear atelectasis.    LIVER: Hepatic steatosis. Few subcentimeter hypodense lesion. For   reference, a 1.0 cm right hepatic dome hypodense lesion, previously   characterized as a suspicious metastatic lesion. Hepatic dome cyst.   Unchanged bilobar hemangioma.  BILE DUCTS: Unchanged moderate intrahepatic biliary ductal dilatation to   the level of hepaticojejunostomy.  GALLBLADDER: Cholecystectomy.  SPLEEN: Within normal limits.  PANCREAS: Status post Whipple. No pancreatic ductal dilatation.  ADRENALS: Within normal limits.  KIDNEYS/URETERS: Within normal limits.    BLADDER: Within normal limits.  REPRODUCTIVE ORGANS: Prostate is enlarged.    BOWEL: Migrated biliary stent in the jejunal loop, unchanged. No bowel   obstruction. Appendix is normal. Colonic diverticulosis.  PERITONEUM: Unchanged soft tissue mass adjacent to the proximal SMA   measuring approximately 3.6 cm, unchanged. Moderate ascites.  VESSELS: Within normal limits.  RETROPERITONEUM/LYMPH NODES: No lymphadenopathy.    ABDOMINAL WALL: Within normal limits.  BONES: Within normal limits.    IMPRESSION:     Moderate ascites is increased from 2019 of indeterminate etiology.    Status post Whipple with unchanged soft tissue mass adjacent to the   proximal SMA.    Unchanged moderate intrahepatic biliary ductal dilatation to the level of   hepaticojejunostomy.        < end of copied text >

## 2019-11-20 NOTE — PROGRESS NOTE ADULT - PROBLEM SELECTOR PLAN 6
Stage 1 hypertension (BP: 130/69 taken on 11/19/2019)   -Continue metoprolol and doxazosin   -DASH/TLC Diet (w/Cholesterol/sodium restricted)

## 2019-11-20 NOTE — PROGRESS NOTE ADULT - PROBLEM SELECTOR PLAN 3
Pt continues to have diarrhea.   -Etiology: inflammatory vs. secretory vs. osmotic   -GI on board; stool fat, alpha 1 antitrypsin, osms electrolytes pending   -Stool PCR negative for invasive organisms, outpatient c diff testing negative 10/2019   -Continue to hydrate

## 2019-11-20 NOTE — CONSULT NOTE ADULT - ASSESSMENT
Impression:  #Diarrhea: possible secondary to bile acid malabsorption, vs IBD-D vs SIBO vs carbohydrate malabsorption. Currently feels modest improvement from prior. Stool studies thus far negative. Has had colonoscopy which was negative for source of diarrhea.  #Fever  #S/p whipple for cholangiocarcinoma    Recommendation:  - continue with cholesytramine  - check cdiff  - monitor BM, CBC, CMP and signs of infection  - supportive care per primary team    Matt Landry  [phone]  Please call/page on call fellow on weekends and weekdays after 5pm Impression:  #Diarrhea: possible secondary to bile acid malabsorption, vs IBD-D vs SIBO vs carbohydrate malabsorption vs panc insuffeciency. Currently feels modest improvement from prior. Stool studies thus far negative. Has had colonoscopy which was negative for source of diarrhea.  #Fever  #S/p whipple for cholangiocarcinoma    Recommendation:  - continue with cholesytramine  - check cdiff  - check stool electrolytes, fecal alpha 1 antitrypsin, fecal fat, stool osmolality   - monitor BM, CBC, CMP and signs of infection  - supportive care per primary team    Matt Landry  [phone]  Please call/page on call fellow on weekends and weekdays after 5pm Impression:  #Diarrhea: possible secondary to bile acid malabsorption, vs IBD-D vs SIBO vs carbohydrate malabsorption vs panc insuffeciency. Currently feels modest improvement from prior. Stool studies thus far negative. Has had colonoscopy which was negative for source of diarrhea.  #Fever  #S/p whipple for cholangiocarcinoma    Recommendation:  - continue with cholesytramine  - check cdiff; if negative can give immodium prn  - check stool electrolytes, fecal alpha 1 antitrypsin, fecal fat, stool osmolality   - monitor BM, CBC, CMP and signs of infection  - supportive care per primary team    Matt Landry  [phone]  [phone]  Please call/page on call fellow on weekends and weekdays after 5pm Impression:  #Diarrhea: possible secondary to bile acid malabsorption, vs IBD-D vs SIBO vs carbohydrate malabsorption vs panc insuffeciency. Currently feels modest improvement from prior. Stool studies thus far negative. Has had colonoscopy but no biopsies were performed.  #Fever  #S/p whipple for cholangiocarcinoma    Recommendation:  - continue with cholesytramine  - check cdiff; if negative can give immodium prn  - check stool electrolytes, fecal alpha 1 antitrypsin, fecal fat, stool osmolality; check serum total igA, TTG  - monitor BM, CBC, CMP and signs of infection  - If diarrhea persists, consider repeat endoscopic evaluation with biopsies to rule out microscopic colitis  - supportive care per primary team    Matt Landry  [phone]  Please call/page on call fellow on weekends and weekdays after 5pm

## 2019-11-20 NOTE — PROVIDER CONTACT NOTE (OTHER) - ACTION/TREATMENT ORDERED:
no instructions for now, cold packs applied
Continue to monitor off antibiotics  Blood cultures  UA
Cooling measures
MD made aware. RN to admin 650 mg tylenol. Will reassess

## 2019-11-20 NOTE — PROGRESS NOTE ADULT - PROBLEM SELECTOR PLAN 1
Pt met sepsis criteria on admission with fever to 101.6 and HR > 90. Likely source of SBP (culture negative neutrocytic ascites) as pt c/o abd pain and underwent paracentesis in ED and ascitic fluid had > 250 PMN. Fevers also possibly 2/2 malignancy, clot burden, atelectasis.    -Continue to monitor for ascites accumulation   -will d/c ceftriaxone and observe   -Peritoneal fluid NGTD, likely more indicative of ascites 2/2 to hepatic portal thrombosis vs cancer rather than SBP  - RVP neg, UA neg, CT a/p showed mod ascites, but no other source of acute infection   -Blood Cx, stool cx, and urine cx negative to date  - tylenol PRN fever

## 2019-11-21 ENCOUNTER — TRANSCRIPTION ENCOUNTER (OUTPATIENT)
Age: 69
End: 2019-11-21

## 2019-11-21 DIAGNOSIS — R18.8 OTHER ASCITES: ICD-10-CM

## 2019-11-21 DIAGNOSIS — K58.8 OTHER IRRITABLE BOWEL SYNDROME: ICD-10-CM

## 2019-11-21 DIAGNOSIS — R50.9 FEVER, UNSPECIFIED: ICD-10-CM

## 2019-11-21 LAB
ALBUMIN SERPL ELPH-MCNC: 2.7 G/DL — LOW (ref 3.3–5)
ALP SERPL-CCNC: 198 U/L — HIGH (ref 40–120)
ALT FLD-CCNC: 10 U/L — SIGNIFICANT CHANGE UP (ref 10–45)
ANION GAP SERPL CALC-SCNC: 7 MMOL/L — SIGNIFICANT CHANGE UP (ref 5–17)
AST SERPL-CCNC: 11 U/L — SIGNIFICANT CHANGE UP (ref 10–40)
BILIRUB SERPL-MCNC: 0.5 MG/DL — SIGNIFICANT CHANGE UP (ref 0.2–1.2)
BUN SERPL-MCNC: 6 MG/DL — LOW (ref 7–23)
CALCIUM SERPL-MCNC: 8 MG/DL — LOW (ref 8.4–10.5)
CHLORIDE SERPL-SCNC: 110 MMOL/L — HIGH (ref 96–108)
CO2 SERPL-SCNC: 22 MMOL/L — SIGNIFICANT CHANGE UP (ref 22–31)
CREAT SERPL-MCNC: 0.68 MG/DL — SIGNIFICANT CHANGE UP (ref 0.5–1.3)
FERRITIN SERPL-MCNC: 146 NG/ML — SIGNIFICANT CHANGE UP (ref 30–400)
FOLATE SERPL-MCNC: 14 NG/ML — SIGNIFICANT CHANGE UP
GLUCOSE SERPL-MCNC: 107 MG/DL — HIGH (ref 70–99)
HCT VFR BLD CALC: 25.2 % — LOW (ref 39–50)
HGB BLD-MCNC: 8 G/DL — LOW (ref 13–17)
IRON SATN MFR SERPL: 5 % — LOW (ref 16–55)
IRON SATN MFR SERPL: 8 UG/DL — LOW (ref 45–165)
MAGNESIUM SERPL-MCNC: 1.7 MG/DL — SIGNIFICANT CHANGE UP (ref 1.6–2.6)
MCHC RBC-ENTMCNC: 26.1 PG — LOW (ref 27–34)
MCHC RBC-ENTMCNC: 31.7 GM/DL — LOW (ref 32–36)
MCV RBC AUTO: 82.4 FL — SIGNIFICANT CHANGE UP (ref 80–100)
NRBC # BLD: 0 /100 WBCS — SIGNIFICANT CHANGE UP (ref 0–0)
PHOSPHATE SERPL-MCNC: 2.5 MG/DL — SIGNIFICANT CHANGE UP (ref 2.5–4.5)
PLATELET # BLD AUTO: 200 K/UL — SIGNIFICANT CHANGE UP (ref 150–400)
POTASSIUM SERPL-MCNC: 3.5 MMOL/L — SIGNIFICANT CHANGE UP (ref 3.5–5.3)
POTASSIUM SERPL-SCNC: 3.5 MMOL/L — SIGNIFICANT CHANGE UP (ref 3.5–5.3)
PROT SERPL-MCNC: 5.3 G/DL — LOW (ref 6–8.3)
RBC # BLD: 3.06 M/UL — LOW (ref 4.2–5.8)
RBC # FLD: 17.1 % — HIGH (ref 10.3–14.5)
SODIUM SERPL-SCNC: 139 MMOL/L — SIGNIFICANT CHANGE UP (ref 135–145)
TIBC SERPL-MCNC: 148 UG/DL — LOW (ref 220–430)
UIBC SERPL-MCNC: 140 UG/DL — SIGNIFICANT CHANGE UP (ref 110–370)
VIT B12 SERPL-MCNC: 1664 PG/ML — HIGH (ref 232–1245)
WBC # BLD: 5.63 K/UL — SIGNIFICANT CHANGE UP (ref 3.8–10.5)
WBC # FLD AUTO: 5.63 K/UL — SIGNIFICANT CHANGE UP (ref 3.8–10.5)

## 2019-11-21 PROCEDURE — 99232 SBSQ HOSP IP/OBS MODERATE 35: CPT | Mod: GC

## 2019-11-21 PROCEDURE — 99233 SBSQ HOSP IP/OBS HIGH 50: CPT | Mod: GC

## 2019-11-21 PROCEDURE — 99223 1ST HOSP IP/OBS HIGH 75: CPT

## 2019-11-21 RX ORDER — IRON SUCROSE 20 MG/ML
200 INJECTION, SOLUTION INTRAVENOUS ONCE
Refills: 0 | Status: COMPLETED | OUTPATIENT
Start: 2019-11-21 | End: 2019-11-21

## 2019-11-21 RX ORDER — CEFTRIAXONE 500 MG/1
2000 INJECTION, POWDER, FOR SOLUTION INTRAMUSCULAR; INTRAVENOUS EVERY 24 HOURS
Refills: 0 | Status: DISCONTINUED | OUTPATIENT
Start: 2019-11-21 | End: 2019-11-22

## 2019-11-21 RX ADMIN — OXYCODONE HYDROCHLORIDE 5 MILLIGRAM(S): 5 TABLET ORAL at 21:48

## 2019-11-21 RX ADMIN — SIMETHICONE 80 MILLIGRAM(S): 80 TABLET, CHEWABLE ORAL at 21:48

## 2019-11-21 RX ADMIN — GABAPENTIN 300 MILLIGRAM(S): 400 CAPSULE ORAL at 13:53

## 2019-11-21 RX ADMIN — APIXABAN 5 MILLIGRAM(S): 2.5 TABLET, FILM COATED ORAL at 05:55

## 2019-11-21 RX ADMIN — GABAPENTIN 300 MILLIGRAM(S): 400 CAPSULE ORAL at 05:55

## 2019-11-21 RX ADMIN — IRON SUCROSE 110 MILLIGRAM(S): 20 INJECTION, SOLUTION INTRAVENOUS at 17:56

## 2019-11-21 RX ADMIN — Medication 1 MILLIGRAM(S): at 21:48

## 2019-11-21 RX ADMIN — OXYCODONE HYDROCHLORIDE 5 MILLIGRAM(S): 5 TABLET ORAL at 22:18

## 2019-11-21 RX ADMIN — CEFTRIAXONE 100 MILLIGRAM(S): 500 INJECTION, POWDER, FOR SOLUTION INTRAMUSCULAR; INTRAVENOUS at 15:01

## 2019-11-21 RX ADMIN — Medication 650 MILLIGRAM(S): at 17:49

## 2019-11-21 RX ADMIN — SIMETHICONE 80 MILLIGRAM(S): 80 TABLET, CHEWABLE ORAL at 17:49

## 2019-11-21 RX ADMIN — Medication 650 MILLIGRAM(S): at 05:55

## 2019-11-21 RX ADMIN — OXYCODONE HYDROCHLORIDE 5 MILLIGRAM(S): 5 TABLET ORAL at 10:12

## 2019-11-21 RX ADMIN — PANTOPRAZOLE SODIUM 40 MILLIGRAM(S): 20 TABLET, DELAYED RELEASE ORAL at 05:56

## 2019-11-21 RX ADMIN — Medication 650 MILLIGRAM(S): at 18:19

## 2019-11-21 RX ADMIN — CHOLESTYRAMINE 4 GRAM(S): 4 POWDER, FOR SUSPENSION ORAL at 09:38

## 2019-11-21 RX ADMIN — Medication 25 MILLIGRAM(S): at 05:55

## 2019-11-21 RX ADMIN — GABAPENTIN 300 MILLIGRAM(S): 400 CAPSULE ORAL at 21:48

## 2019-11-21 RX ADMIN — CHOLESTYRAMINE 4 GRAM(S): 4 POWDER, FOR SUSPENSION ORAL at 21:48

## 2019-11-21 RX ADMIN — OXYCODONE HYDROCHLORIDE 5 MILLIGRAM(S): 5 TABLET ORAL at 09:42

## 2019-11-21 RX ADMIN — Medication 650 MILLIGRAM(S): at 06:52

## 2019-11-21 NOTE — CONSULT NOTE ADULT - ASSESSMENT
69 m with HTN, HLD, cholangiocarcinoma s/p Whipple 2017 with recurrence of disease now with mets to the liver, portal vein thrombus and SMA narrowing on MR abdomen 11/5/19, was diagnosed with H-pylori a few months ago and after the treatment developed diarrhea which is from 3 to 10/day, was checked for C-diff and 2 were negative, presented 11/18 with fever and still diarrhea, here febrile to 101.6 and tachycardic, normal WBC  abd/pelvis CT: increased mod ascites and unchanged soft tissue mass next to SMA  blood and urine cx negative, stool PCR and O&P negative  s/p paracentesis,  with 28% PMN which is not s/o SBP and negative cx  s/p cefepime and vanco 1/18 then ceftriaxone 11/19 and 11/20 69 m with HTN, HLD, cholangiocarcinoma s/p Whipple 2017 with recurrence of disease now with mets to the liver, portal vein thrombus and SMA narrowing on MR abdomen 11/5/19, was diagnosed with H-pylori a few months ago and after the treatment developed diarrhea which is from 3 to 10/day, was checked for C-diff and 2 were negative, presented 11/18 with fever and still diarrhea, here febrile to 101.6 and tachycardic, normal WBC  abd/pelvis CT: increased mod ascites and unchanged soft tissue mass next to SMA  blood and urine cx negative, stool PCR and O&P negative  s/p paracentesis,  with 28% PMN which is not s/o SBP and negative cx  s/p cefepime and vanco 1/18 then ceftriaxone 11/19 and 11/20    metastatic cholangiocarcinoma with liver mets and extensive portal vein thrombus with chronic diarrhea since he was treated for H-pylori months ago now with fever and negative cultures  paracentesis not s/o SBP but pt has intrahepatic biliary dilatation and increased ascites  fever could be just due to metastatic ca and thrombosis vs transient bacteremia due to biliary source, pt also has a port but all blood cultures have been negative  chronic diarrhea, had negative C-diff before x 2 and now negative GI PCR and O&P, likely non infectious    * repeat C-diff  * f/u the repeat blood cultures  * would c/w ceftriaxone 2 q d for now, antibiotics started 11/18 now day 4, if repeat cultures also negative, will likely complete a 5 day course

## 2019-11-21 NOTE — PROGRESS NOTE ADULT - PROBLEM SELECTOR PLAN 3
Pt continues to have diarrhea.   -Etiology: inflammatory vs. secretory vs. osmotic   -Frequency reduced to 3-4/day (down from 10/day)   -GI on board; stool fat, alpha 1 antitrypsin, osms electrolytes pending   -Stool PCR negative for invasive organisms, outpatient c diff testing negative 10/2019   -C. diff ruled out/specimen analysis cancelled, can begin imodium and halt cholestyramine   -Continue to hydrate Pt continues to have diarrhea.   -Etiology: inflammatory vs. secretory vs. osmotic   -Frequency reduced to 3-4/day (down from 10/day)   -ID recommends test for C. diff and continue ceftriaxone for 5 days total.   -GI on board; stool fat, alpha 1 antitrypsin, osms electrolytes pending   -Stool PCR negative for invasive organisms, outpatient c diff testing negative 10/2019   -C. diff ruled out/specimen analysis cancelled, can begin imodium and halt cholestyramine   -Continue to hydrate Pt continues to have diarrhea.   -Etiology: inflammatory vs. secretory vs. osmotic   -Frequency reduced to 3-4/day (down from 10/day)   -ID recommends test for C. diff and continue ceftriaxone for 5 days total.   -GI on board; stool fat, alpha 1 antitrypsin, osms electrolytes pending   -Stool PCR negative for invasive organisms, outpatient c diff testing negative 10/2019   -C. diff testing to be sent with next loose BM   -Continue to hydrate

## 2019-11-21 NOTE — CONSULT NOTE ADULT - SUBJECTIVE AND OBJECTIVE BOX
HPI:  69 m with HTN, HLD, cholangiocarcinoma s/p Whipple  with recurrence of disease now with mets to the liver, portal vein thrombus and SMA narrowing on MR abdomen 19, was diagnosed with H-pylori a few months ago and after the treatment developed diarrhea which is from 3 to 10/day, was checked for C-diff and 2 were negative, presented  with fever and still diarrhea, here febrile to 101.6 and tachycardic, normal WBC  abd/pelvis CT: increased mod ascites and unchanged soft tissue mass next to SMA  blood and urine cx negative, stool PCR and O&P negative  s/p paracentesis,  with 28% PMN which is not s/o SBP and negative cx  s/p cefepime and vanco  then ceftriaxone  and     PAST MEDICAL & SURGICAL HISTORY:  Rash  Biliary stricture: s/p biliary stent  Pancreatic lesion  Gastric polyp  Obstructive jaundice  Other hyperlipidemia  Essential hypertension  IBS (irritable bowel syndrome)  GERD (gastroesophageal reflux disease)  Intestinal Whipple's disease  S/P unilateral inguinal hernia repair  S/P tendon repair: hand  History of lumbar laminectomy  History of biliary stent insertion: 2017  S/P cervical spinal fusion      Allergies    penicillin G benzathine (Rash)  sulfa drugs (Rash)    Intolerances        ANTIMICROBIALS:  cefTRIAXone   IVPB 2000 every 24 hours      OTHER MEDS:  acetaminophen   Tablet .. 650 milliGRAM(s) Oral every 6 hours PRN  aluminum hydroxide/magnesium hydroxide/simethicone Suspension 30 milliLiter(s) Oral every 4 hours PRN  apixaban 5 milliGRAM(s) Oral every 12 hours  cholestyramine Powder (Sugar-Free) 4 Gram(s) Oral two times a day  doxazosin 1 milliGRAM(s) Oral at bedtime  gabapentin 300 milliGRAM(s) Oral three times a day  metoprolol succinate ER 25 milliGRAM(s) Oral daily  oxyCODONE    IR 5 milliGRAM(s) Oral every 6 hours PRN  pantoprazole    Tablet 40 milliGRAM(s) Oral before breakfast  simethicone 80 milliGRAM(s) Chew four times a day      SOCIAL HISTORY:  , lives with wife, has a pond and fish in his backyard which he cleans once a month  former smoker, no alcohol or drug abuse  no recent travel    FAMILY HISTORY:  FH: CHF (congestive heart failure)  FH: prostate cancer  Family history of colitis      ROS:    All other systems negative     Constitutional: + fever, + chills  Eye: no eye pain, no redness, no vision changes  ENT:  no sore throat, no rhinorrhea  Cardiovascular:  no chest pain, no palpitation  Respiratory:  no SOB, no cough  GI:  + abd pain, no vomiting, + diarrhea  urinary: no dysuria, no hematuria, no flank pain  : no penile discharge or bleeding  musculoskeletal:  no joint pain, no joint swelling  skin:  no rash  neurology:  no headache, no seizure, no change in mental status  psych: no anxiety, no depression     Physical Exam:    General:    NAD, non toxic  Head: atraumatic, normocephalic  Eyes: normal sclera and conjunctiva  ENT:   no oropharyngeal lesions, no LAD, neck supple  Cardio:    regular S1,S2, no murmur  Respiratory:   clear b/l, no wheezing  abd:   soft, BS +, not tender  :     no CVAT, no suprapubic tenderness, no sawant  Musculoskeletal : no joint swelling, no edema  Skin:    no rash  vascular: R chest port  Neurologic:     no focal deficits  psych: normal affect      Drug Dosing Weight  Height (cm): 177.8 (2019 00:15)  Weight (kg): 73 (2019 00:15)  BMI (kg/m2): 23.1 (2019 00:15)  BSA (m2): 1.9 (2019 00:15)    Vital Signs Last 24 Hrs  T(F): 98.8 (19 @ 06:52), Max: 101.6 (19 @ 18:25)    Vital Signs Last 24 Hrs  HR: 95 (19 @ 05:30) (85 - 100)  BP: 123/67 (19 @ 05:30) (111/65 - 136/77)  RR: 18 (19 @ 05:30)  SpO2: 95% (19 @ 05:30) (95% - 99%)  Wt(kg): --                          8.0    5.63  )-----------( 200      ( 2019 07:08 )             25.2           139  |  110<H>  |  6<L>  ----------------------------<  107<H>  3.5   |  22  |  0.68    Ca    8.0<L>      2019 07:00  Phos  2.5       Mg     1.7         TPro  5.3<L>  /  Alb  2.7<L>  /  TBili  0.5  /  DBili  x   /  AST  11  /  ALT  10  /  AlkPhos  198<H>        Urinalysis Basic - ( 2019 21:15 )    Color: Yellow / Appearance: Clear / S.030 / pH: x  Gluc: x / Ketone: Negative  / Bili: Negative / Urobili: Negative   Blood: x / Protein: 30 mg/dL / Nitrite: Negative   Leuk Esterase: Negative / RBC: 7 /hpf / WBC 3 /HPF   Sq Epi: x / Non Sq Epi: 1 /hpf / Bacteria: Negative        MICROBIOLOGY:  v  .Body Fluid Peritoneal Fluid  19   No growth  --    polymorphonuclear leukocytes seen  No organisms seen  by cytocentrifuge      .Stool Feces  19   No Protozoa seen by trichrome stain  No Helminths or Protozoa seen in formalin concentrate  performed by iodine stain  (routine O+P not evaluated for Microsporidia,  Cryptosporidia, Cyclospora, or Isospora.)  One negative sample does not necessarily rule  out the presence of a parasitic infection.  --  --      .Stool Feces  19   GI PCR Results: NOT detected  *******Please Note:*******  GI panel PCR evaluates for:  Campylobacter, Plesiomonas shigelloides, Salmonella,  Vibrio, Yersinia enterocolitica, Enteroaggregative  Escherichia coli (EAEC), Enteropathogenic E.coli (EPEC),  Enterotoxigenic E. coli (ETEC) lt/st, Shiga-like  toxin-producing E. coli (STEC) stx1/stx2,  Shigella/ Enteroinvasive E. coli (EIEC), Cryptosporidium,  Cyclospora cayetanensis, Entamoeba histolytica,  Giardia lamblia, Adenovirus F 40/41, Astrovirus,  Norovirus GI/GII, Rotavirus A, Sapovirus  --  --      .Urine Clean Catch (Midstream)  19   No growth  --  --      .Blood Blood-Peripheral  19   No growth to date.  --  --      .Blood Blood-Peripheral  19   No growth to date.  --  --          Rapid RVP Result: NotDetec ( @ 18:47)          RADIOLOGY:    Images below reviewed personally  < from: CT Abdomen and Pelvis w/ IV Cont (19 @ 20:29) >    IMPRESSION:     Moderate ascites is increased from 2019 of indeterminate etiology.    Status post Whipple with unchanged soft tissue mass adjacent to the   proximal SMA.    Unchanged moderate intrahepatic biliary ductal dilatation to the level of   hepaticojejunostomy. HPI:  69 m with HTN, HLD, cholangiocarcinoma s/p Whipple  with recurrence of disease now with mets to the liver, portal vein thrombus and SMA narrowing on MR abdomen 19, was diagnosed with H-pylori a few months ago and after the treatment developed diarrhea which is from 3 to 10/day, was checked for C-diff and 2 were negative, presented  with fever and still diarrhea, here febrile to 101.6 and tachycardic, normal WBC  abd/pelvis CT: increased mod ascites and unchanged soft tissue mass next to SMA  blood and urine cx negative, stool PCR and O&P negative  s/p paracentesis,  with 28% PMN which is not s/o SBP and negative cx  s/p cefepime and vanco  then ceftriaxone  and     PAST MEDICAL & SURGICAL HISTORY:  Rash  Biliary stricture: s/p biliary stent  Pancreatic lesion  Gastric polyp  Obstructive jaundice  Other hyperlipidemia  Essential hypertension  IBS (irritable bowel syndrome)  GERD (gastroesophageal reflux disease)  Intestinal Whipple's disease  S/P unilateral inguinal hernia repair  S/P tendon repair: hand  History of lumbar laminectomy  History of biliary stent insertion: 2017  S/P cervical spinal fusion      Allergies    penicillin G benzathine (Rash)  sulfa drugs (Rash)    Intolerances        ANTIMICROBIALS:  cefTRIAXone   IVPB 2000 every 24 hours      OTHER MEDS:  acetaminophen   Tablet .. 650 milliGRAM(s) Oral every 6 hours PRN  aluminum hydroxide/magnesium hydroxide/simethicone Suspension 30 milliLiter(s) Oral every 4 hours PRN  apixaban 5 milliGRAM(s) Oral every 12 hours  cholestyramine Powder (Sugar-Free) 4 Gram(s) Oral two times a day  doxazosin 1 milliGRAM(s) Oral at bedtime  gabapentin 300 milliGRAM(s) Oral three times a day  metoprolol succinate ER 25 milliGRAM(s) Oral daily  oxyCODONE    IR 5 milliGRAM(s) Oral every 6 hours PRN  pantoprazole    Tablet 40 milliGRAM(s) Oral before breakfast  simethicone 80 milliGRAM(s) Chew four times a day      SOCIAL HISTORY:  , lives with wife, has a pond and fish in his backyard which he cleans once a month  former smoker, no alcohol or drug abuse  no recent travel    FAMILY HISTORY:  FH: CHF (congestive heart failure)  FH: prostate cancer  Family history of colitis      ROS:    All other systems negative     Constitutional: + fever, + chills  Eye: no eye pain, no redness, no vision changes  ENT:  no sore throat, no rhinorrhea  Cardiovascular:  no chest pain, no palpitation  Respiratory:  no SOB, no cough  GI:  + abd pain, no vomiting, + diarrhea  urinary: no dysuria, no hematuria, no flank pain  : no penile discharge or bleeding  musculoskeletal:  no joint pain, no joint swelling  skin:  no rash  neurology:  no headache, no seizure, no change in mental status  psych: no anxiety, no depression     Physical Exam:    General:    NAD, non toxic  Head: atraumatic, normocephalic  Eyes: normal sclera and conjunctiva  ENT:   no oropharyngeal lesions, no LAD, neck supple  Cardio:    regular S1,S2, no murmur  Respiratory:   clear b/l, no wheezing  abd:   soft, BS +, not tender  :     no CVAT, no suprapubic tenderness, no sawant  Musculoskeletal : no joint swelling, no edema  Skin:    no rash  vascular: R chest port  Neurologic:     no focal deficits  psych: normal affect      Drug Dosing Weight  Height (cm): 177.8 (2019 00:15)  Weight (kg): 73 (2019 00:15)  BMI (kg/m2): 23.1 (2019 00:15)  BSA (m2): 1.9 (2019 00:15)    Vital Signs Last 24 Hrs  T(F): 98.8 (19 @ 06:52), Max: 101.6 (19 @ 18:25)    Vital Signs Last 24 Hrs  HR: 95 (19 @ 05:30) (85 - 100)  BP: 123/67 (19 @ 05:30) (111/65 - 136/77)  RR: 18 (19 @ 05:30)  SpO2: 95% (19 @ 05:30) (95% - 99%)  Wt(kg): --                          8.0    5.63  )-----------( 200      ( 2019 07:08 )             25.2           139  |  110<H>  |  6<L>  ----------------------------<  107<H>  3.5   |  22  |  0.68    Ca    8.0<L>      2019 07:00  Phos  2.5       Mg     1.7         TPro  5.3<L>  /  Alb  2.7<L>  /  TBili  0.5  /  DBili  x   /  AST  11  /  ALT  10  /  AlkPhos  198<H>        Urinalysis Basic - ( 2019 21:15 )    Color: Yellow / Appearance: Clear / S.030 / pH: x  Gluc: x / Ketone: Negative  / Bili: Negative / Urobili: Negative   Blood: x / Protein: 30 mg/dL / Nitrite: Negative   Leuk Esterase: Negative / RBC: 7 /hpf / WBC 3 /HPF   Sq Epi: x / Non Sq Epi: 1 /hpf / Bacteria: Negative        MICROBIOLOGY:  v  .Body Fluid Peritoneal Fluid  19   No growth  --    polymorphonuclear leukocytes seen  No organisms seen  by cytocentrifuge      .Stool Feces  19   No Protozoa seen by trichrome stain  No Helminths or Protozoa seen in formalin concentrate      .Stool Feces  19   GI PCR Results: NOT detected    .Urine Clean Catch (Midstream)  19   No growth  --  --      .Blood Blood-Peripheral  19   No growth to date.  --  --      .Blood Blood-Peripheral  19   No growth to date.  --  --          Rapid RVP Result: NotDetec ( @ 18:47)          RADIOLOGY:    Images below reviewed personally  < from: CT Abdomen and Pelvis w/ IV Cont (19 @ 20:29) >    IMPRESSION:     Moderate ascites is increased from 2019 of indeterminate etiology.    Status post Whipple with unchanged soft tissue mass adjacent to the   proximal SMA.    Unchanged moderate intrahepatic biliary ductal dilatation to the level of   hepaticojejunostomy.      < from: MR Abdomen w/wo IV Cont (19 @ 16:06) >  Ill-defined 3.6 cm retroperitoneal mass encasing the SMA and SMV with   extension to the pancreas, main portal vein, and adjacent small bowel,   concerning for recurrent disease. Extensive bland portal vein thrombus.   SMA is narrowed, but remains patent.    New moderate intrahepatic biliary distention to the hepaticojejunostomy,   alsoraising concern for tumor recurrence at that level.    New ill-defined bilobar liver lesions, suspicious for hepatic metastatic   disease.    New small volume ascites.

## 2019-11-21 NOTE — DISCHARGE NOTE PROVIDER - HOSPITAL COURSE
70 y/o male PMHx cholangiocarcinoma s/p Whipple 2017 with recurrence of disease now with mets to the liver and last IV chemo 7/2019, portal vein thrombus  according to MR abdomen 11/5/19 on elequis, R sided Port, HTN, HLD now presenting with fever x2 days and diarrhea x2 months. In addition to having the fevers, the pt has experienced chills for a few weeks. The pt denies URI symptoms, including cough, runny nose, sore throat or other issues. The pt conveys that he has been having non bloody diarrhea since Septmeber which got worse recently in Octoober; the pt reports to having about 10 water BM per day. The pt has been following with Dr. Orantes from GI who started the pt on xifaxan for IBD; this helped the pt's symptoms but did not fully resolve them. The pt denies abdominal pain, nausea or precipitation of diarrhal episodes by food.         Hospital course:    Pt underwent paracentesis in ED and was admitted for possible SBP, started on ceftriaxone. He was evaluated by hepatology and general GI team who recommended discontinuing antibiotics as suspicion for SBP was low. Paracentesis fluid had < 250 PMNs and blood, urine, and stool cultures all returned negative. Pt continued to spike fevers on antibiotics. Oncology evaluation determined fevers are possibly 2/2 tumor burden and recommended discharge for outpatient chemotherapy if infectious disease cleared him. Infectious disease evaluation determined fevers were most likely not 2/2 infection and pt was determined to be stable for discharge for outpatient chemotherapy. 70 y/o male PMHx cholangiocarcinoma s/p Whipple 2017 with recurrence of disease now with mets to the liver and last IV chemo 7/2019, portal vein thrombus  according to MR abdomen 11/5/19 on elequis, R sided Port, HTN, HLD now presenting with fever x2 days and diarrhea x2 months. In addition to having the fevers, the pt has experienced chills for a few weeks. The pt denies URI symptoms, including cough, runny nose, sore throat or other issues. The pt conveys that he has been having non bloody diarrhea since Septmeber which got worse recently in Octoober; the pt reports to having about 10 water BM per day. The pt has been following with Dr. Orantes from GI who started the pt on xifaxan for IBD; this helped the pt's symptoms but did not fully resolve them. The pt denies abdominal pain, nausea or precipitation of diarrhal episodes by food.         Hospital course:    Pt underwent paracentesis in ED and was admitted for possible SBP, started on ceftriaxone. He was evaluated by hepatology and general GI team who recommended discontinuing antibiotics as suspicion for SBP was low. Paracentesis fluid had < 250 PMNs and blood, urine, and stool cultures all returned negative. Pt continued to spike fevers on antibiotics; were continued empirically for 5 days, with resolution of fever on day 5. Oncology evaluation determined fevers are possibly 2/2 tumor burden and recommended discharge for outpatient chemotherapy if infectious disease cleared him. Infectious disease evaluation determined fevers were most likely not 2/2 infection and pt was determined to be stable for discharge for outpatient chemotherapy. 68 y/o male PMHx cholangiocarcinoma s/p Whipple 2017 with recurrence of disease now with mets to the liver and last IV chemo 7/2019, portal vein thrombus  according to MR abdomen 11/5/19 on elequis, R sided Port, HTN, HLD now presenting with fever x2 days and diarrhea x2 months. In addition to having the fevers, the pt has experienced chills for a few weeks. The pt denies URI symptoms, including cough, runny nose, sore throat or other issues. The pt conveys that he has been having non bloody diarrhea since Septmeber which got worse recently in Octoober; the pt reports to having about 10 water BM per day. The pt has been following with Dr. Orantes from GI who started the pt on xifaxan for IBD; this helped the pt's symptoms but did not fully resolve them. The pt denies abdominal pain, nausea or precipitation of diarrhal episodes by food.         Hospital course:    Pt underwent paracentesis in ED and was admitted for possible SBP, started on ceftriaxone. He was evaluated by hepatology and general GI team who recommended discontinuing antibiotics as suspicion for SBP was low. Paracentesis fluid had < 250 PMNs and blood, urine, and stool cultures all returned negative. Pt continued to spike fevers on antibiotics; were continued empirically for 5 days, with resolution of fever on day 5. Oncology evaluation determined fevers are possibly 2/2 tumor burden and recommended discharge for outpatient chemotherapy if infectious disease cleared him. Infectious disease evaluation determined fevers were most likely not 2/2 infection and pt was determined to be stable for discharge for outpatient chemotherapy.

## 2019-11-21 NOTE — CHART NOTE - NSCHARTNOTEFT_GEN_A_CORE
Awaiting stool studies to further identify cause of diarrhea. Continue with cholestyramine. If stool infectious workup negative including negative cdiff, reasonable to start immodium for symptom control.      D/w attending

## 2019-11-21 NOTE — PROGRESS NOTE ADULT - PROBLEM SELECTOR PLAN 2
Pts hgb on presentation 8.0, baseline 10 - 12. Pt reports no blood in stools, however no signs or symptoms of active bleeding on exam.   -Not deficient in B12 (elevated at 1164, 11/21/2019 [Range: 232-1245])   -Folate WNL (14, 11/21/2019 [Range >4.7])   -Serum Ferritin WNL (146, 11/21/2019 [Range ])  - will check FOBT  - cont to monitor for signs/sx of active bleeding  - maintain active type and screen Pts hgb on presentation 8.0, baseline 10 - 12. Pt reports no blood in stools, however no signs or symptoms of active bleeding on exam.   -Not deficient in B12 (elevated at 1164, 11/21/2019 [Range: 232-1245])   -Folate WNL (14, 11/21/2019 [Range >4.7])   -Serum Ferritin WNL (146, 11/21/2019 [Range ])   -Fe deficiency anemia:       -% iron saturation: 5 [Range: 16-55%]       -TIBC: 148 [Range: 220-430]       -Total serum iron: 8 [Range: ]  - will check FOBT  - cont to monitor for signs/sx of active bleeding  - maintain active type and screen Pts hgb on presentation 8.0, baseline 10 - 12. Pt reports no blood in stools, however no signs or symptoms of active bleeding on exam.   -Not deficient in B12 (elevated at 1164, 11/21/2019 [Range: 232-1245])   -Folate WNL (14, 11/21/2019 [Range >4.7])   -Serum Ferritin WNL (146, 11/21/2019 [Range ])   -Fe deficiency anemia:       -% iron saturation: 5 [Range: 16-55%]       -TIBC: 148 [Range: 220-430]       -Total serum iron: 8 [Range: ]  - FOBT negative (11/19/2019)  - cont to monitor for signs/sx of active bleeding  - maintain active type and screen Pts hgb on presentation 8.0, baseline 10 - 12. Pt reports no blood in stools, however no signs or symptoms of active bleeding on exam.   -Not deficient in B12 (elevated at 1164, 11/21/2019 [Range: 232-1245])   -Folate WNL (14, 11/21/2019 [Range >4.7])   -Serum Ferritin WNL (146, 11/21/2019 [Range ])   -Fe deficiency anemia, will start iron supplementation       -% iron saturation: 5 [Range: 16-55%]       -TIBC: 148 [Range: 220-430]       -Total serum iron: 8 [Range: ]  - FOBT negative (11/19/2019)  - cont to monitor for signs/sx of active bleeding  - maintain active type and screen

## 2019-11-21 NOTE — PROGRESS NOTE ADULT - ASSESSMENT
68 y/o male PMHx cholangiocarcinoma s/p Whipple 2017 s/p cis/[place] and capecitabine (from 8/2017-1/2018)  with recurrence of disease now with mets to the liver, portal vein thrombus  according to MR abdomen 11/5/19 on Eliquis R sided Port, HTN, HLD now presenting with fever x2 days and diarrhea x2 months.    # Fever  -CT A/P: Moderate ascites is increased from 11/13/2019.   -diagnostic paracentesis is negative for SBP, Bcx negative, stool culture was negative  -pt still has low grade fever, Bcx repeated yesterday, pending result  -ID following - pt is set to complete 5 -day course of CTX      # recurrent Cholangiocarcinoma  -cholangiocarcinoma s/p Whipple 2017 s/p cis/[place] and capecitabine (from 8/2017-1/2018)  - found to have recurrent dz via patricia-SMA LN bx in feb 2019 showed poorly-differentiated adenocarcinoma,  -s/p SBRT x 5 fractions 3/27/19-4/5/19   -Dr. Terry planning to start CAPEOX after infection resolves.    # Diarrhea  -on going for 2 months, seen by GI and on rifaximin for presume IBS  -previous stool cx has been negative  -f/u cx again  -continue supportive care    # Portal vein thrombosis  -c/w eliquis    # Anemia  -Hgb dropped to 8.1 from baseline of 10-12,   -FOBT negative  -may have low greade GIB. pt on Eliquis for portal vein thrombosis  -iron studies showed consistent with JENN  -can give venofer 200 mg x 2, can complete another 3 doses over the next 2 weeks as out patient.      Shane West MD. PGY 5  [facility] fellow  [phone]; [id number]

## 2019-11-21 NOTE — DISCHARGE NOTE PROVIDER - NSDCFUSCHEDAPPT_GEN_ALL_CORE_FT
WOODY CEVALLOS ; 12/10/2019 ; NPP [facility]  WOODY CEVALLOS ; 12/31/2019 ; NP [facility]  WOODY CEVALLOS ; 01/21/2020 ; NP [facility]  WOODY CEVALLOS ; 02/11/2020 ; [facility] WOODY CEVALLOS ; 12/10/2019 ; NPP [facility]  WOODY CEVALLOS ; 12/31/2019 ; NP [facility]  WOODY CEVALLOS ; 01/21/2020 ; NP [facility]  WOODY CEVALLOS ; 02/11/2020 ; [facility] WOODY CEVALLOS ; 12/10/2019 ; NPP [facility]  WOODY CEVALLOS ; 12/31/2019 ; NP [facility]  WOODY CEVALLOS ; 01/21/2020 ; NP [facility]  WOODY CEVALLOS ; 02/11/2020 ; [facility] WOODY CEVALLOS ; 12/10/2019 ; NPP [facility]  WOODY CEVALLOS ; 12/31/2019 ; NP [facility]  WOODY CEVALLOS ; 01/21/2020 ; [facility]  WOODY CEVALLOS ; 02/11/2020 ; [facility] WOODY CEVALLOS ; 12/10/2019 ; N[facility]  WOODY CEVALLOS ; 12/31/2019 ; NP [facility]  WOODY CEVALLOS ; 01/21/2020 ; [facility]  WOODY CEVALLOS ; 02/11/2020 ; [facility] WOODY CEVALLOS ; 12/10/2019 ; N[facility]  WOODY CEVALLOS ; 12/31/2019 ; NP [facility]  WOODY CEVALLOS ; 01/21/2020 ; [facility]  WOODY CEVALLOS ; 02/11/2020 ; [facility] WOODY CEVALLOS ; 12/10/2019 ; NPP [facility]  WOODY CEVALLOS ; 12/31/2019 ; NP [facility]  WOODY CEVALLOS ; 01/21/2020 ; NP [facility]  WOODY CEVALLOS ; 02/11/2020 ; [facility] WOODY CEVALLOS ; 12/10/2019 ; NPP [facility]  WOODY CEVALLOS ; 12/31/2019 ; NP [facility]  WOODY CEVALLOS ; 01/21/2020 ; NP [facility]  WOODY CEVALLOS ; 02/11/2020 ; [facility]

## 2019-11-21 NOTE — PROGRESS NOTE ADULT - PROBLEM SELECTOR PLAN 8
Pt undergoing outpatient working for ?IBS.  - workup of diarrheal symptoms as above   -Supportive management.

## 2019-11-21 NOTE — PROGRESS NOTE ADULT - SUBJECTIVE AND OBJECTIVE BOX
MARTWOODY  69y  Male    Complaints: Diarrhea, chills  Subjective:   Had chills overnight with a Tmax of 101.4. Bowel movements have decreased in frequency to 3-4/day (down from 10/day) but still liquid and green. Denies blood in stool. Claims to have pain to the right and left of the umbilicus after eating and during bowel movements.            Vital Signs Last 24 Hrs  T(C): 37.1 (21 Nov 2019 06:52), Max: 38.6 (20 Nov 2019 18:58)  T(F): 98.8 (21 Nov 2019 06:52), Max: 101.4 (20 Nov 2019 18:58)  HR: 95 (21 Nov 2019 05:30) (85 - 100)  BP: 123/67 (21 Nov 2019 05:30) (111/65 - 136/77)  BP(mean): --  RR: 18 (21 Nov 2019 05:30) (18 - 18)  SpO2: 95% (21 Nov 2019 05:30) (95% - 99%)    PHYSICAL EXAM:  GENERAL: NAD, well-groomed, well-developed  HEENT - NC/AT, pupils equal and reactive to light,  ; Moist mucous membranes, Good dentition, No lesions  NECK: Supple, No JVD  CHEST/LUNG: Clear to auscultation bilaterally; No rales, rhonchi, wheezing  HEART: Regular rate and rhythm; No murmurs, rubs, or gallops  ABDOMEN: Soft, Tender to palpation; Bowel sounds hyperactive  EXTREMITIES:  2+ Peripheral Pulses, No clubbing, cyanosis, or edema  NEURO:  No Focal deficits, sensory and motor intact  SKIN: No rashes or lesions    Consultant(s) Notes Reviewed:  [x ] YES  [ ] NO  Care Discussed with Consultants/Other Providers [ x] YES  [ ] NO    LABS:                        8.0    5.63  )-----------( 200      ( 21 Nov 2019 07:08 )             25.2     11-21    139  |  110<H>  |  6<L>  ----------------------------<  107<H>  3.5   |  22  |  0.68    Ca    8.0<L>      21 Nov 2019 07:00  Phos  2.5     11-21  Mg     1.7     11-21    TPro  5.3<L>  /  Alb  2.7<L>  /  TBili  0.5  /  DBili  x   /  AST  11  /  ALT  10  /  AlkPhos  198<H>  11-21      RADIOLOGY & ADDITIONAL TESTS:    Imaging Personally Reviewed:  [ ] YES  [ ] NO  MedsMEDICATIONS  (STANDING):  apixaban 5 milliGRAM(s) Oral every 12 hours  cholestyramine Powder (Sugar-Free) 4 Gram(s) Oral two times a day  doxazosin 1 milliGRAM(s) Oral at bedtime  gabapentin 300 milliGRAM(s) Oral three times a day  metoprolol succinate ER 25 milliGRAM(s) Oral daily  pantoprazole    Tablet 40 milliGRAM(s) Oral before breakfast  simethicone 80 milliGRAM(s) Chew four times a day    MEDICATIONS  (PRN):  acetaminophen   Tablet .. 650 milliGRAM(s) Oral every 6 hours PRN Temp greater or equal to 38C (100.4F), Moderate Pain (4 - 6)  aluminum hydroxide/magnesium hydroxide/simethicone Suspension 30 milliLiter(s) Oral every 4 hours PRN Dyspepsia  oxyCODONE    IR 5 milliGRAM(s) Oral every 6 hours PRN Severe Pain (7 - 10)      HEALTH ISSUES - PROBLEM Dx:  Anemia, unspecified type: Anemia, unspecified type  Sepsis due to undetermined organism: Sepsis due to undetermined organism  Discharge planning issues: Discharge planning issues  Other hyperlipidemia: Other hyperlipidemia  Gastroesophageal reflux disease, esophagitis presence not specified: Gastroesophageal reflux disease, esophagitis presence not specified  Hypertension, unspecified type: Hypertension, unspecified type  Cholangiocarcinoma metastatic to liver: Cholangiocarcinoma metastatic to liver  Diarrhea, unspecified type: Diarrhea, unspecified type  GERD (gastroesophageal reflux disease): GERD (gastroesophageal reflux disease)  Portal vein thrombosis: Portal vein thrombosis  Medication management: Medication management  SBP (spontaneous bacterial peritonitis): SBP (spontaneous bacterial peritonitis)  SIRS (systemic inflammatory response syndrome): SIRS (systemic inflammatory response syndrome)  Need for prophylactic measure: Need for prophylactic measure  HLD (hyperlipidemia): HLD (hyperlipidemia)  HTN (hypertension): HTN (hypertension)  Cholangiocarcinoma: Cholangiocarcinoma  Sepsis: Sepsis  Diarrhea: Diarrhea WOODY CEVALLOS  69y  Male    Complaints: Diarrhea, chills  Subjective:   Had chills overnight with a Tmax of 101.4. Bowel movements have decreased in frequency to 3-4/day (down from 10/day) but still liquid and green. Denies blood in stool. Claims to have pain to the right and left of the umbilicus after eating and during bowel movements.    REVIEW OF SYSTEMS:    CONSTITUTIONAL: No weakness, fevers or chills  EYES/ENT: No visual changes;  No vertigo or throat pain   NECK: No pain or stiffness  RESPIRATORY: No cough, wheezing, hemoptysis; No shortness of breath  CARDIOVASCULAR: No chest pain or palpitations  GASTROINTESTINAL: +diarrhea, +abdominal pain; No epigastric pain. No nausea, vomiting, or hematemesis; No diarrhea or constipation.   GENITOURINARY: No dysuria, frequency or hematuria  NEUROLOGICAL: No numbness or weakness  SKIN: No itching, burning, rashes, or lesions   All other review of systems is negative unless indicated above.        Vital Signs Last 24 Hrs  T(C): 37.1 (21 Nov 2019 06:52), Max: 38.6 (20 Nov 2019 18:58)  T(F): 98.8 (21 Nov 2019 06:52), Max: 101.4 (20 Nov 2019 18:58)  HR: 95 (21 Nov 2019 05:30) (85 - 100)  BP: 123/67 (21 Nov 2019 05:30) (111/65 - 136/77)  BP(mean): --  RR: 18 (21 Nov 2019 05:30) (18 - 18)  SpO2: 95% (21 Nov 2019 05:30) (95% - 99%)    PHYSICAL EXAM:  GENERAL: NAD, well-groomed, well-developed  HEENT - NC/AT, pupils equal and reactive to light,  ; Moist mucous membranes, Good dentition, No lesions  NECK: Supple, No JVD  CHEST/LUNG: Clear to auscultation bilaterally; No rales, rhonchi, wheezing  HEART: Regular rate and rhythm; No murmurs, rubs, or gallops  ABDOMEN: Soft, Tender to palpation; Bowel sounds hyperactive  EXTREMITIES:  2+ Peripheral Pulses, No clubbing, cyanosis, or edema  NEURO:  No Focal deficits, sensory and motor intact  SKIN: No rashes or lesions    Consultant(s) Notes Reviewed:  [x ] YES  [ ] NO  Care Discussed with Consultants/Other Providers [ x] YES  [ ] NO    LABS:                        8.0    5.63  )-----------( 200      ( 21 Nov 2019 07:08 )             25.2     11-21    139  |  110<H>  |  6<L>  ----------------------------<  107<H>  3.5   |  22  |  0.68    Ca    8.0<L>      21 Nov 2019 07:00  Phos  2.5     11-21  Mg     1.7     11-21    TPro  5.3<L>  /  Alb  2.7<L>  /  TBili  0.5  /  DBili  x   /  AST  11  /  ALT  10  /  AlkPhos  198<H>  11-21      RADIOLOGY & ADDITIONAL TESTS:    Imaging Personally Reviewed:  [ ] YES  [ ] NO  MedsMEDICATIONS  (STANDING):  apixaban 5 milliGRAM(s) Oral every 12 hours  cholestyramine Powder (Sugar-Free) 4 Gram(s) Oral two times a day  doxazosin 1 milliGRAM(s) Oral at bedtime  gabapentin 300 milliGRAM(s) Oral three times a day  metoprolol succinate ER 25 milliGRAM(s) Oral daily  pantoprazole    Tablet 40 milliGRAM(s) Oral before breakfast  simethicone 80 milliGRAM(s) Chew four times a day    MEDICATIONS  (PRN):  acetaminophen   Tablet .. 650 milliGRAM(s) Oral every 6 hours PRN Temp greater or equal to 38C (100.4F), Moderate Pain (4 - 6)  aluminum hydroxide/magnesium hydroxide/simethicone Suspension 30 milliLiter(s) Oral every 4 hours PRN Dyspepsia  oxyCODONE    IR 5 milliGRAM(s) Oral every 6 hours PRN Severe Pain (7 - 10)      HEALTH ISSUES - PROBLEM Dx:  Anemia, unspecified type: Anemia, unspecified type  Sepsis due to undetermined organism: Sepsis due to undetermined organism  Discharge planning issues: Discharge planning issues  Other hyperlipidemia: Other hyperlipidemia  Gastroesophageal reflux disease, esophagitis presence not specified: Gastroesophageal reflux disease, esophagitis presence not specified  Hypertension, unspecified type: Hypertension, unspecified type  Cholangiocarcinoma metastatic to liver: Cholangiocarcinoma metastatic to liver  Diarrhea, unspecified type: Diarrhea, unspecified type  GERD (gastroesophageal reflux disease): GERD (gastroesophageal reflux disease)  Portal vein thrombosis: Portal vein thrombosis  Medication management: Medication management  SBP (spontaneous bacterial peritonitis): SBP (spontaneous bacterial peritonitis)  SIRS (systemic inflammatory response syndrome): SIRS (systemic inflammatory response syndrome)  Need for prophylactic measure: Need for prophylactic measure  HLD (hyperlipidemia): HLD (hyperlipidemia)  HTN (hypertension): HTN (hypertension)  Cholangiocarcinoma: Cholangiocarcinoma  Sepsis: Sepsis  Diarrhea: Diarrhea

## 2019-11-21 NOTE — PROGRESS NOTE ADULT - ASSESSMENT
68 y/o male w/ cholangiocarcinoma s/p Whipple in 2017 w/ recurrence of disease w/ mets to liver, portal vein thrombosis (seen on MR 11/5) treated with Eliquis, now presenting for 2 day history of fever and 2 month hx of watery stools.       #Ascites: possible causes include malignant ascites 2/2 cholangiocarcinoma, portal vein thrombosis, portal hypertension  -SAAG on admission was around 2, indicative of portal hypertension  -Pt does not meet SBP criteria, no need for ceftriaxone at this time  -Con't eliquis per primary team  -send cdiff  -continue infectious workup per primary team    #Diarrhea  -maintain electrolytes    Will continue to follow     Alice Stacy, PGY-1  Internal Medicine 68 y/o male w/ cholangiocarcinoma s/p Whipple in 2017 w/ recurrence of disease w/ mets to liver, portal vein thrombosis (seen on MR 11/5) treated with Eliquis, now presenting for 2 day history of fever and 2 month hx of watery stools.       #Ascites: possible causes include portal vein thrombosis, portal hypertension  -SAAG on admission was around 2, indicative of portal hypertension  -Pt does not meet SBP criteria  -Con't eliquis per primary team  -send cdiff  -continue infectious workup per primary team  -Since patient has persistent fevers, f/u repeat blood cultures, consider repeating paracentensis w/ cytology and cultures    #Diarrhea  -maintain electrolytes    Will continue to follow     Alice Stacy, PGY-1  Internal Medicine 70 y/o male w/ cholangiocarcinoma s/p Whipple in 2017 w/ recurrence of disease w/ mets to liver, portal vein thrombosis (seen on MR 11/5) treated with Eliquis, now presenting for 2 day history of fever and 2 month hx of watery stools.       #Ascites: possible causes include portal vein thrombosis, portal hypertension  -SAAG on admission was around 2, indicative of portal hypertension  -Pt does not meet SBP criteria  -Con't eliquis per primary team  -send cdiff  -continue infectious workup per primary team  -Chronic Liver disease workup: REX, AMA, ASMA, LKM, SLP, IgA, IgG, IgM, ceruloplasmin, iron, TIBC, ferritin, Hep B S Ag, Hep B SAb  -Since patient has persistent fevers, f/u repeat blood cultures, consider repeating paracentensis w/ cytology and cultures    #Diarrhea  -maintain electrolytes    Will continue to follow     Alice Stacy, PGY-1  Internal Medicine

## 2019-11-21 NOTE — PROGRESS NOTE ADULT - PROBLEM SELECTOR PLAN 1
Originally thought to be 2/2 to SBP, ruled out. Differential includes tumor burden, clot burden.   -No growth in ascites fluid, SBP ruled out   -Blood cultures pending (Collected 11/18/2019) Originally thought to be 2/2 to SBP, ruled out. Differential includes tumor burden, clot burden.   -No growth in ascites fluid   -Hepatology recs paracentesis once before discharge to rule out SBP before discharge.   -Blood cultures pending (Collected 11/18/2019) Pt initially presented with fevers and tachycardia, etiology of SIRS unknown at this time. Ddx includes infectious vs neoplastic vs clot burden vs medication. Pt continues to spike fevers, was take off antibiotics on 11/20 after initially being started on ceftriaxone for ?SBP.  - Paracentesis in ED with corrected PMNs < 250, suspicion for SBP low, now s/p 3 doses of ctx. Now monitoring off antibiotics, ID consulted for ?other infectious source  - Per GI and hepatology, SBP unlikely, however c diff remains on ddx. Will send c diff PCR  - blood cx from 11/18 ngtd, stool cx 11/19 ngtd, c diff pending  - hepatology recommends repeat para, contacted IR who may perform on 11/22, will f/u  - ID recommends completing 5 day course abx, recommend restarting rocephin (day 4/5)  - After infection ruled out/treated, will f/u as outpatient for chemo as fevers may be from malignancy

## 2019-11-21 NOTE — PROGRESS NOTE ADULT - ASSESSMENT
This is a 69 M with PMHx of IBS-C, Cholangiocarcinoma s/p Whipple in 2007 w/recurrence (mets to liver) and last chemotherapy 07/2019, portal vein thrombosis (on Eliquis), s/p right sided port, HTN, HLD who p/w chills and chronic diarrhea found to have SIRS criteria which was thought to be 2/2 SBP, ruled out; Ddx incl tumor fever, bacteremia, clot burden.

## 2019-11-21 NOTE — DISCHARGE NOTE PROVIDER - NSDCMRMEDTOKEN_GEN_ALL_CORE_FT
cholestyramine 4 g/5 g oral powder for reconstitution: 1 application orally 2 times a day  doxazosin 1 mg oral tablet: 1 tab(s) orally once a day (at bedtime)  Eliquis 5 mg oral tablet: 1 tab(s) orally 2 times a day  gabapentin 300 mg oral capsule: 1 cap(s) orally 3 times a day  metoprolol succinate 25 mg oral tablet, extended release: 1 tab(s) orally once a day  pantoprazole 40 mg oral delayed release tablet: 1 tab(s) orally once a day  Percocet 10/325 oral tablet: 1 tab(s) orally 2 times a day  simethicone 80 mg oral tablet, chewable: 1 tab(s) orally 4 times a day (after meals and at bedtime)  Xifaxan 550 mg oral tablet: 1 tab(s) orally 2 times a day aluminum hydroxide-magnesium hydroxide 200 mg-200 mg/5 mL oral suspension: 30 milliliter(s) orally every 4 hours, As needed, Dyspepsia  cholestyramine 4 g/5 g oral powder for reconstitution: 1 application orally 2 times a day  doxazosin 1 mg oral tablet: 1 tab(s) orally once a day (at bedtime)  Eliquis 5 mg oral tablet: 1 tab(s) orally 2 times a day  gabapentin 300 mg oral capsule: 1 cap(s) orally 3 times a day  metoprolol succinate 25 mg oral tablet, extended release: 1 tab(s) orally once a day  pantoprazole 40 mg oral delayed release tablet: 1 tab(s) orally once a day  Percocet 10/325 oral tablet: 1 tab(s) orally 2 times a day  simethicone 80 mg oral tablet, chewable: 1 tab(s) orally 4 times a day (after meals and at bedtime)  Xifaxan 550 mg oral tablet: 1 tab(s) orally 2 times a day aluminum hydroxide-magnesium hydroxide 200 mg-200 mg/5 mL oral suspension: 30 milliliter(s) orally every 4 hours, As needed, Dyspepsia  cholestyramine 4 g/5 g oral powder for reconstitution: 1 application orally 2 times a day  doxazosin 1 mg oral tablet: 1 tab(s) orally once a day (at bedtime)  Eliquis 5 mg oral tablet: 1 tab(s) orally 2 times a day  gabapentin 300 mg oral capsule: 1 cap(s) orally 3 times a day  metoprolol succinate 25 mg oral tablet, extended release: 1 tab(s) orally once a day  pantoprazole 40 mg oral delayed release tablet: 1 tab(s) orally once a day  Percocet 10/325 oral tablet: 1 tab(s) orally 2 times a day  simethicone 80 mg oral tablet, chewable: 1 tab(s) orally 4 times a day (after meals and at bedtime) aluminum hydroxide-magnesium hydroxide 200 mg-200 mg/5 mL oral suspension: 30 milliliter(s) orally every 4 hours, As needed, Dyspepsia  doxazosin 1 mg oral tablet: 1 tab(s) orally once a day (at bedtime)  Eliquis 5 mg oral tablet: 1 tab(s) orally 2 times a day  gabapentin 300 mg oral capsule: 1 cap(s) orally 3 times a day  metoprolol succinate 25 mg oral tablet, extended release: 1 tab(s) orally once a day  pantoprazole 40 mg oral delayed release tablet: 1 tab(s) orally once a day  Percocet 10/325 oral tablet: 1 tab(s) orally 2 times a day  simethicone 80 mg oral tablet, chewable: 1 tab(s) orally 4 times a day (after meals and at bedtime) aluminum hydroxide-magnesium hydroxide 200 mg-200 mg/5 mL oral suspension: 30 milliliter(s) orally every 4 hours, As needed, Dyspepsia  doxazosin 1 mg oral tablet: 1 tab(s) orally once a day (at bedtime)  Eliquis 5 mg oral tablet: 1 tab(s) orally 2 times a day  gabapentin 300 mg oral capsule: 1 cap(s) orally 3 times a day  loperamide 2 mg oral capsule: 1 cap(s) orally 5 times a day, As needed, Diarrhea  metoprolol succinate 25 mg oral tablet, extended release: 1 tab(s) orally once a day  pantoprazole 40 mg oral delayed release tablet: 1 tab(s) orally once a day  Percocet 10/325 oral tablet: 1 tab(s) orally 2 times a day  simethicone 80 mg oral tablet, chewable: 1 tab(s) orally 4 times a day (after meals and at bedtime) aluminum hydroxide-magnesium hydroxide 200 mg-200 mg/5 mL oral suspension: 30 milliliter(s) orally every 4 hours, As needed, Dyspepsia  doxazosin 1 mg oral tablet: 1 tab(s) orally once a day (at bedtime)  Eliquis 5 mg oral tablet: 1 tab(s) orally 2 times a day  gabapentin 300 mg oral capsule: 1 cap(s) orally 3 times a day  loperamide 2 mg oral capsule: 1 cap(s) orally 5 times a day, As needed, Diarrhea  metoprolol succinate 25 mg oral tablet, extended release: 1 tab(s) orally once a day  pantoprazole 40 mg oral delayed release tablet: 1 tab(s) orally once a day  Percocet 10/325 oral tablet: 1 tab(s) orally 2 times a day as needed  simethicone 80 mg oral tablet, chewable: 1 tab(s) orally 4 times a day (after meals and at bedtime) aluminum hydroxide-magnesium hydroxide 200 mg-200 mg/5 mL oral suspension: 30 milliliter(s) orally every 4 hours, As needed, Dyspepsia  doxazosin 1 mg oral tablet: 1 tab(s) orally once a day (at bedtime)  Eliquis 5 mg oral tablet: 1 tab(s) orally 2 times a day  gabapentin 300 mg oral capsule: 1 cap(s) orally 3 times a day  loperamide 2 mg oral capsule: 1 cap(s) orally 5 times a day, As needed, Diarrhea  metoprolol succinate 25 mg oral tablet, extended release: 1 tab(s) orally once a day  pantoprazole 40 mg oral delayed release tablet: 1 tab(s) orally once a day  Percocet 10/325 oral tablet: 1 tab(s) orally 2 times a day, As Needed -for severe pain  as needed MDD:2 tabs   simethicone 80 mg oral tablet, chewable: 1 tab(s) orally 4 times a day (after meals and at bedtime)

## 2019-11-21 NOTE — PROGRESS NOTE ADULT - PROBLEM SELECTOR PLAN 10
Monitor patient for new symptoms. ID consulted to rule out infectious cause of diarrhea before discharge to receive chemotherapy. Discharge with followup instructions to specialists upon ID recs. Monitor patient for new symptoms.   -ID recommends to test for C. diff and continue ceftriaxone for 5 total days to rule out infectious source of diarrhea before discharge.   -Hepatology recommends paracentesis/ascites analysis to rule out SBP before discharge

## 2019-11-21 NOTE — PROGRESS NOTE ADULT - PROBLEM SELECTOR PLAN 5
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    -Ab CT (11/18/2019) did not suggest mass contributing to diarrhea   -Ab MRI (11/04/2019) showed 3.6cm retroperitoneal mass with involvement of the small bowel, pancreas, SMA, VALENTE, and portal vein.  -Oncology on board; CAPEOX when no evidence on infection per ID;

## 2019-11-21 NOTE — PROGRESS NOTE ADULT - PROBLEM SELECTOR PLAN 4
Likely 2/2 to portal hypertension 2/2 to portal vein thrombosis   -SAAG score > 1.1   -Continue Eloquis Likely 2/2 to portal hypertension 2/2 to portal vein thrombosis   -SAAG score > 1.1   -Will hold Eliquis if requested by IR for paracentesis

## 2019-11-21 NOTE — PROGRESS NOTE ADULT - SUBJECTIVE AND OBJECTIVE BOX
Chief Complaint:  Patient is a 69y old  Male who presents with a chief complaint of Diarrhea (2019 11:36)      Interval Events: Overnight, patient was febrile again.     Allergies:  penicillin G benzathine (Rash)  sulfa drugs (Rash)        Hospital Medications:  acetaminophen   Tablet .. 650 milliGRAM(s) Oral every 6 hours PRN  aluminum hydroxide/magnesium hydroxide/simethicone Suspension 30 milliLiter(s) Oral every 4 hours PRN  apixaban 5 milliGRAM(s) Oral every 12 hours  cholestyramine Powder (Sugar-Free) 4 Gram(s) Oral two times a day  doxazosin 1 milliGRAM(s) Oral at bedtime  gabapentin 300 milliGRAM(s) Oral three times a day  metoprolol succinate ER 25 milliGRAM(s) Oral daily  oxyCODONE    IR 5 milliGRAM(s) Oral every 6 hours PRN  pantoprazole    Tablet 40 milliGRAM(s) Oral before breakfast  simethicone 80 milliGRAM(s) Chew four times a day      PMHX/PSHX:  Rash  Biliary stricture  Pancreatic lesion  Gastric polyp  Obstructive jaundice  Other hyperlipidemia  Essential hypertension  IBS (irritable bowel syndrome)  GERD (gastroesophageal reflux disease)  Intestinal Whipple's disease  S/P unilateral inguinal hernia repair  S/P tendon repair  History of lumbar laminectomy  History of biliary stent insertion  S/P lumbar spinal fusion  S/P cervical spinal fusion  No significant past surgical history      Family history:  FH: CHF (congestive heart failure)  FH: prostate cancer  Family history of colitis  No pertinent family history in first degree relatives  Family history of prostate cancer  Family history of coronary artery disease      PHYSICAL EXAM:   Vital Signs:  Vital Signs Last 24 Hrs  T(C): 37.1 (2019 06:52), Max: 38.6 (2019 18:58)  T(F): 98.8 (2019 06:52), Max: 101.4 (2019 18:58)  HR: 95 (2019 05:30) (85 - 100)  BP: 123/67 (2019 05:30) (111/65 - 136/77)  BP(mean): --  RR: 18 (2019 05:30) (16 - 18)  SpO2: 95% (2019 05:30) (95% - 99%)  Daily     Daily Weight in k.3 (2019 09:24)    GENERAL:  No acute distress  HEENT:  Normocephalic/atraumatic, no scleral icterus  CHEST:  Clear to auscultation bilaterally, no wheezes/rales/ronchi, no accessory muscle use  HEART:  Regular rate and rhythm, no murmurs/rubs/gallops  ABDOMEN:  Soft with some firmness on the L side, mildly tender to palpation, mild distension, +BS  EXTREMITIES: No cyanosis, clubbing, or edema  SKIN:  No rash/erythema/ecchymoses/petechiae/wounds/abscess/warm/dry  NEURO:  Alert and oriented x 3, no asterixis    LABS:                        8.0    5.63  )-----------( 200      ( 2019 07:08 )             25.2     Mean Cell Volume: 82.4 fl (19 @ 07:08)        139  |  110<H>  |  6<L>  ----------------------------<  107<H>  3.5   |  22  |  0.68    Ca    8.0<L>      2019 07:00  Phos  2.5       Mg     1.7         TPro  5.3<L>  /  Alb  2.7<L>  /  TBili  0.5  /  DBili  x   /  AST  11  /  ALT  10  /  AlkPhos  198<H>  21    LIVER FUNCTIONS - ( 2019 07:00 )  Alb: 2.7 g/dL / Pro: 5.3 g/dL / ALK PHOS: 198 U/L / ALT: 10 U/L / AST: 11 U/L / GGT: x             Urinalysis Basic - ( 2019 21:15 )    Color: Yellow / Appearance: Clear / S.030 / pH: x  Gluc: x / Ketone: Negative  / Bili: Negative / Urobili: Negative   Blood: x / Protein: 30 mg/dL / Nitrite: Negative   Leuk Esterase: Negative / RBC: 7 /hpf / WBC 3 /HPF   Sq Epi: x / Non Sq Epi: 1 /hpf / Bacteria: Negative                              8.0    5.63  )-----------( 200      ( 2019 07:08 )             25.2                         8.1    6.32  )-----------( 186      ( 2019 07:26 )             25.5                         8.1    7.56  )-----------( 170      ( 2019 06:56 )             24.9                         10.3   9.88  )-----------( 224      ( 2019 18:40 )             31.6       Imaging: Chief Complaint:  Patient is a 69y old  Male who presents with a chief complaint of Diarrhea (2019 11:36)      Interval Events: Overnight, patient was febrile again. Previously had endorsed rigors.     Allergies:  penicillin G benzathine (Rash)  sulfa drugs (Rash)        Hospital Medications:  acetaminophen   Tablet .. 650 milliGRAM(s) Oral every 6 hours PRN  aluminum hydroxide/magnesium hydroxide/simethicone Suspension 30 milliLiter(s) Oral every 4 hours PRN  apixaban 5 milliGRAM(s) Oral every 12 hours  cholestyramine Powder (Sugar-Free) 4 Gram(s) Oral two times a day  doxazosin 1 milliGRAM(s) Oral at bedtime  gabapentin 300 milliGRAM(s) Oral three times a day  metoprolol succinate ER 25 milliGRAM(s) Oral daily  oxyCODONE    IR 5 milliGRAM(s) Oral every 6 hours PRN  pantoprazole    Tablet 40 milliGRAM(s) Oral before breakfast  simethicone 80 milliGRAM(s) Chew four times a day      PMHX/PSHX:  Rash  Biliary stricture  Pancreatic lesion  Gastric polyp  Obstructive jaundice  Other hyperlipidemia  Essential hypertension  IBS (irritable bowel syndrome)  GERD (gastroesophageal reflux disease)  Intestinal Whipple's disease  S/P unilateral inguinal hernia repair  S/P tendon repair  History of lumbar laminectomy  History of biliary stent insertion  S/P lumbar spinal fusion  S/P cervical spinal fusion  No significant past surgical history      Family history:  FH: CHF (congestive heart failure)  FH: prostate cancer  Family history of colitis  No pertinent family history in first degree relatives  Family history of prostate cancer  Family history of coronary artery disease      PHYSICAL EXAM:   Vital Signs:  Vital Signs Last 24 Hrs  T(C): 37.1 (2019 06:52), Max: 38.6 (2019 18:58)  T(F): 98.8 (2019 06:52), Max: 101.4 (2019 18:58)  HR: 95 (2019 05:30) (85 - 100)  BP: 123/67 (2019 05:30) (111/65 - 136/77)  BP(mean): --  RR: 18 (2019 05:30) (16 - 18)  SpO2: 95% (2019 05:30) (95% - 99%)  Daily     Daily Weight in k.3 (2019 09:24)    GENERAL:  No acute distress  HEENT:  Normocephalic/atraumatic, no scleral icterus  CHEST:  Clear to auscultation bilaterally, no wheezes/rales/ronchi, no accessory muscle use  HEART:  Regular rate and rhythm, no murmurs/rubs/gallops  ABDOMEN:  Soft with some firmness on the L side, mildly tender to palpation, mild distension, +BS  EXTREMITIES: No cyanosis, clubbing, or edema  SKIN:  No rash/erythema/ecchymoses/petechiae/wounds/abscess/warm/dry  NEURO:  Alert and oriented x 3, no asterixis    LABS:                        8.0    5.63  )-----------( 200      ( 2019 07:08 )             25.2     Mean Cell Volume: 82.4 fl (- @ 07:08)        139  |  110<H>  |  6<L>  ----------------------------<  107<H>  3.5   |  22  |  0.68    Ca    8.0<L>      2019 07:00  Phos  2.5       Mg     1.7         TPro  5.3<L>  /  Alb  2.7<L>  /  TBili  0.5  /  DBili  x   /  AST  11  /  ALT  10  /  AlkPhos  198<H>      LIVER FUNCTIONS - ( 2019 07:00 )  Alb: 2.7 g/dL / Pro: 5.3 g/dL / ALK PHOS: 198 U/L / ALT: 10 U/L / AST: 11 U/L / GGT: x             Urinalysis Basic - ( 2019 21:15 )    Color: Yellow / Appearance: Clear / S.030 / pH: x  Gluc: x / Ketone: Negative  / Bili: Negative / Urobili: Negative   Blood: x / Protein: 30 mg/dL / Nitrite: Negative   Leuk Esterase: Negative / RBC: 7 /hpf / WBC 3 /HPF   Sq Epi: x / Non Sq Epi: 1 /hpf / Bacteria: Negative                              8.0    5.63  )-----------( 200      ( 2019 07:08 )             25.2                         8.1    6.32  )-----------( 186      ( 2019 07:26 )             25.5                         8.1    7.56  )-----------( 170      ( 2019 06:56 )             24.9                         10.3   9.88  )-----------( 224      ( 2019 18:40 )             31.6       Imaging:

## 2019-11-21 NOTE — PROGRESS NOTE ADULT - PROBLEM SELECTOR PLAN 6
Elevated blood pressure (BP: 123/67 taken on 11/21/2019)   -Continue metoprolol and doxazosin   -DASH/TLC Diet (w/Cholesterol/sodium restricted).

## 2019-11-21 NOTE — DISCHARGE NOTE PROVIDER - NSDCCPCAREPLAN_GEN_ALL_CORE_FT
PRINCIPAL DISCHARGE DIAGNOSIS  Diagnosis: Fever  Assessment and Plan of Treatment: You came to the hospital with fevers. A thorough workup and evaluation by hepatology, gastroenterology, and infectious disease specialists determined that the fevers were most likely due to your underlying cholangiocarcinoma. You are to follow-up with your oncologist within one week of discharge. Continue taking immodium as needed for diarrhea. Return to the hospital immediately if you experience an acute worsening in abdominal pain, nausea, vomiting, or any general signs of distress.

## 2019-11-21 NOTE — PROVIDER CONTACT NOTE (CHANGE IN STATUS NOTIFICATION) - ASSESSMENT
pt AOx4, in no s/s of distress, and in no SOB. Vital signs: Temp: 38.2C, /67, HR 95, SPO2 95% on room air, RR 18.

## 2019-11-21 NOTE — DISCHARGE NOTE PROVIDER - CARE PROVIDER_API CALL
Lukasz Orantes)  Gastroenterology; Internal Medicine  [address] [place] [address]  Phone: [phone]  Fax: [phone]  Follow Up Time:

## 2019-11-22 ENCOUNTER — RESULT REVIEW (OUTPATIENT)
Age: 69
End: 2019-11-22

## 2019-11-22 ENCOUNTER — TRANSCRIPTION ENCOUNTER (OUTPATIENT)
Age: 69
End: 2019-11-22

## 2019-11-22 VITALS
TEMPERATURE: 98 F | HEART RATE: 87 BPM | DIASTOLIC BLOOD PRESSURE: 73 MMHG | RESPIRATION RATE: 18 BRPM | OXYGEN SATURATION: 97 % | SYSTOLIC BLOOD PRESSURE: 111 MMHG

## 2019-11-22 DIAGNOSIS — R50.9 FEVER, UNSPECIFIED: ICD-10-CM

## 2019-11-22 LAB
ALBUMIN FLD-MCNC: 0.6 G/DL — SIGNIFICANT CHANGE UP
B PERT IGG+IGM PNL SER: ABNORMAL
COLOR FLD: YELLOW — SIGNIFICANT CHANGE UP
FLUID INTAKE SUBSTANCE CLASS: SIGNIFICANT CHANGE UP
FLUID SEGMENTED GRANULOCYTES: 68 % — SIGNIFICANT CHANGE UP
GLUCOSE FLD-MCNC: 104 MG/DL — SIGNIFICANT CHANGE UP
GRAM STN FLD: SIGNIFICANT CHANGE UP
LDH SERPL L TO P-CCNC: 40 U/L — SIGNIFICANT CHANGE UP
LYMPHOCYTES # FLD: 12 % — SIGNIFICANT CHANGE UP
MESOTHL CELL # FLD: 1 % — SIGNIFICANT CHANGE UP
MONOS+MACROS # FLD: 19 % — SIGNIFICANT CHANGE UP
PROT FLD-MCNC: 1.5 G/DL — SIGNIFICANT CHANGE UP
RCV VOL RI: 1080 /UL — HIGH (ref 0–5)
SPECIMEN SOURCE: SIGNIFICANT CHANGE UP
TOTAL NUCLEATED CELL COUNT, BODY FLUID: 260 /UL — HIGH (ref 0–5)
TUBE TYPE: SIGNIFICANT CHANGE UP

## 2019-11-22 PROCEDURE — 87798 DETECT AGENT NOS DNA AMP: CPT

## 2019-11-22 PROCEDURE — 84100 ASSAY OF PHOSPHORUS: CPT

## 2019-11-22 PROCEDURE — 87507 IADNA-DNA/RNA PROBE TQ 12-25: CPT

## 2019-11-22 PROCEDURE — 93005 ELECTROCARDIOGRAM TRACING: CPT | Mod: XU

## 2019-11-22 PROCEDURE — 84157 ASSAY OF PROTEIN OTHER: CPT

## 2019-11-22 PROCEDURE — 74177 CT ABD & PELVIS W/CONTRAST: CPT

## 2019-11-22 PROCEDURE — 99233 SBSQ HOSP IP/OBS HIGH 50: CPT

## 2019-11-22 PROCEDURE — 87086 URINE CULTURE/COLONY COUNT: CPT

## 2019-11-22 PROCEDURE — 87040 BLOOD CULTURE FOR BACTERIA: CPT

## 2019-11-22 PROCEDURE — 85610 PROTHROMBIN TIME: CPT

## 2019-11-22 PROCEDURE — 84484 ASSAY OF TROPONIN QUANT: CPT

## 2019-11-22 PROCEDURE — 82728 ASSAY OF FERRITIN: CPT

## 2019-11-22 PROCEDURE — 49083 ABD PARACENTESIS W/IMAGING: CPT

## 2019-11-22 PROCEDURE — 84295 ASSAY OF SERUM SODIUM: CPT

## 2019-11-22 PROCEDURE — 99232 SBSQ HOSP IP/OBS MODERATE 35: CPT | Mod: GC

## 2019-11-22 PROCEDURE — 87075 CULTR BACTERIA EXCEPT BLOOD: CPT

## 2019-11-22 PROCEDURE — 82947 ASSAY GLUCOSE BLOOD QUANT: CPT

## 2019-11-22 PROCEDURE — 86803 HEPATITIS C AB TEST: CPT

## 2019-11-22 PROCEDURE — 83615 LACTATE (LD) (LDH) ENZYME: CPT

## 2019-11-22 PROCEDURE — 83540 ASSAY OF IRON: CPT

## 2019-11-22 PROCEDURE — 81001 URINALYSIS AUTO W/SCOPE: CPT

## 2019-11-22 PROCEDURE — 71045 X-RAY EXAM CHEST 1 VIEW: CPT

## 2019-11-22 PROCEDURE — C1729: CPT

## 2019-11-22 PROCEDURE — 88112 CYTOPATH CELL ENHANCE TECH: CPT

## 2019-11-22 PROCEDURE — 82042 OTHER SOURCE ALBUMIN QUAN EA: CPT

## 2019-11-22 PROCEDURE — 82330 ASSAY OF CALCIUM: CPT

## 2019-11-22 PROCEDURE — 87102 FUNGUS ISOLATION CULTURE: CPT

## 2019-11-22 PROCEDURE — 85014 HEMATOCRIT: CPT

## 2019-11-22 PROCEDURE — 85730 THROMBOPLASTIN TIME PARTIAL: CPT

## 2019-11-22 PROCEDURE — 87633 RESP VIRUS 12-25 TARGETS: CPT

## 2019-11-22 PROCEDURE — 88112 CYTOPATH CELL ENHANCE TECH: CPT | Mod: 26

## 2019-11-22 PROCEDURE — 82746 ASSAY OF FOLIC ACID SERUM: CPT

## 2019-11-22 PROCEDURE — 87046 STOOL CULTR AEROBIC BACT EA: CPT

## 2019-11-22 PROCEDURE — 82272 OCCULT BLD FECES 1-3 TESTS: CPT

## 2019-11-22 PROCEDURE — 87581 M.PNEUMON DNA AMP PROBE: CPT

## 2019-11-22 PROCEDURE — 82565 ASSAY OF CREATININE: CPT

## 2019-11-22 PROCEDURE — 96375 TX/PRO/DX INJ NEW DRUG ADDON: CPT | Mod: XU

## 2019-11-22 PROCEDURE — 87070 CULTURE OTHR SPECIMN AEROBIC: CPT

## 2019-11-22 PROCEDURE — C1894: CPT

## 2019-11-22 PROCEDURE — 82607 VITAMIN B-12: CPT

## 2019-11-22 PROCEDURE — 87177 OVA AND PARASITES SMEARS: CPT

## 2019-11-22 PROCEDURE — 89051 BODY FLUID CELL COUNT: CPT

## 2019-11-22 PROCEDURE — 96374 THER/PROPH/DIAG INJ IV PUSH: CPT | Mod: XU

## 2019-11-22 PROCEDURE — 88305 TISSUE EXAM BY PATHOLOGIST: CPT | Mod: 26

## 2019-11-22 PROCEDURE — 82803 BLOOD GASES ANY COMBINATION: CPT

## 2019-11-22 PROCEDURE — 83605 ASSAY OF LACTIC ACID: CPT

## 2019-11-22 PROCEDURE — 84132 ASSAY OF SERUM POTASSIUM: CPT

## 2019-11-22 PROCEDURE — 83690 ASSAY OF LIPASE: CPT

## 2019-11-22 PROCEDURE — 49082 ABD PARACENTESIS: CPT

## 2019-11-22 PROCEDURE — 85027 COMPLETE CBC AUTOMATED: CPT

## 2019-11-22 PROCEDURE — 83735 ASSAY OF MAGNESIUM: CPT

## 2019-11-22 PROCEDURE — 82435 ASSAY OF BLOOD CHLORIDE: CPT

## 2019-11-22 PROCEDURE — 99285 EMERGENCY DEPT VISIT HI MDM: CPT | Mod: 25

## 2019-11-22 PROCEDURE — 88305 TISSUE EXAM BY PATHOLOGIST: CPT

## 2019-11-22 PROCEDURE — 87205 SMEAR GRAM STAIN: CPT

## 2019-11-22 PROCEDURE — 83550 IRON BINDING TEST: CPT

## 2019-11-22 PROCEDURE — 87486 CHLMYD PNEUM DNA AMP PROBE: CPT

## 2019-11-22 PROCEDURE — 80053 COMPREHEN METABOLIC PANEL: CPT

## 2019-11-22 PROCEDURE — 99239 HOSP IP/OBS DSCHRG MGMT >30: CPT

## 2019-11-22 PROCEDURE — 82945 GLUCOSE OTHER FLUID: CPT

## 2019-11-22 PROCEDURE — 87045 FECES CULTURE AEROBIC BACT: CPT

## 2019-11-22 RX ORDER — IRON SUCROSE 20 MG/ML
200 INJECTION, SOLUTION INTRAVENOUS ONCE
Refills: 0 | Status: COMPLETED | OUTPATIENT
Start: 2019-11-22 | End: 2019-11-22

## 2019-11-22 RX ORDER — LOPERAMIDE HCL 2 MG
1 TABLET ORAL
Qty: 50 | Refills: 0
Start: 2019-11-22 | End: 2019-12-01

## 2019-11-22 RX ORDER — LOPERAMIDE HCL 2 MG
2 TABLET ORAL
Refills: 0 | Status: DISCONTINUED | OUTPATIENT
Start: 2019-11-22 | End: 2019-11-22

## 2019-11-22 RX ORDER — INFLUENZA VIRUS VACCINE 15; 15; 15; 15 UG/.5ML; UG/.5ML; UG/.5ML; UG/.5ML
0.5 SUSPENSION INTRAMUSCULAR ONCE
Refills: 0 | Status: COMPLETED | OUTPATIENT
Start: 2019-11-22 | End: 2019-11-22

## 2019-11-22 RX ORDER — CHOLESTYRAMINE 4 G/9G
1 POWDER, FOR SUSPENSION ORAL
Qty: 0 | Refills: 0 | DISCHARGE

## 2019-11-22 RX ADMIN — CEFTRIAXONE 100 MILLIGRAM(S): 500 INJECTION, POWDER, FOR SOLUTION INTRAMUSCULAR; INTRAVENOUS at 12:41

## 2019-11-22 RX ADMIN — Medication 2 MILLIGRAM(S): at 14:38

## 2019-11-22 RX ADMIN — Medication 25 MILLIGRAM(S): at 06:30

## 2019-11-22 RX ADMIN — SIMETHICONE 80 MILLIGRAM(S): 80 TABLET, CHEWABLE ORAL at 06:30

## 2019-11-22 RX ADMIN — Medication 2 MILLIGRAM(S): at 12:43

## 2019-11-22 RX ADMIN — GABAPENTIN 300 MILLIGRAM(S): 400 CAPSULE ORAL at 06:30

## 2019-11-22 RX ADMIN — IRON SUCROSE 110 MILLIGRAM(S): 20 INJECTION, SOLUTION INTRAVENOUS at 10:25

## 2019-11-22 RX ADMIN — PANTOPRAZOLE SODIUM 40 MILLIGRAM(S): 20 TABLET, DELAYED RELEASE ORAL at 06:30

## 2019-11-22 RX ADMIN — GABAPENTIN 300 MILLIGRAM(S): 400 CAPSULE ORAL at 14:37

## 2019-11-22 NOTE — PROGRESS NOTE ADULT - ASSESSMENT
70 y/o male w/ cholangiocarcinoma s/p Whipple in 2017 w/ recurrence of disease w/ mets to liver, portal vein thrombosis (seen on MR 11/5) treated with Eliquis, now presenting for 2 day history of fever and 2 month hx of watery stools.       #Ascites: possible causes include portal vein thrombosis, portal hypertension  -SAAG on admission was around 2, indicative of portal hypertension, repeat tap also had SAAG of 2.1.   -Pt does not meet SBP criteria  -Con't eliquis per primary team  -f/u chronic liver disease labs  -f/u fluid cytology    #Diarrhea  -maintain electrolytes    Dispo per primary team    Alice Stacy, PGY-1  Internal Medicine

## 2019-11-22 NOTE — PROGRESS NOTE ADULT - REASON FOR ADMISSION
Diarrhea

## 2019-11-22 NOTE — PROGRESS NOTE ADULT - PROBLEM SELECTOR PLAN 4
Likely 2/2 to portal hypertension 2/2 to portal vein thrombosis   -SAAG score > 1.1   -Will hold Eliquis if requested by IR for paracentesis

## 2019-11-22 NOTE — PROGRESS NOTE ADULT - PROBLEM SELECTOR PLAN 6
BP not elevated as of morning reading: /56   -Continue metoprolol and doxazosin   -DASH/TLC Diet (w/Cholesterol/sodium restricted).

## 2019-11-22 NOTE — PROGRESS NOTE ADULT - PROBLEM SELECTOR PLAN 1
Pts hgb on presentation 8.0, baseline 10 - 12. Pt reports no blood in stools, however no signs or symptoms of active bleeding on exam.   -Not deficient in B12 (elevated at 1164, 11/21/2019 [Range: 232-1245])   -Folate WNL (14, 11/21/2019 [Range >4.7])   -Serum Ferritin WNL (146, 11/21/2019 [Range ])   -Fe deficiency anemia, currently on day 2 of iron supplementation       -% iron saturation: 5 [Range: 16-55%]       -TIBC: 148 [Range: 220-430]       -Total serum iron: 8 [Range: ]  - FOBT negative (11/19/2019)  - cont to monitor for signs/sx of active bleeding  - maintain active type and screen.

## 2019-11-22 NOTE — PROGRESS NOTE ADULT - PROBLEM SELECTOR PLAN 4
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    -Ab CT (11/18/2019) did not suggest mass contributing to diarrhea   -Ab MRI (11/04/2019) showed 3.6cm retroperitoneal mass with involvement of the small bowel, pancreas, SMA, VALENTE, and portal vein.  -Oncology on board; CAPEOX when no evidence on infection per ID;  -Oncology is scheduling followup appointment in 1 week before considering discharge. 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    -Ab CT (11/18/2019) did not suggest mass contributing to diarrhea   -Ab MRI (11/04/2019) showed 3.6cm retroperitoneal mass with involvement of the small bowel, pancreas, SMA, VALENTE, and portal vein.  -Oncology on board; CAPEOX when no evidence on infection per ID;  -Oncology is scheduling followup appointment in 1 week before considering discharge.  -Called his oncologist (Dr. Terry) to inform him of the situation and that he is due for chemotherapy. Talked to  at: [phone]

## 2019-11-22 NOTE — PROGRESS NOTE ADULT - SUBJECTIVE AND OBJECTIVE BOX
Interventional Radiology Procedure Note    Procedure: Image-guided paracentesis (diagnostic)    Indication: metastatic cholangiocarcinoma with ascites    Operators: ELHAM Aguiar, Dr. Darrion France    Anesthesia (type): Lidocaine 1% (Local)    Contrast: None.     EBL: None.     Findings/Follow up Plan of Care: Drained 70 ml of cloudy yellow ascites via the LLQ abdominal approach using micropuncture needle. pt tolerated procedure well.  hemostasis secure and VSS.  dressing applied to the site is c/d/i.     Specimens Removed: Yes, Cytology, microbiology, chemistry, cell count.    Implants: None.     Complications: None.     Condition/Disposition: Stable, Sent back to his hospital room in stable condition.   ELIQUIS CAN BE RESUMED IN 12 HOURS AFTER THE PROCEDURE (10 AM).    Please call Interventional Radiology x [phone] with any questions, concerns, or issues.

## 2019-11-22 NOTE — PROGRESS NOTE ADULT - PROBLEM SELECTOR PLAN 3
Pt continues to have diarrhea.   -Etiology: inflammatory vs. secretory vs. osmotic   -Frequency reduced to 3-4/day (down from 10/day)   -ID recommends test for C. diff and continue ceftriaxone for 5 days total.   -GI on board; stool fat, alpha 1 antitrypsin, osms electrolytes pending   -Stool PCR negative for invasive organisms, outpatient c diff testing negative 10/2019   -Continue to hydrate  -loperamide as needed

## 2019-11-22 NOTE — PROGRESS NOTE ADULT - ATTENDING COMMENTS
Patient interviewed/examined on rounds.    Feeling improved.  Had more formed BM today.    Agree with history, PE, A/P as above.      Cheko Ojeda MD
Patient with ascites which likely relates to portal vein thrombosis.  Patient also has episodic rigors and advise vigilance for infection with blood cultures if these occur.  SAAG suggests portal hypertension is cause of ascites.  Viral hepatits B/C is negative.
Patient with cholangiocarcinoma and ascites.  had fevers and repeat paracentesis does not suggest continued ascites infection by cell count.  To complete antibiotic course per ID.  Patient continues on anticoagulation and to be further treated by oncology.
Patient has had episodic fever .  No rigors yesterday or today.  Suggest repeat paracentesis with culture and cell count to help to direct antibiotic therapy.
Pt seen and examined. 69M with recurrent metastatic cholangiocarcinoma pw chills/diarrhea, found to have ascites 2/2 portal HTN 2/2 thrombosis vs. SBP, s/p SBP treatment with IV ceftriaxone. s/p repeat paracentesis this morning with PMN <250. Pt stable for discharge with close follow up with Dr. Terry to continue chemoRx. Pt in agreement with dc planning. All questions and concerns were addressed    43 minutes spent on discharge process    Jaky Lucero MD  Division of Hospital Medicine  Cell: [phone]  Pager: [phone]  Office: [phone]
Pt seen and examined. Fever overnight with rigors. So far, fluid study and cultures suggest low suspicion for infection but given rigors with fever, will obtain repeat BCx if recurs. Care discussed with Dr. Barney - agree with monitor off of abx at this time. Will consider dc tmrw if remains stable with negative cultures to be started on chemoRx with Dr. Terry. Pt/family in agreement with the plan.    Jaky Lucero MD  Division of Hospital Medicine  Cell: [phone]  Pager: [phone]  Office: [phone]
Pt seen and examined.   Continue to have fever and abdominal discomfort  Appreciate ID recs - recommend completion of 5 day course of abx for SBP  Care discussed with Dr. Barney - recommends repeating paracentesis given persistent fever/chills    Jaky Lucero MD  Division of Hospital Medicine  Cell: [phone]  Pager: [phone]  Office: [phone]
69M with recurrent cholangiocarcinoma now with mets, portal vein thrombus, pw fever, diarrhea found to have sepsis likely 2/2 SBP s/p IV abx.   Remains afebrile, improvement in abdominal pain/diarrhea  appreciate hepatology recs - low suspicion for SBP, recommend cytology - pending  continue IV abx, awaiting culture results    Jaky Lucero MD  Division of Hospital Medicine  Cell: [phone]  Pager: [phone]  Office: [phone]

## 2019-11-22 NOTE — PROGRESS NOTE ADULT - PROBLEM SELECTOR PLAN 3
Pt initially presented with fevers and tachycardia, etiology of SIRS unknown at this time. Ddx includes infectious vs neoplastic vs clot burden vs medication. Pt currently on day 5/5 of ceftriaxone.  - Paracentesis in ED with corrected PMNs < 250, suspicion for SBP low  - Repeat paracentesis showed PMNs > 250 and 1080 RBCs indicating possible contamination with peripheral blood  - blood cx from 11/18 ngtd, stool cx 11/19 ngtd, c diff pending  - hepatology recommends repeat para, contacted IR who may perform on 11/22, will f/u  - After infection ruled out/treated, will f/u as outpatient for chemo as fevers may be from malignancy.

## 2019-11-22 NOTE — PROGRESS NOTE ADULT - PROBLEM SELECTOR PLAN 8
Likely 2/2 to portal hypertension 2/2 to portal vein thrombosis   -SAAG score > 1.1   -Eliquis held for paracentesis Likely 2/2 to portal hypertension 2/2 to portal vein thrombosis   -SAAG score from ED paracentesis > 1.1   -SBP unlikely per Hepatology (11/22/2019)   -Eliquis held for paracentesis

## 2019-11-22 NOTE — PROGRESS NOTE ADULT - PROBLEM SELECTOR PLAN 2
Having 3-4 BM's/day (down from 10/day) sometimes accompanied by periumbilical abdominal pain.    -Etiology: inflammatory vs. secretory vs. osmotic   -Currently on Ceftriaxone day 5/5 per ID.   -Does not meet C diff criteria, sample not tested.   -GI on board; stool fat, alpha 1 antitrypsin, osms electrolytes pending   -Stool PCR negative for invasive organisms, outpatient c diff testing negative 10/2019   -Anti-diarrheal agent switched from cholestyramine to loperamide as he does not met C diff criteria.   -Continue to hydrate.

## 2019-11-22 NOTE — PROGRESS NOTE ADULT - SUBJECTIVE AND OBJECTIVE BOX
WOODY CEVALLOS  69y  Male    Katie Fink MD/PhD, PGY-1  [phone]  Dept of Internal Medicine    After 7pm, please contact night float      Subjective: No acute events ON. Patient reports fewer watery stools, last at 9pm. Mild abdominal pain to palpation. No other complaints. Desires d/c.         Vital Signs Last 24 Hrs  T(C): 37.7 (22 Nov 2019 05:46), Max: 37.7 (22 Nov 2019 05:46)  T(F): 99.8 (22 Nov 2019 05:46), Max: 99.8 (22 Nov 2019 05:46)  HR: 100 (22 Nov 2019 05:46) (78 - 100)  BP: 108/74 (22 Nov 2019 05:46) (105/64 - 108/74)  BP(mean): --  RR: 18 (22 Nov 2019 05:46) (18 - 18)  SpO2: 95% (22 Nov 2019 05:46) (94% - 95%)    PHYSICAL EXAM:  GENERAL: NAD, well-groomed, well-developed  HEENT - NC/AT, pupils equal and reactive to light,  Moist mucous membranes, Good dentition, No lesions  NECK: Supple, No JVD  CHEST/LUNG: Clear to auscultation bilaterally; No rales, rhonchi, wheezing  HEART: Regular rate and rhythm; No murmurs, rubs, or gallops  ABDOMEN: Soft, mild diffuse tenderness throughout, Nondistended; Bowel sounds present  EXTREMITIES:  2+ Peripheral Pulses, No clubbing, cyanosis, or edema  NEURO:  No Focal deficits, sensory and motor intact  SKIN: No rashes or lesions    Consultant(s) Notes Reviewed:  [x ] YES  [ ] NO  Care Discussed with Consultants/Other Providers [ x] YES  [ ] NO    MEDICATIONS  (STANDING):  cefTRIAXone   IVPB 2000 milliGRAM(s) IV Intermittent every 24 hours  doxazosin 1 milliGRAM(s) Oral at bedtime  gabapentin 300 milliGRAM(s) Oral three times a day  influenza   Vaccine 0.5 milliLiter(s) IntraMuscular once  metoprolol succinate ER 25 milliGRAM(s) Oral daily  pantoprazole    Tablet 40 milliGRAM(s) Oral before breakfast  simethicone 80 milliGRAM(s) Chew four times a day    MEDICATIONS  (PRN):  acetaminophen   Tablet .. 650 milliGRAM(s) Oral every 6 hours PRN Temp greater or equal to 38C (100.4F), Moderate Pain (4 - 6)  aluminum hydroxide/magnesium hydroxide/simethicone Suspension 30 milliLiter(s) Oral every 4 hours PRN Dyspepsia  loperamide 2 milliGRAM(s) Oral five times a day PRN Diarrhea  oxyCODONE    IR 5 milliGRAM(s) Oral every 6 hours PRN Severe Pain (7 - 10)

## 2019-11-22 NOTE — PROGRESS NOTE ADULT - SUBJECTIVE AND OBJECTIVE BOX
CASSIEWOODY WESTON  69y  Male    Complaints:  Subjective:  No fevers overnight, patient slept comfortably and having 3-4 loose bowel movements/day. Underwent paracentesis in the AM today.    CONSTITUTIONAL: No weakness, fevers or chills  EYES/ENT: No visual changes;  No vertigo or throat pain   NECK: No pain or stiffness  RESPIRATORY: No cough, wheezing, hemoptysis; No shortness of breath  CARDIOVASCULAR: No chest pain or palpitations  GASTROINTESTINAL: +diarrhea, +abdominal pain; Abdominal pain sometimes with eating or with a BM, No nausea, vomiting, or hematemesis  GENITOURINARY: No dysuria, frequency or hematuria  NEUROLOGICAL: No numbness or weakness  SKIN: No itching, burning, rashes, or lesions   All other review of systems is negative unless indicated above.          Vital Signs Last 24 Hrs  T(C): 37.7 (22 Nov 2019 05:46), Max: 37.7 (22 Nov 2019 05:46)  T(F): 99.8 (22 Nov 2019 05:46), Max: 99.8 (22 Nov 2019 05:46)  HR: 100 (22 Nov 2019 05:46) (78 - 100)  BP: 108/74 (22 Nov 2019 05:46) (105/64 - 108/74)  BP(mean): --  RR: 18 (22 Nov 2019 05:46) (18 - 18)  SpO2: 95% (22 Nov 2019 05:46) (94% - 95%)    PHYSICAL EXAM:  GENERAL: NAD, well-groomed, well-developed  HEENT - NC/AT, pupils equal and reactive to light,  ; Moist mucous membranes, Good dentition, No lesions  NECK: Supple, No JVD  CHEST/LUNG: Clear to auscultation bilaterally; No rales, rhonchi, wheezing  HEART: Regular rate and rhythm; No murmurs, rubs, or gallops  ABDOMEN: Soft, Nontender, Nondistended; Bowel sounds present  EXTREMITIES:  2+ Peripheral Pulses, No clubbing, cyanosis, or edema  NEURO:  No Focal deficits, sensory and motor intact  SKIN: No rashes or lesions    Consultant(s) Notes Reviewed:  [x ] YES  [ ] NO  Care Discussed with Consultants/Other Providers [ x] YES  [ ] NO    LABS:                        8.0    5.63  )-----------( 200      ( 21 Nov 2019 07:08 )             25.2     11-21    139  |  110<H>  |  6<L>  ----------------------------<  107<H>  3.5   |  22  |  0.68    Ca    8.0<L>      21 Nov 2019 07:00  Phos  2.5     11-21  Mg     1.7     11-21    TPro  5.3<L>  /  Alb  2.7<L>  /  TBili  0.5  /  DBili  x   /  AST  11  /  ALT  10  /  AlkPhos  198<H>  11-21          RADIOLOGY & ADDITIONAL TESTS:    Imaging Personally Reviewed:  [ ] YES  [ ] NO  MedsMEDICATIONS  (STANDING):  cefTRIAXone   IVPB 2000 milliGRAM(s) IV Intermittent every 24 hours  doxazosin 1 milliGRAM(s) Oral at bedtime  gabapentin 300 milliGRAM(s) Oral three times a day  influenza   Vaccine 0.5 milliLiter(s) IntraMuscular once  metoprolol succinate ER 25 milliGRAM(s) Oral daily  pantoprazole    Tablet 40 milliGRAM(s) Oral before breakfast  simethicone 80 milliGRAM(s) Chew four times a day    MEDICATIONS  (PRN):  acetaminophen   Tablet .. 650 milliGRAM(s) Oral every 6 hours PRN Temp greater or equal to 38C (100.4F), Moderate Pain (4 - 6)  aluminum hydroxide/magnesium hydroxide/simethicone Suspension 30 milliLiter(s) Oral every 4 hours PRN Dyspepsia  loperamide 2 milliGRAM(s) Oral five times a day PRN Diarrhea  oxyCODONE    IR 5 milliGRAM(s) Oral every 6 hours PRN Severe Pain (7 - 10)      HEALTH ISSUES - PROBLEM Dx:  Fever, unknown origin: Fever, unknown origin  Other irritable bowel syndrome: Other irritable bowel syndrome  Other ascites: Other ascites  Anemia, unspecified type: Anemia, unspecified type  Sepsis due to undetermined organism: Sepsis due to undetermined organism  Discharge planning issues: Discharge planning issues  Other hyperlipidemia: Other hyperlipidemia  Gastroesophageal reflux disease, esophagitis presence not specified: Gastroesophageal reflux disease, esophagitis presence not specified  Hypertension, unspecified type: Hypertension, unspecified type  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: 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  Diarrhea, unspecified type: Diarrhea, unspecified type  GERD (gastroesophageal reflux disease): GERD (gastroesophageal reflux disease)  Portal vein thrombosis: Portal vein thrombosis  Medication management: Medication management  SBP (spontaneous bacterial peritonitis): SBP (spontaneous bacterial peritonitis)  SIRS (systemic inflammatory response syndrome): SIRS (systemic inflammatory response syndrome)  Need for prophylactic measure: Need for prophylactic measure  HLD (hyperlipidemia): HLD (hyperlipidemia)  HTN (hypertension): HTN (hypertension)  Cholangiocarcinoma: Cholangiocarcinoma  Sepsis: Sepsis  Diarrhea: Diarrhea

## 2019-11-22 NOTE — PROGRESS NOTE ADULT - PROBLEM SELECTOR PLAN 10
Infectious etiology unlikely. Will manage diarrheal symptoms with loperamide. Repeat paracentesis shows unlikely SPB despite >250 PMNs as sample contains numerous RBCs and normal glucose (104). Plans to discharge to receive chemotherapy. Infectious etiology of diarrheal symptoms unlikely. Will manage diarrheal symptoms with loperamide. Repeat paracentesis shows unlikely SPB despite >250 PMNs as sample contains numerous RBCs and normal glucose (104). Plans to discharge to receive chemotherapy.

## 2019-11-22 NOTE — PROGRESS NOTE ADULT - SUBJECTIVE AND OBJECTIVE BOX
Chief Complaint:  Patient is a 69y old  Male who presents with a chief complaint of Diarrhea (2019 11:06)      Interval Events: Patient afebrile overnight, got paracentesis this AM, feeling better.     Allergies:  penicillin G benzathine (Rash)  sulfa drugs (Rash)        Hospital Medications:  acetaminophen   Tablet .. 650 milliGRAM(s) Oral every 6 hours PRN  aluminum hydroxide/magnesium hydroxide/simethicone Suspension 30 milliLiter(s) Oral every 4 hours PRN  cefTRIAXone   IVPB 2000 milliGRAM(s) IV Intermittent every 24 hours  doxazosin 1 milliGRAM(s) Oral at bedtime  gabapentin 300 milliGRAM(s) Oral three times a day  loperamide 2 milliGRAM(s) Oral five times a day PRN  metoprolol succinate ER 25 milliGRAM(s) Oral daily  oxyCODONE    IR 5 milliGRAM(s) Oral every 6 hours PRN  pantoprazole    Tablet 40 milliGRAM(s) Oral before breakfast  simethicone 80 milliGRAM(s) Chew four times a day      PMHX/PSHX:  Rash  Biliary stricture  Pancreatic lesion  Gastric polyp  Obstructive jaundice  Other hyperlipidemia  Essential hypertension  IBS (irritable bowel syndrome)  GERD (gastroesophageal reflux disease)  Intestinal Whipple's disease  S/P unilateral inguinal hernia repair  S/P tendon repair  History of lumbar laminectomy  History of biliary stent insertion  S/P lumbar spinal fusion  S/P cervical spinal fusion  No significant past surgical history      Family history:  FH: CHF (congestive heart failure)  FH: prostate cancer  Family history of colitis  No pertinent family history in first degree relatives  Family history of prostate cancer  Family history of coronary artery disease      PHYSICAL EXAM:   Vital Signs:  Vital Signs Last 24 Hrs  T(C): 37.7 (2019 05:46), Max: 37.7 (2019 05:46)  T(F): 99.8 (2019 05:46), Max: 99.8 (2019 05:46)  HR: 100 (2019 05:46) (78 - 100)  BP: 108/74 (2019 05:46) (100/60 - 108/74)  BP(mean): --  RR: 18 (2019 05:46) (18 - 18)  SpO2: 95% (2019 05:46) (94% - 100%)  Daily     Daily     GENERAL:  No acute distress  HEENT:  Normocephalic/atraumatic, no scleral icterus  CHEST:  Clear to auscultation bilaterally, no wheezes/rales/ronchi, no accessory muscle use  HEART:  Regular rate and rhythm, no murmurs/rubs/gallops  ABDOMEN:  Soft with some firmness on the L side, nontender to palpation, mild distension, +BS  EXTREMITIES: No cyanosis, clubbing, or edema  SKIN:  No rash/erythema/ecchymoses/petechiae/wounds/abscess/warm/dry  NEURO:  Alert and oriented x 3, no asterixis  LABS:                        8.0    5.63  )-----------( 200      ( 2019 07:08 )             25.2       11-    139  |  110<H>  |  6<L>  ----------------------------<  107<H>  3.5   |  22  |  0.68    Ca    8.0<L>      2019 07:00  Phos  2.5     -  Mg     1.7         TPro  5.3<L>  /  Alb  2.7<L>  /  TBili  0.5  /  DBili  x   /  AST  11  /  ALT  10  /  AlkPhos  198<H>  -21    LIVER FUNCTIONS - ( 2019 07:00 )  Alb: 2.7 g/dL / Pro: 5.3 g/dL / ALK PHOS: 198 U/L / ALT: 10 U/L / AST: 11 U/L / GGT: x             Urinalysis Basic - ( 2019 21:15 )    Color: Yellow / Appearance: Clear / S.030 / pH: x  Gluc: x / Ketone: Negative  / Bili: Negative / Urobili: Negative   Blood: x / Protein: 30 mg/dL / Nitrite: Negative   Leuk Esterase: Negative / RBC: 7 /hpf / WBC 3 /HPF   Sq Epi: x / Non Sq Epi: 1 /hpf / Bacteria: Negative                              8.0    5.63  )-----------( 200      ( 2019 07:08 )             25.2                         8.1    6.32  )-----------( 186      ( 2019 07:26 )             25.5       Imaging:

## 2019-11-22 NOTE — DISCHARGE NOTE NURSING/CASE MANAGEMENT/SOCIAL WORK - NSDCPEELIQUIS_GEN_ALL_CORE
Apixaban/Eliquis - Follow up monitoring/Apixaban/Eliquis - Compliance/Apixaban/Eliquis - Potential for adverse drug reactions and interactions/Apixaban/Eliquis - Dietary Advice

## 2019-11-22 NOTE — PROGRESS NOTE ADULT - SUBJECTIVE AND OBJECTIVE BOX
Interventional Radiology     69y Male with h/o cholangiocarcinoma s/p Whipple in 2017 w/ recurrence of disease w/ mets to liver, portal vein thrombosis and ascites. Pt had diagnostic paracentesis in ER but is referred to IR for repeat dx paracentesis for further lab analysis.     PAST MEDICAL & SURGICAL HISTORY:  Rash  Biliary stricture: s/p biliary stent  Pancreatic lesion  Gastric polyp  Obstructive jaundice  Other hyperlipidemia  Essential hypertension  IBS (irritable bowel syndrome)  GERD (gastroesophageal reflux disease)  Intestinal Whipple's disease  S/P unilateral inguinal hernia repair  S/P tendon repair: hand  History of lumbar laminectomy  History of biliary stent insertion: 2/21/2017  S/P cervical spinal fusion    Allergies  penicillin G benzathine (Rash)  sulfa drugs (Rash)    Labs:                        8.0    5.63  )-----------( 200      ( 21 Nov 2019 07:08 )             25.2     11-21    139  |  110<H>  |  6<L>  ----------------------------<  107<H>  3.5   |  22  |  0.68    Ca    8.0<L>      21 Nov 2019 07:00  Phos  2.5     11-21  Mg     1.7     11-21    TPro  5.3<L>  /  Alb  2.7<L>  /  TBili  0.5  /  DBili  x   /  AST  11  /  ALT  10  /  AlkPhos  198<H>  11-21    Prothrombin Time and INR, Plasma (11.18.19 @ 18:40)    Prothrombin Time, Plasma: 14.6:    INR: 1.26:   Activated Partial Thromboplastin Time (11.18.19 @ 18:40)    Activated Partial Thromboplastin Time: 29.3:    After risks, benefits, alternatives discussion the pt verbalized understanding and signed informed consent.  Will plan for image-guided diagnostic paracentesis.    Korey Aguiar, STACEY  [facility] [id number]  Ext [phone] Interventional Radiology     69y Male with h/o cholangiocarcinoma s/p Whipple in 2017 w/ recurrence of disease w/ mets to liver, portal vein thrombosis and ascites. Pt had diagnostic paracentesis in ER but is referred to IR for repeat dx paracentesis for further lab analysis.     PAST MEDICAL & SURGICAL HISTORY:  Rash  Biliary stricture: s/p biliary stent  Pancreatic lesion  Gastric polyp  Obstructive jaundice  Other hyperlipidemia  Essential hypertension  IBS (irritable bowel syndrome)  GERD (gastroesophageal reflux disease)  Intestinal Whipple's disease  S/P unilateral inguinal hernia repair  S/P tendon repair: hand  History of lumbar laminectomy  History of biliary stent insertion: 2/21/2017  S/P cervical spinal fusion    Allergies  penicillin G benzathine (Rash)  sulfa drugs (Rash)    Labs:                        8.0    5.63  )-----------( 200      ( 21 Nov 2019 07:08 )             25.2     11-21    139  |  110<H>  |  6<L>  ----------------------------<  107<H>  3.5   |  22  |  0.68    Ca    8.0<L>      21 Nov 2019 07:00  Phos  2.5     11-21  Mg     1.7     11-21    TPro  5.3<L>  /  Alb  2.7<L>  /  TBili  0.5  /  DBili  x   /  AST  11  /  ALT  10  /  AlkPhos  198<H>  11-21    Prothrombin Time and INR, Plasma (11.18.19 @ 18:40)    Prothrombin Time, Plasma: 14.6:    INR: 1.26:   Activated Partial Thromboplastin Time (11.18.19 @ 18:40)    Activated Partial Thromboplastin Time: 29.3:    After risks, benefits, alternatives discussion the pt verbalized understanding and signed informed consent.  Will plan for image-guided diagnostic paracentesis.   Eliquis has been held 24 hours and can proceed with dx paracentesis with a small gauge needle as d/w IR attending Dr. [place].      Korey Aguiar, RPA-C  [facility] [id number]  Ext [phone]

## 2019-11-22 NOTE — PROGRESS NOTE ADULT - ASSESSMENT
Impression:  #Diarrhea: possible secondary to bile acid malabsorption, vs IBD-D vs SIBO vs carbohydrate malabsorption vs panc insuffeciency. Awaiting stool studies. Currently feels modest improvement from prior. Stool studies thus far negative. Has had colonoscopy but no biopsies were performed.  #Fever  #S/p whipple for cholangiocarcinoma    Recommendation:  - switch to immodium for symptom control.  - await stool electrolytes, fecal alpha 1 antitrypsin, fecal fat, stool osmolality; check serum total igA, TTG  - monitor BM, CBC, CMP and signs of infection  - If diarrhea persists, can consider repeat endoscopic evaluation with biopsies to rule out microscopic colitis  - supportive care per primary team

## 2019-11-22 NOTE — DISCHARGE NOTE NURSING/CASE MANAGEMENT/SOCIAL WORK - PATIENT PORTAL LINK FT
You can access the FollowMyHealth Patient Portal offered by [facility] by registering at the following website: [email or web address]. By joining [employer]’s FollowMyHealth portal, you will also be able to view your health information using other applications (apps) compatible with our system.

## 2019-11-22 NOTE — PROGRESS NOTE ADULT - SUBJECTIVE AND OBJECTIVE BOX
Follow Up:  fever, diarrhea, metastatic cholangiocarcinoma    Interval History: pt now afebrile for over 24 h, going for repeat paracentesis today    ROS:      All other systems negative    Constitutional: no fever, no chills  Head: no trauma  Eyes: no vision changes, no eye pain  ENT:  no sore throat, no rhinorrhea  Cardiovascular:  no chest pain, no palpitation  Respiratory:  no SOB, no cough  GI:  + abd pain, no vomiting, + diarrhea  urinary: no dysuria, no hematuria, no flank pain  musculoskeletal:  no joint swelling  skin:  no rash  neurology:  no headache, no seizure, no change in mental status  psych: no anxiety, no depression         Allergies  penicillin G benzathine (Rash)  sulfa drugs (Rash)        ANTIMICROBIALS:  cefTRIAXone   IVPB 2000 every 24 hours      OTHER MEDS:  acetaminophen   Tablet .. 650 milliGRAM(s) Oral every 6 hours PRN  aluminum hydroxide/magnesium hydroxide/simethicone Suspension 30 milliLiter(s) Oral every 4 hours PRN  doxazosin 1 milliGRAM(s) Oral at bedtime  gabapentin 300 milliGRAM(s) Oral three times a day  iron sucrose IVPB 200 milliGRAM(s) IV Intermittent once  loperamide 2 milliGRAM(s) Oral five times a day PRN  metoprolol succinate ER 25 milliGRAM(s) Oral daily  oxyCODONE    IR 5 milliGRAM(s) Oral every 6 hours PRN  pantoprazole    Tablet 40 milliGRAM(s) Oral before breakfast  simethicone 80 milliGRAM(s) Chew four times a day      Vital Signs Last 24 Hrs  T(C): 37.7 (2019 05:46), Max: 37.7 (2019 05:46)  T(F): 99.8 (2019 05:46), Max: 99.8 (2019 05:46)  HR: 100 (2019 05:46) (78 - 100)  BP: 108/74 (2019 05:46) (100/60 - 108/74)  BP(mean): --  RR: 18 (2019 05:46) (18 - 18)  SpO2: 95% (2019 05:46) (94% - 100%)    Physical Exam:  General:    NAD, non toxic  Head: atraumatic, normocephalic  Eyes: normal sclera and conjunctiva  ENT:   no oropharyngeal lesions, no LAD, neck supple  Cardio:    regular S1,S2, no murmur  Respiratory:   clear b/l, no wheezing  abd:   soft, BS +, not tender  :     no CVAT, no suprapubic tenderness, no sawant  Musculoskeletal : no joint swelling, no edema  Skin:    no rash  vascular: R chest port with no erythema or tenderness  Neurologic:     no focal deficits  psych: normal affect                              8.0    5.63  )-----------( 200      ( 2019 07:08 )             25.2           139  |  110<H>  |  6<L>  ----------------------------<  107<H>  3.5   |  22  |  0.68    Ca    8.0<L>      2019 07:00  Phos  2.5       Mg     1.7         TPro  5.3<L>  /  Alb  2.7<L>  /  TBili  0.5  /  DBili  x   /  AST  11  /  ALT  10  /  AlkPhos  198<H>        Urinalysis Basic - ( 2019 21:15 )    Color: Yellow / Appearance: Clear / S.030 / pH: x  Gluc: x / Ketone: Negative  / Bili: Negative / Urobili: Negative   Blood: x / Protein: 30 mg/dL / Nitrite: Negative   Leuk Esterase: Negative / RBC: 7 /hpf / WBC 3 /HPF   Sq Epi: x / Non Sq Epi: 1 /hpf / Bacteria: Negative        MICROBIOLOGY:  v  .Blood Blood-Peripheral  19   No growth to date.  --  --      .Body Fluid Peritoneal Fluid  19   No growth  --    polymorphonuclear leukocytes seen  No organisms seen  by cytocentrifuge      .Stool Feces  19   No Protozoa seen by trichrome stain  No Helminths or Protozoa seen in formalin concentrate    .Stool Feces  19   GI PCR Results: NOT detected  *******Please Note:*******  GI panel PCR evaluates for:    .Urine Clean Catch (Midstream)  19   No growth  --  --      .Blood Blood-Peripheral  19   No growth to date.  --  --      .Blood Blood-Peripheral  19   No growth to date.  --  --          Rapid RVP Result: NotDetec ( @ 18:47)        RADIOLOGY:  Images below reviewed personally  < from: CT Abdomen and Pelvis w/ IV Cont (19 @ 20:29) >    IMPRESSION:     Moderate ascites is increased from 2019 of indeterminate etiology.    Status post Whipple with unchanged soft tissue mass adjacent to the   proximal SMA.    Unchanged moderate intrahepatic biliary ductal dilatation to the level of   hepaticojejunostomy.

## 2019-11-22 NOTE — PROGRESS NOTE ADULT - PROBLEM SELECTOR PLAN 1
Pt initially presented with fevers and tachycardia, etiology of SIRS unknown at this time. Ddx includes infectious vs neoplastic vs clot burden vs medication. Pt continues to spike fevers, was take off antibiotics on 11/20 after initially being started on ceftriaxone for ?SBP.  - Paracentesis in ED with corrected PMNs < 250, suspicion for SBP low, now s/p 3 doses of ctx. Now monitoring off antibiotics, ID consulted for ?other infectious source  - Per GI and hepatology, SBP unlikely, however c diff remains on ddx. Will send c diff PCR  - blood cx from 11/18 ngtd, stool cx 11/19 ngtd  - repeat para with similar findings of transudative exudate  - complete 5 days ceftriaxone today  - After infection ruled out/treated, will f/u as outpatient for chemo as fevers may be from malignancy

## 2019-11-22 NOTE — PROGRESS NOTE ADULT - PROBLEM SELECTOR PLAN 10
Monitor patient for new symptoms.   -ID recommends to test for C. diff and continue ceftriaxone for 5 total days to rule out infectious source of diarrhea before discharge.   -Hepatology recommends paracentesis/ascites analysis to rule out SBP before discharge

## 2019-11-22 NOTE — PROGRESS NOTE ADULT - PROBLEM SELECTOR PLAN 2
Pts hgb on presentation 8.0, baseline 10 - 12. Pt reports no blood in stools, however no signs or symptoms of active bleeding on exam.   -Not deficient in B12 (elevated at 1164, 11/21/2019 [Range: 232-1245])   -Folate WNL (14, 11/21/2019 [Range >4.7])   -Serum Ferritin WNL (146, 11/21/2019 [Range ])   -Fe deficiency anemia, will start iron supplementation       -% iron saturation: 5 [Range: 16-55%]       -TIBC: 148 [Range: 220-430]       -Total serum iron: 8 [Range: ]  - FOBT negative (11/19/2019)  - cont to monitor for signs/sx of active bleeding  - maintain active type and screen

## 2019-11-22 NOTE — PROGRESS NOTE ADULT - ASSESSMENT
69 m with HTN, HLD, cholangiocarcinoma s/p Whipple 2017 with recurrence of disease now with mets to the liver, portal vein thrombus and SMA narrowing on MR abdomen 11/5/19, was diagnosed with H-pylori a few months ago and after the treatment developed diarrhea which is from 3 to 10/day, was checked for C-diff and 2 were negative, presented 11/18 with fever and still diarrhea, here febrile to 101.6 and tachycardic, normal WBC  abd/pelvis CT: increased mod ascites and unchanged soft tissue mass next to SMA  blood and urine cx negative, stool PCR and O&P negative  s/p paracentesis,  with 28% PMN which is not s/o SBP and negative cx  s/p cefepime and vanco 1/18 then ceftriaxone 11/19 and 11/20    metastatic cholangiocarcinoma with liver mets and extensive portal vein thrombus with chronic diarrhea since he was treated for H-pylori months ago now with fever and negative cultures  paracentesis not s/o SBP but pt has intrahepatic biliary dilatation and increased ascites  fever could be just due to metastatic ca and thrombosis vs transient bacteremia due to biliary source or SBP, pt also has a port but all blood cultures have been negative  chronic diarrhea, had negative C-diff before x 2 and now negative GI PCR and O&P, likely non infectious      * repeat blood cultures also negative and pt now afebrile  * on ceftriaxone 2 q d, antibiotics started 11/18, now day 5  * f/u the ascitic fluid cell studies

## 2019-11-23 LAB
CULTURE RESULTS: SIGNIFICANT CHANGE UP
CULTURE RESULTS: SIGNIFICANT CHANGE UP
SPECIMEN SOURCE: SIGNIFICANT CHANGE UP
SPECIMEN SOURCE: SIGNIFICANT CHANGE UP

## 2019-11-24 LAB
CULTURE RESULTS: SIGNIFICANT CHANGE UP
SPECIMEN SOURCE: SIGNIFICANT CHANGE UP

## 2019-11-25 ENCOUNTER — INBOUND DOCUMENT (OUTPATIENT)
Age: 69
End: 2019-11-25

## 2019-11-25 ENCOUNTER — APPOINTMENT (OUTPATIENT)
Dept: HEMATOLOGY ONCOLOGY | Facility: CLINIC | Age: 69
End: 2019-11-25
Payer: MEDICARE

## 2019-11-25 ENCOUNTER — RESULT REVIEW (OUTPATIENT)
Age: 69
End: 2019-11-25

## 2019-11-25 VITALS
DIASTOLIC BLOOD PRESSURE: 70 MMHG | OXYGEN SATURATION: 100 % | WEIGHT: 155.84 LBS | BODY MASS INDEX: 22.36 KG/M2 | RESPIRATION RATE: 18 BRPM | SYSTOLIC BLOOD PRESSURE: 128 MMHG | HEART RATE: 120 BPM | TEMPERATURE: 98.7 F

## 2019-11-25 DIAGNOSIS — I82.90 ACUTE EMBOLISM AND THROMBOSIS OF UNSPECIFIED VEIN: ICD-10-CM

## 2019-11-25 DIAGNOSIS — C24.9 MALIGNANT NEOPLASM OF BILIARY TRACT, UNSPECIFIED: ICD-10-CM

## 2019-11-25 DIAGNOSIS — Z79.899 OTHER LONG TERM (CURRENT) DRUG THERAPY: ICD-10-CM

## 2019-11-25 LAB
HCT VFR BLD CALC: 30.1 % — LOW (ref 39–50)
HGB BLD-MCNC: 9.6 G/DL — LOW (ref 13–17)
MCHC RBC-ENTMCNC: 27.6 PG — SIGNIFICANT CHANGE UP (ref 27–34)
MCHC RBC-ENTMCNC: 32 G/DL — SIGNIFICANT CHANGE UP (ref 32–36)
MCV RBC AUTO: 86.2 FL — SIGNIFICANT CHANGE UP (ref 80–100)
PLATELET # BLD AUTO: 348 K/UL — SIGNIFICANT CHANGE UP (ref 150–400)
RBC # BLD: 3.49 M/UL — LOW (ref 4.2–5.8)
RBC # FLD: 16.3 % — HIGH (ref 10.3–14.5)
WBC # BLD: 7.3 K/UL — SIGNIFICANT CHANGE UP (ref 3.8–10.5)
WBC # FLD AUTO: 7.3 K/UL — SIGNIFICANT CHANGE UP (ref 3.8–10.5)

## 2019-11-25 PROCEDURE — 99215 OFFICE O/P EST HI 40 MIN: CPT

## 2019-11-25 RX ORDER — SUCRALFATE 1 G/10ML
1 SUSPENSION ORAL 3 TIMES DAILY
Qty: 900 | Refills: 0 | Status: ACTIVE | COMMUNITY
Start: 2019-11-25 | End: 1900-01-01

## 2019-11-26 PROBLEM — Z79.899 ON ANTINEOPLASTIC CHEMOTHERAPY: Status: ACTIVE | Noted: 2017-08-01

## 2019-11-26 PROBLEM — I82.90 THROMBOSIS: Status: ACTIVE | Noted: 2019-11-15

## 2019-11-26 PROBLEM — C24.9 CHOLANGIOCARCINOMA OF BILIARY TRACT: Status: ACTIVE | Noted: 2017-06-05

## 2019-11-26 LAB
ALBUMIN SERPL ELPH-MCNC: 3.8 G/DL
ALP BLD-CCNC: 368 U/L
ALT SERPL-CCNC: 21 U/L
ANION GAP SERPL CALC-SCNC: 15 MMOL/L
AST SERPL-CCNC: 30 U/L
BILIRUB SERPL-MCNC: 0.6 MG/DL
BUN SERPL-MCNC: 15 MG/DL
CALCIUM SERPL-MCNC: 9.1 MG/DL
CANCER AG19-9 SERPL-ACNC: 451 U/ML
CHLORIDE SERPL-SCNC: 108 MMOL/L
CO2 SERPL-SCNC: 22 MMOL/L
CREAT SERPL-MCNC: 0.95 MG/DL
CULTURE RESULTS: SIGNIFICANT CHANGE UP
CULTURE RESULTS: SIGNIFICANT CHANGE UP
GLUCOSE SERPL-MCNC: 105 MG/DL
POTASSIUM SERPL-SCNC: 4.6 MMOL/L
PROT SERPL-MCNC: 6.5 G/DL
SODIUM SERPL-SCNC: 145 MMOL/L
SPECIMEN SOURCE: SIGNIFICANT CHANGE UP
SPECIMEN SOURCE: SIGNIFICANT CHANGE UP

## 2019-11-26 NOTE — ASSESSMENT
[Palliative] : Goals of care discussed with patient: Palliative [Palliative Care Plan] : not applicable at this time [FreeTextEntry1] : Met Cholangiocarcinoma w prob tumor fever rel to POD.To start salvage chemo w XELOX f/b eval of Fopundation studies which are pending.

## 2019-11-26 NOTE — HISTORY OF PRESENT ILLNESS
[Disease: _____________________] : Disease: [unfilled] [T: ___] : T[unfilled] [N: ___] : N[unfilled] [AJCC Stage: ____] : AJCC Stage: [unfilled] [de-identified] : 66 Year old gentleman with PMH of IBS-C for many years, hypercholesterolemia, GERD, L4-L5 laminectomy 2016; cervical spine fusion 2014, here for a medical oncology evaluation for treatment of recently diagnosis of the common bile duct. \par HPI below as per the patient and available records\par 1/2017 Presented with painless jaundice to his PMD.  Found to have liver abnormalities, abnormal sonogram.\par 2/17/2017 CT abdomen revealed no definitive pancreatic mass\par 2/18/2017 He presented to the emergency room at [facility] with jaundice and progressive itching.  Total Bilirubin 12.5/Direct 9.1. CT abdomen showed intrabiliary Vs. extra biliary dilation. Started on ursodiol .\par 2/18/2017 MRI Abdomen with contrast: :Intra- as well as extra- hepatic biliary ductal dilatation, with stricturing of the mid to distal common bile duct. No shanna associated pancreatic mass, choledocholithiasis nor shanna mass of the common bile duct.  There is subtle enhancement of the wall of the common bile duct which may be related to inflammation or malignant stricture of the common bile duct. Multiple hepatic hemangiomas are demonstrated.\par \par 2/21/2017 EUS: Subtle 14mm x 15mm hypoechoic, heterogenous area in pancreatic head (at site of transition to biliary dilation) \par ERCP : biliary sphincterotomy, extraction of dark-thick sludge, cholangioscopy, biopsies of exposed ampulla/distal CBD, brushings of distal CBD, and placement of double pigtail biliary stent. No obvious findings seen on cholangioscopy although visualization was limited by thick, dark, sludge throughout biliary tree\par Bile duct brush suspicious for malignancy. \par Pancreas Head FNA :glandular cells with focal atypia in background of inflammatory cells including lymphocytes and fragments of fibrous tissue.\par Ampullary biopsy, ampullary type mucosa, negative for dysplasia\par Gastric body polyps; endoscopic biopsies show gastric fundic gland polyps\par \par 3/2/2017  EUS: Gastric polyps were noted. There was dilation in the common bile duct which measured up to 15 mm. There was small amount of sludge noted in the dilated bile duct. One stent was visualized endosonographically in the common bile duct. Extension of the stent was noted in the common bile duct. Pancreatic parenchymal abnormalities with hypoechoic foci were noted in the pancreatic head and pancreatic body. No obvious discrete mass / lesion was noted hence no FNA was performed.\par \par 3/2/2017 ERCP \par  A severe localized biliary stricture was found in the bile duct with SpyGlass cholangioscopy and biopsied using a cold forceps and also with a spy bite forceps for more directed sampling during cholangioscopy.  The stricture was then restented.\par \par 3/7/2017 Pt continued to experience jaundice and intense pruritus, was seen in outpatient office last week ere he had a metal stent placed, but today when he had a follow up appointment he was once again found to have an elevated bilirubin to 20 and was sent in for admission.\par \par 3/9/2017 EUS/ERCP: Distal biliary stricture with stent removal, cholangioscopy, biopsies, brushings, fully covered metal stent placement\par \par 4/26/2017 Whipple resection, feeding jejunostomy, appendectomy. Pathology:\par Common bile duct - Extra and intra pancreatic Tumor Size: 0.6 x 0.1 cm Histologic Type: Adenocarcinoma (not otherwise characterized) Histologic Grade: Moderate to poorly differentiated (G2 and G3).  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 Pt  admitted for IV Antibiotics with Cipro/Flagyl changed to Meropenem and Vancomycin.. Pt taken to IR and underwent CT guided percutaneous drainage of a right lower quadrant fluid collection on 6/13. \par Pt discharged home with drain to follow up with IR for tube check in 1 week \par Cultures of abscess grew out enterococcus faecalis and enterococcus raffinosus. He has completed antibiotic therapy.\par \par He was evaluated by Dr. Medrano on 7/11 and cleared for medical evaluation.\par \par Received Day 1 Cycle 1 gemcitabine and cisplatin, fatigue for 2 days lynne 5; Day 8 held due to neutropenia (WBC 2.5 ) resolved in 1 week.\par \par 10/12/17 Here for Cycle 4 Day 8 gemcitabine and cisplatin, very fatigued after day . Constipation has been more problematic 2 days since last bm. He has mild mucositis upper palate, improving. Denies paresthesias or subjective changes in hearing.  Plan: Senekot S nightly, Reglan 10 mg Po tid.  Based on current data, will rotate to oral capecitabine for the next 3 months to complete his course of Rx.\par \par \par 1/22/18 Fibroscan test:median liver stiffness of 5.0 kPa which is consistent with F0-F1 disease,  dB/m (S 2).\par \par 1/19/18  F/U visit: some back pain that is chronic and comes and goes as prev reported. Issue is a finding on recent scan, which indicates an enlarging soft tissue density, essentially doubled in size in 3 months adjacent to SMA. \par \par 2/9/18  Elevated alk phosphatase of 170.  As per discussion among DRs Mary, Rolanda, Alejandra and Mitchell regarding follow up plan after elevated alk phos and CT/MRI. No evidence of cancer recurrence on liver MRI, will continue surveillance q 3 months as planned with reimaging (MRI) as recommnded. F/U CMP in one month (week of 2/19) patient to obtain at [facility], orders entered and requisitions sent to patient. NO new symptoms. Advised to call sooner if he notes new symptoms. \par \par 4/27/18 Completed Capecitabine in January 2018. He experienced 3 episodes syncope, admitted with the last episode on 2/13. He sustained a fall on his face. Initial EKG showed accelerated junctional rhythm.  No events on telemetry, negative troponins, unremarkable TTE (2/14/18). Results from outpatient studies provided by his cardiologist, Dr. Casey Kerr: TTE results 1/9/18 with unchanged EF 60%, mild mitral and tricuspid regurgitation, normal wall motion. Stress test in 2016 nonischemic. \par \par 8/8/18 Upper Endoscopy :erythematous mucosa in stomach\par \par 10/11/18 Cardiology-PATs with no associated symptoms. Lifestyle changes recommended and beta blocker started. Told by Dr. Turner he would not need ablation\par \par 10/18/18 CT abdomen and pelvis:  Addendum 11/12/18 Upon further review, a 9 mm nodular focus is seen adjacent to the SMA on the left (2:77) new from prior imaging. Subtle recurrent disease is not excluded. Short-term follow-up imaging is recommended.  \par CA 19-9 wnl- FU in 3 mos, otherwise no new symptoms, chronic back pain\par \par 1/3/19 CT abdomen and pelvis showed increase in size of soft tissue density adjacent to the superior mesenteric \par artery today measuring 1.4 x 1.5 cm in size. \par 1/15/19 Pet CT obtained FDG avid lymph node adjacent to the SMA, suspicious for \par recurrent/metastatic disease.; FDG avidity along the intrahepatic CBD stent, with focal FDG avidity in the extrahepatic portion possibly within the jejunum, possibly recurrent \par disease. Consider evaluation with EUS/ERCP for further characterization.\par 2/1/19 Upper EUS Sono guided core needle Bx of “patricia SMA” LN 1 cm: Poorly-differentiated adenocarcinoma,\par 2/26/19 Altered Anatomy ERCP Mild hyperplasia at hepatic jejunostomy anastomosis felt to be stent related. 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   11/2/17  Capecitabine 1500 mg Po bid 2 weeks on/ 2 weeks off completed 1/15/18 \par  [de-identified] : Pt recently discharged from hospital after w/u for FUO.Defervesced after 5 d course of Abx w/o a source..diarrhea has abated as well that was a chronic issue pre-hosp.Feels weak , has lost wt.Note  inc quickly, indic. inc tumor activity.To staert chemo in a few days as d/w him today, pending outcomes at home. Taking eliquis for patrciia-portalthrombus

## 2019-11-26 NOTE — RESULTS/DATA
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  While the findings are compatible with metastasis\par from this known primary, morphologic comparison will be performed\par upon receipt of the slides and the findings will be reported in\par an addendum.\par \par The carcinoma is present in the form of detached clusters as well\par as infiltrative into dense fibrous tissue showing desmoplastic\par type response.  Focal small lymphoid aggregates are present but\par convincing features of lymph node parenchyma are not seen.  It is\par not entirely certain in the sampling whether the findings\par represent near-total effacement of a lymph node versus invasion\par of soft tissue or other structures by carcinoma.  Correlation\par with clinical/radiographic impression may be helpful.\par \par This case was reviewed for  at the Gastrointestinal/Hepatobiliary intradepartmental consensus\par conference on 2/5/2019.\par \par Verified by: Farhat Baker DO\par (Electronic Signature)\par Reported on: 02/05/19 14:55 EST, [address] [place] [address], [place], [place]\par [address]\par \par \par EXAM: PETCT SKUL-THI ONC FDG INIT \par PROCEDURE DATE: 01/15/2019 \par INTERPRETATION: PROCEDURE: PET/CT SKULL BASE-MID THIGH IMAGING \par \par RADIOPHARMACEUTICAL: 13.5 mCi F-18, FDG, I.V. 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 PET/CT is done as part of the initial treatment strategy evaluation. \par \par TECHNIQUE: Fasting blood sugar measured prior to injection of \par radiopharmaceutical was 85 mg/dl. Following intravenous injection of the \par radiopharmaceutical and an uptake period of approximately 75 minutes, \par FDG-PET/CT was obtained on a Siemens Biograph mCT 64 scanner from the skull \par base to mid thigh. Oral contrast was administered during the uptake period. \par CT was performed during shallow respiration. The CT protocol was optimized \par for PET attenuation correction and to provide anatomic detail for \par localization of PET abnormalities. The CT protocol was not designed to \par produce and cannot replace state-of-the-art diagnostic CT images with \par specific imaging protocols for different body parts and indications. Images \par were reviewed on a dedicated workstation using multiplanar reconstruction. \par \par The standardized uptake values (SUV) are normalized to patient body weight \par and indicate the highest activity concentration (SUVmax) in a given disease \par site. All image numbers refer to axial image number. \par \par COMPARISON: No prior PET/CT \par \par OTHER STUDIES USED FOR CORRELATION: CT abdomen pelvis January 3, 2019; CT \par chest, abdomen/pelvis October 24, 2018; MRI abdomen June 6, 2018 \par \par FINDINGS: \par \par HEAD/NECK: No abnormal avidity. Physiologic FDG activity seen in the \par visualized portions of the brain, major salivary glands and neck muscles. \par \par THORAX: Right chest wall port catheter tip cavoatrial junction. No abnormal \par avidity.Physiologic FDG activity in the myocardium and blood pool. \par \par LUNGS: No FDG avid foci. No pulmonary nodules. \par \par PLEURA/PERICARDIUM: No abnormal avidity. No pericardial or pleural effusions. \par \par HEPATOBILIARY/PANCREAS: Liver background SUV mean as a reference for \par comparing studies is 2.5. Hepatic steatosis. Postsurgical changes of Whipple \par procedure and hepaticojejunostomy. Common bile duct stent, with mild FDG \par avidity along intrahepatic course of the stent (SUV 4.9; image 139) and \par focal intense FDG avidity in the extrahepatic portion of the stent localized \par to the jejunum (SUV 9.2; image 152). \par \par SPLEEN: No abnormal avidity. \par \par ADRENAL GLANDS: No abnormal avidity. \par \par KIDNEYS/URINARY BLADDER: Excreted activity is seen. No abnormal avidity. \par \par ABDOMINOPELVIC NODES: FDG avid lymph node adjacent to the SMA 1.6 x 1.0 cm \par (SUV 8.7; image 156) \par \par BOWEL/PERITONEUM/MESENTERY: No abnormal avidity. \par \par PELVIC ORGANS: No abnormal avidity. \par \par BONES: Anterior cervical discectomy and fusion C4-C6. Mild degenerative \par changes thoracolumbar spine. \par \par SOFT TISSUES: No abnormal avidity. \par \par IMPRESSION: \par \par 1. FDG avid lymph node adjacent to the SMA, suspicious for \par recurrent/metastatic disease. \par \par 2. FDG avidity along the intrahepatic CBD stent, with focal FDG avidity in \par the extrahepatic portion possibly within the jejunum, possibly recurrent \par disease. Consider evaluation with EUS/ERCP for further characterization. \par \par FIONA SALAZAR M.D., ATTENDING RADIOLOGIST \par This document has been electronically signed. John 15 2019 3:22PM \par \par \par EXAM: CT ABDOMEN AND PELVIS OC IC \par PROCEDURE DATE: 01/03/2019 \par INTERPRETATION: CLINICAL INFORMATION: History gallbladder cancer with \par possible developing retroperitoneal adenopathy \par \par COMPARISON: 10/18 7/17 MRI dated 6/18 \par \par PROCEDURE: \par CT of the Abdomen and Pelvis was performed with intravenous contrast. \par Intravenous contrast: 90 ml Omnipaque 350. 10 ml discarded. \par Oral contrast: positive contrast was administered. \par Sagittal and coronal reformats were performed. \par \par FINDINGS: \par \par LOWER CHEST: Within normal limits. \par \par LIVER: Diffuse fatty infiltration of the liver. Previously described \par hemangioma and cysts are stable in their radiographic appearance \par BILE DUCTS: Enteric anastomosis appears intact. No intrahepatic ductal \par dilatation seen \par GALLBLADDER: Removed \par SPLEEN: Within normal limits. \par PANCREAS: Postop changes to the pancreatic head stable in its radiographic \par appearance \par ADRENALS: Within normal limits. \par KIDNEYS/URETERS: Within normal limits. \par \par BLADDER: Within normal limits. \par REPRODUCTIVE ORGANS: Unremarkable \par \par BOWEL: Gastrojejunostomy \par PERITONEUM: No ascites. \par VESSELS: Atherosclerotic changes \par RETROPERITONEUM: Previously seen soft tissue density adjacent to the \par superior mesenteric artery appears to be increased in size measuring 1.4 x \par 1.5 cm in size series 2 image 30 \par ABDOMINAL WALL: Within normal limits. \par BONES: Within normal limits. \par \par IMPRESSION: \par \par Increase in size of soft tissue density adjacent to the superior mesenteric \par artery today measuring 1.4 x 1.5 cm in size. \par \par SIRENA PETERSON M.D., ATTENDING RADIOLOGIST \par This document has been electronically signed. John 3 2019 10:54AM \par

## 2019-11-27 LAB
CULTURE RESULTS: SIGNIFICANT CHANGE UP
SPECIMEN SOURCE: SIGNIFICANT CHANGE UP

## 2019-12-02 ENCOUNTER — RESULT REVIEW (OUTPATIENT)
Age: 69
End: 2019-12-02

## 2019-12-02 ENCOUNTER — APPOINTMENT (OUTPATIENT)
Dept: INFUSION THERAPY | Facility: HOSPITAL | Age: 69
End: 2019-12-02
Payer: MEDICARE

## 2019-12-02 ENCOUNTER — RX RENEWAL (OUTPATIENT)
Age: 69
End: 2019-12-02

## 2019-12-02 ENCOUNTER — INPATIENT (INPATIENT)
Facility: HOSPITAL | Age: 69
LOS: 2 days | Discharge: HOME | End: 2019-12-05
Attending: HOSPITALIST | Admitting: HOSPITALIST
Payer: MEDICARE

## 2019-12-02 VITALS
SYSTOLIC BLOOD PRESSURE: 101 MMHG | HEART RATE: 77 BPM | OXYGEN SATURATION: 96 % | RESPIRATION RATE: 18 BRPM | TEMPERATURE: 98 F | DIASTOLIC BLOOD PRESSURE: 62 MMHG

## 2019-12-02 DIAGNOSIS — Z98.1 ARTHRODESIS STATUS: Chronic | ICD-10-CM

## 2019-12-02 DIAGNOSIS — Z98.890 OTHER SPECIFIED POSTPROCEDURAL STATES: Chronic | ICD-10-CM

## 2019-12-02 DIAGNOSIS — K90.81 WHIPPLE'S DISEASE: Chronic | ICD-10-CM

## 2019-12-02 LAB
ALBUMIN SERPL ELPH-MCNC: 2.7 G/DL — LOW (ref 3.3–5)
ALP SERPL-CCNC: 360 U/L — HIGH (ref 40–120)
ALT FLD-CCNC: 12 U/L — SIGNIFICANT CHANGE UP (ref 4–41)
ANION GAP SERPL CALC-SCNC: 13 MMO/L — SIGNIFICANT CHANGE UP (ref 7–14)
ANISOCYTOSIS BLD QL: SIGNIFICANT CHANGE UP
APTT BLD: 31.7 SEC — SIGNIFICANT CHANGE UP (ref 27.5–36.3)
AST SERPL-CCNC: 22 U/L — SIGNIFICANT CHANGE UP (ref 4–40)
BASE EXCESS BLDV CALC-SCNC: -0.9 MMOL/L — SIGNIFICANT CHANGE UP
BASE EXCESS BLDV CALC-SCNC: 0.3 MMOL/L — SIGNIFICANT CHANGE UP
BASOPHILS # BLD AUTO: 0 K/UL — SIGNIFICANT CHANGE UP (ref 0–0.2)
BASOPHILS # BLD AUTO: 0.05 K/UL — SIGNIFICANT CHANGE UP (ref 0–0.2)
BASOPHILS NFR BLD AUTO: 0.2 % — SIGNIFICANT CHANGE UP (ref 0–2)
BASOPHILS NFR BLD AUTO: 0.3 % — SIGNIFICANT CHANGE UP (ref 0–2)
BASOPHILS NFR SPEC: 0 % — SIGNIFICANT CHANGE UP (ref 0–2)
BILIRUB SERPL-MCNC: 1 MG/DL — SIGNIFICANT CHANGE UP (ref 0.2–1.2)
BLASTS # FLD: 0 % — SIGNIFICANT CHANGE UP (ref 0–0)
BLOOD GAS VENOUS - CREATININE: 0.86 MG/DL — SIGNIFICANT CHANGE UP (ref 0.5–1.3)
BLOOD GAS VENOUS - CREATININE: SIGNIFICANT CHANGE UP MG/DL (ref 0.5–1.3)
BUN SERPL-MCNC: 11 MG/DL — SIGNIFICANT CHANGE UP (ref 7–23)
CALCIUM SERPL-MCNC: 7.9 MG/DL — LOW (ref 8.4–10.5)
CHLORIDE BLDV-SCNC: 105 MMOL/L — SIGNIFICANT CHANGE UP (ref 96–108)
CHLORIDE BLDV-SCNC: 108 MMOL/L — SIGNIFICANT CHANGE UP (ref 96–108)
CHLORIDE SERPL-SCNC: 104 MMOL/L — SIGNIFICANT CHANGE UP (ref 98–107)
CO2 SERPL-SCNC: 23 MMOL/L — SIGNIFICANT CHANGE UP (ref 22–31)
CREAT SERPL-MCNC: 0.92 MG/DL — SIGNIFICANT CHANGE UP (ref 0.5–1.3)
DACRYOCYTES BLD QL SMEAR: SLIGHT — SIGNIFICANT CHANGE UP
ELLIPTOCYTES BLD QL SMEAR: SLIGHT — SIGNIFICANT CHANGE UP
EOSINOPHIL # BLD AUTO: 0 K/UL — SIGNIFICANT CHANGE UP (ref 0–0.5)
EOSINOPHIL # BLD AUTO: 0.05 K/UL — SIGNIFICANT CHANGE UP (ref 0–0.5)
EOSINOPHIL NFR BLD AUTO: 0.2 % — SIGNIFICANT CHANGE UP (ref 0–6)
EOSINOPHIL NFR BLD AUTO: 0.3 % — SIGNIFICANT CHANGE UP (ref 0–6)
EOSINOPHIL NFR FLD: 0.9 % — SIGNIFICANT CHANGE UP (ref 0–6)
GAS PNL BLDV: 135 MMOL/L — LOW (ref 136–146)
GAS PNL BLDV: 136 MMOL/L — SIGNIFICANT CHANGE UP (ref 136–146)
GIANT PLATELETS BLD QL SMEAR: PRESENT — SIGNIFICANT CHANGE UP
GLUCOSE BLDV-MCNC: 128 MG/DL — HIGH (ref 70–99)
GLUCOSE BLDV-MCNC: 135 MG/DL — HIGH (ref 70–99)
GLUCOSE SERPL-MCNC: 109 MG/DL — HIGH (ref 70–99)
HCO3 BLDV-SCNC: 23 MMOL/L — SIGNIFICANT CHANGE UP (ref 20–27)
HCO3 BLDV-SCNC: 24 MMOL/L — SIGNIFICANT CHANGE UP (ref 20–27)
HCT VFR BLD CALC: 27.3 % — LOW (ref 39–50)
HCT VFR BLD CALC: 29.6 % — LOW (ref 39–50)
HCT VFR BLDV CALC: 27.8 % — LOW (ref 39–51)
HCT VFR BLDV CALC: 29.5 % — LOW (ref 39–51)
HGB BLD-MCNC: 8.5 G/DL — LOW (ref 13–17)
HGB BLD-MCNC: 9.5 G/DL — LOW (ref 13–17)
HGB BLDV-MCNC: 9 G/DL — LOW (ref 13–17)
HGB BLDV-MCNC: 9.5 G/DL — LOW (ref 13–17)
HYPOCHROMIA BLD QL: SIGNIFICANT CHANGE UP
IMM GRANULOCYTES NFR BLD AUTO: 0.9 % — SIGNIFICANT CHANGE UP (ref 0–1.5)
INR BLD: 2.39 — HIGH (ref 0.88–1.17)
LACTATE BLDV-MCNC: 2.4 MMOL/L — HIGH (ref 0.5–2)
LACTATE BLDV-MCNC: 3.1 MMOL/L — HIGH (ref 0.5–2)
LYMPHOCYTES # BLD AUTO: 0.3 K/UL — LOW (ref 1–3.3)
LYMPHOCYTES # BLD AUTO: 0.35 K/UL — LOW (ref 1–3.3)
LYMPHOCYTES # BLD AUTO: 1.5 % — LOW (ref 13–44)
LYMPHOCYTES # BLD AUTO: 1.8 % — LOW (ref 13–44)
LYMPHOCYTES NFR SPEC AUTO: 0.9 % — LOW (ref 13–44)
MACROCYTES BLD QL: SLIGHT — SIGNIFICANT CHANGE UP
MCHC RBC-ENTMCNC: 26.6 PG — LOW (ref 27–34)
MCHC RBC-ENTMCNC: 28.5 PG — SIGNIFICANT CHANGE UP (ref 27–34)
MCHC RBC-ENTMCNC: 31.1 % — LOW (ref 32–36)
MCHC RBC-ENTMCNC: 32.2 G/DL — SIGNIFICANT CHANGE UP (ref 32–36)
MCV RBC AUTO: 85.3 FL — SIGNIFICANT CHANGE UP (ref 80–100)
MCV RBC AUTO: 88.7 FL — SIGNIFICANT CHANGE UP (ref 80–100)
METAMYELOCYTES # FLD: 0 % — SIGNIFICANT CHANGE UP (ref 0–1)
MONOCYTES # BLD AUTO: 0.98 K/UL — HIGH (ref 0–0.9)
MONOCYTES # BLD AUTO: 1 K/UL — HIGH (ref 0–0.9)
MONOCYTES NFR BLD AUTO: 4.8 % — SIGNIFICANT CHANGE UP (ref 2–14)
MONOCYTES NFR BLD AUTO: 4.9 % — SIGNIFICANT CHANGE UP (ref 2–14)
MONOCYTES NFR BLD: 4.3 % — SIGNIFICANT CHANGE UP (ref 2–9)
MYELOCYTES NFR BLD: 0 % — SIGNIFICANT CHANGE UP (ref 0–0)
NEUTROPHIL AB SER-ACNC: 85.3 % — HIGH (ref 43–77)
NEUTROPHILS # BLD AUTO: 18.27 K/UL — HIGH (ref 1.8–7.4)
NEUTROPHILS # BLD AUTO: 19 K/UL — HIGH (ref 1.8–7.4)
NEUTROPHILS NFR BLD AUTO: 91.8 % — HIGH (ref 43–77)
NEUTROPHILS NFR BLD AUTO: 93.3 % — HIGH (ref 43–77)
NEUTS BAND # BLD: 8.6 % — HIGH (ref 0–6)
NRBC # FLD: 0 K/UL — SIGNIFICANT CHANGE UP (ref 0–0)
OTHER - HEMATOLOGY %: 0 — SIGNIFICANT CHANGE UP
PCO2 BLDV: 41 MMHG — SIGNIFICANT CHANGE UP (ref 41–51)
PCO2 BLDV: 43 MMHG — SIGNIFICANT CHANGE UP (ref 41–51)
PH BLDV: 7.38 PH — SIGNIFICANT CHANGE UP (ref 7.32–7.43)
PH BLDV: 7.38 PH — SIGNIFICANT CHANGE UP (ref 7.32–7.43)
PLATELET # BLD AUTO: 193 K/UL — SIGNIFICANT CHANGE UP (ref 150–400)
PLATELET # BLD AUTO: 237 K/UL — SIGNIFICANT CHANGE UP (ref 150–400)
PLATELET COUNT - ESTIMATE: NORMAL — SIGNIFICANT CHANGE UP
PMV BLD: 11.4 FL — SIGNIFICANT CHANGE UP (ref 7–13)
PO2 BLDV: 34 MMHG — LOW (ref 35–40)
PO2 BLDV: < 24 MMHG — LOW (ref 35–40)
POIKILOCYTOSIS BLD QL AUTO: SIGNIFICANT CHANGE UP
POTASSIUM BLDV-SCNC: 3.5 MMOL/L — SIGNIFICANT CHANGE UP (ref 3.4–4.5)
POTASSIUM BLDV-SCNC: 3.9 MMOL/L — SIGNIFICANT CHANGE UP (ref 3.4–4.5)
POTASSIUM SERPL-MCNC: 3.4 MMOL/L — LOW (ref 3.5–5.3)
POTASSIUM SERPL-SCNC: 3.4 MMOL/L — LOW (ref 3.5–5.3)
PROMYELOCYTES # FLD: 0 % — SIGNIFICANT CHANGE UP (ref 0–0)
PROT SERPL-MCNC: 5.6 G/DL — LOW (ref 6–8.3)
PROTHROM AB SERPL-ACNC: 28 SEC — HIGH (ref 9.8–13.1)
RBC # BLD: 3.2 M/UL — LOW (ref 4.2–5.8)
RBC # BLD: 3.34 M/UL — LOW (ref 4.2–5.8)
RBC # FLD: 17.1 % — HIGH (ref 10.3–14.5)
RBC # FLD: 18.6 % — HIGH (ref 10.3–14.5)
SAO2 % BLDV: 36.7 % — LOW (ref 60–85)
SAO2 % BLDV: 58.8 % — LOW (ref 60–85)
SODIUM SERPL-SCNC: 140 MMOL/L — SIGNIFICANT CHANGE UP (ref 135–145)
TROPONIN T, HIGH SENSITIVITY: 25 NG/L — SIGNIFICANT CHANGE UP (ref ?–14)
TROPONIN T, HIGH SENSITIVITY: 38 NG/L — SIGNIFICANT CHANGE UP (ref ?–14)
VARIANT LYMPHS # BLD: 0 % — SIGNIFICANT CHANGE UP
WBC # BLD: 19.87 K/UL — HIGH (ref 3.8–10.5)
WBC # BLD: 20.4 K/UL — HIGH (ref 3.8–10.5)
WBC # FLD AUTO: 19.87 K/UL — HIGH (ref 3.8–10.5)
WBC # FLD AUTO: 20.4 K/UL — HIGH (ref 3.8–10.5)

## 2019-12-02 PROCEDURE — 70450 CT HEAD/BRAIN W/O DYE: CPT | Mod: 26

## 2019-12-02 PROCEDURE — 99213 OFFICE O/P EST LOW 20 MIN: CPT

## 2019-12-02 PROCEDURE — 74177 CT ABD & PELVIS W/CONTRAST: CPT | Mod: 26

## 2019-12-02 PROCEDURE — 71045 X-RAY EXAM CHEST 1 VIEW: CPT | Mod: 26

## 2019-12-02 RX ORDER — CHOLESTYRAMINE 4 G/5.5G
4 POWDER, FOR SUSPENSION ORAL TWICE DAILY
Qty: 180 | Refills: 0 | Status: ACTIVE | COMMUNITY
Start: 2019-11-13 | End: 1900-01-01

## 2019-12-02 RX ORDER — SODIUM CHLORIDE 9 MG/ML
1000 INJECTION INTRAMUSCULAR; INTRAVENOUS; SUBCUTANEOUS ONCE
Refills: 0 | Status: COMPLETED | OUTPATIENT
Start: 2019-12-02 | End: 2019-12-02

## 2019-12-02 RX ORDER — AZTREONAM 2 G
1000 VIAL (EA) INJECTION ONCE
Refills: 0 | Status: COMPLETED | OUTPATIENT
Start: 2019-12-02 | End: 2019-12-02

## 2019-12-02 RX ORDER — NOREPINEPHRINE BITARTRATE/D5W 8 MG/250ML
0.05 PLASTIC BAG, INJECTION (ML) INTRAVENOUS
Qty: 8 | Refills: 0 | Status: DISCONTINUED | OUTPATIENT
Start: 2019-12-02 | End: 2019-12-03

## 2019-12-02 RX ORDER — CEFEPIME 1 G/1
1000 INJECTION, POWDER, FOR SOLUTION INTRAMUSCULAR; INTRAVENOUS ONCE
Refills: 0 | Status: DISCONTINUED | OUTPATIENT
Start: 2019-12-02 | End: 2019-12-02

## 2019-12-02 RX ORDER — MIDODRINE HYDROCHLORIDE 2.5 MG/1
10 TABLET ORAL ONCE
Refills: 0 | Status: COMPLETED | OUTPATIENT
Start: 2019-12-02 | End: 2019-12-02

## 2019-12-02 RX ADMIN — Medication 50 MILLIGRAM(S): at 18:48

## 2019-12-02 RX ADMIN — SODIUM CHLORIDE 1000 MILLILITER(S): 9 INJECTION INTRAMUSCULAR; INTRAVENOUS; SUBCUTANEOUS at 20:25

## 2019-12-02 RX ADMIN — SODIUM CHLORIDE 1000 MILLILITER(S): 9 INJECTION INTRAMUSCULAR; INTRAVENOUS; SUBCUTANEOUS at 16:24

## 2019-12-02 RX ADMIN — MIDODRINE HYDROCHLORIDE 10 MILLIGRAM(S): 2.5 TABLET ORAL at 19:51

## 2019-12-02 RX ADMIN — SODIUM CHLORIDE 1000 MILLILITER(S): 9 INJECTION INTRAMUSCULAR; INTRAVENOUS; SUBCUTANEOUS at 20:42

## 2019-12-02 RX ADMIN — Medication 5.95 MICROGRAM(S)/KG/MIN: at 22:07

## 2019-12-02 RX ADMIN — Medication 0.05 MICROGRAM(S)/KG/MIN: at 23:35

## 2019-12-02 NOTE — CONSULT NOTE ADULT - SUBJECTIVE AND OBJECTIVE BOX
CHIEF COMPLAINT: hypotension and fall    HPI: 68 yo M PMHx cholangiocarcinoma s/p Whipple 2017 with recurrence of disease now with mets to the liver and last IV chemo 7/2019, portal vein thrombus on Eliquis x 1.5 weeks presents from [facility] for fall. Pt was at [facility] to start chemotherapy today when he had a mechanical fall and tripped over his cane. He braced his fall but hit the right side of his head. Patient's BP was checked at [facility] and was reportedly low. He denies preceding sx of CP/palpitations, lightheadedness, dizziness. He received 1 L IVF en route to ER. Denies headache, vision changes, weakness. Of note, pt was in ER at [facility] on 11/22 for one day of fever, diagnostic paracentesis was done which did not show infection.    In the ED, patient was persistently hypotensive to 80s-90s/50s (baseline SBP- 150s). He got 2L NS bolus.     PAST MEDICAL & SURGICAL HISTORY:  Rash  Biliary stricture: s/p biliary stent  Pancreatic lesion  Gastric polyp  Obstructive jaundice  Other hyperlipidemia  Essential hypertension  IBS (irritable bowel syndrome)  GERD (gastroesophageal reflux disease)  Intestinal Whipple's disease  S/P unilateral inguinal hernia repair  S/P tendon repair: hand  History of lumbar laminectomy  History of biliary stent insertion: 2/21/2017  S/P cervical spinal fusion      FAMILY HISTORY:  FH: CHF (congestive heart failure)  FH: prostate cancer  Family history of colitis (Father)      SOCIAL HISTORY:  Smoking: [ ] Never Smoked [ ] Former Smoker (__ packs x ___ years) [ ] Current Smoker  (__ packs x ___ years)  Substance Use: [ ] Never Used [ ] Used ____  EtOH Use:  Marital Status: [ ] Single [ ]  [ ]  [ ]   Sexual History:   Occupation:  Recent Travel:  Country of Birth:  Advance Directives:    Allergies    penicillin G benzathine (Rash)  sulfa drugs (Rash)    Intolerances        HOME MEDICATIONS:  Home Medications:  aluminum hydroxide-magnesium hydroxide 200 mg-200 mg/5 mL oral suspension: 30 milliliter(s) orally every 4 hours, As needed, Dyspepsia (21 Nov 2019 11:17)  doxazosin 1 mg oral tablet: 1 tab(s) orally once a day (at bedtime) (12 Oct 2017 09:28)  Eliquis 5 mg oral tablet: 1 tab(s) orally 2 times a day (19 Nov 2019 02:08)  gabapentin 300 mg oral capsule: 1 cap(s) orally 3 times a day (19 Nov 2019 02:05)  metoprolol succinate 25 mg oral tablet, extended release: 1 tab(s) orally once a day (19 Nov 2019 02:13)  pantoprazole 40 mg oral delayed release tablet: 1 tab(s) orally once a day (12 Oct 2017 09:28)  simethicone 80 mg oral tablet, chewable: 1 tab(s) orally 4 times a day (after meals and at bedtime) (19 Nov 2019 02:07)      REVIEW OF SYSTEMS:  Constitutional: [ ] negative [ ] fevers [ ] chills [ ] weight loss [ ] weight gain  HEENT: [ ] negative [ ] dry eyes [ ] eye irritation [ ] postnasal drip [ ] nasal congestion  CV: [ ] negative  [ ] chest pain [ ] orthopnea [ ] palpitations [ ] murmur  Resp: [ ] negative [ ] cough [ ] shortness of breath [ ] dyspnea [ ] wheezing [ ] sputum [ ] hemoptysis  GI: [ ] negative [ ] nausea [ ] vomiting [ ] diarrhea [ ] constipation [ ] abd pain [ ] dysphagia   : [ ] negative [ ] dysuria [ ] nocturia [ ] hematuria [ ] increased urinary frequency  Musculoskeletal: [ ] negative [ ] back pain [ ] myalgias [ ] arthralgias [ ] fracture  Skin: [ ] negative [ ] rash [ ] itch  Neurological: [ ] negative [ ] headache [ ] dizziness [ ] syncope [ ] weakness [ ] numbness  Psychiatric: [ ] negative [ ] anxiety [ ] depression  Endocrine: [ ] negative [ ] diabetes [ ] thyroid problem  Hematologic/Lymphatic: [ ] negative [ ] anemia [ ] bleeding problem  Allergic/Immunologic: [ ] negative [ ] itchy eyes [ ] nasal discharge [ ] hives [ ] angioedema  [ ] All other systems negative  [ ] Unable to assess ROS because ________    OBJECTIVE:  ICU Vital Signs Last 24 Hrs  T(C): 36.7 (02 Dec 2019 21:40), Max: 36.7 (02 Dec 2019 20:43)  T(F): 98 (02 Dec 2019 21:40), Max: 98 (02 Dec 2019 20:43)  HR: 78 (02 Dec 2019 21:40) (71 - 79)  BP: 90/53 (02 Dec 2019 21:40) (83/52 - 101/62)  BP(mean): --  ABP: --  ABP(mean): --  RR: 20 (02 Dec 2019 21:40) (16 - 20)  SpO2: 98% (02 Dec 2019 21:40) (96% - 100%)        CAPILLARY BLOOD GLUCOSE          PHYSICAL EXAM:  GENERAL: NAD, lying in bed comfortably  CHEST/LUNG: Clear to auscultation bilaterally; No rales, rhonchi, wheezing, or rubs. Unlabored respirations  HEART: Regular rate and rhythm; No murmurs, rubs, or gallops  ABDOMEN: Bowel sounds present; Soft, Nontender, Nondistended. No hepatomegaly  EXTREMITIES:  2+ Peripheral Pulses, brisk capillary refill. No clubbing, cyanosis, or edema  NERVOUS SYSTEM:  Alert & Oriented X3, speech clear. No deficits   MSK: FROM all 4 extremities, full and equal strength  SKIN: No rashes or lesions    LINES:     HOSPITAL MEDICATIONS:  Standing Meds:  norepinephrine Infusion 0.05 MICROgram(s)/kG/Min IV Continuous <Continuous>      PRN Meds:      LABS:                        8.5    19.87 )-----------( 237      ( 02 Dec 2019 15:37 )             27.3     Hgb Trend: 8.5<--, 9.5<--  12-02    140  |  104  |  11  ----------------------------<  109<H>  3.4<L>   |  23  |  0.92    Ca    7.9<L>      02 Dec 2019 15:37    TPro  5.6<L>  /  Alb  2.7<L>  /  TBili  1.0  /  DBili  x   /  AST  22  /  ALT  12  /  AlkPhos  360<H>  12-02    Creatinine Trend: 0.92<--, 0.68<--, 0.78<--, 0.70<--, 0.82<--  PT/INR - ( 02 Dec 2019 15:37 )   PT: 28.0 SEC;   INR: 2.39          PTT - ( 02 Dec 2019 15:37 )  PTT:31.7 SEC      Venous Blood Gas:  12-02 @ 20:18  7.38/41/34/23/58.8  VBG Lactate: 2.4  Venous Blood Gas:  12-02 @ 18:30  7.38/43/< 24/24/36.7  VBG Lactate: 3.1      MICROBIOLOGY:       RADIOLOGY:  [ ] Reviewed and interpreted by me    EKG: CHIEF COMPLAINT: hypotension and fall    HPI: 68 yo M PMHx cholangiocarcinoma s/p Whipple 2017 with recurrence of disease now with mets to the liver and last IV chemo 7/2019, portal vein thrombus on Eliquis x 1.5 weeks presents from [facility] for fall. Pt was at [facility] to start chemotherapy today when he had a mechanical fall and tripped over his cane. He braced his fall but hit the right side of his head. Patient's BP was checked at [facility] and was reportedly low. He denies preceding sx of CP/palpitations, lightheadedness, dizziness. He received 1 L IVF en route to ER. Denies headache, vision changes, weakness. Of note, pt was in ER at [facility] on 11/22 for one day of fever, diagnostic paracentesis was done which did not show infection.    In the ED, patient was persistently hypotensive to 80s-90s/50s (baseline SBP- 150s). He got 2L NS bolus.     PAST MEDICAL & SURGICAL HISTORY:  Rash  Biliary stricture: s/p biliary stent  Pancreatic lesion  Gastric polyp  Obstructive jaundice  Other hyperlipidemia  Essential hypertension  IBS (irritable bowel syndrome)  GERD (gastroesophageal reflux disease)  Intestinal Whipple's disease  S/P unilateral inguinal hernia repair  S/P tendon repair: hand  History of lumbar laminectomy  History of biliary stent insertion: 2/21/2017  S/P cervical spinal fusion      FAMILY HISTORY:  FH: CHF (congestive heart failure)  FH: prostate cancer  Family history of colitis (Father)      SOCIAL HISTORY:  Smoking: [ ] Never Smoked [ ] Former Smoker (__ packs x ___ years) [ ] Current Smoker  (__ packs x ___ years)  Substance Use: [ ] Never Used [ ] Used ____  EtOH Use:  Marital Status: [ ] Single [ ]  [ ]  [ ]   Sexual History:   Occupation:  Recent Travel:  Country of Birth:  Advance Directives:    Allergies    penicillin G benzathine (Rash)  sulfa drugs (Rash)    Intolerances        HOME MEDICATIONS:  Home Medications:  aluminum hydroxide-magnesium hydroxide 200 mg-200 mg/5 mL oral suspension: 30 milliliter(s) orally every 4 hours, As needed, Dyspepsia (21 Nov 2019 11:17)  doxazosin 1 mg oral tablet: 1 tab(s) orally once a day (at bedtime) (12 Oct 2017 09:28)  Eliquis 5 mg oral tablet: 1 tab(s) orally 2 times a day (19 Nov 2019 02:08)  gabapentin 300 mg oral capsule: 1 cap(s) orally 3 times a day (19 Nov 2019 02:05)  metoprolol succinate 25 mg oral tablet, extended release: 1 tab(s) orally once a day (19 Nov 2019 02:13)  pantoprazole 40 mg oral delayed release tablet: 1 tab(s) orally once a day (12 Oct 2017 09:28)  simethicone 80 mg oral tablet, chewable: 1 tab(s) orally 4 times a day (after meals and at bedtime) (19 Nov 2019 02:07)      REVIEW OF SYSTEMS:  Constitutional: [ ] negative [ ] fevers [ ] chills [ ] weight loss [ ] weight gain  HEENT: [ ] negative [ ] dry eyes [ ] eye irritation [ ] postnasal drip [ ] nasal congestion  CV: [ ] negative  [ ] chest pain [ ] orthopnea [ ] palpitations [ ] murmur  Resp: [ ] negative [ ] cough [ ] shortness of breath [ ] dyspnea [ ] wheezing [ ] sputum [ ] hemoptysis  GI: [ ] negative [ ] nausea [ ] vomiting [ ] diarrhea [ ] constipation [ ] abd pain [ ] dysphagia   : [ ] negative [ ] dysuria [ ] nocturia [ ] hematuria [ ] increased urinary frequency  Musculoskeletal: [ ] negative [ ] back pain [ ] myalgias [ ] arthralgias [ ] fracture  Skin: [ ] negative [ ] rash [ ] itch  Neurological: [ ] negative [ ] headache [ ] dizziness [ ] syncope [ ] weakness [ ] numbness  Psychiatric: [ ] negative [ ] anxiety [ ] depression  Endocrine: [ ] negative [ ] diabetes [ ] thyroid problem  Hematologic/Lymphatic: [ ] negative [ ] anemia [ ] bleeding problem  Allergic/Immunologic: [ ] negative [ ] itchy eyes [ ] nasal discharge [ ] hives [ ] angioedema  [ ] All other systems negative  [ ] Unable to assess ROS because ________    OBJECTIVE:  ICU Vital Signs Last 24 Hrs  T(C): 36.7 (02 Dec 2019 21:40), Max: 36.7 (02 Dec 2019 20:43)  T(F): 98 (02 Dec 2019 21:40), Max: 98 (02 Dec 2019 20:43)  HR: 78 (02 Dec 2019 21:40) (71 - 79)  BP: 90/53 (02 Dec 2019 21:40) (83/52 - 101/62)  BP(mean): --  ABP: --  ABP(mean): --  RR: 20 (02 Dec 2019 21:40) (16 - 20)  SpO2: 98% (02 Dec 2019 21:40) (96% - 100%)        CAPILLARY BLOOD GLUCOSE      PHYSICAL EXAM:  GENERAL: NAD, lying in bed comfortably  CHEST/LUNG: Clear to auscultation bilaterally; No rales, rhonchi, wheezing, or rubs. Unlabored respirations  HEART: Regular rate and rhythm; No murmurs, rubs, or gallops  ABDOMEN: Bowel sounds present; Soft, Nontender, Nondistended. No hepatomegaly  EXTREMITIES:  2+ Peripheral Pulses, brisk capillary refill. No clubbing, cyanosis, or edema  NERVOUS SYSTEM:  Alert & Oriented X3, speech clear. No deficits   MSK: FROM all 4 extremities, full and equal strength  SKIN: No rashes or lesions    LINES:     HOSPITAL MEDICATIONS:  Standing Meds:  norepinephrine Infusion 0.05 MICROgram(s)/kG/Min IV Continuous <Continuous>      PRN Meds:      LABS:                        8.5    19.87 )-----------( 237      ( 02 Dec 2019 15:37 )             27.3     Hgb Trend: 8.5<--, 9.5<--  12-02    140  |  104  |  11  ----------------------------<  109<H>  3.4<L>   |  23  |  0.92    Ca    7.9<L>      02 Dec 2019 15:37    TPro  5.6<L>  /  Alb  2.7<L>  /  TBili  1.0  /  DBili  x   /  AST  22  /  ALT  12  /  AlkPhos  360<H>  12-02    Creatinine Trend: 0.92<--, 0.68<--, 0.78<--, 0.70<--, 0.82<--  PT/INR - ( 02 Dec 2019 15:37 )   PT: 28.0 SEC;   INR: 2.39          PTT - ( 02 Dec 2019 15:37 )  PTT:31.7 SEC      Venous Blood Gas:  12-02 @ 20:18  7.38/41/34/23/58.8  VBG Lactate: 2.4  Venous Blood Gas:  12-02 @ 18:30  7.38/43/< 24/24/36.7  VBG Lactate: 3.1      MICROBIOLOGY:       RADIOLOGY:  [ ] Reviewed and interpreted by me    EKG:

## 2019-12-02 NOTE — ED ADULT NURSE REASSESSMENT NOTE - NS ED NURSE REASSESS COMMENT FT1
Pt received in Trauma C, A&Ox3, in no obvious distress. Respiration is even and unlabored.  Blood pressure as noted, ordered meds to be given. Pt denies any CP, SOB, nausea and vomiting. Cardiac monitor in progress, NSR. will continue to monitor. Pt received in Trauma C, A&Ox3, in no obvious distress. Respiration is even and unlabored.  Blood pressure as noted, ordered meds to be given. Pt denies any CP, SOB, nausea and vomiting. Cardiac monitor in progress, NSR. Accessed chemo port noted to right chest wall with positive blood return. will continue to monitor.

## 2019-12-02 NOTE — ED PROVIDER NOTE - PROGRESS NOTE DETAILS
Pt endorsed from Dr Dowd. pt ambulatory in ED no distress. Denies any cp/sob/abdominal pain. asking to eat. no nausea/vomit. no cough. SBP 87 after 2L Ns and midorine. Pt states normal BP is 115. pending ua/rvp will add ct. pt nontender abdomen. -Jayleen pt no distress. SBP 82 after midorine. pt given 2L NS in ED and one w EMS. Denies any complaints. pending ua/rvp/ct to be done. will start Pressor and MICU consulted. pt and wife informed pt feeling better. pending ua/ ct read, Off the pressor now will monitor BP. pt to be admitted. Endorse to Dr Soares Jonathan Weil, PGY3 - BP remained stable off pressors. CT unrevealing. Will admit to medicine.

## 2019-12-02 NOTE — ED PROVIDER NOTE - CLINICAL SUMMARY MEDICAL DECISION MAKING FREE TEXT BOX
Annika:  pt here with fall from trip today.  denies palpitations, cp, n/v.  pt recently placed on eliquis for thrombosis related to his choriocarcinoma, which has recurred and which he was due to have a chemo treatment for today.  pt did hit his head, did not have loc. pt with abrasions on forehead, arm and leg.  plan head ct and check of labs and observe. Annika:  pt here with fall from trip today.  denies palpitations, cp, n/v.  pt recently placed on eliquis for thrombosis related to his choriocarcinoma, which has recurred and which he was due to have a chemo treatment for today.  pt did hit his head, did not have loc. pt with abrasions on forehead, arm and leg.  plan head ct and check of labs and observe. pt also noted to be hypotensive, abd soft no r/g/t, conversant, sepsis under consideration    signed out to Dr. Goyal at 4:15pm awaiting labs, response to fluid, and ct and chest film

## 2019-12-02 NOTE — ED ADULT NURSE NOTE - OBJECTIVE STATEMENT
Pt received to [facility] AAO x 3 sent from [place] [facility] for hypotension 63/48. Pt states he slipped and fell, denies dizziness or LOC prior to episode and found to have low BP. Pt was to receive first round of chemo and arrives with NS infusing to RCW port. Pt denies dizziness or other complaints presenlty. VS noted and MD aware of BP 80's/50's and second liter of NS infusing. Labs sent and pt awaiting head CT. Small abrasion noted to the forehead. [place]

## 2019-12-02 NOTE — ED PROVIDER NOTE - NS ED ROS FT
GENERAL: No fever or chills, EYES: no change in vision, HEENT: no trouble swallowing or speaking, CARDIAC: no chest pain, PULMONARY: no cough or SOB, GI: no abdominal pain, no nausea, no vomiting, no diarrhea or constipation, : No changes in urination, SKIN: no rashes, NEURO: no headache,  MSK: No joint pain ~Sabino Dsouza M.D. Resident

## 2019-12-02 NOTE — ED PROVIDER NOTE - PHYSICAL EXAMINATION
Gen: AAOx3, non-toxic  Head: NCAT  HEENT: EOMI, oral mucosa moist, normal conjunctiva  Lung: CTAB, no respiratory distress, no wheezes/rhonchi/rales B/L, speaking in full sentences  CV: RRR, no murmurs, rubs or gallops  Abd: soft, NTND, no guarding  MSK: no visible deformities  Neuro: No focal sensory or motor deficits  Skin: Warm, well perfused, no rash  Psych: normal affect.   ~Sabino Dsouza M.D. Resident

## 2019-12-02 NOTE — CONSULT NOTE ADULT - ASSESSMENT
68 yo M PMHx cholangiocarcinoma s/p Whipple 2017 with recurrence of disease now with mets to the liver and last IV chemo 7/2019, portal vein thrombus on Eliquis x 1.5 weeks presents from [facility] for fall with hospital c/b hypotension.    # Hypotension  - s/p 2L NS bolus  - would recommend giving additional fluid bolus and considering mainetence fluid  - 68 yo M PMHx cholangiocarcinoma s/p Whipple 2017 with recurrence of disease now with mets to the liver and last IV chemo 7/2019, portal vein thrombus on Eliquis x 1.5 weeks presents from [facility] for fall with hospital c/b hypotension.    # Hypotension  - s/p 2L NS bolus  - agree with urine cx and CT A/P   - agree with continuing IV Aztreonam  - would recommend giving additional fluid bolus and considering maintenance fluid  - closely monitor vitals    Patient is not a MICU candidate at this time. Please reconsult as necessary. 68 yo M PMHx cholangiocarcinoma s/p Whipple 2017 with recurrence of disease now with mets to the liver and last IV chemo 7/2019, portal vein thrombus on Eliquis x 1.5 weeks presents from [facility] for fall with hospital c/b hypotension.    # Hypotension  - s/p 2L NS bolus  - agree with urine cx and CT A/P   - agree with continuing IV Aztreonam  - most recent vitas- BP- 93/50, HR- 74, satting 100% and RR- 15  - would recommend giving additional fluid bolus and considering maintenance fluid  - closely monitor vitals    Patient is not a MICU candidate at this time. Please reconsult as necessary.

## 2019-12-02 NOTE — ED PROVIDER NOTE - OBJECTIVE STATEMENT
68 yo M PMHx cholangiocarcinoma s/p Whipple 2017 with recurrence of disease now with mets to the liver and last IV chemo 7/2019, portal vein thrombus on Eliquis x 1.5 weeks presents from [facility] for fall. Pt was at [facility] to start chemotherapy today when he had a mechanical fall and tripped over his cane. He braced his fall but hit the right side of his head. Patient's BP was checked at [facility] and was reportedly low. He denies preceding sx of CP/palpitations, lightheadedness, dizziness. He received 1 L IVF en route to ER. Denies headache, vision changes, weakness. Of note, pt was in ER at [facility] on 11/22 for one day of fever, diagnostic paracentesis was done which did not show infection.

## 2019-12-02 NOTE — ED PROVIDER NOTE - ATTENDING CONTRIBUTION TO CARE
I performed a history and physical exam of the patient and discussed their management with the resident and /or advanced care provider. I reviewed the resident and /or ACP's note and agree with the documented findings and plan of care. My medical decision making and observations are found above.      pt with low bp, choliagiocancer, but conversant, denies abd pain, being evaluated for sepsis and on eliquis so plan ct for head bleed possibility,  small abrasion on head, and knee as stated

## 2019-12-02 NOTE — ED ADULT NURSE REASSESSMENT NOTE - COMFORT CARE
plan of care explained/ambulated to bathroom/warm blanket provided/side rails up/wait time explained

## 2019-12-02 NOTE — ED PROVIDER NOTE - FAMILY HISTORY
FH: prostate cancer     FH: CHF (congestive heart failure)     Father  Still living? Unknown  Family history of colitis, Age at diagnosis: Age Unknown

## 2019-12-02 NOTE — ED ADULT TRIAGE NOTE - CHIEF COMPLAINT QUOTE
pt had witnessed mechanical fall at [facility].  pt was found to be hypotensive.  pt has PMH:  gallbladder ca

## 2019-12-03 DIAGNOSIS — Z29.9 ENCOUNTER FOR PROPHYLACTIC MEASURES, UNSPECIFIED: ICD-10-CM

## 2019-12-03 DIAGNOSIS — K21.9 GASTRO-ESOPHAGEAL REFLUX DISEASE WITHOUT ESOPHAGITIS: ICD-10-CM

## 2019-12-03 DIAGNOSIS — K76.9 LIVER DISEASE, UNSPECIFIED: ICD-10-CM

## 2019-12-03 DIAGNOSIS — R65.10 SYSTEMIC INFLAMMATORY RESPONSE SYNDROME (SIRS) OF NON-INFECTIOUS ORIGIN WITHOUT ACUTE ORGAN DYSFUNCTION: ICD-10-CM

## 2019-12-03 DIAGNOSIS — I95.9 HYPOTENSION, UNSPECIFIED: ICD-10-CM

## 2019-12-03 DIAGNOSIS — M54.9 DORSALGIA, UNSPECIFIED: ICD-10-CM

## 2019-12-03 DIAGNOSIS — I49.9 CARDIAC ARRHYTHMIA, UNSPECIFIED: ICD-10-CM

## 2019-12-03 DIAGNOSIS — S09.8XXA OTHER SPECIFIED INJURIES OF HEAD, INITIAL ENCOUNTER: ICD-10-CM

## 2019-12-03 DIAGNOSIS — C22.1 INTRAHEPATIC BILE DUCT CARCINOMA: ICD-10-CM

## 2019-12-03 DIAGNOSIS — Z02.9 ENCOUNTER FOR ADMINISTRATIVE EXAMINATIONS, UNSPECIFIED: ICD-10-CM

## 2019-12-03 LAB
ALBUMIN SERPL ELPH-MCNC: 2.6 G/DL — LOW (ref 3.3–5)
ALP SERPL-CCNC: 326 U/L — HIGH (ref 40–120)
ALT FLD-CCNC: 10 U/L — SIGNIFICANT CHANGE UP (ref 4–41)
AMMONIA BLD-MCNC: 30 UMOL/L — SIGNIFICANT CHANGE UP (ref 11–55)
ANION GAP SERPL CALC-SCNC: 12 MMO/L — SIGNIFICANT CHANGE UP (ref 7–14)
APPEARANCE UR: CLEAR — SIGNIFICANT CHANGE UP
APTT BLD: 29.5 SEC — SIGNIFICANT CHANGE UP (ref 27.5–36.3)
AST SERPL-CCNC: 20 U/L — SIGNIFICANT CHANGE UP (ref 4–40)
B PERT DNA SPEC QL NAA+PROBE: NOT DETECTED — SIGNIFICANT CHANGE UP
BACTERIA # UR AUTO: NEGATIVE — SIGNIFICANT CHANGE UP
BASE EXCESS BLDV CALC-SCNC: 1.7 MMOL/L — SIGNIFICANT CHANGE UP
BASOPHILS # BLD AUTO: 0.04 K/UL — SIGNIFICANT CHANGE UP (ref 0–0.2)
BASOPHILS NFR BLD AUTO: 0.3 % — SIGNIFICANT CHANGE UP (ref 0–2)
BILIRUB SERPL-MCNC: 0.7 MG/DL — SIGNIFICANT CHANGE UP (ref 0.2–1.2)
BILIRUB UR-MCNC: NEGATIVE — SIGNIFICANT CHANGE UP
BLD GP AB SCN SERPL QL: NEGATIVE — SIGNIFICANT CHANGE UP
BLOOD GAS VENOUS - CREATININE: 0.8 MG/DL — SIGNIFICANT CHANGE UP (ref 0.5–1.3)
BLOOD GAS VENOUS - FIO2: 21 — SIGNIFICANT CHANGE UP
BLOOD UR QL VISUAL: NEGATIVE — SIGNIFICANT CHANGE UP
BUN SERPL-MCNC: 13 MG/DL — SIGNIFICANT CHANGE UP (ref 7–23)
C PNEUM DNA SPEC QL NAA+PROBE: NOT DETECTED — SIGNIFICANT CHANGE UP
CALCIUM SERPL-MCNC: 7.9 MG/DL — LOW (ref 8.4–10.5)
CHLORIDE BLDV-SCNC: 106 MMOL/L — SIGNIFICANT CHANGE UP (ref 96–108)
CHLORIDE SERPL-SCNC: 106 MMOL/L — SIGNIFICANT CHANGE UP (ref 98–107)
CO2 SERPL-SCNC: 23 MMOL/L — SIGNIFICANT CHANGE UP (ref 22–31)
COLOR SPEC: YELLOW — SIGNIFICANT CHANGE UP
CREAT SERPL-MCNC: 0.82 MG/DL — SIGNIFICANT CHANGE UP (ref 0.5–1.3)
EOSINOPHIL # BLD AUTO: 0.95 K/UL — HIGH (ref 0–0.5)
EOSINOPHIL NFR BLD AUTO: 6.8 % — HIGH (ref 0–6)
FLUAV H1 2009 PAND RNA SPEC QL NAA+PROBE: NOT DETECTED — SIGNIFICANT CHANGE UP
FLUAV H1 RNA SPEC QL NAA+PROBE: NOT DETECTED — SIGNIFICANT CHANGE UP
FLUAV H3 RNA SPEC QL NAA+PROBE: NOT DETECTED — SIGNIFICANT CHANGE UP
FLUAV SUBTYP SPEC NAA+PROBE: NOT DETECTED — SIGNIFICANT CHANGE UP
FLUBV RNA SPEC QL NAA+PROBE: NOT DETECTED — SIGNIFICANT CHANGE UP
GAS PNL BLDV: 137 MMOL/L — SIGNIFICANT CHANGE UP (ref 136–146)
GLUCOSE BLDV-MCNC: 98 MG/DL — SIGNIFICANT CHANGE UP (ref 70–99)
GLUCOSE SERPL-MCNC: 103 MG/DL — HIGH (ref 70–99)
GLUCOSE UR-MCNC: NEGATIVE — SIGNIFICANT CHANGE UP
HADV DNA SPEC QL NAA+PROBE: NOT DETECTED — SIGNIFICANT CHANGE UP
HCO3 BLDV-SCNC: 25 MMOL/L — SIGNIFICANT CHANGE UP (ref 20–27)
HCOV PNL SPEC NAA+PROBE: SIGNIFICANT CHANGE UP
HCT VFR BLD CALC: 26.5 % — LOW (ref 39–50)
HCT VFR BLD CALC: 30.3 % — LOW (ref 39–50)
HCT VFR BLDV CALC: 27.1 % — LOW (ref 39–51)
HGB BLD-MCNC: 8.5 G/DL — LOW (ref 13–17)
HGB BLD-MCNC: 9.6 G/DL — LOW (ref 13–17)
HGB BLDV-MCNC: 8.7 G/DL — LOW (ref 13–17)
HMPV RNA SPEC QL NAA+PROBE: NOT DETECTED — SIGNIFICANT CHANGE UP
HPIV1 RNA SPEC QL NAA+PROBE: NOT DETECTED — SIGNIFICANT CHANGE UP
HPIV2 RNA SPEC QL NAA+PROBE: NOT DETECTED — SIGNIFICANT CHANGE UP
HPIV3 RNA SPEC QL NAA+PROBE: NOT DETECTED — SIGNIFICANT CHANGE UP
HPIV4 RNA SPEC QL NAA+PROBE: NOT DETECTED — SIGNIFICANT CHANGE UP
HYALINE CASTS # UR AUTO: HIGH
IMM GRANULOCYTES NFR BLD AUTO: 0.6 % — SIGNIFICANT CHANGE UP (ref 0–1.5)
INR BLD: 1.93 — HIGH (ref 0.88–1.17)
KETONES UR-MCNC: NEGATIVE — SIGNIFICANT CHANGE UP
LACTATE BLDV-MCNC: 1.8 MMOL/L — SIGNIFICANT CHANGE UP (ref 0.5–2)
LEUKOCYTE ESTERASE UR-ACNC: NEGATIVE — SIGNIFICANT CHANGE UP
LYMPHOCYTES # BLD AUTO: 0.61 K/UL — LOW (ref 1–3.3)
LYMPHOCYTES # BLD AUTO: 4.4 % — LOW (ref 13–44)
MAGNESIUM SERPL-MCNC: 1.6 MG/DL — SIGNIFICANT CHANGE UP (ref 1.6–2.6)
MCHC RBC-ENTMCNC: 27 PG — SIGNIFICANT CHANGE UP (ref 27–34)
MCHC RBC-ENTMCNC: 27 PG — SIGNIFICANT CHANGE UP (ref 27–34)
MCHC RBC-ENTMCNC: 31.7 % — LOW (ref 32–36)
MCHC RBC-ENTMCNC: 32.1 % — SIGNIFICANT CHANGE UP (ref 32–36)
MCV RBC AUTO: 84.1 FL — SIGNIFICANT CHANGE UP (ref 80–100)
MCV RBC AUTO: 85.4 FL — SIGNIFICANT CHANGE UP (ref 80–100)
MONOCYTES # BLD AUTO: 0.69 K/UL — SIGNIFICANT CHANGE UP (ref 0–0.9)
MONOCYTES NFR BLD AUTO: 4.9 % — SIGNIFICANT CHANGE UP (ref 2–14)
NEUTROPHILS # BLD AUTO: 11.59 K/UL — HIGH (ref 1.8–7.4)
NEUTROPHILS NFR BLD AUTO: 83 % — HIGH (ref 43–77)
NITRITE UR-MCNC: NEGATIVE — SIGNIFICANT CHANGE UP
NRBC # FLD: 0 K/UL — SIGNIFICANT CHANGE UP (ref 0–0)
NRBC # FLD: 0 K/UL — SIGNIFICANT CHANGE UP (ref 0–0)
PCO2 BLDV: 36 MMHG — LOW (ref 41–51)
PH BLDV: 7.46 PH — HIGH (ref 7.32–7.43)
PH UR: 6 — SIGNIFICANT CHANGE UP (ref 5–8)
PHOSPHATE SERPL-MCNC: 2.4 MG/DL — LOW (ref 2.5–4.5)
PLATELET # BLD AUTO: 225 K/UL — SIGNIFICANT CHANGE UP (ref 150–400)
PLATELET # BLD AUTO: 227 K/UL — SIGNIFICANT CHANGE UP (ref 150–400)
PMV BLD: 10.9 FL — SIGNIFICANT CHANGE UP (ref 7–13)
PMV BLD: 11.1 FL — SIGNIFICANT CHANGE UP (ref 7–13)
PO2 BLDV: 28 MMHG — LOW (ref 35–40)
POTASSIUM BLDV-SCNC: 3.5 MMOL/L — SIGNIFICANT CHANGE UP (ref 3.4–4.5)
POTASSIUM SERPL-MCNC: 3.6 MMOL/L — SIGNIFICANT CHANGE UP (ref 3.5–5.3)
POTASSIUM SERPL-SCNC: 3.6 MMOL/L — SIGNIFICANT CHANGE UP (ref 3.5–5.3)
PROT SERPL-MCNC: 5.1 G/DL — LOW (ref 6–8.3)
PROT UR-MCNC: 30 — SIGNIFICANT CHANGE UP
PROTHROM AB SERPL-ACNC: 21.9 SEC — HIGH (ref 9.8–13.1)
RBC # BLD: 3.15 M/UL — LOW (ref 4.2–5.8)
RBC # BLD: 3.55 M/UL — LOW (ref 4.2–5.8)
RBC # FLD: 18.6 % — HIGH (ref 10.3–14.5)
RBC # FLD: 18.7 % — HIGH (ref 10.3–14.5)
RBC CASTS # UR COMP ASSIST: SIGNIFICANT CHANGE UP (ref 0–?)
RH IG SCN BLD-IMP: POSITIVE — SIGNIFICANT CHANGE UP
RSV RNA SPEC QL NAA+PROBE: NOT DETECTED — SIGNIFICANT CHANGE UP
RV+EV RNA SPEC QL NAA+PROBE: NOT DETECTED — SIGNIFICANT CHANGE UP
SAO2 % BLDV: 49.6 % — LOW (ref 60–85)
SODIUM SERPL-SCNC: 141 MMOL/L — SIGNIFICANT CHANGE UP (ref 135–145)
SP GR SPEC: > 1.04 — HIGH (ref 1–1.04)
SPECIMEN SOURCE: SIGNIFICANT CHANGE UP
SPECIMEN SOURCE: SIGNIFICANT CHANGE UP
SQUAMOUS # UR AUTO: SIGNIFICANT CHANGE UP
UROBILINOGEN FLD QL: NORMAL — SIGNIFICANT CHANGE UP
WBC # BLD: 11.3 K/UL — HIGH (ref 3.8–10.5)
WBC # BLD: 13.96 K/UL — HIGH (ref 3.8–10.5)
WBC # FLD AUTO: 11.3 K/UL — HIGH (ref 3.8–10.5)
WBC # FLD AUTO: 13.96 K/UL — HIGH (ref 3.8–10.5)
WBC UR QL: SIGNIFICANT CHANGE UP (ref 0–?)

## 2019-12-03 PROCEDURE — 99223 1ST HOSP IP/OBS HIGH 75: CPT

## 2019-12-03 PROCEDURE — 99223 1ST HOSP IP/OBS HIGH 75: CPT | Mod: GC,AI

## 2019-12-03 RX ORDER — GABAPENTIN 400 MG/1
300 CAPSULE ORAL AT BEDTIME
Refills: 0 | Status: DISCONTINUED | OUTPATIENT
Start: 2019-12-03 | End: 2019-12-05

## 2019-12-03 RX ORDER — SIMETHICONE 80 MG/1
1 TABLET, CHEWABLE ORAL
Qty: 0 | Refills: 0 | DISCHARGE

## 2019-12-03 RX ORDER — APIXABAN 2.5 MG/1
5 TABLET, FILM COATED ORAL
Refills: 0 | Status: DISCONTINUED | OUTPATIENT
Start: 2019-12-03 | End: 2019-12-05

## 2019-12-03 RX ORDER — LIDOCAINE 4 G/100G
1 CREAM TOPICAL ONCE
Refills: 0 | Status: COMPLETED | OUTPATIENT
Start: 2019-12-03 | End: 2019-12-04

## 2019-12-03 RX ORDER — OXYCODONE HYDROCHLORIDE 5 MG/1
5 TABLET ORAL EVERY 4 HOURS
Refills: 0 | Status: DISCONTINUED | OUTPATIENT
Start: 2019-12-03 | End: 2019-12-05

## 2019-12-03 RX ORDER — PANTOPRAZOLE SODIUM 20 MG/1
40 TABLET, DELAYED RELEASE ORAL
Refills: 0 | Status: DISCONTINUED | OUTPATIENT
Start: 2019-12-03 | End: 2019-12-05

## 2019-12-03 RX ORDER — GABAPENTIN 400 MG/1
1 CAPSULE ORAL
Qty: 0 | Refills: 0 | DISCHARGE

## 2019-12-03 RX ORDER — DOXAZOSIN MESYLATE 4 MG
1 TABLET ORAL AT BEDTIME
Refills: 0 | Status: DISCONTINUED | OUTPATIENT
Start: 2019-12-03 | End: 2019-12-03

## 2019-12-03 RX ORDER — CEFEPIME 1 G/1
1000 INJECTION, POWDER, FOR SOLUTION INTRAMUSCULAR; INTRAVENOUS EVERY 12 HOURS
Refills: 0 | Status: DISCONTINUED | OUTPATIENT
Start: 2019-12-03 | End: 2019-12-04

## 2019-12-03 RX ORDER — TAMSULOSIN HYDROCHLORIDE 0.4 MG/1
0.4 CAPSULE ORAL AT BEDTIME
Refills: 0 | Status: DISCONTINUED | OUTPATIENT
Start: 2019-12-03 | End: 2019-12-05

## 2019-12-03 RX ORDER — SODIUM CHLORIDE 9 MG/ML
500 INJECTION, SOLUTION INTRAVENOUS ONCE
Refills: 0 | Status: COMPLETED | OUTPATIENT
Start: 2019-12-03 | End: 2019-12-03

## 2019-12-03 RX ORDER — SODIUM,POTASSIUM PHOSPHATES 278-250MG
1 POWDER IN PACKET (EA) ORAL
Refills: 0 | Status: COMPLETED | OUTPATIENT
Start: 2019-12-03 | End: 2019-12-03

## 2019-12-03 RX ORDER — OXYCODONE HYDROCHLORIDE 5 MG/1
2.5 TABLET ORAL ONCE
Refills: 0 | Status: DISCONTINUED | OUTPATIENT
Start: 2019-12-03 | End: 2019-12-03

## 2019-12-03 RX ORDER — LIDOCAINE 4 G/100G
1 CREAM TOPICAL ONCE
Refills: 0 | Status: COMPLETED | OUTPATIENT
Start: 2019-12-03 | End: 2019-12-03

## 2019-12-03 RX ORDER — SODIUM CHLORIDE 9 MG/ML
1000 INJECTION, SOLUTION INTRAVENOUS
Refills: 0 | Status: DISCONTINUED | OUTPATIENT
Start: 2019-12-03 | End: 2019-12-04

## 2019-12-03 RX ORDER — VANCOMYCIN HCL 1 G
750 VIAL (EA) INTRAVENOUS EVERY 12 HOURS
Refills: 0 | Status: DISCONTINUED | OUTPATIENT
Start: 2019-12-03 | End: 2019-12-04

## 2019-12-03 RX ORDER — CEFEPIME 1 G/1
2000 INJECTION, POWDER, FOR SOLUTION INTRAMUSCULAR; INTRAVENOUS EVERY 8 HOURS
Refills: 0 | Status: DISCONTINUED | OUTPATIENT
Start: 2019-12-03 | End: 2019-12-03

## 2019-12-03 RX ADMIN — OXYCODONE HYDROCHLORIDE 5 MILLIGRAM(S): 5 TABLET ORAL at 19:07

## 2019-12-03 RX ADMIN — Medication 1 TABLET(S): at 09:51

## 2019-12-03 RX ADMIN — LIDOCAINE 1 PATCH: 4 CREAM TOPICAL at 09:50

## 2019-12-03 RX ADMIN — OXYCODONE HYDROCHLORIDE 5 MILLIGRAM(S): 5 TABLET ORAL at 20:00

## 2019-12-03 RX ADMIN — OXYCODONE HYDROCHLORIDE 2.5 MILLIGRAM(S): 5 TABLET ORAL at 15:34

## 2019-12-03 RX ADMIN — APIXABAN 5 MILLIGRAM(S): 2.5 TABLET, FILM COATED ORAL at 19:07

## 2019-12-03 RX ADMIN — Medication 250 MILLIGRAM(S): at 19:07

## 2019-12-03 RX ADMIN — APIXABAN 5 MILLIGRAM(S): 2.5 TABLET, FILM COATED ORAL at 06:42

## 2019-12-03 RX ADMIN — SODIUM CHLORIDE 75 MILLILITER(S): 9 INJECTION, SOLUTION INTRAVENOUS at 21:15

## 2019-12-03 RX ADMIN — LIDOCAINE 1 PATCH: 4 CREAM TOPICAL at 22:45

## 2019-12-03 RX ADMIN — Medication 250 MILLIGRAM(S): at 07:48

## 2019-12-03 RX ADMIN — PANTOPRAZOLE SODIUM 40 MILLIGRAM(S): 20 TABLET, DELAYED RELEASE ORAL at 06:42

## 2019-12-03 RX ADMIN — SODIUM CHLORIDE 75 MILLILITER(S): 9 INJECTION, SOLUTION INTRAVENOUS at 10:45

## 2019-12-03 RX ADMIN — TAMSULOSIN HYDROCHLORIDE 0.4 MILLIGRAM(S): 0.4 CAPSULE ORAL at 22:14

## 2019-12-03 RX ADMIN — CEFEPIME 100 MILLIGRAM(S): 1 INJECTION, POWDER, FOR SOLUTION INTRAMUSCULAR; INTRAVENOUS at 06:52

## 2019-12-03 RX ADMIN — SODIUM CHLORIDE 1000 MILLILITER(S): 9 INJECTION, SOLUTION INTRAVENOUS at 09:49

## 2019-12-03 RX ADMIN — GABAPENTIN 300 MILLIGRAM(S): 400 CAPSULE ORAL at 22:14

## 2019-12-03 RX ADMIN — Medication 1 TABLET(S): at 15:33

## 2019-12-03 RX ADMIN — CEFEPIME 100 MILLIGRAM(S): 1 INJECTION, POWDER, FOR SOLUTION INTRAMUSCULAR; INTRAVENOUS at 21:25

## 2019-12-03 NOTE — PROGRESS NOTE ADULT - PROBLEM SELECTOR PLAN 1
- Patient presented with tachycardia with leukocytosis.   - unclear source of infection, possibly pneumonia vs. intrabdominal infection, SIRS could be response to portal venous thrombosis.   - CT abdomen pelvis shows spreading disease in the liver with signs of infarct.   - CXR shows new left pleural effusion.   - patient given 2 liters of IV fluids aztreonam and cefepime in the ED.   - MICU consulted for hypotension, consult reviewed  - will continue with vanc and cefepime.  f/u BCx, UCX  consider CT chest - P/w leukocytosis, hypotension in ED s/p 2L fluid/Levophed, tachycardia (though tachycardia present after administration of Levophed)  - Leukocytosis possibly due to portal vein thrombosis vs infection vs ABD infection   - CT A/P demonstrates increased liver mets with new liver infarcts  - CXR demonstrates L pleural effusion, R basilar atelectasis  - S/p aztreonam and cefepime in the ED.   - MICU consulted for hypotension, advised fluids and midodrine to which patient had appropriate response  - C/w vanc and cefepime.  - F/u BCx, UCX - P/w leukocytosis, hypotension in ED s/p 2L fluid/Levophed, tachycardia (though tachycardia present after administration of Levophed)  - Leukocytosis possibly due to portal vein thrombosis vs infection vs ABD infection  - RVP negative   - CT A/P demonstrates increased liver mets with new liver infarcts  - CXR demonstrates L pleural effusion, R basilar atelectasis  - S/p aztreonam and cefepime in the ED.   - MICU consulted for hypotension, advised fluids and midodrine to which patient had appropriate response  - C/w vanc and cefepime  - F/u BCx, UCX

## 2019-12-03 NOTE — H&P ADULT - NSHPLABSRESULTS_GEN_ALL_CORE
8.5    19.87 )-----------( 237      ( 02 Dec 2019 15:37 )             27.3     12-02    140  |  104  |  11  ----------------------------<  109<H>  3.4<L>   |  23  |  0.92  Ca    7.9<L>      02 Dec 2019 15:37  TPro  5.6<L>  /  Alb  2.7<L>  /  TBili  1.0  /  DBili  x   /  AST  22  /  ALT  12  /  AlkPhos  360<H>  12-02        Urinalysis Basic - ( 03 Dec 2019 01:30 )  Color: YELLOW / Appearance: CLEAR / SG: > 1.040 / pH: 6.0 Gluc: NEGATIVE / Ketone: NEGATIVE  / Bili: NEGATIVE / Urobili: NORMAL Blood: NEGATIVE / Protein: 30 / Nitrite: NEGATIVE Leuk Esterase: NEGATIVE / RBC: 0-2 / WBC 3-5 Sq Epi: OCC / Non Sq Epi: x / Bacteria: NEGATIVE    PT/INR - ( 02 Dec 2019 15:37 )   PT: 28.0 SEC;   INR: 2.39     PTT - ( 02 Dec 2019 15:37 )  PTT:31.7 SEC    Culture Results: No growth (11-22 @ 16:54)  Culture Results: No growth at 5 days (11-22 @ 16:53)  Culture Results: No growth at 5 days. (11-20 @ 23:05)  Culture Results: No growth at 5 days. (11-20 @ 23:05)  Culture Results: No growth at 5 days (11-19 @ 05:27)  Culture Results: No Protozoa seen by trichrome stain No Helminths or Protozoa seen in formalin concentrate One negative sample does not necessarily rule out the presence of a parasitic infection. (11-19 @ 03:07)  Culture Results: GI PCR Results: NOT detected  Culture Results: No enteric pathogens isolated. (Stool culture examined for Salmonella, Shigella, Campylobacter, Aeromonas, Plesiomonas, Vibrio, E.coli O157 and Yersinia) (11-19 @ 02:22)  Culture Results: No growth (11-18 @ 22:57)  Culture Results: No growth at 5 days. (11-18 @ 22:10)  Culture Results: No growth at 5 days. (11-18 @ 22:09)    Rapid Respiratory Viral Panel (11.18.19 @ 18:47)    Rapid RVP Result: NotDetec:     < from: Xray Chest 1 View- PORTABLE-Urgent (12.02.19 @ 16:21) >    INTERPRETATION:  Leftlower lung opacity may represent pleural effusion, however cannot exclude infection. Right basilar linear and subsegmental atelectasis.    < end of copied text >    < from: CT Abdomen and Pelvis w/ IV Cont (12.02.19 @ 22:47) >    IMPRESSION:     New areas of wedge-shaped hypoattenuation in the liver, in the setting of   known portal vein thrombosis, may reflect areas of hepatic infarction.    Worsening hepatic metastases.    Slight interval increase in the moderate volume ascites.    Status post Whipple with stable soft tissue encasement of the superior   mesenteric artery.    New small left pleural effusion.    < end of copied text >    < from: CT Head No Cont (12.02.19 @ 17:01) >      IMPRESSION:     No acute intracranial bleeding, mass effect, or shift. No acute displaced   calvarial fracture.    < end of copied text >

## 2019-12-03 NOTE — PROGRESS NOTE ADULT - SUBJECTIVE AND OBJECTIVE BOX
Contact Information:  Nhi Arzate II, MD, MPH  PGY-1, Internal Medicine  Pager: [phone] ([facility]) /// [phone] ([facility])    WOODY CEVALLOS, [id number]    Patient is a 69y old  Male who presents with a chief complaint of mechanical fall in the setting of SIRS (03 Dec 2019 04:11)      OVERNIGHT EVENTS:    SUBJECTIVE:    CONSTITUTIONAL: No weakness. No fatigue. No fever.  HEAD: No head trauma.   EYES: No vision changes.  ENT: No hearing changes or tinnitus. No ear pain. No changes in smell. No nasal congestion or discharge. No sore throat. No voice hoarseness.   NECK: No neck pain or stiffness. No lumps.  RESPIRATORY: No cough. No SOB. No wheezing. No hemoptysis.   CARDIOVASCULAR: No chest pain. No palpitations.   GASTROINTESTINAL: No dysphagia. No ABD pain. No distension. No constipation. No diarrhea. No pain with defecation. No hematemesis. No hematochezia or melena.  BACK: No back pain.  GENITOURINARY: No dysuria. No frequency or urgency. No hesitancy. No incontinence. No urinary retention. No suprapubic pain. No hematuria.  EXTREMITY: No swelling.  MUSCULOSKELETAL: No joint pain or swelling. No fractures. No stiffness.    SKIN: No rashes. No itching. No skin, hair, or nail changes.  NEUROLOGICAL: No weakness or paralysis. No lightheadedness or dizziness. No HA. No numbness or tingling.   PSYCHIATRIC: No depression.       OBJECTIVE:  Vital Signs Last 24 Hrs  T(C): 37.6 (03 Dec 2019 06:30), Max: 37.6 (03 Dec 2019 06:30)  T(F): 99.7 (03 Dec 2019 06:30), Max: 99.7 (03 Dec 2019 06:30)  HR: 87 (03 Dec 2019 06:30) (71 - 110)  BP: 103/62 (03 Dec 2019 06:30) (83/52 - 130/74)  BP(mean): 75 (03 Dec 2019 01:40) (63 - 87)  RR: 22 (03 Dec 2019 06:30) (16 - 22)  SpO2: 92% (03 Dec 2019 06:30) (92% - 100%)  I&O's Summary      MEDICATIONS  (STANDING):  apixaban 5 milliGRAM(s) Oral two times a day  cefepime   IVPB 2000 milliGRAM(s) IV Intermittent every 8 hours  doxazosin 1 milliGRAM(s) Oral at bedtime  gabapentin 300 milliGRAM(s) Oral at bedtime  pantoprazole    Tablet 40 milliGRAM(s) Oral before breakfast  potassium acid phosphate/sodium acid phosphate tablet (K-PHOS No. 2) 1 Tablet(s) Oral four times a day with meals  vancomycin  IVPB 750 milliGRAM(s) IV Intermittent every 12 hours    MEDICATIONS  (PRN):  aluminum hydroxide/magnesium hydroxide/simethicone Suspension 30 milliLiter(s) Oral every 4 hours PRN Dyspepsia    Allergies    penicillin G benzathine (Rash)  sulfa drugs (Rash)    Intolerances        CONSTITUTIONAL: No acute distress. Awake and alert.  HEAD: No evidence of trauma. Structures WNL.  EYES: +PERRL. +EOMI. No scleral icterus. No conjunctival injection.  ENT: Moist oral mucosa. No erythema. No pharyngeal exudates.   NECK: Supple. Appropriate ROM. No stiffness. No masses or lymphadenopathy.  RESPIRATORY: CTAB. No wheezes, rales, or rhonchi. No accessory muscle use. No apparent respiratory distress.  CARDIOVASCULAR: +S1/S2. No audible S3/S4. Regular rate and rhythm. No murmurs, rubs, or gallops. 2+ radial pulses x b/l UE; 2+ DP pulses x b/l LE.   GASTROINTESTINAL: Soft, nontender, nondistended. +BS. No rebound or guarding.   BACK: No spinal or paraspinal tenderness. No CVA tenderness.  EXTREMITY: No LE swelling or edema. EXTs warm to touch.  MUSCULOSKELETAL: Movement evident in all limbs. No tenderness on palpation.  DERMATOLOGICAL: No abnormal rashes or lesions.  NEUROLOGICAL: CN 2-12 grossly intact. No focal deficits. Sensation intact x 4EXT. A&Ox3 (oriented to person, place, and time).  PSYCHIATRIC: Appropriate affect.                            8.5    13.96 )-----------( 225      ( 03 Dec 2019 05:30 )             26.5     PT/INR - ( 03 Dec 2019 05:30 )   PT: 21.9 SEC;   INR: 1.93          PTT - ( 03 Dec 2019 05:30 )  PTT:29.5 SEC  12-03    141  |  106  |  13  ----------------------------<  103<H>  3.6   |  23  |  0.82    Ca    7.9<L>      03 Dec 2019 05:30  Phos  2.4     12-03  Mg     1.6     12-03    TPro  5.1<L>  /  Alb  2.6<L>  /  TBili  0.7  /  DBili  x   /  AST  20  /  ALT  10  /  AlkPhos  326<H>  12-03    CAPILLARY BLOOD GLUCOSE        LIVER FUNCTIONS - ( 03 Dec 2019 05:30 )  Alb: 2.6 g/dL / Pro: 5.1 g/dL / ALK PHOS: 326 u/L / ALT: 10 u/L / AST: 20 u/L / GGT: x               Urinalysis Basic - ( 03 Dec 2019 01:30 )    Color: YELLOW / Appearance: CLEAR / SG: > 1.040 / pH: 6.0  Gluc: NEGATIVE / Ketone: NEGATIVE  / Bili: NEGATIVE / Urobili: NORMAL   Blood: NEGATIVE / Protein: 30 / Nitrite: NEGATIVE   Leuk Esterase: NEGATIVE / RBC: 0-2 / WBC 3-5   Sq Epi: OCC / Non Sq Epi: x / Bacteria: NEGATIVE            RADIOLOGY AND ADDITIONAL TESTS:    CONSULTANT NOTES REVIEWED:    CARE DISCUSSED WITH THE FOLLOWING CONSULTANTS/PROVIDERS: Contact Information:  Nhi Arzate II, MD, MPH  PGY-1, Internal Medicine  Pager: [phone] ([facility]) /// [phone] ([facility])    WOODY CEVALLOS, [id number]    Patient is a 69y old  Male who presents with a chief complaint of mechanical fall in the setting of SIRS (03 Dec 2019 04:11)      OVERNIGHT EVENTS: Patient admitted for evaluation s/p fall at [facility], found to have findings of worsening liver metastases/new liver infarctions.    SUBJECTIVE: Patient evaluated at bedside, states that he feels tired and weak with back pain and cough. Had a soft green bowel movement. Denies fever, nausea/vomiting, pain.    CONSTITUTIONAL:+Fatigue. H/o weakness.  RESPIRATORY: +Cough. No SOB. No wheezing. No hemoptysis.    GASTROINTESTINAL: +Diarrhea. No dysphagia. No ABD pain. No distension. No constipation. No pain with defecation. No hematemesis. No hematochezia or melena.  BACK: +Back pain.    OBJECTIVE:  Vital Signs Last 24 Hrs  T(C): 37.6 (03 Dec 2019 06:30), Max: 37.6 (03 Dec 2019 06:30)  T(F): 99.7 (03 Dec 2019 06:30), Max: 99.7 (03 Dec 2019 06:30)  HR: 87 (03 Dec 2019 06:30) (71 - 110)  BP: 103/62 (03 Dec 2019 06:30) (83/52 - 130/74)  BP(mean): 75 (03 Dec 2019 01:40) (63 - 87)  RR: 22 (03 Dec 2019 06:30) (16 - 22)  SpO2: 92% (03 Dec 2019 06:30) (92% - 100%)  I&O's Summary      MEDICATIONS  (STANDING):  apixaban 5 milliGRAM(s) Oral two times a day  cefepime   IVPB 2000 milliGRAM(s) IV Intermittent every 8 hours  doxazosin 1 milliGRAM(s) Oral at bedtime  gabapentin 300 milliGRAM(s) Oral at bedtime  pantoprazole    Tablet 40 milliGRAM(s) Oral before breakfast  potassium acid phosphate/sodium acid phosphate tablet (K-PHOS No. 2) 1 Tablet(s) Oral four times a day with meals  vancomycin  IVPB 750 milliGRAM(s) IV Intermittent every 12 hours    MEDICATIONS  (PRN):  aluminum hydroxide/magnesium hydroxide/simethicone Suspension 30 milliLiter(s) Oral every 4 hours PRN Dyspepsia    Allergies    penicillin G benzathine (Rash)  sulfa drugs (Rash)    Intolerances        CONSTITUTIONAL: Older gentleman looking younger than state age sitting in bed; no acute distress.  HEAD: Small abrasion above R eye.  EYES: +PERRL. +EOMI. No scleral icterus. No conjunctival injection.  ENT: Moist oral mucosa.   NECK: Supple.  RESPIRATORY: CTAB in anterior lung fields, slightly decreased breath sounds in the lower posterior lung fields. No wheezes, rales, or rhonchi. No accessory muscle use. No apparent respiratory distress.  CHEST: Port catheter in R chest.  CARDIOVASCULAR: +S1/S2. No audible S3/S4. Regular rate and rhythm. No murmurs, rubs, or gallops. 2+ radial pulses x b/l UE; 2+ DP pulses x b/l LE.   GASTROINTESTINAL: Soft, nontender, slightly distended. +BS. No rebound or guarding.   EXTREMITY: No LE swelling or edema. EXTs warm to touch.  MUSCULOSKELETAL: Movement evident in all limbs. No tenderness on palpation.  DERMATOLOGICAL: No abnormal rashes or lesions.  NEUROLOGICAL: CN 2-12 grossly intact. No focal deficits. Sensation intact x 4EXT. A&Ox3 (oriented to person, place, and time).  PSYCHIATRIC: Appropriate affect.                            8.5    13.96 )-----------( 225      ( 03 Dec 2019 05:30 )             26.5     PT/INR - ( 03 Dec 2019 05:30 )   PT: 21.9 SEC;   INR: 1.93          PTT - ( 03 Dec 2019 05:30 )  PTT:29.5 SEC  12-03    141  |  106  |  13  ----------------------------<  103<H>  3.6   |  23  |  0.82    Ca    7.9<L>      03 Dec 2019 05:30  Phos  2.4     12-03  Mg     1.6     12-03    TPro  5.1<L>  /  Alb  2.6<L>  /  TBili  0.7  /  DBili  x   /  AST  20  /  ALT  10  /  AlkPhos  326<H>  12-03    CAPILLARY BLOOD GLUCOSE        LIVER FUNCTIONS - ( 03 Dec 2019 05:30 )  Alb: 2.6 g/dL / Pro: 5.1 g/dL / ALK PHOS: 326 u/L / ALT: 10 u/L / AST: 20 u/L / GGT: x               Urinalysis Basic - ( 03 Dec 2019 01:30 )    Color: YELLOW / Appearance: CLEAR / SG: > 1.040 / pH: 6.0  Gluc: NEGATIVE / Ketone: NEGATIVE  / Bili: NEGATIVE / Urobili: NORMAL   Blood: NEGATIVE / Protein: 30 / Nitrite: NEGATIVE   Leuk Esterase: NEGATIVE / RBC: 0-2 / WBC 3-5   Sq Epi: OCC / Non Sq Epi: x / Bacteria: NEGATIVE            RADIOLOGY AND ADDITIONAL TESTS:    CONSULTANT NOTES REVIEWED:    CARE DISCUSSED WITH THE FOLLOWING CONSULTANTS/PROVIDERS: Contact Information:  Nhi Arzaet II, MD, MPH  PGY-1, Internal Medicine  Pager: [phone] ([facility]) /// [phone] ([facility])    WOODY CEVALLOS, MRN-[id number]    Patient is a 69y old  Male who presents with a chief complaint of mechanical fall in the setting of SIRS (03 Dec 2019 04:11)    OVERNIGHT EVENTS: Patient admitted for evaluation s/p fall at [facility], found to have findings of worsening liver metastases/new liver infarctions.    SUBJECTIVE: Patient evaluated at bedside, states that he feels tired and weak with back pain and cough. Had a soft green bowel movement. Denies fever, nausea/vomiting, pain.    CONSTITUTIONAL:+Fatigue. H/o weakness.  RESPIRATORY: +Cough. No SOB. No wheezing. No hemoptysis.    GASTROINTESTINAL: +Diarrhea. No dysphagia. No ABD pain. No distension. No constipation. No pain with defecation. No hematemesis. No hematochezia or melena.  BACK: +Back pain.    OBJECTIVE:  Vital Signs Last 24 Hrs  T(C): 37.6 (03 Dec 2019 06:30), Max: 37.6 (03 Dec 2019 06:30)  T(F): 99.7 (03 Dec 2019 06:30), Max: 99.7 (03 Dec 2019 06:30)  HR: 87 (03 Dec 2019 06:30) (71 - 110)  BP: 103/62 (03 Dec 2019 06:30) (83/52 - 130/74)  BP(mean): 75 (03 Dec 2019 01:40) (63 - 87)  RR: 22 (03 Dec 2019 06:30) (16 - 22)  SpO2: 92% (03 Dec 2019 06:30) (92% - 100%)    MEDICATIONS  (STANDING):  apixaban 5 milliGRAM(s) Oral two times a day  cefepime   IVPB 2000 milliGRAM(s) IV Intermittent every 8 hours  doxazosin 1 milliGRAM(s) Oral at bedtime  gabapentin 300 milliGRAM(s) Oral at bedtime  pantoprazole    Tablet 40 milliGRAM(s) Oral before breakfast  potassium acid phosphate/sodium acid phosphate tablet (K-PHOS No. 2) 1 Tablet(s) Oral four times a day with meals  vancomycin  IVPB 750 milliGRAM(s) IV Intermittent every 12 hours    MEDICATIONS  (PRN):  aluminum hydroxide/magnesium hydroxide/simethicone Suspension 30 milliLiter(s) Oral every 4 hours PRN Dyspepsia    Allergies  penicillin G benzathine (Rash)  sulfa drugs (Rash)    CONSTITUTIONAL: Older gentleman looking younger than state age sitting in bed; no acute distress.  HEAD: Small abrasion above R eye.  EYES: +PERRL. +EOMI. No scleral icterus. No conjunctival injection.  ENT: Moist oral mucosa.   NECK: Supple.  RESPIRATORY: CTAB in anterior lung fields, slightly decreased breath sounds in the lower posterior lung fields. No wheezes, rales, or rhonchi. No accessory muscle use. No apparent respiratory distress.  CHEST: Port catheter in R chest.  CARDIOVASCULAR: +S1/S2. No audible S3/S4. Regular rate and rhythm. No murmurs, rubs, or gallops. 2+ radial pulses x b/l UE; 2+ DP pulses x b/l LE.   GASTROINTESTINAL: Soft, nontender, slightly distended. +BS. No rebound or guarding.   EXTREMITY: No LE swelling or edema. EXTs warm to touch.  MUSCULOSKELETAL: Movement evident in all limbs. No tenderness on palpation.  DERMATOLOGICAL: No abnormal rashes or lesions.  NEUROLOGICAL: CN 2-12 grossly intact. No focal deficits. Sensation intact x 4EXT. A&Ox3 (oriented to person, place, and time).  PSYCHIATRIC: Appropriate affect.               8.5    13.96 )-----------( 225      ( 03 Dec 2019 05:30 )             26.5     PT/INR - ( 03 Dec 2019 05:30 )   PT: 21.9 SEC;   INR: 1.93          PTT - ( 03 Dec 2019 05:30 )  PTT:29.5 SEC  12-03    141  |  106  |  13  ----------------------------<  103<H>  3.6   |  23  |  0.82    Ca    7.9<L>      03 Dec 2019 05:30  Phos  2.4     12-03  Mg     1.6     12-03    TPro  5.1<L>  /  Alb  2.6<L>  /  TBili  0.7  /  DBili  x   /  AST  20  /  ALT  10  /  AlkPhos  326<H>  12-03    CAPILLARY BLOOD GLUCOSE    LIVER FUNCTIONS - ( 03 Dec 2019 05:30 )  Alb: 2.6 g/dL / Pro: 5.1 g/dL / ALK PHOS: 326 u/L / ALT: 10 u/L / AST: 20 u/L / GGT: x           Urinalysis Basic - ( 03 Dec 2019 01:30 )    Color: YELLOW / Appearance: CLEAR / SG: > 1.040 / pH: 6.0  Gluc: NEGATIVE / Ketone: NEGATIVE  / Bili: NEGATIVE / Urobili: NORMAL   Blood: NEGATIVE / Protein: 30 / Nitrite: NEGATIVE   Leuk Esterase: NEGATIVE / RBC: 0-2 / WBC 3-5   Sq Epi: OCC / Non Sq Epi: x / Bacteria: NEGATIVE            RADIOLOGY AND ADDITIONAL TESTS:    CONSULTANT NOTES REVIEWED:    CARE DISCUSSED WITH THE FOLLOWING CONSULTANTS/PROVIDERS:

## 2019-12-03 NOTE — H&P ADULT - PROBLEM SELECTOR PLAN 7
Transitions of Care Status:  1.  Name of PCP:Dr. Melgar  2.  PCP Contacted on Admission: [ ] Y    [ ] N    3.  PCP contacted at Discharge: [ ] Y    [ ] N    [ ] N/A  4.  Post-Discharge Appointment Date and Location:  5.  Summary of Handoff given to PCP:

## 2019-12-03 NOTE — PROGRESS NOTE ADULT - PROBLEM SELECTOR PLAN 5
- continue pantoprazole and maalox - patient presenting with irregular heart rhythm that was present on prior admission. Possibly Afib in the setting of SIRS  - Most recent EKG NSR w/PACs  - C/w metoprolol  - Holding BB due to hypotension

## 2019-12-03 NOTE — PROGRESS NOTE ADULT - PROBLEM SELECTOR PLAN 3
- patient presenting with irregular heart rhythm that was present on prior admission. Possibly Afib in the setting of SIRS  - will repeat EKG.   - continue eliquis  - hold metoprolol for now in the setting of hypotension - patient presenting with irregular heart rhythm that was present on prior admission. Possibly Afib in the setting of SIRS  - Most recent EKG NSR w/PACs  - C/w metoprolol  - Holding BB due to hypotension - H/o cholangiocarcinoma with relapse  - CT demonstrating worsening mets within liver; also demonstrating increased number of liver hypodensities likely 2/2 portal vein thrombosis vs hypotension  - Oncology consulted, recommends continuation of eliquis

## 2019-12-03 NOTE — H&P ADULT - ATTENDING COMMENTS
Pt reports continue diarrhea.  No subjective fever, dyspnea or cough.  Felt generalized weakness earlier, but improved with fluid resuscitation.    On exam: pt in NAD.  Heart, irregular, lungs decreased BS at L base. Abd distended, NT normal BS  Labs reviewed  CT a/p and CXR reviewed   Will continue broad spectrum abx  trend BP  hold off on immodium for now.  Low suspicion for C diff, however if diarrhea is severe, will consider checking PCR.

## 2019-12-03 NOTE — PROGRESS NOTE ADULT - PROBLEM SELECTOR PLAN 8
- DVT prophylaxis: Eliquis - DVT prophylaxis: Eliquis  - Diet: Regular   - PT Consult Pending  - Dietitian Consult Pending

## 2019-12-03 NOTE — CONSULT NOTE ADULT - SUBJECTIVE AND OBJECTIVE BOX
Patient is a 69y old  Male who presents with a chief complaint of mechanical fall in the setting of SIRS (03 Dec 2019 08:26)      HPI:  The patient is a 69 year old man with a history of cholangiocarcinoma s/p Whipple 2017 with recurrence of disease now with mets to the liver and last IV chemo 7/2019, portal vein thrombus on Eliquis x 1.5 weeks presents from [facility] for fall. The patient went to [facility] to restart his chemo regimen and when he was walking his cane got caught on a rug and he tripped over the cane and fell forward and hit his head. He never lost consciousness, he has no headache, vision changes, chest pain, palpitations. Over the past several weeks he has had days where he has felt ok and others when he has felt unwell. He has had a bit of a cough. He has had no fevers or chills, nausea, vomiting. He has been having off and on diarrhea for 2 months. During his last hospital visit the patient was given loperamide which helped with the diarrhea. He stopped taking it 3 days ago and has since had 2 softer bowel movements and some abdominal pain.     In the ED the patient was found to have a BP of 88/57. He was given 2 liters of IV fluids and midodrine 10 mg x1 with good response. (03 Dec 2019 04:11)           ROS: as above     PAST MEDICAL & SURGICAL HISTORY:  Rash  Biliary stricture: s/p biliary stent  Pancreatic lesion  Gastric polyp  Obstructive jaundice  Other hyperlipidemia  Essential hypertension  IBS (irritable bowel syndrome)  GERD (gastroesophageal reflux disease)  Intestinal Whipple's disease  S/P unilateral inguinal hernia repair  S/P tendon repair: hand  History of lumbar laminectomy  History of biliary stent insertion: 2/21/2017  S/P cervical spinal fusion      SOCIAL HISTORY:    FAMILY HISTORY:  FH: CHF (congestive heart failure)  FH: prostate cancer  Family history of colitis      MEDICATIONS  (STANDING):  apixaban 5 milliGRAM(s) Oral two times a day  cefepime   IVPB 1000 milliGRAM(s) IV Intermittent every 12 hours  gabapentin 300 milliGRAM(s) Oral at bedtime  lactated ringers. 1000 milliLiter(s) (75 mL/Hr) IV Continuous <Continuous>  lidocaine   Patch 1 Patch Transdermal once  pantoprazole    Tablet 40 milliGRAM(s) Oral before breakfast  potassium acid phosphate/sodium acid phosphate tablet (K-PHOS No. 2) 1 Tablet(s) Oral four times a day with meals  tamsulosin 0.4 milliGRAM(s) Oral at bedtime  vancomycin  IVPB 750 milliGRAM(s) IV Intermittent every 12 hours    MEDICATIONS  (PRN):  aluminum hydroxide/magnesium hydroxide/simethicone Suspension 30 milliLiter(s) Oral every 4 hours PRN Dyspepsia  oxyCODONE    IR 5 milliGRAM(s) Oral every 4 hours PRN moderate and severe pain      Allergies    penicillin G benzathine (Rash)  sulfa drugs (Rash)    Intolerances        Vital Signs Last 24 Hrs  T(C): 36.7 (03 Dec 2019 08:52), Max: 37.6 (03 Dec 2019 06:30)  T(F): 98.1 (03 Dec 2019 08:52), Max: 99.7 (03 Dec 2019 06:30)  HR: 82 (03 Dec 2019 08:52) (71 - 110)  BP: 99/57 (03 Dec 2019 08:52) (83/52 - 130/74)  BP(mean): 75 (03 Dec 2019 01:40) (63 - 87)  RR: 20 (03 Dec 2019 08:52) (16 - 22)  SpO2: 96% (03 Dec 2019 08:52) (92% - 100%)    PHYSICAL EXAM  General: adult in NAD  HEENT: clear oropharynx, anicteric sclera, pink conjunctiva  Neck: supple  CV: normal S1/S2 with no murmur rubs or gallops  Lungs: positive air movement b/l ant lungs, clear to auscultation, no wheezes, no rales  Abdomen: soft non-tender non-distended, no hepatosplenomegaly  Ext: no clubbing cyanosis or edema  Skin: no rashes and no petechiae  Neuro: alert and oriented X 3, none focal    LABS:                          8.5    13.96 )-----------( 225      ( 03 Dec 2019 05:30 )             26.5         Mean Cell Volume : 84.1 fL  Mean Cell Hemoglobin : 27.0 pg  Mean Cell Hemoglobin Concentration : 32.1 %  Auto Neutrophil # : 11.59 K/uL  Auto Lymphocyte # : 0.61 K/uL  Auto Monocyte # : 0.69 K/uL  Auto Eosinophil # : 0.95 K/uL  Auto Basophil # : 0.04 K/uL  Auto Neutrophil % : 83.0 %  Auto Lymphocyte % : 4.4 %  Auto Monocyte % : 4.9 %  Auto Eosinophil % : 6.8 %  Auto Basophil % : 0.3 %      Serial CBC's  12-03 @ 05:30  Hct-26.5 / Hgb-8.5 / Plat-225 / RBC-3.15 / WBC-13.96  Serial CBC's  12-02 @ 15:37  Hct-27.3 / Hgb-8.5 / Plat-237 / RBC-3.20 / WBC-19.87  Serial CBC's  12-02 @ 13:14  Hct-29.6 / Hgb-9.5 / Plat-193 / RBC-3.34 / WBC-20.4      12-03    141  |  106  |  13  ----------------------------<  103<H>  3.6   |  23  |  0.82    Ca    7.9<L>      03 Dec 2019 05:30  Phos  2.4     12-03  Mg     1.6     12-03    TPro  5.1<L>  /  Alb  2.6<L>  /  TBili  0.7  /  DBili  x   /  AST  20  /  ALT  10  /  AlkPhos  326<H>  12-03      PT/INR - ( 03 Dec 2019 05:30 )   PT: 21.9 SEC;   INR: 1.93          PTT - ( 03 Dec 2019 05:30 )  PTT:29.5 SEC                RADIOLOGY & ADDITIONAL STUDIES: Patient is a 69y old  Male who presents with a chief complaint of mechanical fall in the setting of SIRS (03 Dec 2019 08:26)      HPI:  The patient is a 69 year old man with a history of cholangiocarcinoma s/p Whipple 2017 with recurrence of disease now with mets to the liver and last IV chemo 7/2019, portal vein thrombus on Eliquis x 1.5 weeks presents from [facility] for fall. The patient went to [facility] to restart his chemo regimen and when he was walking his cane got caught on a rug and he tripped over the cane and fell forward and hit his head. He never lost consciousness, he has no headache, vision changes, chest pain, palpitations. Over the past several weeks he has had days where he has felt ok and others when he has felt unwell. He has had a bit of a cough. He has had no fevers or chills, nausea, vomiting. He has been having off and on diarrhea for 2 months. During his last hospital visit the patient was given loperamide which helped with the diarrhea. He stopped taking it 3 days ago and has since had 2 softer bowel movements and some abdominal pain.     In the ED the patient was found to have a BP of 88/57. He was given 2 liters of IV fluids and midodrine 10 mg x1 with good response. (03 Dec 2019 04:11)       ROS: as above     PAST MEDICAL & SURGICAL HISTORY:  Rash  Biliary stricture: s/p biliary stent  Pancreatic lesion  Gastric polyp  Obstructive jaundice  Other hyperlipidemia  Essential hypertension  IBS (irritable bowel syndrome)  GERD (gastroesophageal reflux disease)  Intestinal Whipple's disease  S/P unilateral inguinal hernia repair  S/P tendon repair: hand  History of lumbar laminectomy  History of biliary stent insertion: 2/21/2017  S/P cervical spinal fusion      SOCIAL HISTORY:    FAMILY HISTORY:  FH: CHF (congestive heart failure)  FH: prostate cancer  Family history of colitis      MEDICATIONS  (STANDING):  apixaban 5 milliGRAM(s) Oral two times a day  cefepime   IVPB 1000 milliGRAM(s) IV Intermittent every 12 hours  gabapentin 300 milliGRAM(s) Oral at bedtime  lactated ringers. 1000 milliLiter(s) (75 mL/Hr) IV Continuous <Continuous>  lidocaine   Patch 1 Patch Transdermal once  pantoprazole    Tablet 40 milliGRAM(s) Oral before breakfast  potassium acid phosphate/sodium acid phosphate tablet (K-PHOS No. 2) 1 Tablet(s) Oral four times a day with meals  tamsulosin 0.4 milliGRAM(s) Oral at bedtime  vancomycin  IVPB 750 milliGRAM(s) IV Intermittent every 12 hours    MEDICATIONS  (PRN):  aluminum hydroxide/magnesium hydroxide/simethicone Suspension 30 milliLiter(s) Oral every 4 hours PRN Dyspepsia  oxyCODONE    IR 5 milliGRAM(s) Oral every 4 hours PRN moderate and severe pain      Allergies    penicillin G benzathine (Rash)  sulfa drugs (Rash)    Intolerances        Vital Signs Last 24 Hrs  T(C): 36.7 (03 Dec 2019 08:52), Max: 37.6 (03 Dec 2019 06:30)  T(F): 98.1 (03 Dec 2019 08:52), Max: 99.7 (03 Dec 2019 06:30)  HR: 82 (03 Dec 2019 08:52) (71 - 110)  BP: 99/57 (03 Dec 2019 08:52) (83/52 - 130/74)  BP(mean): 75 (03 Dec 2019 01:40) (63 - 87)  RR: 20 (03 Dec 2019 08:52) (16 - 22)  SpO2: 96% (03 Dec 2019 08:52) (92% - 100%)    PHYSICAL EXAM  General: adult in NAD  HEENT: clear oropharynx, anicteric sclera, pink conjunctiva  Neck: supple  CV: normal S1/S2 with no murmur rubs or gallops  Lungs: positive air movement b/l ant lungs, clear to auscultation, no wheezes, no rales  Abdomen: soft non-tender non-distended, no hepatosplenomegaly  Ext: no clubbing cyanosis or edema  Skin: no rashes and no petechiae  Neuro: alert and oriented X 3, none focal    LABS:                          8.5    13.96 )-----------( 225      ( 03 Dec 2019 05:30 )             26.5         Mean Cell Volume : 84.1 fL  Mean Cell Hemoglobin : 27.0 pg  Mean Cell Hemoglobin Concentration : 32.1 %  Auto Neutrophil # : 11.59 K/uL  Auto Lymphocyte # : 0.61 K/uL  Auto Monocyte # : 0.69 K/uL  Auto Eosinophil # : 0.95 K/uL  Auto Basophil # : 0.04 K/uL  Auto Neutrophil % : 83.0 %  Auto Lymphocyte % : 4.4 %  Auto Monocyte % : 4.9 %  Auto Eosinophil % : 6.8 %  Auto Basophil % : 0.3 %      Serial CBC's  12-03 @ 05:30  Hct-26.5 / Hgb-8.5 / Plat-225 / RBC-3.15 / WBC-13.96  Serial CBC's  12-02 @ 15:37  Hct-27.3 / Hgb-8.5 / Plat-237 / RBC-3.20 / WBC-19.87  Serial CBC's  12-02 @ 13:14  Hct-29.6 / Hgb-9.5 / Plat-193 / RBC-3.34 / WBC-20.4      12-03    141  |  106  |  13  ----------------------------<  103<H>  3.6   |  23  |  0.82    Ca    7.9<L>      03 Dec 2019 05:30  Phos  2.4     12-03  Mg     1.6     12-03    TPro  5.1<L>  /  Alb  2.6<L>  /  TBili  0.7  /  DBili  x   /  AST  20  /  ALT  10  /  AlkPhos  326<H>  12-03      PT/INR - ( 03 Dec 2019 05:30 )   PT: 21.9 SEC;   INR: 1.93          PTT - ( 03 Dec 2019 05:30 )  PTT:29.5 SEC                RADIOLOGY & ADDITIONAL STUDIES:

## 2019-12-03 NOTE — PROGRESS NOTE ADULT - PROBLEM SELECTOR PLAN 7
Transitions of Care Status:  1.  Name of PCP:Dr. Melgar  2.  PCP Contacted on Admission: [ ] Y    [ ] N    3.  PCP contacted at Discharge: [ ] Y    [ ] N    [ ] N/A  4.  Post-Discharge Appointment Date and Location:  5.  Summary of Handoff given to PCP: - C/w Maalox, pantoprazole

## 2019-12-03 NOTE — PROGRESS NOTE ADULT - PROBLEM SELECTOR PLAN 9
Transitions of Care Status:  1.  Name of PCP: Dr. Melgar  2.  PCP Contacted on Admission: [ ] Y    [ ] N    3.  PCP contacted at Discharge: [ ] Y    [ ] N    [ ] N/A  4.  Post-Discharge Appointment Date and Location:  5.  Summary of Handoff given to PCP: Transitions of Care Status:  1.  Name of PCP: Dr. Melgar  2.  PCP Contacted on Admission: [ ] Y    [ X ] N    3.  PCP contacted at Discharge: [ ] Y    [ ] N    [ ] N/A  4.  Post-Discharge Appointment Date and Location:  5.  Summary of Handoff given to PCP:

## 2019-12-03 NOTE — CHART NOTE - NSCHARTNOTEFT_GEN_A_CORE
Confidential Drug Utilization Report  Search Terms: Poncho Medrano, 1950Search Date: 12/03/2019 01:02:17 PMSearching on behalf of: [id number] - [facility]  The Drug Utilization Report below displays all of the controlled substance prescriptions, if any, that your patient has filled in the last twelve months. The information displayed on this report is compiled from pharmacy submissions to the Department, and accurately reflects the information as submitted by the pharmacies.    This report was requested by: Nhi Arzate ii | Reference #: [id number]    Others' Prescriptions  Patient Name:	Poncho Medrano	YOB: 1950  Address:	[address] [place] [address]	Sex:	Male  Rx Written	Rx Dispensed	Drug	Quantity	Days Supply	Prescriber Name  11/22/2019	11/22/2019	oxycodone-acetaminophen  mg tab	14	7	Jaky Lucero  11/07/2019	11/09/2019	diazepam 5 mg tablet	1	1	Korey Atkinson MD  10/31/2019	11/03/2019	diazepam 5 mg tablet	1	1	Iraida Foote K  10/10/2019	10/12/2019	oxycodone-acetaminophen  mg tab	20	10	Jonathon Alcantara  10/01/2019	10/01/2019	oxycodone-acetaminophen  mg tab	14	14	Jory Almazan  09/19/2019	09/19/2019	tramadol hcl 50 mg tablet	120	30	Korey Atkinson MD  08/29/2019	08/29/2019	acetaminophen-cod #3 tablet	30	4	Richard Cox  * - Drugs marked with an asterisk are compound drugs. If the compound drug is made up of more than one controlled substance, then each controlled substance will be a separate row in the table.

## 2019-12-03 NOTE — H&P ADULT - NSHPPHYSICALEXAM_GEN_ALL_CORE
PHYSICAL EXAM:  T(C): 36.8 (12-02-19 @ 23:58), Max: 36.8 (12-02-19 @ 23:58)  HR: 76 (12-03-19 @ 01:40) (71 - 110)  BP: 114/63 (12-03-19 @ 01:40) (83/52 - 130/74)  RR: 18 (12-03-19 @ 01:40) (16 - 20)  SpO2: 97% (12-03-19 @ 01:40) (96% - 100%)  GENERAL: NAD, well-developed  HEAD:  Atraumatic, Normocephalic  EYES: EOMI, PERRLA, conjunctiva and sclera clear  NECK: Supple, No JVD  CHEST/LUNG: Clear to auscultation bilaterally; No wheeze  HEART: afib; No murmurs, rubs, or gallops  ABDOMEN: Soft, Nontender, Nondistended; Bowel sounds present  EXTREMITIES:  2+ Peripheral Pulses, No clubbing, cyanosis, or edema  PSYCH: AAOx3  NEUROLOGY: non-focal  SKIN: No rashes or lesions

## 2019-12-03 NOTE — PROGRESS NOTE ADULT - PROBLEM SELECTOR PLAN 4
- Patient admitted from [facility].   - will notify team that the patient has been admitted. - History of back pain, s/p laminectomy and spinal fusion  - Oxycodone 5 PRN  - ISTOP verified Reference #: [id number]

## 2019-12-03 NOTE — H&P ADULT - PROBLEM SELECTOR PLAN 4
- Patient admitted from [facility].   - will notify team that the patient has been admitted.

## 2019-12-03 NOTE — H&P ADULT - PROBLEM SELECTOR PLAN 3
- patient presenting with irregular heart rhythm that was present on prior admission. Possibly Afib in the setting of SIRS  - will repeat EKG.   - continue eliquis  - hold metoprolol for now in the setting of hypotension

## 2019-12-03 NOTE — H&P ADULT - ASSESSMENT
The patient is a 69 year old man with a history of cholangiocarcinoma s/p Whipple 2017 with recurrence of disease now with mets to the liver and last IV chemo 7/2019, portal vein thrombus on Eliquis x 1.5 weeks presents from [facility] for fall The patient is a 69 year old man with a history of cholangiocarcinoma s/p Whipple 2017 with recurrence of disease now with mets to the liver and last IV chemo 7/2019, portal vein thrombus on Eliquis x 1.5 weeks presents from [facility] for fall, admitted for monitoring after fall in the setting of SIRS.

## 2019-12-03 NOTE — H&P ADULT - PROBLEM SELECTOR PLAN 2
- Patient presented after likely mechanical fall.   - ct head negative for intracranial bleed   - monitor of telemetry Interval Events: Reviewed  Patient seen and examined at bedside.    Patient is a 73y old  Male who presents with a chief complaint of elective spine surgery (29 Jun 2018 07:19)    Is doing okay feeling better the pain in the chest is less  PAST MEDICAL & SURGICAL HISTORY:  PAD (peripheral artery disease)  Hypercholesterolemia  Hypertension  H/O laminectomy  History of hernia repair  S/P hip replacement: right hip  S/P knee replacement, bilateral      MEDICATIONS:  Pulmonary:    Antimicrobials:    Anticoagulants:  enoxaparin Injectable 40 milliGRAM(s) SubCutaneous at bedtime    Cardiac:  metoprolol tartrate 25 milliGRAM(s) Oral every 12 hours      Allergies    No Known Allergies    Intolerances        Vital Signs Last 24 Hrs  T(C): 36.8 (06 Jul 2018 04:57), Max: 37.1 (05 Jul 2018 14:27)  T(F): 98.3 (06 Jul 2018 04:57), Max: 98.7 (05 Jul 2018 14:27)  HR: 97 (06 Jul 2018 04:57) (74 - 97)  BP: 102/60 (06 Jul 2018 04:57) (102/60 - 153/65)  BP(mean): --  RR: 16 (06 Jul 2018 04:57) (15 - 17)  SpO2: 95% (06 Jul 2018 04:57) (92% - 96%)    07-05 @ 07:01  -  07-06 @ 07:00  --------------------------------------------------------  IN: 2330 mL / OUT: 1940 mL / NET: 390 mL          LABS:      CBC Full  -  ( 06 Jul 2018 06:54 )  WBC Count : 13.8 K/uL  Hemoglobin : 9.3 g/dL  Hematocrit : 28.9 %  Platelet Count - Automated : 287 K/uL  Mean Cell Volume : 93.2 fL  Mean Cell Hemoglobin : 30.0 pg  Mean Cell Hemoglobin Concentration : 32.2 g/dL  Auto Neutrophil # : x  Auto Lymphocyte # : x  Auto Monocyte # : x  Auto Eosinophil # : x  Auto Basophil # : x  Auto Neutrophil % : x  Auto Lymphocyte % : x  Auto Monocyte % : x  Auto Eosinophil % : x  Auto Basophil % : x    07-06    139  |  101  |  24<H>  ----------------------------<  159<H>  4.8   |  25  |  0.95    Ca    8.6      06 Jul 2018 06:54  Phos  3.6     07-06  Mg     2.0     07-06    TPro  5.7<L>  /  Alb  3.3  /  TBili  0.5  /  DBili  x   /  AST  23  /  ALT  25  /  AlkPhos  39<L>  07-05          Urinalysis Basic - ( 05 Jul 2018 16:28 )    Color: Yellow / Appearance: SL Cloudy / SG: <=1.005 / pH: x  Gluc: x / Ketone: NEGATIVE  / Bili: Negative / Urobili: 0.2 E.U./dL   Blood: x / Protein: NEGATIVE mg/dL / Nitrite: NEGATIVE   Leuk Esterase: NEGATIVE / RBC: Many /HPF / WBC < 5 /HPF   Sq Epi: x / Non Sq Epi: 0-5 /HPF / Bacteria: Present /HPF                  RADIOLOGY & ADDITIONAL STUDIES (The following images were personally reviewed):  Ivory:                                     No  Urine output:                       adequate  DVT prophylaxis:                 Yes  Flattus:                                  Yes  Bowel movement:              No - Patient presented after likely mechanical fall.   - ct head negative for intracranial bleed   - monitor on telemetry

## 2019-12-03 NOTE — PROGRESS NOTE ADULT - PROBLEM SELECTOR PLAN 2
- Patient presented after likely mechanical fall.   - ct head negative for intracranial bleed   - monitor on telemetry - Patient presented after likely mechanical fall; small abrasion on R forehead  - CTH negative for ICH  - Monitor - Patient presented after likely mechanical fall; small abrasion on R forehead  - CTH negative for ICH  - Monitor changes

## 2019-12-03 NOTE — H&P ADULT - HISTORY OF PRESENT ILLNESS
The patient is a 69 year old man with a history of cholangiocarcinoma s/p Whipple 2017 with recurrence of disease now with mets to the liver and last IV chemo 7/2019, portal vein thrombus on Eliquis x 1.5 weeks presents from [facility] for fall. The patient went to [facility] to restart his chemo regimen and when he was walking his cane got caught on a rug and he trip over the cane and fell forward and hit his head. He never lost consciousness, he has no headache, vision changes, chest pain, palpitations. Over the past several weeks he has had days where he has felt ok and others when he has felt unwell. He has had a bit of a cough. He has had no fevers or chills, nausea, vomiting. He has been having off and on diarrhea for 2 months. During his last hospital visit the patient was given loperamide which helped with the diarrhea. He stopped taking it 3 days ago and has since had 2 softer bowel movements and some abdominal pain.     In the ED the patient was found to have a BP of 88/57. He was given 2 liters of IV fluids and midodrine 10 mg x1 with good response. The patient is a 69 year old man with a history of cholangiocarcinoma s/p Whipple 2017 with recurrence of disease now with mets to the liver and last IV chemo 7/2019, portal vein thrombus on Eliquis x 1.5 weeks presents from [facility] for fall. The patient went to [facility] to restart his chemo regimen and when he was walking his cane got caught on a rug and he tripped over the cane and fell forward and hit his head. He never lost consciousness, he has no headache, vision changes, chest pain, palpitations. Over the past several weeks he has had days where he has felt ok and others when he has felt unwell. He has had a bit of a cough. He has had no fevers or chills, nausea, vomiting. He has been having off and on diarrhea for 2 months. During his last hospital visit the patient was given loperamide which helped with the diarrhea. He stopped taking it 3 days ago and has since had 2 softer bowel movements and some abdominal pain.     In the ED the patient was found to have a BP of 88/57. He was given 2 liters of IV fluids and midodrine 10 mg x1 with good response.

## 2019-12-03 NOTE — CONSULT NOTE ADULT - ASSESSMENT
Infectious work up  C/w eliquis  Systemic therapy on hold 69m with recurrent metastatic cholangiocarcinoma  s/p Whipple 2017 s/p cis/Ferry and capecitabine (from 8/2017-1/2018) found to have recurrent dz via patricia-SMA LN bx in feb 2019 showed poorly-differentiated adenocarcinoma, s/p SBRT x 5 fractions 3/27/19-4/5/19, presenting s/p fall [facility].   He was admitted at [facility] 2 weeks ago with fever and diarrhea. He was discharged on imodium and source of fever was not found.  He again has diarrhea since this morning.  Progression of disease on CT, but has been off chemo since 7/2019.    -Infectious work up  -Consider ID consult  -C/w eliquis  -Systemic chemotherapy on hold  -Supportive care, pain control, Nutrition, PT, DVT ppx  -Outpatient oncology f/u    Will follow. Please do not hesitate to call back with questions.     Pippa Aggarwal MD  Medical Oncology Attending  C: [phone]

## 2019-12-03 NOTE — PROGRESS NOTE ADULT - ASSESSMENT
The patient is a 69 year old man with a history of cholangiocarcinoma s/p Whipple 2017 with recurrence of disease now with mets to the liver and last IV chemo 7/2019, portal vein thrombus on Eliquis x 1.5 weeks presents from [facility] for fall, admitted for monitoring after fall in the setting of SIRS. The patient is a 69 year old man with a history of cholangiocarcinoma s/p Whipple 2017 with recurrence of disease now with mets to the liver and last IV chemo 7/2019, portal vein thrombus on Eliquis x 1.5 weeks presents from [facility] for fall, admitted for monitoring after fall in the setting of SIRS (leukocytosis + tachycardia); on CT, found to have worsening metastasis to liver and increased hypodensities in the setting of portal vein thrombosis. The patient is a 69 year old man with a history of cholangiocarcinoma s/p Whipple 2017 with recurrence of disease now with mets to the liver and last IV chemo 7/2019, portal vein thrombus on Eliquis x 1.5 weeks presents from [facility] for fall, admitted for monitoring after fall in the setting of SIRS (leukocytosis + tachycardia); on CT, found to have worsening metastasis to liver and increased hypodensities in the setting of portal vein thrombosis.

## 2019-12-03 NOTE — H&P ADULT - PROBLEM SELECTOR PLAN 1
- Patient presented with tachycardia with leukocytosis.   - unclear source of infection, possibly pneumonia vs. intrabdominal infection, SIRS could be response to portal venous thrombosis.   - CT abdomen pelvis shows spreading disease in the liver with signs of infarct.   - CXR shows new left pleural effusion.   - patient given 2 liters of IV fluids aztreonam and cefepime in the ED.   - MICU consulted for hypotension  - will monitor need for midodrine.   - will continue with vanc and cefepime.  f/u BCx, UCX  consider CT chest - Patient presented with tachycardia with leukocytosis.   - unclear source of infection, possibly pneumonia vs. intrabdominal infection, SIRS could be response to portal venous thrombosis.   - CT abdomen pelvis shows spreading disease in the liver with signs of infarct.   - CXR shows new left pleural effusion.   - patient given 2 liters of IV fluids aztreonam and cefepime in the ED.   - MICU consulted for hypotension, consult reviewed  - will continue with vanc and cefepime.  f/u BCx, UCX  consider CT chest

## 2019-12-03 NOTE — PROGRESS NOTE ADULT - PROBLEM SELECTOR PLAN 6
- DVT prophylaxis: on full ac - Follows Dr. Atkinson at [facility]  - Oncology/Dr. Aggarwal consulted, recommends to c/w AC, f/u ID w/u, no systemic chemotherapy inpatient

## 2019-12-04 DIAGNOSIS — R19.7 DIARRHEA, UNSPECIFIED: ICD-10-CM

## 2019-12-04 LAB
ALBUMIN SERPL ELPH-MCNC: 2.4 G/DL — LOW (ref 3.3–5)
ALP SERPL-CCNC: 347 U/L — HIGH (ref 40–120)
ALT FLD-CCNC: 10 U/L — SIGNIFICANT CHANGE UP (ref 4–41)
ANION GAP SERPL CALC-SCNC: 12 MMO/L — SIGNIFICANT CHANGE UP (ref 7–14)
APTT BLD: 29.2 SEC — SIGNIFICANT CHANGE UP (ref 27.5–36.3)
AST SERPL-CCNC: 22 U/L — SIGNIFICANT CHANGE UP (ref 4–40)
BACTERIA UR CULT: SIGNIFICANT CHANGE UP
BILIRUB SERPL-MCNC: 0.6 MG/DL — SIGNIFICANT CHANGE UP (ref 0.2–1.2)
BLD GP AB SCN SERPL QL: NEGATIVE — SIGNIFICANT CHANGE UP
BUN SERPL-MCNC: 9 MG/DL — SIGNIFICANT CHANGE UP (ref 7–23)
C DIFF TOX GENS STL QL NAA+PROBE: SIGNIFICANT CHANGE UP
CALCIUM SERPL-MCNC: 7.8 MG/DL — LOW (ref 8.4–10.5)
CHLORIDE SERPL-SCNC: 104 MMOL/L — SIGNIFICANT CHANGE UP (ref 98–107)
CO2 SERPL-SCNC: 21 MMOL/L — LOW (ref 22–31)
CREAT SERPL-MCNC: 0.67 MG/DL — SIGNIFICANT CHANGE UP (ref 0.5–1.3)
GI PCR PANEL, STOOL: SIGNIFICANT CHANGE UP
GLUCOSE SERPL-MCNC: 115 MG/DL — HIGH (ref 70–99)
HCT VFR BLD CALC: 26 % — LOW (ref 39–50)
HGB BLD-MCNC: 8.6 G/DL — LOW (ref 13–17)
INR BLD: 1.6 — HIGH (ref 0.88–1.17)
LACTATE SERPL-SCNC: 0.9 MMOL/L — SIGNIFICANT CHANGE UP (ref 0.5–2)
MAGNESIUM SERPL-MCNC: 1.6 MG/DL — SIGNIFICANT CHANGE UP (ref 1.6–2.6)
MCHC RBC-ENTMCNC: 27.5 PG — SIGNIFICANT CHANGE UP (ref 27–34)
MCHC RBC-ENTMCNC: 33.1 % — SIGNIFICANT CHANGE UP (ref 32–36)
MCV RBC AUTO: 83.1 FL — SIGNIFICANT CHANGE UP (ref 80–100)
NRBC # FLD: 0 K/UL — SIGNIFICANT CHANGE UP (ref 0–0)
PHOSPHATE SERPL-MCNC: 3.3 MG/DL — SIGNIFICANT CHANGE UP (ref 2.5–4.5)
PLATELET # BLD AUTO: 200 K/UL — SIGNIFICANT CHANGE UP (ref 150–400)
PMV BLD: 10.8 FL — SIGNIFICANT CHANGE UP (ref 7–13)
POTASSIUM SERPL-MCNC: 3.2 MMOL/L — LOW (ref 3.5–5.3)
POTASSIUM SERPL-SCNC: 3.2 MMOL/L — LOW (ref 3.5–5.3)
PROT SERPL-MCNC: 5.3 G/DL — LOW (ref 6–8.3)
PROTHROM AB SERPL-ACNC: 18.5 SEC — HIGH (ref 9.8–13.1)
RBC # BLD: 3.13 M/UL — LOW (ref 4.2–5.8)
RBC # FLD: 18.5 % — HIGH (ref 10.3–14.5)
RH IG SCN BLD-IMP: POSITIVE — SIGNIFICANT CHANGE UP
SODIUM SERPL-SCNC: 137 MMOL/L — SIGNIFICANT CHANGE UP (ref 135–145)
SPECIMEN SOURCE: SIGNIFICANT CHANGE UP
SPECIMEN SOURCE: SIGNIFICANT CHANGE UP
WBC # BLD: 7.17 K/UL — SIGNIFICANT CHANGE UP (ref 3.8–10.5)
WBC # FLD AUTO: 7.17 K/UL — SIGNIFICANT CHANGE UP (ref 3.8–10.5)

## 2019-12-04 PROCEDURE — 99233 SBSQ HOSP IP/OBS HIGH 50: CPT | Mod: GC

## 2019-12-04 PROCEDURE — 99222 1ST HOSP IP/OBS MODERATE 55: CPT | Mod: GC

## 2019-12-04 RX ORDER — LOPERAMIDE HCL 2 MG
2 TABLET ORAL EVERY 6 HOURS
Refills: 0 | Status: DISCONTINUED | OUTPATIENT
Start: 2019-12-04 | End: 2019-12-05

## 2019-12-04 RX ORDER — CHLORHEXIDINE GLUCONATE 213 G/1000ML
1 SOLUTION TOPICAL DAILY
Refills: 0 | Status: DISCONTINUED | OUTPATIENT
Start: 2019-12-04 | End: 2019-12-05

## 2019-12-04 RX ORDER — POTASSIUM CHLORIDE 20 MEQ
20 PACKET (EA) ORAL
Refills: 0 | Status: COMPLETED | OUTPATIENT
Start: 2019-12-04 | End: 2019-12-04

## 2019-12-04 RX ADMIN — Medication 20 MILLIEQUIVALENT(S): at 13:18

## 2019-12-04 RX ADMIN — SODIUM CHLORIDE 75 MILLILITER(S): 9 INJECTION, SOLUTION INTRAVENOUS at 10:20

## 2019-12-04 RX ADMIN — CEFEPIME 100 MILLIGRAM(S): 1 INJECTION, POWDER, FOR SOLUTION INTRAMUSCULAR; INTRAVENOUS at 18:25

## 2019-12-04 RX ADMIN — Medication 20 MILLIEQUIVALENT(S): at 15:45

## 2019-12-04 RX ADMIN — LIDOCAINE 1 PATCH: 4 CREAM TOPICAL at 19:51

## 2019-12-04 RX ADMIN — LIDOCAINE 1 PATCH: 4 CREAM TOPICAL at 13:16

## 2019-12-04 RX ADMIN — GABAPENTIN 300 MILLIGRAM(S): 400 CAPSULE ORAL at 21:06

## 2019-12-04 RX ADMIN — CEFEPIME 100 MILLIGRAM(S): 1 INJECTION, POWDER, FOR SOLUTION INTRAMUSCULAR; INTRAVENOUS at 05:59

## 2019-12-04 RX ADMIN — Medication 30 MILLILITER(S): at 18:25

## 2019-12-04 RX ADMIN — CHLORHEXIDINE GLUCONATE 1 APPLICATION(S): 213 SOLUTION TOPICAL at 13:18

## 2019-12-04 RX ADMIN — OXYCODONE HYDROCHLORIDE 5 MILLIGRAM(S): 5 TABLET ORAL at 20:40

## 2019-12-04 RX ADMIN — OXYCODONE HYDROCHLORIDE 5 MILLIGRAM(S): 5 TABLET ORAL at 19:58

## 2019-12-04 RX ADMIN — TAMSULOSIN HYDROCHLORIDE 0.4 MILLIGRAM(S): 0.4 CAPSULE ORAL at 21:06

## 2019-12-04 RX ADMIN — Medication 250 MILLIGRAM(S): at 05:59

## 2019-12-04 RX ADMIN — APIXABAN 5 MILLIGRAM(S): 2.5 TABLET, FILM COATED ORAL at 05:59

## 2019-12-04 RX ADMIN — PANTOPRAZOLE SODIUM 40 MILLIGRAM(S): 20 TABLET, DELAYED RELEASE ORAL at 05:59

## 2019-12-04 RX ADMIN — APIXABAN 5 MILLIGRAM(S): 2.5 TABLET, FILM COATED ORAL at 18:25

## 2019-12-04 NOTE — PROGRESS NOTE ADULT - PROBLEM SELECTOR PLAN 3
- H/o cholangiocarcinoma with relapse  - CT demonstrating worsening mets within liver; also demonstrating increased number of liver hypodensities likely 2/2 portal vein thrombosis vs hypotension  - Oncology consulted, recommends continuation of eliquis - Patient presented after likely mechanical fall; small abrasion on R forehead  - CTH negative for ICH  - Monitor changes

## 2019-12-04 NOTE — PROGRESS NOTE ADULT - PROBLEM SELECTOR PLAN 5
- patient presenting with irregular heart rhythm that was present on prior admission. Possibly Afib in the setting of SIRS  - Most recent EKG NSR w/PACs  - C/w metoprolol  - Holding BB due to hypotension - History of back pain, s/p laminectomy and spinal fusion  - Oxycodone 5 PRN  - ISTOP verified Reference #: [id number]

## 2019-12-04 NOTE — PROGRESS NOTE ADULT - ASSESSMENT
The patient is a 69 year old man with a history of cholangiocarcinoma s/p Whipple 2017 with recurrence of disease now with mets to the liver and last IV chemo 7/2019, portal vein thrombus on Eliquis x 1.5 weeks presents from [facility] for fall, admitted for monitoring after fall in the setting of SIRS (leukocytosis + tachycardia); on CT, found to have worsening metastasis to liver and increased hypodensities in the setting of portal vein thrombosis.

## 2019-12-04 NOTE — PROGRESS NOTE ADULT - PROBLEM SELECTOR PLAN 9
Transitions of Care Status:  1.  Name of PCP: Dr. Melgar  2.  PCP Contacted on Admission: [ ] Y    [ X ] N    3.  PCP contacted at Discharge: [ ] Y    [ ] N    [ ] N/A  4.  Post-Discharge Appointment Date and Location:  5.  Summary of Handoff given to PCP: - DVT prophylaxis: Eliquis  - Diet: Regular   - PT Consult Pending  - Dietitian Consult Pending

## 2019-12-04 NOTE — PROGRESS NOTE ADULT - PROBLEM SELECTOR PLAN 1
- P/w leukocytosis, hypotension in ED s/p 2L fluid/Levophed, tachycardia (though tachycardia present after administration of Levophed)  - Leukocytosis possibly due to portal vein thrombosis vs infection vs ABD infection  - RVP negative   - CT A/P demonstrates increased liver mets with new liver infarcts  - CXR demonstrates L pleural effusion, R basilar atelectasis  - S/p aztreonam and cefepime in the ED.   - MICU consulted for hypotension, advised fluids and midodrine to which patient had appropriate response  - C/w vanc and cefepime  - F/u BCx, UCX - Patient c/o diarrhea that was improved on imodium but since stopping it has gotten worse  - Recently had 5 BM  - Ddx includes C. diff, osmotic diarrhea, pancreatic insufficiency  - F/u GI PCR, stool Cx, C. diff  - GI consulted, f/u stool electrolytes/osmolality, fecal a1-antitrypsin, fecal fat

## 2019-12-04 NOTE — PROGRESS NOTE ADULT - PROBLEM SELECTOR PLAN 8
- DVT prophylaxis: Eliquis  - Diet: Regular   - PT Consult Pending  - Dietitian Consult Pending - C/w Maalox, pantoprazole

## 2019-12-04 NOTE — CONSULT NOTE ADULT - ATTENDING COMMENTS
GI consulted for chronic diarrhea. Patient recently hospitalized at [facility] for similar complaints. Follows with Dr. Orantes as outpatient.   Patient reports had 2 days of diarrhea, but today with only one loose stool.   Currently on broad spectrum antibiotics for possible infection.   Pending results of stool studies.   Discussed that OK to use Imodium if C diff negative. If persistent diarrhea, can consider colonoscopy with biopsy as inpatient (once medically optimized) vs outpatient.

## 2019-12-04 NOTE — CONSULT NOTE ADULT - ASSESSMENT
69M w/ metastatic cholangiocarcinoma (s/p Whipple 2017) w/ mets to liver and portal vein throbmus on eliquis, admitted for fall. Pt w/ diarrhea x 1-2 months, previously ruled out for infection.  Impression:  #Diarrhea: d/dx microscopic colitis vs bile acid malabsorption vs IBD-D vs SIBO vs pancreatic insufficiency. Less likely infectious but should be ruled out.  #Cholangiocarcinoma - on chemotherapy  #Portal Vein Thrombus - on eliquis    Recommendations:  - rule out infectious causes w/ GI PCR and C. diff, if neg can give imodium   - please send stool electrolytes, fecal alpha 1 antitrypsin, fecal fat, stool osmolality  - if diarrhea persists, can consider repeat colonoscopy early next week (once s/p course of abx) w/ biopsies for microsocpic colitis    Timo Davies  Gastroenterology Fellow  Pager# [phone] or [phone]  Page on-call GI fellow after 5pm or on weekends 69M w/ metastatic cholangiocarcinoma (s/p Whipple 2017) w/ mets to liver and portal vein throbmus on eliquis, admitted for fall. Pt w/ diarrhea x 1-2 months, previously ruled out for infection.  Impression:  #Diarrhea: d/dx microscopic colitis vs bile acid malabsorption vs IBD-D vs SIBO vs pancreatic insufficiency. Less likely infectious but should be ruled out.  #Cholangiocarcinoma - on chemotherapy  #Portal Vein Thrombus - on eliquis    Recommendations:  - rule out infectious causes w/ GI PCR and C. diff, if neg can give imodium   - please send stool electrolytes, fecal alpha 1 antitrypsin, fecal fat, stool osmolality  - if diarrhea persists and infectious workup negative, can consider colonoscopy with biopsies to r/o microscopic colitis either prior to discharge or as outpatient    Timo Davies  Gastroenterology Fellow  Pager# [phone] or [phone]  Page on-call GI fellow after 5pm or on weekends

## 2019-12-04 NOTE — PROGRESS NOTE ADULT - PROBLEM SELECTOR PLAN 10
Transitions of Care Status:  1.  Name of PCP: Dr. Melgar  2.  PCP Contacted on Admission: [ ] Y    [ X ] N    3.  PCP contacted at Discharge: [ ] Y    [ ] N    [ ] N/A  4.  Post-Discharge Appointment Date and Location:  5.  Summary of Handoff given to PCP:

## 2019-12-04 NOTE — PROGRESS NOTE ADULT - PROBLEM SELECTOR PLAN 6
[place] [facility] [place]/Dr. Aggarwal consulted, recommends to c/w AC, f/u ID w/u, no systemic chemotherapy inpatient - Patient presenting with irregular heart rhythm that was present on prior admission. Possibly Afib in the setting of SIRS  - Most recent EKG NSR w/PACs  - C/w metoprolol  - Holding BB due to hypotension

## 2019-12-04 NOTE — PROGRESS NOTE ADULT - PROBLEM SELECTOR PLAN 7
- C/w Maalox, pantoprazole - [place] [facility] [place]  - Oncology/Dr. Aggarwal consulted, recommends to c/w AC, f/u ID w/u, no systemic chemotherapy inpatient

## 2019-12-04 NOTE — PROGRESS NOTE ADULT - PROBLEM SELECTOR PLAN 2
- Patient presented after likely mechanical fall; small abrasion on R forehead  - CTH negative for ICH  - Monitor changes - P/w leukocytosis, hypotension in ED s/p 2L fluid/Levophed, tachycardia (though tachycardia present after administration of Levophed)  - Leukocytosis possibly due to portal vein thrombosis vs infection vs ABD infection  - RVP negative   - CT A/P demonstrates increased liver mets with new liver infarcts  - CXR demonstrates L pleural effusion, R basilar atelectasis  - S/p aztreonam and cefepime in the ED.   - MICU consulted for hypotension, advised fluids and midodrine to which patient had appropriate response  - C/w vanc and cefepime  - Bcx, Ucx negative  - Monitor vital signs

## 2019-12-04 NOTE — CONSULT NOTE ADULT - SUBJECTIVE AND OBJECTIVE BOX
Chief Complaint:  Patient is a 69y old  Male who presents with a chief complaint of mechanical fall in the setting of SIRS (04 Dec 2019 07:09)    HPI:WOODY CEVALLOS is a 69y Male w/ hx of cholangiocarcinoma s/p Whipple (2017) w/ recurrence of disease w/ mets to liver, portal vein thrombosis on Eliquis x 1.5 weeks, admitted for mechanical fall. Pt was on his way to [facility] and had a mechanical fall.   Pt was recently admitted from 11/18 to 11/22 w/ diarrhea. Pt had EGD/Colon 9/2019. EGD showed normal exam, biopsy was positive for rare H pylori. Colonoscopy was unremarkable, no biopsies were taken. He was treated for H pylori, and began having watery diarrhea after that. He was ruled out for C diff on 11/16. GI PCR was neg on 11/19. Pt was seen by GI and pt was discharged on imodium. He reports that his diarrhea was well controlled on that, but it returned 4 days after he stopped taking it.   He describes his diarrhea as watery, 4-5x per day. Not associated w/ abd pain. He denies n/v. Denies blood or melena.   He was found to be hypotensive with leukocytosis to 14 on admission, and is on broad spectrum abx.     PMHX/PSHX:  Rash  Biliary stricture  Pancreatic lesion  Gastric polyp  Obstructive jaundice  Other hyperlipidemia  Essential hypertension  IBS (irritable bowel syndrome)  GERD (gastroesophageal reflux disease)  Intestinal Whipple's disease  S/P unilateral inguinal hernia repair  S/P tendon repair  History of lumbar laminectomy  History of biliary stent insertion  S/P lumbar spinal fusion  S/P cervical spinal fusion  No significant past surgical history    Allergies:  penicillin G benzathine (Rash)  sulfa drugs (Rash)    Home Medications: reviewed    Hospital Medications:  aluminum hydroxide/magnesium hydroxide/simethicone Suspension 30 milliLiter(s) Oral every 4 hours PRN  apixaban 5 milliGRAM(s) Oral two times a day  cefepime   IVPB 1000 milliGRAM(s) IV Intermittent every 12 hours  chlorhexidine 4% Liquid 1 Application(s) Topical daily  gabapentin 300 milliGRAM(s) Oral at bedtime  lactated ringers. 1000 milliLiter(s) IV Continuous <Continuous>  lidocaine   Patch 1 Patch Transdermal once  oxyCODONE    IR 5 milliGRAM(s) Oral every 4 hours PRN  pantoprazole    Tablet 40 milliGRAM(s) Oral before breakfast  potassium chloride    Tablet ER 20 milliEquivalent(s) Oral every 2 hours  tamsulosin 0.4 milliGRAM(s) Oral at bedtime  vancomycin  IVPB 750 milliGRAM(s) IV Intermittent every 12 hours    Social History:   Tob: Denies  EtOH: Denies  Illicit Drugs: Denies    Family history:  FH: CHF (congestive heart failure)  FH: prostate cancer  Family history of colitis  No pertinent family history in first degree relatives  Family history of prostate cancer  Family history of coronary artery disease    Denies family history of colon cancer/polyps, stomach cancer/polyps, pancreatic cancer/masses, liver cancer/disease, ovarian cancer and endometrial cancer.    ROS:   General:  No wt loss, fevers, chills, night sweats, fatigue  Eyes:  Good vision, no reported pain  ENT:  No sore throat, pain, runny nose, dysphagia  CV:  No pain, palpitations, hypo/hypertension  Pulm:  No dyspnea, cough, tachypnea, wheezing  GI:  See HPI, otherwise negative  :  No pain, bleeding, incontinence, nocturia  Muscle:  No pain, weakness  Neuro:  No weakness, tingling, memory problems  Psych:  No fatigue, insomnia, mood problems, depression  Endocrine:  No polyuria, polydipsia, cold/heat intolerance  Heme:  No petechiae, ecchymosis, easy bruisability  Skin:  No rash, tattoos, scars, edema    PHYSICAL EXAM:   Vital Signs:  Vital Signs Last 24 Hrs  T(C): 36.6 (04 Dec 2019 09:15), Max: 36.9 (03 Dec 2019 15:22)  T(F): 97.9 (04 Dec 2019 09:15), Max: 98.5 (03 Dec 2019 15:22)  HR: 88 (04 Dec 2019 09:15) (88 - 108)  BP: 128/70 (04 Dec 2019 09:15) (128/70 - 138/87)  BP(mean): --  RR: 17 (04 Dec 2019 09:15) (17 - 20)  SpO2: 98% (04 Dec 2019 09:15) (96% - 99%)  Daily Height in cm: 177.8 (03 Dec 2019 22:57)    Daily     GENERAL: no acute distress  NEURO: alert and oriented x 3, no asterixis  HEENT: anicteric sclera, no conjunctival pallor appreciated  CHEST: no respiratory distress, no accessory muscle use, clear to auscultation bilaterally  CARDIAC: regular rate, rhythm, no mrg appreciated  ABDOMEN: soft, non-tender, mildly distended, no rebound or guarding  EXTREMITIES: warm, well perfused, no edema  SKIN: no lesions noted    LABS: reviewed                        8.6    7.17  )-----------( 200      ( 04 Dec 2019 06:49 )             26.0     12-04    137  |  104  |  9   ----------------------------<  115<H>  3.2<L>   |  21<L>  |  0.67    Ca    7.8<L>      04 Dec 2019 06:49  Phos  3.3     12-04  Mg     1.6     12-04    TPro  5.3<L>  /  Alb  2.4<L>  /  TBili  0.6  /  DBili  x   /  AST  22  /  ALT  10  /  AlkPhos  347<H>  12-04    LIVER FUNCTIONS - ( 04 Dec 2019 06:49 )  Alb: 2.4 g/dL / Pro: 5.3 g/dL / ALK PHOS: 347 u/L / ALT: 10 u/L / AST: 22 u/L / GGT: x             Culture - Urine (collected 03 Dec 2019 02:51)  Source: URINE MIDSTREAM  Final Report (04 Dec 2019 07:51):    NO GROWTH AT 24 HOURS    Culture - Blood (collected 02 Dec 2019 19:51)  Source: BLOOD VENOUS  Preliminary Report (03 Dec 2019 19:51):    NO ORGANISMS ISOLATED    NO ORGANISMS ISOLATED AT 24 HOURS    Culture - Blood (collected 02 Dec 2019 19:51)  Source: BLOOD  Preliminary Report (03 Dec 2019 19:51):    NO ORGANISMS ISOLATED    NO ORGANISMS ISOLATED AT 24 HOURS        Diagnostic Studies: see sunrise for full report

## 2019-12-04 NOTE — PROGRESS NOTE ADULT - PROBLEM SELECTOR PLAN 4
- History of back pain, s/p laminectomy and spinal fusion  - Oxycodone 5 PRN  - ISTOP verified Reference #: [id number] - H/o cholangiocarcinoma with relapse  - CT demonstrating worsening mets within liver; also demonstrating increased number of liver hypodensities likely 2/2 portal vein thrombosis vs hypotension  - Oncology consulted, recommends continuation of Eliquis

## 2019-12-04 NOTE — PROGRESS NOTE ADULT - SUBJECTIVE AND OBJECTIVE BOX
Contact Information:  Nhi Arzate II, MD, MPH  PGY-1, Internal Medicine  Pager: [phone] ([facility]) /// [phone] ([facility])    WOODY CEVALLOS, MRN-[id number]    Patient is a 69y old  Male who presents with a chief complaint of mechanical fall in the setting of SIRS (03 Dec 2019 10:44)      OVERNIGHT EVENTS:    SUBJECTIVE:    CONSTITUTIONAL: No weakness. No fatigue. No fever.  HEAD: No head trauma.   EYES: No vision changes.  ENT: No hearing changes or tinnitus. No ear pain. No changes in smell. No nasal congestion or discharge. No sore throat. No voice hoarseness.   NECK: No neck pain or stiffness. No lumps.  RESPIRATORY: No cough. No SOB. No wheezing. No hemoptysis.   CARDIOVASCULAR: No chest pain. No palpitations.   GASTROINTESTINAL: No dysphagia. No ABD pain. No distension. No constipation. No diarrhea. No pain with defecation. No hematemesis. No hematochezia or melena.  BACK: No back pain.  GENITOURINARY: No dysuria. No frequency or urgency. No hesitancy. No incontinence. No urinary retention. No suprapubic pain. No hematuria.  EXTREMITY: No swelling.  MUSCULOSKELETAL: No joint pain or swelling. No fractures. No stiffness.    SKIN: No rashes. No itching. No skin, hair, or nail changes.  NEUROLOGICAL: No weakness or paralysis. No lightheadedness or dizziness. No HA. No numbness or tingling.   PSYCHIATRIC: No depression.       OBJECTIVE:  Vital Signs Last 24 Hrs  T(C): 36.8 (04 Dec 2019 05:55), Max: 36.9 (03 Dec 2019 15:22)  T(F): 98.2 (04 Dec 2019 05:55), Max: 98.5 (03 Dec 2019 15:22)  HR: 90 (04 Dec 2019 05:55) (82 - 108)  BP: 132/65 (04 Dec 2019 05:55) (99/57 - 138/87)  BP(mean): --  RR: 20 (04 Dec 2019 05:55) (20 - 20)  SpO2: 96% (04 Dec 2019 05:55) (96% - 99%)  I&O's Summary      MEDICATIONS  (STANDING):  apixaban 5 milliGRAM(s) Oral two times a day  cefepime   IVPB 1000 milliGRAM(s) IV Intermittent every 12 hours  gabapentin 300 milliGRAM(s) Oral at bedtime  lactated ringers. 1000 milliLiter(s) (75 mL/Hr) IV Continuous <Continuous>  lidocaine   Patch 1 Patch Transdermal once  pantoprazole    Tablet 40 milliGRAM(s) Oral before breakfast  tamsulosin 0.4 milliGRAM(s) Oral at bedtime  vancomycin  IVPB 750 milliGRAM(s) IV Intermittent every 12 hours    MEDICATIONS  (PRN):  aluminum hydroxide/magnesium hydroxide/simethicone Suspension 30 milliLiter(s) Oral every 4 hours PRN Dyspepsia  oxyCODONE    IR 5 milliGRAM(s) Oral every 4 hours PRN moderate and severe pain    Allergies    penicillin G benzathine (Rash)  sulfa drugs (Rash)    Intolerances        CONSTITUTIONAL: No acute distress. Awake and alert.  HEAD: No evidence of trauma. Structures WNL.  EYES: +PERRL. +EOMI. No scleral icterus. No conjunctival injection.  ENT: Moist oral mucosa. No erythema. No pharyngeal exudates.   NECK: Supple. Appropriate ROM. No stiffness. No masses or lymphadenopathy.  RESPIRATORY: CTAB. No wheezes, rales, or rhonchi. No accessory muscle use. No apparent respiratory distress.  CARDIOVASCULAR: +S1/S2. No audible S3/S4. Regular rate and rhythm. No murmurs, rubs, or gallops. 2+ radial pulses x b/l UE; 2+ DP pulses x b/l LE.   GASTROINTESTINAL: Soft, nontender, nondistended. +BS. No rebound or guarding.   BACK: No spinal or paraspinal tenderness. No CVA tenderness.  EXTREMITY: No LE swelling or edema. EXTs warm to touch.  MUSCULOSKELETAL: Movement evident in all limbs. No tenderness on palpation.  DERMATOLOGICAL: No abnormal rashes or lesions.  NEUROLOGICAL: CN 2-12 grossly intact. No focal deficits. Sensation intact x 4EXT. A&Ox3 (oriented to person, place, and time).  PSYCHIATRIC: Appropriate affect.                            9.6    11.30 )-----------( 227      ( 03 Dec 2019 18:04 )             30.3     PT/INR - ( 03 Dec 2019 05:30 )   PT: 21.9 SEC;   INR: 1.93          PTT - ( 03 Dec 2019 05:30 )  PTT:29.5 SEC  12-03    141  |  106  |  13  ----------------------------<  103<H>  3.6   |  23  |  0.82    Ca    7.9<L>      03 Dec 2019 05:30  Phos  2.4     12-03  Mg     1.6     12-03    TPro  5.1<L>  /  Alb  2.6<L>  /  TBili  0.7  /  DBili  x   /  AST  20  /  ALT  10  /  AlkPhos  326<H>  12-03    CAPILLARY BLOOD GLUCOSE        LIVER FUNCTIONS - ( 03 Dec 2019 05:30 )  Alb: 2.6 g/dL / Pro: 5.1 g/dL / ALK PHOS: 326 u/L / ALT: 10 u/L / AST: 20 u/L / GGT: x               Urinalysis Basic - ( 03 Dec 2019 01:30 )    Color: YELLOW / Appearance: CLEAR / SG: > 1.040 / pH: 6.0  Gluc: NEGATIVE / Ketone: NEGATIVE  / Bili: NEGATIVE / Urobili: NORMAL   Blood: NEGATIVE / Protein: 30 / Nitrite: NEGATIVE   Leuk Esterase: NEGATIVE / RBC: 0-2 / WBC 3-5   Sq Epi: OCC / Non Sq Epi: x / Bacteria: NEGATIVE        Culture - Blood (collected 02 Dec 2019 19:51)  Source: BLOOD VENOUS  Preliminary Report (03 Dec 2019 19:51):    NO ORGANISMS ISOLATED    NO ORGANISMS ISOLATED AT 24 HOURS    Culture - Blood (collected 02 Dec 2019 19:51)  Source: BLOOD  Preliminary Report (03 Dec 2019 19:51):    NO ORGANISMS ISOLATED    NO ORGANISMS ISOLATED AT 24 HOURS          RADIOLOGY AND ADDITIONAL TESTS:    CONSULTANT NOTES REVIEWED:    CARE DISCUSSED WITH THE FOLLOWING CONSULTANTS/PROVIDERS: Contact Information:  Nhi Arzate II, MD, MPH  PGY-1, Internal Medicine  Pager: [phone] ([facility]) /// [phone] ([facility])    WOODY CEVALLOS, MRN-[id number]    Patient is a 69y old  Male who presents with a chief complaint of mechanical fall in the setting of SIRS (03 Dec 2019 10:44)      OVERNIGHT EVENTS: No overnight events.    SUBJECTIVE: Patient evaluated at bedside, complaining of 4/10 back pain helped with medication. Yesterday, had 5 BM, one of which he described as watery, others with some substance.       GASTROINTESTINAL: +Diarrhea. Firm abdomen.  BACK: +back pain    OBJECTIVE:  Vital Signs Last 24 Hrs  T(C): 36.8 (04 Dec 2019 05:55), Max: 36.9 (03 Dec 2019 15:22)  T(F): 98.2 (04 Dec 2019 05:55), Max: 98.5 (03 Dec 2019 15:22)  HR: 90 (04 Dec 2019 05:55) (82 - 108)  BP: 132/65 (04 Dec 2019 05:55) (99/57 - 138/87)  BP(mean): --  RR: 20 (04 Dec 2019 05:55) (20 - 20)  SpO2: 96% (04 Dec 2019 05:55) (96% - 99%)  I&O's Summary      MEDICATIONS  (STANDING):  apixaban 5 milliGRAM(s) Oral two times a day  cefepime   IVPB 1000 milliGRAM(s) IV Intermittent every 12 hours  gabapentin 300 milliGRAM(s) Oral at bedtime  lactated ringers. 1000 milliLiter(s) (75 mL/Hr) IV Continuous <Continuous>  lidocaine   Patch 1 Patch Transdermal once  pantoprazole    Tablet 40 milliGRAM(s) Oral before breakfast  tamsulosin 0.4 milliGRAM(s) Oral at bedtime  vancomycin  IVPB 750 milliGRAM(s) IV Intermittent every 12 hours    MEDICATIONS  (PRN):  aluminum hydroxide/magnesium hydroxide/simethicone Suspension 30 milliLiter(s) Oral every 4 hours PRN Dyspepsia  oxyCODONE    IR 5 milliGRAM(s) Oral every 4 hours PRN moderate and severe pain    Allergies    penicillin G benzathine (Rash)  sulfa drugs (Rash)    Intolerances    CONSTITUTIONAL: No acute distress.  EYES: +PERRL. +EOMI. No scleral icterus. No conjunctival injection.  ENT: Moist oral mucosa.   NECK: Supple.  RESPIRATORY: CTAB in anterior lung fields, improving breath sounds in the lower posterior lung fields. No wheezes, rales, or rhonchi. No accessory muscle use. No apparent respiratory distress.  CHEST: Port catheter in R chest.  CARDIOVASCULAR: +S1/S2. No audible S3/S4. Regular rate and rhythm. No murmurs, rubs, or gallops. 2+ radial pulses x b/l UE; 2+ DP pulses x b/l LE.   GASTROINTESTINAL: Soft, nontender, slightly distended. +BS. No rebound or guarding.   EXTREMITY: No LE swelling or edema. EXTs warm to touch.  MUSCULOSKELETAL: Movement evident in all limbs. No tenderness on palpation.  DERMATOLOGICAL: No abnormal rashes or lesions.  NEUROLOGICAL: CN 2-12 grossly intact. No focal deficits. Sensation intact x 4EXT. A&Ox3 (oriented to person, place, and time).  PSYCHIATRIC: Appropriate affect.                            9.6    11.30 )-----------( 227      ( 03 Dec 2019 18:04 )             30.3     PT/INR - ( 03 Dec 2019 05:30 )   PT: 21.9 SEC;   INR: 1.93          PTT - ( 03 Dec 2019 05:30 )  PTT:29.5 SEC  12-03    141  |  106  |  13  ----------------------------<  103<H>  3.6   |  23  |  0.82    Ca    7.9<L>      03 Dec 2019 05:30  Phos  2.4     12-03  Mg     1.6     12-03    TPro  5.1<L>  /  Alb  2.6<L>  /  TBili  0.7  /  DBili  x   /  AST  20  /  ALT  10  /  AlkPhos  326<H>  12-03    CAPILLARY BLOOD GLUCOSE        LIVER FUNCTIONS - ( 03 Dec 2019 05:30 )  Alb: 2.6 g/dL / Pro: 5.1 g/dL / ALK PHOS: 326 u/L / ALT: 10 u/L / AST: 20 u/L / GGT: x               Urinalysis Basic - ( 03 Dec 2019 01:30 )    Color: YELLOW / Appearance: CLEAR / SG: > 1.040 / pH: 6.0  Gluc: NEGATIVE / Ketone: NEGATIVE  / Bili: NEGATIVE / Urobili: NORMAL   Blood: NEGATIVE / Protein: 30 / Nitrite: NEGATIVE   Leuk Esterase: NEGATIVE / RBC: 0-2 / WBC 3-5   Sq Epi: OCC / Non Sq Epi: x / Bacteria: NEGATIVE        Culture - Blood (collected 02 Dec 2019 19:51)  Source: BLOOD VENOUS  Preliminary Report (03 Dec 2019 19:51):    NO ORGANISMS ISOLATED    NO ORGANISMS ISOLATED AT 24 HOURS    Culture - Blood (collected 02 Dec 2019 19:51)  Source: BLOOD  Preliminary Report (03 Dec 2019 19:51):    NO ORGANISMS ISOLATED    NO ORGANISMS ISOLATED AT 24 HOURS          RADIOLOGY AND ADDITIONAL TESTS:    CONSULTANT NOTES REVIEWED:    CARE DISCUSSED WITH THE FOLLOWING CONSULTANTS/PROVIDERS: Contact Information:  Nhi Arzate II, MD, MPH  PGY-1, Internal Medicine  Pager: [phone] ([facility]) /// [phone] ([facility])    WOODY CVEALLOS, MRN-[id number]    Patient is a 69y old  Male who presents with a chief complaint of mechanical fall in the setting of SIRS (03 Dec 2019 10:44)    OVERNIGHT EVENTS: No overnight events.    SUBJECTIVE: Patient evaluated at bedside, complaining of 4/10 back pain helped with medication. Yesterday, had 5 BM, one of which he described as watery, others with some substance.     GASTROINTESTINAL: +Diarrhea. Firm abdomen.  BACK: +back pain    OBJECTIVE:  Vital Signs Last 24 Hrs  T(C): 36.8 (04 Dec 2019 05:55), Max: 36.9 (03 Dec 2019 15:22)  T(F): 98.2 (04 Dec 2019 05:55), Max: 98.5 (03 Dec 2019 15:22)  HR: 90 (04 Dec 2019 05:55) (82 - 108)  BP: 132/65 (04 Dec 2019 05:55) (99/57 - 138/87)  BP(mean): --  RR: 20 (04 Dec 2019 05:55) (20 - 20)  SpO2: 96% (04 Dec 2019 05:55) (96% - 99%)  I&O's Summary    MEDICATIONS  (STANDING):  apixaban 5 milliGRAM(s) Oral two times a day  cefepime   IVPB 1000 milliGRAM(s) IV Intermittent every 12 hours  gabapentin 300 milliGRAM(s) Oral at bedtime  lactated ringers. 1000 milliLiter(s) (75 mL/Hr) IV Continuous <Continuous>  lidocaine   Patch 1 Patch Transdermal once  pantoprazole    Tablet 40 milliGRAM(s) Oral before breakfast  tamsulosin 0.4 milliGRAM(s) Oral at bedtime  vancomycin  IVPB 750 milliGRAM(s) IV Intermittent every 12 hours    MEDICATIONS  (PRN):  aluminum hydroxide/magnesium hydroxide/simethicone Suspension 30 milliLiter(s) Oral every 4 hours PRN Dyspepsia  oxyCODONE    IR 5 milliGRAM(s) Oral every 4 hours PRN moderate and severe pain    Allergies    penicillin G benzathine (Rash)  sulfa drugs (Rash)    Intolerances    CONSTITUTIONAL: No acute distress.  EYES: +PERRL. +EOMI. No scleral icterus. No conjunctival injection.  ENT: Moist oral mucosa.   NECK: Supple.  RESPIRATORY: CTAB in anterior lung fields, improving breath sounds in the lower posterior lung fields. No wheezes, rales, or rhonchi. No accessory muscle use. No apparent respiratory distress.  CHEST: Port catheter in R chest.  CARDIOVASCULAR: +S1/S2. No audible S3/S4. Regular rate and rhythm. No murmurs, rubs, or gallops. 2+ radial pulses x b/l UE; 2+ DP pulses x b/l LE.   GASTROINTESTINAL: Soft, nontender, slightly distended. +BS. No rebound or guarding.   EXTREMITY: No LE swelling or edema. EXTs warm to touch.  MUSCULOSKELETAL: Movement evident in all limbs. No tenderness on palpation.  DERMATOLOGICAL: No abnormal rashes or lesions.  NEUROLOGICAL: CN 2-12 grossly intact. No focal deficits. Sensation intact x 4EXT. A&Ox3 (oriented to person, place, and time).  PSYCHIATRIC: Appropriate affect.                            9.6    11.30 )-----------( 227      ( 03 Dec 2019 18:04 )             30.3     PT/INR - ( 03 Dec 2019 05:30 )   PT: 21.9 SEC;   INR: 1.93          PTT - ( 03 Dec 2019 05:30 )  PTT:29.5 SEC  12-03    141  |  106  |  13  ----------------------------<  103<H>  3.6   |  23  |  0.82    Ca    7.9<L>      03 Dec 2019 05:30  Phos  2.4     12-03  Mg     1.6     12-03    TPro  5.1<L>  /  Alb  2.6<L>  /  TBili  0.7  /  DBili  x   /  AST  20  /  ALT  10  /  AlkPhos  326<H>  12-03      LIVER FUNCTIONS - ( 03 Dec 2019 05:30 )  Alb: 2.6 g/dL / Pro: 5.1 g/dL / ALK PHOS: 326 u/L / ALT: 10 u/L / AST: 20 u/L / GGT: x           Urinalysis Basic - ( 03 Dec 2019 01:30 )    Color: YELLOW / Appearance: CLEAR / SG: > 1.040 / pH: 6.0  Gluc: NEGATIVE / Ketone: NEGATIVE  / Bili: NEGATIVE / Urobili: NORMAL   Blood: NEGATIVE / Protein: 30 / Nitrite: NEGATIVE   Leuk Esterase: NEGATIVE / RBC: 0-2 / WBC 3-5   Sq Epi: OCC / Non Sq Epi: x / Bacteria: NEGATIVE    Culture - Blood (collected 02 Dec 2019 19:51)  Source: BLOOD VENOUS  Preliminary Report (03 Dec 2019 19:51):    NO ORGANISMS ISOLATED    NO ORGANISMS ISOLATED AT 24 HOURS    Culture - Blood (collected 02 Dec 2019 19:51)  Source: BLOOD  Preliminary Report (03 Dec 2019 19:51):    NO ORGANISMS ISOLATED      CONSULTANT NOTES REVIEWED: GI  CARE DISCUSSED WITH THE FOLLOWING CONSULTANTS/PROVIDERS:

## 2019-12-05 ENCOUNTER — TRANSCRIPTION ENCOUNTER (OUTPATIENT)
Age: 69
End: 2019-12-05

## 2019-12-05 VITALS
RESPIRATION RATE: 18 BRPM | TEMPERATURE: 98 F | HEART RATE: 87 BPM | OXYGEN SATURATION: 98 % | SYSTOLIC BLOOD PRESSURE: 123 MMHG | DIASTOLIC BLOOD PRESSURE: 71 MMHG

## 2019-12-05 LAB
ALBUMIN SERPL ELPH-MCNC: 2.5 G/DL — LOW (ref 3.3–5)
ALP SERPL-CCNC: 353 U/L — HIGH (ref 40–120)
ALT FLD-CCNC: 11 U/L — SIGNIFICANT CHANGE UP (ref 4–41)
ANION GAP SERPL CALC-SCNC: 13 MMO/L — SIGNIFICANT CHANGE UP (ref 7–14)
APTT BLD: 28.1 SEC — SIGNIFICANT CHANGE UP (ref 27.5–36.3)
AST SERPL-CCNC: 23 U/L — SIGNIFICANT CHANGE UP (ref 4–40)
BILIRUB SERPL-MCNC: 0.6 MG/DL — SIGNIFICANT CHANGE UP (ref 0.2–1.2)
BUN SERPL-MCNC: 6 MG/DL — LOW (ref 7–23)
CALCIUM SERPL-MCNC: 8 MG/DL — LOW (ref 8.4–10.5)
CHLORIDE SERPL-SCNC: 104 MMOL/L — SIGNIFICANT CHANGE UP (ref 98–107)
CO2 SERPL-SCNC: 21 MMOL/L — LOW (ref 22–31)
CREAT SERPL-MCNC: 0.64 MG/DL — SIGNIFICANT CHANGE UP (ref 0.5–1.3)
GLUCOSE SERPL-MCNC: 110 MG/DL — HIGH (ref 70–99)
H PYLORI AG STL QL: NEGATIVE — SIGNIFICANT CHANGE UP
HCT VFR BLD CALC: 28.5 % — LOW (ref 39–50)
HGB BLD-MCNC: 9.1 G/DL — LOW (ref 13–17)
INR BLD: 1.84 — HIGH (ref 0.88–1.17)
MAGNESIUM SERPL-MCNC: 1.7 MG/DL — SIGNIFICANT CHANGE UP (ref 1.6–2.6)
MCHC RBC-ENTMCNC: 27 PG — SIGNIFICANT CHANGE UP (ref 27–34)
MCHC RBC-ENTMCNC: 31.9 % — LOW (ref 32–36)
MCV RBC AUTO: 84.6 FL — SIGNIFICANT CHANGE UP (ref 80–100)
NRBC # FLD: 0 K/UL — SIGNIFICANT CHANGE UP (ref 0–0)
OSMOLALITY STL: 307 — SIGNIFICANT CHANGE UP
PHOSPHATE SERPL-MCNC: 2.8 MG/DL — SIGNIFICANT CHANGE UP (ref 2.5–4.5)
PLATELET # BLD AUTO: 213 K/UL — SIGNIFICANT CHANGE UP (ref 150–400)
PMV BLD: 11.6 FL — SIGNIFICANT CHANGE UP (ref 7–13)
POTASSIUM SERPL-MCNC: 3.7 MMOL/L — SIGNIFICANT CHANGE UP (ref 3.5–5.3)
POTASSIUM SERPL-SCNC: 3.7 MMOL/L — SIGNIFICANT CHANGE UP (ref 3.5–5.3)
PROT SERPL-MCNC: 5.5 G/DL — LOW (ref 6–8.3)
PROTHROM AB SERPL-ACNC: 20.8 SEC — HIGH (ref 9.8–13.1)
RBC # BLD: 3.37 M/UL — LOW (ref 4.2–5.8)
RBC # FLD: 18.5 % — HIGH (ref 10.3–14.5)
SODIUM SERPL-SCNC: 138 MMOL/L — SIGNIFICANT CHANGE UP (ref 135–145)
WBC # BLD: 6.76 K/UL — SIGNIFICANT CHANGE UP (ref 3.8–10.5)
WBC # FLD AUTO: 6.76 K/UL — SIGNIFICANT CHANGE UP (ref 3.8–10.5)

## 2019-12-05 PROCEDURE — 99232 SBSQ HOSP IP/OBS MODERATE 35: CPT | Mod: GC

## 2019-12-05 PROCEDURE — 99239 HOSP IP/OBS DSCHRG MGMT >30: CPT

## 2019-12-05 RX ORDER — LOPERAMIDE HCL 2 MG
4 TABLET ORAL ONCE
Refills: 0 | Status: COMPLETED | OUTPATIENT
Start: 2019-12-05 | End: 2019-12-05

## 2019-12-05 RX ORDER — APIXABAN 2.5 MG/1
1 TABLET, FILM COATED ORAL
Qty: 0 | Refills: 0 | DISCHARGE
Start: 2019-12-05

## 2019-12-05 RX ORDER — PANTOPRAZOLE SODIUM 20 MG/1
1 TABLET, DELAYED RELEASE ORAL
Qty: 0 | Refills: 0 | DISCHARGE
Start: 2019-12-05

## 2019-12-05 RX ORDER — GABAPENTIN 400 MG/1
1 CAPSULE ORAL
Qty: 0 | Refills: 0 | DISCHARGE
Start: 2019-12-05

## 2019-12-05 RX ORDER — SIMETHICONE 80 MG/1
80 TABLET, CHEWABLE ORAL
Refills: 0 | Status: DISCONTINUED | OUTPATIENT
Start: 2019-12-05 | End: 2019-12-05

## 2019-12-05 RX ORDER — OXYCODONE HYDROCHLORIDE 5 MG/1
1 TABLET ORAL
Qty: 30 | Refills: 0
Start: 2019-12-05 | End: 2019-12-09

## 2019-12-05 RX ORDER — APIXABAN 2.5 MG/1
1 TABLET, FILM COATED ORAL
Qty: 0 | Refills: 0 | DISCHARGE

## 2019-12-05 RX ORDER — GABAPENTIN 400 MG/1
1 CAPSULE ORAL
Qty: 0 | Refills: 0 | DISCHARGE

## 2019-12-05 RX ORDER — OXYCODONE HYDROCHLORIDE 5 MG/1
1 TABLET ORAL
Qty: 42 | Refills: 0
Start: 2019-12-05 | End: 2019-12-11

## 2019-12-05 RX ORDER — LOPERAMIDE HCL 2 MG
1 TABLET ORAL
Qty: 0 | Refills: 0 | DISCHARGE
Start: 2019-12-05

## 2019-12-05 RX ORDER — SIMETHICONE 80 MG/1
1 TABLET, CHEWABLE ORAL
Qty: 60 | Refills: 0
Start: 2019-12-05 | End: 2020-01-03

## 2019-12-05 RX ADMIN — SIMETHICONE 80 MILLIGRAM(S): 80 TABLET, CHEWABLE ORAL at 15:35

## 2019-12-05 RX ADMIN — LIDOCAINE 1 PATCH: 4 CREAM TOPICAL at 00:46

## 2019-12-05 RX ADMIN — APIXABAN 5 MILLIGRAM(S): 2.5 TABLET, FILM COATED ORAL at 17:11

## 2019-12-05 RX ADMIN — APIXABAN 5 MILLIGRAM(S): 2.5 TABLET, FILM COATED ORAL at 05:42

## 2019-12-05 RX ADMIN — PANTOPRAZOLE SODIUM 40 MILLIGRAM(S): 20 TABLET, DELAYED RELEASE ORAL at 05:42

## 2019-12-05 RX ADMIN — CHLORHEXIDINE GLUCONATE 1 APPLICATION(S): 213 SOLUTION TOPICAL at 13:22

## 2019-12-05 RX ADMIN — SIMETHICONE 80 MILLIGRAM(S): 80 TABLET, CHEWABLE ORAL at 08:45

## 2019-12-05 RX ADMIN — OXYCODONE HYDROCHLORIDE 5 MILLIGRAM(S): 5 TABLET ORAL at 08:46

## 2019-12-05 RX ADMIN — Medication 4 MILLIGRAM(S): at 15:31

## 2019-12-05 RX ADMIN — OXYCODONE HYDROCHLORIDE 5 MILLIGRAM(S): 5 TABLET ORAL at 09:16

## 2019-12-05 RX ADMIN — Medication 30 MILLILITER(S): at 17:10

## 2019-12-05 NOTE — PROGRESS NOTE ADULT - PROBLEM SELECTOR PLAN 1
- Patient c/o diarrhea that was improved on imodium but since stopping it has gotten worse  - Recently had 5 BM  - Ddx includes C. diff, osmotic diarrhea, pancreatic insufficiency  - F/u GI PCR, stool Cx, C. diff  - GI consulted, f/u stool electrolytes/osmolality, fecal a1-antitrypsin, fecal fat - Resolving diarrhea on imodium; C. diff negative  - Patient c/o diarrhea that was improved on imodium but since stopping it has gotten worse  - Recently had a BM stated to be more solid and consistent  - Ddx includes C. diff, osmotic diarrhea, pancreatic insufficiency  - GI PCR and C. diff negative  - GI consulted, f/u stool electrolytes/osmolality, fecal a1-antitrypsin, fecal fat, to be followed up outpatient by Dr. Orantes

## 2019-12-05 NOTE — PROGRESS NOTE ADULT - PROBLEM SELECTOR PLAN 3
- Patient presented after likely mechanical fall; small abrasion on R forehead  - CTH negative for ICH  - Monitor changes

## 2019-12-05 NOTE — DISCHARGE NOTE PROVIDER - NSDCCPTREATMENT_GEN_ALL_CORE_FT
PRINCIPAL PROCEDURE  Procedure: CT abdomen pelvis  Findings and Treatment: IMPRESSION:   New areas of wedge-shaped hypoattenuation in the liver, in the setting of known portal vein thrombosis, may reflect areas of hepatic infarction.  Worsening hepatic metastases.  Slight interval increase in the moderate volume ascites.  Status post Whipple with stable soft tissue encasement of the superior   mesenteric artery.  New small left pleural effusion.      SECONDARY PROCEDURE  Procedure: CT head wo con  Findings and Treatment: IMPRESSION:   No acute intracranial bleeding, mass effect, or shift. No acute displaced   calvarial fracture.

## 2019-12-05 NOTE — DISCHARGE NOTE PROVIDER - NSDCCPCAREPLAN_GEN_ALL_CORE_FT
PRINCIPAL DISCHARGE DIAGNOSIS  Diagnosis: Blunt head trauma, initial encounter  Assessment and Plan of Treatment: You were admitted because of fall at your chemotherapy center during which you hit your head. You were assessed for any head trauma; a CT of the head was negative for brain bleeding. You are advised to follow-up with your primary care provider.  Homero Melgar  [address] [place] [address]  Phone: [phone]  Fax: (   )    -  Established Patient  Follow Up Time: 1 week      SECONDARY DISCHARGE DIAGNOSES  Diagnosis: Diarrhea  Assessment and Plan of Treatment: You were admitted because of fall at your chemotherapy center during which you hit your head; workup was negative. During your hospital course, you were noted to have diarrhea. Stool studies, including GI PCR and C. diff studies, were negative. You continued to have some diarrhea, so GI was consulted; they recommended more stool studies, imodium, and a colonoscopy if the diarrhea continued. Stool studies were sent and you were noted to improve on the imodium. You are advised to follow-up with your Gastroenterologist, who will follow-up the results of the stool studies:  Lukasz Orantes) - 12/17/2019, 11AM  Gastroenterology; Internal Medicine  [address] [place] [address], [address]  [place] [address]  Phone: [phone]  Fax: [phone]  Established Patient  Follow Up Time: 2 weeks    Diagnosis: SIRS (systemic inflammatory response syndrome)  Assessment and Plan of Treatment: You were admitted because of fall at your chemotherapy center during which you hit your head. You were ruled out for any head injury or bleeding, but you had results that were suggestive of infection. Blood and urine cultures were negative. You were given antibiotics for prophylactic treatment but they were discontinued when your cultures were found to be negative. You are advised to follow-up with your [facility] care provider.  Yennyadriana Homero  [address] [place] [address]  Phone: [phone]  Fax: (   )    -  Established Patient  Follow Up Time: 1 week    Diagnosis: Back pain  Assessment and Plan of Treatment: You were admitted because of fall at your chemotherapy center during which you hit your head; workup was negative. You also indicated back pain; you were given pain medication. You are advised to follow-up with your primary care physician.  Homero Melgar  [address] [place] [address]  Phone: [phone]  Fax: (   )    -  Established Patient  Follow Up Time: 1 week    Diagnosis: Liver lesion  Assessment and Plan of Treatment: You were admitted because of fall at your chemotherapy center during which you hit your head; workup was negative. During your evaluation, you had a CT that demonstrated increasingly worsening of metastatic lesions in your liver and new liver infarction because of your portal vein thrombosis. Oncology was consulted; they recommended stopping systemic chemotherapy but continuing anticoagulation. You are advised to follow-up with your outpatient Oncologist and Gastroenterologist.  Lukasz Orantes) - 12/17/2019, 11AM  Gastroenterology; Internal Medicine  [address] [place] [address], [address] [place] [address]  Phone: [phone]  Fax: [phone]  Established Patient  Follow Up Time: 2 weeks  Korey Tompkins)  [facility] Medicine; Internal Medicine; Medical Oncology  [address] [place] [address]  Phone: [phone]  Fax: [phone]  Established Patient  Follow Up Time: 2 weeks

## 2019-12-05 NOTE — PROGRESS NOTE ADULT - PROBLEM SELECTOR PLAN 4
- H/o cholangiocarcinoma with relapse  - CT demonstrating worsening mets within liver; also demonstrating increased number of liver hypodensities likely 2/2 portal vein thrombosis vs hypotension  - Oncology consulted, recommends continuation of Eliquis - H/o cholangiocarcinoma with relapse  - CT demonstrating worsening mets within liver; also demonstrating increased number of liver hypodensities likely 2/2 portal vein thrombosis vs hypotension  - To follow-up with Dr. Atkinson outpatient

## 2019-12-05 NOTE — DISCHARGE NOTE PROVIDER - NSDCFUADDAPPT_GEN_ALL_CORE_FT
Dr. Lukasz Orantes-Gastroenterology  [address] [place] [address],  [address] [place] [address]  12/17  [email or web address] Lukasz Orantes) - 12/17/2019, 11AM  Gastroenterology; Internal Medicine  [address] [facility], [facility] [[email or web address] [address]  [place] [address]  Phone: [phone]  Fax: [phone]  Established Patient  Follow Up Time: 2 weeks    Korey Tompkins)  \[facility]\""ative Medicine; Internal Medicine; Medical Oncology  [address] [place] [address]  Phone: [phone]  Fax: [phone]  Established Patient  Follow Up Time: 2 weeks    Homero Melgar  [address] [place] [address]  Phone: [phone]  Fax: (   )    -  Established Patient  Follow Up Time: 1 week

## 2019-12-05 NOTE — DISCHARGE NOTE PROVIDER - CARE PROVIDERS DIRECT ADDRESSES
[email or web address]

## 2019-12-05 NOTE — PROGRESS NOTE ADULT - SUBJECTIVE AND OBJECTIVE BOX
Contact Information:  Nhi Arzate II, MD, MPH  PGY-1, Internal Medicine  Pager: [phone] ([facility]) /// [phone] ([facility])    WOODY CEVALLOS, MRN-[id number]    Patient is a 69y old  Male who presents with a chief complaint of mechanical fall in the setting of SIRS (04 Dec 2019 11:53)      OVERNIGHT EVENTS:    SUBJECTIVE:    CONSTITUTIONAL: No weakness. No fatigue. No fever.  HEAD: No head trauma.   EYES: No vision changes.  ENT: No hearing changes or tinnitus. No ear pain. No changes in smell. No nasal congestion or discharge. No sore throat. No voice hoarseness.   NECK: No neck pain or stiffness. No lumps.  RESPIRATORY: No cough. No SOB. No wheezing. No hemoptysis.   CARDIOVASCULAR: No chest pain. No palpitations.   GASTROINTESTINAL: No dysphagia. No ABD pain. No distension. No constipation. No diarrhea. No pain with defecation. No hematemesis. No hematochezia or melena.  BACK: No back pain.  GENITOURINARY: No dysuria. No frequency or urgency. No hesitancy. No incontinence. No urinary retention. No suprapubic pain. No hematuria.  EXTREMITY: No swelling.  MUSCULOSKELETAL: No joint pain or swelling. No fractures. No stiffness.    SKIN: No rashes. No itching. No skin, hair, or nail changes.  NEUROLOGICAL: No weakness or paralysis. No lightheadedness or dizziness. No HA. No numbness or tingling.   PSYCHIATRIC: No depression.       OBJECTIVE:  Vital Signs Last 24 Hrs  T(C): 36.7 (05 Dec 2019 05:40), Max: 37.1 (04 Dec 2019 15:57)  T(F): 98 (05 Dec 2019 05:40), Max: 98.7 (04 Dec 2019 15:57)  HR: 90 (05 Dec 2019 05:40) (81 - 90)  BP: 127/61 (05 Dec 2019 05:40) (109/65 - 140/79)  BP(mean): --  RR: 18 (05 Dec 2019 05:40) (16 - 18)  SpO2: 95% (05 Dec 2019 05:40) (95% - 98%)  I&O's Summary    04 Dec 2019 07:01  -  05 Dec 2019 07:00  --------------------------------------------------------  IN: 1089 mL / OUT: 400 mL / NET: 689 mL        MEDICATIONS  (STANDING):  apixaban 5 milliGRAM(s) Oral two times a day  chlorhexidine 4% Liquid 1 Application(s) Topical daily  gabapentin 300 milliGRAM(s) Oral at bedtime  pantoprazole    Tablet 40 milliGRAM(s) Oral before breakfast  tamsulosin 0.4 milliGRAM(s) Oral at bedtime    MEDICATIONS  (PRN):  aluminum hydroxide/magnesium hydroxide/simethicone Suspension 30 milliLiter(s) Oral every 4 hours PRN Dyspepsia  loperamide 2 milliGRAM(s) Oral every 6 hours PRN Diarrhea  oxyCODONE    IR 5 milliGRAM(s) Oral every 4 hours PRN moderate and severe pain  simethicone 80 milliGRAM(s) Chew two times a day PRN Gas    Allergies    penicillin G benzathine (Rash)  sulfa drugs (Rash)    Intolerances        CONSTITUTIONAL: No acute distress. Awake and alert.  HEAD: No evidence of trauma. Structures WNL.  EYES: +PERRL. +EOMI. No scleral icterus. No conjunctival injection.  ENT: Moist oral mucosa. No erythema. No pharyngeal exudates.   NECK: Supple. Appropriate ROM. No stiffness. No masses or lymphadenopathy.  RESPIRATORY: CTAB. No wheezes, rales, or rhonchi. No accessory muscle use. No apparent respiratory distress.  CARDIOVASCULAR: +S1/S2. No audible S3/S4. Regular rate and rhythm. No murmurs, rubs, or gallops. 2+ radial pulses x b/l UE; 2+ DP pulses x b/l LE.   GASTROINTESTINAL: Soft, nontender, nondistended. +BS. No rebound or guarding.   BACK: No spinal or paraspinal tenderness. No CVA tenderness.  EXTREMITY: No LE swelling or edema. EXTs warm to touch.  MUSCULOSKELETAL: Movement evident in all limbs. No tenderness on palpation.  DERMATOLOGICAL: No abnormal rashes or lesions.  NEUROLOGICAL: CN 2-12 grossly intact. No focal deficits. Sensation intact x 4EXT. A&Ox3 (oriented to person, place, and time).  PSYCHIATRIC: Appropriate affect.                            9.1    6.76  )-----------( 213      ( 05 Dec 2019 07:04 )             28.5     PT/INR - ( 05 Dec 2019 07:04 )   PT: 20.8 SEC;   INR: 1.84          PTT - ( 05 Dec 2019 07:04 )  PTT:28.1 SEC  12-05    138  |  104  |  6<L>  ----------------------------<  110<H>  3.7   |  21<L>  |  0.64    Ca    8.0<L>      05 Dec 2019 07:04  Phos  2.8     12-05  Mg     1.7     12-05    TPro  5.5<L>  /  Alb  2.5<L>  /  TBili  0.6  /  DBili  x   /  AST  23  /  ALT  11  /  AlkPhos  353<H>  12-05    CAPILLARY BLOOD GLUCOSE        LIVER FUNCTIONS - ( 05 Dec 2019 07:04 )  Alb: 2.5 g/dL / Pro: 5.5 g/dL / ALK PHOS: 353 u/L / ALT: 11 u/L / AST: 23 u/L / GGT: x                   GI PCR Panel, Stool (collected 03 Dec 2019 23:05)  Source: FECES    Culture - Urine (collected 03 Dec 2019 02:51)  Source: URINE MIDSTREAM  Final Report (04 Dec 2019 07:51):    NO GROWTH AT 24 HOURS    Culture - Blood (collected 02 Dec 2019 19:51)  Source: BLOOD VENOUS  Preliminary Report (04 Dec 2019 19:51):    NO ORGANISMS ISOLATED    NO ORGANISMS ISOLATED AT 48 HRS.    Culture - Blood (collected 02 Dec 2019 19:51)  Source: BLOOD  Preliminary Report (04 Dec 2019 19:51):    NO ORGANISMS ISOLATED    NO ORGANISMS ISOLATED AT 48 HRS.          RADIOLOGY AND ADDITIONAL TESTS:    CONSULTANT NOTES REVIEWED:    CARE DISCUSSED WITH THE FOLLOWING CONSULTANTS/PROVIDERS: Contact Information:  Nhi Arzate II, MD, MPH  PGY-1, Internal Medicine  Pager: [phone] ([facility]) /// [phone] ([facility])    WOODY CEVALLOS, MRN-[id number]    Patient is a 69y old  Male who presents with a chief complaint of mechanical fall in the setting of SIRS (04 Dec 2019 11:53)      OVERNIGHT EVENTS: No overnight events.    SUBJECTIVE: Patient evaluated at bedside, endorses ABD discomfort (5/10) which is not new and not worse than usual; also endorses back pain 2/2 bed. Otherwise endorses resolving diarrhea.    GASTROINTESTINAL: +Resolving diarrhea.  BACK: +Back pain.    OBJECTIVE:  Vital Signs Last 24 Hrs  T(C): 36.7 (05 Dec 2019 05:40), Max: 37.1 (04 Dec 2019 15:57)  T(F): 98 (05 Dec 2019 05:40), Max: 98.7 (04 Dec 2019 15:57)  HR: 90 (05 Dec 2019 05:40) (81 - 90)  BP: 127/61 (05 Dec 2019 05:40) (109/65 - 140/79)  BP(mean): --  RR: 18 (05 Dec 2019 05:40) (16 - 18)  SpO2: 95% (05 Dec 2019 05:40) (95% - 98%)  I&O's Summary    04 Dec 2019 07:01  -  05 Dec 2019 07:00  --------------------------------------------------------  IN: 1089 mL / OUT: 400 mL / NET: 689 mL        MEDICATIONS  (STANDING):  apixaban 5 milliGRAM(s) Oral two times a day  chlorhexidine 4% Liquid 1 Application(s) Topical daily  gabapentin 300 milliGRAM(s) Oral at bedtime  pantoprazole    Tablet 40 milliGRAM(s) Oral before breakfast  tamsulosin 0.4 milliGRAM(s) Oral at bedtime    MEDICATIONS  (PRN):  aluminum hydroxide/magnesium hydroxide/simethicone Suspension 30 milliLiter(s) Oral every 4 hours PRN Dyspepsia  loperamide 2 milliGRAM(s) Oral every 6 hours PRN Diarrhea  oxyCODONE    IR 5 milliGRAM(s) Oral every 4 hours PRN moderate and severe pain  simethicone 80 milliGRAM(s) Chew two times a day PRN Gas    Allergies    penicillin G benzathine (Rash)  sulfa drugs (Rash)    Intolerances        CONSTITUTIONAL: No acute distress. Awake and alert.  HEAD: No evidence of trauma. Structures WNL.  EYES: +PERRL. +EOMI. No scleral icterus. No conjunctival injection.  ENT: Moist oral mucosa. No erythema. No pharyngeal exudates.   NECK: Supple. Appropriate ROM. No stiffness. No masses or lymphadenopathy.  RESPIRATORY: CTAB. No wheezes, rales, or rhonchi. No accessory muscle use. No apparent respiratory distress.  CARDIOVASCULAR: +S1/S2. No audible S3/S4. Regular rate and rhythm. No murmurs, rubs, or gallops. 2+ radial pulses x b/l UE; 2+ DP pulses x b/l LE.   GASTROINTESTINAL: Soft, nontender, nondistended. +BS. No rebound or guarding.   BACK: No spinal or paraspinal tenderness. No CVA tenderness.  EXTREMITY: No LE swelling or edema. EXTs warm to touch.  MUSCULOSKELETAL: Movement evident in all limbs. No tenderness on palpation.  DERMATOLOGICAL: No abnormal rashes or lesions.  NEUROLOGICAL: CN 2-12 grossly intact. No focal deficits. Sensation intact x 4EXT. A&Ox3 (oriented to person, place, and time).  PSYCHIATRIC: Appropriate affect.                            9.1    6.76  )-----------( 213      ( 05 Dec 2019 07:04 )             28.5     PT/INR - ( 05 Dec 2019 07:04 )   PT: 20.8 SEC;   INR: 1.84          PTT - ( 05 Dec 2019 07:04 )  PTT:28.1 SEC  12-05    138  |  104  |  6<L>  ----------------------------<  110<H>  3.7   |  21<L>  |  0.64    Ca    8.0<L>      05 Dec 2019 07:04  Phos  2.8     12-05  Mg     1.7     12-05    TPro  5.5<L>  /  Alb  2.5<L>  /  TBili  0.6  /  DBili  x   /  AST  23  /  ALT  11  /  AlkPhos  353<H>  12-05    CAPILLARY BLOOD GLUCOSE        LIVER FUNCTIONS - ( 05 Dec 2019 07:04 )  Alb: 2.5 g/dL / Pro: 5.5 g/dL / ALK PHOS: 353 u/L / ALT: 11 u/L / AST: 23 u/L / GGT: x                   GI PCR Panel, Stool (collected 03 Dec 2019 23:05)  Source: FECES    Culture - Urine (collected 03 Dec 2019 02:51)  Source: URINE MIDSTREAM  Final Report (04 Dec 2019 07:51):    NO GROWTH AT 24 HOURS    Culture - Blood (collected 02 Dec 2019 19:51)  Source: BLOOD VENOUS  Preliminary Report (04 Dec 2019 19:51):    NO ORGANISMS ISOLATED    NO ORGANISMS ISOLATED AT 48 HRS.    Culture - Blood (collected 02 Dec 2019 19:51)  Source: BLOOD  Preliminary Report (04 Dec 2019 19:51):    NO ORGANISMS ISOLATED    NO ORGANISMS ISOLATED AT 48 HRS.          RADIOLOGY AND ADDITIONAL TESTS:    CONSULTANT NOTES REVIEWED:    CARE DISCUSSED WITH THE FOLLOWING CONSULTANTS/PROVIDERS: Contact Information:  Nhi Arzate II, MD, MPH  PGY-1, Internal Medicine  Pager: [phone] ([facility]) /// [phone] ([facility])    WOODY CEVALLOS, MRN-[id number]    Patient is a 69y old  Male who presents with a chief complaint of mechanical fall in the setting of SIRS (04 Dec 2019 11:53)    OVERNIGHT EVENTS: No overnight events.    SUBJECTIVE: Patient evaluated at bedside, endorses ABD discomfort (5/10) which is not new and not worse than usual; also endorses back pain 2/2 bed. Otherwise endorses resolving diarrhea.    GASTROINTESTINAL: +Resolving diarrhea.  BACK: +Back pain.    OBJECTIVE:  Vital Signs Last 24 Hrs  T(C): 36.7 (05 Dec 2019 05:40), Max: 37.1 (04 Dec 2019 15:57)  T(F): 98 (05 Dec 2019 05:40), Max: 98.7 (04 Dec 2019 15:57)  HR: 90 (05 Dec 2019 05:40) (81 - 90)  BP: 127/61 (05 Dec 2019 05:40) (109/65 - 140/79)  BP(mean): --  RR: 18 (05 Dec 2019 05:40) (16 - 18)  SpO2: 95% (05 Dec 2019 05:40) (95% - 98%)  I&O's Summary    04 Dec 2019 07:01  -  05 Dec 2019 07:00  --------------------------------------------------------  IN: 1089 mL / OUT: 400 mL / NET: 689 mL    MEDICATIONS  (STANDING):  apixaban 5 milliGRAM(s) Oral two times a day  chlorhexidine 4% Liquid 1 Application(s) Topical daily  gabapentin 300 milliGRAM(s) Oral at bedtime  pantoprazole    Tablet 40 milliGRAM(s) Oral before breakfast  tamsulosin 0.4 milliGRAM(s) Oral at bedtime    MEDICATIONS  (PRN):  aluminum hydroxide/magnesium hydroxide/simethicone Suspension 30 milliLiter(s) Oral every 4 hours PRN Dyspepsia  loperamide 2 milliGRAM(s) Oral every 6 hours PRN Diarrhea  oxyCODONE    IR 5 milliGRAM(s) Oral every 4 hours PRN moderate and severe pain  simethicone 80 milliGRAM(s) Chew two times a day PRN Gas    Allergies  penicillin G benzathine (Rash)  sulfa drugs (Rash)    CONSTITUTIONAL: No acute distress. Awake and alert.  HEAD: No evidence of trauma. Structures WNL.  EYES: +PERRL. +EOMI. No scleral icterus. No conjunctival injection.  ENT: Moist oral mucosa. No erythema. No pharyngeal exudates.   NECK: Supple. Appropriate ROM. No stiffness. No masses or lymphadenopathy.  RESPIRATORY: CTAB. No wheezes, rales, or rhonchi. No accessory muscle use. No apparent respiratory distress.  CARDIOVASCULAR: +S1/S2. No audible S3/S4. Regular rate and rhythm. No murmurs, rubs, or gallops. 2+ radial pulses x b/l UE; 2+ DP pulses x b/l LE.   GASTROINTESTINAL: Soft, nontender, nondistended. +BS. No rebound or guarding.   BACK: No spinal or paraspinal tenderness. No CVA tenderness.  EXTREMITY: No LE swelling or edema. EXTs warm to touch.  MUSCULOSKELETAL: Movement evident in all limbs. No tenderness on palpation.  DERMATOLOGICAL: No abnormal rashes or lesions.  NEUROLOGICAL: CN 2-12 grossly intact. No focal deficits. Sensation intact x 4EXT. A&Ox3 (oriented to person, place, and time).  PSYCHIATRIC: Appropriate affect.                            9.1    6.76  )-----------( 213      ( 05 Dec 2019 07:04 )             28.5     PT/INR - ( 05 Dec 2019 07:04 )   PT: 20.8 SEC;   INR: 1.84          PTT - ( 05 Dec 2019 07:04 )  PTT:28.1 SEC  12-05    138  |  104  |  6<L>  ----------------------------<  110<H>  3.7   |  21<L>  |  0.64    Ca    8.0<L>      05 Dec 2019 07:04  Phos  2.8     12-05  Mg     1.7     12-05    TPro  5.5<L>  /  Alb  2.5<L>  /  TBili  0.6  /  DBili  x   /  AST  23  /  ALT  11  /  AlkPhos  353<H>  12-05    CAPILLARY BLOOD GLUCOSE        LIVER FUNCTIONS - ( 05 Dec 2019 07:04 )  Alb: 2.5 g/dL / Pro: 5.5 g/dL / ALK PHOS: 353 u/L / ALT: 11 u/L / AST: 23 u/L / GGT: x                   GI PCR Panel, Stool (collected 03 Dec 2019 23:05)  Source: FECES    Culture - Urine (collected 03 Dec 2019 02:51)  Source: URINE MIDSTREAM  Final Report (04 Dec 2019 07:51):    NO GROWTH AT 24 HOURS    Culture - Blood (collected 02 Dec 2019 19:51)  Source: BLOOD VENOUS  Preliminary Report (04 Dec 2019 19:51):    NO ORGANISMS ISOLATED    NO ORGANISMS ISOLATED AT 48 HRS.    Culture - Blood (collected 02 Dec 2019 19:51)  Source: BLOOD  Preliminary Report (04 Dec 2019 19:51):    NO ORGANISMS ISOLATED    NO ORGANISMS ISOLATED AT 48 HRS.          RADIOLOGY AND ADDITIONAL TESTS:    CONSULTANT NOTES REVIEWED:    CARE DISCUSSED WITH THE FOLLOWING CONSULTANTS/PROVIDERS:

## 2019-12-05 NOTE — DIETITIAN INITIAL EVALUATION ADULT. - PERTINENT LABORATORY DATA
12-05 Na138 mmol/L Glu 110 mg/dL<H> K+ 3.7 mmol/L Cr  0.64 mg/dL BUN 6 mg/dL<L> 12-05 Phos 2.8 mg/dL 12-05 Alb 2.5 g/dL<L>

## 2019-12-05 NOTE — DIETITIAN INITIAL EVALUATION ADULT. - PHYSICAL APPEARANCE
Nutrition focused physical exam deferred at this time./other (specify) No noted edema or pressure injuries.

## 2019-12-05 NOTE — PROGRESS NOTE ADULT - PROBLEM SELECTOR PLAN 6
- Patient presenting with irregular heart rhythm that was present on prior admission. Possibly Afib in the setting of SIRS  - Most recent EKG NSR w/PACs  - C/w metoprolol  - Holding BB due to hypotension

## 2019-12-05 NOTE — PROGRESS NOTE ADULT - ASSESSMENT
69M w/ metastatic cholangiocarcinoma (s/p Whipple 2017) w/ mets to liver and portal vein throbmus on eliquis, admitted for fall. Pt w/ diarrhea x 1-2 months, ruled out for infectious etiology.  Impression:  #Diarrhea: d/dx microscopic colitis vs bile acid malabsorption vs IBD-D vs SIBO vs pancreatic insufficiency. Ruled out for C. diff and common infectious pathogens.   #Cholangiocarcinoma - on chemotherapy  #Portal Vein Thrombus - on eliquis    Recommendations:  - no GI contraindication to discharge  - can start pt on imodium  - f/u w/ Dr. Lukasz Orantes, can pursue further diarrhea work-up as outpatient, can consider colonoscopy w/ biopsies to eval for miscroscopic colitis    Timo Davies  Gastroenterology Fellow  Pager# [phone] or [phone]  Page on-call GI fellow after 5pm or on weekends 69M w/ metastatic cholangiocarcinoma (s/p Whipple 2017) w/ mets to liver and portal vein throbmus on eliquis, admitted for fall. Pt w/ diarrhea x 1-2 months, ruled out for infectious etiology.  Impression:  #Diarrhea: d/dx microscopic colitis vs bile acid malabsorption vs IBD-D vs SIBO vs pancreatic insufficiency. Ruled out for C. diff and common infectious pathogens.   #Cholangiocarcinoma - on chemotherapy  #Portal Vein Thrombus - on eliquis    Recommendations:  - no GI contraindication to discharge  - can start pt on imodium  - f/u w/ Dr. Luksaz Orantes, can pursue further diarrhea work-up as outpatient, can consider colonoscopy w/ biopsies to eval for miscroscopic colitis pending clinical course    Timo Davies  Gastroenterology Fellow  Pager# [phone] or [phone]  Page on-call GI fellow after 5pm or on weekends

## 2019-12-05 NOTE — PROGRESS NOTE ADULT - ASSESSMENT
The patient is a 69 year old man with a history of cholangiocarcinoma s/p Whipple 2017 with recurrence of disease now with mets to the liver and last IV chemo 7/2019, portal vein thrombus on Eliquis x 1.5 weeks presents from [facility] for fall, admitted for monitoring after fall in the setting of SIRS (leukocytosis + tachycardia); on CT, found to have worsening metastasis to liver and increased hypodensities in the setting of portal vein thrombosis. The patient is a 69 year old man with a history of cholangiocarcinoma s/p Whipple 2017 with recurrence of disease now with mets to the liver and last IV chemo 7/2019, portal vein thrombus on Eliquis x 1.5 weeks presents from [facility] for fall, admitted for monitoring after fall in the setting of SIRS (leukocytosis + tachycardia); on CT, found to have worsening metastasis to liver and increased hypodensities in the setting of portal vein thrombosis. ID workup negative, C. diff negative. Tentative discharge today, to follow up with outpatient Oncology and Gastroenterology.

## 2019-12-05 NOTE — DISCHARGE NOTE PROVIDER - PROVIDER TOKENS
PROVIDER:[[id number]],FOLLOWUP:[2 weeks],ESTABLISHEDPATIENT:[T]],PROVIDER:[[id number]],FOLLOWUP:[2 weeks],ESTABLISHEDPATIENT:[T]],FREE:[LAST:[Ramón],FIRST:[Homero],PHONE:[phone]],FAX:[(   )    -],ADDRESS:[[address] [place] [address]],FOLLOWUP:[1 week],ESTABLISHEDPATIENT:[T]] PROVIDER:[TOKEN:[[id number]],FOLLOWUP:[2 weeks],ESTABLISHEDPATIENT:[T]],PROVIDER:[TOKEN:[[id number]],SCHEDULEDAPPT:[12/17/2019],SCHEDULEDAPPTTIME:[11:00 AM],ESTABLISHEDPATIENT:[T]],FREE:[LAST:[Ramón],FIRST:[Homero],PHONE:[phone]:[(   )    -],ADDRESS:[[address] [place] [address]],FOLLOWUP:[1 week],ESTABLISHEDPATIENT:[T]]

## 2019-12-05 NOTE — PROGRESS NOTE ADULT - REASON FOR ADMISSION
mechanical fall in the setting of SIRS

## 2019-12-05 NOTE — DISCHARGE NOTE NURSING/CASE MANAGEMENT/SOCIAL WORK - PATIENT PORTAL LINK FT
You can access the FollowMyHealth Patient Portal offered by [facility] by registering at the following website: [email or web address]. By joining [facility]’s FollowMyHealth portal, you will also be able to view your health information using other applications (apps) compatible with our system.

## 2019-12-05 NOTE — DIETITIAN INITIAL EVALUATION ADULT. - OTHER INFO
Met with Pt.  Ate ~25-50% of breakfast.  Reports 1 BM thus far today. Denies nausea/vomiting or issues chewing/swallowing.  NKFA.    Poor appetite, per Pt., x 3 days PTA.  Pt. with Hx of persistent diarrhea x ~3 months, which he had recent previous treatment for.  Loperamide had been initiated, however Per GI, Pt. then stopped taking it for ~5 days PTA with resultant in increase in diarrhea.  Also experiencing abdominal distention on occasion... it does not necessarily happen immediately after food consumption or with any specific foods.   At this time, diarrhea currently improving with Loperamide based on discussion with Pt. & GI.    Pt. typically drinks Ensure supplement 1x daily at home.  Encouraged small, frequent nutrient and protein dense food choices.  Also emphasized water intake & discouraged consumption of fatty, greasy foods or concentrated sugary beverages (i.e. juice, soda).    Pt. receptive to education.        Per Pt., usual body weight ~165lbs with decrease over past 5 months to about 143lbs at this time.  However, upon review of documented weights (both currently and prior to this hospitalization) this information does not correlate with Pt. reports.   142.8lbs (July 2019)---> 155.5lbs (Nov 2019)--> 167.5lbs (currently on 12/3/19).

## 2019-12-05 NOTE — DISCHARGE NOTE PROVIDER - CARE PROVIDER_API CALL
Korey Tompkins)  [facility]; Medical Oncology  [address] [place] [address]  Phone: [phone]  Fax: [phone]  Established Patient  Follow Up Time: 2 weeks    Lukasz Orantes)  Gastroenterology; Internal Medicine  [address] [place] [address]  Phone: [phone]  Fax: [phone]  Established Patient  Follow Up Time: 2 weeks    Homero Melgar  [address] [place] [address]  Phone: [phone]  Fax: (   )    -  Established Patient  Follow Up Time: 1 week Korey Tompkins)  [facility]; Internal Medicine; Medical Oncology  [address] [place] [address]  Phone: [phone]  Fax: [phone]  Established Patient  Follow Up Time: 2 weeks    Lukasz Orantes)  Gastroenterology; Internal Medicine  [address] [place] [address]  Phone: [phone]  Fax: [phone]  Established Patient  Scheduled Appointment: 12/17/2019 11:00 AM    Homero Melgar  [address] [place] [address]  Phone: [phone]  Fax: (   )    -  Established Patient  Follow Up Time: 1 week

## 2019-12-05 NOTE — DISCHARGE NOTE PROVIDER - NSDCFUSCHEDAPPT_GEN_ALL_CORE_FT
WOODY CEVALLOS ; 12/23/2019 ; [facility]  WOODY CEVALLOS ; 01/13/2020 ; [facility]  WOODY CEVALLOS ; 02/03/2020 ; [facility]  WOODY CEVALLOS ; 02/27/2020 ; [facility] [address] [place] [address]  WOODY CEVALLOS ; 02/27/2020 ; [facility] [address] [place] [address] WOODY CEVALLOS ; 12/23/2019 ; [facility]  WOODY CEVALLOS ; 01/13/2020 ; [facility]  WOODY CEVALLOS ; 02/03/2020 ; [facility]  WOODY CEVALLOS ; 02/27/2020 ; [facility] [address] [place] [address]  WOODY CEVALLOS ; 02/27/2020 ; [facility] [address] [place] [address] WOODY CEVALLOS ; 12/23/2019 ; [facility]  WOODY CEVALLOS ; 01/13/2020 ; [facility]  WOODY CEVALLOS ; 02/03/2020 ; [facility]  WOODY CEVALLOS ; 02/27/2020 ; [facility] [address] [place] [address]  WOODY CEVALLOS ; 02/27/2020 ; [facility] [address] [place] [address] WOODY CEVALLOS ; 12/23/2019 ; [facility]  WOODY CEVALLOS ; 01/13/2020 ; [facility]  WOODY CEVALLOS ; 02/03/2020 ; [facility]  WOODY CEVALLOS ; 02/27/2020 ; [facility] [address] [place] [address]  WOODY CEVALLOS ; 02/27/2020 ; [facility] [address] [place] [address] WOODY CEVALLOS ; 12/17/2019 ; [facility] [address]  WOODY CEVALLOS ; 12/23/2019 ; [facility]  WOODY CEVALLOS ; 01/13/2020 ; [facility]  WOODY CEVALLOS ; 02/03/2020 ; [facility]  WOODY CEVALLOS ; 02/27/2020 ; [facility]  WOODY CEVALLOS ; 02/27/2020 ; [facility] [address] [place] [address] WOODY CEVALLOS ; 12/17/2019 ; [facility] [address]  WOODY CEVALLOS ; 12/23/2019 ; [facility]  WOODY CEVALLOS ; 01/13/2020 ; [facility]  WOODY CEVALLOS ; 02/03/2020 ; [facility]  WOODY CEVALLOS ; 02/27/2020 ; [facility]  WOODY CEVALLOS ; 02/27/2020 ; [facility] [address] [place] [address] WOODY CEVALLOS ; 12/17/2019 ; [facility] [address]  WOODY CEVALLOS ; 12/23/2019 ; [facility]  WOODY CEVALLOS ; 01/13/2020 ; [facility]  WOODY CEVALLOS ; 02/03/2020 ; [facility]  WOODY CEVALLOS ; 02/27/2020 ; [facility] [address] [facility]  WOODY CEVALLOS ; 02/27/2020 ; [facility] [address] [place] [address] WOODY CEVALLOS ; 12/17/2019 ; [facility] [address]  WOODY CEVALLOS ; 12/23/2019 ; [facility]  WOODY CEVALLOS ; 01/13/2020 ; [facility]  WOODY CEVALLOS ; 02/03/2020 ; [facility]  WOODY CEVALLOS ; 02/27/2020 ; [facility] [address] [facility]  WOODY CEVALLOS ; 02/27/2020 ; [facility] [address] [place] [address]

## 2019-12-05 NOTE — DIETITIAN INITIAL EVALUATION ADULT. - ADD RECOMMEND
1)Add Ensure Enlive 8oz PO 2x daily to regular diet.                                                  2)Consider provision of probiotic                                   3)Add Multivitamin for micronutrient coverage.

## 2019-12-05 NOTE — DIETITIAN INITIAL EVALUATION ADULT. - PERTINENT MEDS FT
MEDICATIONS  (STANDING):  apixaban 5 milliGRAM(s) Oral two times a day  chlorhexidine 4% Liquid 1 Application(s) Topical daily  gabapentin 300 milliGRAM(s) Oral at bedtime  pantoprazole    Tablet 40 milliGRAM(s) Oral before breakfast  tamsulosin 0.4 milliGRAM(s) Oral at bedtime    MEDICATIONS  (PRN):  aluminum hydroxide/magnesium hydroxide/simethicone Suspension 30 milliLiter(s) Oral every 4 hours PRN Dyspepsia  loperamide 2 milliGRAM(s) Oral every 6 hours PRN Diarrhea  oxyCODONE    IR 5 milliGRAM(s) Oral every 4 hours PRN moderate and severe pain  simethicone 80 milliGRAM(s) Chew two times a day PRN Gas

## 2019-12-05 NOTE — PROGRESS NOTE ADULT - PROBLEM SELECTOR PLAN 2
- P/w leukocytosis, hypotension in ED s/p 2L fluid/Levophed, tachycardia (though tachycardia present after administration of Levophed)  - Leukocytosis possibly due to portal vein thrombosis vs infection vs ABD infection  - RVP negative   - CT A/P demonstrates increased liver mets with new liver infarcts  - CXR demonstrates L pleural effusion, R basilar atelectasis  - S/p aztreonam and cefepime in the ED.   - MICU consulted for hypotension, advised fluids and midodrine to which patient had appropriate response  - C/w vanc and cefepime  - Bcx, Ucx negative  - Monitor vital signs - WNL WBC count and RR; clinically well  - Off antibiotics, afebrile, vital signs stable  - Bcx, Ucx negative  - P/w leukocytosis, hypotension in ED s/p 2L fluid/Levophed, tachycardia (though tachycardia present after administration of Levophed)  - Leukocytosis possibly due to portal vein thrombosis vs infection vs ABD infection  - RVP negative   - CT A/P demonstrates increased liver mets with new liver infarcts  - CXR demonstrates L pleural effusion, R basilar atelectasis  - MICU consulted for hypotension, advised fluids and midodrine to which patient had appropriate response  - Monitor vital signs

## 2019-12-05 NOTE — DISCHARGE NOTE NURSING/CASE MANAGEMENT/SOCIAL WORK - NSDCFUADDAPPT_GEN_ALL_CORE_FT
Lukasz Orantes) - 12/17/2019, 11AM  Gastroenterology; Internal Medicine  [address] [facility], [facility] [address]  [place] [address]  Phone: [phone]  Fax: [phone]  Established Patient  Follow Up Time: 2 weeks    Korey Tompkins)  [facility]; Medical Oncology  [address] [place] [address]  Phone: [phone]  Fax: [phone]  Established Patient  Follow Up Time: 2 weeks    Homero Melgar  [address] [place] [address]  Phone: [phone]  Fax: (   )    -  Established Patient  Follow Up Time: 1 week

## 2019-12-05 NOTE — DISCHARGE NOTE PROVIDER - HOSPITAL COURSE
History of Present Illness    The patient is a 69 year old man with a history of cholangiocarcinoma s/p Whipple 2017 with recurrence of disease now with mets to the liver and last IV chemo 7/2019, portal vein thrombus on Eliquis x 1.5 weeks presents from [facility] for fall. The patient went to [facility] to restart his chemo regimen and when he was walking his cane got caught on a rug and he tripped over the cane and fell forward and hit his head. He never lost consciousness, he has no headache, vision changes, chest pain, palpitations. Over the past several weeks he has had days where he has felt ok and others when he has felt unwell. He has had a bit of a cough. He has had no fevers or chills, nausea, vomiting. He has been having off and on diarrhea for 2 months. During his last hospital visit the patient was given loperamide which helped with the diarrhea. He stopped taking it 3 days ago and has since had 2 softer bowel movements and some abdominal pain.         Emergency Department Course    In the ED the patient was found to have /62 WBC 20, BP of 88/57. He was given 2 liters of IV fluids and midodrine 10 mg x1 with good response.        Hospital Course    Patient was transferred to the medical floors to be evaluated History of Present Illness    The patient is a 69 year old man with a history of cholangiocarcinoma s/p Whipple 2017 with recurrence of disease now with mets to the liver and last IV chemo 7/2019, portal vein thrombus on Eliquis x 1.5 weeks presents from [facility] for fall. The patient went to [facility] to restart his chemo regimen and when he was walking his cane got caught on a rug and he tripped over the cane and fell forward and hit his head. He never lost consciousness, he has no headache, vision changes, chest pain, palpitations. Over the past several weeks he has had days where he has felt ok and others when he has felt unwell. He has had a bit of a cough. He has had no fevers or chills, nausea, vomiting. He has been having off and on diarrhea for 2 months. During his last hospital visit the patient was given loperamide which helped with the diarrhea. He stopped taking it 3 days ago and has since had 2 softer bowel movements and some abdominal pain.         Emergency Department Course    In the ED, the patient presented with a systolic BP of 101/62 --> which decreased to 88. The patient was given 2 liters of IV fluids and midodrine 10 mg x1, to which he had an appropriate response.        Hospital Course    Patient was transferred to the medical floors to be evaluated. History of Present Illness    The patient is a 69 year old man with a history of cholangiocarcinoma s/p Whipple 2017 with recurrence of disease now with mets to the liver and last IV chemo 7/2019, portal vein thrombus on Eliquis x 1.5 weeks presents from [facility] for fall. The patient went to [facility] to restart his chemo regimen and when he was walking his cane got caught on a rug and he tripped over the cane and fell forward and hit his head. He never lost consciousness, he has no headache, vision changes, chest pain, palpitations. Over the past several weeks he has had days where he has felt ok and others when he has felt unwell. He has had a bit of a cough. He has had no fevers or chills, nausea, vomiting. He has been having off and on diarrhea for 2 months. During his last hospital visit the patient was given loperamide which helped with the diarrhea. He stopped taking it 3 days ago and has since had 2 softer bowel movements and some abdominal pain.         Emergency Department Course    In the ED, the patient presented with a systolic BP of 101/62 --> 88/52, RR 16-20, O2 Sat. WBC 20which decreased to 88. The patient was given 2 liters of IV fluids and midodrine 10 mg x1, to which he had an appropriate response.        Hospital Course    Patient was transferred to the medical floors to be evaluated. History of Present Illness    The patient is a 69 year old man with a history of cholangiocarcinoma s/p Whipple 2017 with recurrence of disease now with mets to the liver and last IV chemo 7/2019, portal vein thrombus on Eliquis x 1.5 weeks presents from [facility] for fall. The patient went to [facility] to restart his chemo regimen and when he was walking his cane got caught on a rug and he tripped over the cane and fell forward and hit his head. He never lost consciousness, he has no headache, vision changes, chest pain, palpitations. Over the past several weeks he has had days where he has felt ok and others when he has felt unwell. He has had a bit of a cough. He has had no fevers or chills, nausea, vomiting. He has been having off and on diarrhea for 2 months. During his last hospital visit the patient was given loperamide which helped with the diarrhea. He stopped taking it 3 days ago and has since had 2 softer bowel movements and some abdominal pain.         Emergency Department Course    In the ED, the patient presented with a systolic BP of 101/62 --> 88/52, RR 16-20, O2 Sat 97-99 on RA, Tmax 98.2. WBC 20. The patient was given 2 liters of IV fluids and midodrine 10 mg x1, to which he had an appropriate response.        Hospital Course    Patient was transferred to the medical floors to be evaluated for fall in the setting of SIRS (WBC 20, RR max 20). CT head was negative for any intracranial bleed. Blood cultures and urine cultures drawn, which were determined to be negative. Patient was on vancomycin and cefepime but the antibiotics were discontinued given negative cultures. CT A/P demonstrated new areas of wedge-shaped hypoattenuation in the liver, in the setting of known portal vein thrombosis, may reflect areas of hepatic infarction, worsening hepatic metastases, and slight interval increase in the moderate volume ascites. Oncology was consulted and recommended ID w/u, continuing with AC, and stopping systemic chemotherapy while inpatient. Patient's leukocytosis and respiratory rate eventually trended to normal. Hospital course complicated by diarrhea; GI PCR and C. diff studies were negative. Diarrhea persisted to some degree, so GI was consulted; they recommended stool fat, electrolytes, osmolality, and alpha-1 antitrypsin studies. Patient was also started on imodium, which improved his symptoms. GI also stated that if diarrhea persists, a colonoscopy can be performed to evaluate for microvascular colitis. The office of the patient's outpatient Oncologist (Dr. Atkinson) and Gastroenterologist (Dr. Orantes) were notified of the patient's hospital course and appointments were made or scheduled to be satnam for the patient for follow-up.        On the day of discharge, the patient was evaluated to be clinically stable; he was assessed to be an appropriate candidate for discharge. History of Present Illness    The patient is a 69 year old man with a history of cholangiocarcinoma s/p Whipple 2017 with recurrence of disease now with mets to the liver and last IV chemo 7/2019, portal vein thrombus on Eliquis x 1.5 weeks presents from [facility] for fall. The patient went to [facility] to restart his chemo regimen and when he was walking his cane got caught on a rug and he tripped over the cane and fell forward and hit his head. He never lost consciousness, he has no headache, vision changes, chest pain, palpitations. Over the past several weeks he has had days where he has felt ok and others when he has felt unwell. He has had a bit of a cough. He has had no fevers or chills, nausea, vomiting. He has been having off and on diarrhea for 2 months. During his last hospital visit the patient was given loperamide which helped with the diarrhea. He stopped taking it 3 days ago and has since had 2 softer bowel movements and some abdominal pain.         Emergency Department Course    In the ED, the patient presented with a systolic BP of 101/62 --> 88/52, RR 16-20, O2 Sat 97-99 on RA, Tmax 98.2. WBC 20. The patient was given 2 liters of IV fluids and midodrine 10 mg x1, to which he had an appropriate response.        Hospital Course    Patient was transferred to the medical floors to be evaluated for fall in the setting of SIRS (WBC 20, RR max 20). CT head was negative for any intracranial bleed. Blood cultures and urine cultures drawn, which were determined to be negative. Patient was on vancomycin and cefepime but the antibiotics were discontinued given negative cultures. Sepsis ruled out as no infection indentified. HYpotension likely due to poor PO intake and diarrhea. CT A/P demonstrated new areas of wedge-shaped hypoattenuation in the liver, in the setting of known portal vein thrombosis, may reflect areas of hepatic infarction, worsening hepatic metastases, and slight interval increase in the moderate volume ascites. Oncology was consulted and recommended ID w/u, continuing with AC, and stopping systemic chemotherapy while inpatient. Patient's leukocytosis and respiratory rate eventually trended to normal. Hospital course complicated by diarrhea; GI PCR and C. diff studies were negative. Diarrhea persisted to some degree, so GI was consulted; they recommended stool fat, electrolytes, osmolality, and alpha-1 antitrypsin studies. Patient was also started on imodium, which improved his symptoms. GI also stated that if diarrhea persists, a colonoscopy can be performed to evaluate for microvascular colitis. The office of the patient's outpatient Oncologist (Dr. Atkinson) and Gastroenterologist (Dr. Orantes) were notified of the patient's hospital course and appointments were made or scheduled to be satnam for the patient for follow-up.        On the day of discharge, the patient was evaluated to be clinically stable; he was assessed to be an appropriate candidate for discharge.

## 2019-12-05 NOTE — PROGRESS NOTE ADULT - PROBLEM SELECTOR PLAN 7
- [place] [facility] [place]  - Oncology/Dr. Aggarwal consulted, recommends to c/w AC, f/u ID w/u, no systemic chemotherapy inpatient [place] [facility] [place]cology/Dr. Aggarwal consulted, recommends to c/w AC, f/u ID w/u, no systemic chemotherapy inpatient  - To follow-up with Dr. Atkinson outpatient

## 2019-12-05 NOTE — PROGRESS NOTE ADULT - PROBLEM SELECTOR PLAN 5
- History of back pain, s/p laminectomy and spinal fusion  - Oxycodone 5 PRN  - ISTOP verified Reference #: [id number]

## 2019-12-05 NOTE — PROGRESS NOTE ADULT - PROBLEM SELECTOR PLAN 10
Transitions of Care Status:  1.  Name of PCP: Dr. Melgar  2.  PCP Contacted on Admission: [ ] Y    [ X ] N    3.  PCP contacted at Discharge: [ ] Y    [ ] N    [ ] N/A  4.  Post-Discharge Appointment Date and Location:  5.  Summary of Handoff given to PCP: Transitions of Care Status:  1.  Name of PCP: Dr. Atkinson (Oncology), Dr. Orantes (GI)  2.  PCP Contacted on Admission: [ ] Y    [ X ] N    3.  PCP contacted at Discharge: [ X ] Y    [ ] N    [ ] N/A  4.  Post-Discharge Appointment Date and Location: [place] - office contacted, will call patient to establish appointment; Dr. Orantes - office contacted, appt date 12/17/2019 at 11AM  5.  Summary of Handoff given to PCP: Patient admitted for fall at outpatient chemotherapy center in setting of SIRS (tachypnea + leukocytosis). Bcx and Ucx negative. CTH negative but CT A/P with new liver hypodensities (infarcts) + worsening liver mets. Oncology consulted, patient to continue AC, no systemic therapy inpatient. Hospital course complicated by diarrhea; GI PCR and C. diff negative. Diarrhea persistent, so GI consulted, recommended stool electrolytes, fat, osmolality, a1-antitrypsin (stool studies to be followed up by Dr. Orantes). Appointments scheduled or to be scheduled soon.

## 2019-12-05 NOTE — PROGRESS NOTE ADULT - SUBJECTIVE AND OBJECTIVE BOX
Chief Complaint:  Patient is a 69y old  Male who presents with a chief complaint of mechanical fall in the setting of SIRS (05 Dec 2019 09:57)    Interval Events: Diarrhea improved. C. diff and GI PCR neg. Pt planned for discharge today (per patient).     Hospital Medications:  aluminum hydroxide/magnesium hydroxide/simethicone Suspension 30 milliLiter(s) Oral every 4 hours PRN  apixaban 5 milliGRAM(s) Oral two times a day  chlorhexidine 4% Liquid 1 Application(s) Topical daily  gabapentin 300 milliGRAM(s) Oral at bedtime  loperamide 2 milliGRAM(s) Oral every 6 hours PRN  oxyCODONE    IR 5 milliGRAM(s) Oral every 4 hours PRN  pantoprazole    Tablet 40 milliGRAM(s) Oral before breakfast  simethicone 80 milliGRAM(s) Chew two times a day PRN  tamsulosin 0.4 milliGRAM(s) Oral at bedtime      ROS:   General:  No wt loss, fevers, chills, night sweats  Eyes:  Good vision, no reported pain  ENT:  No sore throat, pain, runny nose, dysphagia  CV:  No pain, palpitations, hypo/hypertension  Pulm:  No dyspnea, cough, tachypnea, wheezing  GI:  See HPI, otherwise negative  :  No pain, bleeding, incontinence, nocturia  Muscle:  No pain, weakness  Neuro:  No weakness, tingling, memory problems  Psych:  No fatigue, insomnia, mood problems, depression  Endocrine:  No polyuria, polydipsia, cold/heat intolerance  Heme:  No petechiae, ecchymosis, easy bruisability  Skin:  No rash, tattoos, scars, edema    PHYSICAL EXAM:   Vital Signs:  Vital Signs Last 24 Hrs  T(C): 36.7 (05 Dec 2019 05:40), Max: 37.1 (04 Dec 2019 15:57)  T(F): 98 (05 Dec 2019 05:40), Max: 98.7 (04 Dec 2019 15:57)  HR: 90 (05 Dec 2019 05:40) (81 - 90)  BP: 127/61 (05 Dec 2019 05:40) (109/65 - 140/79)  BP(mean): --  RR: 18 (05 Dec 2019 05:40) (16 - 18)  SpO2: 95% (05 Dec 2019 05:40) (95% - 98%)  Daily     Daily Weight in k (05 Dec 2019 10:31)    GENERAL: no acute distress  NEURO: alert and oriented x 3, no asterixis  HEENT: anicteric sclera, no conjunctival pallor appreciated  CHEST: no respiratory distress, no accessory muscle use, clear to auscultation bilaterally  CARDIAC: regular rate, rhythm, no mrg appreciated  ABDOMEN: soft, non-tender, mildly distended, no rebound or guarding  EXTREMITIES: warm, well perfused, no edema  SKIN: no lesions noted    LABS: reviewed                        9.1    6.76  )-----------( 213      ( 05 Dec 2019 07:04 )             28.5     12-    138  |  104  |  6<L>  ----------------------------<  110<H>  3.7   |  21<L>  |  0.64    Ca    8.0<L>      05 Dec 2019 07:04  Phos  2.8     12-  Mg     1.7     12-    TPro  5.5<L>  /  Alb  2.5<L>  /  TBili  0.6  /  DBili  x   /  AST  23  /  ALT  11  /  AlkPhos  353<H>  12-05    LIVER FUNCTIONS - ( 05 Dec 2019 07:04 )  Alb: 2.5 g/dL / Pro: 5.5 g/dL / ALK PHOS: 353 u/L / ALT: 11 u/L / AST: 23 u/L / GGT: x             Interval Diagnostic Studies: see sunrise for full report

## 2019-12-05 NOTE — CHART NOTE - NSCHARTNOTEFT_GEN_A_CORE
Confidential Drug Utilization Report            Search Terms: Poncho Medrano, 1950     Search Date: 12/05/2019 04:09:10 PM     Searching on behalf of: [id number]     The Drug Utilization Report below displays all of the controlled substance prescriptions, if any, that your patient has filled in the last twelve months. The information displayed on this report is compiled from pharmacy submissions to the Department, and accurately reflects the information as submitted by the pharmacies.    This report was requested by: Nhi Arzate ii | Reference #: [id number]             Others' Prescriptions    Patient Name: Poncho Medrano YOB: 1950   Address: [address] [place] [address] Sex: Male         Rx Written    Rx Dispensed    Drug    Quantity    Days Supply    Prescriber Name              11/22/2019 11/22/2019 oxycodone-acetaminophen  mg tab  14 7 Jaky Lucero     11/07/2019 11/09/2019 diazepam 5 mg tablet  1 1 Korey Atkinson MD     10/31/2019 11/03/2019 diazepam 5 mg tablet  1 1 Iraida Foote K     10/10/2019 10/12/2019 oxycodone-acetaminophen  mg tab  20 10 Jonathon Alcantara     10/01/2019 10/01/2019 oxycodone-acetaminophen  mg tab  14 14 Jory Almazan     09/19/2019 09/19/2019 tramadol hcl 50 mg tablet  120 30 DKorey Ayala MD     08/29/2019 08/29/2019 acetaminophen-cod #3 tablet  30 4 Richard Cox           * - Drugs marked with an asterisk are compound drugs. If the compound drug is made up of more than one controlled substance, then each controlled substance will be a separate row in the table.

## 2019-12-05 NOTE — DISCHARGE NOTE PROVIDER - NSDCMRMEDTOKEN_GEN_ALL_CORE_FT
aluminum hydroxide-magnesium hydroxide 200 mg-200 mg/5 mL oral suspension: 30 milliliter(s) orally every 4 hours, As needed, Dyspepsia  doxazosin 1 mg oral tablet: 1 tab(s) orally once a day (at bedtime)  Eliquis 5 mg oral tablet: 1 tab(s) orally 2 times a day  gabapentin 300 mg oral capsule: 1 cap(s) orally once a day (at bedtime)  loperamide 2 mg oral capsule: 1 cap(s) orally 5 times a day, As needed, Diarrhea  metoprolol succinate 25 mg oral tablet, extended release: 1 tab(s) orally once a day  pantoprazole 40 mg oral delayed release tablet: 1 tab(s) orally once a day aluminum hydroxide-magnesium hydroxide 200 mg-200 mg/5 mL oral suspension: 30 milliliter(s) orally every 4 hours, As needed, Dyspepsia  apixaban 5 mg oral tablet: 1 tab(s) orally 2 times a day  doxazosin 1 mg oral tablet: 1 tab(s) orally once a day (at bedtime)  gabapentin 300 mg oral capsule: 1 cap(s) orally once a day (at bedtime)  loperamide 2 mg oral capsule: 1 cap(s) orally every 6 hours, As needed, Diarrhea  metoprolol succinate 25 mg oral tablet, extended release: 1 tab(s) orally once a day  pantoprazole 40 mg oral delayed release tablet: 1 tab(s) orally once a day (before a meal) aluminum hydroxide-magnesium hydroxide 200 mg-200 mg/5 mL oral suspension: 30 milliliter(s) orally every 4 hours, As needed, Dyspepsia  apixaban 5 mg oral tablet: 1 tab(s) orally 2 times a day  doxazosin 1 mg oral tablet: 1 tab(s) orally once a day (at bedtime)  gabapentin 300 mg oral capsule: 1 cap(s) orally once a day (at bedtime)  loperamide 2 mg oral capsule: 1 cap(s) orally every 6 hours, As needed, Diarrhea  metoprolol succinate 25 mg oral tablet, extended release: 1 tab(s) orally once a day  oxyCODONE 5 mg oral tablet: 1 tab(s) orally every 4 hours, As needed, moderate and severe pain MDD:30 mg  pantoprazole 40 mg oral delayed release tablet: 1 tab(s) orally once a day (before a meal)  simethicone 80 mg oral tablet, chewable: 1 tab(s) orally 2 times a day, As needed, Gas

## 2019-12-06 ENCOUNTER — INBOUND DOCUMENT (OUTPATIENT)
Age: 69
End: 2019-12-06

## 2019-12-06 LAB — SPECIMEN SOURCE: SIGNIFICANT CHANGE UP

## 2019-12-07 LAB
BACTERIA BLD CULT: SIGNIFICANT CHANGE UP
BACTERIA BLD CULT: SIGNIFICANT CHANGE UP
BACTERIA STL CULT: SIGNIFICANT CHANGE UP

## 2019-12-07 NOTE — PRE-OP CHECKLIST - ALLERGY BAND ON
done Admission Reconciliation is Completed  Discharge Reconciliation is Not Complete Admission Reconciliation is Completed  Discharge Reconciliation is Completed

## 2019-12-09 LAB
FAT STL QN: NORMAL — SIGNIFICANT CHANGE UP
FAT STL QN: NORMAL — SIGNIFICANT CHANGE UP

## 2019-12-10 ENCOUNTER — INBOUND DOCUMENT (OUTPATIENT)
Age: 69
End: 2019-12-10

## 2019-12-11 ENCOUNTER — INPATIENT (INPATIENT)
Facility: HOSPITAL | Age: 69
LOS: 5 days | Discharge: HOME | DRG: 374 | End: 2019-12-17
Attending: HOSPITALIST | Admitting: HOSPITALIST
Payer: MEDICARE

## 2019-12-11 ENCOUNTER — OUTPATIENT (OUTPATIENT)
Dept: OUTPATIENT SERVICES | Facility: HOSPITAL | Age: 69
LOS: 1 days | Discharge: HOME | End: 2019-12-11

## 2019-12-11 VITALS
SYSTOLIC BLOOD PRESSURE: 105 MMHG | WEIGHT: 145.06 LBS | RESPIRATION RATE: 20 BRPM | HEIGHT: 70 IN | OXYGEN SATURATION: 96 % | TEMPERATURE: 99 F | HEART RATE: 115 BPM | DIASTOLIC BLOOD PRESSURE: 69 MMHG

## 2019-12-11 DIAGNOSIS — Z98.890 OTHER SPECIFIED POSTPROCEDURAL STATES: Chronic | ICD-10-CM

## 2019-12-11 DIAGNOSIS — C24.9 MALIGNANT NEOPLASM OF BILIARY TRACT, UNSPECIFIED: ICD-10-CM

## 2019-12-11 DIAGNOSIS — K90.81 WHIPPLE'S DISEASE: Chronic | ICD-10-CM

## 2019-12-11 DIAGNOSIS — Z98.1 ARTHRODESIS STATUS: Chronic | ICD-10-CM

## 2019-12-11 PROCEDURE — 99285 EMERGENCY DEPT VISIT HI MDM: CPT | Mod: GC

## 2019-12-11 RX ORDER — SODIUM CHLORIDE 9 MG/ML
2000 INJECTION INTRAMUSCULAR; INTRAVENOUS; SUBCUTANEOUS ONCE
Refills: 0 | Status: COMPLETED | OUTPATIENT
Start: 2019-12-11 | End: 2019-12-11

## 2019-12-11 NOTE — PROVIDER CONTACT NOTE (OTHER) - DATE AND TIME:
19-Nov-2019 18:30 Detail Level: Detailed Name Of The Referring Provider For Procedure: Gracie Lacy MD Size Of Lesion: 0.8 Incorporate Mauc In Note: Yes X Size Of Lesion In Cm (Optional): 0.7

## 2019-12-12 DIAGNOSIS — A41.9 SEPSIS, UNSPECIFIED ORGANISM: ICD-10-CM

## 2019-12-12 DIAGNOSIS — Z02.9 ENCOUNTER FOR ADMINISTRATIVE EXAMINATIONS, UNSPECIFIED: ICD-10-CM

## 2019-12-12 DIAGNOSIS — Z29.9 ENCOUNTER FOR PROPHYLACTIC MEASURES, UNSPECIFIED: ICD-10-CM

## 2019-12-12 DIAGNOSIS — K21.9 GASTRO-ESOPHAGEAL REFLUX DISEASE WITHOUT ESOPHAGITIS: ICD-10-CM

## 2019-12-12 DIAGNOSIS — K58.9 IRRITABLE BOWEL SYNDROME WITHOUT DIARRHEA: ICD-10-CM

## 2019-12-12 DIAGNOSIS — R50.9 FEVER, UNSPECIFIED: ICD-10-CM

## 2019-12-12 DIAGNOSIS — I48.91 UNSPECIFIED ATRIAL FIBRILLATION: ICD-10-CM

## 2019-12-12 DIAGNOSIS — I95.9 HYPOTENSION, UNSPECIFIED: ICD-10-CM

## 2019-12-12 DIAGNOSIS — C22.1 INTRAHEPATIC BILE DUCT CARCINOMA: ICD-10-CM

## 2019-12-12 LAB
ALBUMIN SERPL ELPH-MCNC: 2.6 G/DL — LOW (ref 3.3–5)
ALBUMIN SERPL ELPH-MCNC: 2.6 G/DL — LOW (ref 3.3–5)
ALP SERPL-CCNC: 324 U/L — HIGH (ref 40–120)
ALP SERPL-CCNC: 353 U/L — HIGH (ref 40–120)
ALT FLD-CCNC: 12 U/L — SIGNIFICANT CHANGE UP (ref 10–45)
ALT FLD-CCNC: 13 U/L — SIGNIFICANT CHANGE UP (ref 10–45)
ANION GAP SERPL CALC-SCNC: 14 MMOL/L — SIGNIFICANT CHANGE UP (ref 5–17)
ANION GAP SERPL CALC-SCNC: 14 MMOL/L — SIGNIFICANT CHANGE UP (ref 5–17)
APPEARANCE UR: CLEAR — SIGNIFICANT CHANGE UP
APTT BLD: 30.2 SEC — SIGNIFICANT CHANGE UP (ref 27.5–36.3)
AST SERPL-CCNC: 16 U/L — SIGNIFICANT CHANGE UP (ref 10–40)
AST SERPL-CCNC: 20 U/L — SIGNIFICANT CHANGE UP (ref 10–40)
BASOPHILS # BLD AUTO: 0 K/UL — SIGNIFICANT CHANGE UP (ref 0–0.2)
BASOPHILS NFR BLD AUTO: 0 % — SIGNIFICANT CHANGE UP (ref 0–2)
BILIRUB SERPL-MCNC: 1 MG/DL — SIGNIFICANT CHANGE UP (ref 0.2–1.2)
BILIRUB SERPL-MCNC: 1.1 MG/DL — SIGNIFICANT CHANGE UP (ref 0.2–1.2)
BILIRUB UR-MCNC: NEGATIVE — SIGNIFICANT CHANGE UP
BUN SERPL-MCNC: 10 MG/DL — SIGNIFICANT CHANGE UP (ref 7–23)
BUN SERPL-MCNC: 11 MG/DL — SIGNIFICANT CHANGE UP (ref 7–23)
CALCIUM SERPL-MCNC: 8.2 MG/DL — LOW (ref 8.4–10.5)
CALCIUM SERPL-MCNC: 8.3 MG/DL — LOW (ref 8.4–10.5)
CHLORIDE SERPL-SCNC: 104 MMOL/L — SIGNIFICANT CHANGE UP (ref 96–108)
CHLORIDE SERPL-SCNC: 96 MMOL/L — SIGNIFICANT CHANGE UP (ref 96–108)
CO2 SERPL-SCNC: 19 MMOL/L — LOW (ref 22–31)
CO2 SERPL-SCNC: 22 MMOL/L — SIGNIFICANT CHANGE UP (ref 22–31)
COLOR SPEC: COLORLESS — SIGNIFICANT CHANGE UP
CREAT SERPL-MCNC: 0.74 MG/DL — SIGNIFICANT CHANGE UP (ref 0.5–1.3)
CREAT SERPL-MCNC: 0.74 MG/DL — SIGNIFICANT CHANGE UP (ref 0.5–1.3)
DIFF PNL FLD: NEGATIVE — SIGNIFICANT CHANGE UP
EOSINOPHIL # BLD AUTO: 0 K/UL — SIGNIFICANT CHANGE UP (ref 0–0.5)
EOSINOPHIL NFR BLD AUTO: 0 % — SIGNIFICANT CHANGE UP (ref 0–6)
GAS PNL BLDV: SIGNIFICANT CHANGE UP
GLUCOSE SERPL-MCNC: 121 MG/DL — HIGH (ref 70–99)
GLUCOSE SERPL-MCNC: 170 MG/DL — HIGH (ref 70–99)
GLUCOSE UR QL: NEGATIVE — SIGNIFICANT CHANGE UP
HCT VFR BLD CALC: 27.6 % — LOW (ref 39–50)
HCT VFR BLD CALC: 28.3 % — LOW (ref 39–50)
HGB BLD-MCNC: 9 G/DL — LOW (ref 13–17)
HGB BLD-MCNC: 9 G/DL — LOW (ref 13–17)
INR BLD: 2.21 RATIO — HIGH (ref 0.88–1.16)
KETONES UR-MCNC: NEGATIVE — SIGNIFICANT CHANGE UP
LEUKOCYTE ESTERASE UR-ACNC: NEGATIVE — SIGNIFICANT CHANGE UP
LYMPHOCYTES # BLD AUTO: 0.1 K/UL — LOW (ref 1–3.3)
LYMPHOCYTES # BLD AUTO: 0.9 % — LOW (ref 13–44)
MCHC RBC-ENTMCNC: 26.9 PG — LOW (ref 27–34)
MCHC RBC-ENTMCNC: 27 PG — SIGNIFICANT CHANGE UP (ref 27–34)
MCHC RBC-ENTMCNC: 31.8 GM/DL — LOW (ref 32–36)
MCHC RBC-ENTMCNC: 32.6 GM/DL — SIGNIFICANT CHANGE UP (ref 32–36)
MCV RBC AUTO: 82.6 FL — SIGNIFICANT CHANGE UP (ref 80–100)
MCV RBC AUTO: 85 FL — SIGNIFICANT CHANGE UP (ref 80–100)
MISCELLANEOUS - CHEM: SIGNIFICANT CHANGE UP
MONOCYTES # BLD AUTO: 0.46 K/UL — SIGNIFICANT CHANGE UP (ref 0–0.9)
MONOCYTES NFR BLD AUTO: 4.3 % — SIGNIFICANT CHANGE UP (ref 2–14)
NEUTROPHILS # BLD AUTO: 10.22 K/UL — HIGH (ref 1.8–7.4)
NEUTROPHILS NFR BLD AUTO: 94.8 % — HIGH (ref 43–77)
NITRITE UR-MCNC: NEGATIVE — SIGNIFICANT CHANGE UP
PH UR: 6 — SIGNIFICANT CHANGE UP (ref 5–8)
PLATELET # BLD AUTO: 210 K/UL — SIGNIFICANT CHANGE UP (ref 150–400)
PLATELET # BLD AUTO: 228 K/UL — SIGNIFICANT CHANGE UP (ref 150–400)
POTASSIUM SERPL-MCNC: 3.5 MMOL/L — SIGNIFICANT CHANGE UP (ref 3.5–5.3)
POTASSIUM SERPL-MCNC: 3.6 MMOL/L — SIGNIFICANT CHANGE UP (ref 3.5–5.3)
POTASSIUM SERPL-SCNC: 3.5 MMOL/L — SIGNIFICANT CHANGE UP (ref 3.5–5.3)
POTASSIUM SERPL-SCNC: 3.6 MMOL/L — SIGNIFICANT CHANGE UP (ref 3.5–5.3)
PROT SERPL-MCNC: 6 G/DL — SIGNIFICANT CHANGE UP (ref 6–8.3)
PROT SERPL-MCNC: 6.2 G/DL — SIGNIFICANT CHANGE UP (ref 6–8.3)
PROT UR-MCNC: NEGATIVE — SIGNIFICANT CHANGE UP
PROTHROM AB SERPL-ACNC: 25.8 SEC — HIGH (ref 10–12.9)
RAPID RVP RESULT: SIGNIFICANT CHANGE UP
RBC # BLD: 3.33 M/UL — LOW (ref 4.2–5.8)
RBC # BLD: 3.34 M/UL — LOW (ref 4.2–5.8)
RBC # FLD: 18.6 % — HIGH (ref 10.3–14.5)
RBC # FLD: 18.9 % — HIGH (ref 10.3–14.5)
SODIUM SERPL-SCNC: 132 MMOL/L — LOW (ref 135–145)
SODIUM SERPL-SCNC: 137 MMOL/L — SIGNIFICANT CHANGE UP (ref 135–145)
SP GR SPEC: 1 — LOW (ref 1.01–1.02)
UROBILINOGEN FLD QL: NEGATIVE — SIGNIFICANT CHANGE UP
WBC # BLD: 10.78 K/UL — HIGH (ref 3.8–10.5)
WBC # BLD: 16.53 K/UL — HIGH (ref 3.8–10.5)
WBC # FLD AUTO: 10.78 K/UL — HIGH (ref 3.8–10.5)
WBC # FLD AUTO: 16.53 K/UL — HIGH (ref 3.8–10.5)

## 2019-12-12 PROCEDURE — 99223 1ST HOSP IP/OBS HIGH 75: CPT

## 2019-12-12 PROCEDURE — 71045 X-RAY EXAM CHEST 1 VIEW: CPT | Mod: 26

## 2019-12-12 PROCEDURE — 99223 1ST HOSP IP/OBS HIGH 75: CPT | Mod: GC

## 2019-12-12 PROCEDURE — 71250 CT THORAX DX C-: CPT | Mod: 26

## 2019-12-12 RX ORDER — APIXABAN 2.5 MG/1
5 TABLET, FILM COATED ORAL EVERY 12 HOURS
Refills: 0 | Status: DISCONTINUED | OUTPATIENT
Start: 2019-12-12 | End: 2019-12-13

## 2019-12-12 RX ORDER — VANCOMYCIN HCL 1 G
1000 VIAL (EA) INTRAVENOUS ONCE
Refills: 0 | Status: COMPLETED | OUTPATIENT
Start: 2019-12-12 | End: 2019-12-12

## 2019-12-12 RX ORDER — METRONIDAZOLE 500 MG
500 TABLET ORAL EVERY 8 HOURS
Refills: 0 | Status: DISCONTINUED | OUTPATIENT
Start: 2019-12-12 | End: 2019-12-17

## 2019-12-12 RX ORDER — SIMETHICONE 80 MG/1
80 TABLET, CHEWABLE ORAL
Refills: 0 | Status: DISCONTINUED | OUTPATIENT
Start: 2019-12-12 | End: 2019-12-14

## 2019-12-12 RX ORDER — LOPERAMIDE HCL 2 MG
2 TABLET ORAL ONCE
Refills: 0 | Status: COMPLETED | OUTPATIENT
Start: 2019-12-12 | End: 2019-12-12

## 2019-12-12 RX ORDER — IBUPROFEN 200 MG
600 TABLET ORAL ONCE
Refills: 0 | Status: COMPLETED | OUTPATIENT
Start: 2019-12-12 | End: 2019-12-12

## 2019-12-12 RX ORDER — PANTOPRAZOLE SODIUM 20 MG/1
40 TABLET, DELAYED RELEASE ORAL
Refills: 0 | Status: DISCONTINUED | OUTPATIENT
Start: 2019-12-12 | End: 2019-12-17

## 2019-12-12 RX ORDER — LOPERAMIDE HCL 2 MG
2 TABLET ORAL EVERY 6 HOURS
Refills: 0 | Status: DISCONTINUED | OUTPATIENT
Start: 2019-12-12 | End: 2019-12-17

## 2019-12-12 RX ORDER — CEFEPIME 1 G/1
2000 INJECTION, POWDER, FOR SOLUTION INTRAMUSCULAR; INTRAVENOUS ONCE
Refills: 0 | Status: COMPLETED | OUTPATIENT
Start: 2019-12-12 | End: 2019-12-12

## 2019-12-12 RX ORDER — SODIUM CHLORIDE 9 MG/ML
500 INJECTION, SOLUTION INTRAVENOUS ONCE
Refills: 0 | Status: COMPLETED | OUTPATIENT
Start: 2019-12-12 | End: 2019-12-12

## 2019-12-12 RX ORDER — METOPROLOL TARTRATE 50 MG
25 TABLET ORAL DAILY
Refills: 0 | Status: DISCONTINUED | OUTPATIENT
Start: 2019-12-12 | End: 2019-12-17

## 2019-12-12 RX ORDER — SODIUM CHLORIDE 9 MG/ML
1000 INJECTION, SOLUTION INTRAVENOUS ONCE
Refills: 0 | Status: COMPLETED | OUTPATIENT
Start: 2019-12-12 | End: 2019-12-12

## 2019-12-12 RX ORDER — CEFEPIME 1 G/1
2000 INJECTION, POWDER, FOR SOLUTION INTRAMUSCULAR; INTRAVENOUS EVERY 12 HOURS
Refills: 0 | Status: DISCONTINUED | OUTPATIENT
Start: 2019-12-12 | End: 2019-12-17

## 2019-12-12 RX ORDER — LIDOCAINE 4 G/100G
1 CREAM TOPICAL EVERY 24 HOURS
Refills: 0 | Status: DISCONTINUED | OUTPATIENT
Start: 2019-12-12 | End: 2019-12-17

## 2019-12-12 RX ORDER — METRONIDAZOLE 500 MG
500 TABLET ORAL ONCE
Refills: 0 | Status: COMPLETED | OUTPATIENT
Start: 2019-12-12 | End: 2019-12-12

## 2019-12-12 RX ORDER — VANCOMYCIN HCL 1 G
1000 VIAL (EA) INTRAVENOUS EVERY 12 HOURS
Refills: 0 | Status: DISCONTINUED | OUTPATIENT
Start: 2019-12-12 | End: 2019-12-17

## 2019-12-12 RX ORDER — GABAPENTIN 400 MG/1
300 CAPSULE ORAL ONCE
Refills: 0 | Status: COMPLETED | OUTPATIENT
Start: 2019-12-12 | End: 2019-12-12

## 2019-12-12 RX ORDER — DOXAZOSIN MESYLATE 4 MG
1 TABLET ORAL AT BEDTIME
Refills: 0 | Status: DISCONTINUED | OUTPATIENT
Start: 2019-12-12 | End: 2019-12-17

## 2019-12-12 RX ORDER — GABAPENTIN 400 MG/1
300 CAPSULE ORAL AT BEDTIME
Refills: 0 | Status: DISCONTINUED | OUTPATIENT
Start: 2019-12-12 | End: 2019-12-17

## 2019-12-12 RX ORDER — ACETAMINOPHEN 500 MG
650 TABLET ORAL EVERY 6 HOURS
Refills: 0 | Status: DISCONTINUED | OUTPATIENT
Start: 2019-12-12 | End: 2019-12-17

## 2019-12-12 RX ORDER — METRONIDAZOLE 500 MG
TABLET ORAL
Refills: 0 | Status: DISCONTINUED | OUTPATIENT
Start: 2019-12-12 | End: 2019-12-17

## 2019-12-12 RX ADMIN — SODIUM CHLORIDE 500 MILLILITER(S): 9 INJECTION, SOLUTION INTRAVENOUS at 18:30

## 2019-12-12 RX ADMIN — Medication 600 MILLIGRAM(S): at 22:08

## 2019-12-12 RX ADMIN — Medication 2 MILLIGRAM(S): at 15:34

## 2019-12-12 RX ADMIN — PANTOPRAZOLE SODIUM 40 MILLIGRAM(S): 20 TABLET, DELAYED RELEASE ORAL at 11:17

## 2019-12-12 RX ADMIN — SIMETHICONE 80 MILLIGRAM(S): 80 TABLET, CHEWABLE ORAL at 15:34

## 2019-12-12 RX ADMIN — Medication 2 MILLIGRAM(S): at 03:41

## 2019-12-12 RX ADMIN — GABAPENTIN 300 MILLIGRAM(S): 400 CAPSULE ORAL at 01:25

## 2019-12-12 RX ADMIN — Medication 650 MILLIGRAM(S): at 16:49

## 2019-12-12 RX ADMIN — Medication 650 MILLIGRAM(S): at 15:34

## 2019-12-12 RX ADMIN — Medication 25 MILLIGRAM(S): at 13:21

## 2019-12-12 RX ADMIN — Medication 1 MILLIGRAM(S): at 22:08

## 2019-12-12 RX ADMIN — Medication 600 MILLIGRAM(S): at 22:47

## 2019-12-12 RX ADMIN — Medication 100 MILLIGRAM(S): at 19:03

## 2019-12-12 RX ADMIN — Medication 600 MILLIGRAM(S): at 01:25

## 2019-12-12 RX ADMIN — GABAPENTIN 300 MILLIGRAM(S): 400 CAPSULE ORAL at 22:08

## 2019-12-12 RX ADMIN — CEFEPIME 100 MILLIGRAM(S): 1 INJECTION, POWDER, FOR SOLUTION INTRAMUSCULAR; INTRAVENOUS at 18:28

## 2019-12-12 RX ADMIN — APIXABAN 5 MILLIGRAM(S): 2.5 TABLET, FILM COATED ORAL at 11:32

## 2019-12-12 RX ADMIN — SODIUM CHLORIDE 1000 MILLILITER(S): 9 INJECTION, SOLUTION INTRAVENOUS at 03:11

## 2019-12-12 RX ADMIN — Medication 250 MILLIGRAM(S): at 01:23

## 2019-12-12 RX ADMIN — APIXABAN 5 MILLIGRAM(S): 2.5 TABLET, FILM COATED ORAL at 18:35

## 2019-12-12 RX ADMIN — Medication 250 MILLIGRAM(S): at 22:08

## 2019-12-12 RX ADMIN — CEFEPIME 100 MILLIGRAM(S): 1 INJECTION, POWDER, FOR SOLUTION INTRAMUSCULAR; INTRAVENOUS at 00:36

## 2019-12-12 RX ADMIN — SODIUM CHLORIDE 2000 MILLILITER(S): 9 INJECTION INTRAMUSCULAR; INTRAVENOUS; SUBCUTANEOUS at 00:03

## 2019-12-12 NOTE — ED ADULT NURSE REASSESSMENT NOTE - NS ED NURSE REASSESS COMMENT FT1
Inpatient team paged again, no answer. Patient is tachy and febrile. Given acetaminophen. Patient has rigors at this time.

## 2019-12-12 NOTE — ED PROVIDER NOTE - NS ED ROS FT
GENERAL: +fevers and chills  EYES: No change in vision  HEENT: No trouble swallowing or speaking  CARDIAC: No chest pain  PULMONARY: No cough or SOB  GI: No abdominal pain, no nausea or no vomiting, no diarrhea or constipation  : No changes in urination  SKIN: No rashes  NEURO: No headache, no numbness  MSK: No joint pain  Otherwise as HPI or negative.

## 2019-12-12 NOTE — H&P ADULT - NSHPPHYSICALEXAM_GEN_ALL_CORE
GENERAL: NAD, appears tired and wrapped in multiple blankets on bed; nasal cannula in place  HEAD:  Atraumatic, Normocephalic  EYES: EOMI, conjunctiva and sclera clear  ENMT: No tonsillar erythema, exudates, or enlargement; Moist mucous membranes  NECK: Supple, No JVD  CHEST/LUNG: slightly diminshed breath sounds at left lower lobe, otherwise clear to auscultation bilaterally; No rales, rhonchi, wheezing, or rubs appreciated  HEART: Regular rate and rhythm; No murmurs, rubs, or gallops  ABDOMEN: soft, mildly distended abdomen, nontender to palpation. Bowel sounds normoactive  EXTREMITIES:  2+ Peripheral Pulses, No clubbing, cyanosis, or edema  LYMPH: No lymphadenopathy noted  NERVOUS SYSTEM:  A&O x3, pleasant affect, non-focal neuro exam  SKIN: No rashes or lesions

## 2019-12-12 NOTE — ED ADULT NURSE REASSESSMENT NOTE - NS ED NURSE REASSESS COMMENT FT1
Patient seen resting comfortably in bed. Denies any pain or discomfort at this time. Awaiting bed assignment. Safety and comfort provided.

## 2019-12-12 NOTE — H&P ADULT - PROBLEM SELECTOR PLAN 8
DVT: patient on Eliquis  Diet: regular  Dispo: uncertain at this time Transitions of Care Status:  1.  Name of PCP: Dr. Homero Melgar  2.  PCP Contacted on Admission: [ ] Y    [ ] N    3.  PCP contacted at Discharge: [ ] Y    [ ] N    [ ] N/A  4.  Post-Discharge Appointment Date and Location:  5.  Summary of Handoff given to PCP:

## 2019-12-12 NOTE — H&P ADULT - HISTORY OF PRESENT ILLNESS
68 yo M with PMH of cholangiocarcinoma (dx in 2017 s/p Whipple procedure with liver mets found in 2019 after finding of clots in portal vein, also s/p multiple rounds of radiation treatment) currently on chemotherapy including oxplatin and 2 other medications per patient and wife, as well as chronic back pain, GERD, and atrial fibrillation. Patient has been having fevers and rigors since last night; wife reports fevers initially at 101 with acute rise to as high as 105.5 last night, during which patient had violent rigors. Patient states he feels unwell and has a chronic dry cough, but denies additional symptoms, specifically denying any difficulty breathing or SOB, productive cough, chest pain, palpitations, nausea or vomiting, abdominal pain, dysuria or changes in urinary frequency. Patient had similar fevers about 4 weeks ago and was admitted to the hospital, got 5 days of broad spectrum antibiotics and had general infectious workup which was negative for any sign of infection. Also had fevers and was admitted to [facility] for fevers/hypotension about 1 week ago, during which he had additional infectious workup that was negative. Patient states that doctors during his last admission thought the cause may be microcolitis, and patient was scheduled for colonoscopy with his GI doctor, Dr. Allen, next week for evaluation. 70 yo M with PMH of cholangiocarcinoma (dx in 2017 s/p Whipple procedure with liver mets found in 2019 after finding of clots in portal vein, also s/p multiple rounds of radiation treatment) currently on chemotherapy including oxplatin and 2 other medications per patient and wife (last dose in 2018, attempting to get next dose lately), as well as chronic back pain, GERD, IBS with diarrhea, and recently diagnosed atrial fibrillation. Patient has been having fevers and rigors since last night; wife reports fevers initially at 101 with acute rise to as high as 105.5 last night, during which patient had violent rigors. Patient states he feels unwell and has a chronic dry cough, but denies additional symptoms, specifically denying any difficulty breathing or SOB, productive cough, chest pain, palpitations, nausea or vomiting, abdominal pain, dysuria or changes in urinary frequency. Patient had similar fevers about 4 weeks ago and was admitted to the hospital, got 5 days of broad spectrum antibiotics and had general infectious workup which was negative for any sign of infection. Also had fevers and was admitted to [facility] for fevers/hypotension about 1 week ago, during which he had additional infectious workup that was negative. Patient states that doctors during his last admission thought the cause may be microcolitis, and patient was scheduled for colonoscopy with his GI doctor, Dr. Allen, next week for evaluation. He denies any recent travel or sick contacts, other than being in the hospital multiple times over the last couple months.    In ED, patient was initially febrile to 100.5 and tachycardic to 115, as well as hypoxic to low 80%. Patient was slightly hypotensive in 90's/50's and received a total of 3L IVF with improvement to 110's/60-70's. Patient has been afebrile this morning and is no longer tachycardic with HR in 80's-90's. On 2L nasal cannula O2, saturating well currently.

## 2019-12-12 NOTE — H&P ADULT - NSHPOUTPATIENTPROVIDERS_GEN_ALL_CORE
Dr. Shell - PCP  Dr. PENA[facility] - oncology  Dr. Allen - GI Dr. Homero Melgar - PCP  Dr. Atkinson - oncology  Dr. Orantes - GI

## 2019-12-12 NOTE — H&P ADULT - ASSESSMENT
68 yo male with PMH 68 yo male with PMH cholangiocarcinoma (past chemotherapy and radiation therapy, attempting to restart chemo) and chronic back pain, GERD, IBS with diarrhea, and recently diagnosed atrial fibrillation with recent admission for fevers with negative infectious workup, presenting with new onset fevers up to 105 with unclear source. Likely infectious etiology with infectious workup pending, but cannot exclude alternative diagnoses such as new malignancy given recent past negative infectious workup.

## 2019-12-12 NOTE — H&P ADULT - NSHPLABSRESULTS_GEN_ALL_CORE
9.0    10.78 )-----------( 210      ( 12 Dec 2019 00:07 )             27.6     Prothrombin Time and INR, Plasma (12.12.19 @ 00:07)    Prothrombin Time, Plasma: 25.8: Effective October 30th, 2018 the reference range for PT has changed. sec    INR: 2.21    12-12    132<L>  |  96  |  11  ----------------------------<  170<H>  3.5   |  22  |  0.74    Ca    8.3<L>      12 Dec 2019 00:07    TPro  6.2  /  Alb  2.6<L>  /  TBili  1.0  /  DBili  x   /  AST  16  /  ALT  12  /  AlkPhos  353<H>  12-12    LIVER FUNCTIONS - ( 12 Dec 2019 00:07 )  Alb: 2.6 g/dL / Pro: 6.2 g/dL / ALK PHOS: 353 U/L / ALT: 12 U/L / AST: 16 U/L / GGT: x    RADIOLOGY:  Xray Chest 1 View-PORTABLE IMMEDIATE (12.12.19 @ 00:19)    IMPRESSION:   Left pleural effusion with adjacent subsegmental atelectasis.

## 2019-12-12 NOTE — CONSULT NOTE ADULT - SUBJECTIVE AND OBJECTIVE BOX
HPI:  69 m with HTN, HLD, cholangiocarcinoma s/p Whipple 2017 with recurrence of disease now with mets to the liver, portal vein thrombus and SMA narrowing, chronic diarrhea with negative infection w/u, ascites and intrahepatic biliary dilatation, multiple admissions with sepsis and negative work up, last one a week ago and abd CT showed new liver infarcts, now again brought in with high fevers to 105 and rigors  here febrile 100.4, tachycardic and hypotensive with active rigors  WBC: 10.87, RVP negative, CXR small pleural effusion and atelectasis    PAST MEDICAL & SURGICAL HISTORY:  Rash  Biliary stricture: s/p biliary stent  Pancreatic lesion  Gastric polyp  Obstructive jaundice  Other hyperlipidemia  Essential hypertension  IBS (irritable bowel syndrome)  GERD (gastroesophageal reflux disease)  Intestinal Whipple's disease  S/P unilateral inguinal hernia repair  S/P tendon repair: hand  History of lumbar laminectomy  History of biliary stent insertion: 2017  S/P cervical spinal fusion      Allergies    penicillin G benzathine (Rash)  sulfa drugs (Rash)    Intolerances        ANTIMICROBIALS:  cefepime   IVPB 2000 every 12 hours  vancomycin  IVPB 1000 every 12 hours      OTHER MEDS:  acetaminophen   Tablet .. 650 milliGRAM(s) Oral every 6 hours PRN  apixaban 5 milliGRAM(s) Oral every 12 hours  doxazosin 1 milliGRAM(s) Oral at bedtime  gabapentin 300 milliGRAM(s) Oral at bedtime  loperamide 2 milliGRAM(s) Oral every 6 hours PRN  metoprolol succinate ER 25 milliGRAM(s) Oral daily  pantoprazole    Tablet 40 milliGRAM(s) Oral before breakfast  simethicone 80 milliGRAM(s) Chew two times a day PRN      SOCIAL HISTORY:  , lives with wife, has a pond and fish in his backyard which he cleans once a month  former smoker, no alcohol or drug abuse  no recent travel      FAMILY HISTORY:  FH: CHF (congestive heart failure)  FH: prostate cancer  Family history of colitis      ROS:  Unobtainable because:   All other systems negative     Constitutional: + fever and rigors  Eye: no eye pain, no redness, no vision changes  ENT:  no sore throat, no rhinorrhea  Cardiovascular:  no chest pain, no palpitation  Respiratory:  no SOB, no cough  GI:  no abd pain, no vomiting, + chronic diarrhea but not worse  urinary: no dysuria, no hematuria, no flank pain  : no penile discharge or bleeding  musculoskeletal:  no joint pain, no joint swelling  skin:  no rash  neurology:  no headache, no seizure, no change in mental status  psych: no anxiety, no depression     Physical Exam:    General:    rigoring and warm to touch  Head: atraumatic, normocephalic  Eyes: normal sclera and conjunctiva  ENT:   no oropharyngeal lesions, no LAD, neck supple  Cardio:    regular S1,S2, no murmur  Respiratory:   clear b/l, no wheezing  abd:   soft, BS +, not tender  :     no CVAT, no suprapubic tenderness, no sawant  Musculoskeletal : no joint swelling, no edema  Skin:    no rash  vascular: R chest port  Neurologic:     no focal deficits  psych: normal affect      Drug Dosing Weight  Height (cm): 177.8 (11 Dec 2019 23:21)  Weight (kg): 65.8 (11 Dec 2019 23:21)  BMI (kg/m2): 20.8 (11 Dec 2019 23:21)  BSA (m2): 1.82 (11 Dec 2019 23:21)    Vital Signs Last 24 Hrs  T(F): 100.4 (19 @ 15:29), Max: 100.4 (19 @ 15:29)    Vital Signs Last 24 Hrs  HR: 136 (19 @ 15:29) (87 - 136)  BP: 91/63 (19 @ 14:30) (90/66 - 117/69)  RR: 26 (19 @ 15:29)  SpO2: 97% (19 @ 13:10) (95% - 97%)  Wt(kg): --                          9.0    10.78 )-----------( 210      ( 12 Dec 2019 00:07 )             27.6           132<L>  |  96  |  11  ----------------------------<  170<H>  3.5   |  22  |  0.74    Ca    8.3<L>      12 Dec 2019 00:07    TPro  6.2  /  Alb  2.6<L>  /  TBili  1.0  /  DBili  x   /  AST  16  /  ALT  12  /  AlkPhos  353<H>        Urinalysis Basic - ( 12 Dec 2019 03:04 )    Color: Colorless / Appearance: Clear / S.005 / pH: x  Gluc: x / Ketone: Negative  / Bili: Negative / Urobili: Negative   Blood: x / Protein: Negative / Nitrite: Negative   Leuk Esterase: Negative / RBC: x / WBC x   Sq Epi: x / Non Sq Epi: x / Bacteria: x        MICROBIOLOGY:  v  FECES  19 --  --  --      FECES  19 --  --  --      URINE MIDSTREAM  19 --  --  --      BLOOD  19 --  --  --      Ascites Fl Peritoneal Fluid  19   No growth  --  --      Ascites Fl Peritoneal Fluid  19   No growth at 5 days  --    No polymorphonuclear cells seen  No organisms seen by cytocentrifuge      .Blood Blood-Peripheral  19   No growth at 5 days.  --  --      .Body Fluid Peritoneal Fluid  19   No growth at 5 days  --    polymorphonuclear leukocytes seen  No organisms seen  by cytocentrifuge      .Stool Feces  19   No Protozoa seen by trichrome stain  No Helminths or Protozoa seen in formalin concentrate  performed by iodine stain  (routine O+P not evaluated for Microsporidia,  Cryptosporidia, Cyclospora, or Isospora.)  One negative sample does not necessarily rule  out the presence of a parasitic infection.  --  --      .Stool Feces  19   GI PCR Results: NOT detected      .Urine Clean Catch (Midstream)  19   No growth  --  --      .Blood Blood-Peripheral  19   No growth at 5 days.  --  --      .Blood Blood-Peripheral  19   No growth at 5 days.  --  --          Rapid RVP Result: NotDetec ( @ 00:47)          RADIOLOGY:    Images below reviewed personally  < from: Xray Chest 1 View-PORTABLE IMMEDIATE (19 @ 00:19) >    IMPRESSION:   Left pleural effusion with adjacent subsegmental atelectasis.    < from: CT Abdomen and Pelvis w/ IV Cont (19 @ 22:47) >    IMPRESSION:     New areas of wedge-shaped hypoattenuation in the liver, in the setting of   known portal vein thrombosis, may reflect areas of hepatic infarction.    Worsening hepatic metastases.    Slight interval increase in the moderate volume ascites.

## 2019-12-12 NOTE — H&P ADULT - ATTENDING COMMENTS
Patient seen and examined.  Fevers, appears to be an ongoing problem.  On CXR he has a left sided effusion which appears worse when compared to previous admissions, would start empiric abx and further evaluate with a CT of his chest.

## 2019-12-12 NOTE — CONSULT NOTE ADULT - SUBJECTIVE AND OBJECTIVE BOX
70 yo M with PMH of cholangiocarcinoma (dx in 2017 s/p Whipple procedure with liver mets/p multiple rounds of radiation treatment) currently on chemotherapy including oxplatin and 2 other medications per patient and wife (last dose in 2018, attempting to get next dose lately), as well as chronic back pain, GERD, IBS, and atrial fibrillation p/w fever/rigor. Patient has been having fevers and rigors since last night; wife reports fevers initially at 101 with acute rise to as high as 105.5, during which patient had violent rigors. Patient states he feels unwell and has a chronic dry cough, but denies additional symptoms, specifically denying any difficulty breathing or SOB, productive cough, chest pain, palpitations, nausea or vomiting, abdominal pain, dysuria or changes in urinary frequency. Patient had similar fevers about 4 weeks ago and was admitted to the hospital, got 5 days of broad spectrum antibiotics and had general infectious workup which was negative for any sign of infection. Also had fevers and was admitted to [facility] for fevers/hypotension about 1 week ago, during which he had additional infectious workup that was negative. Patient states that doctors during his last admission thought the cause may be microcolitis, and patient was scheduled for colonoscopy with his GI doctor, Dr. Allen, next week for evaluation. He denies any recent travel or sick contacts, other than being in the hospital multiple times over the last couple months.    In ED, patient was initially febrile to 100.5 and tachycardic to 115, as well as hypoxic to low 80%. Patient was slightly hypotensive in 90's/50's and received a total of 3L IVF with improvement to 110's/60-70's. Patient has been afebrile this morning and is no longer tachycardic with HR in 80's-90's. On 2L nasal cannula O2, saturating well currently.       Allergies  penicillin G benzathine (Rash)  sulfa drugs (Rash)      PAST MEDICAL & SURGICAL HISTORY:  Rash  Biliary stricture: s/p biliary stent  Pancreatic lesion  Gastric polyp  Obstructive jaundice  Other hyperlipidemia  Essential hypertension  IBS (irritable bowel syndrome)  GERD (gastroesophageal reflux disease)  Intestinal Whipple's disease  S/P unilateral inguinal hernia repair  S/P tendon repair: hand  History of lumbar laminectomy  History of biliary stent insertion: 2/21/2017  S/P cervical spinal fusion      FAMILY HISTORY:  FH: CHF (congestive heart failure)  FH: prostate cancer  Family history of colitis      Social History:  Patient lives at home with his wife. Has no trouble with ADLs at home. Past smoking history, states limited smoking, < 1 ppd and quit "many years ago". States drinks socially, occasionally > 1 drink per day, but often 4-5 drinks/week. Denies any illicit drug use.    REVIEW OF SYSTEMS:  CONSTITUTIONAL: + weakness, + fevers or chills  EYES/ENT: No visual changes, no throat pain   RESPIRATORY: + dry cough, No wheezing, hemoptysis; No shortness of breath  CARDIOVASCULAR: No chest pain or palpitations  GASTROINTESTINAL: No abdominal pain, nausea, vomiting, or hematemesis; No diarrhea or constipation. No melena or hematochezia.  GENITOURINARY: No dysuria, frequency or hematuria  MUSCULOSKELETAL: No joint pain.  NEUROLOGICAL: No dizziness, numbness, or weakness  SKIN: No itching, burning, rashes, or lesions   All other review of systems is negative unless indicated above.    VITAL SIGNS:  Vital Signs Last 24 Hrs  T(C): 36.9 (12 Dec 2019 16:49), Max: 38 (12 Dec 2019 15:29)  T(F): 98.5 (12 Dec 2019 16:49), Max: 100.4 (12 Dec 2019 15:29)  HR: 114 (12 Dec 2019 16:49) (87 - 136)  BP: 106/67 (12 Dec 2019 16:49) (90/66 - 117/69)  BP(mean): --  RR: 20 (12 Dec 2019 16:49) (17 - 26)  SpO2: 96% (12 Dec 2019 16:49) (95% - 97%)      PHYSICAL EXAM:   GENERAL: appear fatigued  HEENT: EOMI, neck supple  RESPIRATORY: LCTAB/L, no rhonchi, rales, or wheezing  CARDIOVASCULAR: RRR, no murmurs, gallops, rubs  ABDOMINAL: soft, non-tender, non-distended, positive bowel sounds   EXTREMITIES: no clubbing, cyanosis, or edema  NEUROLOGICAL: alert and oriented x 3, non-focal  SKIN: no rashes or lesions   MUSCULOSKELETAL: no gross joint deformity                          9.0    10.78 )-----------( 210      ( 12 Dec 2019 00:07 )             27.6     12-12    132<L>  |  96  |  11  ----------------------------<  170<H>  3.5   |  22  |  0.74    Ca    8.3<L>      12 Dec 2019 00:07    TPro  6.2  /  Alb  2.6<L>  /  TBili  1.0  /  DBili  x   /  AST  16  /  ALT  12  /  AlkPhos  353<H>  12-12      MEDICATIONS  (STANDING):  apixaban 5 milliGRAM(s) Oral every 12 hours  cefepime   IVPB 2000 milliGRAM(s) IV Intermittent every 12 hours  doxazosin 1 milliGRAM(s) Oral at bedtime  gabapentin 300 milliGRAM(s) Oral at bedtime  metoprolol succinate ER 25 milliGRAM(s) Oral daily  pantoprazole    Tablet 40 milliGRAM(s) Oral before breakfast  vancomycin  IVPB 1000 milliGRAM(s) IV Intermittent every 12 hours    < from: CT Chest No Cont (12.12.19 @ 16:19) >  IMPRESSION:    When compared with prior study of June 14, 2019:    New small left pleural effusion with resultant partial collapse of the   left lower lobe    New focal patchy consolidation in the left upper lobe    Intra-abdominal ascites, new since previous exam. Multiple lymph nodes   within the mesentery along with questionable nodular studding of the   mesenteric fat suggestive of peritoneal carcinomatosis    < end of copied text >

## 2019-12-12 NOTE — ED ADULT NURSE REASSESSMENT NOTE - NS ED NURSE REASSESS COMMENT FT1
Patient seen resting comfortably in bed. Heart rate has been going into 140s, metoprolol administered and is on pulse oximetry to monitor. Patient denies any pain or discomfort at this time. Safety and comfort provided, awaiting bed assignment.

## 2019-12-12 NOTE — H&P ADULT - PROBLEM SELECTOR PLAN 5
- pt on metoprolol succinate 25 mg for rate control; continue  - pt also on Eliquis, 5mg BID - continue home dose pantoprazole 40mg

## 2019-12-12 NOTE — ED PROVIDER NOTE - PHYSICAL EXAMINATION
Gen: NAD, AOx3, able to make needs known, non-toxic  Head: NCAT  HEENT: EOMI, oral mucosa moist, normal conjunctiva  Lung: diminished lung sounds L base, CTA remaining lung fields, no respiratory distress, no wheezes/rhonchi/rales B/L, speaking in full sentences  CV: RRR, no murmurs  Abd: soft, distended, non tender, no guarding  MSK: no visible deformities  Neuro: No focal sensory or motor deficits  Skin: Warm, well perfused  Psych: normal affect

## 2019-12-12 NOTE — ED PROVIDER NOTE - CLINICAL SUMMARY MEDICAL DECISION MAKING FREE TEXT BOX
70 y/o M w/ PMH of pancreatic Ca s/p whipple and prior chemo, HTN, GERD, IBS, HLD presenting w/ fever. Sepsis workup ordered. Unclear source of sepsis as pt lacks infectious symptoms (chronic diarrhea, w/ recent improvement). Abdomen is distended, however there is no tenderness on exam. Low suspicion for SBP as cause of infection at this time. Pt hypoxic on room air to low 90s. Discussed with pt and family the possibility of PE. If CXR unremarkable for cause of hypoxia will consider getting CTA to eval for PE given hypoxia, tachycardia, and cancer hx. Labs, EKG, infectious workup, Abx, admission. Will reassess the need for additional intervention as clinically indicated.

## 2019-12-12 NOTE — CONSULT NOTE ADULT - ASSESSMENT
69 m with HTN, HLD, cholangiocarcinoma s/p Whipple 2017 with recurrence of disease now with mets to the liver, portal vein thrombus and SMA narrowing, chronic diarrhea with negative infection w/u, ascites and intrahepatic biliary dilatation, multiple admissions with sepsis and negative work up, last one a week ago and abd CT showed new liver infarcts, now again brought in with high fevers to 105 and rigors  here febrile 100.4, tachycardic and hypotensive with active rigors  WBC: 10.87, RVP negative, CXR small pleural effusion and atelectasis    fever, rigors, tachycardia, hypotension, sepsis, in the setting of metastatic cholangiocarcinoma and jonathan vein thrombosis liver infarcts and intrahepatic biliary ductal dilatation  r/o bacteremia vs tumor fever    * f/u the blood cultures  * c/w vanco 1 q 12 for now  * c/w cefepime 2 q8  * add flagyl 500 q 8   * if worsening status will need a repeat abd/pelvis with IV and oral contrast

## 2019-12-12 NOTE — H&P ADULT - NSHPREVIEWOFSYSTEMS_GEN_ALL_CORE
CONSTITUTIONAL: +fever and chills; no weight loss or fatigue  HEENT: No difficulty hearing, tinnitus, or vertigo; No sinus or throat pain  NECK: No pain or stiffness  RESPIRATORY: + dry cough; no congestion, productive cough, wheezing or hemoptysis; No shortness of breath  CARDIOVASCULAR: No chest pain, palpitations, dizziness, or leg swelling  GASTROINTESTINAL: + chronic diarrhea; No abdominal or epigastric pain. No nausea, vomiting, or hematemesis; No constipation or blood in stools.  GENITOURINARY: No dysuria, frequency, hematuria, or incontinence  NEUROLOGICAL: No headaches, memory loss, loss of strength, numbness, or tremors  SKIN: No itching, burning, rashes, or lesions   MUSCULOSKELETAL: No joint pain or swelling; No muscle, back, or extremity pain CONSTITUTIONAL: +fever and chills, fatigue; no weight loss  HEENT: No difficulty hearing, tinnitus, or vertigo; No sinus or throat pain  NECK: No pain or stiffness  RESPIRATORY: + dry cough; no congestion, productive cough, wheezing or hemoptysis; No shortness of breath  CARDIOVASCULAR: No chest pain, palpitations, dizziness, or leg swelling  GASTROINTESTINAL: + chronic diarrhea; No abdominal or epigastric pain. No nausea, vomiting, or hematemesis; No constipation or blood in stools.  GENITOURINARY: No dysuria, frequency, hematuria, or incontinence  NEUROLOGICAL: No headaches, memory loss, loss of strength, numbness, or tremors  SKIN: No itching, burning, rashes, or lesions   MUSCULOSKELETAL: No joint pain or swelling; No muscle, back, or extremity pain

## 2019-12-12 NOTE — H&P ADULT - PROBLEM SELECTOR PLAN 9
Transitions of Care Status:  1.  Name of PCP: Dr. Homero Melgar  2.  PCP Contacted on Admission: [ ] Y    [ ] N    3.  PCP contacted at Discharge: [ ] Y    [ ] N    [ ] N/A  4.  Post-Discharge Appointment Date and Location:  5.  Summary of Handoff given to PCP:

## 2019-12-12 NOTE — H&P ADULT - PROBLEM SELECTOR PLAN 4
Normotensive currently, continue to monitor vitals regularly  - IVF bolus if drops again,  - if continues to remain hypotensive despite IVF, consider MICU consult for ? of pressors - pt on metoprolol succinate 25 mg for rate control; continue  - pt also on Eliquis, 5mg BID

## 2019-12-12 NOTE — H&P ADULT - PROBLEM SELECTOR PLAN 2
likely due to infectious cause as above, but cannot rule out malignancy or other causes of fever given recent negative infectious workups.  - CT abdomen to check for evidence of infection, new abdominal malignancy Oncologist is Dr. Atkinson; will f/u regarding pt's chemo plan  - hold on any chemo for now, f/u onc recs

## 2019-12-12 NOTE — H&P ADULT - NSHPSOCIALHISTORY_GEN_ALL_CORE
Patient lives at home with his wife. Has no trouble with ADLs at home. Past smoking history, states limited smoking, < 1 ppd and quit "many years ago". States drinks socially, occasionally > 1 drink per day, but often 4-5 drinks/week. Denies any illicit drug use.

## 2019-12-12 NOTE — H&P ADULT - PROBLEM SELECTOR PLAN 1
Pt febrile with leukocytosis and hypotension on presentation. Currently afebrile and normotensive s/p 3L IVF in ED. Likely infectious etiology, unclear source  - will review treatment from recent previous admissions  - UA negative, CXR with L pleural effusion, no evidence of PNA  - f/u blood cultures, urine culture  - empiric antibiotic coverage in setting of likely infection with immunocompromised state (chemo), will continue cefepime and vanc for now  - consider CT abdomen given concern for possible microcolitis on past admission  - consult GI given concern for microcolitis/plan for colonoscopy next week per pt, pt sees Dr. Orantes  - BP normotensive currently, if drops will consider additional IVF bolus, if BP unable to be controlled with fluids, consider MICU consult for hypotension Meets SIRS criteria - pt febrile with leukocytosis and hypotension on presentation. Currently afebrile and normotensive s/p 3L IVF in ED. Likely infectious etiology, unclear source  - will review treatment from recent previous admissions  - UA negative, CXR with L pleural effusion, no evidence of PNA  - f/u blood cultures, urine culture  - empiric antibiotic coverage in setting of likely infection with immunocompromised state (chemo), will continue cefepime and vanc for now  - consider CT abdomen given concern for possible microcolitis on past admission  - consult GI given concern for microcolitis/plan for colonoscopy next week per pt, pt sees Dr. Orantes  - BP normotensive currently, if drops will consider additional IVF bolus, if BP unable to be controlled with fluids, consider MICU consult for hypotension

## 2019-12-12 NOTE — ED ADULT NURSE REASSESSMENT NOTE - NS ED NURSE REASSESS COMMENT FT1
Handoff received from Tabitha DAVIES at 07:05 in [facility] ([place]). Patient seen resting comfortably in bed. Inpatient medicine team at bedside. Safety and comfort provided.

## 2019-12-12 NOTE — ED PROVIDER NOTE - OBJECTIVE STATEMENT
70 y/o M w/ PMH of pancreatic Ca s/p whipple and prior chemo, HTN, GERD, IBS, HLD presenting w/ fever. Pt presents with family. Pt reportedly has felt increased fatigue over the past few days. Yesterday had low grade temps of 99-100F. Tonight developed temps of 101, 103, then 105.5F. Associated with shaking chills. Pt took tylenol for the fevers. Came to ED w/ concern for infection. Pt reports multiple hospital stays for fevers of unclear origin. Has dry cough, which he reports has been present for some time. No known sick contacts. Has diarrhea chronically due to IBS which he states has improved lately. +abdominal distention which has been increasing over the past few months but denies abd pain. Denies fevers, chills, headache, dizziness, blurred vision, chest pain, shortness of breath, abdominal pain, n/v/c, urinary symptoms, MSK pain, rash.

## 2019-12-12 NOTE — CONSULT NOTE ADULT - ASSESSMENT
68 y/o male PMHx cholangiocarcinoma s/p Whipple 2017 s/p cis/Potrero and capcitabine (from 8/2017-1/2018)  with recurrence of disease now with mets to the liver, portal vein thrombus  according to MR abdomen 11/5/19 on eliquis, R sided Port, HTN, HLD now presenting with recurrent feve    # Fever  -pt had 2 recent hospitalization due to fever with extensive workup that were negative for infection  -likely tumor fever from progression of cholangiocarcinoma  - f/u BCx, Ucx, UA, RVP  -appreciate ID input    # recurrent Cholangiocarcinoma  -cholangiocarcinoma s/p Whipple 2017 s/p cis/Potrero and capecitabine (from 8/2017-1/2018)  - found to have recurrent dz via patricia-SMA LN bx in feb 2019 showed poorly-differentiated adenocarcinoma,  -s/p SBRT x 5 fractions 3/27/19-4/5/19   -Planned to start salvage therapy with CAPEOX  -no indication for chemo while he is admitted  -follows up with Dr. Atkinson at [facility]         Shane West MD. PGY 5  Heme-Onc fellow  [phone]; 70 y/o male PMHx cholangiocarcinoma s/p Whipple 2017 s/p cis/[place] and capcitabine (from 8/2017-1/2018)  with recurrence of disease now with mets to the liver, portal vein thrombus  according to MR abdomen 11/5/19 on eliquis, R sided Port, HTN, HLD now presenting with recurrent fever.    # Fever  -pt had 2 recent hospitalization due to fever with extensive workup that were negative for infection  -likely tumor fever from progression of cholangiocarcinoma but will need to r/o infectious causes  - f/u BCx, Ucx, UA, RVP  -appreciate ID input    # recurrent Cholangiocarcinoma  -cholangiocarcinoma s/p Whipple 2017 s/p cis/[place] and capecitabine (from 8/2017-1/2018)  - found to have recurrent dz via patricia-SMA LN bx in feb 2019 showed poorly-differentiated adenocarcinoma,  -s/p SBRT x 5 fractions 3/27/19-4/5/19   -Planned to start salvage therapy with CAPEOX  -no indication for chemo while he is admitted  -follows up with Dr. Atkinson at [facility]         Shane West MD. PGY 5  Heme-Onc fellow  [phone];

## 2019-12-12 NOTE — H&P ADULT - PROBLEM SELECTOR PLAN 3
Oncologist is Dr. Atkinson; will f/u regarding pt's chemo plan  - hold on any chemo for now, f/u onc recs Normotensive currently, continue to monitor vitals regularly  - IVF bolus if drops again,  - if continues to remain hypotensive despite IVF, consider MICU consult for ? of pressors

## 2019-12-12 NOTE — H&P ADULT - PROBLEM SELECTOR PLAN 7
- loperamide 2mg PRN for diarrhea DVT: patient on Eliquis  Diet: regular  Dispo: uncertain at this time

## 2019-12-12 NOTE — ED ADULT NURSE REASSESSMENT NOTE - NS ED NURSE REASSESS COMMENT FT1
Patient became dizzy while ambulating to bathroom, assisted to wheel chair. patient had episode of diarrhea and is requesting imodium because "I had it over night and it helped". Paged in patient team, awaiting call back.

## 2019-12-12 NOTE — ED ADULT NURSE NOTE - OBJECTIVE STATEMENT
70 y/o male history of bile duct cancer, A.Fib., IBS, GERD, HTN, gastric polyp, and pancreatic lesion presents to the ED from home c/o fever. Patient states that he was at home and had spiked a fever of 105.5. Patient's wife states that he felt hot which is why she took his temperature. Patient states that he was here last week for a fever and everything had come back negative. Patient states that he took 3 Tylenol extra strength, last dose was at 21:15. AS per EMS, patient was tachypneic and tachycardic. Patient has a port for chemotherapy but has not had his first chemo yet. Patient is on ellaquis for A.Fib.  Denies n/v, weakness, abd pain, diarrhea/constipation, numbness/tingling, urinary s/s. Patient A&Ox3, in no respiratory distress, and denies chest pain. Placed on CM, A.Fib. on monitor. EKG performed in ED. Strong peripheral pulses. Symmetrical chest rise. No retraction use when breathing.

## 2019-12-12 NOTE — ED PROVIDER NOTE - ATTENDING CONTRIBUTION TO CARE
MD Fallon:  patient seen and evaluated personally.   I agree with the History & Physical,  Impression & Plan other than what was detailed in my note.  MD Fallon    Pt w/ hx of panceratic ca presnting w/ fever to 105, rigors. received apap feeling better. otherwise feeling baseline. no increased cough, no increased body aches, abd pain. mild distention. pt is on ac. afebrile here, vitals stable, non toxic, on 02 as was 92 %, crackles in ll b, no abd ttp. pt placed for septic workup. given vanc/cefepime. givne no abd ttp, do not feel pt needs paracentesis. x ray shows opacity in left side, does seem worse than previous. pt treated as pna. considered pe but pt on thinners at this point, not having sig sob, no hemoptysis, no hx of pe.

## 2019-12-13 DIAGNOSIS — M79.89 OTHER SPECIFIED SOFT TISSUE DISORDERS: ICD-10-CM

## 2019-12-13 LAB
ALBUMIN SERPL ELPH-MCNC: 2.5 G/DL — LOW (ref 3.3–5)
ALP SERPL-CCNC: 312 U/L — HIGH (ref 40–120)
ALT FLD-CCNC: 13 U/L — SIGNIFICANT CHANGE UP (ref 10–45)
ANION GAP SERPL CALC-SCNC: 8 MMOL/L — SIGNIFICANT CHANGE UP (ref 5–17)
APTT BLD: 29.4 SEC — SIGNIFICANT CHANGE UP (ref 27.5–36.3)
AST SERPL-CCNC: 21 U/L — SIGNIFICANT CHANGE UP (ref 10–40)
BILIRUB SERPL-MCNC: 0.8 MG/DL — SIGNIFICANT CHANGE UP (ref 0.2–1.2)
BUN SERPL-MCNC: 11 MG/DL — SIGNIFICANT CHANGE UP (ref 7–23)
CALCIUM SERPL-MCNC: 8.2 MG/DL — LOW (ref 8.4–10.5)
CHLORIDE SERPL-SCNC: 103 MMOL/L — SIGNIFICANT CHANGE UP (ref 96–108)
CO2 SERPL-SCNC: 25 MMOL/L — SIGNIFICANT CHANGE UP (ref 22–31)
CREAT SERPL-MCNC: 0.72 MG/DL — SIGNIFICANT CHANGE UP (ref 0.5–1.3)
CULTURE RESULTS: NO GROWTH — SIGNIFICANT CHANGE UP
GLUCOSE SERPL-MCNC: 123 MG/DL — HIGH (ref 70–99)
HCT VFR BLD CALC: 28.8 % — LOW (ref 39–50)
HGB BLD-MCNC: 9.1 G/DL — LOW (ref 13–17)
MCHC RBC-ENTMCNC: 26.8 PG — LOW (ref 27–34)
MCHC RBC-ENTMCNC: 31.6 GM/DL — LOW (ref 32–36)
MCV RBC AUTO: 84.7 FL — SIGNIFICANT CHANGE UP (ref 80–100)
NRBC # BLD: 0 /100 WBCS — SIGNIFICANT CHANGE UP (ref 0–0)
PLATELET # BLD AUTO: 207 K/UL — SIGNIFICANT CHANGE UP (ref 150–400)
POTASSIUM SERPL-MCNC: 3.6 MMOL/L — SIGNIFICANT CHANGE UP (ref 3.5–5.3)
POTASSIUM SERPL-SCNC: 3.6 MMOL/L — SIGNIFICANT CHANGE UP (ref 3.5–5.3)
PROT SERPL-MCNC: 5.5 G/DL — LOW (ref 6–8.3)
RBC # BLD: 3.4 M/UL — LOW (ref 4.2–5.8)
RBC # FLD: 19 % — HIGH (ref 10.3–14.5)
SODIUM SERPL-SCNC: 136 MMOL/L — SIGNIFICANT CHANGE UP (ref 135–145)
SPECIMEN SOURCE: SIGNIFICANT CHANGE UP
WBC # BLD: 12.37 K/UL — HIGH (ref 3.8–10.5)
WBC # FLD AUTO: 12.37 K/UL — HIGH (ref 3.8–10.5)

## 2019-12-13 PROCEDURE — 93971 EXTREMITY STUDY: CPT | Mod: 26

## 2019-12-13 PROCEDURE — 99233 SBSQ HOSP IP/OBS HIGH 50: CPT

## 2019-12-13 PROCEDURE — 76604 US EXAM CHEST: CPT | Mod: 26,GC

## 2019-12-13 PROCEDURE — 99233 SBSQ HOSP IP/OBS HIGH 50: CPT | Mod: GC

## 2019-12-13 PROCEDURE — 99223 1ST HOSP IP/OBS HIGH 75: CPT | Mod: GC,25

## 2019-12-13 RX ORDER — TRAMADOL HYDROCHLORIDE 50 MG/1
25 TABLET ORAL ONCE
Refills: 0 | Status: DISCONTINUED | OUTPATIENT
Start: 2019-12-13 | End: 2019-12-13

## 2019-12-13 RX ORDER — HEPARIN SODIUM 5000 [USP'U]/ML
INJECTION INTRAVENOUS; SUBCUTANEOUS
Qty: 25000 | Refills: 0 | Status: DISCONTINUED | OUTPATIENT
Start: 2019-12-13 | End: 2019-12-16

## 2019-12-13 RX ORDER — HEPARIN SODIUM 5000 [USP'U]/ML
5500 INJECTION INTRAVENOUS; SUBCUTANEOUS EVERY 6 HOURS
Refills: 0 | Status: DISCONTINUED | OUTPATIENT
Start: 2019-12-13 | End: 2019-12-16

## 2019-12-13 RX ORDER — HEPARIN SODIUM 5000 [USP'U]/ML
2500 INJECTION INTRAVENOUS; SUBCUTANEOUS EVERY 6 HOURS
Refills: 0 | Status: DISCONTINUED | OUTPATIENT
Start: 2019-12-13 | End: 2019-12-16

## 2019-12-13 RX ADMIN — Medication 1 MILLIGRAM(S): at 21:31

## 2019-12-13 RX ADMIN — TRAMADOL HYDROCHLORIDE 25 MILLIGRAM(S): 50 TABLET ORAL at 18:54

## 2019-12-13 RX ADMIN — PANTOPRAZOLE SODIUM 40 MILLIGRAM(S): 20 TABLET, DELAYED RELEASE ORAL at 05:19

## 2019-12-13 RX ADMIN — GABAPENTIN 300 MILLIGRAM(S): 400 CAPSULE ORAL at 21:31

## 2019-12-13 RX ADMIN — LIDOCAINE 1 PATCH: 4 CREAM TOPICAL at 18:51

## 2019-12-13 RX ADMIN — SIMETHICONE 80 MILLIGRAM(S): 80 TABLET, CHEWABLE ORAL at 13:26

## 2019-12-13 RX ADMIN — Medication 100 MILLIGRAM(S): at 04:00

## 2019-12-13 RX ADMIN — Medication 250 MILLIGRAM(S): at 21:46

## 2019-12-13 RX ADMIN — Medication 25 MILLIGRAM(S): at 05:19

## 2019-12-13 RX ADMIN — CEFEPIME 100 MILLIGRAM(S): 1 INJECTION, POWDER, FOR SOLUTION INTRAMUSCULAR; INTRAVENOUS at 18:09

## 2019-12-13 RX ADMIN — APIXABAN 5 MILLIGRAM(S): 2.5 TABLET, FILM COATED ORAL at 05:19

## 2019-12-13 RX ADMIN — HEPARIN SODIUM 1200 UNIT(S)/HR: 5000 INJECTION INTRAVENOUS; SUBCUTANEOUS at 19:50

## 2019-12-13 RX ADMIN — Medication 100 MILLIGRAM(S): at 13:27

## 2019-12-13 RX ADMIN — Medication 250 MILLIGRAM(S): at 10:46

## 2019-12-13 RX ADMIN — LIDOCAINE 1 PATCH: 4 CREAM TOPICAL at 11:46

## 2019-12-13 RX ADMIN — CEFEPIME 100 MILLIGRAM(S): 1 INJECTION, POWDER, FOR SOLUTION INTRAMUSCULAR; INTRAVENOUS at 05:19

## 2019-12-13 RX ADMIN — Medication 100 MILLIGRAM(S): at 20:18

## 2019-12-13 NOTE — PROGRESS NOTE ADULT - PROBLEM SELECTOR PLAN 3
Normotensive currently, continue to monitor vitals regularly  - IVF bolus if drops again,  - if continues to remain hypotensive despite IVF, consider MICU consult for ? of pressors Normotensive currently, continue to monitor vitals regularly  - IVF bolus if drops again

## 2019-12-13 NOTE — CONSULT NOTE ADULT - SUBJECTIVE AND OBJECTIVE BOX
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 Patient reports that fever was noticed at an outpatient visit and he was advised to come to the hospital for evaluation. Patient reports 1 day of feeling fevers, however cannot remember the exact temperature. Endorses mild SOB and nonproductive cough, but denies headache, URI symptoms, sputum production, hemoptysis, chest pain, pleuritic chest pain, abdominal pain, n/v/d, urinary symptoms or sick contacts. On admission, patient was noted to have a leukocytosis of 16.53, but has been afebrile since arriving to the hospital (Tmax 100.4 in ED). Workup thus far has been revealing for new small L pleural effusion and increasing moderate ascites. Pulmonary consulted for L pleural effusion.    PAST MEDICAL & SURGICAL HISTORY:  Rash  Biliary stricture: s/p biliary stent  Pancreatic lesion  Gastric polyp  Obstructive jaundice  Other hyperlipidemia  Essential hypertension  IBS (irritable bowel syndrome)  GERD (gastroesophageal reflux disease)  Intestinal Whipple's disease  S/P unilateral inguinal hernia repair  S/P tendon repair: hand  History of lumbar laminectomy  History of biliary stent insertion: 2017  S/P cervical spinal fusion      FAMILY HISTORY:  FH: CHF (congestive heart failure)  FH: prostate cancer  Family history of colitis      SOCIAL HISTORY:  Smoking: [ ] Never Smoked [ ] Former Smoker (__ packs x ___ years) [ ] Current Smoker  (__ packs x ___ years)  Substance Use: [ ] Never Used [ ] Used ____  EtOH Use:  Marital Status: [ ] Single [ ]  [ ]  [ ]   Sexual History:   Occupation:  Recent Travel:  Country of Birth:  Advance Directives:    Allergies    penicillin G benzathine (Rash)  sulfa drugs (Rash)    Intolerances        HOME MEDICATIONS:  Home Medications:  aluminum hydroxide-magnesium hydroxide 200 mg-200 mg/5 mL oral suspension: 30 milliliter(s) orally every 4 hours, As needed, Dyspepsia (05 Dec 2019 15:48)  apixaban 5 mg oral tablet: 1 tab(s) orally 2 times a day (05 Dec 2019 15:48)  doxazosin 1 mg oral tablet: 1 tab(s) orally once a day (at bedtime) (03 Dec 2019 04:19)  gabapentin 300 mg oral capsule: 1 cap(s) orally once a day (at bedtime) (05 Dec 2019 15:48)  loperamide 2 mg oral capsule: 1 cap(s) orally every 6 hours, As needed, Diarrhea (05 Dec 2019 15:48)  metoprolol succinate 25 mg oral tablet, extended release: 1 tab(s) orally once a day (03 Dec 2019 04:19)  pantoprazole 40 mg oral delayed release tablet: 1 tab(s) orally once a day (before a meal) (05 Dec 2019 15:48)      REVIEW OF SYSTEMS:  Constitutional: [ ] negative [ ] fevers [ ] chills [ ] weight loss [ ] weight gain  HEENT: [ ] negative [ ] dry eyes [ ] eye irritation [ ] postnasal drip [ ] nasal congestion  CV: [ ] negative  [ ] chest pain [ ] orthopnea [ ] palpitations [ ] murmur  Resp: [ ] negative [ ] cough [ ] shortness of breath [ ] dyspnea [ ] wheezing [ ] sputum [ ] hemoptysis  GI: [ ] negative [ ] nausea [ ] vomiting [ ] diarrhea [ ] constipation [ ] abd pain [ ] dysphagia   : [ ] negative [ ] dysuria [ ] nocturia [ ] hematuria [ ] increased urinary frequency  Musculoskeletal: [ ] negative [ ] back pain [ ] myalgias [ ] arthralgias [ ] fracture  Skin: [ ] negative [ ] rash [ ] itch  Neurological: [ ] negative [ ] headache [ ] dizziness [ ] syncope [ ] weakness [ ] numbness  Psychiatric: [ ] negative [ ] anxiety [ ] depression  Endocrine: [ ] negative [ ] diabetes [ ] thyroid problem  Hematologic/Lymphatic: [ ] negative [ ] anemia [ ] bleeding problem  Allergic/Immunologic: [ ] negative [ ] itchy eyes [ ] nasal discharge [ ] hives [ ] angioedema  [ ] All other systems negative  [ ] Unable to assess ROS because ________    OBJECTIVE:  ICU Vital Signs Last 24 Hrs  T(C): 36.5 (13 Dec 2019 04:16), Max: 38 (12 Dec 2019 15:29)  T(F): 97.7 (13 Dec 2019 04:16), Max: 100.4 (12 Dec 2019 15:29)  HR: 73 (13 Dec 2019 04:16) (73 - 136)  BP: 102/63 (13 Dec 2019 04:16) (91/63 - 108/63)  BP(mean): --  ABP: --  ABP(mean): --  RR: 18 (13 Dec 2019 04:16) (18 - 26)  SpO2: 99% (13 Dec 2019 04:16) (96% - 99%)        CAPILLARY BLOOD GLUCOSE          PHYSICAL EXAM:  General:   HEENT:   Lymph Nodes:  Neck:   Respiratory:   Cardiovascular:   Abdomen:   Extremities:   Skin:   Neurological:  Psychiatry:    LINES:     HOSPITAL MEDICATIONS:  Standing Meds:  cefepime   IVPB 2000 milliGRAM(s) IV Intermittent every 12 hours  doxazosin 1 milliGRAM(s) Oral at bedtime  gabapentin 300 milliGRAM(s) Oral at bedtime  lidocaine   Patch 1 Patch Transdermal every 24 hours  metoprolol succinate ER 25 milliGRAM(s) Oral daily  metroNIDAZOLE  IVPB 500 milliGRAM(s) IV Intermittent every 8 hours  metroNIDAZOLE  IVPB      pantoprazole    Tablet 40 milliGRAM(s) Oral before breakfast  vancomycin  IVPB 1000 milliGRAM(s) IV Intermittent every 12 hours      PRN Meds:  acetaminophen   Tablet .. 650 milliGRAM(s) Oral every 6 hours PRN  loperamide 2 milliGRAM(s) Oral every 6 hours PRN  simethicone 80 milliGRAM(s) Chew two times a day PRN      LABS:                        9.0    16.53 )-----------( 228      ( 12 Dec 2019 22:52 )             28.3     Hgb Trend: 9.0<--, 9.0<--  1212    137  |  104  |  10  ----------------------------<  121<H>  3.6   |  19<L>  |  0.74    Ca    8.2<L>      12 Dec 2019 16:58    TPro  6.0  /  Alb  2.6<L>  /  TBili  1.1  /  DBili  x   /  AST  20  /  ALT  13  /  AlkPhos  324<H>      Creatinine Trend: 0.74<--, 0.74<--, 0.64<--, 0.67<--, 0.82<--, 0.92<--  PT/INR - ( 12 Dec 2019 00:07 )   PT: 25.8 sec;   INR: 2.21 ratio         PTT - ( 12 Dec 2019 00:07 )  PTT:30.2 sec  Urinalysis Basic - ( 12 Dec 2019 03:04 )    Color: Colorless / Appearance: Clear / S.005 / pH: x  Gluc: x / Ketone: Negative  / Bili: Negative / Urobili: Negative   Blood: x / Protein: Negative / Nitrite: Negative   Leuk Esterase: Negative / RBC: x / WBC x   Sq Epi: x / Non Sq Epi: x / Bacteria: x        Venous Blood Gas:   @ 06:22  7.42/41/40/26/72  VBG Lactate: 1.2  Venous Blood Gas:   @ 02:50  7.37/43/23/24/30  VBG Lactate: 3.2  Venous Blood Gas:   @ 00:06  7.47/37/31/27/55  VBG Lactate: 2.8      MICROBIOLOGY:     Culture - Urine (collected 12 Dec 2019 09:51)  Source: .Urine Clean Catch (Midstream)  Final Report (13 Dec 2019 08:36):    No growth    Culture - Blood (collected 12 Dec 2019 03:13)  Source: .Blood Blood-Peripheral  Preliminary Report (13 Dec 2019 04:00):    No growth to date.    Culture - Blood (collected 12 Dec 2019 03:13)  Source: .Blood Blood-Peripheral  Preliminary Report (13 Dec 2019 04:00):    No growth to date.        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 Patient reports that fever was noticed at an outpatient visit and he was advised to come to the hospital for evaluation. Patient reports 1 day of feeling fevers, however cannot remember the exact temperature. Endorses mild SOB and nonproductive cough, but denies headache, URI symptoms, sputum production, hemoptysis, chest pain, pleuritic chest pain, abdominal pain, n/v/d, urinary symptoms or sick contacts. On admission, patient was noted to have a leukocytosis of 16.53, but has been afebrile since arriving to the hospital (Tmax 100.4 in ED). Workup thus far has been revealing for new small L pleural effusion and increasing moderate ascites. Pulmonary consulted for L pleural effusion.    PAST MEDICAL & SURGICAL HISTORY:  Rash  Biliary stricture: s/p biliary stent  Pancreatic lesion  Gastric polyp  Obstructive jaundice  Other hyperlipidemia  Essential hypertension  IBS (irritable bowel syndrome)  GERD (gastroesophageal reflux disease)  Intestinal Whipple's disease  S/P unilateral inguinal hernia repair  S/P tendon repair: hand  History of lumbar laminectomy  History of biliary stent insertion: 2017  S/P cervical spinal fusion      FAMILY HISTORY:  FH: CHF (congestive heart failure)  FH: prostate cancer  Family history of colitis      SOCIAL HISTORY:  Smoking: [ ] Never Smoked [x] Former Smoker (__ packs x ___ years) [ ] Current Smoker  (__ packs x ___ years)  Substance Use: [x] Never Used [ ] Used ____  EtOH Use: social  Marital Status: [ ] Single [ ]  [ ]  [ ]   Sexual History:   Occupation:  Recent Travel:  Country of Birth:  Advance Directives:    Allergies    penicillin G benzathine (Rash)  sulfa drugs (Rash)    Intolerances        HOME MEDICATIONS:  Home Medications:  aluminum hydroxide-magnesium hydroxide 200 mg-200 mg/5 mL oral suspension: 30 milliliter(s) orally every 4 hours, As needed, Dyspepsia (05 Dec 2019 15:48)  apixaban 5 mg oral tablet: 1 tab(s) orally 2 times a day (05 Dec 2019 15:48)  doxazosin 1 mg oral tablet: 1 tab(s) orally once a day (at bedtime) (03 Dec 2019 04:19)  gabapentin 300 mg oral capsule: 1 cap(s) orally once a day (at bedtime) (05 Dec 2019 15:48)  loperamide 2 mg oral capsule: 1 cap(s) orally every 6 hours, As needed, Diarrhea (05 Dec 2019 15:48)  metoprolol succinate 25 mg oral tablet, extended release: 1 tab(s) orally once a day (03 Dec 2019 04:19)  pantoprazole 40 mg oral delayed release tablet: 1 tab(s) orally once a day (before a meal) (05 Dec 2019 15:48)      REVIEW OF SYSTEMS:  Constitutional: [ ] negative [x] fevers [x] chills [ ] weight loss [ ] weight gain  HEENT: [x] negative [ ] dry eyes [ ] eye irritation [ ] postnasal drip [ ] nasal congestion  CV: [x] negative  [ ] chest pain [ ] orthopnea [ ] palpitations [ ] murmur  Resp: [x] negative [ ] cough [ ] shortness of breath [ ] dyspnea [ ] wheezing [ ] sputum [ ] hemoptysis  GI: [ ] negative [ ] nausea [ ] vomiting [ ] diarrhea [ ] constipation [ ] abd pain [ ] dysphagia [x] increased abdominal girth   : [ ] negative [ ] dysuria [ ] nocturia [ ] hematuria [ ] increased urinary frequency  Musculoskeletal: [x] negative [ ] back pain [ ] myalgias [ ] arthralgias [ ] fracture  Skin: [ ] negative [ ] rash [ ] itch  Neurological: [ ] negative [ ] headache [ ] dizziness [ ] syncope [ ] weakness [ ] numbness  Psychiatric: [ ] negative [ ] anxiety [ ] depression  Endocrine: [ ] negative [ ] diabetes [ ] thyroid problem  Hematologic/Lymphatic: [ ] negative [ ] anemia [ ] bleeding problem  Allergic/Immunologic: [ ] negative [ ] itchy eyes [ ] nasal discharge [ ] hives [ ] angioedema  [x] All other systems negative  [ ] Unable to assess ROS because ________    OBJECTIVE:  ICU Vital Signs Last 24 Hrs  T(C): 36.5 (13 Dec 2019 04:16), Max: 38 (12 Dec 2019 15:29)  T(F): 97.7 (13 Dec 2019 04:16), Max: 100.4 (12 Dec 2019 15:29)  HR: 73 (13 Dec 2019 04:16) (73 - 136)  BP: 102/63 (13 Dec 2019 04:16) (91/63 - 108/63)  BP(mean): --  ABP: --  ABP(mean): --  RR: 18 (13 Dec 2019 04:16) (18 - 26)  SpO2: 99% (13 Dec 2019 04:16) (96% - 99%)      CAPILLARY BLOOD GLUCOSE      PHYSICAL EXAM:  General: awake and alert, nontoxic appearing male lying in bed, NAD  HEENT: NC/AT, EOMI b/l, conjunctiva normal, MMM  Neck: supple  Respiratory: decreased breath sounds at L lung base, R lung CTA, appears comfortable on room air, no conversational dyspnea or accessory muscle use  Cardiovascular: S1 S2 present, RRR, no m/r/g  Abdomen: soft, nontender, moderately distended  Extremities: trace LE edema b/l, no c/c  Skin: no rashes or lesions noted  Neurological: AAOx3, no focal deficits  Psychiatry: calm, cooperative    LINES:     HOSPITAL MEDICATIONS:  Standing Meds:  cefepime   IVPB 2000 milliGRAM(s) IV Intermittent every 12 hours  doxazosin 1 milliGRAM(s) Oral at bedtime  gabapentin 300 milliGRAM(s) Oral at bedtime  lidocaine   Patch 1 Patch Transdermal every 24 hours  metoprolol succinate ER 25 milliGRAM(s) Oral daily  metroNIDAZOLE  IVPB 500 milliGRAM(s) IV Intermittent every 8 hours  metroNIDAZOLE  IVPB      pantoprazole    Tablet 40 milliGRAM(s) Oral before breakfast  vancomycin  IVPB 1000 milliGRAM(s) IV Intermittent every 12 hours      PRN Meds:  acetaminophen   Tablet .. 650 milliGRAM(s) Oral every 6 hours PRN  loperamide 2 milliGRAM(s) Oral every 6 hours PRN  simethicone 80 milliGRAM(s) Chew two times a day PRN      LABS:                        9.0    16.53 )-----------( 228      ( 12 Dec 2019 22:52 )             28.3     Hgb Trend: 9.0<--, 9.0<--  12-12    137  |  104  |  10  ----------------------------<  121<H>  3.6   |  19<L>  |  0.74    Ca    8.2<L>      12 Dec 2019 16:58    TPro  6.0  /  Alb  2.6<L>  /  TBili  1.1  /  DBili  x   /  AST  20  /  ALT  13  /  AlkPhos  324<H>      Creatinine Trend: 0.74<--, 0.74<--, 0.64<--, 0.67<--, 0.82<--, 0.92<--  PT/INR - ( 12 Dec 2019 00:07 )   PT: 25.8 sec;   INR: 2.21 ratio         PTT - ( 12 Dec 2019 00:07 )  PTT:30.2 sec  Urinalysis Basic - ( 12 Dec 2019 03:04 )    Color: Colorless / Appearance: Clear / S.005 / pH: x  Gluc: x / Ketone: Negative  / Bili: Negative / Urobili: Negative   Blood: x / Protein: Negative / Nitrite: Negative   Leuk Esterase: Negative / RBC: x / WBC x   Sq Epi: x / Non Sq Epi: x / Bacteria: x        Venous Blood Gas:   @ 06:22  7.42/41/40/26/72  VBG Lactate: 1.2  Venous Blood Gas:   @ 02:50  7.37/43/23/24/30  VBG Lactate: 3.2  Venous Blood Gas:   @ 00:06  7.47/37/31/27/55  VBG Lactate: 2.8      MICROBIOLOGY:     Culture - Urine (collected 12 Dec 2019 09:51)  Source: .Urine Clean Catch (Midstream)  Final Report (13 Dec 2019 08:36):    No growth    Culture - Blood (collected 12 Dec 2019 03:13)  Source: .Blood Blood-Peripheral  Preliminary Report (13 Dec 2019 04:00):    No growth to date.    Culture - Blood (collected 12 Dec 2019 03:13)  Source: .Blood Blood-Peripheral  Preliminary Report (13 Dec 2019 04:00):    No growth to date.      RADIOLOGY:  < from: CT Chest No Cont (19 @ 16:19) >  INTERPRETATION:  Reason for Exam:  Fevers    CT of the chest was performed from the thoracic inlet to the level of the   adrenal glands without contrast injection.    Comparison: Chest x-ray dated 2019 and prior CT chest dated   2019    Tubes/Lines: Right Mediport catheter with tip in SVC.     Mediastinum/Vessels/Heart: Aorta and pulmonary arteries are normal in   size. There is no pericardial effusion. No lymphadenopathy. Coronary   artery calcifications are noted. Thyroid gland is unremarkable    Lungs/Pleura/Airways: Small left pleural effusion with loculated   component in the left major fissure, new since previous exam. There is   resultant partial collapse of the left lower lobe without associated   endobronchial lesion.    Minimal focal patchy consolidation in the left upper lobe on series 2   image 40, new since prior exam    Visualized abdomen: Intra-abdominal ascites, new since previous exam.   Multiple lymph nodes within the mesentery along with questionable nodular   studding of the mesenteric fat suggestive of peritoneal carcinomatosis    Bones and soft tissues: No suspicious osseous lesions. Degenerative   changes noted throughout the spine.    IMPRESSION:    When compared with prior study of 2019:    New small left pleural effusion with resultant partial collapse of the   left lower lobe    New focal patchy consolidation in the left upper lobe    Intra-abdominal ascites, new since previous exam. Multiple lymph nodes   within the mesentery along with questionable nodular studding of the   mesenteric fat suggestive of peritoneal carcinomatosis    < end of copied text >      PULMONARY FUNCTION TESTS: none on file

## 2019-12-13 NOTE — PROGRESS NOTE ADULT - SUBJECTIVE AND OBJECTIVE BOX
Follow Up:  sepsis    Interval History: pt feels much better and no more fevers    ROS:      All other systems negative    Constitutional: no more fever and rigors  Eye: no eye pain, no redness, no vision changes  ENT:  no sore throat, no rhinorrhea  Cardiovascular:  no chest pain, no palpitation  Respiratory:  no SOB, no cough  GI:  no abd pain, no vomiting, + chronic diarrhea but formed now  urinary: no dysuria, no hematuria, no flank pain  musculoskeletal:  no joint pain, no joint swelling  skin:  no rash  neurology:  no headache, no seizure, no change in mental status  psych: no anxiety, no depression         Allergies  penicillin G benzathine (Rash)  sulfa drugs (Rash)        ANTIMICROBIALS:  cefepime   IVPB 2000 every 12 hours  metroNIDAZOLE  IVPB 500 every 8 hours  metroNIDAZOLE  IVPB    vancomycin  IVPB 1000 every 12 hours      OTHER MEDS:  acetaminophen   Tablet .. 650 milliGRAM(s) Oral every 6 hours PRN  doxazosin 1 milliGRAM(s) Oral at bedtime  gabapentin 300 milliGRAM(s) Oral at bedtime  lidocaine   Patch 1 Patch Transdermal every 24 hours  loperamide 2 milliGRAM(s) Oral every 6 hours PRN  metoprolol succinate ER 25 milliGRAM(s) Oral daily  pantoprazole    Tablet 40 milliGRAM(s) Oral before breakfast  simethicone 80 milliGRAM(s) Chew two times a day PRN      Vital Signs Last 24 Hrs  T(C): 36.5 (13 Dec 2019 13:33), Max: 38 (12 Dec 2019 15:29)  T(F): 97.7 (13 Dec 2019 13:33), Max: 100.4 (12 Dec 2019 15:29)  HR: 93 (13 Dec 2019 13:33) (73 - 136)  BP: 102/58 (13 Dec 2019 13:33) (91/63 - 108/63)  BP(mean): --  RR: 18 (13 Dec 2019 13:33) (18 - 26)  SpO2: 95% (13 Dec 2019 13:33) (95% - 99%)    Physical Exam:  General:  NAD, non toxic  Head: atraumatic, normocephalic  Eyes: normal sclera and conjunctiva  ENT:   no oropharyngeal lesions, no LAD, neck supple  Cardio:    regular S1,S2, no murmur  Respiratory:   clear b/l, no wheezing  abd:   soft, BS +, not tender  :     no CVAT, no suprapubic tenderness, no sawant  Musculoskeletal : no joint swelling, no edema  Skin:    no rash  vascular: R chest port  Neurologic:     no focal deficits  psych: normal affect                          9.1    12.37 )-----------( 207      ( 13 Dec 2019 10:44 )             28.8           136  |  103  |  11  ----------------------------<  123<H>  3.6   |  25  |  0.72    Ca    8.2<L>      13 Dec 2019 10:44    TPro  5.5<L>  /  Alb  2.5<L>  /  TBili  0.8  /  DBili  x   /  AST  21  /  ALT  13  /  AlkPhos  312<H>        Urinalysis Basic - ( 12 Dec 2019 03:04 )    Color: Colorless / Appearance: Clear / S.005 / pH: x  Gluc: x / Ketone: Negative  / Bili: Negative / Urobili: Negative   Blood: x / Protein: Negative / Nitrite: Negative   Leuk Esterase: Negative / RBC: x / WBC x   Sq Epi: x / Non Sq Epi: x / Bacteria: x        MICROBIOLOGY:  v  .Urine Clean Catch (Midstream)  19   No growth  --  --      .Blood Blood-Peripheral  19   No growth to date.  --  --      FECES  19 --  --  --      FECES  19 --  --  --      URINE MIDSTREAM  19 --  --  --      BLOOD  19 --  --  --      Ascites Fl Peritoneal Fluid  19   No growth  --  --      Ascites Fl Peritoneal Fluid  19   No growth at 5 days  --    No polymorphonuclear cells seen  No organisms seen by cytocentrifuge      .Blood Blood-Peripheral  19   No growth at 5 days.  --  --      .Body Fluid Peritoneal Fluid  19   No growth at 5 days  --    polymorphonuclear leukocytes seen  No organisms seen  by cytocentrifuge      .Stool Feces  19   No Protozoa seen by trichrome stain  No Helminths or Protozoa seen in formalin concentrate  performed by iodine stain  (routine O+P not evaluated for Microsporidia,  Cryptosporidia, Cyclospora, or Isospora.)  One negative sample does not necessarily rule  out the presence of a parasitic infection.  --  --      .Stool Feces  19   GI PCR Results: NOT detected  *******Please Note:*******  GI panel PCR evaluates for:  Campylobacter, Plesiomonas shigelloides, Salmonella,  Vibrio, Yersinia enterocolitica, Enteroaggregative  Escherichia coli (EAEC), Enteropathogenic E.coli (EPEC),  Enterotoxigenic E. coli (ETEC) lt/st, Shiga-like  toxin-producing E. coli (STEC) stx1/stx2,  Shigella/ Enteroinvasive E. coli (EIEC), Cryptosporidium,  Cyclospora cayetanensis, Entamoeba histolytica,  Giardia lamblia, Adenovirus F 40/41, Astrovirus,  Norovirus GI/GII, Rotavirus A, Sapovirus  --  --      .Urine Clean Catch (Midstream)  19   No growth  --  --      .Blood Blood-Peripheral  19   No growth at 5 days.  --  --      .Blood Blood-Peripheral  19   No growth at 5 days.  --  --          Rapid RVP Result: NotDetec ( @ 00:47)        RADIOLOGY:  Images below reviewed personally  < from: CT Chest No Cont (19 @ 16:19) >  IMPRESSION:    When compared with prior study of 2019:    New small left pleural effusion with resultant partial collapse of the   left lower lobe    New focal patchy consolidation in the left upper lobe    Intra-abdominal ascites, new since previous exam. Multiple lymph nodes   within the mesentery along with questionable nodular studding of the   mesenteric fat suggestive of peritoneal carcinomatosis

## 2019-12-13 NOTE — CHART NOTE - NSCHARTNOTEFT_GEN_A_CORE
: Dr. Damon    INDICATION: pleural effusion    PROCEDURE:  [ ] LIMITED ECHO  [x] LIMITED CHEST  [ ] LIMITED RETROPERITONEAL  [ ] LIMITED ABDOMINAL  [ ] LIMITED DVT  [ ] NEEDLE GUIDANCE VASCULAR  [ ] NEEDLE GUIDANCE THORACENTESIS  [ ] NEEDLE GUIDANCE PARACENTESIS  [ ] NEEDLE GUIDANCE PERICARDIOCENTESIS  [ ] OTHER    FINDINGS:  Small, simple left pleural effusion  Simple ascites seen below the diaphragm pushing up the diaphragm into the thorax    INTERPRETATION:  Small L pleural effusion - too small for thoracentesis  Moderate volume ascites    Guero Damon PGY-4  Pulmonary/Critical Care Fellow  Pager: [phone] ([facility]) [phone] ([facility])  Pulmonary [facility] #[id number]

## 2019-12-13 NOTE — PROGRESS NOTE ADULT - PROBLEM SELECTOR PLAN 1
Meets SIRS criteria - pt febrile with leukocytosis and hypotension on presentation. Currently afebrile and normotensive s/p 3L IVF in ED. Likely infectious etiology, unclear source  - will review treatment from recent previous admissions  - UA negative, CXR with L pleural effusion, no evidence of PNA  - f/u blood cultures, urine culture  - empiric antibiotic coverage in setting of likely infection with immunocompromised state (chemo), will continue cefepime and vanc for now  - consider CT abdomen given concern for possible microcolitis on past admission  - consult GI given concern for microcolitis/plan for colonoscopy next week per pt, pt sees Dr. Orantes  - BP normotensive currently, if drops will consider additional IVF bolus, if BP unable to be controlled with fluids, consider MICU consult for hypotension Meets SIRS criteria - pt febrile with leukocytosis and hypotension on presentation. Currently afebrile and normotensive s/p 3L IVF in ED. Likely infectious etiology, ?possible PNA  - UA negative, CXR with L pleural effusion, no evidence of PNA  - f/u blood cultures, urine culture  - ID recs appreciated, pt on cefepime, vanc, and metronidazole  - consider CT abdomen given concern for possible microcolitis on past admission  - consult GI given concern for microcolitis/plan for colonoscopy next week per pt, pt sees Dr. Orantes  - BP normotensive currently, if drops will consider additional IVF bolus, if BP unable to be controlled with fluids, consider MICU consult for hypotension Meets SIRS criteria - pt febrile with leukocytosis and hypotension on presentation. Currently afebrile and normotensive s/p 3L IVF in ED. Likely infectious etiology, ?possible PNA  - UA negative, CXR with L pleural effusion  - CTC LINDA patchy opacity ?PNA  - f/u blood cultures, urine culture  - ID recs appreciated, pt on cefepime, vanc, and metronidazole  - Pulm following, plan for thoracentesis next Monday. Will need to be off eliquis for 48 hours prior. Meets SIRS criteria - pt febrile with leukocytosis and hypotension on presentation. Currently afebrile and normotensive s/p 3L IVF in ED. Likely infectious etiology (?possible PNA verse intra-abdominal source) verse tumor fever   - UA negative, CXR with L pleural effusion  - CTC LINDA patchy opacity ?PNA  - f/u blood cultures, urine culture  - ID recs appreciated, pt on cefepime, vanc, and metronidazole  - Pulm following, plan for thoracentesis next Monday. Will need to be off eliquis for 48 hours prior.  - Will D/W IR paracentesis monday

## 2019-12-13 NOTE — PROGRESS NOTE ADULT - PROBLEM SELECTOR PLAN 9
Transitions of Care Status:  1.  Name of PCP: Dr. Homero Melgar  2.  PCP Contacted on Admission: [ ] Y    [ ] N    3.  PCP contacted at Discharge: [ ] Y    [ ] N    [ ] N/A  4.  Post-Discharge Appointment Date and Location:  5.  Summary of Handoff given to PCP: Transitions of Care Status:  1.  Name of PCP: Dr. Homero Melgar  2.  PCP Contacted on Admission: [x] Y    [ ] N    3.  PCP contacted at Discharge: [ ] Y    [ ] N    [ ] N/A  4.  Post-Discharge Appointment Date and Location:  5.  Summary of Handoff given to PCP:

## 2019-12-13 NOTE — PROGRESS NOTE ADULT - PROBLEM SELECTOR PLAN 7
DVT: patient on Eliquis  Diet: regular  Dispo: uncertain at this time - 1+ pitting edema of LLE   - f/u dvt study

## 2019-12-13 NOTE — PROGRESS NOTE ADULT - ASSESSMENT
70 y/o male PMHx cholangiocarcinoma s/p Whipple 2017 s/p cis/[place] and capcitabine (from 8/2017-1/2018)  with recurrence of disease now with mets to the liver, portal vein thrombus  according to MR abdomen 11/5/19 on eliquis, R sided Port, HTN, HLD now presenting with recurrent fever.    # Fever  -pt had 2 recent hospitalization due to fever with extensive workup that were negative for infection  -CT chest showed new patchy consolidation in the LINDA but pt without cough of repisratory symptoms.  -likely tumor fever from progression of cholangiocarcinoma but will need to r/o infectious causes  - f/u BCx, Ucx, UA, RVP  -pt on IV abx  -appreciate ID input    # recurrent Cholangiocarcinoma  -cholangiocarcinoma s/p Whipple 2017 s/p cis/[place] and capecitabine (from 8/2017-1/2018)  - found to have recurrent dz via patricia-SMA LN bx in feb 2019 showed poorly-differentiated adenocarcinoma,  -s/p SBRT x 5 fractions 3/27/19-4/5/19   -Planned to start salvage therapy with CAPEOX  -no indication for chemo while he is admitted  -follows up [facility] Dr. Atkinson [facility]         Shane West MD. PGY 5  Heme-Onc fellow  [phone]; 68 y/o male PMHx cholangiocarcinoma s/p Whipple 2017 s/p cis/[place] and capcitabine (from 8/2017-1/2018)  with recurrence of disease now with mets to the liver, portal vein thrombus  according to MR abdomen 11/5/19 on eliquis, R sided Port, HTN, HLD now presenting with recurrent fever.    # sepsis work up  -pt had 2 recent hospitalization due to fever with extensive workup that were negative for infection  -CT chest showed new patchy consolidation in the LINDA but pt without cough of respiratory symptoms.  -likely tumor fever from progression of cholangiocarcinoma but will need to r/o infectious causes  - f/u BCx, Ucx, UA, RVP  -therapeutic/diagnostic paracentesis to r/o SBP - please also send for cytology and liquid biopsy for the ascitic fluid  -pt on IV abx  -appreciate ID input    # recurrent Cholangiocarcinoma  -cholangiocarcinoma s/p Whipple 2017 s/p cis/[place] and capecitabine (from 8/2017-1/2018)  - found to have recurrent dz via patricia-SMA LN bx in feb 2019 showed poorly-differentiated adenocarcinoma,  -s/p SBRT x 5 fractions 3/27/19-4/5/19   -Planned to start salvage therapy with FOLFOX, scheduled for 12/17. Will reschedule if pt remains admitted.  -no indication for chemo while he is admitted  -follows up with Dr. tAkinson at [facility]         Shane West MD. PGY 5  Heme-Onc fellow  [phone];

## 2019-12-13 NOTE — CONSULT NOTE ADULT - ASSESSMENT
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 During workup patient noted to have leukocytosis as well as increasing ascites associated with peritoneal thickening consistent with peritoneal carcinomatosis. Also noted to have small L pleural effusion on imaging. On POCUS, L pleural effusion is very minimal, and ascites can be seen pushing the diaphragm up into the thorax. Small effusion likely related to increasing ascites and current abdominal process.    #L pleural effusion  - effusion on POCUS is very minimal on L side  - likely related to abdominal process and increasing ascites  - favor malignant ascites given findings of peritoneal carcinomatosis on imaging, however SBP and portal hypertension from portal vein thrombosis are also possibilities  - agree with paracentesis, scheduled for Monday once eliquis washes out  - asymptomatic from respiratory standpoint  - no need for drainage of pleural effusion at this time - additionally pocket on POCUS too small for thoracentesis    Pulmonary will sign off. Please call with any questions.  ---------------------------------------------------------------  Guero Damon PGY-4  Pulmonary/Critical Care Fellow  Pager: [phone] ([employer]) [phone] (NS)  Pulmonary Spectra #[id number]

## 2019-12-13 NOTE — PROGRESS NOTE ADULT - ATTENDING COMMENTS
Pt defervesced after Vanco, implying an infectious cause.No furthr chills.Paracentesis is warranted w dual goal of r/o SBP as well as cytology for malignant cells.

## 2019-12-13 NOTE — PROGRESS NOTE ADULT - ASSESSMENT
70 yo male with PMH cholangiocarcinoma (past chemotherapy and radiation therapy, attempting to restart chemo) and chronic back pain, GERD, IBS with diarrhea, and recently diagnosed atrial fibrillation with recent admission for fevers with negative infectious workup, presenting with new onset fevers up to 105 with unclear source. Likely infectious etiology with infectious workup pending, but cannot exclude alternative diagnoses such as new malignancy given recent past negative infectious workup. 68 yo male with PMH cholangiocarcinoma (past chemotherapy and radiation therapy, attempting to restart chemo) and chronic back pain, GERD, IBS with diarrhea, and recently diagnosed atrial fibrillation with recent admission for fevers with negative infectious workup, presenting with new onset fevers up to 105 (per patient) with unclear source. Likely infectious etiology with infectious workup pending, but cannot exclude alternative diagnoses such as new malignancy given recent past negative infectious workup.

## 2019-12-13 NOTE — PROGRESS NOTE ADULT - SUBJECTIVE AND OBJECTIVE BOX
INTERVAL EVENTS:  No acute events overnight. Pt remained afebrile overnight night. Denies any fever, chills, night sweat, CP, SOB, abd pain, constipation, diarrhea, dysuria    Vital Signs Last 24 Hrs  T(C): 36.5 (13 Dec 2019 13:33), Max: 38 (12 Dec 2019 15:29)  T(F): 97.7 (13 Dec 2019 13:33), Max: 100.4 (12 Dec 2019 15:29)  HR: 93 (13 Dec 2019 13:33) (73 - 136)  BP: 102/58 (13 Dec 2019 13:33) (91/63 - 108/63)  BP(mean): --  RR: 18 (13 Dec 2019 13:33) (18 - 26)  SpO2: 95% (13 Dec 2019 13:33) (95% - 99%)      PHYSICAL EXAM:   GENERAL: no acute distress  HEENT: EOMI, neck supple  RESPIRATORY: LCTAB/L, no rhonchi, rales, or wheezing  CARDIOVASCULAR: RRR, no murmurs, gallops, rubs  ABDOMINAL: + abd distension, positive bowel sounds   EXTREMITIES: no clubbing, cyanosis, or edema  NEUROLOGICAL: alert and oriented x 3, non-focal  SKIN: no rashes or lesions   MUSCULOSKELETAL: no gross joint deformity                          9.1    12.37 )-----------( 207      ( 13 Dec 2019 10:44 )             28.8     12-13    136  |  103  |  11  ----------------------------<  123<H>  3.6   |  25  |  0.72    Ca    8.2<L>      13 Dec 2019 10:44    TPro  5.5<L>  /  Alb  2.5<L>  /  TBili  0.8  /  DBili  x   /  AST  21  /  ALT  13  /  AlkPhos  312<H>  12-13    PT/INR - ( 12 Dec 2019 00:07 )   PT: 25.8 sec;   INR: 2.21 ratio         PTT - ( 12 Dec 2019 00:07 )  PTT:30.2 sec    MEDICATIONS  (STANDING):  cefepime   IVPB 2000 milliGRAM(s) IV Intermittent every 12 hours  doxazosin 1 milliGRAM(s) Oral at bedtime  gabapentin 300 milliGRAM(s) Oral at bedtime  lidocaine   Patch 1 Patch Transdermal every 24 hours  metoprolol succinate ER 25 milliGRAM(s) Oral daily  metroNIDAZOLE  IVPB 500 milliGRAM(s) IV Intermittent every 8 hours  metroNIDAZOLE  IVPB      pantoprazole    Tablet 40 milliGRAM(s) Oral before breakfast  vancomycin  IVPB 1000 milliGRAM(s) IV Intermittent every 12 hours    < from: CT Chest No Cont (12.12.19 @ 16:19) >  IMPRESSION:    When compared with prior study of June 14, 2019:    New small left pleural effusion with resultant partial collapse of the   left lower lobe    New focal patchy consolidation in the left upper lobe    Intra-abdominal ascites, new since previous exam. Multiple lymph nodes   within the mesentery along with questionable nodular studding of the   mesenteric fat suggestive of peritoneal carcinomatosis        JENNY GAUTHIER M.D., ATTENDING RADIOLOGIST    < end of copied text > INTERVAL EVENTS:  No acute events overnight. Pt remained afebrile overnight night. Denies any fever, chills, night sweat, CP, SOB, abd pain, constipation, diarrhea, dysuria.    Vital Signs Last 24 Hrs  T(C): 36.5 (13 Dec 2019 13:33), Max: 38 (12 Dec 2019 15:29)  T(F): 97.7 (13 Dec 2019 13:33), Max: 100.4 (12 Dec 2019 15:29)  HR: 93 (13 Dec 2019 13:33) (73 - 136)  BP: 102/58 (13 Dec 2019 13:33) (91/63 - 108/63)  BP(mean): --  RR: 18 (13 Dec 2019 13:33) (18 - 26)  SpO2: 95% (13 Dec 2019 13:33) (95% - 99%)      PHYSICAL EXAM:   GENERAL: no acute distress  HEENT: EOMI, neck supple  RESPIRATORY: LCTAB/L, no rhonchi, rales, or wheezing  CARDIOVASCULAR: RRR, no murmurs, gallops, rubs  ABDOMINAL: + abd distension, positive bowel sounds   EXTREMITIES: no clubbing, cyanosis, or edema  NEUROLOGICAL: alert and oriented x 3, non-focal  SKIN: no rashes or lesions   MUSCULOSKELETAL: no gross joint deformity                          9.1    12.37 )-----------( 207      ( 13 Dec 2019 10:44 )             28.8     12-13    136  |  103  |  11  ----------------------------<  123<H>  3.6   |  25  |  0.72    Ca    8.2<L>      13 Dec 2019 10:44    TPro  5.5<L>  /  Alb  2.5<L>  /  TBili  0.8  /  DBili  x   /  AST  21  /  ALT  13  /  AlkPhos  312<H>  12-13    PT/INR - ( 12 Dec 2019 00:07 )   PT: 25.8 sec;   INR: 2.21 ratio         PTT - ( 12 Dec 2019 00:07 )  PTT:30.2 sec    MEDICATIONS  (STANDING):  cefepime   IVPB 2000 milliGRAM(s) IV Intermittent every 12 hours  doxazosin 1 milliGRAM(s) Oral at bedtime  gabapentin 300 milliGRAM(s) Oral at bedtime  lidocaine   Patch 1 Patch Transdermal every 24 hours  metoprolol succinate ER 25 milliGRAM(s) Oral daily  metroNIDAZOLE  IVPB 500 milliGRAM(s) IV Intermittent every 8 hours  metroNIDAZOLE  IVPB      pantoprazole    Tablet 40 milliGRAM(s) Oral before breakfast  vancomycin  IVPB 1000 milliGRAM(s) IV Intermittent every 12 hours    < from: CT Chest No Cont (12.12.19 @ 16:19) >  IMPRESSION:    When compared with prior study of June 14, 2019:    New small left pleural effusion with resultant partial collapse of the   left lower lobe    New focal patchy consolidation in the left upper lobe    Intra-abdominal ascites, new since previous exam. Multiple lymph nodes   within the mesentery along with questionable nodular studding of the   mesenteric fat suggestive of peritoneal carcinomatosis        JENNY GAUTHIER M.D., ATTENDING RADIOLOGIST    < end of copied text >

## 2019-12-13 NOTE — PROGRESS NOTE ADULT - PROBLEM SELECTOR PLAN 4
- pt on metoprolol succinate 25 mg for rate control; continue  - pt also on Eliquis, 5mg BID - pt on metoprolol succinate 25 mg for rate control; continue  - pt also on Eliquis, 5mg BID. will switch to hep gtt for para/thora

## 2019-12-13 NOTE — PROGRESS NOTE ADULT - ASSESSMENT
69 m with HTN, HLD, cholangiocarcinoma s/p Whipple 2017 with recurrence of disease now with mets to the liver, portal vein thrombus and SMA narrowing, chronic diarrhea with negative infection w/u, ascites and intrahepatic biliary dilatation, multiple admissions with sepsis and negative work up, last one a week ago and abd CT showed new liver infarcts, now again brought in with high fevers to 105 and rigors  here febrile 100.4, tachycardic and hypotensive with active rigors  WBC: 10.87, RVP negative, CXR small pleural effusion and atelectasis    fever, rigors, tachycardia, hypotension, sepsis, in the setting of metastatic cholangiocarcinoma and portal vein thrombosis, liver infarcts and intrahepatic biliary ductal dilatation  r/o bacteremia vs tumor fever  chest Ct with new minimal focal patchy consolidation in LINDA, new small L pleural effusion and partial collapse of LLL but no significant cough or respiratory symptoms    * f/u the blood cultures  * c/w vanco 1 q 12 for now, started 12/12, now day 2  * c/w cefepime 2 q8  * c/w flagyl 500 q 8   * if worsening status will need a repeat abd/pelvis with IV and oral contrast  * f/u with pulmonary

## 2019-12-13 NOTE — PROGRESS NOTE ADULT - PROBLEM SELECTOR PLAN 2
Oncologist is Dr. Atkinson; will f/u regarding pt's chemo plan  - hold on any chemo for now, f/u onc recs Oncologist is Dr. Atkinson; will f/u regarding pt's chemo plan  - hold on any chemo for now, f/u onc recs  - ruling out infection  - will send cytology with pleural, peritoneal fluid

## 2019-12-13 NOTE — PROGRESS NOTE ADULT - ATTENDING COMMENTS
Patient seen and examined.  This is a 68 yo male with cholangiocarcinoma with hepatic mets and PVT on Eliquis who presented with fevers (105.0), found to have increasing ascites with ?peritoneal carcinomatosis and pleural effusion with LINDA patchy opacity on CT scan.     1. Fevers: Unclear if infectious verse secondary to malignancy. We are completing full infectious work up. RVP is negative. LINDA opacity is c/f pneumonia, however is small and he clinically is not behaving like pneumonia. We need to r/o SBP considering significant intra-abdominal malignancy. He is on Eliquis for PVT (11/2019 dx) We will hold and bridge with Heparin drip. D/W IR for Monday. ID and onc ar following. We will appreciate their recommendations. Pulmonary was also consulted 12/12 and we will follow their recs, preliminarily, they are planning for thora on Monday as well.     2. Cholangioca: Follow up oncology recommendations. Dr. Atkinson potentially mentioned chemo next week if infectious work up negative.

## 2019-12-13 NOTE — PROGRESS NOTE ADULT - SUBJECTIVE AND OBJECTIVE BOX
Homero Lala MD, PGY-1  Pager #[phone]  After 7 PM on weekdays and 12 PM on weekends, for urgent issues please page #[phone].  ----------------------------------------------------------------  Patient is a 69y old  Male who presents with a chief complaint of fevers (12 Dec 2019 17:09)    SUBJECTIVE / OVERNIGHT EVENTS:   ADDITIONAL REVIEW OF SYSTEMS:    MEDICATIONS  (STANDING):  apixaban 5 milliGRAM(s) Oral every 12 hours  cefepime   IVPB 2000 milliGRAM(s) IV Intermittent every 12 hours  doxazosin 1 milliGRAM(s) Oral at bedtime  gabapentin 300 milliGRAM(s) Oral at bedtime  lidocaine   Patch 1 Patch Transdermal every 24 hours  metoprolol succinate ER 25 milliGRAM(s) Oral daily  metroNIDAZOLE  IVPB 500 milliGRAM(s) IV Intermittent every 8 hours  metroNIDAZOLE  IVPB      pantoprazole    Tablet 40 milliGRAM(s) Oral before breakfast  vancomycin  IVPB 1000 milliGRAM(s) IV Intermittent every 12 hours    MEDICATIONS  (PRN):  acetaminophen   Tablet .. 650 milliGRAM(s) Oral every 6 hours PRN Temp greater or equal to 38C (100.4F)  loperamide 2 milliGRAM(s) Oral every 6 hours PRN Diarrhea  simethicone 80 milliGRAM(s) Chew two times a day PRN Gas      CAPILLARY BLOOD GLUCOSE    I&O's Summary    PHYSICAL EXAM:  Vital Signs Last 24 Hrs  T(C): 36.5 (13 Dec 2019 04:16), Max: 38 (12 Dec 2019 15:29)  T(F): 97.7 (13 Dec 2019 04:16), Max: 100.4 (12 Dec 2019 15:29)  HR: 73 (13 Dec 2019 04:16) (73 - 136)  BP: 102/63 (13 Dec 2019 04:16) (91/63 - 108/63)  BP(mean): --  RR: 18 (13 Dec 2019 04:16) (18 - 26)  SpO2: 99% (13 Dec 2019 04:16) (96% - 99%)  CONSTITUTIONAL: NAD, well-developed, well-groomed  EYES: PERRLA; conjunctiva and sclera clear  ENMT: Moist oral mucosa, no pharyngeal injection or exudates; normal dentition  NECK: Supple, no palpable masses; no thyromegaly  RESPIRATORY: Normal respiratory effort; lungs are clear to auscultation bilaterally  CARDIOVASCULAR: Regular rate and rhythm, normal S1 and S2, no murmur/rub/gallop; No lower extremity edema; Peripheral pulses are 2+ bilaterally  ABDOMEN: Nontender to palpation, normoactive bowel sounds, no rebound/guarding; No hepatosplenomegaly  MUSCULOSKELETAL:  Normal gait; no clubbing or cyanosis of digits; no joint swelling or tenderness to palpation  PSYCH: A+O to person, place, and time; affect appropriate  NEUROLOGY: CN 2-12 are intact and symmetric; no gross sensory deficits   SKIN: No rashes; no palpable lesions    LABS:                        9.0    16.53 )-----------( 228      ( 12 Dec 2019 22:52 )             28.3         137  |  104  |  10  ----------------------------<  121<H>  3.6   |  19<L>  |  0.74    Ca    8.2<L>      12 Dec 2019 16:58    TPro  6.0  /  Alb  2.6<L>  /  TBili  1.1  /  DBili  x   /  AST  20  /  ALT  13  /  AlkPhos  324<H>      PT/INR - ( 12 Dec 2019 00:07 )   PT: 25.8 sec;   INR: 2.21 ratio         PTT - ( 12 Dec 2019 00:07 )  PTT:30.2 sec      Urinalysis Basic - ( 12 Dec 2019 03:04 )    Color: Colorless / Appearance: Clear / S.005 / pH: x  Gluc: x / Ketone: Negative  / Bili: Negative / Urobili: Negative   Blood: x / Protein: Negative / Nitrite: Negative   Leuk Esterase: Negative / RBC: x / WBC x   Sq Epi: x / Non Sq Epi: x / Bacteria: x    Culture - Blood (collected 12 Dec 2019 03:13)  Source: .Blood Blood-Peripheral  Preliminary Report (13 Dec 2019 04:00):    No growth to date.    Culture - Blood (collected 12 Dec 2019 03:13)  Source: .Blood Blood-Peripheral  Preliminary Report (13 Dec 2019 04:00):    No growth to date.    RADIOLOGY & ADDITIONAL TESTS:  Results Reviewed:   Imaging Personally Reviewed:  Electrocardiogram Personally Reviewed:    COORDINATION OF CARE:  Care Discussed with Consultants/Other Providers [Y/N]:  Prior or Outpatient Records Reviewed [Y/N]: Homero Lala MD, PGY-1  Pager #[phone]  After 7 PM on weekdays and 12 PM on weekends, for urgent issues please page #[phone].  ----------------------------------------------------------------  Patient is a 69y old  Male who presents with a chief complaint of fevers (12 Dec 2019 17:09)    SUBJECTIVE / OVERNIGHT EVENTS: Pt afebrile overnight. Pt seen and examined at bedside. Complaining of intermittent dry cough but denies any other acute complaints including headache, fever, chills, nausea, vomiting, diarrhea, constipation, chest pain, shortness of breath.     MEDICATIONS  (STANDING):  apixaban 5 milliGRAM(s) Oral every 12 hours  cefepime   IVPB 2000 milliGRAM(s) IV Intermittent every 12 hours  doxazosin 1 milliGRAM(s) Oral at bedtime  gabapentin 300 milliGRAM(s) Oral at bedtime  lidocaine   Patch 1 Patch Transdermal every 24 hours  metoprolol succinate ER 25 milliGRAM(s) Oral daily  metroNIDAZOLE  IVPB 500 milliGRAM(s) IV Intermittent every 8 hours  metroNIDAZOLE  IVPB      pantoprazole    Tablet 40 milliGRAM(s) Oral before breakfast  vancomycin  IVPB 1000 milliGRAM(s) IV Intermittent every 12 hours    MEDICATIONS  (PRN):  acetaminophen   Tablet .. 650 milliGRAM(s) Oral every 6 hours PRN Temp greater or equal to 38C (100.4F)  loperamide 2 milliGRAM(s) Oral every 6 hours PRN Diarrhea  simethicone 80 milliGRAM(s) Chew two times a day PRN Gas      CAPILLARY BLOOD GLUCOSE    I&O's Summary    PHYSICAL EXAM:  Vital Signs Last 24 Hrs  T(C): 36.5 (13 Dec 2019 04:16), Max: 38 (12 Dec 2019 15:29)  T(F): 97.7 (13 Dec 2019 04:16), Max: 100.4 (12 Dec 2019 15:29)  HR: 73 (13 Dec 2019 04:16) (73 - 136)  BP: 102/63 (13 Dec 2019 04:16) (91/63 - 108/63)  RR: 18 (13 Dec 2019 04:16) (18 - 26)  SpO2: 99% (13 Dec 2019 04:16) (96% - 99%)    CONSTITUTIONAL: NAD  EYES: PERRLA; conjunctiva and sclera clear  ENMT: MMM  NECK: Supple, no palpable masses; no thyromegaly  RESPIRATORY: Normal respiratory effort; crackles over left posterior middle lung field, clear to auscultation otherwise  CARDIOVASCULAR: irregular rhythm, normal S1 and S2, no murmur/rub/gallop  ABDOMEN: distended, Nontender to palpation, normoactive bowel sounds, no rebound/guarding  MUSCULOSKELETAL: Normal gait; no clubbing or cyanosis of digits; no joint swelling or tenderness to palpation, 1+ pitting edema on LLE  PSYCH: A+O to person, place, and time; affect appropriate  NEUROLOGY: no gross sensory deficits   SKIN: No rashes; no palpable lesions    LABS:             9.0    16.53 )-----------( 228      ( 12 Dec 2019 22:52 )             28.3         137  |  104  |  10  ----------------------------<  121<H>  3.6   |  19<L>  |  0.74    Ca    8.2<L>      12 Dec 2019 16:58  TPro  6.0  /  Alb  2.6<L>  /  TBili  1.1  /  DBili  x   /  AST  20  /  ALT  13  /  AlkPhos  324<H>  12  PT/INR - ( 12 Dec 2019 00:07 )   PT: 25.8 sec;   INR: 2.21 ratio      PTT - ( 12 Dec 2019 00:07 )  PTT:30.2 sec    Urinalysis Basic - ( 12 Dec 2019 03:04 )    Color: Colorless / Appearance: Clear / S.005 / pH: x  Gluc: x / Ketone: Negative  / Bili: Negative / Urobili: Negative   Blood: x / Protein: Negative / Nitrite: Negative   Leuk Esterase: Negative / RBC: x / WBC x   Sq Epi: x / Non Sq Epi: x / Bacteria: x    Culture - Blood (collected 12 Dec 2019 03:13)  Source: .Blood Blood-Peripheral  Preliminary Report (13 Dec 2019 04:00):    No growth to date.    Culture - Blood (collected 12 Dec 2019 03:13)  Source: .Blood Blood-Peripheral  Preliminary Report (13 Dec 2019 04:00):    No growth to date.    RADIOLOGY & ADDITIONAL TESTS:  Results Reviewed:   Imaging Personally Reviewed:  Electrocardiogram Personally Reviewed:    COORDINATION OF CARE:  Care Discussed with Consultants/Other Providers [Y/N]:  Prior or Outpatient Records Reviewed [Y/N]: Homero Lala MD, PGY-1  Pager #[phone]  After 7 PM on weekdays and 12 PM on weekends, for urgent issues please page #[phone].  ----------------------------------------------------------------  Patient is a 69y old  Male who presents with a chief complaint of fevers (12 Dec 2019 17:09)    SUBJECTIVE / OVERNIGHT EVENTS: Pt afebrile overnight. Pt seen and examined at bedside. Complaining of intermittent dry cough but denies any other acute complaints including headache, fever, chills, nausea, vomiting, diarrhea, constipation, chest pain, shortness of breath.     MEDICATIONS  (STANDING):  apixaban 5 milliGRAM(s) Oral every 12 hours  cefepime   IVPB 2000 milliGRAM(s) IV Intermittent every 12 hours  doxazosin 1 milliGRAM(s) Oral at bedtime  gabapentin 300 milliGRAM(s) Oral at bedtime  lidocaine   Patch 1 Patch Transdermal every 24 hours  metoprolol succinate ER 25 milliGRAM(s) Oral daily  metroNIDAZOLE  IVPB 500 milliGRAM(s) IV Intermittent every 8 hours  metroNIDAZOLE  IVPB      pantoprazole    Tablet 40 milliGRAM(s) Oral before breakfast  vancomycin  IVPB 1000 milliGRAM(s) IV Intermittent every 12 hours    MEDICATIONS  (PRN):  acetaminophen   Tablet .. 650 milliGRAM(s) Oral every 6 hours PRN Temp greater or equal to 38C (100.4F)  loperamide 2 milliGRAM(s) Oral every 6 hours PRN Diarrhea  simethicone 80 milliGRAM(s) Chew two times a day PRN Gas      CAPILLARY BLOOD GLUCOSE    I&O's Summary    PHYSICAL EXAM:  Vital Signs Last 24 Hrs  T(C): 36.5 (13 Dec 2019 04:16), Max: 38 (12 Dec 2019 15:29)  T(F): 97.7 (13 Dec 2019 04:16), Max: 100.4 (12 Dec 2019 15:29)  HR: 73 (13 Dec 2019 04:16) (73 - 136)  BP: 102/63 (13 Dec 2019 04:16) (91/63 - 108/63)  RR: 18 (13 Dec 2019 04:16) (18 - 26)  SpO2: 99% (13 Dec 2019 04:16) (96% - 99%)    CONSTITUTIONAL: NAD  EYES: PERRLA; conjunctiva and sclera clear  ENMT: MMM  NECK: Supple, no palpable masses; no thyromegaly  RESPIRATORY: Normal respiratory effort; crackles over left posterior middle lung field, clear to auscultation otherwise  CARDIOVASCULAR: irregular rhythm, normal S1 and S2, no murmur/rub/gallop  ABDOMEN: distended, Nontender to palpation, normoactive bowel sounds, no rebound/guarding  MUSCULOSKELETAL: Normal gait; no clubbing or cyanosis of digits; no joint swelling or tenderness to palpation, 1+ pitting edema on LLE  PSYCH: A+O to person, place, and time; affect appropriate  NEUROLOGY: no gross sensory deficits   SKIN: No rashes; no palpable lesions    LABS:             9.0    16.53 )-----------( 228      ( 12 Dec 2019 22:52 )             28.3         137  |  104  |  10  ----------------------------<  121<H>  3.6   |  19<L>  |  0.74    Ca    8.2<L>      12 Dec 2019 16:58  TPro  6.0  /  Alb  2.6<L>  /  TBili  1.1  /  DBili  x   /  AST  20  /  ALT  13  /  AlkPhos  324<H>  12  PT/INR - ( 12 Dec 2019 00:07 )   PT: 25.8 sec;   INR: 2.21 ratio      PTT - ( 12 Dec 2019 00:07 )  PTT:30.2 sec    Urinalysis Basic - ( 12 Dec 2019 03:04 )    Color: Colorless / Appearance: Clear / S.005 / pH: x  Gluc: x / Ketone: Negative  / Bili: Negative / Urobili: Negative   Blood: x / Protein: Negative / Nitrite: Negative   Leuk Esterase: Negative / RBC: x / WBC x   Sq Epi: x / Non Sq Epi: x / Bacteria: x    Culture - Blood (collected 12 Dec 2019 03:13)  Source: .Blood Blood-Peripheral  Preliminary Report (13 Dec 2019 04:00):    No growth to date.    Culture - Blood (collected 12 Dec 2019 03:13)  Source: .Blood Blood-Peripheral  Preliminary Report (13 Dec 2019 04:00):    No growth to date.    RADIOLOGY & ADDITIONAL TESTS:  Results Reviewed:   Imaging Personally Reviewed:  Electrocardiogram Personally Reviewed:    COORDINATION OF CARE:  Care Discussed with Consultants/Other Providers [Y/N]: Pulm, ID, Onc  Prior or Outpatient Records Reviewed [Y/N]:

## 2019-12-13 NOTE — PROGRESS NOTE ADULT - PROBLEM SELECTOR PLAN 6
- loperamide 2mg PRN for diarrhea - unlikely infectious as pt had extensive workup at [facility] 1 week ago  - concern for microcolitis on last admission and was planned for colonoscopy next week per pt, pt sees Dr. Orantes  - will hold off on GI consult and continue to monitor symptoms  - loperamide 2mg PRN for diarrhea

## 2019-12-13 NOTE — PROGRESS NOTE ADULT - PROBLEM SELECTOR PLAN 8
Transitions of Care Status:  1.  Name of PCP: Dr. Homero Melgar  2.  PCP Contacted on Admission: [ ] Y    [ ] N    3.  PCP contacted at Discharge: [ ] Y    [ ] N    [ ] N/A  4.  Post-Discharge Appointment Date and Location:  5.  Summary of Handoff given to PCP: DVT: patient on Eliquis, will hold for possible para/thora and start heparin gtt  Diet: regular  Dispo: uncertain at this time

## 2019-12-14 DIAGNOSIS — M54.9 DORSALGIA, UNSPECIFIED: ICD-10-CM

## 2019-12-14 DIAGNOSIS — D64.9 ANEMIA, UNSPECIFIED: ICD-10-CM

## 2019-12-14 LAB
ANION GAP SERPL CALC-SCNC: 11 MMOL/L — SIGNIFICANT CHANGE UP (ref 5–17)
APTT BLD: 40.9 SEC — HIGH (ref 27.5–36.3)
APTT BLD: 48.2 SEC — HIGH (ref 27.5–36.3)
APTT BLD: >200 SEC — CRITICAL HIGH (ref 27.5–36.3)
BASOPHILS # BLD AUTO: 0.04 K/UL — SIGNIFICANT CHANGE UP (ref 0–0.2)
BASOPHILS NFR BLD AUTO: 0.5 % — SIGNIFICANT CHANGE UP (ref 0–2)
BLD GP AB SCN SERPL QL: NEGATIVE — SIGNIFICANT CHANGE UP
BUN SERPL-MCNC: 10 MG/DL — SIGNIFICANT CHANGE UP (ref 7–23)
CALCIUM SERPL-MCNC: 8.2 MG/DL — LOW (ref 8.4–10.5)
CHLORIDE SERPL-SCNC: 100 MMOL/L — SIGNIFICANT CHANGE UP (ref 96–108)
CO2 SERPL-SCNC: 22 MMOL/L — SIGNIFICANT CHANGE UP (ref 22–31)
CREAT SERPL-MCNC: 0.68 MG/DL — SIGNIFICANT CHANGE UP (ref 0.5–1.3)
EOSINOPHIL # BLD AUTO: 0.37 K/UL — SIGNIFICANT CHANGE UP (ref 0–0.5)
EOSINOPHIL NFR BLD AUTO: 5 % — SIGNIFICANT CHANGE UP (ref 0–6)
GLUCOSE SERPL-MCNC: 122 MG/DL — HIGH (ref 70–99)
HCT VFR BLD CALC: 24.1 % — LOW (ref 39–50)
HCT VFR BLD CALC: 25.3 % — LOW (ref 39–50)
HCT VFR BLD CALC: 28.2 % — LOW (ref 39–50)
HGB BLD-MCNC: 7.7 G/DL — LOW (ref 13–17)
HGB BLD-MCNC: 8.2 G/DL — LOW (ref 13–17)
HGB BLD-MCNC: 9.1 G/DL — LOW (ref 13–17)
IMM GRANULOCYTES NFR BLD AUTO: 0.7 % — SIGNIFICANT CHANGE UP (ref 0–1.5)
LYMPHOCYTES # BLD AUTO: 0.55 K/UL — LOW (ref 1–3.3)
LYMPHOCYTES # BLD AUTO: 7.4 % — LOW (ref 13–44)
MCHC RBC-ENTMCNC: 26.5 PG — LOW (ref 27–34)
MCHC RBC-ENTMCNC: 26.6 PG — LOW (ref 27–34)
MCHC RBC-ENTMCNC: 26.7 PG — LOW (ref 27–34)
MCHC RBC-ENTMCNC: 32 GM/DL — SIGNIFICANT CHANGE UP (ref 32–36)
MCHC RBC-ENTMCNC: 32.3 GM/DL — SIGNIFICANT CHANGE UP (ref 32–36)
MCHC RBC-ENTMCNC: 32.4 GM/DL — SIGNIFICANT CHANGE UP (ref 32–36)
MCV RBC AUTO: 82.2 FL — SIGNIFICANT CHANGE UP (ref 80–100)
MCV RBC AUTO: 82.4 FL — SIGNIFICANT CHANGE UP (ref 80–100)
MCV RBC AUTO: 83.4 FL — SIGNIFICANT CHANGE UP (ref 80–100)
MONOCYTES # BLD AUTO: 0.83 K/UL — SIGNIFICANT CHANGE UP (ref 0–0.9)
MONOCYTES NFR BLD AUTO: 11.2 % — SIGNIFICANT CHANGE UP (ref 2–14)
NEUTROPHILS # BLD AUTO: 5.58 K/UL — SIGNIFICANT CHANGE UP (ref 1.8–7.4)
NEUTROPHILS NFR BLD AUTO: 75.2 % — SIGNIFICANT CHANGE UP (ref 43–77)
NRBC # BLD: 0 /100 WBCS — SIGNIFICANT CHANGE UP (ref 0–0)
PLATELET # BLD AUTO: 185 K/UL — SIGNIFICANT CHANGE UP (ref 150–400)
PLATELET # BLD AUTO: 208 K/UL — SIGNIFICANT CHANGE UP (ref 150–400)
PLATELET # BLD AUTO: 276 K/UL — SIGNIFICANT CHANGE UP (ref 150–400)
POTASSIUM SERPL-MCNC: 3.5 MMOL/L — SIGNIFICANT CHANGE UP (ref 3.5–5.3)
POTASSIUM SERPL-SCNC: 3.5 MMOL/L — SIGNIFICANT CHANGE UP (ref 3.5–5.3)
RBC # BLD: 2.89 M/UL — LOW (ref 4.2–5.8)
RBC # BLD: 3.07 M/UL — LOW (ref 4.2–5.8)
RBC # BLD: 3.43 M/UL — LOW (ref 4.2–5.8)
RBC # FLD: 18.4 % — HIGH (ref 10.3–14.5)
RBC # FLD: 18.6 % — HIGH (ref 10.3–14.5)
RBC # FLD: 18.6 % — HIGH (ref 10.3–14.5)
RH IG SCN BLD-IMP: POSITIVE — SIGNIFICANT CHANGE UP
SODIUM SERPL-SCNC: 133 MMOL/L — LOW (ref 135–145)
VANCOMYCIN TROUGH SERPL-MCNC: 14.5 UG/ML — SIGNIFICANT CHANGE UP (ref 10–20)
WBC # BLD: 7.42 K/UL — SIGNIFICANT CHANGE UP (ref 3.8–10.5)
WBC # BLD: 8.42 K/UL — SIGNIFICANT CHANGE UP (ref 3.8–10.5)
WBC # BLD: 8.92 K/UL — SIGNIFICANT CHANGE UP (ref 3.8–10.5)
WBC # FLD AUTO: 7.42 K/UL — SIGNIFICANT CHANGE UP (ref 3.8–10.5)
WBC # FLD AUTO: 8.42 K/UL — SIGNIFICANT CHANGE UP (ref 3.8–10.5)
WBC # FLD AUTO: 8.92 K/UL — SIGNIFICANT CHANGE UP (ref 3.8–10.5)

## 2019-12-14 PROCEDURE — 99233 SBSQ HOSP IP/OBS HIGH 50: CPT | Mod: GC

## 2019-12-14 RX ORDER — TRAMADOL HYDROCHLORIDE 50 MG/1
25 TABLET ORAL ONCE
Refills: 0 | Status: DISCONTINUED | OUTPATIENT
Start: 2019-12-14 | End: 2019-12-14

## 2019-12-14 RX ORDER — SODIUM CHLORIDE 9 MG/ML
1000 INJECTION INTRAMUSCULAR; INTRAVENOUS; SUBCUTANEOUS
Refills: 0 | Status: DISCONTINUED | OUTPATIENT
Start: 2019-12-14 | End: 2019-12-15

## 2019-12-14 RX ORDER — OXYCODONE HYDROCHLORIDE 5 MG/1
5 TABLET ORAL EVERY 4 HOURS
Refills: 0 | Status: DISCONTINUED | OUTPATIENT
Start: 2019-12-14 | End: 2019-12-17

## 2019-12-14 RX ORDER — CHLORHEXIDINE GLUCONATE 213 G/1000ML
1 SOLUTION TOPICAL DAILY
Refills: 0 | Status: DISCONTINUED | OUTPATIENT
Start: 2019-12-14 | End: 2019-12-17

## 2019-12-14 RX ADMIN — OXYCODONE HYDROCHLORIDE 5 MILLIGRAM(S): 5 TABLET ORAL at 20:50

## 2019-12-14 RX ADMIN — GABAPENTIN 300 MILLIGRAM(S): 400 CAPSULE ORAL at 22:12

## 2019-12-14 RX ADMIN — Medication 100 MILLIGRAM(S): at 13:30

## 2019-12-14 RX ADMIN — OXYCODONE HYDROCHLORIDE 5 MILLIGRAM(S): 5 TABLET ORAL at 10:00

## 2019-12-14 RX ADMIN — TRAMADOL HYDROCHLORIDE 25 MILLIGRAM(S): 50 TABLET ORAL at 02:33

## 2019-12-14 RX ADMIN — OXYCODONE HYDROCHLORIDE 5 MILLIGRAM(S): 5 TABLET ORAL at 21:30

## 2019-12-14 RX ADMIN — Medication 25 MILLIGRAM(S): at 05:55

## 2019-12-14 RX ADMIN — OXYCODONE HYDROCHLORIDE 5 MILLIGRAM(S): 5 TABLET ORAL at 09:25

## 2019-12-14 RX ADMIN — OXYCODONE HYDROCHLORIDE 5 MILLIGRAM(S): 5 TABLET ORAL at 14:39

## 2019-12-14 RX ADMIN — Medication 30 MILLILITER(S): at 20:51

## 2019-12-14 RX ADMIN — OXYCODONE HYDROCHLORIDE 5 MILLIGRAM(S): 5 TABLET ORAL at 13:30

## 2019-12-14 RX ADMIN — LIDOCAINE 1 PATCH: 4 CREAM TOPICAL at 00:00

## 2019-12-14 RX ADMIN — SODIUM CHLORIDE 75 MILLILITER(S): 9 INJECTION INTRAMUSCULAR; INTRAVENOUS; SUBCUTANEOUS at 20:52

## 2019-12-14 RX ADMIN — HEPARIN SODIUM 0 UNIT(S)/HR: 5000 INJECTION INTRAVENOUS; SUBCUTANEOUS at 03:25

## 2019-12-14 RX ADMIN — TRAMADOL HYDROCHLORIDE 25 MILLIGRAM(S): 50 TABLET ORAL at 02:03

## 2019-12-14 RX ADMIN — CHLORHEXIDINE GLUCONATE 1 APPLICATION(S): 213 SOLUTION TOPICAL at 17:52

## 2019-12-14 RX ADMIN — HEPARIN SODIUM 1000 UNIT(S)/HR: 5000 INJECTION INTRAVENOUS; SUBCUTANEOUS at 04:35

## 2019-12-14 RX ADMIN — Medication 250 MILLIGRAM(S): at 22:22

## 2019-12-14 RX ADMIN — HEPARIN SODIUM 2500 UNIT(S): 5000 INJECTION INTRAVENOUS; SUBCUTANEOUS at 12:05

## 2019-12-14 RX ADMIN — Medication 100 MILLIGRAM(S): at 22:24

## 2019-12-14 RX ADMIN — HEPARIN SODIUM 2500 UNIT(S): 5000 INJECTION INTRAVENOUS; SUBCUTANEOUS at 19:44

## 2019-12-14 RX ADMIN — CEFEPIME 100 MILLIGRAM(S): 1 INJECTION, POWDER, FOR SOLUTION INTRAMUSCULAR; INTRAVENOUS at 05:55

## 2019-12-14 RX ADMIN — CEFEPIME 100 MILLIGRAM(S): 1 INJECTION, POWDER, FOR SOLUTION INTRAMUSCULAR; INTRAVENOUS at 17:52

## 2019-12-14 RX ADMIN — Medication 650 MILLIGRAM(S): at 18:26

## 2019-12-14 RX ADMIN — Medication 30 MILLILITER(S): at 13:30

## 2019-12-14 RX ADMIN — HEPARIN SODIUM 1200 UNIT(S)/HR: 5000 INJECTION INTRAVENOUS; SUBCUTANEOUS at 19:40

## 2019-12-14 RX ADMIN — Medication 250 MILLIGRAM(S): at 11:26

## 2019-12-14 RX ADMIN — HEPARIN SODIUM 1100 UNIT(S)/HR: 5000 INJECTION INTRAVENOUS; SUBCUTANEOUS at 12:02

## 2019-12-14 RX ADMIN — Medication 30 MILLILITER(S): at 09:25

## 2019-12-14 RX ADMIN — Medication 100 MILLIGRAM(S): at 04:35

## 2019-12-14 RX ADMIN — Medication 100 MILLIGRAM(S): at 22:11

## 2019-12-14 RX ADMIN — PANTOPRAZOLE SODIUM 40 MILLIGRAM(S): 20 TABLET, DELAYED RELEASE ORAL at 05:55

## 2019-12-14 RX ADMIN — Medication 1 MILLIGRAM(S): at 22:12

## 2019-12-14 NOTE — PROGRESS NOTE ADULT - ASSESSMENT
68 yo male with PMH cholangiocarcinoma (past chemotherapy and radiation therapy, attempting to restart chemo) and chronic back pain, GERD, IBS with diarrhea, and recently diagnosed atrial fibrillation with recent admission for fevers with negative infectious workup, presenting with new onset fevers up to 105 (per patient) with unclear source. Likely infectious etiology with infectious workup pending, but cannot exclude alternative diagnoses such as new malignancy given recent past negative infectious workup.

## 2019-12-14 NOTE — PROGRESS NOTE ADULT - SUBJECTIVE AND OBJECTIVE BOX
CONSTITUTIONAL: NAD  EYES: PERRLA; conjunctiva and sclera clear  ENMT: MMM  NECK: Supple, no palpable masses; no thyromegaly  RESPIRATORY: Normal respiratory effort; crackles over left posterior middle lung field, clear to auscultation otherwise  CARDIOVASCULAR: irregular rhythm, normal S1 and S2, no murmur/rub/gallop  ABDOMEN: distended, Nontender to palpation, normoactive bowel sounds, no rebound/guarding  MUSCULOSKELETAL: Normal gait; no clubbing or cyanosis of digits; no joint swelling or tenderness to palpation, 1+ pitting edema on LLE  PSYCH: A+O to person, place, and time; affect appropriate  NEUROLOGY: no gross sensory deficits   SKIN: No rashes; no palpable lesions Homero Lala MD, PGY-1  Pager #[phone]  After 7 PM on weekdays and 12 PM on weekends, for urgent issues please page #[phone].  ----------------------------------------------------------------  Patient is a 69y old  Male who presents with a chief complaint of fevers (14 Dec 2019 07:29)    SUBJECTIVE / OVERNIGHT EVENTS: No acute events overnight. Pt seen and examined at bedside. Pt complaining of back pain, takes oxycodone at home. Trial of tramadol with adequate pain relief. Pt with abdominal discomfort worsening yesterday, feels like a "belly ache". Had one bowel movement yesterday, without hematochezia or melena, however toilet paper was red when he wiped. No further BMs. Per patient cough resolving. Pt denies any other acute complaints including headache, fever, chills, nausea, vomiting, diarrhea, constipation, chest pain, sob.     MEDICATIONS  (STANDING):  cefepime   IVPB 2000 milliGRAM(s) IV Intermittent every 12 hours  chlorhexidine 4% Liquid 1 Application(s) Topical daily  doxazosin 1 milliGRAM(s) Oral at bedtime  gabapentin 300 milliGRAM(s) Oral at bedtime  heparin  Infusion.  Unit(s)/Hr (12 mL/Hr) IV Continuous <Continuous>  lidocaine   Patch 1 Patch Transdermal every 24 hours  metoprolol succinate ER 25 milliGRAM(s) Oral daily  metroNIDAZOLE  IVPB 500 milliGRAM(s) IV Intermittent every 8 hours  metroNIDAZOLE  IVPB      pantoprazole    Tablet 40 milliGRAM(s) Oral before breakfast  vancomycin  IVPB 1000 milliGRAM(s) IV Intermittent every 12 hours    MEDICATIONS  (PRN):  acetaminophen   Tablet .. 650 milliGRAM(s) Oral every 6 hours PRN Temp greater or equal to 38C (100.4F)  aluminum hydroxide/magnesium hydroxide/simethicone Suspension 30 milliLiter(s) Oral every 6 hours PRN Dyspepsia/heartburn  heparin  Injectable 5500 Unit(s) IV Push every 6 hours PRN For aPTT less than 40  heparin  Injectable 2500 Unit(s) IV Push every 6 hours PRN For aPTT between 40 - 57  loperamide 2 milliGRAM(s) Oral every 6 hours PRN Diarrhea  oxyCODONE    IR 5 milliGRAM(s) Oral every 4 hours PRN Severe Pain (7 - 10)      CAPILLARY BLOOD GLUCOSE    I&O's Summary    14 Dec 2019 07:01  -  14 Dec 2019 10:51  --------------------------------------------------------  IN: 0 mL / OUT: 300 mL / NET: -300 mL    PHYSICAL EXAM:  Vital Signs Last 24 Hrs  T(C): 36.9 (14 Dec 2019 05:49), Max: 36.9 (14 Dec 2019 05:49)  T(F): 98.4 (14 Dec 2019 05:49), Max: 98.4 (14 Dec 2019 05:49)  HR: 88 (14 Dec 2019 05:49) (87 - 93)  BP: 115/74 (14 Dec 2019 05:49) (102/58 - 115/74)  RR: 18 (14 Dec 2019 05:49) (18 - 20)  SpO2: 92% (14 Dec 2019 05:49) (92% - 97%)    CONSTITUTIONAL: NAD  RESPIRATORY: Normal respiratory effort; lungs are clear to auscultation bilaterally  CARDIOVASCULAR: irregular heart rhythm, normal S1 and S2, no murmur/rub/gallop; No lower extremity edema; Peripheral pulses are 2+ bilaterally  ABDOMEN: distended, soft, nontender to light and deep palpation, normoactive bowel sounds, no rebound/guarding  MUSCULOSKELETAL: Normal gait; no clubbing or cyanosis of digits; no joint swelling or tenderness to palpation  PSYCH: AOx3  NEUROLOGY: CN 2-12 are intact and symmetric; no gross sensory deficits   SKIN: No rashes; no palpable lesions    LABS:                        7.7    7.42  )-----------( 185      ( 14 Dec 2019 09:53 )             24.1     12-13    136  |  103  |  11  ----------------------------<  123<H>  3.6   |  25  |  0.72    Ca    8.2<L>      13 Dec 2019 10:44    TPro  5.5<L>  /  Alb  2.5<L>  /  TBili  0.8  /  DBili  x   /  AST  21  /  ALT  13  /  AlkPhos  312<H>  12-13    PTT - ( 14 Dec 2019 02:28 )  PTT:>200.0 sec    Culture - Urine (collected 12 Dec 2019 09:51)  Source: .Urine Clean Catch (Midstream)  Final Report (13 Dec 2019 08:36):    No growth    Culture - Blood (collected 12 Dec 2019 03:13)  Source: .Blood Blood-Peripheral  Preliminary Report (13 Dec 2019 04:00):    No growth to date.    Culture - Blood (collected 12 Dec 2019 03:13)  Source: .Blood Blood-Peripheral  Preliminary Report (13 Dec 2019 04:00):    No growth to date.      RADIOLOGY & ADDITIONAL TESTS:  Results Reviewed:   Imaging Personally Reviewed:  Electrocardiogram Personally Reviewed:    COORDINATION OF CARE:  Care Discussed with Consultants/Other Providers [Y/N]:  Prior or Outpatient Records Reviewed [Y/N]:

## 2019-12-14 NOTE — PROGRESS NOTE ADULT - PROBLEM SELECTOR PLAN 9
DVT: patient on Eliquis, will hold for possible para/thora and start heparin gtt  Diet: regular  Dispo: uncertain at this time

## 2019-12-14 NOTE — PROGRESS NOTE ADULT - ATTENDING COMMENTS
Patient seen and examined.  This is a 70 yo male with cholangiocarcinoma with hepatic mets and PVT on Eliquis who presented with fevers (105.0), found to have increasing ascites with ?peritoneal carcinomatosis and pleural effusion with LINDA patchy opacity on CT scan.     1. Fevers: Unclear if infectious verse secondary to malignancy. We are completing full infectious work up. As I noted previously, LINDA opacity is c/f pneumonia, however is small and he clinically is not behaving like pneumonia. I think more likely, intra-abdominal source; we need to r/o SBP considering significant intra-abdominal malignancy. He is on Eliquis for PVT (11/2019 dx) we are bridging with Heparin drip and planning for IR tap on Monday. Unfortunately, this may decrease yield of data obtained.   ID is following, will appreciate their recs    2. Cholangioca: Onc is following as well. If possible, planning for FOLFOX 12/17 Patient seen and examined.  This is a 68 yo male with cholangiocarcinoma with hepatic mets and PVT on Eliquis who presented with fevers (105.0), found to have increasing ascites with ?peritoneal carcinomatosis and pleural effusion with LINDA patchy opacity on CT scan.     1. Fevers: Unclear if infectious verse secondary to malignancy. We are completing full infectious work up. As I noted previously, LINDA opacity is c/f pneumonia, however is small and he clinically is not behaving like pneumonia. I think more likely, intra-abdominal source; we need to r/o SBP considering significant intra-abdominal malignancy. He is on Eliquis for PVT (11/2019 dx) we are bridging with Heparin drip and planning for IR tap on Monday. Unfortunately, this may decrease yield of data obtained.   ID is following, will appreciate their recs    2. Cholangioca: Onc is following as well. If possible, planning for FOLFOX 12/17    3. Anemia noted, no signs of active bleeding. He is on heparin and low threshold to r/o RP or intraperitoneal bleed with any clinical change. We will trend Hgb

## 2019-12-14 NOTE — PROGRESS NOTE ADULT - PROBLEM SELECTOR PLAN 7
- unlikely infectious as pt had extensive workup at [facility] 1 week ago  - concern for microcolitis on last admission and was planned for colonoscopy next week per pt, pt sees Dr. Orantes  - will hold off on GI consult and continue to monitor symptoms  - loperamide 2mg PRN for diarrhea

## 2019-12-14 NOTE — PROGRESS NOTE ADULT - PROBLEM SELECTOR PLAN 4
- pt on metoprolol succinate 25 mg for rate control; continue  - pt also on Eliquis, 5mg BID. will switch to hep gtt for para/thora

## 2019-12-14 NOTE — PROGRESS NOTE ADULT - PROBLEM SELECTOR PLAN 3
Normotensive currently, continue to monitor vitals regularly  - IVF bolus if drops again - hgb 9 -> 7.7 today w/ no acute signs of blood loss or hemolysis  - iron studies 11/21 suggestive of ACD, low iron, low TIBC, normal ferritin  - will monitor cbc q6 x 24 hrs  - maintain active T&S

## 2019-12-14 NOTE — PROGRESS NOTE ADULT - PROBLEM SELECTOR PLAN 1
Meets SIRS criteria - pt febrile with leukocytosis and hypotension on presentation. Currently afebrile and normotensive s/p 3L IVF in ED. Likely infectious etiology (?possible PNA verse intra-abdominal source) verse tumor fever   - UA negative, CXR with L pleural effusion  - CTC LNIDA patchy opacity ?PNA  - f/u blood cultures, urine culture  - ID recs appreciated, pt on cefepime, vanc, and metronidazole  - Pulm following, plan for thoracentesis next Monday. Will need to be off eliquis for 48 hours prior.  - Will D/W IR paracentesis monday Meets SIRS criteria - pt febrile with leukocytosis and hypotension on presentation. Currently afebrile and normotensive s/p 3L IVF in ED. Likely infectious etiology (?possible PNA verse intra-abdominal source) verse tumor fever   - UA negative, CXR with L pleural effusion  - CTC LINDA patchy opacity ?PNA  - f/u blood cultures, urine culture  - ID recs appreciated, pt on cefepime, vanc, and metronidazole  - Pulm following, plan for thoracentesis next Monday. Will need to be off eliquis for 48 hours prior.  - Discussed with IR, tentatively on schedule for Monday for IR paracentesis. Will need to hold hep gtt 4 hours before procedure and US abdomen to evaluate ascites

## 2019-12-14 NOTE — PROVIDER CONTACT NOTE (CRITICAL VALUE NOTIFICATION) - SITUATION
Pt on heparin drip, Aptt was greater than 200, drip was stopped immediately. Will be restarted in one hour at 2 units less at 10ml/hr.

## 2019-12-14 NOTE — PROGRESS NOTE ADULT - PROBLEM SELECTOR PLAN 2
Oncologist is Dr. Atkinson; will f/u regarding pt's chemo plan  - hold on any chemo for now, f/u onc recs  - ruling out infection  - will send cytology with pleural, peritoneal fluid

## 2019-12-14 NOTE — PROGRESS NOTE ADULT - PROBLEM SELECTOR PLAN 10
Transitions of Care Status:  1.  Name of PCP: Dr. Homero Melgar  2.  PCP Contacted on Admission: [x] Y    [ ] N    3.  PCP contacted at Discharge: [ ] Y    [ ] N    [ ] N/A  4.  Post-Discharge Appointment Date and Location:  5.  Summary of Handoff given to PCP:

## 2019-12-15 LAB
ALBUMIN SERPL ELPH-MCNC: 2.2 G/DL — LOW (ref 3.3–5)
ALP SERPL-CCNC: 308 U/L — HIGH (ref 40–120)
ALT FLD-CCNC: 11 U/L — SIGNIFICANT CHANGE UP (ref 10–45)
ANION GAP SERPL CALC-SCNC: 10 MMOL/L — SIGNIFICANT CHANGE UP (ref 5–17)
APPEARANCE UR: ABNORMAL
APPEARANCE UR: ABNORMAL
APTT BLD: 79.8 SEC — HIGH (ref 27.5–36.3)
APTT BLD: 82.5 SEC — HIGH (ref 27.5–36.3)
AST SERPL-CCNC: 19 U/L — SIGNIFICANT CHANGE UP (ref 10–40)
BACTERIA # UR AUTO: NEGATIVE — SIGNIFICANT CHANGE UP
BASOPHILS # BLD AUTO: 0.05 K/UL — SIGNIFICANT CHANGE UP (ref 0–0.2)
BASOPHILS NFR BLD AUTO: 0.6 % — SIGNIFICANT CHANGE UP (ref 0–2)
BILIRUB SERPL-MCNC: 0.6 MG/DL — SIGNIFICANT CHANGE UP (ref 0.2–1.2)
BILIRUB UR-MCNC: NEGATIVE — SIGNIFICANT CHANGE UP
BILIRUB UR-MCNC: NEGATIVE — SIGNIFICANT CHANGE UP
BUN SERPL-MCNC: 10 MG/DL — SIGNIFICANT CHANGE UP (ref 7–23)
CALCIUM SERPL-MCNC: 7.6 MG/DL — LOW (ref 8.4–10.5)
CHLORIDE SERPL-SCNC: 102 MMOL/L — SIGNIFICANT CHANGE UP (ref 96–108)
CO2 SERPL-SCNC: 23 MMOL/L — SIGNIFICANT CHANGE UP (ref 22–31)
COLOR SPEC: ABNORMAL
COLOR SPEC: YELLOW — SIGNIFICANT CHANGE UP
CREAT SERPL-MCNC: 0.68 MG/DL — SIGNIFICANT CHANGE UP (ref 0.5–1.3)
DIFF PNL FLD: NEGATIVE — SIGNIFICANT CHANGE UP
DIFF PNL FLD: NEGATIVE — SIGNIFICANT CHANGE UP
EOSINOPHIL # BLD AUTO: 0.29 K/UL — SIGNIFICANT CHANGE UP (ref 0–0.5)
EOSINOPHIL NFR BLD AUTO: 3.6 % — SIGNIFICANT CHANGE UP (ref 0–6)
EPI CELLS # UR: 2 — SIGNIFICANT CHANGE UP
GLUCOSE SERPL-MCNC: 112 MG/DL — HIGH (ref 70–99)
GLUCOSE UR QL: NEGATIVE — SIGNIFICANT CHANGE UP
GLUCOSE UR QL: NEGATIVE — SIGNIFICANT CHANGE UP
GRAN CASTS # UR COMP ASSIST: 1 /LPF — SIGNIFICANT CHANGE UP
HCT VFR BLD CALC: 25.4 % — LOW (ref 39–50)
HGB BLD-MCNC: 8 G/DL — LOW (ref 13–17)
HYALINE CASTS # UR AUTO: 0 /LPF — SIGNIFICANT CHANGE UP (ref 0–7)
IMM GRANULOCYTES NFR BLD AUTO: 0.4 % — SIGNIFICANT CHANGE UP (ref 0–1.5)
INR BLD: 1.65 RATIO — HIGH (ref 0.88–1.16)
KETONES UR-MCNC: SIGNIFICANT CHANGE UP
KETONES UR-MCNC: SIGNIFICANT CHANGE UP
LEUKOCYTE ESTERASE UR-ACNC: NEGATIVE — SIGNIFICANT CHANGE UP
LEUKOCYTE ESTERASE UR-ACNC: NEGATIVE — SIGNIFICANT CHANGE UP
LYMPHOCYTES # BLD AUTO: 0.78 K/UL — LOW (ref 1–3.3)
LYMPHOCYTES # BLD AUTO: 9.7 % — LOW (ref 13–44)
MAGNESIUM SERPL-MCNC: 1.8 MG/DL — SIGNIFICANT CHANGE UP (ref 1.6–2.6)
MCHC RBC-ENTMCNC: 26.4 PG — LOW (ref 27–34)
MCHC RBC-ENTMCNC: 31.5 GM/DL — LOW (ref 32–36)
MCV RBC AUTO: 83.8 FL — SIGNIFICANT CHANGE UP (ref 80–100)
MONOCYTES # BLD AUTO: 1.01 K/UL — HIGH (ref 0–0.9)
MONOCYTES NFR BLD AUTO: 12.5 % — SIGNIFICANT CHANGE UP (ref 2–14)
NEUTROPHILS # BLD AUTO: 5.89 K/UL — SIGNIFICANT CHANGE UP (ref 1.8–7.4)
NEUTROPHILS NFR BLD AUTO: 73.2 % — SIGNIFICANT CHANGE UP (ref 43–77)
NITRITE UR-MCNC: NEGATIVE — SIGNIFICANT CHANGE UP
NITRITE UR-MCNC: POSITIVE
NRBC # BLD: 0 /100 WBCS — SIGNIFICANT CHANGE UP (ref 0–0)
PH UR: 6 — SIGNIFICANT CHANGE UP (ref 5–8)
PH UR: 6 — SIGNIFICANT CHANGE UP (ref 5–8)
PHOSPHATE SERPL-MCNC: 2.7 MG/DL — SIGNIFICANT CHANGE UP (ref 2.5–4.5)
PLATELET # BLD AUTO: 227 K/UL — SIGNIFICANT CHANGE UP (ref 150–400)
POTASSIUM SERPL-MCNC: 3.4 MMOL/L — LOW (ref 3.5–5.3)
POTASSIUM SERPL-SCNC: 3.4 MMOL/L — LOW (ref 3.5–5.3)
PROT SERPL-MCNC: 5.1 G/DL — LOW (ref 6–8.3)
PROT UR-MCNC: ABNORMAL
PROT UR-MCNC: ABNORMAL
PROTHROM AB SERPL-ACNC: 19.3 SEC — HIGH (ref 10–12.9)
RBC # BLD: 3.03 M/UL — LOW (ref 4.2–5.8)
RBC # FLD: 18.6 % — HIGH (ref 10.3–14.5)
RBC CASTS # UR COMP ASSIST: 0 /HPF — SIGNIFICANT CHANGE UP (ref 0–4)
SODIUM SERPL-SCNC: 135 MMOL/L — SIGNIFICANT CHANGE UP (ref 135–145)
SP GR SPEC: 1.02 — SIGNIFICANT CHANGE UP (ref 1.01–1.02)
SP GR SPEC: 1.03 — HIGH (ref 1.01–1.02)
UROBILINOGEN FLD QL: NEGATIVE — SIGNIFICANT CHANGE UP
UROBILINOGEN FLD QL: NEGATIVE — SIGNIFICANT CHANGE UP
WBC # BLD: 7.75 K/UL — SIGNIFICANT CHANGE UP (ref 3.8–10.5)
WBC # FLD AUTO: 7.75 K/UL — SIGNIFICANT CHANGE UP (ref 3.8–10.5)
WBC UR QL: 1 /HPF — SIGNIFICANT CHANGE UP (ref 0–5)

## 2019-12-15 PROCEDURE — 74177 CT ABD & PELVIS W/CONTRAST: CPT | Mod: 26

## 2019-12-15 PROCEDURE — 99233 SBSQ HOSP IP/OBS HIGH 50: CPT | Mod: GC

## 2019-12-15 PROCEDURE — 99233 SBSQ HOSP IP/OBS HIGH 50: CPT

## 2019-12-15 PROCEDURE — 76705 ECHO EXAM OF ABDOMEN: CPT | Mod: 26

## 2019-12-15 RX ORDER — POTASSIUM CHLORIDE 20 MEQ
40 PACKET (EA) ORAL ONCE
Refills: 0 | Status: COMPLETED | OUTPATIENT
Start: 2019-12-15 | End: 2019-12-15

## 2019-12-15 RX ORDER — SODIUM CHLORIDE 9 MG/ML
85 INJECTION INTRAMUSCULAR; INTRAVENOUS; SUBCUTANEOUS ONCE
Refills: 0 | Status: DISCONTINUED | OUTPATIENT
Start: 2019-12-15 | End: 2019-12-15

## 2019-12-15 RX ORDER — FAMOTIDINE 10 MG/ML
20 INJECTION INTRAVENOUS
Refills: 0 | Status: DISCONTINUED | OUTPATIENT
Start: 2019-12-15 | End: 2019-12-17

## 2019-12-15 RX ORDER — SODIUM CHLORIDE 9 MG/ML
1000 INJECTION INTRAMUSCULAR; INTRAVENOUS; SUBCUTANEOUS
Refills: 0 | Status: DISCONTINUED | OUTPATIENT
Start: 2019-12-15 | End: 2019-12-17

## 2019-12-15 RX ADMIN — CEFEPIME 100 MILLIGRAM(S): 1 INJECTION, POWDER, FOR SOLUTION INTRAMUSCULAR; INTRAVENOUS at 05:45

## 2019-12-15 RX ADMIN — HEPARIN SODIUM 1200 UNIT(S)/HR: 5000 INJECTION INTRAVENOUS; SUBCUTANEOUS at 11:21

## 2019-12-15 RX ADMIN — Medication 1 MILLIGRAM(S): at 21:53

## 2019-12-15 RX ADMIN — FAMOTIDINE 20 MILLIGRAM(S): 10 INJECTION INTRAVENOUS at 13:48

## 2019-12-15 RX ADMIN — OXYCODONE HYDROCHLORIDE 5 MILLIGRAM(S): 5 TABLET ORAL at 09:58

## 2019-12-15 RX ADMIN — Medication 100 MILLIGRAM(S): at 21:55

## 2019-12-15 RX ADMIN — OXYCODONE HYDROCHLORIDE 5 MILLIGRAM(S): 5 TABLET ORAL at 10:28

## 2019-12-15 RX ADMIN — Medication 250 MILLIGRAM(S): at 23:00

## 2019-12-15 RX ADMIN — Medication 100 MILLIGRAM(S): at 14:11

## 2019-12-15 RX ADMIN — OXYCODONE HYDROCHLORIDE 5 MILLIGRAM(S): 5 TABLET ORAL at 14:10

## 2019-12-15 RX ADMIN — OXYCODONE HYDROCHLORIDE 5 MILLIGRAM(S): 5 TABLET ORAL at 01:39

## 2019-12-15 RX ADMIN — OXYCODONE HYDROCHLORIDE 5 MILLIGRAM(S): 5 TABLET ORAL at 20:08

## 2019-12-15 RX ADMIN — OXYCODONE HYDROCHLORIDE 5 MILLIGRAM(S): 5 TABLET ORAL at 06:39

## 2019-12-15 RX ADMIN — Medication 40 MILLIEQUIVALENT(S): at 09:40

## 2019-12-15 RX ADMIN — GABAPENTIN 300 MILLIGRAM(S): 400 CAPSULE ORAL at 21:53

## 2019-12-15 RX ADMIN — CEFEPIME 100 MILLIGRAM(S): 1 INJECTION, POWDER, FOR SOLUTION INTRAMUSCULAR; INTRAVENOUS at 17:05

## 2019-12-15 RX ADMIN — FAMOTIDINE 20 MILLIGRAM(S): 10 INJECTION INTRAVENOUS at 20:08

## 2019-12-15 RX ADMIN — OXYCODONE HYDROCHLORIDE 5 MILLIGRAM(S): 5 TABLET ORAL at 05:58

## 2019-12-15 RX ADMIN — OXYCODONE HYDROCHLORIDE 5 MILLIGRAM(S): 5 TABLET ORAL at 14:40

## 2019-12-15 RX ADMIN — HEPARIN SODIUM 1200 UNIT(S)/HR: 5000 INJECTION INTRAVENOUS; SUBCUTANEOUS at 03:12

## 2019-12-15 RX ADMIN — Medication 25 MILLIGRAM(S): at 05:46

## 2019-12-15 RX ADMIN — Medication 30 MILLILITER(S): at 03:14

## 2019-12-15 RX ADMIN — Medication 250 MILLIGRAM(S): at 10:51

## 2019-12-15 RX ADMIN — PANTOPRAZOLE SODIUM 40 MILLIGRAM(S): 20 TABLET, DELAYED RELEASE ORAL at 09:41

## 2019-12-15 RX ADMIN — Medication 100 MILLIGRAM(S): at 05:45

## 2019-12-15 RX ADMIN — OXYCODONE HYDROCHLORIDE 5 MILLIGRAM(S): 5 TABLET ORAL at 20:55

## 2019-12-15 RX ADMIN — CHLORHEXIDINE GLUCONATE 1 APPLICATION(S): 213 SOLUTION TOPICAL at 17:05

## 2019-12-15 RX ADMIN — OXYCODONE HYDROCHLORIDE 5 MILLIGRAM(S): 5 TABLET ORAL at 02:15

## 2019-12-15 NOTE — PROGRESS NOTE ADULT - PROBLEM SELECTOR PLAN 1
Meets SIRS criteria - pt febrile with leukocytosis and hypotension on presentation. Currently afebrile and normotensive s/p 3L IVF in ED. Likely infectious etiology (?possible PNA verse intra-abdominal source) verse tumor fever   - UA negative, CXR with L pleural effusion  - CTC LINDA patchy opacity ?PNA  - f/u blood cultures, urine culture  - ID recs appreciated, pt on cefepime, vanc, and metronidazole  - Pulm following, plan for thoracentesis next Monday. Will need to be off eliquis for 48 hours prior.  - Discussed with IR, tentatively on schedule for Monday for IR paracentesis. Will need to hold hep gtt 4 hours before procedure and US abdomen to evaluate ascites Meets SIRS criteria - pt febrile with leukocytosis and hypotension on presentation. Currently afebrile and normotensive s/p 3L IVF in ED. Likely infectious etiology (?possible PNA verse intra-abdominal source) verse tumor fever   - UA negative, CXR with L pleural effusion  - CTC LINDA patchy opacity ?PNA  - bcx 12/12 NGT, 12/14 pending. UCx 12/12 NGTD  - ID recs appreciated, pt on cefepime, vanc, and metronidazole  - Pulm recs appreciated. Pleural effusion too small to tap.   - Will need to be off eliquis for 48 hours prior to any procedure. (held since 12/13, on hep gtt)  - Discussed with IR, tentatively on schedule for Monday for IR paracentesis. Will need to hold hep gtt 4 hours before and after procedure. Pending US abdomen to evaluate drainable pockets of ascites Meets SIRS criteria - pt febrile with leukocytosis and hypotension on presentation. Likely infectious etiology (?possible PNA verse intra-abdominal source) verse tumor fever   - 12/14 febrile to 101.9 on broad coverage  - UA 12/12 negative, UA 12/15 - positive for nitrites and bacteria, may be contaminant per ID, f/u ucx  - CTC LINDA patchy opacity ?PNA  - bcx 12/12 NGT, 12/14 pending. UCx 12/12 NGTD  - ordered ctap w/ iv and oral contrast  - ID recs appreciated, pt on cefepime, vanc, and metronidazole  - Pulm recs appreciated. Pleural effusion too small to tap.   - Will need to be off eliquis for 48 hours prior to any procedure. (held since 12/13, on hep gtt)  - Discussed with IR, tentatively on schedule for Monday for IR paracentesis. Will need to hold hep gtt 4 hours before and after procedure. Pending US abdomen to evaluate drainable pockets of ascites

## 2019-12-15 NOTE — PROGRESS NOTE ADULT - PROBLEM SELECTOR PLAN 7
- unlikely infectious as pt had extensive workup at [facility] 1 week ago  - concern for microcolitis on last admission and was planned for colonoscopy next week per pt, pt sees Dr. Orantes  - will hold off on GI consult and continue to monitor symptoms  - loperamide 2mg PRN for diarrhea

## 2019-12-15 NOTE — PROGRESS NOTE ADULT - ATTENDING COMMENTS
Patient seen and examined.  This is a 70 yo male with cholangiocarcinoma with hepatic mets and PVT on Eliquis who presented with fevers (105.0), found to have increasing ascites with ?peritoneal carcinomatosis and pleural effusion with LINDA patchy opacity on CT scan.     Febrile overnight 12/14.     1. Fevers: Unclear if infectious verse secondary to malignancy verse clot?  As I noted previously, LINDA opacity is c/f pneumonia, however is small and he clinically is not behaving like pneumonia. I think more likely, intra-abdominal source: In light of recurrent fever, will obtain CT A/P with oral and IV contrast. Discussed with ID attg. We need to r/o SBP considering significant intra-abdominal malignancy (though yield may be low as on abx)  Abx per ID    Rest per resident note

## 2019-12-15 NOTE — PROGRESS NOTE ADULT - ASSESSMENT
69 m with HTN, HLD, cholangiocarcinoma s/p [facility] 2017 with recurrence of disease now with mets to the liver, portal vein thrombus and SMA narrowing, chronic diarrhea with negative infection w/u, ascites and intrahepatic biliary dilatation, multiple admissions with sepsis and negative work up, last one a week ago and abd CT showed new liver infarcts, now again brought in with high fevers to 105 and rigors  here febrile 100.4, tachycardic and hypotensive with active rigors  WBC: 10.87, RVP negative, CXR small pleural effusion and atelectasis    fever, rigors, tachycardia, hypotension, sepsis, in the setting of metastatic cholangiocarcinoma and portal vein thrombosis, liver infarcts and intrahepatic biliary ductal dilatation  r/o bacteremia vs tumor fever  chest Ct with new minimal focal patchy consolidation in LINDA, new small L pleural effusion and partial collapse of LLL but no significant cough or respiratory symptoms    * f/u the blood cultures  * c/w vanco 1 q 12 for now,, levels adeaquate   please continue to montior  * c/w cefepime 2 q8  * c/w flagyl 500 q 8   * if worsening status will need a repeat abd/pelvis with IV and oral contrast  * f/u with pulmonary  * for paracentesis tomorrow 69 m with HTN, HLD, cholangiocarcinoma s/p [facility] 2017 with recurrence of disease now with mets to the liver, portal vein thrombus and SMA narrowing, chronic diarrhea with negative infection w/u, ascites and intrahepatic biliary dilatation, multiple admissions with sepsis and negative work up, last one a week ago and abd CT showed new liver infarcts, now again brought in with high fevers to 105 and rigors  here febrile 100.4, tachycardic and hypotensive with active rigors  WBC: 10.87, RVP negative, CXR small pleural effusion and atelectasis    fever, rigors, tachycardia, hypotension, sepsis, in the setting of metastatic cholangiocarcinoma and portal vein thrombosis, liver infarcts and intrahepatic biliary ductal dilatation  r/o bacteremia vs tumor fever  chest Ct with new minimal focal patchy consolidation in LINDA, new small L pleural effusion and partial collapse of LLL but no significant cough or respiratory symptoms    * f/u the blood cultures  * c/w vanco 1 q 12 for now,, levels adeaquate   please continue to montior  * c/w cefepime 2 q8  * c/w flagyl 500 q 8   * if worsening status will need a repeat abd/pelvis with IV and oral contrast  * f/u with pulmonary  * for paracentesis tomorrow- please send fluid for cell count, glu, TPROT, LDH, cytology routine culture, fungal culture and AFB 69 m with HTN, HLD, cholangiocarcinoma s/p [facility] 2017 with recurrence of disease now with mets to the liver, portal vein thrombus and SMA narrowing, chronic diarrhea with negative infection w/u, ascites and intrahepatic biliary dilatation, multiple admissions with sepsis and negative work up, last one a week ago and abd CT showed new liver infarcts, now again brought in with high fevers to 105 and rigors  here febrile 100.4, tachycardic and hypotensive with active rigors  WBC: 10.87, RVP negative, CXR small pleural effusion and atelectasis    fever, rigors, tachycardia, hypotension, sepsis, in the setting of metastatic cholangiocarcinoma and portal vein thrombosis, liver infarcts and intrahepatic biliary ductal dilatation  r/o bacteremia vs tumor fever  chest Ct with new minimal focal patchy consolidation in LINDA, new small L pleural effusion and partial collapse of LLL but no significant cough or respiratory symptoms    * f/u the blood cultures  * c/w vanco 1 q 12 for now,, levels adeaquate   please continue to montior  * c/w cefepime 2 q8  * c/w flagyl 500 q 8   * in view of fevers and abdominal pain will need a repeat abd/pelvis with IV and oral contrast  * f/u with pulmonary  * for paracentesis tomorrow- please send fluid for cell count, glu, TPROT, LDH, cytology routine culture, fungal culture and AFB

## 2019-12-15 NOTE — PROGRESS NOTE ADULT - PROBLEM SELECTOR PLAN 3
- hgb 9 -> 7.7 today w/ no acute signs of blood loss or hemolysis  - iron studies 11/21 suggestive of ACD, low iron, low TIBC, normal ferritin  - will monitor cbc q6 x 24 hrs  - maintain active T&S - hgb 9 -> 7.7->8.0 w/ no acute signs of blood loss or hemolysis  - iron studies 11/21 suggestive of ACD, low iron, low TIBC, normal ferritin  - maintain active T&S

## 2019-12-15 NOTE — PROGRESS NOTE ADULT - SUBJECTIVE AND OBJECTIVE BOX
Homero Lala MD, PGY-1  Pager #[phone]  After 7 PM on weekdays and 12 PM on weekends, for urgent issues please page #[phone].  ----------------------------------------------------------------  Patient is a 69y old  Male who presents with a chief complaint of fevers (14 Dec 2019 07:29)      SUBJECTIVE / OVERNIGHT EVENTS:  ADDITIONAL REVIEW OF SYSTEMS:    MEDICATIONS  (STANDING):  cefepime   IVPB 2000 milliGRAM(s) IV Intermittent every 12 hours  chlorhexidine 4% Liquid 1 Application(s) Topical daily  doxazosin 1 milliGRAM(s) Oral at bedtime  gabapentin 300 milliGRAM(s) Oral at bedtime  heparin  Infusion.  Unit(s)/Hr (12 mL/Hr) IV Continuous <Continuous>  lidocaine   Patch 1 Patch Transdermal every 24 hours  metoprolol succinate ER 25 milliGRAM(s) Oral daily  metroNIDAZOLE  IVPB 500 milliGRAM(s) IV Intermittent every 8 hours  metroNIDAZOLE  IVPB      pantoprazole    Tablet 40 milliGRAM(s) Oral before breakfast  sodium chloride 0.9%. 1000 milliLiter(s) (75 mL/Hr) IV Continuous <Continuous>  vancomycin  IVPB 1000 milliGRAM(s) IV Intermittent every 12 hours    MEDICATIONS  (PRN):  acetaminophen   Tablet .. 650 milliGRAM(s) Oral every 6 hours PRN Temp greater or equal to 38C (100.4F)  aluminum hydroxide/magnesium hydroxide/simethicone Suspension 30 milliLiter(s) Oral every 6 hours PRN Dyspepsia/heartburn  heparin  Injectable 5500 Unit(s) IV Push every 6 hours PRN For aPTT less than 40  heparin  Injectable 2500 Unit(s) IV Push every 6 hours PRN For aPTT between 40 - 57  loperamide 2 milliGRAM(s) Oral every 6 hours PRN Diarrhea  oxyCODONE    IR 5 milliGRAM(s) Oral every 4 hours PRN Severe Pain (7 - 10)      CAPILLARY BLOOD GLUCOSE    I&O's Summary    14 Dec 2019 07:01  -  15 Dec 2019 07:00  --------------------------------------------------------  IN: 2163 mL / OUT: 750 mL / NET: 1413 mL    PHYSICAL EXAM:  Vital Signs Last 24 Hrs  T(C): 36.7 (15 Dec 2019 04:52), Max: 38.8 (14 Dec 2019 18:16)  T(F): 98 (15 Dec 2019 04:52), Max: 101.9 (14 Dec 2019 18:16)  HR: 80 (15 Dec 2019 04:52) (80 - 96)  BP: 97/58 (15 Dec 2019 04:52) (97/58 - 106/74)  RR: 18 (15 Dec 2019 04:52) (18 - 18)  SpO2: 95% (15 Dec 2019 04:52) (95% - 98%)    CONSTITUTIONAL: NAD  RESPIRATORY: Normal respiratory effort; lungs are clear to auscultation bilaterally  CARDIOVASCULAR: irregular heart rhythm, normal S1 and S2, no murmur/rub/gallop; No lower extremity edema; Peripheral pulses are 2+ bilaterally  ABDOMEN: distended, soft, nontender to light and deep palpation, normoactive bowel sounds, no rebound/guarding  MUSCULOSKELETAL: Normal gait; no clubbing or cyanosis of digits; no joint swelling or tenderness to palpation  PSYCH: AOx3  NEUROLOGY: CN 2-12 are intact and symmetric; no gross sensory deficits   SKIN: No rashes; no palpable lesions    LABS:                        8.0    7.75  )-----------( 227      ( 15 Dec 2019 06:47 )             25.4     12-14    133<L>  |  100  |  10  ----------------------------<  122<H>  3.5   |  22  |  0.68    Ca    8.2<L>      14 Dec 2019 18:17    TPro  5.5<L>  /  Alb  2.5<L>  /  TBili  0.8  /  DBili  x   /  AST  21  /  ALT  13  /  AlkPhos  312<H>  12-13    PTT - ( 15 Dec 2019 01:52 )  PTT:82.5 sec      Culture - Urine (collected 12 Dec 2019 09:51)  Source: .Urine Clean Catch (Midstream)  Final Report (13 Dec 2019 08:36):    No growth      RADIOLOGY & ADDITIONAL TESTS:  Results Reviewed:   Imaging Personally Reviewed:  Electrocardiogram Personally Reviewed:    COORDINATION OF CARE:  Care Discussed with Consultants/Other Providers [Y/N]:  Prior or Outpatient Records Reviewed [Y/N]: Homero Lala MD, PGY-1  Pager #[phone]  After 7 PM on weekdays and 12 PM on weekends, for urgent issues please page #[phone].  ----------------------------------------------------------------  Patient is a 69y old  Male who presents with a chief complaint of fevers (14 Dec 2019 07:29)      SUBJECTIVE / OVERNIGHT EVENTS: No acute events overnight. Pt seen and examined at bedside.     MEDICATIONS  (STANDING):  cefepime   IVPB 2000 milliGRAM(s) IV Intermittent every 12 hours  chlorhexidine 4% Liquid 1 Application(s) Topical daily  doxazosin 1 milliGRAM(s) Oral at bedtime  gabapentin 300 milliGRAM(s) Oral at bedtime  heparin  Infusion.  Unit(s)/Hr (12 mL/Hr) IV Continuous <Continuous>  lidocaine   Patch 1 Patch Transdermal every 24 hours  metoprolol succinate ER 25 milliGRAM(s) Oral daily  metroNIDAZOLE  IVPB 500 milliGRAM(s) IV Intermittent every 8 hours  metroNIDAZOLE  IVPB      pantoprazole    Tablet 40 milliGRAM(s) Oral before breakfast  sodium chloride 0.9%. 1000 milliLiter(s) (75 mL/Hr) IV Continuous <Continuous>  vancomycin  IVPB 1000 milliGRAM(s) IV Intermittent every 12 hours    MEDICATIONS  (PRN):  acetaminophen   Tablet .. 650 milliGRAM(s) Oral every 6 hours PRN Temp greater or equal to 38C (100.4F)  aluminum hydroxide/magnesium hydroxide/simethicone Suspension 30 milliLiter(s) Oral every 6 hours PRN Dyspepsia/heartburn  heparin  Injectable 5500 Unit(s) IV Push every 6 hours PRN For aPTT less than 40  heparin  Injectable 2500 Unit(s) IV Push every 6 hours PRN For aPTT between 40 - 57  loperamide 2 milliGRAM(s) Oral every 6 hours PRN Diarrhea  oxyCODONE    IR 5 milliGRAM(s) Oral every 4 hours PRN Severe Pain (7 - 10)      CAPILLARY BLOOD GLUCOSE    I&O's Summary    14 Dec 2019 07:01  -  15 Dec 2019 07:00  --------------------------------------------------------  IN: 2163 mL / OUT: 750 mL / NET: 1413 mL    PHYSICAL EXAM:  Vital Signs Last 24 Hrs  T(C): 36.7 (15 Dec 2019 04:52), Max: 38.8 (14 Dec 2019 18:16)  T(F): 98 (15 Dec 2019 04:52), Max: 101.9 (14 Dec 2019 18:16)  HR: 80 (15 Dec 2019 04:52) (80 - 96)  BP: 97/58 (15 Dec 2019 04:52) (97/58 - 106/74)  RR: 18 (15 Dec 2019 04:52) (18 - 18)  SpO2: 95% (15 Dec 2019 04:52) (95% - 98%)    CONSTITUTIONAL: NAD  RESPIRATORY: Normal respiratory effort; lungs are clear to auscultation bilaterally  CARDIOVASCULAR: irregular heart rhythm, normal S1 and S2, no murmur/rub/gallop; No lower extremity edema; Peripheral pulses are 2+ bilaterally  ABDOMEN: distended, soft, nontender to light and deep palpation, normoactive bowel sounds, no rebound/guarding  MUSCULOSKELETAL: Normal gait; no clubbing or cyanosis of digits; no joint swelling or tenderness to palpation  PSYCH: AOx3  NEUROLOGY: CN 2-12 are intact and symmetric; no gross sensory deficits   SKIN: No rashes; no palpable lesions    LABS:                        8.0    7.75  )-----------( 227      ( 15 Dec 2019 06:47 )             25.4     12-14    133<L>  |  100  |  10  ----------------------------<  122<H>  3.5   |  22  |  0.68    Ca    8.2<L>      14 Dec 2019 18:17    TPro  5.5<L>  /  Alb  2.5<L>  /  TBili  0.8  /  DBili  x   /  AST  21  /  ALT  13  /  AlkPhos  312<H>  12-13    PTT - ( 15 Dec 2019 01:52 )  PTT:82.5 sec    Culture - Urine (collected 12 Dec 2019 09:51)  Source: .Urine Clean Catch (Midstream)  Final Report (13 Dec 2019 08:36):    No growth      RADIOLOGY & ADDITIONAL TESTS:  Results Reviewed:   Imaging Personally Reviewed:  Electrocardiogram Personally Reviewed:    COORDINATION OF CARE:  Care Discussed with Consultants/Other Providers [Y/N]:  Prior or Outpatient Records Reviewed [Y/N]: Homero Lala MD, PGY-1  Pager #[phone]  After 7 PM on weekdays and 12 PM on weekends, for urgent issues please page #[phone].  ----------------------------------------------------------------  Patient is a 69y old  Male who presents with a chief complaint of fevers (14 Dec 2019 07:29)      SUBJECTIVE / OVERNIGHT EVENTS: No acute events overnight. Pt seen and examined at bedside. Pt with back pain relieved by oxycodone. Bowel movement yesterday without hematochezia or melena. Pt denies any acute complaints including headache, fever, chills, nausea, vomiting, diarrhea, constipation, chest pain, shortness of breath, dysuria.     MEDICATIONS  (STANDING):  cefepime   IVPB 2000 milliGRAM(s) IV Intermittent every 12 hours  chlorhexidine 4% Liquid 1 Application(s) Topical daily  doxazosin 1 milliGRAM(s) Oral at bedtime  gabapentin 300 milliGRAM(s) Oral at bedtime  heparin  Infusion.  Unit(s)/Hr (12 mL/Hr) IV Continuous <Continuous>  lidocaine   Patch 1 Patch Transdermal every 24 hours  metoprolol succinate ER 25 milliGRAM(s) Oral daily  metroNIDAZOLE  IVPB 500 milliGRAM(s) IV Intermittent every 8 hours  metroNIDAZOLE  IVPB      pantoprazole    Tablet 40 milliGRAM(s) Oral before breakfast  sodium chloride 0.9%. 1000 milliLiter(s) (75 mL/Hr) IV Continuous <Continuous>  vancomycin  IVPB 1000 milliGRAM(s) IV Intermittent every 12 hours    MEDICATIONS  (PRN):  acetaminophen   Tablet .. 650 milliGRAM(s) Oral every 6 hours PRN Temp greater or equal to 38C (100.4F)  aluminum hydroxide/magnesium hydroxide/simethicone Suspension 30 milliLiter(s) Oral every 6 hours PRN Dyspepsia/heartburn  heparin  Injectable 5500 Unit(s) IV Push every 6 hours PRN For aPTT less than 40  heparin  Injectable 2500 Unit(s) IV Push every 6 hours PRN For aPTT between 40 - 57  loperamide 2 milliGRAM(s) Oral every 6 hours PRN Diarrhea  oxyCODONE    IR 5 milliGRAM(s) Oral every 4 hours PRN Severe Pain (7 - 10)      CAPILLARY BLOOD GLUCOSE    I&O's Summary    14 Dec 2019 07:01  -  15 Dec 2019 07:00  --------------------------------------------------------  IN: 2163 mL / OUT: 750 mL / NET: 1413 mL    PHYSICAL EXAM:  Vital Signs Last 24 Hrs  T(C): 36.7 (15 Dec 2019 04:52), Max: 38.8 (14 Dec 2019 18:16)  T(F): 98 (15 Dec 2019 04:52), Max: 101.9 (14 Dec 2019 18:16)  HR: 80 (15 Dec 2019 04:52) (80 - 96)  BP: 97/58 (15 Dec 2019 04:52) (97/58 - 106/74)  RR: 18 (15 Dec 2019 04:52) (18 - 18)  SpO2: 95% (15 Dec 2019 04:52) (95% - 98%)    CONSTITUTIONAL: NAD  RESPIRATORY: Normal respiratory effort; lungs are clear to auscultation bilaterally  CARDIOVASCULAR: irregular heart rhythm, normal S1 and S2, no murmur/rub/gallop;  ABDOMEN: distended, soft, nontender to light and deep palpation, normoactive bowel sounds, no rebound/guarding  MUSCULOSKELETAL: Normal gait; no clubbing or cyanosis of digits; no joint swelling or tenderness to palpation  PSYCH: AOx3  NEUROLOGY: CN 2-12 are intact and symmetric; no gross sensory deficits   SKIN: No rashes; no palpable lesions    LABS:                        8.0    7.75  )-----------( 227      ( 15 Dec 2019 06:47 )             25.4     12-14    133<L>  |  100  |  10  ----------------------------<  122<H>  3.5   |  22  |  0.68    Ca    8.2<L>      14 Dec 2019 18:17    TPro  5.5<L>  /  Alb  2.5<L>  /  TBili  0.8  /  DBili  x   /  AST  21  /  ALT  13  /  AlkPhos  312<H>  12-13    PTT - ( 15 Dec 2019 01:52 )  PTT:82.5 sec    Culture - Urine (collected 12 Dec 2019 09:51)  Source: .Urine Clean Catch (Midstream)  Final Report (13 Dec 2019 08:36):    No growth      RADIOLOGY & ADDITIONAL TESTS:  Results Reviewed:   Imaging Personally Reviewed:  Electrocardiogram Personally Reviewed:    COORDINATION OF CARE:  Care Discussed with Consultants/Other Providers [Y/N]:  Prior or Outpatient Records Reviewed [Y/N]:

## 2019-12-15 NOTE — PROGRESS NOTE ADULT - PROBLEM SELECTOR PLAN 2
Oncologist is Dr. Atkinson; will f/u regarding pt's chemo plan  - hold on any chemo for now, f/u onc recs  - ruling out infection  - will send cytology with pleural, peritoneal fluid Oncologist is Dr. Atkinson; will f/u regarding pt's chemo plan  - hold on any chemo for now, f/u onc recs  - ruling out infection  - will send cytology with peritoneal fluid

## 2019-12-15 NOTE — PROGRESS NOTE ADULT - SUBJECTIVE AND OBJECTIVE BOX
Patient is a 69y old  Male who presents with a chief complaint of fevers (15 Dec 2019 07:23)    Being followed by ID for        Interval history:  pt remains afebrile but distended abdomen is very uncomfortable   had soft bowel mvmt yesterday with some formed stool  today no bowel mvmt  breathing stable  No other acute events          PAST MEDICAL & SURGICAL HISTORY:  Rash  Biliary stricture: s/p biliary stent  Pancreatic lesion  Gastric polyp  Obstructive jaundice  Other hyperlipidemia  Essential hypertension  IBS (irritable bowel syndrome)  GERD (gastroesophageal reflux disease)  Intestinal Whipple's disease  S/P unilateral inguinal hernia repair  S/P tendon repair: hand  History of lumbar laminectomy  History of biliary stent insertion: 2017  S/P cervical spinal fusion    Allergies    penicillin G benzathine (Rash)  sulfa drugs (Rash)    Intolerances      Antimicrobials:    cefepime   IVPB 2000 milliGRAM(s) IV Intermittent every 12 hours  metroNIDAZOLE  IVPB 500 milliGRAM(s) IV Intermittent every 8 hours  metroNIDAZOLE  IVPB      vancomycin  IVPB 1000 milliGRAM(s) IV Intermittent every 12 hours    MEDICATIONS  (STANDING):  cefepime   IVPB 2000 milliGRAM(s) IV Intermittent every 12 hours  chlorhexidine 4% Liquid 1 Application(s) Topical daily  doxazosin 1 milliGRAM(s) Oral at bedtime  gabapentin 300 milliGRAM(s) Oral at bedtime  heparin  Infusion.  Unit(s)/Hr (12 mL/Hr) IV Continuous <Continuous>  lidocaine   Patch 1 Patch Transdermal every 24 hours  metoprolol succinate ER 25 milliGRAM(s) Oral daily  metroNIDAZOLE  IVPB 500 milliGRAM(s) IV Intermittent every 8 hours  metroNIDAZOLE  IVPB      pantoprazole    Tablet 40 milliGRAM(s) Oral before breakfast  sodium chloride 0.9%. 1000 milliLiter(s) (75 mL/Hr) IV Continuous <Continuous>  vancomycin  IVPB 1000 milliGRAM(s) IV Intermittent every 12 hours      Vital Signs Last 24 Hrs  T(C): 36.7 (12-15-19 @ 04:52), Max: 38.8 (19 @ 18:16)  T(F): 98 (12-15-19 @ 04:52), Max: 101.9 (19 @ 18:16)  HR: 80 (12-15-19 @ 04:52) (80 - 96)  BP: 97/58 (12-15-19 @ 04:52) (97/58 - 106/74)  BP(mean): --  RR: 18 (12-15-19 @ 04:52) (18 - 18)  SpO2: 95% (12-15-19 @ 04:52) (95% - 98%)    Physical Exam:    Constitutional  pt resting quielty  c/o abd discomfort    HEENT PERRLA EOMI,No pallor or icterus    No oral exudate or erythema  mediport    Neck supple no JVD or LN    Chest Good AE,CTA    CVS S1 S2     Abd  distended  no rebound    Ext trace edema    IV site no erythema tenderness or discharge    Joints no swelling or LOM    CNS AAO X 3 no focal    Lab Data:                          8.0    7.75  )-----------( 227      ( 15 Dec 2019 06:47 )             25.4       12-15    135  |  102  |  10  ----------------------------<  112<H>  3.4<L>   |  23  |  0.68    Ca    7.6<L>      15 Dec 2019 06:47  Phos  2.7     12-15  Mg     1.8     12-15    TPro  5.1<L>  /  Alb  2.2<L>  /  TBili  0.6  /  DBili  x   /  AST  19  /  ALT  11  /  AlkPhos  308<H>  12-15      Urinalysis Basic - ( 15 Dec 2019 10:24 )    Color: Yellow / Appearance: Turbid / S.024 / pH: x  Gluc: x / Ketone: Trace  / Bili: Negative / Urobili: Negative   Blood: x / Protein: 30 mg/dL / Nitrite: Positive   Leuk Esterase: Negative / RBC: 0 /hpf / WBC 5 /HPF   Sq Epi: x / Non Sq Epi: 2 / Bacteria: Many        .Urine Clean Catch (Midstream)  19   No growth  --  --      .Blood Blood-Peripheral  19   No growth to date.  --  --        < from: [facility] Duplex Lower Ext Vein Scan, Left (19 @ 17:29) >  IMPRESSION:     No evidence of left lower extremity deep venous thrombosis.    < end of copied text >    < from: CT Chest No Cont (19 @ 16:19) >  EXAM:  CT CHEST                            PROCEDURE DATE:  2019            INTERPRETATION:  Reason for Exam:  Fevers    CT of the chest was performed from the thoracic inlet to the level of the   adrenal glands without contrast injection.    Comparison: Chest x-ray dated 2019 and prior CT chest dated   2019    Tubes/Lines: Right Mediport catheter with tip in SVC.     Mediastinum/Vessels/Heart: Aorta and pulmonary arteries are normal in   size. There is no pericardial effusion. No lymphadenopathy. Coronary   artery calcifications are noted. Thyroid gland is unremarkable    Lungs/Pleura/Airways: Small left pleural effusion with loculated   component in the left major fissure, new since previous exam. There is   resultant partial collapse of the left lower lobe without associated   endobronchial lesion.    Minimal focal patchy consolidation in the left upper lobe on series 2   image 40, new since prior exam    Visualized abdomen: Intra-abdominal ascites, new since previous exam.   Multiple lymph nodes within the mesentery along with questionable nodular   studding of the mesenteric fat suggestive of peritoneal carcinomatosis    Bones and soft tissues: No suspicious osseous lesions. Degenerative   changes noted throughout the spine.    IMPRESSION:    When compared with prior study of 2019:    New small left pleural effusion with resultant partial collapse of the   left lower lobe    New focal patchy consolidation in the left upper lobe    Intra-abdominal ascites, new since previous exam. Multiple lymph nodes   within the mesentery along with questionable nodular studding of the   mesenteric fat suggestive of peritoneal carcinomatosis      < end of copied text >        Vancomycin Level, Trough: 14.5 ug/mL (19 @ 09:53)      WBC Count: 7.75 (12-15-19 @ 06:47)  WBC Count: 8.92 (19 @ 18:17)  WBC Count: 7.42 (19 @ 09:53)  WBC Count: 8.42 (19 @ 02:28)  WBC Count: 12.37 (19 @ 10:44)  WBC Count: 16.53 (19 @ 22:52)  WBC Count: 10.78 (19 @ 00:07) Patient is a 69y old  Male who presents with a chief complaint of fevers (15 Dec 2019 07:23)    Being followed by ID for        Interval history:  pt with fever last night  c/o  distended abdomen is very uncomfortable   had soft bowel mvmt yesterday with some formed stool  today no bowel mvmt  breathing stable  No other acute events          PAST MEDICAL & SURGICAL HISTORY:  Rash  Biliary stricture: s/p biliary stent  Pancreatic lesion  Gastric polyp  Obstructive jaundice  Other hyperlipidemia  Essential hypertension  IBS (irritable bowel syndrome)  GERD (gastroesophageal reflux disease)  Intestinal Whipple's disease  S/P unilateral inguinal hernia repair  S/P tendon repair: hand  History of lumbar laminectomy  History of biliary stent insertion: 2017  S/P cervical spinal fusion    Allergies    penicillin G benzathine (Rash)  sulfa drugs (Rash)    Intolerances      Antimicrobials:    cefepime   IVPB 2000 milliGRAM(s) IV Intermittent every 12 hours  metroNIDAZOLE  IVPB 500 milliGRAM(s) IV Intermittent every 8 hours  metroNIDAZOLE  IVPB      vancomycin  IVPB 1000 milliGRAM(s) IV Intermittent every 12 hours    MEDICATIONS  (STANDING):  cefepime   IVPB 2000 milliGRAM(s) IV Intermittent every 12 hours  chlorhexidine 4% Liquid 1 Application(s) Topical daily  doxazosin 1 milliGRAM(s) Oral at bedtime  gabapentin 300 milliGRAM(s) Oral at bedtime  heparin  Infusion.  Unit(s)/Hr (12 mL/Hr) IV Continuous <Continuous>  lidocaine   Patch 1 Patch Transdermal every 24 hours  metoprolol succinate ER 25 milliGRAM(s) Oral daily  metroNIDAZOLE  IVPB 500 milliGRAM(s) IV Intermittent every 8 hours  metroNIDAZOLE  IVPB      pantoprazole    Tablet 40 milliGRAM(s) Oral before breakfast  sodium chloride 0.9%. 1000 milliLiter(s) (75 mL/Hr) IV Continuous <Continuous>  vancomycin  IVPB 1000 milliGRAM(s) IV Intermittent every 12 hours      Vital Signs Last 24 Hrs  T(C): 36.7 (12-15-19 @ 04:52), Max: 38.8 (19 @ 18:16)  T(F): 98 (12-15-19 @ 04:52), Max: 101.9 (19 @ 18:16)  HR: 80 (12-15-19 @ 04:52) (80 - 96)  BP: 97/58 (12-15-19 @ 04:52) (97/58 - 106/74)  BP(mean): --  RR: 18 (12-15-19 @ 04:52) (18 - 18)  SpO2: 95% (12-15-19 @ 04:52) (95% - 98%)    Physical Exam:    Constitutional  pt resting quielty  c/o abd discomfort    HEENT PERRLA EOMI,No pallor or icterus    No oral exudate or erythema  mediport    Neck supple no JVD or LN    Chest Good AE,CTA    CVS S1 S2     Abd  distended  no rebound    Ext trace edema    IV site no erythema tenderness or discharge    Joints no swelling or LOM    CNS AAO X 3 no focal    Lab Data:                          8.0    7.75  )-----------( 227      ( 15 Dec 2019 06:47 )             25.4       12-15    135  |  102  |  10  ----------------------------<  112<H>  3.4<L>   |  23  |  0.68    Ca    7.6<L>      15 Dec 2019 06:47  Phos  2.7     12-15  Mg     1.8     12-15    TPro  5.1<L>  /  Alb  2.2<L>  /  TBili  0.6  /  DBili  x   /  AST  19  /  ALT  11  /  AlkPhos  308<H>  12-15      Urinalysis Basic - ( 15 Dec 2019 10:24 )    Color: Yellow / Appearance: Turbid / S.024 / pH: x  Gluc: x / Ketone: Trace  / Bili: Negative / Urobili: Negative   Blood: x / Protein: 30 mg/dL / Nitrite: Positive   Leuk Esterase: Negative / RBC: 0 /hpf / WBC 5 /HPF   Sq Epi: x / Non Sq Epi: 2 / Bacteria: Many        .Urine Clean Catch (Midstream)  19   No growth  --  --      .Blood Blood-Peripheral  19   No growth to date.  --  --        < from: [facility] Duplex Lower Ext Vein Scan, Left (19 @ 17:29) >  IMPRESSION:     No evidence of left lower extremity deep venous thrombosis.    < end of copied text >    < from: CT Chest No Cont (19 @ 16:19) >  EXAM:  CT CHEST                            PROCEDURE DATE:  2019            INTERPRETATION:  Reason for Exam:  Fevers    CT of the chest was performed from the thoracic inlet to the level of the   adrenal glands without contrast injection.    Comparison: Chest x-ray dated 2019 and prior CT chest dated   2019    Tubes/Lines: Right Mediport catheter with tip in SVC.     Mediastinum/Vessels/Heart: Aorta and pulmonary arteries are normal in   size. There is no pericardial effusion. No lymphadenopathy. Coronary   artery calcifications are noted. Thyroid gland is unremarkable    Lungs/Pleura/Airways: Small left pleural effusion with loculated   component in the left major fissure, new since previous exam. There is   resultant partial collapse of the left lower lobe without associated   endobronchial lesion.    Minimal focal patchy consolidation in the left upper lobe on series 2   image 40, new since prior exam    Visualized abdomen: Intra-abdominal ascites, new since previous exam.   Multiple lymph nodes within the mesentery along with questionable nodular   studding of the mesenteric fat suggestive of peritoneal carcinomatosis    Bones and soft tissues: No suspicious osseous lesions. Degenerative   changes noted throughout the spine.    IMPRESSION:    When compared with prior study of 2019:    New small left pleural effusion with resultant partial collapse of the   left lower lobe    New focal patchy consolidation in the left upper lobe    Intra-abdominal ascites, new since previous exam. Multiple lymph nodes   within the mesentery along with questionable nodular studding of the   mesenteric fat suggestive of peritoneal carcinomatosis      < end of copied text >        Vancomycin Level, Trough: 14.5 ug/mL (19 @ 09:53)      WBC Count: 7.75 (12-15-19 @ 06:47)  WBC Count: 8.92 (19 @ 18:17)  WBC Count: 7.42 (19 @ 09:53)  WBC Count: 8.42 (19 @ 02:28)  WBC Count: 12.37 (19 @ 10:44)  WBC Count: 16.53 (19 @ 22:52)  WBC Count: 10.78 (19 @ 00:07)

## 2019-12-15 NOTE — PROGRESS NOTE ADULT - PROBLEM SELECTOR PLAN 4
- pt on metoprolol succinate 25 mg for rate control; continue  - pt also on Eliquis, 5mg BID. will switch to hep gtt for para/thora - pt on metoprolol succinate 25 mg for rate control; continue  - pt also on Eliquis, 5mg BID, held and bridged with hep gtt for paracentesis

## 2019-12-16 ENCOUNTER — RESULT REVIEW (OUTPATIENT)
Age: 69
End: 2019-12-16

## 2019-12-16 ENCOUNTER — TRANSCRIPTION ENCOUNTER (OUTPATIENT)
Age: 69
End: 2019-12-16

## 2019-12-16 DIAGNOSIS — D89.9 DISORDER INVOLVING THE IMMUNE MECHANISM, UNSPECIFIED: ICD-10-CM

## 2019-12-16 LAB
ALBUMIN FLD-MCNC: 0.5 G/DL — SIGNIFICANT CHANGE UP
ALBUMIN SERPL ELPH-MCNC: 2.3 G/DL — LOW (ref 3.3–5)
ALP SERPL-CCNC: 331 U/L — HIGH (ref 40–120)
ALT FLD-CCNC: 10 U/L — SIGNIFICANT CHANGE UP (ref 10–45)
ANION GAP SERPL CALC-SCNC: 7 MMOL/L — SIGNIFICANT CHANGE UP (ref 5–17)
APTT BLD: 73 SEC — HIGH (ref 27.5–36.3)
AST SERPL-CCNC: 15 U/L — SIGNIFICANT CHANGE UP (ref 10–40)
B PERT IGG+IGM PNL SER: CLEAR — SIGNIFICANT CHANGE UP
BASOPHILS # BLD AUTO: 0.05 K/UL — SIGNIFICANT CHANGE UP (ref 0–0.2)
BASOPHILS NFR BLD AUTO: 0.7 % — SIGNIFICANT CHANGE UP (ref 0–2)
BILIRUB SERPL-MCNC: 0.5 MG/DL — SIGNIFICANT CHANGE UP (ref 0.2–1.2)
BUN SERPL-MCNC: 7 MG/DL — SIGNIFICANT CHANGE UP (ref 7–23)
CALCIUM SERPL-MCNC: 8.2 MG/DL — LOW (ref 8.4–10.5)
CHLORIDE SERPL-SCNC: 102 MMOL/L — SIGNIFICANT CHANGE UP (ref 96–108)
CO2 SERPL-SCNC: 23 MMOL/L — SIGNIFICANT CHANGE UP (ref 22–31)
COLOR FLD: SIGNIFICANT CHANGE UP
CREAT SERPL-MCNC: 0.66 MG/DL — SIGNIFICANT CHANGE UP (ref 0.5–1.3)
CULTURE RESULTS: NO GROWTH — SIGNIFICANT CHANGE UP
EOSINOPHIL # BLD AUTO: 0.33 K/UL — SIGNIFICANT CHANGE UP (ref 0–0.5)
EOSINOPHIL NFR BLD AUTO: 4.3 % — SIGNIFICANT CHANGE UP (ref 0–6)
FLUID INTAKE SUBSTANCE CLASS: SIGNIFICANT CHANGE UP
FLUID SEGMENTED GRANULOCYTES: 21 % — SIGNIFICANT CHANGE UP
GLUCOSE FLD-MCNC: 127 MG/DL — SIGNIFICANT CHANGE UP
GLUCOSE SERPL-MCNC: 119 MG/DL — HIGH (ref 70–99)
GRAM STN FLD: SIGNIFICANT CHANGE UP
HCT VFR BLD CALC: 26.3 % — LOW (ref 39–50)
HGB BLD-MCNC: 8.7 G/DL — LOW (ref 13–17)
IMM GRANULOCYTES NFR BLD AUTO: 0.8 % — SIGNIFICANT CHANGE UP (ref 0–1.5)
INR BLD: 1.63 RATIO — HIGH (ref 0.88–1.16)
LDH SERPL L TO P-CCNC: 34 U/L — SIGNIFICANT CHANGE UP
LYMPHOCYTES # BLD AUTO: 0.75 K/UL — LOW (ref 1–3.3)
LYMPHOCYTES # BLD AUTO: 9.8 % — LOW (ref 13–44)
LYMPHOCYTES # FLD: 59 % — SIGNIFICANT CHANGE UP
MAGNESIUM SERPL-MCNC: 1.9 MG/DL — SIGNIFICANT CHANGE UP (ref 1.6–2.6)
MCHC RBC-ENTMCNC: 26.8 PG — LOW (ref 27–34)
MCHC RBC-ENTMCNC: 33.1 GM/DL — SIGNIFICANT CHANGE UP (ref 32–36)
MCV RBC AUTO: 80.9 FL — SIGNIFICANT CHANGE UP (ref 80–100)
MESOTHL CELL # FLD: 6 % — SIGNIFICANT CHANGE UP
MONOCYTES # BLD AUTO: 1.13 K/UL — HIGH (ref 0–0.9)
MONOCYTES NFR BLD AUTO: 14.7 % — HIGH (ref 2–14)
MONOS+MACROS # FLD: 14 % — SIGNIFICANT CHANGE UP
NEUTROPHILS # BLD AUTO: 5.37 K/UL — SIGNIFICANT CHANGE UP (ref 1.8–7.4)
NEUTROPHILS NFR BLD AUTO: 69.7 % — SIGNIFICANT CHANGE UP (ref 43–77)
NRBC # BLD: 0 /100 WBCS — SIGNIFICANT CHANGE UP (ref 0–0)
PHOSPHATE SERPL-MCNC: 2.3 MG/DL — LOW (ref 2.5–4.5)
PLATELET # BLD AUTO: 247 K/UL — SIGNIFICANT CHANGE UP (ref 150–400)
POTASSIUM SERPL-MCNC: 3.7 MMOL/L — SIGNIFICANT CHANGE UP (ref 3.5–5.3)
POTASSIUM SERPL-SCNC: 3.7 MMOL/L — SIGNIFICANT CHANGE UP (ref 3.5–5.3)
PROT FLD-MCNC: 1.5 G/DL — SIGNIFICANT CHANGE UP
PROT SERPL-MCNC: 5.6 G/DL — LOW (ref 6–8.3)
PROTHROM AB SERPL-ACNC: 18.9 SEC — HIGH (ref 10–12.9)
RBC # BLD: 3.25 M/UL — LOW (ref 4.2–5.8)
RBC # FLD: 18.5 % — HIGH (ref 10.3–14.5)
RCV VOL RI: 230 /UL — HIGH (ref 0–0)
SODIUM SERPL-SCNC: 132 MMOL/L — LOW (ref 135–145)
SPECIMEN SOURCE: SIGNIFICANT CHANGE UP
SPECIMEN SOURCE: SIGNIFICANT CHANGE UP
TOTAL NUCLEATED CELL COUNT, BODY FLUID: 108 /UL — SIGNIFICANT CHANGE UP
TUBE TYPE: SIGNIFICANT CHANGE UP
VANCOMYCIN TROUGH SERPL-MCNC: 19.1 UG/ML — SIGNIFICANT CHANGE UP (ref 10–20)
WBC # BLD: 7.69 K/UL — SIGNIFICANT CHANGE UP (ref 3.8–10.5)
WBC # FLD AUTO: 7.69 K/UL — SIGNIFICANT CHANGE UP (ref 3.8–10.5)

## 2019-12-16 PROCEDURE — 99233 SBSQ HOSP IP/OBS HIGH 50: CPT

## 2019-12-16 PROCEDURE — 88112 CYTOPATH CELL ENHANCE TECH: CPT | Mod: 26

## 2019-12-16 PROCEDURE — 88305 TISSUE EXAM BY PATHOLOGIST: CPT | Mod: 26

## 2019-12-16 RX ORDER — APIXABAN 2.5 MG/1
10 TABLET, FILM COATED ORAL EVERY 12 HOURS
Refills: 0 | Status: DISCONTINUED | OUTPATIENT
Start: 2019-12-16 | End: 2019-12-17

## 2019-12-16 RX ORDER — MOXIFLOXACIN HYDROCHLORIDE TABLETS, 400 MG 400 MG/1
1 TABLET, FILM COATED ORAL
Qty: 30 | Refills: 1
Start: 2019-12-16 | End: 2020-02-13

## 2019-12-16 RX ORDER — FAMOTIDINE 10 MG/ML
1 INJECTION INTRAVENOUS
Qty: 60 | Refills: 1
Start: 2019-12-16 | End: 2020-02-13

## 2019-12-16 RX ORDER — APIXABAN 2.5 MG/1
2 TABLET, FILM COATED ORAL
Qty: 28 | Refills: 0
Start: 2019-12-16 | End: 2019-12-22

## 2019-12-16 RX ORDER — ALBUMIN HUMAN 25 %
200 VIAL (ML) INTRAVENOUS ONCE
Refills: 0 | Status: COMPLETED | OUTPATIENT
Start: 2019-12-16 | End: 2019-12-16

## 2019-12-16 RX ORDER — HYDROMORPHONE HYDROCHLORIDE 2 MG/ML
0.25 INJECTION INTRAMUSCULAR; INTRAVENOUS; SUBCUTANEOUS ONCE
Refills: 0 | Status: DISCONTINUED | OUTPATIENT
Start: 2019-12-16 | End: 2019-12-16

## 2019-12-16 RX ADMIN — Medication 50 MILLILITER(S): at 17:24

## 2019-12-16 RX ADMIN — OXYCODONE HYDROCHLORIDE 5 MILLIGRAM(S): 5 TABLET ORAL at 17:24

## 2019-12-16 RX ADMIN — OXYCODONE HYDROCHLORIDE 5 MILLIGRAM(S): 5 TABLET ORAL at 05:44

## 2019-12-16 RX ADMIN — OXYCODONE HYDROCHLORIDE 5 MILLIGRAM(S): 5 TABLET ORAL at 23:41

## 2019-12-16 RX ADMIN — Medication 25 MILLIGRAM(S): at 05:40

## 2019-12-16 RX ADMIN — HYDROMORPHONE HYDROCHLORIDE 0.25 MILLIGRAM(S): 2 INJECTION INTRAMUSCULAR; INTRAVENOUS; SUBCUTANEOUS at 15:32

## 2019-12-16 RX ADMIN — Medication 62.5 MILLIMOLE(S): at 12:06

## 2019-12-16 RX ADMIN — CHLORHEXIDINE GLUCONATE 1 APPLICATION(S): 213 SOLUTION TOPICAL at 12:07

## 2019-12-16 RX ADMIN — CEFEPIME 100 MILLIGRAM(S): 1 INJECTION, POWDER, FOR SOLUTION INTRAMUSCULAR; INTRAVENOUS at 05:40

## 2019-12-16 RX ADMIN — OXYCODONE HYDROCHLORIDE 5 MILLIGRAM(S): 5 TABLET ORAL at 23:11

## 2019-12-16 RX ADMIN — Medication 250 MILLIGRAM(S): at 22:57

## 2019-12-16 RX ADMIN — OXYCODONE HYDROCHLORIDE 5 MILLIGRAM(S): 5 TABLET ORAL at 12:04

## 2019-12-16 RX ADMIN — OXYCODONE HYDROCHLORIDE 5 MILLIGRAM(S): 5 TABLET ORAL at 01:00

## 2019-12-16 RX ADMIN — PANTOPRAZOLE SODIUM 40 MILLIGRAM(S): 20 TABLET, DELAYED RELEASE ORAL at 05:41

## 2019-12-16 RX ADMIN — Medication 0.5 MILLIGRAM(S): at 13:54

## 2019-12-16 RX ADMIN — Medication 1 MILLIGRAM(S): at 22:57

## 2019-12-16 RX ADMIN — APIXABAN 10 MILLIGRAM(S): 2.5 TABLET, FILM COATED ORAL at 22:58

## 2019-12-16 RX ADMIN — HYDROMORPHONE HYDROCHLORIDE 0.25 MILLIGRAM(S): 2 INJECTION INTRAMUSCULAR; INTRAVENOUS; SUBCUTANEOUS at 16:20

## 2019-12-16 RX ADMIN — GABAPENTIN 300 MILLIGRAM(S): 400 CAPSULE ORAL at 22:58

## 2019-12-16 RX ADMIN — Medication 100 MILLIGRAM(S): at 23:11

## 2019-12-16 RX ADMIN — Medication 100 MILLIGRAM(S): at 17:24

## 2019-12-16 RX ADMIN — OXYCODONE HYDROCHLORIDE 5 MILLIGRAM(S): 5 TABLET ORAL at 12:35

## 2019-12-16 RX ADMIN — OXYCODONE HYDROCHLORIDE 5 MILLIGRAM(S): 5 TABLET ORAL at 06:34

## 2019-12-16 RX ADMIN — CEFEPIME 100 MILLIGRAM(S): 1 INJECTION, POWDER, FOR SOLUTION INTRAMUSCULAR; INTRAVENOUS at 17:23

## 2019-12-16 RX ADMIN — Medication 100 MILLIGRAM(S): at 06:23

## 2019-12-16 RX ADMIN — OXYCODONE HYDROCHLORIDE 5 MILLIGRAM(S): 5 TABLET ORAL at 18:00

## 2019-12-16 RX ADMIN — OXYCODONE HYDROCHLORIDE 5 MILLIGRAM(S): 5 TABLET ORAL at 01:40

## 2019-12-16 NOTE — PROGRESS NOTE ADULT - SUBJECTIVE AND OBJECTIVE BOX
Interventional Radiology Pre-Procedure Note    68 yo male with PMH cholangiocarcinoma (past chemotherapy and radiation therapy, attempting to restart chemo) and chronic back pain, GERD, IBS with diarrhea, and recently diagnosed atrial fibrillation with recent admission for fevers with negative infectious workup, presenting with new onset fevers up to 105 (per patient) with unclear source. Likely infectious etiology with infectious workup pending, but cannot exclude alternative diagnoses such as new malignancy given recent past negative infectious workup. Referred to IR for diagnostic and therapeutic paracentesis.    PAST MEDICAL & SURGICAL HISTORY:  Rash  Biliary stricture: s/p biliary stent  Pancreatic lesion  Gastric polyp  Obstructive jaundice  Other hyperlipidemia  Essential hypertension  IBS (irritable bowel syndrome)  GERD (gastroesophageal reflux disease)  Intestinal Whipple's disease  S/P unilateral inguinal hernia repair  S/P tendon repair: hand  History of lumbar laminectomy  History of biliary stent insertion: 2/21/2017  S/P cervical spinal fusion       Vital Signs Last 24 Hrs  T(C): 37 (16 Dec 2019 04:19), Max: 37.1 (15 Dec 2019 21:29)  T(F): 98.6 (16 Dec 2019 04:19), Max: 98.7 (15 Dec 2019 21:29)  HR: 86 (16 Dec 2019 04:19) (85 - 91)  BP: 106/63 (16 Dec 2019 04:19) (101/63 - 106/63)  RR: 18 (16 Dec 2019 04:19) (18 - 18)  SpO2: 93% (16 Dec 2019 04:19) (93% - 97%)    Allergies:  penicillin G benzathine (Rash)  sulfa drugs (Rash)      Physical Exam: Gen: NAD; A&Ox3    Labs:                         8.7    7.69  )-----------( 247      ( 16 Dec 2019 06:49 )             26.3     12-16    132<L>  |  102  |  7   ----------------------------<  119<H>  3.7   |  23  |  0.66    Ca    8.2<L>      16 Dec 2019 06:49  Phos  2.3     12-16  Mg     1.9     12-16    TPro  5.6<L>  /  Alb  2.3<L>  /  TBili  0.5  /  DBili  x   /  AST  15  /  ALT  10  /  AlkPhos  331<H>  12-16    PT/INR - ( 16 Dec 2019 06:49 )   PT: 18.9 sec;   INR: 1.63 ratio         PTT - ( 16 Dec 2019 06:49 )  PTT:73.0 sec    Informed consent obtained. All questions and concerns have been addressed at this time.     Plan: Diagnostic and therapeutic paracentesis.

## 2019-12-16 NOTE — PROGRESS NOTE ADULT - ASSESSMENT
69 m with HTN, HLD, cholangiocarcinoma s/p Whipple 2017 with recurrence of disease now with mets to the liver, portal vein thrombus and SMA narrowing, chronic diarrhea with negative infection w/u, ascites and intrahepatic biliary dilatation, multiple admissions with sepsis and negative work up, last one a week ago and abd CT showed new liver infarcts, now again brought in with high fevers to 105 and rigors  here febrile 100.4, tachycardic and hypotensive with active rigors  WBC: 10.87, RVP negative, CXR small pleural effusion and atelectasis    fever, rigors, tachycardia, hypotension, sepsis, in the setting of metastatic cholangiocarcinoma and portal vein thrombosis, liver infarcts and intrahepatic biliary ductal dilatation  SBP vs tumor fever  chest Ct with new minimal focal patchy consolidation in LINDA, new small L pleural effusion and partial collapse of LLL but no significant cough or respiratory symptoms and effusion simple on u/s  abd/pelvis CT: increased ascites and mod L pleural effusion    * blood and  urine cx negative and pt has been afebrile  * c/w vanco 1 q 12 for now, started 12/12, now day 5  * c/w cefepime 2 q8  * c/w flagyl 500 q 8   * f/u the ascites results

## 2019-12-16 NOTE — PROGRESS NOTE ADULT - PROBLEM SELECTOR PLAN 1
Meets SIRS criteria - pt febrile with leukocytosis and hypotension on presentation. Likely infectious etiology (?possible PNA verse intra-abdominal source) verse tumor fever   - 12/14 febrile to 101.9 on broad coverage  - UA 12/12 negative, UA 12/15 - positive for nitrites and bacteria, may be contaminant per ID, f/u ucx  - CTC LINDA patchy opacity ?PNA  - bcx 12/12 NGT, 12/14 pending. UCx 12/12 NGTD  - ordered ctap w/ iv and oral contrast  - ID recs appreciated, pt on cefepime, vanc, and metronidazole  - Pulm recs appreciated. Pleural effusion too small to tap.   - Will need to be off eliquis for 48 hours prior to any procedure. (held since 12/13, on hep gtt)  - IR diagnostic/therapeutic para today, will restart eliquis after

## 2019-12-16 NOTE — DISCHARGE NOTE PROVIDER - CARE PROVIDER_API CALL
Homero Melgar (DO)  Internal Medicine  [address] [place] [address], [address]  [place] [address]  Phone: [phone]  Fax: [phone]  Established Patient  Follow Up Time: 1 week

## 2019-12-16 NOTE — DISCHARGE NOTE PROVIDER - NSDCMRMEDTOKEN_GEN_ALL_CORE_FT
aluminum hydroxide-magnesium hydroxide 200 mg-200 mg/5 mL oral suspension: 30 milliliter(s) orally every 4 hours, As needed, Dyspepsia  apixaban 5 mg oral tablet: 1 tab(s) orally 2 times a day  doxazosin 1 mg oral tablet: 1 tab(s) orally once a day (at bedtime)  gabapentin 300 mg oral capsule: 1 cap(s) orally once a day (at bedtime)  loperamide 2 mg oral capsule: 1 cap(s) orally every 6 hours, As needed, Diarrhea  metoprolol succinate 25 mg oral tablet, extended release: 1 tab(s) orally once a day  oxyCODONE 5 mg oral tablet: 1 tab(s) orally every 4 hours, As needed, moderate and severe pain MDD:30 mg  pantoprazole 40 mg oral delayed release tablet: 1 tab(s) orally once a day (before a meal)  simethicone 80 mg oral tablet, chewable: 1 tab(s) orally 2 times a day, As needed, Gas aluminum hydroxide-magnesium hydroxide 200 mg-200 mg/5 mL oral suspension: 30 milliliter(s) orally every 4 hours, As needed, Dyspepsia  doxazosin 1 mg oral tablet: 1 tab(s) orally once a day (at bedtime)  gabapentin 300 mg oral capsule: 1 cap(s) orally once a day (at bedtime)  metoprolol succinate 25 mg oral tablet, extended release: 1 tab(s) orally once a day  oxyCODONE 5 mg oral tablet: 1 tab(s) orally every 4 hours, As needed, moderate and severe pain MDD:30 mg  pantoprazole 40 mg oral delayed release tablet: 1 tab(s) orally once a day (before a meal)  simethicone 80 mg oral tablet, chewable: 1 tab(s) orally 2 times a day, As needed, Gas aluminum hydroxide-magnesium hydroxide 200 mg-200 mg/5 mL oral suspension: 30 milliliter(s) orally every 4 hours, As needed, Dyspepsia  Cipro 500 mg oral tablet: 1 tab(s) orally once a day   doxazosin 1 mg oral tablet: 1 tab(s) orally once a day (at bedtime)  Eliquis 5 mg oral tablet: 2 tab(s) orally 2 times a day starting for 6 day. Starting on 12/23 in the evening, continue taking 1 tab orally 2 times a day.   famotidine 20 mg oral tablet: 1 tab(s) orally 2 times a day, As needed, GERD  gabapentin 300 mg oral capsule: 1 cap(s) orally once a day (at bedtime)  metoprolol succinate 25 mg oral tablet, extended release: 1 tab(s) orally once a day  oxyCODONE 5 mg oral tablet: 1 tab(s) orally every 4 hours, As needed, moderate and severe pain MDD:30 mg  pantoprazole 40 mg oral delayed release tablet: 1 tab(s) orally once a day (before a meal)  simethicone 80 mg oral tablet, chewable: 1 tab(s) orally 2 times a day, As needed, Gas

## 2019-12-16 NOTE — PROGRESS NOTE ADULT - SUBJECTIVE AND OBJECTIVE BOX
Follow Up:  sepsis, metastatic cholangiocarcinoma and peritoneal carcinomatosis with portal vein thrombosis and liver infarct    Interval History: now pt afebrile, feels better but still with abd distention and discomfort    ROS:      All other systems negative    Constitutional: no fever, no chills  Head: no trauma  Eyes: no vision changes, no eye pain  ENT:  no sore throat, no rhinorrhea  Cardiovascular:  no chest pain, no palpitation  Respiratory:  no SOB, no cough  GI:  occasional abd distention and pain, occasional vomiting, no diarrhea  urinary: no dysuria, no hematuria, no flank pain  musculoskeletal:  no joint pain, no joint swelling  skin:  no rash  neurology:  no headache, no seizure, no change in mental status  psych: no anxiety, no depression         Allergies  penicillin G benzathine (Rash)  sulfa drugs (Rash)        ANTIMICROBIALS:  cefepime   IVPB 2000 every 12 hours  metroNIDAZOLE  IVPB 500 every 8 hours  metroNIDAZOLE  IVPB    vancomycin  IVPB 1000 every 12 hours      OTHER MEDS:  acetaminophen   Tablet .. 650 milliGRAM(s) Oral every 6 hours PRN  aluminum hydroxide/magnesium hydroxide/simethicone Suspension 30 milliLiter(s) Oral every 6 hours PRN  chlorhexidine 4% Liquid 1 Application(s) Topical daily  doxazosin 1 milliGRAM(s) Oral at bedtime  famotidine    Tablet 20 milliGRAM(s) Oral two times a day PRN  gabapentin 300 milliGRAM(s) Oral at bedtime  lidocaine   Patch 1 Patch Transdermal every 24 hours  loperamide 2 milliGRAM(s) Oral every 6 hours PRN  metoprolol succinate ER 25 milliGRAM(s) Oral daily  oxyCODONE    IR 5 milliGRAM(s) Oral every 4 hours PRN  pantoprazole    Tablet 40 milliGRAM(s) Oral before breakfast  sodium chloride 0.9%. 1000 milliLiter(s) IV Continuous <Continuous>      Vital Signs Last 24 Hrs  T(C): 37 (16 Dec 2019 04:19), Max: 37.1 (15 Dec 2019 21:29)  T(F): 98.6 (16 Dec 2019 04:19), Max: 98.7 (15 Dec 2019 21:29)  HR: 86 (16 Dec 2019 04:19) (85 - 91)  BP: 106/63 (16 Dec 2019 04:19) (101/63 - 106/63)  BP(mean): --  RR: 18 (16 Dec 2019 04:19) (18 - 18)  SpO2: 93% (16 Dec 2019 04:19) (93% - 97%)    Physical Exam:  General:  NAD, non toxic  Head: atraumatic, normocephalic  Eyes: normal sclera and conjunctiva  ENT:   no oropharyngeal lesions, no LAD, neck supple  Cardio:    regular S1,S2, no murmur  Respiratory:   clear b/l, no wheezing  abd:   distended but sift, slight diffuse tenderness  :     no CVAT, no suprapubic tenderness, no sawant  Musculoskeletal : no joint swelling, no edema  Skin:    no rash  vascular: R chest port  Neurologic:     no focal deficits  psych: normal affect                        8.7    7.69  )-----------( 247      ( 16 Dec 2019 06:49 )             26.3           132<L>  |  102  |  7   ----------------------------<  119<H>  3.7   |  23  |  0.66    Ca    8.2<L>      16 Dec 2019 06:49  Phos  2.3       Mg     1.9         TPro  5.6<L>  /  Alb  2.3<L>  /  TBili  0.5  /  DBili  x   /  AST  15  /  ALT  10  /  AlkPhos  331<H>        Urinalysis Basic - ( 15 Dec 2019 17:06 )    Color: Orange / Appearance: Turbid / S.027 / pH: x  Gluc: x / Ketone: Trace  / Bili: Negative / Urobili: Negative   Blood: x / Protein: 30 mg/dL / Nitrite: Negative   Leuk Esterase: Negative / RBC: 0 /hpf / WBC 1 /HPF   Sq Epi: x / Non Sq Epi: 2 / Bacteria: Negative        MICROBIOLOGY:  Vancomycin Level, Trough: 19.1 ug/mL (19 @ 09:41)  v  .Blood Blood-Peripheral  19   No growth to date.  --  --      .Urine Clean Catch (Midstream)  19   No growth  --  --      .Blood Blood-Peripheral  19   No growth to date.  --  --      FECES  19 --  --  --      FECES  19 --  --  --      URINE MIDSTREAM  19 --  --  --      BLOOD  19 --  --  --      Ascites Fl Peritoneal Fluid  19   No growth  --  --      Ascites Fl Peritoneal Fluid  19   No growth at 5 days  --    No polymorphonuclear cells seen  No organisms seen by cytocentrifuge      .Blood Blood-Peripheral  19   No growth at 5 days.  --  --      .Body Fluid Peritoneal Fluid  19   No growth at 5 days  --    polymorphonuclear leukocytes seen  No organisms seen  by cytocentrifuge      .Stool Feces  19   No Protozoa seen by trichrome stain  No Helminths or Protozoa seen in formalin concentrate  performed by iodine stain  (routine O+P not evaluated for Microsporidia,  Cryptosporidia, Cyclospora, or Isospora.)  One negative sample does not necessarily rule  out the presence of a parasitic infection.  --  --      .Stool Feces  19   GI PCR Results: NOT detected  *******Please Note:*******  GI panel PCR evaluates for:  Campylobacter, Plesiomonas shigelloides, Salmonella,  Vibrio, Yersinia enterocolitica, Enteroaggregative  Escherichia coli (EAEC), Enteropathogenic E.coli (EPEC),  Enterotoxigenic E. coli (ETEC) lt/st, Shiga-like  toxin-producing E. coli (STEC) stx1/stx2,  Shigella/ Enteroinvasive E. coli (EIEC), Cryptosporidium,  Cyclospora cayetanensis, Entamoeba histolytica,  Giardia lamblia, Adenovirus F 40/41, Astrovirus,  Norovirus GI/GII, Rotavirus A, Sapovirus  --  --      .Urine Clean Catch (Midstream)  19   No growth  --  --      .Blood Blood-Peripheral  19   No growth at 5 days.  --  --      .Blood Blood-Peripheral  19   No growth at 5 days.  --  --          Rapid RVP Result: [employer] ( @ 00:47)        RADIOLOGY:  Images below reviewed personally  < from: CT Abdomen and Pelvis w/ Oral Cont and w/ IV Cont (12.15.19 @ 18:07) >  IMPRESSION:     Redemonstrated hepatic metastatic disease and portal venous thrombosis.    Large volume ascites, increased from prior exam.    Moderate left pleural effusion. Follow Up:  sepsis, metastatic cholangiocarcinoma and peritoneal carcinomatosis with portal vein thrombosis and liver infarct    Interval History: now pt afebrile, feels better but still with abd distention and discomfort    ROS:      All other systems negative    Constitutional: no fever, no chills  Head: no trauma  Eyes: no vision changes, no eye pain  ENT:  no sore throat, no rhinorrhea  Cardiovascular:  no chest pain, no palpitation  Respiratory:  no SOB, no cough  GI:  occasional abd distention and pain and nausea, no diarrhea  urinary: no dysuria, no hematuria, no flank pain  musculoskeletal:  no joint pain, no joint swelling  skin:  no rash  neurology:  no headache, no seizure, no change in mental status  psych: no anxiety, no depression         Allergies  penicillin G benzathine (Rash)  sulfa drugs (Rash)        ANTIMICROBIALS:  cefepime   IVPB 2000 every 12 hours  metroNIDAZOLE  IVPB 500 every 8 hours  metroNIDAZOLE  IVPB    vancomycin  IVPB 1000 every 12 hours      OTHER MEDS:  acetaminophen   Tablet .. 650 milliGRAM(s) Oral every 6 hours PRN  aluminum hydroxide/magnesium hydroxide/simethicone Suspension 30 milliLiter(s) Oral every 6 hours PRN  chlorhexidine 4% Liquid 1 Application(s) Topical daily  doxazosin 1 milliGRAM(s) Oral at bedtime  famotidine    Tablet 20 milliGRAM(s) Oral two times a day PRN  gabapentin 300 milliGRAM(s) Oral at bedtime  lidocaine   Patch 1 Patch Transdermal every 24 hours  loperamide 2 milliGRAM(s) Oral every 6 hours PRN  metoprolol succinate ER 25 milliGRAM(s) Oral daily  oxyCODONE    IR 5 milliGRAM(s) Oral every 4 hours PRN  pantoprazole    Tablet 40 milliGRAM(s) Oral before breakfast  sodium chloride 0.9%. 1000 milliLiter(s) IV Continuous <Continuous>      Vital Signs Last 24 Hrs  T(C): 37 (16 Dec 2019 04:19), Max: 37.1 (15 Dec 2019 21:29)  T(F): 98.6 (16 Dec 2019 04:19), Max: 98.7 (15 Dec 2019 21:29)  HR: 86 (16 Dec 2019 04:19) (85 - 91)  BP: 106/63 (16 Dec 2019 04:19) (101/63 - 106/63)  BP(mean): --  RR: 18 (16 Dec 2019 04:19) (18 - 18)  SpO2: 93% (16 Dec 2019 04:19) (93% - 97%)    Physical Exam:  General:  NAD, non toxic  Head: atraumatic, normocephalic  Eyes: normal sclera and conjunctiva  ENT:   no oropharyngeal lesions, no LAD, neck supple  Cardio:    regular S1,S2, no murmur  Respiratory:   clear b/l, no wheezing  abd:   distended but soft, slight diffuse tenderness  :     no CVAT, no suprapubic tenderness, no sawant  Musculoskeletal : no joint swelling, no edema  Skin:    no rash  vascular: R chest port  Neurologic:     no focal deficits  psych: normal affect                        8.7    7.69  )-----------( 247      ( 16 Dec 2019 06:49 )             26.3           132<L>  |  102  |  7   ----------------------------<  119<H>  3.7   |  23  |  0.66    Ca    8.2<L>      16 Dec 2019 06:49  Phos  2.3       Mg     1.9         TPro  5.6<L>  /  Alb  2.3<L>  /  TBili  0.5  /  DBili  x   /  AST  15  /  ALT  10  /  AlkPhos  331<H>        Urinalysis Basic - ( 15 Dec 2019 17:06 )    Color: Orange / Appearance: Turbid / S.027 / pH: x  Gluc: x / Ketone: Trace  / Bili: Negative / Urobili: Negative   Blood: x / Protein: 30 mg/dL / Nitrite: Negative   Leuk Esterase: Negative / RBC: 0 /hpf / WBC 1 /HPF   Sq Epi: x / Non Sq Epi: 2 / Bacteria: Negative        MICROBIOLOGY:  Vancomycin Level, Trough: 19.1 ug/mL (19 @ 09:41)  v  .Blood Blood-Peripheral  19   No growth to date.  --  --      .Urine Clean Catch (Midstream)  19   No growth  --  --      .Blood Blood-Peripheral  19   No growth to date.  --  --      FECES  19 --  --  --      FECES  19 --  --  --      URINE MIDSTREAM  19 --  --  --      BLOOD  19 --  --  --      Ascites Fl Peritoneal Fluid  19   No growth  --  --      Ascites Fl Peritoneal Fluid  19   No growth at 5 days  --    No polymorphonuclear cells seen  No organisms seen by cytocentrifuge      .Blood Blood-Peripheral  19   No growth at 5 days.  --  --      .Body Fluid Peritoneal Fluid  19   No growth at 5 days  --    polymorphonuclear leukocytes seen  No organisms seen  by cytocentrifuge      .Stool Feces  19   No Protozoa seen by trichrome stain  No Helminths or Protozoa seen in formalin concentrate  performed by iodine stain  (routine O+P not evaluated for Microsporidia,  Cryptosporidia, Cyclospora, or Isospora.)  One negative sample does not necessarily rule  out the presence of a parasitic infection.  --  --      .Stool Feces  19   GI PCR Results: NOT detected  *******Please Note:*******  GI panel PCR evaluates for:  Campylobacter, Plesiomonas shigelloides, Salmonella,  Vibrio, Yersinia enterocolitica, Enteroaggregative  Escherichia coli (EAEC), Enteropathogenic E.coli (EPEC),  Enterotoxigenic E. coli (ETEC) lt/st, Shiga-like  toxin-producing E. coli (STEC) stx1/stx2,  Shigella/ Enteroinvasive E. coli (EIEC), Cryptosporidium,  Cyclospora cayetanensis, Entamoeba histolytica,  Giardia lamblia, Adenovirus F 40/41, Astrovirus,  Norovirus GI/GII, Rotavirus A, Sapovirus  --  --      .Urine Clean Catch (Midstream)  19   No growth  --  --      .Blood Blood-Peripheral  19   No growth at 5 days.  --  --      .Blood Blood-Peripheral  19   No growth at 5 days.  --  --          Rapid RVP Result: [facility] ( @ 00:47)        RADIOLOGY:  Images below reviewed personally  < from: CT Abdomen and Pelvis w/ Oral Cont and w/ IV Cont (12.15.19 @ 18:07) >  IMPRESSION:     Redemonstrated hepatic metastatic disease and portal venous thrombosis.    Large volume ascites, increased from prior exam.    Moderate left pleural effusion.

## 2019-12-16 NOTE — PROGRESS NOTE ADULT - PROBLEM SELECTOR PLAN 9
DVT: patient on Eliquis, will hold for thora, restart after para  Diet: regular  Dispo: uncertain at this time

## 2019-12-16 NOTE — PROGRESS NOTE ADULT - SUBJECTIVE AND OBJECTIVE BOX
Homero Lala MD, PGY-1  Pager #[phone]  After 7 PM on weekdays and 12 PM on weekends, for urgent issues please page #[phone].  ----------------------------------------------------------------  Patient is a 69y old  Male who presents with a chief complaint of fevers (15 Dec 2019 12:07)    SUBJECTIVE / OVERNIGHT EVENTS:    MEDICATIONS  (STANDING):  cefepime   IVPB 2000 milliGRAM(s) IV Intermittent every 12 hours  chlorhexidine 4% Liquid 1 Application(s) Topical daily  doxazosin 1 milliGRAM(s) Oral at bedtime  gabapentin 300 milliGRAM(s) Oral at bedtime  lidocaine   Patch 1 Patch Transdermal every 24 hours  metoprolol succinate ER 25 milliGRAM(s) Oral daily  metroNIDAZOLE  IVPB 500 milliGRAM(s) IV Intermittent every 8 hours  metroNIDAZOLE  IVPB      pantoprazole    Tablet 40 milliGRAM(s) Oral before breakfast  sodium chloride 0.9%. 1000 milliLiter(s) (85 mL/Hr) IV Continuous <Continuous>  sodium phosphate IVPB 15 milliMole(s) IV Intermittent once  vancomycin  IVPB 1000 milliGRAM(s) IV Intermittent every 12 hours    MEDICATIONS  (PRN):  acetaminophen   Tablet .. 650 milliGRAM(s) Oral every 6 hours PRN Temp greater or equal to 38C (100.4F)  aluminum hydroxide/magnesium hydroxide/simethicone Suspension 30 milliLiter(s) Oral every 6 hours PRN Dyspepsia/heartburn  famotidine    Tablet 20 milliGRAM(s) Oral two times a day PRN GERD  loperamide 2 milliGRAM(s) Oral every 6 hours PRN Diarrhea  oxyCODONE    IR 5 milliGRAM(s) Oral every 4 hours PRN Severe Pain (7 - 10)      CAPILLARY BLOOD GLUCOSE    I&O's Summary    15 Dec 2019 07:01  -  16 Dec 2019 07:00  --------------------------------------------------------  IN: 1592 mL / OUT: 0 mL / NET: 1592 mL    PHYSICAL EXAM:  Vital Signs Last 24 Hrs  T(C): 37 (16 Dec 2019 04:19), Max: 37.1 (15 Dec 2019 21:29)  T(F): 98.6 (16 Dec 2019 04:19), Max: 98.7 (15 Dec 2019 21:29)  HR: 86 (16 Dec 2019 04:19) (85 - 91)  BP: 106/63 (16 Dec 2019 04:19) (101/63 - 106/63)  RR: 18 (16 Dec 2019 04:19) (18 - 18)  SpO2: 93% (16 Dec 2019 04:19) (93% - 97%)    CONSTITUTIONAL: NAD  RESPIRATORY: Normal respiratory effort; lungs are clear to auscultation bilaterally  CARDIOVASCULAR: irregular heart rhythm, normal S1 and S2, no murmur/rub/gallop;  ABDOMEN: distended, soft, nontender to light and deep palpation, normoactive bowel sounds, no rebound/guarding  MUSCULOSKELETAL: Normal gait; no clubbing or cyanosis of digits; no joint swelling or tenderness to palpation  PSYCH: AOx3  NEUROLOGY: CN 2-12 are intact and symmetric; no gross sensory deficits   SKIN: No rashes; no palpable lesions    LABS:                        8.7    7.69  )-----------( 247      ( 16 Dec 2019 06:49 )             26.3     12-16    132<L>  |  102  |  7   ----------------------------<  119<H>  3.7   |  23  |  0.66    Ca    8.2<L>      16 Dec 2019 06:49  Phos  2.3     12-16  Mg     1.9     -    TPro  5.6<L>  /  Alb  2.3<L>  /  TBili  0.5  /  DBili  x   /  AST  15  /  ALT  10  /  AlkPhos  331<H>  12-16    PT/INR - ( 15 Dec 2019 06:47 )   PT: 19.3 sec;   INR: 1.65 ratio         PTT - ( 15 Dec 2019 10:24 )  PTT:79.8 sec      Urinalysis Basic - ( 15 Dec 2019 17:06 )    Color: Orange / Appearance: Turbid / S.027 / pH: x  Gluc: x / Ketone: Trace  / Bili: Negative / Urobili: Negative   Blood: x / Protein: 30 mg/dL / Nitrite: Negative   Leuk Esterase: Negative / RBC: 0 /hpf / WBC 1 /HPF   Sq Epi: x / Non Sq Epi: 2 / Bacteria: Negative        Culture - Blood (collected 14 Dec 2019 23:02)  Source: .Blood Blood-Venous  Preliminary Report (16 Dec 2019 01:02):    No growth to date.    Culture - Blood (collected 14 Dec 2019 23:02)  Source: .Blood Blood-Peripheral  Preliminary Report (16 Dec 2019 01:02):    No growth to date.    RADIOLOGY & ADDITIONAL TESTS:  Results Reviewed:   Imaging Personally Reviewed:  Electrocardiogram Personally Reviewed:    COORDINATION OF CARE:  Care Discussed with Consultants/Other Providers [Y/N]:  Prior or Outpatient Records Reviewed [Y/N]: Homero Lala MD, PGY-1  Pager #[phone]  After 7 PM on weekdays and 12 PM on weekends, for urgent issues please page #[phone].  ----------------------------------------------------------------  Patient is a 69y old  Male who presents with a chief complaint of fevers (15 Dec 2019 12:07)    SUBJECTIVE / OVERNIGHT EVENTS: No acute events overnight. Pt seen and examined at bedside. With chronic back pain and discomfort from distended abdomen. Pt denies any other acute complaints including headache, fever, chills, nausea, vomiting, diarrhea, constipation, chest pain, shortness of breath.     MEDICATIONS  (STANDING):  cefepime   IVPB 2000 milliGRAM(s) IV Intermittent every 12 hours  chlorhexidine 4% Liquid 1 Application(s) Topical daily  doxazosin 1 milliGRAM(s) Oral at bedtime  gabapentin 300 milliGRAM(s) Oral at bedtime  lidocaine   Patch 1 Patch Transdermal every 24 hours  metoprolol succinate ER 25 milliGRAM(s) Oral daily  metroNIDAZOLE  IVPB 500 milliGRAM(s) IV Intermittent every 8 hours  metroNIDAZOLE  IVPB      pantoprazole    Tablet 40 milliGRAM(s) Oral before breakfast  sodium chloride 0.9%. 1000 milliLiter(s) (85 mL/Hr) IV Continuous <Continuous>  sodium phosphate IVPB 15 milliMole(s) IV Intermittent once  vancomycin  IVPB 1000 milliGRAM(s) IV Intermittent every 12 hours    MEDICATIONS  (PRN):  acetaminophen   Tablet .. 650 milliGRAM(s) Oral every 6 hours PRN Temp greater or equal to 38C (100.4F)  aluminum hydroxide/magnesium hydroxide/simethicone Suspension 30 milliLiter(s) Oral every 6 hours PRN Dyspepsia/heartburn  famotidine    Tablet 20 milliGRAM(s) Oral two times a day PRN GERD  loperamide 2 milliGRAM(s) Oral every 6 hours PRN Diarrhea  oxyCODONE    IR 5 milliGRAM(s) Oral every 4 hours PRN Severe Pain (7 - 10)      CAPILLARY BLOOD GLUCOSE    I&O's Summary    15 Dec 2019 07:01  -  16 Dec 2019 07:00  --------------------------------------------------------  IN: 1592 mL / OUT: 0 mL / NET: 1592 mL    PHYSICAL EXAM:  Vital Signs Last 24 Hrs  T(C): 37 (16 Dec 2019 04:19), Max: 37.1 (15 Dec 2019 21:29)  T(F): 98.6 (16 Dec 2019 04:19), Max: 98.7 (15 Dec 2019 21:29)  HR: 86 (16 Dec 2019 04:19) (85 - 91)  BP: 106/63 (16 Dec 2019 04:19) (101/63 - 106/63)  RR: 18 (16 Dec 2019 04:19) (18 - 18)  SpO2: 93% (16 Dec 2019 04:19) (93% - 97%)    CONSTITUTIONAL: NAD  RESPIRATORY: Normal respiratory effort; lungs are clear to auscultation bilaterally  CARDIOVASCULAR: irregular heart rhythm, normal S1 and S2, no murmur/rub/gallop  ABDOMEN: markedly distended, soft, nontender to light and deep palpation, normoactive bowel sounds, no rebound/guarding  MUSCULOSKELETAL: Normal gait; no clubbing or cyanosis of digits; no joint swelling or tenderness to palpation  PSYCH: AOx3  NEUROLOGY: CN 2-12 are intact and symmetric; no gross sensory deficits   SKIN: No rashes; no palpable lesions    LABS:                        8.7    7.69  )-----------( 247      ( 16 Dec 2019 06:49 )             26.3     12-    132<L>  |  102  |  7   ----------------------------<  119<H>  3.7   |  23  |  0.66    Ca    8.2<L>      16 Dec 2019 06:49  Phos  2.3     12-  Mg     1.9     12-16    TPro  5.6<L>  /  Alb  2.3<L>  /  TBili  0.5  /  DBili  x   /  AST  15  /  ALT  10  /  AlkPhos  331<H>  12-16    PT/INR - ( 15 Dec 2019 06:47 )   PT: 19.3 sec;   INR: 1.65 ratio         PTT - ( 15 Dec 2019 10:24 )  PTT:79.8 sec      Urinalysis Basic - ( 15 Dec 2019 17:06 )    Color: Orange / Appearance: Turbid / S.027 / pH: x  Gluc: x / Ketone: Trace  / Bili: Negative / Urobili: Negative   Blood: x / Protein: 30 mg/dL / Nitrite: Negative   Leuk Esterase: Negative / RBC: 0 /hpf / WBC 1 /HPF   Sq Epi: x / Non Sq Epi: 2 / Bacteria: Negative        Culture - Blood (collected 14 Dec 2019 23:02)  Source: .Blood Blood-Venous  Preliminary Report (16 Dec 2019 01:02):    No growth to date.    Culture - Blood (collected 14 Dec 2019 23:02)  Source: .Blood Blood-Peripheral  Preliminary Report (16 Dec 2019 01:02):    No growth to date.    RADIOLOGY & ADDITIONAL TESTS:  Results Reviewed:   Imaging Personally Reviewed:  Electrocardiogram Personally Reviewed:    COORDINATION OF CARE:  Care Discussed with Consultants/Other Providers [Y/N]:  Prior or Outpatient Records Reviewed [Y/N]:

## 2019-12-16 NOTE — PROGRESS NOTE ADULT - ASSESSMENT
69 m with HTN, HLD, cholangiocarcinoma s/p [facility] 2017 with recurrence of disease now with mets to the liver, portal vein thrombus and SMA narrowing, chronic diarrhea with negative infection w/u, ascites and intrahepatic biliary dilatation, multiple admissions with sepsis and negative work up, last one a week ago and abd CT showed new liver infarcts, now again brought in with high fevers to 105 and rigors  here febrile 100.4, tachycardic and hypotensive with active rigors  WBC: 10.87, RVP negative, CXR small pleural effusion and atelectasis    fever, rigors, tachycardia, hypotension, sepsis, in the setting of metastatic cholangiocarcinoma and portal vein thrombosis, liver infarcts and intrahepatic biliary ductal dilatation  r/o bacteremia vs tumor fever  chest Ct with new minimal focal patchy consolidation in LINDA, new small L pleural effusion and partial collapse of LLL but no significant cough or respiratory symptoms    * f/u the blood cultures  * c/w vanco 1 q 12 for now,, levels adeaquate   please continue to montior  * c/w cefepime 2 q8  * c/w flagyl 500 q 8   * in view of fevers and abdominal pain will need a repeat abd/pelvis with IV and oral contrast  * f/u with pulmonary  * for paracentesis tomorrow- please send fluid for cell count, glu, TPROT, LDH, cytology routine culture, fungal culture and AFB 70 yo male with PMH cholangiocarcinoma (past chemotherapy and radiation therapy, attempting to restart chemo) and chronic back pain, GERD, IBS with diarrhea, and recently diagnosed atrial fibrillation with recent admission for fevers with negative infectious workup, presenting with new onset fevers up to 105 (per patient) with unclear source. Likely infectious etiology with infectious workup pending, but cannot exclude alternative diagnoses such as new malignancy given recent past negative infectious workup.

## 2019-12-16 NOTE — DISCHARGE NOTE PROVIDER - HOSPITAL COURSE
HPI    70 yo M with PMH of cholangiocarcinoma (dx in 2017 s/p Whipple procedure with liver mets found in 2019 after finding of clots in portal vein, also s/p multiple rounds of radiation treatment) currently on chemotherapy including oxplatin and 2 other medications per patient and wife (last dose in 2018, attempting to get next dose lately), as well as chronic back pain, GERD, IBS with diarrhea, and recently diagnosed atrial fibrillation. Patient has been having fevers and rigors since last night; wife reports fevers initially at 101 with acute rise to as high as 105.5 last night, during which patient had violent rigors. Patient states he feels unwell and has a chronic dry cough, but denies additional symptoms, specifically denying any difficulty breathing or SOB, productive cough, chest pain, palpitations, nausea or vomiting, abdominal pain, dysuria or changes in urinary frequency. Patient had similar fevers about 4 weeks ago and was admitted to the hospital, got 5 days of broad spectrum antibiotics and had general infectious workup which was negative for any sign of infection. Also had fevers and was admitted to [facility] for fevers/hypotension about 1 week ago, during which he had additional infectious workup that was negative. Patient states that doctors during his last admission thought the cause may be microcolitis, and patient was scheduled for colonoscopy with his GI doctor, Dr. Allen, next week for evaluation. He denies any recent travel or sick contacts, other than being in the hospital multiple times over the last couple months.        In ED, patient was initially febrile to 100.5 and tachycardic to 115, as well as hypoxic to low 80%. Patient was slightly hypotensive in 90's/50's and received a total of 3L IVF with improvement to 110's/60-70's. Patient has been afebrile this morning and is no longer tachycardic with HR in 80's-90's. On 2L nasal cannula O2, saturating well currently.        Hospital Course HPI    70 yo M with PMH of cholangiocarcinoma (dx in 2017 s/p Whipple procedure with liver mets found in 2019 after finding of clots in portal vein, also s/p multiple rounds of radiation treatment) currently on chemotherapy including oxplatin and 2 other medications per patient and wife (last dose in 2018, attempting to get next dose lately), as well as chronic back pain, GERD, IBS with diarrhea, and recently diagnosed atrial fibrillation. Patient has been having fevers and rigors since last night; wife reports fevers initially at 101 with acute rise to as high as 105.5 last night, during which patient had violent rigors. Patient states he feels unwell and has a chronic dry cough, but denies additional symptoms, specifically denying any difficulty breathing or SOB, productive cough, chest pain, palpitations, nausea or vomiting, abdominal pain, dysuria or changes in urinary frequency. Patient had similar fevers about 4 weeks ago and was admitted to the hospital, got 5 days of broad spectrum antibiotics and had general infectious workup which was negative for any sign of infection. Also had fevers and was admitted to [facility] for fevers/hypotension about 1 week ago, during which he had additional infectious workup that was negative. Patient states that doctors during his last admission thought the cause may be microcolitis, and patient was scheduled for colonoscopy with his GI doctor, Dr. Allen, next week for evaluation. He denies any recent travel or sick contacts, other than being in the hospital multiple times over the last couple months.        In ED, patient was initially febrile to 100.5 and tachycardic to 115, as well as hypoxic to low 80%. Patient was slightly hypotensive in 90's/50's and received a total of 3L IVF with improvement to 110's/60-70's. Patient has been afebrile this morning and is no longer tachycardic with HR in 80's-90's. On 2L nasal cannula O2, saturating well currently.        Hospital Course    ID was consulted for fevers and leukocytosis without clear source of infection, started on vanc, metro, cefepime. Initial concern was for pneumonia given slight opacity in LINDA and pleural effusion, with minor cough. With abx, leukocytosis resolved and patient's cough resolved. There was also concern for SBP, however, we could not pursue paracentesis initially due to patient on eliquis. He was bridged with heparin until diagnostic and therapeutic paracentesis was performed. Pt was discharged on levaquin 500 mg qd for SBP prophylaxis.         Throughout hospital stay, patient complained of severe chronic back pain and he takes oxycodone at home. He also complained of GERD symptoms.         Pt medically optimized for discharge.

## 2019-12-16 NOTE — PROGRESS NOTE ADULT - SUBJECTIVE AND OBJECTIVE BOX
Interventional Radiology Brief-Op Note      Pre-op diagnosis: Malignant Ascites    Post op diagnosis: malignant ascites    Indication: r/o sbp    Procedure: diagnostic and therapeutic paracentesis    Operators: JESSICA Forte; Dr. France    Anesthesia (type): lidocaine 1%    Contrast:  8cc    EBL: minimal    Findings/Follow up Plan of Care: Successful image guided paracentesis. 7 liters clear/cloudy yellow removed. specimen sent    Specimens Removed:  7 liters clear cloudy yellow    Implants:none     Complications: none    Condition/Disposition: back to floor    Please call Interventional Radiology x [phone] with any questions, concerns, or issues.

## 2019-12-16 NOTE — PROGRESS NOTE ADULT - PROBLEM SELECTOR PLAN 7
- unlikely infectious as pt had extensive workup at [facility] 1 week ago  - concern for microcolitis on last admission and was planned for colonoscopy next week per pt, pt sees Dr. Orantes  - will hold off on GI consult and continue to monitor symptoms  - loperamide 2mg PRN for diarrhea

## 2019-12-16 NOTE — DISCHARGE NOTE PROVIDER - NSDCFUSCHEDAPPT_GEN_ALL_CORE_FT
WOODY CEVALLOS ; 12/17/2019 ; [facility]  WOODY CEVALLOS ; 12/17/2019 ; [facility]  WOODY CEVALLOS ; 12/19/2019 ; [facility]  WOODY CEVALLOS ; 12/31/2019 ; [facility]  WOODY CEVALLOS ; 01/02/2020 ; [facility]  WOODY CEVALLOS ; 01/14/2020 ; [facility]  WOODY CEVALLOS ; 01/16/2020 ; [facility]  WOODY CEVALLOS ; 01/28/2020 ; [facility]  WOODY CEVALLOS ; 01/30/2020 ; [facility]  WOODY CEVALLOS ; 02/27/2020 ; [facility] [address] [place] [address]  WOODY CEVALLOS ; 02/27/2020 ; [facility] [address] [place] [address] WOODY CEVALLOS ; 12/17/2019 ; [facility]  WOODY CEVALLOS ; 12/17/2019 ; [facility]  WOODY CEVALLOS ; 12/19/2019 ; [facility]  WOODY CEVALLOS ; 12/31/2019 ; [facility]  WOODY CEVALLOS ; 01/02/2020 ; [facility]  WOODY CEVALLOS ; 01/14/2020 ; [facility]  WOODY CEVALLOS ; 01/16/2020 ; [facility]  WOODY CEVALLOS ; 01/28/2020 ; [facility]  WOODY CEVALLOS ; 01/30/2020 ; [facility]  WOODY CEVALLOS ; 02/27/2020 ; [facility] [address] [place] [address]  WOODY CEVALLOS ; 02/27/2020 ; [facility] [address] [place] [address] WOODY CEVALLOS ; 12/17/2019 ; [facility]  WOODY CEVALLOS ; 12/31/2019 ; [facility]  WOODY CEVALLOS ; 01/02/2020 ; [facility]  WOODY CEVALLOS ; 01/14/2020 ; [facility]  WOODY CEVALLOS ; 01/16/2020 ; [facility]  WOODY CEVALLOS ; 01/28/2020 ; [facility]  WOODY CEVALLOS ; 01/30/2020 ; [facility]  WOODY CEVALLOS ; 02/27/2020 ; [facility] [address] [place] [address]  WOODY CEVALLOS ; 02/27/2020 ; [facility] [address] [place] [address] WOODY CEVALLOS ; 12/31/2019 ; [facility]  WOODY CEVALLOS ; 01/02/2020 ; [facility]  WOODY CEVALLOS ; 01/14/2020 ; [facility]  WOODY CEVALLOS ; 01/16/2020 ; [facility]  WOODY CEVALLOS ; 01/28/2020 ; [facility]  WOODY CEVALLOS ; 01/30/2020 ; [facility]  WOODY CEVALLOS ; 02/27/2020 ; [facility] [address] [place] [address]  WOODY CEVALLOS ; 02/27/2020 ; [facility] [address] [place] [address]

## 2019-12-16 NOTE — PROGRESS NOTE ADULT - PROBLEM SELECTOR PLAN 3
- hgb 9 -> 7.7-> 8.0 w/ no acute signs of blood loss or hemolysis  - iron studies 11/21 suggestive of ACD, low iron, low TIBC, normal ferritin  - maintain active T&S

## 2019-12-16 NOTE — DISCHARGE NOTE PROVIDER - NSDCCPCAREPLAN_GEN_ALL_CORE_FT
PRINCIPAL DISCHARGE DIAGNOSIS  Diagnosis: Fever  Assessment and Plan of Treatment: You initially came in with fevers and an elevated white count, a sign of infection. We treated you with broad spectrum antibiotics which improved your white count. You became febrile once more with the antibiotics. We feel this latest fever is not infectious given that you were on such broad antibiotics. The fever may be secondary to your malignancy and clot in your veins.      SECONDARY DISCHARGE DIAGNOSES  Diagnosis: Blood clot in vein  Assessment and Plan of Treatment: You have a blood clot in a vein near your liver. Please continue taking your blood thinner as prescribed and follow-up with your PCP.    Diagnosis: GERD (gastroesophageal reflux disease)  Assessment and Plan of Treatment: You have GERD. Please continue taking your PPI and famotidine as needed and follow-up with your PCP.    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    Diagnosis: Atrial fibrillation  Assessment and Plan of Treatment: You have a history of irregular heart rate. Please continue taking your blood thinner and metoprolol as prescribed.    Diagnosis: Back pain  Assessment and Plan of Treatment: You have persistent back pain. Please follow-up with your PCP regarding management of your pain. PRINCIPAL DISCHARGE DIAGNOSIS  Diagnosis: Fever  Assessment and Plan of Treatment: You initially came in with fevers and an elevated white count, a sign of infection. We treated you with broad spectrum antibiotics which improved your white count. You became febrile once more with the antibiotics. We feel this latest fever is not infectious given that you were on such broad antibiotics. The fever may be secondary to your malignancy and clot in your veins.      SECONDARY DISCHARGE DIAGNOSES  Diagnosis: Blood clot in vein  Assessment and Plan of Treatment: You have a blood clot in a vein near your liver. Please continue taking your blood thinner 2 tablets twice a day for 7 days, followed by 1 tablet twice a day and follow-up with your PCP.    Diagnosis: GERD (gastroesophageal reflux disease)  Assessment and Plan of Treatment: You have GERD. Please continue taking your PPI and famotidine as needed and follow-up with your PCP.    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    Diagnosis: Atrial fibrillation  Assessment and Plan of Treatment: You have a history of irregular heart rate. Please continue taking your blood thinner and metoprolol as prescribed.    Diagnosis: Back pain  Assessment and Plan of Treatment: You have persistent back pain. Please follow-up with your PCP regarding management of your pain. PRINCIPAL DISCHARGE DIAGNOSIS  Diagnosis: Fever  Assessment and Plan of Treatment: You initially came in with fevers and an elevated white count, a sign of infection. We treated you with broad spectrum antibiotics which improved your white count. You became febrile once more with the antibiotics. We feel this latest fever is not infectious given that you were on such broad antibiotics. The fever may be secondary to your malignancy and clot in your veins.      SECONDARY DISCHARGE DIAGNOSES  Diagnosis: Blood clot in vein  Assessment and Plan of Treatment: You have a blood clot in a vein near your liver. Please continue taking your blood thinner 2 tablets twice a day for 7 days, followed by 1 tablet twice a day and follow-up with your PCP.    Diagnosis: GERD (gastroesophageal reflux disease)  Assessment and Plan of Treatment: You have GERD. Please continue taking your PPI and famotidine as needed and follow-up with your PCP.    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 We drained 7 liters of fluid. You may need repeat drainage in the future. Please follow-up with your oncologist for the results of the drainage, to assess the need for future drainage, and for treatment of your cancer.    Diagnosis: Atrial fibrillation  Assessment and Plan of Treatment: You have a history of irregular heart rate. Please continue taking your blood thinner and metoprolol as prescribed.    Diagnosis: Back pain  Assessment and Plan of Treatment: You have persistent back pain. Please follow-up with your PCP regarding management of your pain.

## 2019-12-16 NOTE — PROGRESS NOTE ADULT - PROBLEM SELECTOR PLAN 2
Oncologist is Dr. Atkinson; will f/u regarding pt's chemo plan  - hold on any chemo for now, f/u onc recs  - ruling out infection  - will send cytology with peritoneal fluid

## 2019-12-16 NOTE — PROGRESS NOTE ADULT - PROBLEM SELECTOR PLAN 4
- pt on metoprolol succinate 25 mg for rate control; continue  - pt also on Eliquis, 5mg BID, held and bridged with hep gtt for paracentesis

## 2019-12-16 NOTE — PROGRESS NOTE ADULT - ATTENDING COMMENTS
Hospitalist- Micky Randle MD  Pager: [phone]  If no response or off-hours, page [phone]  -------------------------------------  I personally performed the E/M service provided and supervised key portions of the service furnished by the resident/fellow. I agree with the history, physical and assessment/plan as stated above.   - fevers- suspect tumor fever however must also r/o infection- massive ascites- with concern for SBP. Pt planned for dx/tx paracentesis this afternoon, possible dc home thereafter if negative for SBP and cleared by H/O as he is due to resume chemo.  - afib- resume AC post paracentesis once cleared by IR. Hospitalist- Micky Randle MD  Pager: [phone]  If no response or off-hours, page [phone]  -------------------------------------  I personally performed the E/M service provided and supervised key portions of the service furnished by the resident/fellow. I agree with the history, physical and assessment/plan as stated above.   - fevers- suspect tumor fever however must also r/o infection- massive ascites- with concern for SBP. Pt planned for dx/tx paracentesis this afternoon, possible dc home thereafter if negative for SBP and cleared by H/O and ID as he is due to resume chemo.  - afib- resume AC post paracentesis once cleared by IR.

## 2019-12-17 ENCOUNTER — APPOINTMENT (OUTPATIENT)
Dept: GASTROENTEROLOGY | Facility: CLINIC | Age: 69
End: 2019-12-17

## 2019-12-17 ENCOUNTER — TRANSCRIPTION ENCOUNTER (OUTPATIENT)
Age: 69
End: 2019-12-17

## 2019-12-17 ENCOUNTER — APPOINTMENT (OUTPATIENT)
Dept: HEMATOLOGY ONCOLOGY | Facility: CLINIC | Age: 69
End: 2019-12-17

## 2019-12-17 ENCOUNTER — APPOINTMENT (OUTPATIENT)
Dept: INFUSION THERAPY | Facility: HOSPITAL | Age: 69
End: 2019-12-17

## 2019-12-17 VITALS
TEMPERATURE: 98 F | RESPIRATION RATE: 18 BRPM | SYSTOLIC BLOOD PRESSURE: 95 MMHG | DIASTOLIC BLOOD PRESSURE: 61 MMHG | OXYGEN SATURATION: 100 % | HEART RATE: 98 BPM

## 2019-12-17 LAB
ALBUMIN SERPL ELPH-MCNC: 2.8 G/DL — LOW (ref 3.3–5)
ALP SERPL-CCNC: 306 U/L — HIGH (ref 40–120)
ALT FLD-CCNC: 9 U/L — LOW (ref 10–45)
ANION GAP SERPL CALC-SCNC: 12 MMOL/L — SIGNIFICANT CHANGE UP (ref 5–17)
APTT BLD: 33 SEC — SIGNIFICANT CHANGE UP (ref 27.5–36.3)
AST SERPL-CCNC: 19 U/L — SIGNIFICANT CHANGE UP (ref 10–40)
BASOPHILS # BLD AUTO: 0.03 K/UL — SIGNIFICANT CHANGE UP (ref 0–0.2)
BASOPHILS NFR BLD AUTO: 0.5 % — SIGNIFICANT CHANGE UP (ref 0–2)
BILIRUB SERPL-MCNC: 0.8 MG/DL — SIGNIFICANT CHANGE UP (ref 0.2–1.2)
BUN SERPL-MCNC: 6 MG/DL — LOW (ref 7–23)
CALCIUM SERPL-MCNC: 8.1 MG/DL — LOW (ref 8.4–10.5)
CHLORIDE SERPL-SCNC: 102 MMOL/L — SIGNIFICANT CHANGE UP (ref 96–108)
CO2 SERPL-SCNC: 24 MMOL/L — SIGNIFICANT CHANGE UP (ref 22–31)
CREAT SERPL-MCNC: 0.67 MG/DL — SIGNIFICANT CHANGE UP (ref 0.5–1.3)
CULTURE RESULTS: SIGNIFICANT CHANGE UP
CULTURE RESULTS: SIGNIFICANT CHANGE UP
EOSINOPHIL # BLD AUTO: 0.25 K/UL — SIGNIFICANT CHANGE UP (ref 0–0.5)
EOSINOPHIL NFR BLD AUTO: 3.8 % — SIGNIFICANT CHANGE UP (ref 0–6)
GLUCOSE SERPL-MCNC: 121 MG/DL — HIGH (ref 70–99)
HCT VFR BLD CALC: 26.1 % — LOW (ref 39–50)
HGB BLD-MCNC: 8.4 G/DL — LOW (ref 13–17)
IMM GRANULOCYTES NFR BLD AUTO: 0.8 % — SIGNIFICANT CHANGE UP (ref 0–1.5)
INR BLD: 3.09 RATIO — HIGH (ref 0.88–1.16)
LYMPHOCYTES # BLD AUTO: 0.59 K/UL — LOW (ref 1–3.3)
LYMPHOCYTES # BLD AUTO: 9 % — LOW (ref 13–44)
MAGNESIUM SERPL-MCNC: 1.7 MG/DL — SIGNIFICANT CHANGE UP (ref 1.6–2.6)
MCHC RBC-ENTMCNC: 26.3 PG — LOW (ref 27–34)
MCHC RBC-ENTMCNC: 32.2 GM/DL — SIGNIFICANT CHANGE UP (ref 32–36)
MCV RBC AUTO: 81.8 FL — SIGNIFICANT CHANGE UP (ref 80–100)
MONOCYTES # BLD AUTO: 0.92 K/UL — HIGH (ref 0–0.9)
MONOCYTES NFR BLD AUTO: 14 % — SIGNIFICANT CHANGE UP (ref 2–14)
NEUTROPHILS # BLD AUTO: 4.73 K/UL — SIGNIFICANT CHANGE UP (ref 1.8–7.4)
NEUTROPHILS NFR BLD AUTO: 71.9 % — SIGNIFICANT CHANGE UP (ref 43–77)
NRBC # BLD: 0 /100 WBCS — SIGNIFICANT CHANGE UP (ref 0–0)
PHOSPHATE SERPL-MCNC: 2.4 MG/DL — LOW (ref 2.5–4.5)
PLATELET # BLD AUTO: 219 K/UL — SIGNIFICANT CHANGE UP (ref 150–400)
POTASSIUM SERPL-MCNC: 3.5 MMOL/L — SIGNIFICANT CHANGE UP (ref 3.5–5.3)
POTASSIUM SERPL-SCNC: 3.5 MMOL/L — SIGNIFICANT CHANGE UP (ref 3.5–5.3)
PROT SERPL-MCNC: 5.4 G/DL — LOW (ref 6–8.3)
PROTHROM AB SERPL-ACNC: 36.5 SEC — HIGH (ref 10–12.9)
RBC # BLD: 3.19 M/UL — LOW (ref 4.2–5.8)
RBC # FLD: 18.5 % — HIGH (ref 10.3–14.5)
SODIUM SERPL-SCNC: 138 MMOL/L — SIGNIFICANT CHANGE UP (ref 135–145)
SPECIMEN SOURCE: SIGNIFICANT CHANGE UP
SPECIMEN SOURCE: SIGNIFICANT CHANGE UP
WBC # BLD: 6.57 K/UL — SIGNIFICANT CHANGE UP (ref 3.8–10.5)
WBC # FLD AUTO: 6.57 K/UL — SIGNIFICANT CHANGE UP (ref 3.8–10.5)

## 2019-12-17 PROCEDURE — 87040 BLOOD CULTURE FOR BACTERIA: CPT

## 2019-12-17 PROCEDURE — 89051 BODY FLUID CELL COUNT: CPT

## 2019-12-17 PROCEDURE — 84295 ASSAY OF SERUM SODIUM: CPT

## 2019-12-17 PROCEDURE — 87070 CULTURE OTHR SPECIMN AEROBIC: CPT

## 2019-12-17 PROCEDURE — 49083 ABD PARACENTESIS W/IMAGING: CPT

## 2019-12-17 PROCEDURE — 81001 URINALYSIS AUTO W/SCOPE: CPT

## 2019-12-17 PROCEDURE — 85014 HEMATOCRIT: CPT

## 2019-12-17 PROCEDURE — 85610 PROTHROMBIN TIME: CPT

## 2019-12-17 PROCEDURE — 76705 ECHO EXAM OF ABDOMEN: CPT

## 2019-12-17 PROCEDURE — 82435 ASSAY OF BLOOD CHLORIDE: CPT

## 2019-12-17 PROCEDURE — 80053 COMPREHEN METABOLIC PANEL: CPT

## 2019-12-17 PROCEDURE — 99233 SBSQ HOSP IP/OBS HIGH 50: CPT | Mod: GC

## 2019-12-17 PROCEDURE — 93971 EXTREMITY STUDY: CPT

## 2019-12-17 PROCEDURE — 93005 ELECTROCARDIOGRAM TRACING: CPT

## 2019-12-17 PROCEDURE — 83735 ASSAY OF MAGNESIUM: CPT

## 2019-12-17 PROCEDURE — 88112 CYTOPATH CELL ENHANCE TECH: CPT

## 2019-12-17 PROCEDURE — 87205 SMEAR GRAM STAIN: CPT

## 2019-12-17 PROCEDURE — 99285 EMERGENCY DEPT VISIT HI MDM: CPT | Mod: 25

## 2019-12-17 PROCEDURE — 88305 TISSUE EXAM BY PATHOLOGIST: CPT

## 2019-12-17 PROCEDURE — 71250 CT THORAX DX C-: CPT

## 2019-12-17 PROCEDURE — 86901 BLOOD TYPING SEROLOGIC RH(D): CPT

## 2019-12-17 PROCEDURE — 87633 RESP VIRUS 12-25 TARGETS: CPT

## 2019-12-17 PROCEDURE — 80048 BASIC METABOLIC PNL TOTAL CA: CPT

## 2019-12-17 PROCEDURE — 87486 CHLMYD PNEUM DNA AMP PROBE: CPT

## 2019-12-17 PROCEDURE — 86850 RBC ANTIBODY SCREEN: CPT

## 2019-12-17 PROCEDURE — 99233 SBSQ HOSP IP/OBS HIGH 50: CPT

## 2019-12-17 PROCEDURE — 84157 ASSAY OF PROTEIN OTHER: CPT

## 2019-12-17 PROCEDURE — 80202 ASSAY OF VANCOMYCIN: CPT

## 2019-12-17 PROCEDURE — 85730 THROMBOPLASTIN TIME PARTIAL: CPT

## 2019-12-17 PROCEDURE — 71045 X-RAY EXAM CHEST 1 VIEW: CPT

## 2019-12-17 PROCEDURE — 84132 ASSAY OF SERUM POTASSIUM: CPT

## 2019-12-17 PROCEDURE — 82042 OTHER SOURCE ALBUMIN QUAN EA: CPT

## 2019-12-17 PROCEDURE — 87798 DETECT AGENT NOS DNA AMP: CPT

## 2019-12-17 PROCEDURE — 87102 FUNGUS ISOLATION CULTURE: CPT

## 2019-12-17 PROCEDURE — C1729: CPT

## 2019-12-17 PROCEDURE — 87086 URINE CULTURE/COLONY COUNT: CPT

## 2019-12-17 PROCEDURE — 82945 GLUCOSE OTHER FLUID: CPT

## 2019-12-17 PROCEDURE — 74177 CT ABD & PELVIS W/CONTRAST: CPT

## 2019-12-17 PROCEDURE — 82330 ASSAY OF CALCIUM: CPT

## 2019-12-17 PROCEDURE — 82803 BLOOD GASES ANY COMBINATION: CPT

## 2019-12-17 PROCEDURE — 84100 ASSAY OF PHOSPHORUS: CPT

## 2019-12-17 PROCEDURE — 81003 URINALYSIS AUTO W/O SCOPE: CPT

## 2019-12-17 PROCEDURE — 86900 BLOOD TYPING SEROLOGIC ABO: CPT

## 2019-12-17 PROCEDURE — 96374 THER/PROPH/DIAG INJ IV PUSH: CPT

## 2019-12-17 PROCEDURE — 83615 LACTATE (LD) (LDH) ENZYME: CPT

## 2019-12-17 PROCEDURE — P9047: CPT

## 2019-12-17 PROCEDURE — 82947 ASSAY GLUCOSE BLOOD QUANT: CPT

## 2019-12-17 PROCEDURE — 85027 COMPLETE CBC AUTOMATED: CPT

## 2019-12-17 PROCEDURE — 96375 TX/PRO/DX INJ NEW DRUG ADDON: CPT

## 2019-12-17 PROCEDURE — 87581 M.PNEUMON DNA AMP PROBE: CPT

## 2019-12-17 PROCEDURE — 87075 CULTR BACTERIA EXCEPT BLOOD: CPT

## 2019-12-17 PROCEDURE — 83605 ASSAY OF LACTIC ACID: CPT

## 2019-12-17 RX ORDER — APIXABAN 2.5 MG/1
2 TABLET, FILM COATED ORAL
Qty: 84 | Refills: 0
Start: 2019-12-17 | End: 2019-12-22

## 2019-12-17 RX ADMIN — OXYCODONE HYDROCHLORIDE 5 MILLIGRAM(S): 5 TABLET ORAL at 11:16

## 2019-12-17 RX ADMIN — PANTOPRAZOLE SODIUM 40 MILLIGRAM(S): 20 TABLET, DELAYED RELEASE ORAL at 07:51

## 2019-12-17 RX ADMIN — APIXABAN 10 MILLIGRAM(S): 2.5 TABLET, FILM COATED ORAL at 11:46

## 2019-12-17 RX ADMIN — OXYCODONE HYDROCHLORIDE 5 MILLIGRAM(S): 5 TABLET ORAL at 11:45

## 2019-12-17 RX ADMIN — CHLORHEXIDINE GLUCONATE 1 APPLICATION(S): 213 SOLUTION TOPICAL at 11:51

## 2019-12-17 RX ADMIN — Medication 25 MILLIGRAM(S): at 07:50

## 2019-12-17 RX ADMIN — Medication 100 MILLIGRAM(S): at 11:47

## 2019-12-17 RX ADMIN — CEFEPIME 100 MILLIGRAM(S): 1 INJECTION, POWDER, FOR SOLUTION INTRAMUSCULAR; INTRAVENOUS at 07:50

## 2019-12-17 NOTE — PROGRESS NOTE ADULT - ASSESSMENT
70 y/o male PMHx cholangiocarcinoma s/p Whipple 2017 s/p cis/Whitman and capcitabine (from 8/2017-1/2018)  with recurrence of disease now with mets to the liver, portal vein thrombus on eliquis, HTN, HLD now presenting with recurrent fever. S/p paracentesis with overall improvement in symptoms and fever curve.     # Metastatic Cholangiocarcinoma  - cholangiocarcinoma initially diagnosed in 2017 s/p Whipple, s/p cis/Whitman and capecitabine (from 8/2017-1/2018)  - found to have recurrent dz via patricia-SMA LN bx in feb 2019 showing poorly-differentiated adenocarcinoma,  - s/p SBRT x 5 fractions 3/27/19-4/5/19   - Planned to start salvage therapy with FOLFOX, scheduled for today. Will reschedule treatment appointments  - s/p therapeutic/diagnostic paracentesis, cytology pending   - follow up with Dr. Atkinson at [facility] as outpatient       Arturo Falk, PGY-5  Hematology-Oncology Fellow  [phone] ([place]) [phone] ([facility])

## 2019-12-17 NOTE — PROGRESS NOTE ADULT - ASSESSMENT
69 m with HTN, HLD, cholangiocarcinoma s/p Whipple 2017 with recurrence of disease now with mets to the liver, portal vein thrombus and SMA narrowing, chronic diarrhea with negative infection w/u, ascites and intrahepatic biliary dilatation, multiple admissions with sepsis and negative work up, last one a week ago and abd CT showed new liver infarcts, now again brought in with high fevers to 105 and rigors  here febrile 100.4, tachycardic and hypotensive with active rigors  WBC: 10.87, RVP negative, CXR small pleural effusion and atelectasis    fever, rigors, tachycardia, hypotension, sepsis, in the setting of metastatic cholangiocarcinoma and portal vein thrombosis, liver infarcts and intrahepatic biliary ductal dilatation  SBP vs tumor fever  chest Ct with new minimal focal patchy consolidation in LINDA, new small L pleural effusion and partial collapse of LLL but no significant cough or respiratory symptoms and effusion simple on u/s  abd/pelvis CT: increased ascites and mod L pleural effusion  s/p paracentesis and 7 liters removed, WBC: 108, se%, , cx negative but after 5 days of antibiotics    * blood and  urine cx negative and pt has been afebrile  * s/p 6 days of vanco, cefepime and flagyl started   * on discharge switch to PO levofloxacin 500 qd for SBP ppx  * f/u with ID in 2 weeks

## 2019-12-17 NOTE — PROGRESS NOTE ADULT - PROBLEM SELECTOR PLAN 5
- continue home dose pantoprazole 40mg
- severe back pain previously prescribed oxy 5, percocet , tramadol 50  - iSTOP in chart  - oxycodone 5 mg q4 PRN for severe pain

## 2019-12-17 NOTE — PROGRESS NOTE ADULT - PROBLEM SELECTOR PROBLEM 9
Prophylactic measure
Discharge planning issues

## 2019-12-17 NOTE — PROGRESS NOTE ADULT - ASSESSMENT
70 yo male with PMH cholangiocarcinoma (past chemotherapy and radiation therapy, attempting to restart chemo) and chronic back pain, GERD, IBS with diarrhea, and recently diagnosed atrial fibrillation with recent admission for fevers with negative infectious workup, presenting with new onset fevers up to 105 (per patient) with unclear source. Likely infectious etiology with infectious workup pending, but cannot exclude alternative diagnoses such as new malignancy given recent past negative infectious workup.

## 2019-12-17 NOTE — PROGRESS NOTE ADULT - PROBLEM SELECTOR PLAN 1
Meets SIRS criteria - pt febrile with leukocytosis and hypotension on presentation. Likely infectious etiology (?possible PNA verse intra-abdominal source) verse tumor fever   - 12/14 febrile to 101.9 on broad coverage  - UA 12/12 negative, UA 12/15 - positive for nitrites and bacteria, may be contaminant per ID, f/u ucx  - CTC LINDA patchy opacity ?PNA  - bcx 12/12 NGT, 12/14 pending. UCx 12/12 NGTD  - ordered ctap w/ iv and oral contrast  - ID recs appreciated, pt on cefepime, vanc, and metronidazole  - Pulm recs appreciated. Pleural effusion too small to tap.   - Will need to be off eliquis for 48 hours prior to any procedure. (held since 12/13, on hep gtt)  - IR diagnostic/therapeutic para 12/17 w/ 7L drained. given 50g albumin.

## 2019-12-17 NOTE — PROGRESS NOTE ADULT - PROBLEM SELECTOR PROBLEM 2
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

## 2019-12-17 NOTE — PROGRESS NOTE ADULT - SUBJECTIVE AND OBJECTIVE BOX
Homero Lala MD, PGY-1  Pager #[phone]  After 7 PM on weekdays and 12 PM on weekends, for urgent issues please page #[phone].  ----------------------------------------------------------------  Patient is a 69y old  Male who presents with a chief complaint of fevers (16 Dec 2019 15:29)      SUBJECTIVE / OVERNIGHT EVENTS:  ADDITIONAL REVIEW OF SYSTEMS:    MEDICATIONS  (STANDING):  apixaban 10 milliGRAM(s) Oral every 12 hours  cefepime   IVPB 2000 milliGRAM(s) IV Intermittent every 12 hours  chlorhexidine 4% Liquid 1 Application(s) Topical daily  doxazosin 1 milliGRAM(s) Oral at bedtime  gabapentin 300 milliGRAM(s) Oral at bedtime  lidocaine   Patch 1 Patch Transdermal every 24 hours  metoprolol succinate ER 25 milliGRAM(s) Oral daily  metroNIDAZOLE  IVPB 500 milliGRAM(s) IV Intermittent every 8 hours  metroNIDAZOLE  IVPB      pantoprazole    Tablet 40 milliGRAM(s) Oral before breakfast  sodium chloride 0.9%. 1000 milliLiter(s) (85 mL/Hr) IV Continuous <Continuous>  vancomycin  IVPB 1000 milliGRAM(s) IV Intermittent every 12 hours    MEDICATIONS  (PRN):  acetaminophen   Tablet .. 650 milliGRAM(s) Oral every 6 hours PRN Temp greater or equal to 38C (100.4F)  aluminum hydroxide/magnesium hydroxide/simethicone Suspension 30 milliLiter(s) Oral every 6 hours PRN Dyspepsia/heartburn  famotidine    Tablet 20 milliGRAM(s) Oral two times a day PRN GERD  loperamide 2 milliGRAM(s) Oral every 6 hours PRN Diarrhea  oxyCODONE    IR 5 milliGRAM(s) Oral every 4 hours PRN Severe Pain (7 - 10)      CAPILLARY BLOOD GLUCOSE        I&O's Summary    16 Dec 2019 07:01  -  17 Dec 2019 07:00  --------------------------------------------------------  IN: 600 mL / OUT: 2 mL / NET: 598 mL        PHYSICAL EXAM:  Vital Signs Last 24 Hrs  T(C): 37.1 (17 Dec 2019 05:19), Max: 37.1 (17 Dec 2019 05:19)  T(F): 98.8 (17 Dec 2019 05:19), Max: 98.8 (17 Dec 2019 05:19)  HR: 90 (17 Dec 2019 05:19) (87 - 90)  BP: 107/58 (17 Dec 2019 05:19) (107/58 - 119/66)  BP(mean): --  RR: 18 (17 Dec 2019 05:19) (18 - 18)  SpO2: 91% (17 Dec 2019 05:19) (91% - 91%)    CONSTITUTIONAL: NAD  RESPIRATORY: Normal respiratory effort; lungs are clear to auscultation bilaterally  CARDIOVASCULAR: irregular heart rhythm, normal S1 and S2, no murmur/rub/gallop  ABDOMEN: markedly distended, soft, nontender to light and deep palpation, normoactive bowel sounds, no rebound/guarding  MUSCULOSKELETAL: Normal gait; no clubbing or cyanosis of digits; no joint swelling or tenderness to palpation  PSYCH: AOx3  NEUROLOGY: CN 2-12 are intact and symmetric; no gross sensory deficits   SKIN: No rashes; no palpable lesions    LABS:                        8.7    7.69  )-----------( 247      ( 16 Dec 2019 06:49 )             26.3     12-16    132<L>  |  102  |  7   ----------------------------<  119<H>  3.7   |  23  |  0.66    Ca    8.2<L>      16 Dec 2019 06:49  Phos  2.3     12-16  Mg     1.9     12-16    TPro  5.6<L>  /  Alb  2.3<L>  /  TBili  0.5  /  DBili  x   /  AST  15  /  ALT  10  /  AlkPhos  331<H>  12-16    PT/INR - ( 16 Dec 2019 06:49 )   PT: 18.9 sec;   INR: 1.63 ratio         PTT - ( 16 Dec 2019 06:49 )  PTT:73.0 sec      Urinalysis Basic - ( 15 Dec 2019 17:06 )    Color: Orange / Appearance: Turbid / S.027 / pH: x  Gluc: x / Ketone: Trace  / Bili: Negative / Urobili: Negative   Blood: x / Protein: 30 mg/dL / Nitrite: Negative   Leuk Esterase: Negative / RBC: 0 /hpf / WBC 1 /HPF   Sq Epi: x / Non Sq Epi: 2 / Bacteria: Negative        Culture - Body Fluid with Gram Stain (collected 16 Dec 2019 18:16)  Source: Ascites Fl Peritoneal Fluid  Gram Stain (16 Dec 2019 21:41):    polymorphonuclear leukocytes seen    No organisms seen    by cytocentrifuge    Culture - Urine (collected 15 Dec 2019 21:05)  Source: .Urine Clean Catch (Midstream)  Final Report (16 Dec 2019 16:34):    No growth    Culture - Blood (collected 14 Dec 2019 23:02)  Source: .Blood Blood-Venous  Preliminary Report (16 Dec 2019 01:02):    No growth to date.    Culture - Blood (collected 14 Dec 2019 23:02)  Source: .Blood Blood-Peripheral  Preliminary Report (16 Dec 2019 01:02):    No growth to date.    RADIOLOGY & ADDITIONAL TESTS:  Results Reviewed:   Imaging Personally Reviewed:  Electrocardiogram Personally Reviewed:    COORDINATION OF CARE:  Care Discussed with Consultants/Other Providers [Y/N]:  Prior or Outpatient Records Reviewed [Y/N]: Homero Lala MD, PGY-1  Pager #[phone]  After 7 PM on weekdays and 12 PM on weekends, for urgent issues please page #[phone].  ----------------------------------------------------------------  Patient is a 69y old  Male who presents with a chief complaint of fevers (16 Dec 2019 15:29)      SUBJECTIVE / OVERNIGHT EVENTS: Pt seen and examined at bedside. Endorses diarrhea x 2, loose brown stools, typical of his IBS associated diarrhea. s/p para w/ 7 liters drained yesterday. Denies f/c/n/v.     MEDICATIONS  (STANDING):  apixaban 10 milliGRAM(s) Oral every 12 hours  cefepime   IVPB 2000 milliGRAM(s) IV Intermittent every 12 hours  chlorhexidine 4% Liquid 1 Application(s) Topical daily  doxazosin 1 milliGRAM(s) Oral at bedtime  gabapentin 300 milliGRAM(s) Oral at bedtime  lidocaine   Patch 1 Patch Transdermal every 24 hours  metoprolol succinate ER 25 milliGRAM(s) Oral daily  metroNIDAZOLE  IVPB 500 milliGRAM(s) IV Intermittent every 8 hours  metroNIDAZOLE  IVPB      pantoprazole    Tablet 40 milliGRAM(s) Oral before breakfast  sodium chloride 0.9%. 1000 milliLiter(s) (85 mL/Hr) IV Continuous <Continuous>  vancomycin  IVPB 1000 milliGRAM(s) IV Intermittent every 12 hours    MEDICATIONS  (PRN):  acetaminophen   Tablet .. 650 milliGRAM(s) Oral every 6 hours PRN Temp greater or equal to 38C (100.4F)  aluminum hydroxide/magnesium hydroxide/simethicone Suspension 30 milliLiter(s) Oral every 6 hours PRN Dyspepsia/heartburn  famotidine    Tablet 20 milliGRAM(s) Oral two times a day PRN GERD  loperamide 2 milliGRAM(s) Oral every 6 hours PRN Diarrhea  oxyCODONE    IR 5 milliGRAM(s) Oral every 4 hours PRN Severe Pain (7 - 10)      CAPILLARY BLOOD GLUCOSE    I&O's Summary    16 Dec 2019 07:01  -  17 Dec 2019 07:00  --------------------------------------------------------  IN: 600 mL / OUT: 2 mL / NET: 598 mL    PHYSICAL EXAM:  Vital Signs Last 24 Hrs  T(C): 37.1 (17 Dec 2019 05:19), Max: 37.1 (17 Dec 2019 05:19)  T(F): 98.8 (17 Dec 2019 05:19), Max: 98.8 (17 Dec 2019 05:19)  HR: 90 (17 Dec 2019 05:19) (87 - 90)  BP: 107/58 (17 Dec 2019 05:19) (107/58 - 119/66)  RR: 18 (17 Dec 2019 05:19) (18 - 18)  SpO2: 91% (17 Dec 2019 05:19) (91% - 91%)    CONSTITUTIONAL: NAD  RESPIRATORY: Normal respiratory effort; lungs are clear to auscultation bilaterally  CARDIOVASCULAR: irregular heart rhythm, normal S1 and S2, no murmur/rub/gallop  ABDOMEN: distended, soft, nontender to light and deep palpation, normoactive bowel sounds, no rebound/guarding, dressing c/d/i  MUSCULOSKELETAL: normal gait  PSYCH: AOx3  NEUROLOGY: no gross sensory deficits   SKIN: No rashes; no palpable lesions    LABS:                        8.7    7.69  )-----------( 247      ( 16 Dec 2019 06:49 )             26.3     12-16    132<L>  |  102  |  7   ----------------------------<  119<H>  3.7   |  23  |  0.66    Ca    8.2<L>      16 Dec 2019 06:49  Phos  2.3     12-16  Mg     1.9     12-16    TPro  5.6<L>  /  Alb  2.3<L>  /  TBili  0.5  /  DBili  x   /  AST  15  /  ALT  10  /  AlkPhos  331<H>  12-16    PT/INR - ( 16 Dec 2019 06:49 )   PT: 18.9 sec;   INR: 1.63 ratio         PTT - ( 16 Dec 2019 06:49 )  PTT:73.0 sec      Urinalysis Basic - ( 15 Dec 2019 17:06 )    Color: Orange / Appearance: Turbid / S.027 / pH: x  Gluc: x / Ketone: Trace  / Bili: Negative / Urobili: Negative   Blood: x / Protein: 30 mg/dL / Nitrite: Negative   Leuk Esterase: Negative / RBC: 0 /hpf / WBC 1 /HPF   Sq Epi: x / Non Sq Epi: 2 / Bacteria: Negative        Culture - Body Fluid with Gram Stain (collected 16 Dec 2019 18:16)  Source: Ascites Fl Peritoneal Fluid  Gram Stain (16 Dec 2019 21:41):    polymorphonuclear leukocytes seen    No organisms seen    by cytocentrifuge    Culture - Urine (collected 15 Dec 2019 21:05)  Source: .Urine Clean Catch (Midstream)  Final Report (16 Dec 2019 16:34):    No growth    Culture - Blood (collected 14 Dec 2019 23:02)  Source: .Blood Blood-Venous  Preliminary Report (16 Dec 2019 01:02):    No growth to date.    Culture - Blood (collected 14 Dec 2019 23:02)  Source: .Blood Blood-Peripheral  Preliminary Report (16 Dec 2019 01:02):    No growth to date.    RADIOLOGY & ADDITIONAL TESTS:  Results Reviewed:   Imaging Personally Reviewed:  Electrocardiogram Personally Reviewed:    COORDINATION OF CARE:  Care Discussed with Consultants/Other Providers [Y/N]:  Prior or Outpatient Records Reviewed [Y/N]:

## 2019-12-17 NOTE — PROGRESS NOTE ADULT - PROBLEM SELECTOR PLAN 10
Transitions of Care Status:  1.  Name of PCP: Dr. Homero Melgar  2.  PCP Contacted on Admission: [x] Y    [ ] N    3.  PCP contacted at Discharge: [ ] Y    [ ] N    [ ] N/A  4.  Post-Discharge Appointment Date and Location:  5.  Summary of Handoff given to PCP: Transitions of Care Status:  1.  Name of PCP: Dr. Homero Melgar  2.  PCP Contacted on Admission: [x] Y    [ ] N    3.  PCP contacted at Discharge: yes  4.  Post-Discharge Appointment Date and Location: [facility] appointment to be made by H/O and given to patient to resume chemotherapy  5.  Summary of Handoff given to PCP: h/o team aware of patient while admitted- will f/u with pt OP to resume chemotherapy.

## 2019-12-17 NOTE — PROGRESS NOTE ADULT - PROBLEM SELECTOR PLAN 2
Oncologist is Dr. Atkinson; will f/u regarding pt's chemo plan  - hold on any chemo for now, f/u onc recs  - SBP negative  - per ID, d/c w/ cipro prophylaxis

## 2019-12-17 NOTE — PROGRESS NOTE ADULT - SUBJECTIVE AND OBJECTIVE BOX
Follow Up:  sepsis, metastatic cholangiocarcinoma and peritoneal carcinomatosis with portal vein thrombosis and liver infarct    Interval History: s/p paracentesis yesterday and 7 liters were removed, pt feels better today    ROS:      All other systems negative    Constitutional: no fever, no chills  Head: no trauma  Eyes: no vision changes, no eye pain  ENT:  no sore throat, no rhinorrhea  Cardiovascular:  no chest pain, no palpitation  Respiratory:  no SOB, no cough  GI:  improved abd distention  and discomfort, no diarrhea  urinary: no dysuria, no hematuria, no flank pain  musculoskeletal:  no joint pain, no joint swelling  skin:  no rash  neurology:  no headache, no seizure, no change in mental status  psych: no anxiety, no depression           Allergies  penicillin G benzathine (Rash)  sulfa drugs (Rash)        ANTIMICROBIALS:  cefepime   IVPB 2000 every 12 hours  metroNIDAZOLE  IVPB 500 every 8 hours  metroNIDAZOLE  IVPB    vancomycin  IVPB 1000 every 12 hours      OTHER MEDS:  acetaminophen   Tablet .. 650 milliGRAM(s) Oral every 6 hours PRN  aluminum hydroxide/magnesium hydroxide/simethicone Suspension 30 milliLiter(s) Oral every 6 hours PRN  apixaban 10 milliGRAM(s) Oral every 12 hours  chlorhexidine 4% Liquid 1 Application(s) Topical daily  doxazosin 1 milliGRAM(s) Oral at bedtime  famotidine    Tablet 20 milliGRAM(s) Oral two times a day PRN  gabapentin 300 milliGRAM(s) Oral at bedtime  lidocaine   Patch 1 Patch Transdermal every 24 hours  loperamide 2 milliGRAM(s) Oral every 6 hours PRN  metoprolol succinate ER 25 milliGRAM(s) Oral daily  oxyCODONE    IR 5 milliGRAM(s) Oral every 4 hours PRN  pantoprazole    Tablet 40 milliGRAM(s) Oral before breakfast  sodium chloride 0.9%. 1000 milliLiter(s) IV Continuous <Continuous>      Vital Signs Last 24 Hrs  T(C): 37.1 (17 Dec 2019 05:19), Max: 37.1 (17 Dec 2019 05:19)  T(F): 98.8 (17 Dec 2019 05:19), Max: 98.8 (17 Dec 2019 05:19)  HR: 90 (17 Dec 2019 05:19) (87 - 90)  BP: 107/58 (17 Dec 2019 05:19) (107/58 - 119/66)  BP(mean): --  RR: 18 (17 Dec 2019 05:19) (18 - 18)  SpO2: 91% (17 Dec 2019 05:19) (91% - 91%)    Physical Exam:  General:  NAD, non toxic  Head: atraumatic, normocephalic  Eyes: normal sclera and conjunctiva  ENT:   no oropharyngeal lesions, no LAD, neck supple  Cardio:    regular S1,S2, no murmur  Respiratory:   clear b/l, no wheezing  abd: not distended anymore, soft  , slight diffuse tenderness  :     no CVAT, no suprapubic tenderness, no sawant  Musculoskeletal : no joint swelling, no edema  Skin:    no rash  vascular: R chest port  Neurologic:     no focal deficits  psych: normal affect                          8.4    6.57  )-----------( 219      ( 17 Dec 2019 10:30 )             26.1           138  |  102  |  6<L>  ----------------------------<  121<H>  3.5   |  24  |  0.67    Ca    8.1<L>      17 Dec 2019 10:30  Phos  2.4       Mg     1.7         TPro  5.4<L>  /  Alb  2.8<L>  /  TBili  0.8  /  DBili  x   /  AST  19  /  ALT  9<L>  /  AlkPhos  306<H>        Urinalysis Basic - ( 15 Dec 2019 17:06 )    Color: Orange / Appearance: Turbid / S.027 / pH: x  Gluc: x / Ketone: Trace  / Bili: Negative / Urobili: Negative   Blood: x / Protein: 30 mg/dL / Nitrite: Negative   Leuk Esterase: Negative / RBC: 0 /hpf / WBC 1 /HPF   Sq Epi: x / Non Sq Epi: 2 / Bacteria: Negative        MICROBIOLOGY:  v  Ascites Fl Peritoneal Fluid  19   Testing in progress  --    polymorphonuclear leukocytes seen  No organisms seen  by cytocentrifuge      .Urine Clean Catch (Midstream)  12-15-19   No growth  --  --      .Blood Blood-Peripheral  19   No growth to date.  --  --      .Urine Clean Catch (Midstream)  19   No growth  --  --      .Blood Blood-Peripheral  19   No growth at 5 days.  --  --      FECES  19 --  --  --      FECES  19 --  --  --      URINE MIDSTREAM  19 --  --  --      BLOOD  19 --  --  --      Ascites Fl Peritoneal Fluid  19   No growth  --  --      Ascites Fl Peritoneal Fluid  19   No growth at 5 days  --    No polymorphonuclear cells seen  No organisms seen by cytocentrifuge      .Blood Blood-Peripheral  19   No growth at 5 days.  --  --      .Body Fluid Peritoneal Fluid  19   No growth at 5 days  --    polymorphonuclear leukocytes seen  No organisms seen  by cytocentrifuge      .Stool Feces  19   No Protozoa seen by trichrome stain  No Helminths or Protozoa seen in formalin concentrate  performed by iodine stain  (routine O+P not evaluated for Microsporidia,  Cryptosporidia, Cyclospora, or Isospora.)  One negative sample does not necessarily rule  out the presence of a parasitic infection.  --  --      .Stool Feces  19   GI PCR Results: NOT detected  *******Please Note:*******  GI panel PCR evaluates for:  Campylobacter, Plesiomonas shigelloides, Salmonella,  Vibrio, Yersinia enterocolitica, Enteroaggregative  Escherichia coli (EAEC), Enteropathogenic E.coli (EPEC),  Enterotoxigenic E. coli (ETEC) lt/st, Shiga-like  toxin-producing E. coli (STEC) stx1/stx2,  Shigella/ Enteroinvasive E. coli (EIEC), Cryptosporidium,  Cyclospora cayetanensis, Entamoeba histolytica,  Giardia lamblia, Adenovirus F 40/41, Astrovirus,  Norovirus GI/GII, Rotavirus A, Sapovirus  --  --      .Urine Clean Catch (Midstream)  19   No growth  --  --      .Blood Blood-Peripheral  19   No growth at 5 days.  --  --      .Blood Blood-Peripheral  19   No growth at 5 days.  --  --          Rapid RVP Result: [employer] ( @ 00:47)        RADIOLOGY:  Images below reviewed personally  < from: CT Abdomen and Pelvis w/ Oral Cont and w/ IV Cont (12.15.19 @ 18:07) >  IMPRESSION:     Redemonstrated hepatic metastatic disease and portal venous thrombosis.    Large volume ascites, increased from prior exam.    Moderate left pleural effusion.

## 2019-12-17 NOTE — PROGRESS NOTE ADULT - PROBLEM SELECTOR PLAN 7
- unlikely infectious as pt had extensive workup at [facility] 1 week ago  - concern for microcolitis on last admission and was planned for colonoscopy next week per pt, pt sees Dr. Orantes  - will hold off on GI consult and continue to monitor symptoms  - loperamide 2mg PRN for diarrhea

## 2019-12-17 NOTE — DISCHARGE NOTE NURSING/CASE MANAGEMENT/SOCIAL WORK - PATIENT PORTAL LINK FT
You can access the FollowMyHealth Patient Portal offered by [facility] by registering at the following website: [email or web address]. By joining [facility] [employer]’s FollowMyHealth portal, you will also be able to view your health information using other applications (apps) compatible with our system.

## 2019-12-17 NOTE — PROGRESS NOTE ADULT - PROBLEM SELECTOR PROBLEM 6
GERD (gastroesophageal reflux disease)
IBS (irritable bowel syndrome)
GERD (gastroesophageal reflux disease)

## 2019-12-17 NOTE — PROGRESS NOTE ADULT - NSHPATTENDINGPLANDISCUSS_GEN_ALL_CORE
the primary team
the primary team
the primary team [phone]
medical residents- care model B
Medicine team
Medicine team, ID [id number]
Medicine team, pulm service

## 2019-12-17 NOTE — PROGRESS NOTE ADULT - ATTENDING COMMENTS
Hospitalist- Micky Randle MD  Pager: [phone]  If no response or off-hours, page [phone]  -------------------------------------  I personally performed the E/M service provided and supervised key portions of the service furnished by the resident/fellow. I agree with the history, physical and assessment/plan as stated above    total discharge time: 43 minutes.

## 2019-12-17 NOTE — PROGRESS NOTE ADULT - SUBJECTIVE AND OBJECTIVE BOX
INTERVAL HPI/OVERNIGHT EVENTS:  No overnight events. Tolerated paracentesis well, about 7L removed. Notes less distention but still slight abdominal/back pain. Otherwise no more fevers.     MEDICATIONS  (STANDING):  apixaban 10 milliGRAM(s) Oral every 12 hours  cefepime   IVPB 2000 milliGRAM(s) IV Intermittent every 12 hours  chlorhexidine 4% Liquid 1 Application(s) Topical daily  doxazosin 1 milliGRAM(s) Oral at bedtime  gabapentin 300 milliGRAM(s) Oral at bedtime  lidocaine   Patch 1 Patch Transdermal every 24 hours  metoprolol succinate ER 25 milliGRAM(s) Oral daily  metroNIDAZOLE  IVPB 500 milliGRAM(s) IV Intermittent every 8 hours  metroNIDAZOLE  IVPB      pantoprazole    Tablet 40 milliGRAM(s) Oral before breakfast  sodium chloride 0.9%. 1000 milliLiter(s) (85 mL/Hr) IV Continuous <Continuous>  vancomycin  IVPB 1000 milliGRAM(s) IV Intermittent every 12 hours    MEDICATIONS  (PRN):  acetaminophen   Tablet .. 650 milliGRAM(s) Oral every 6 hours PRN Temp greater or equal to 38C (100.4F)  aluminum hydroxide/magnesium hydroxide/simethicone Suspension 30 milliLiter(s) Oral every 6 hours PRN Dyspepsia/heartburn  famotidine    Tablet 20 milliGRAM(s) Oral two times a day PRN GERD  loperamide 2 milliGRAM(s) Oral every 6 hours PRN Diarrhea  oxyCODONE    IR 5 milliGRAM(s) Oral every 4 hours PRN Severe Pain (7 - 10)    Allergies    penicillin G benzathine (Rash)  sulfa drugs (Rash)    Intolerances          VITAL SIGNS:  T(F): 98.8 (19 @ 05:19)  HR: 90 (19 @ 05:19)  BP: 107/58 (19 @ 05:19)  RR: 18 (19 @ 05:19)  SpO2: 91% (19 @ 05:19)  Wt(kg): --    PHYSICAL EXAM:    Constitutional: NAD, lying comfortably in bed  Eyes: EOMI, PERRLA  Neck: supple, no masses, no JVD  Respiratory: CTAB; no r/r/w  Cardiovascular: RRR, no M/R/G  Gastrointestinal: +BS, moderate distention, tender to deep palpation; no hepatosplenomegaly  Extremities: no c/c/e  Neurological: AAOx3, nonfocal    LABS:                        8.4    6.57  )-----------( 219      ( 17 Dec 2019 10:30 )             26.1         138  |  102  |  6<L>  ----------------------------<  121<H>  3.5   |  24  |  0.67    Ca    8.1<L>      17 Dec 2019 10:30  Phos  2.4       Mg     1.7         TPro  5.4<L>  /  Alb  2.8<L>  /  TBili  0.8  /  DBili  x   /  AST  19  /  ALT  9<L>  /  AlkPhos  306<H>      PT/INR - ( 17 Dec 2019 10:30 )   PT: 36.5 sec;   INR: 3.09 ratio         PTT - ( 17 Dec 2019 10:30 )  PTT:33.0 sec  Urinalysis Basic - ( 15 Dec 2019 17:06 )    Color: Orange / Appearance: Turbid / S.027 / pH: x  Gluc: x / Ketone: Trace  / Bili: Negative / Urobili: Negative   Blood: x / Protein: 30 mg/dL / Nitrite: Negative   Leuk Esterase: Negative / RBC: 0 /hpf / WBC 1 /HPF   Sq Epi: x / Non Sq Epi: 2 / Bacteria: Negative        RADIOLOGY & ADDITIONAL TESTS:  Studies reviewed.

## 2019-12-19 ENCOUNTER — APPOINTMENT (OUTPATIENT)
Dept: INFUSION THERAPY | Facility: HOSPITAL | Age: 69
End: 2019-12-19

## 2019-12-19 ENCOUNTER — RESULT REVIEW (OUTPATIENT)
Age: 69
End: 2019-12-19

## 2019-12-19 ENCOUNTER — RX CHANGE (OUTPATIENT)
Age: 69
End: 2019-12-19

## 2019-12-19 LAB
BASOPHILS # BLD AUTO: 0.1 K/UL — SIGNIFICANT CHANGE UP (ref 0–0.2)
BASOPHILS NFR BLD AUTO: 0.5 % — SIGNIFICANT CHANGE UP (ref 0–2)
EOSINOPHIL # BLD AUTO: 0.9 K/UL — HIGH (ref 0–0.5)
EOSINOPHIL NFR BLD AUTO: 8.4 % — HIGH (ref 0–6)
HCT VFR BLD CALC: 31.5 % — LOW (ref 39–50)
HGB BLD-MCNC: 9.8 G/DL — LOW (ref 13–17)
LYMPHOCYTES # BLD AUTO: 1.6 K/UL — SIGNIFICANT CHANGE UP (ref 1–3.3)
LYMPHOCYTES # BLD AUTO: 15.4 % — SIGNIFICANT CHANGE UP (ref 13–44)
MCHC RBC-ENTMCNC: 27 PG — SIGNIFICANT CHANGE UP (ref 27–34)
MCHC RBC-ENTMCNC: 31.1 G/DL — LOW (ref 32–36)
MCV RBC AUTO: 86.6 FL — SIGNIFICANT CHANGE UP (ref 80–100)
MONOCYTES # BLD AUTO: 1.3 K/UL — HIGH (ref 0–0.9)
MONOCYTES NFR BLD AUTO: 12.5 % — SIGNIFICANT CHANGE UP (ref 2–14)
NEUTROPHILS # BLD AUTO: 6.6 K/UL — SIGNIFICANT CHANGE UP (ref 1.8–7.4)
NEUTROPHILS NFR BLD AUTO: 63.2 % — SIGNIFICANT CHANGE UP (ref 43–77)
NON-GYNECOLOGICAL CYTOLOGY STUDY: SIGNIFICANT CHANGE UP
PLATELET # BLD AUTO: 337 K/UL — SIGNIFICANT CHANGE UP (ref 150–400)
RBC # BLD: 3.63 M/UL — LOW (ref 4.2–5.8)
RBC # FLD: 18.1 % — HIGH (ref 10.3–14.5)
WBC # BLD: 10.5 K/UL — SIGNIFICANT CHANGE UP (ref 3.8–10.5)
WBC # FLD AUTO: 10.5 K/UL — SIGNIFICANT CHANGE UP (ref 3.8–10.5)

## 2019-12-19 RX ORDER — METOPROLOL TARTRATE 50 MG
1 TABLET ORAL
Qty: 0 | Refills: 0 | DISCHARGE

## 2019-12-19 RX ORDER — APREPITANT 80 MG/1
80 CAPSULE ORAL
Qty: 2 | Refills: 0 | Status: ACTIVE | COMMUNITY
Start: 2019-12-19 | End: 1900-01-01

## 2019-12-20 ENCOUNTER — MESSAGE (OUTPATIENT)
Age: 69
End: 2019-12-20

## 2019-12-20 DIAGNOSIS — Z51.11 ENCOUNTER FOR ANTINEOPLASTIC CHEMOTHERAPY: ICD-10-CM

## 2019-12-20 DIAGNOSIS — R11.2 NAUSEA WITH VOMITING, UNSPECIFIED: ICD-10-CM

## 2019-12-21 ENCOUNTER — APPOINTMENT (OUTPATIENT)
Dept: INFUSION THERAPY | Facility: HOSPITAL | Age: 69
End: 2019-12-21

## 2019-12-23 ENCOUNTER — APPOINTMENT (OUTPATIENT)
Dept: INFUSION THERAPY | Facility: HOSPITAL | Age: 69
End: 2019-12-23

## 2019-12-24 ENCOUNTER — INPATIENT (INPATIENT)
Facility: HOSPITAL | Age: 69
LOS: 15 days | Discharge: NURSING, REHAB OR LONG-TERM CARE | DRG: 64 | End: 2020-01-09
Attending: HOSPITALIST | Admitting: HOSPITALIST
Payer: MEDICARE

## 2019-12-24 VITALS
TEMPERATURE: 99 F | HEART RATE: 93 BPM | HEIGHT: 70 IN | OXYGEN SATURATION: 100 % | SYSTOLIC BLOOD PRESSURE: 104 MMHG | WEIGHT: 139.99 LBS | RESPIRATION RATE: 16 BRPM | DIASTOLIC BLOOD PRESSURE: 70 MMHG

## 2019-12-24 DIAGNOSIS — Z98.890 OTHER SPECIFIED POSTPROCEDURAL STATES: Chronic | ICD-10-CM

## 2019-12-24 DIAGNOSIS — K90.81 WHIPPLE'S DISEASE: Chronic | ICD-10-CM

## 2019-12-24 DIAGNOSIS — Z98.1 ARTHRODESIS STATUS: Chronic | ICD-10-CM

## 2019-12-24 DIAGNOSIS — E86.0 DEHYDRATION: ICD-10-CM

## 2019-12-24 PROCEDURE — 99285 EMERGENCY DEPT VISIT HI MDM: CPT | Mod: GC

## 2019-12-24 RX ORDER — SODIUM CHLORIDE 9 MG/ML
1000 INJECTION INTRAMUSCULAR; INTRAVENOUS; SUBCUTANEOUS ONCE
Refills: 0 | Status: COMPLETED | OUTPATIENT
Start: 2019-12-24 | End: 2019-12-24

## 2019-12-24 NOTE — ED PROVIDER NOTE - CLINICAL SUMMARY MEDICAL DECISION MAKING FREE TEXT BOX
69M presenting for evaluation of abdominal swelling and weakness / hypotension, has hx of this in the past and told it was due to his ascites secondary to liver metastatic. On exam here well appearing other than abdominal distention and tired appearing, ascites tap in the past, no fevers, chills, or pain today. Will check labs, cxr, admit for therapeutic ascites drainage.

## 2019-12-24 NOTE — ED PROVIDER NOTE - OBJECTIVE STATEMENT
Hx of hepatic ascites seen at [facility] for his primary CA presenting for evaluation of abdominal distention, states that he has been intermittently hypotensive this past week also required an ivfb at [facility] for hypotension earlier this week. No fevers, chills, nausea, vomiting, chest pain, headache, abdominal pain, or back pain. States he does want to stay for an ascites tap.

## 2019-12-24 NOTE — ED ADULT NURSE NOTE - OBJECTIVE STATEMENT
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 Per wife, he woke up lethargic today and has been having episodes of hypotension recently. Pt has been unsteady on feet today. Upon exam pt A&Ox3 gross neuro intact, lungs cta bilaterally, no difficulty speaking in complete sentences, s1s2 heart sounds heard, pulses x 4, peñaloza x4, abdomen soft nontender distended, skin intact. Pt denies chest pain, sob, ha, n/v/d, abdominal pain, f/c, urinary symptoms, hematuria. pt appears pale for race. Pt is orthostatic in ED, denies dizziness/ light headedness at this time.

## 2019-12-24 NOTE — ED PROVIDER NOTE - PHYSICAL EXAMINATION
Gen: NAD, non-toxic, conversational  Eyes: PERRL, EOMI   HENT: Normocephalic, atraumatic. External ears normal, no rhinorrhea, dry mucous membranes.   CV: RRR, no M/R/G  Resp: CTAB, non-labored, speaking without difficulty on room air  Abd: soft, +distention, non-tender, non rigid, no guarding or rebound tenderness  Back: No CVAT bilaterally, no midline ttp  Skin: dry, wwp   Neuro: AOx3, speech is fluent and appropriate  Psych: Mood concerned, affect euthymic

## 2019-12-24 NOTE — ED PROVIDER NOTE - CARE PLAN
Principal Discharge DX:	Ascites, malignant  Secondary Diagnosis:	Elevated troponin  Secondary Diagnosis:	Blood clot in vein

## 2019-12-24 NOTE — ED PROVIDER NOTE - ATTENDING CONTRIBUTION TO CARE
68yo male pmh cholangiocarcinoma with ascites p/w hypotension requiring IVF at [facility] this week. Denies fevers vomiting, chest pain, cough, dyspnea. +abdominal distention. Abd non tender. Labs, septic workup.

## 2019-12-25 DIAGNOSIS — C22.1 INTRAHEPATIC BILE DUCT CARCINOMA: ICD-10-CM

## 2019-12-25 DIAGNOSIS — I95.9 HYPOTENSION, UNSPECIFIED: ICD-10-CM

## 2019-12-25 DIAGNOSIS — I81 PORTAL VEIN THROMBOSIS: ICD-10-CM

## 2019-12-25 DIAGNOSIS — K21.9 GASTRO-ESOPHAGEAL REFLUX DISEASE WITHOUT ESOPHAGITIS: ICD-10-CM

## 2019-12-25 DIAGNOSIS — Z02.9 ENCOUNTER FOR ADMINISTRATIVE EXAMINATIONS, UNSPECIFIED: ICD-10-CM

## 2019-12-25 DIAGNOSIS — I10 ESSENTIAL (PRIMARY) HYPERTENSION: ICD-10-CM

## 2019-12-25 DIAGNOSIS — R18.0 MALIGNANT ASCITES: ICD-10-CM

## 2019-12-25 DIAGNOSIS — R79.89 OTHER SPECIFIED ABNORMAL FINDINGS OF BLOOD CHEMISTRY: ICD-10-CM

## 2019-12-25 LAB
ALBUMIN SERPL ELPH-MCNC: 2.8 G/DL — LOW (ref 3.3–5)
ALP SERPL-CCNC: 336 U/L — HIGH (ref 40–120)
ALT FLD-CCNC: 51 U/L — HIGH (ref 10–45)
ANION GAP SERPL CALC-SCNC: 13 MMOL/L — SIGNIFICANT CHANGE UP (ref 5–17)
ANISOCYTOSIS BLD QL: SLIGHT — SIGNIFICANT CHANGE UP
APPEARANCE UR: CLEAR — SIGNIFICANT CHANGE UP
APTT BLD: 155.3 SEC — CRITICAL HIGH (ref 27.5–36.3)
APTT BLD: 29.8 SEC — SIGNIFICANT CHANGE UP (ref 27.5–36.3)
APTT BLD: 68 SEC — HIGH (ref 27.5–36.3)
AST SERPL-CCNC: 280 U/L — HIGH (ref 10–40)
BACTERIA # UR AUTO: NEGATIVE — SIGNIFICANT CHANGE UP
BASOPHILS # BLD AUTO: 0.14 K/UL — SIGNIFICANT CHANGE UP (ref 0–0.2)
BASOPHILS NFR BLD AUTO: 2.6 % — HIGH (ref 0–2)
BILIRUB SERPL-MCNC: 1.4 MG/DL — HIGH (ref 0.2–1.2)
BILIRUB UR-MCNC: NEGATIVE — SIGNIFICANT CHANGE UP
BUN SERPL-MCNC: 12 MG/DL — SIGNIFICANT CHANGE UP (ref 7–23)
CALCIUM SERPL-MCNC: 8.4 MG/DL — SIGNIFICANT CHANGE UP (ref 8.4–10.5)
CHLORIDE SERPL-SCNC: 99 MMOL/L — SIGNIFICANT CHANGE UP (ref 96–108)
CK MB CFR SERPL CALC: 5.3 NG/ML — SIGNIFICANT CHANGE UP (ref 0–6.7)
CK MB CFR SERPL CALC: 6.1 NG/ML — SIGNIFICANT CHANGE UP (ref 0–6.7)
CK SERPL-CCNC: 64 U/L — SIGNIFICANT CHANGE UP (ref 30–200)
CO2 SERPL-SCNC: 24 MMOL/L — SIGNIFICANT CHANGE UP (ref 22–31)
COLOR SPEC: YELLOW — SIGNIFICANT CHANGE UP
CREAT SERPL-MCNC: 0.64 MG/DL — SIGNIFICANT CHANGE UP (ref 0.5–1.3)
DIFF PNL FLD: NEGATIVE — SIGNIFICANT CHANGE UP
EOSINOPHIL # BLD AUTO: 0.14 K/UL — SIGNIFICANT CHANGE UP (ref 0–0.5)
EOSINOPHIL NFR BLD AUTO: 2.6 % — SIGNIFICANT CHANGE UP (ref 0–6)
EPI CELLS # UR: 0 /HPF — SIGNIFICANT CHANGE UP
GIANT PLATELETS BLD QL SMEAR: PRESENT — SIGNIFICANT CHANGE UP
GLUCOSE SERPL-MCNC: 134 MG/DL — HIGH (ref 70–99)
GLUCOSE UR QL: NEGATIVE — SIGNIFICANT CHANGE UP
HCT VFR BLD CALC: 23.5 % — LOW (ref 39–50)
HCT VFR BLD CALC: 24 % — LOW (ref 39–50)
HCT VFR BLD CALC: 26.9 % — LOW (ref 39–50)
HGB BLD-MCNC: 7.6 G/DL — LOW (ref 13–17)
HGB BLD-MCNC: 7.7 G/DL — LOW (ref 13–17)
HGB BLD-MCNC: 9 G/DL — LOW (ref 13–17)
HYALINE CASTS # UR AUTO: 1 /LPF — SIGNIFICANT CHANGE UP (ref 0–2)
HYPOCHROMIA BLD QL: SIGNIFICANT CHANGE UP
INR BLD: 2.44 RATIO — HIGH (ref 0.88–1.16)
KETONES UR-MCNC: ABNORMAL
LEUKOCYTE ESTERASE UR-ACNC: NEGATIVE — SIGNIFICANT CHANGE UP
LYMPHOCYTES # BLD AUTO: 0.68 K/UL — LOW (ref 1–3.3)
LYMPHOCYTES # BLD AUTO: 12.3 % — LOW (ref 13–44)
MACROCYTES BLD QL: SLIGHT — SIGNIFICANT CHANGE UP
MANUAL SMEAR VERIFICATION: SIGNIFICANT CHANGE UP
MCHC RBC-ENTMCNC: 26.5 PG — LOW (ref 27–34)
MCHC RBC-ENTMCNC: 26.6 PG — LOW (ref 27–34)
MCHC RBC-ENTMCNC: 27.4 PG — SIGNIFICANT CHANGE UP (ref 27–34)
MCHC RBC-ENTMCNC: 32.1 GM/DL — SIGNIFICANT CHANGE UP (ref 32–36)
MCHC RBC-ENTMCNC: 32.3 GM/DL — SIGNIFICANT CHANGE UP (ref 32–36)
MCHC RBC-ENTMCNC: 33.5 GM/DL — SIGNIFICANT CHANGE UP (ref 32–36)
MCV RBC AUTO: 81.8 FL — SIGNIFICANT CHANGE UP (ref 80–100)
MCV RBC AUTO: 82.2 FL — SIGNIFICANT CHANGE UP (ref 80–100)
MCV RBC AUTO: 82.5 FL — SIGNIFICANT CHANGE UP (ref 80–100)
MONOCYTES # BLD AUTO: 0.1 K/UL — SIGNIFICANT CHANGE UP (ref 0–0.9)
MONOCYTES NFR BLD AUTO: 1.8 % — LOW (ref 2–14)
NEUTROPHILS # BLD AUTO: 4.45 K/UL — SIGNIFICANT CHANGE UP (ref 1.8–7.4)
NEUTROPHILS NFR BLD AUTO: 80.7 % — HIGH (ref 43–77)
NITRITE UR-MCNC: NEGATIVE — SIGNIFICANT CHANGE UP
NRBC # BLD: 0 /100 WBCS — SIGNIFICANT CHANGE UP (ref 0–0)
NRBC # BLD: 0 /100 WBCS — SIGNIFICANT CHANGE UP (ref 0–0)
OVALOCYTES BLD QL SMEAR: SLIGHT — SIGNIFICANT CHANGE UP
PH UR: 6 — SIGNIFICANT CHANGE UP (ref 5–8)
PLAT MORPH BLD: NORMAL — SIGNIFICANT CHANGE UP
PLATELET # BLD AUTO: 126 K/UL — LOW (ref 150–400)
PLATELET # BLD AUTO: 127 K/UL — LOW (ref 150–400)
PLATELET # BLD AUTO: 129 K/UL — LOW (ref 150–400)
PLATELET COUNT - ESTIMATE: NORMAL — SIGNIFICANT CHANGE UP
POIKILOCYTOSIS BLD QL AUTO: SLIGHT — SIGNIFICANT CHANGE UP
POLYCHROMASIA BLD QL SMEAR: SLIGHT — SIGNIFICANT CHANGE UP
POTASSIUM SERPL-MCNC: 4.2 MMOL/L — SIGNIFICANT CHANGE UP (ref 3.5–5.3)
POTASSIUM SERPL-SCNC: 4.2 MMOL/L — SIGNIFICANT CHANGE UP (ref 3.5–5.3)
PROT SERPL-MCNC: 6 G/DL — SIGNIFICANT CHANGE UP (ref 6–8.3)
PROT UR-MCNC: ABNORMAL
PROTHROM AB SERPL-ACNC: 28.8 SEC — HIGH (ref 10–12.9)
RBC # BLD: 2.86 M/UL — LOW (ref 4.2–5.8)
RBC # BLD: 2.91 M/UL — LOW (ref 4.2–5.8)
RBC # BLD: 3.29 M/UL — LOW (ref 4.2–5.8)
RBC # FLD: 20.2 % — HIGH (ref 10.3–14.5)
RBC # FLD: 20.2 % — HIGH (ref 10.3–14.5)
RBC # FLD: 20.7 % — HIGH (ref 10.3–14.5)
RBC BLD AUTO: ABNORMAL
RBC CASTS # UR COMP ASSIST: 4 /HPF — SIGNIFICANT CHANGE UP (ref 0–4)
SODIUM SERPL-SCNC: 136 MMOL/L — SIGNIFICANT CHANGE UP (ref 135–145)
SP GR SPEC: 1.02 — SIGNIFICANT CHANGE UP (ref 1.01–1.02)
TARGETS BLD QL SMEAR: SLIGHT — SIGNIFICANT CHANGE UP
TROPONIN T, HIGH SENSITIVITY RESULT: 135 NG/L — HIGH (ref 0–51)
TROPONIN T, HIGH SENSITIVITY RESULT: 155 NG/L — HIGH (ref 0–51)
TROPONIN T, HIGH SENSITIVITY RESULT: 159 NG/L — HIGH (ref 0–51)
TROPONIN T, HIGH SENSITIVITY RESULT: 169 NG/L — HIGH (ref 0–51)
UROBILINOGEN FLD QL: NEGATIVE — SIGNIFICANT CHANGE UP
WBC # BLD: 4.72 K/UL — SIGNIFICANT CHANGE UP (ref 3.8–10.5)
WBC # BLD: 5.03 K/UL — SIGNIFICANT CHANGE UP (ref 3.8–10.5)
WBC # BLD: 5.51 K/UL — SIGNIFICANT CHANGE UP (ref 3.8–10.5)
WBC # FLD AUTO: 4.72 K/UL — SIGNIFICANT CHANGE UP (ref 3.8–10.5)
WBC # FLD AUTO: 5.03 K/UL — SIGNIFICANT CHANGE UP (ref 3.8–10.5)
WBC # FLD AUTO: 5.51 K/UL — SIGNIFICANT CHANGE UP (ref 3.8–10.5)
WBC UR QL: 2 /HPF — SIGNIFICANT CHANGE UP (ref 0–5)

## 2019-12-25 PROCEDURE — 71046 X-RAY EXAM CHEST 2 VIEWS: CPT | Mod: 26

## 2019-12-25 PROCEDURE — 99223 1ST HOSP IP/OBS HIGH 75: CPT

## 2019-12-25 PROCEDURE — 12345: CPT | Mod: NC

## 2019-12-25 PROCEDURE — 99221 1ST HOSP IP/OBS SF/LOW 40: CPT

## 2019-12-25 RX ORDER — HEPARIN SODIUM 5000 [USP'U]/ML
2500 INJECTION INTRAVENOUS; SUBCUTANEOUS EVERY 6 HOURS
Refills: 0 | Status: DISCONTINUED | OUTPATIENT
Start: 2019-12-25 | End: 2019-12-27

## 2019-12-25 RX ORDER — HEPARIN SODIUM 5000 [USP'U]/ML
5500 INJECTION INTRAVENOUS; SUBCUTANEOUS ONCE
Refills: 0 | Status: COMPLETED | OUTPATIENT
Start: 2019-12-25 | End: 2019-12-25

## 2019-12-25 RX ORDER — HEPARIN SODIUM 5000 [USP'U]/ML
INJECTION INTRAVENOUS; SUBCUTANEOUS
Qty: 25000 | Refills: 0 | Status: DISCONTINUED | OUTPATIENT
Start: 2019-12-25 | End: 2019-12-25

## 2019-12-25 RX ORDER — SODIUM CHLORIDE 9 MG/ML
1000 INJECTION INTRAMUSCULAR; INTRAVENOUS; SUBCUTANEOUS ONCE
Refills: 0 | Status: COMPLETED | OUTPATIENT
Start: 2019-12-25 | End: 2019-12-25

## 2019-12-25 RX ORDER — HEPARIN SODIUM 5000 [USP'U]/ML
5500 INJECTION INTRAVENOUS; SUBCUTANEOUS EVERY 6 HOURS
Refills: 0 | Status: DISCONTINUED | OUTPATIENT
Start: 2019-12-25 | End: 2019-12-27

## 2019-12-25 RX ORDER — PANTOPRAZOLE SODIUM 20 MG/1
40 TABLET, DELAYED RELEASE ORAL
Refills: 0 | Status: DISCONTINUED | OUTPATIENT
Start: 2019-12-25 | End: 2020-01-09

## 2019-12-25 RX ORDER — OXYCODONE HYDROCHLORIDE 5 MG/1
5 TABLET ORAL EVERY 6 HOURS
Refills: 0 | Status: DISCONTINUED | OUTPATIENT
Start: 2019-12-25 | End: 2020-01-01

## 2019-12-25 RX ORDER — CIPROFLOXACIN LACTATE 400MG/40ML
500 VIAL (ML) INTRAVENOUS DAILY
Refills: 0 | Status: DISCONTINUED | OUTPATIENT
Start: 2019-12-25 | End: 2020-01-09

## 2019-12-25 RX ORDER — SIMETHICONE 80 MG/1
80 TABLET, CHEWABLE ORAL
Refills: 0 | Status: DISCONTINUED | OUTPATIENT
Start: 2019-12-25 | End: 2020-01-09

## 2019-12-25 RX ORDER — METOPROLOL TARTRATE 50 MG
25 TABLET ORAL DAILY
Refills: 0 | Status: DISCONTINUED | OUTPATIENT
Start: 2019-12-25 | End: 2020-01-09

## 2019-12-25 RX ORDER — HEPARIN SODIUM 5000 [USP'U]/ML
2500 INJECTION INTRAVENOUS; SUBCUTANEOUS EVERY 6 HOURS
Refills: 0 | Status: DISCONTINUED | OUTPATIENT
Start: 2019-12-25 | End: 2019-12-25

## 2019-12-25 RX ORDER — HEPARIN SODIUM 5000 [USP'U]/ML
5000 INJECTION INTRAVENOUS; SUBCUTANEOUS ONCE
Refills: 0 | Status: DISCONTINUED | OUTPATIENT
Start: 2019-12-25 | End: 2019-12-25

## 2019-12-25 RX ORDER — HEPARIN SODIUM 5000 [USP'U]/ML
5500 INJECTION INTRAVENOUS; SUBCUTANEOUS EVERY 6 HOURS
Refills: 0 | Status: DISCONTINUED | OUTPATIENT
Start: 2019-12-25 | End: 2019-12-25

## 2019-12-25 RX ORDER — ACETAMINOPHEN 500 MG
650 TABLET ORAL EVERY 4 HOURS
Refills: 0 | Status: DISCONTINUED | OUTPATIENT
Start: 2019-12-25 | End: 2020-01-09

## 2019-12-25 RX ORDER — HEPARIN SODIUM 5000 [USP'U]/ML
5000 INJECTION INTRAVENOUS; SUBCUTANEOUS EVERY 6 HOURS
Refills: 0 | Status: DISCONTINUED | OUTPATIENT
Start: 2019-12-25 | End: 2019-12-25

## 2019-12-25 RX ORDER — HEPARIN SODIUM 5000 [USP'U]/ML
1000 INJECTION INTRAVENOUS; SUBCUTANEOUS
Qty: 25000 | Refills: 0 | Status: DISCONTINUED | OUTPATIENT
Start: 2019-12-25 | End: 2019-12-26

## 2019-12-25 RX ORDER — SODIUM CHLORIDE 9 MG/ML
500 INJECTION INTRAMUSCULAR; INTRAVENOUS; SUBCUTANEOUS
Refills: 0 | Status: COMPLETED | OUTPATIENT
Start: 2019-12-25 | End: 2019-12-25

## 2019-12-25 RX ORDER — DOXAZOSIN MESYLATE 4 MG
1 TABLET ORAL AT BEDTIME
Refills: 0 | Status: DISCONTINUED | OUTPATIENT
Start: 2019-12-25 | End: 2019-12-25

## 2019-12-25 RX ORDER — GABAPENTIN 400 MG/1
300 CAPSULE ORAL AT BEDTIME
Refills: 0 | Status: DISCONTINUED | OUTPATIENT
Start: 2019-12-25 | End: 2020-01-09

## 2019-12-25 RX ORDER — FAMOTIDINE 10 MG/ML
20 INJECTION INTRAVENOUS
Refills: 0 | Status: DISCONTINUED | OUTPATIENT
Start: 2019-12-25 | End: 2020-01-09

## 2019-12-25 RX ADMIN — FAMOTIDINE 20 MILLIGRAM(S): 10 INJECTION INTRAVENOUS at 18:18

## 2019-12-25 RX ADMIN — PANTOPRAZOLE SODIUM 40 MILLIGRAM(S): 20 TABLET, DELAYED RELEASE ORAL at 06:36

## 2019-12-25 RX ADMIN — SODIUM CHLORIDE 1000 MILLILITER(S): 9 INJECTION INTRAMUSCULAR; INTRAVENOUS; SUBCUTANEOUS at 19:04

## 2019-12-25 RX ADMIN — HEPARIN SODIUM 5500 UNIT(S): 5000 INJECTION INTRAVENOUS; SUBCUTANEOUS at 03:26

## 2019-12-25 RX ADMIN — OXYCODONE HYDROCHLORIDE 5 MILLIGRAM(S): 5 TABLET ORAL at 12:43

## 2019-12-25 RX ADMIN — Medication 25 MILLIGRAM(S): at 06:36

## 2019-12-25 RX ADMIN — SIMETHICONE 80 MILLIGRAM(S): 80 TABLET, CHEWABLE ORAL at 12:43

## 2019-12-25 RX ADMIN — HEPARIN SODIUM 1000 UNIT(S)/HR: 5000 INJECTION INTRAVENOUS; SUBCUTANEOUS at 19:03

## 2019-12-25 RX ADMIN — OXYCODONE HYDROCHLORIDE 5 MILLIGRAM(S): 5 TABLET ORAL at 18:16

## 2019-12-25 RX ADMIN — OXYCODONE HYDROCHLORIDE 5 MILLIGRAM(S): 5 TABLET ORAL at 07:29

## 2019-12-25 RX ADMIN — SODIUM CHLORIDE 125 MILLILITER(S): 9 INJECTION INTRAMUSCULAR; INTRAVENOUS; SUBCUTANEOUS at 12:07

## 2019-12-25 RX ADMIN — GABAPENTIN 300 MILLIGRAM(S): 400 CAPSULE ORAL at 21:29

## 2019-12-25 RX ADMIN — HEPARIN SODIUM 0 UNIT(S)/HR: 5000 INJECTION INTRAVENOUS; SUBCUTANEOUS at 10:06

## 2019-12-25 RX ADMIN — OXYCODONE HYDROCHLORIDE 5 MILLIGRAM(S): 5 TABLET ORAL at 06:36

## 2019-12-25 RX ADMIN — HEPARIN SODIUM 1000 UNIT(S)/HR: 5000 INJECTION INTRAVENOUS; SUBCUTANEOUS at 11:32

## 2019-12-25 RX ADMIN — SODIUM CHLORIDE 1000 MILLILITER(S): 9 INJECTION INTRAMUSCULAR; INTRAVENOUS; SUBCUTANEOUS at 00:02

## 2019-12-25 RX ADMIN — HEPARIN SODIUM 1200 UNIT(S)/HR: 5000 INJECTION INTRAVENOUS; SUBCUTANEOUS at 03:27

## 2019-12-25 RX ADMIN — Medication 500 MILLIGRAM(S): at 12:43

## 2019-12-25 RX ADMIN — OXYCODONE HYDROCHLORIDE 5 MILLIGRAM(S): 5 TABLET ORAL at 20:49

## 2019-12-25 RX ADMIN — OXYCODONE HYDROCHLORIDE 5 MILLIGRAM(S): 5 TABLET ORAL at 13:43

## 2019-12-25 NOTE — H&P ADULT - PROBLEM SELECTOR PLAN 5
- since BP improved and currently stable will resume home metoprolol - Simethicone prn   - Famotidine PRN   - Pantoprazole

## 2019-12-25 NOTE — H&P ADULT - PROBLEM SELECTOR PLAN 1
Tense ascites  contributing to poor po intake and patient discomfort , recent ascitic fluid analysis not suggestive of SBP currently on cipro for PPX   - Eliquis on hold and patient on heparin gtt for possible therapeutic paracentesis   - IR consult for paracentesis - to be arranged by day team   - continue cipro for SBP pPX

## 2019-12-25 NOTE — CONSULT NOTE ADULT - SUBJECTIVE AND OBJECTIVE BOX
CHIEF COMPLAINT:Patient is a 69y old  Male who presents with a chief complaint of Hypotension x 1 day (25 Dec 2019 10:45)      HISTORY OF PRESENT ILLNESS:HPI:  Patient is a 70 yo M with PMH of cholangiocarcinoma (dx in 2017 s/p Whipple procedure with liver mets found in 2019 after finding of clots in portal vein, also s/p multiple rounds of radiation treatment) currently on chemotherapy, last session 12/19) ,  chronic back pain, GERD, IBS with diarrhea, and recently diagnosed atrial fibrillation, multiple  recent admissions ( most recent Dec 12-17) for fevers , work up has been non revealing , patient was treated with vanc, cefepime and metronidazole  s/p paracentesis  Fluid analysis not c/w SBP , ascites fluid w/ no growth , patient was discharged home on ciprofloxacin , he now presents for hypotension.  Per patients wife , patient was feeling weak and had hypotension on 12/21, at that time her was given IV fluids at [facility] and improved. ON day of admission , wife reports patient was on again lethargic and generally weak , he was unable to get out of bed. Per wife , patient has poor appetite and hardly eating . Patient reports GERD symptoms poorly responsive to Maalox and simethicone . Patient also reports expanding ascites that is uncomfortable. He reports no fever or chills , no runny nose sore throat , no diarrhea , no dysuria . (25 Dec 2019 04:38)      PAST MEDICAL & SURGICAL HISTORY:  Rash  Biliary stricture: s/p biliary stent  Pancreatic lesion  Gastric polyp  Obstructive jaundice  Other hyperlipidemia  Essential hypertension  IBS (irritable bowel syndrome)  GERD (gastroesophageal reflux disease)  Intestinal Whipple's disease  S/P unilateral inguinal hernia repair  S/P tendon repair: hand  History of lumbar laminectomy  History of biliary stent insertion: 2/21/2017  S/P cervical spinal fusion          MEDICATIONS:  doxazosin 1 milliGRAM(s) Oral at bedtime  heparin  Infusion. 1000 Unit(s)/Hr IV Continuous <Continuous>  heparin  Injectable 5500 Unit(s) IV Push every 6 hours PRN  heparin  Injectable 2500 Unit(s) IV Push every 6 hours PRN  metoprolol succinate ER 25 milliGRAM(s) Oral daily    ciprofloxacin     Tablet 500 milliGRAM(s) Oral daily      acetaminophen   Tablet .. 650 milliGRAM(s) Oral every 4 hours PRN  gabapentin 300 milliGRAM(s) Oral at bedtime  oxyCODONE    IR 5 milliGRAM(s) Oral every 6 hours PRN    aluminum hydroxide/magnesium hydroxide/simethicone Suspension 30 milliLiter(s) Oral every 4 hours PRN  famotidine    Tablet 20 milliGRAM(s) Oral two times a day PRN  pantoprazole    Tablet 40 milliGRAM(s) Oral before breakfast  simethicone 80 milliGRAM(s) Chew two times a day PRN          FAMILY HISTORY:  FH: CHF (congestive heart failure)  FH: prostate cancer  Family history of colitis      Non-contributory    SOCIAL HISTORY:    [ ] Tobacco  [ ] Drugs  [ ] Alcohol    Allergies    penicillin G benzathine (Rash)  sulfa drugs (Rash)    Intolerances    	    REVIEW OF SYSTEMS:  CONSTITUTIONAL: No fever  EYES: No eye pain, visual disturbances, or discharge  ENMT:  No difficulty hearing, tinnitus  NECK: No pain or stiffness  RESPIRATORY: No cough, wheezing,  CARDIOVASCULAR: No chest pain, palpitations, passing out, dizziness, or leg swelling  GASTROINTESTINAL:  No nausea, vomiting, diarrhea or constipation. No melena.  GENITOURINARY: No dysuria, hematuria  NEUROLOGICAL: No stroke like symptoms  SKIN: No burning or lesions   ENDOCRINE: No heat or cold intolerance  MUSCULOSKELETAL: No joint pain or swelling  PSYCHIATRIC: No  anxiety, mood swings  HEME/LYMPH: No bleeding gums  ALLERGY AND IMMUNOLOGIC: No hives or eczema	    All other ROS negative    PHYSICAL EXAM:  T(C): 36.9 (12-25-19 @ 11:16), Max: 37.1 (12-24-19 @ 21:10)  HR: 100 (12-25-19 @ 06:32) (89 - 100)  BP: 124/65 (12-25-19 @ 06:32) (104/70 - 124/65)  RR: 18 (12-25-19 @ 11:16) (16 - 18)  SpO2: 95% (12-25-19 @ 11:16) (95% - 100%)  Wt(kg): --  I&O's Summary      Appearance: Normal	  HEENT:   Normal oral mucosa, EOMI	  Cardiovascular: Normal S1 S2, No JVD, No murmurs  Respiratory: Lungs clear to auscultation	  Psychiatry: Alert  Gastrointestinal:  Soft, Non-tender, + BS	  Skin: No rashes   Neurologic: Non-focal  Extremities:  No edema  Vascular: Peripheral pulses palpable    	    	  	  CARDIAC MARKERS:  Labs personally reviewed by me                                  7.6    5.03  )-----------( 126      ( 25 Dec 2019 09:20 )             23.5     12-25    136  |  99  |  12  ----------------------------<  134<H>  4.2   |  24  |  0.64    Ca    8.4      25 Dec 2019 00:22    TPro  6.0  /  Alb  2.8<L>  /  TBili  1.4<H>  /  DBili  x   /  AST  280<H>  /  ALT  51<H>  /  AlkPhos  336<H>  12-25          EKG: Personally reviewed by me -   Radiology: Personally reviewed by me -       Assessment /Plan:   Elevated trop - nonspecific in this male with multiple comorbidities, conservative management  - TTE  - Full recs to follow    Advanced care planning was discussed with patient/family. Goals of care was discussed with patient/family.  Differential diagnosis and plan of care discussed with patient after the evaluation.  Counseling on diet, exercise and medication compliance was done.        Adonis Collins DO [facility]  Cardiovascular Medicine  [address] [place] [address], [address]  Office [phone]  Cell [phone] CHIEF COMPLAINT:Patient is a 69y old  Male who presents with a chief complaint of Hypotension x 1 day (25 Dec 2019 10:45)      HISTORY OF PRESENT ILLNESS:HPI:  Patient is a 70 yo M with PMH of cholangiocarcinoma (dx in 2017 s/p Whipple procedure with liver mets found in 2019 after finding of clots in portal vein, also s/p multiple rounds of radiation treatment) currently on chemotherapy, last session 12/19) ,  chronic back pain, GERD, IBS with diarrhea, and recently diagnosed atrial fibrillation, multiple  recent admissions ( most recent Dec 12-17) for fevers , work up has been non revealing , patient was treated with vanc, cefepime and metronidazole  s/p paracentesis  Fluid analysis not c/w SBP , ascites fluid w/ no growth , patient was discharged home on ciprofloxacin , he now presents for hypotension.  Per patients wife , patient was feeling weak and had hypotension on 12/21, at that time her was given IV fluids at [facility] and improved. ON day of admission , wife reports patient was on again lethargic and generally weak , he was unable to get out of bed. Per wife , patient has poor appetite and hardly eating . Patient reports GERD symptoms poorly responsive to Maalox and simethicone . Patient also reports expanding ascites that is uncomfortable. He reports no fever or chills , no runny nose sore throat , no diarrhea , no dysuria . (25 Dec 2019 04:38)      PAST MEDICAL & SURGICAL HISTORY:  Rash  Biliary stricture: s/p biliary stent  Pancreatic lesion  Gastric polyp  Obstructive jaundice  Other hyperlipidemia  Essential hypertension  IBS (irritable bowel syndrome)  GERD (gastroesophageal reflux disease)  Intestinal Whipple's disease  S/P unilateral inguinal hernia repair  S/P tendon repair: hand  History of lumbar laminectomy  History of biliary stent insertion: 2/21/2017  S/P cervical spinal fusion          MEDICATIONS:  doxazosin 1 milliGRAM(s) Oral at bedtime  heparin  Infusion. 1000 Unit(s)/Hr IV Continuous <Continuous>  heparin  Injectable 5500 Unit(s) IV Push every 6 hours PRN  heparin  Injectable 2500 Unit(s) IV Push every 6 hours PRN  metoprolol succinate ER 25 milliGRAM(s) Oral daily    ciprofloxacin     Tablet 500 milliGRAM(s) Oral daily      acetaminophen   Tablet .. 650 milliGRAM(s) Oral every 4 hours PRN  gabapentin 300 milliGRAM(s) Oral at bedtime  oxyCODONE    IR 5 milliGRAM(s) Oral every 6 hours PRN    aluminum hydroxide/magnesium hydroxide/simethicone Suspension 30 milliLiter(s) Oral every 4 hours PRN  famotidine    Tablet 20 milliGRAM(s) Oral two times a day PRN  pantoprazole    Tablet 40 milliGRAM(s) Oral before breakfast  simethicone 80 milliGRAM(s) Chew two times a day PRN          FAMILY HISTORY:  FH: CHF (congestive heart failure)  FH: prostate cancer  Family history of colitis      Non-contributory    SOCIAL HISTORY:    not a smoker    Allergies    penicillin G benzathine (Rash)  sulfa drugs (Rash)    Intolerances     Review of Systems:  Review of Systems: CONSTITUTIONAL: + weakness, no  fevers or chills  EYES/ENT: No visual changes;  No dysphagia  NECK: No pain or stiffness  RESPIRATORY: No cough, wheezing, hemoptysis; No shortness of breath  CARDIOVASCULAR: No chest pain or palpitations; No lower extremity edema  EXTREMITIES: no le edema, cyanosis, clubbing  GASTROINTESTINAL: +  abdominal  pain. + nausea, NO  vomiting, or hematemesis; No diarrhea or constipation. No melena or hematochezia.  BACK: + chronic  back pain  GENITOURINARY: No dysuria, frequency or hematuria  NEUROLOGICAL: No numbness or weakness  MSK: no joint swelling or pain  SKIN: No itching, burning, rashes, or lesions   PSYCH: no agitation All other review of systems is negative unless indicated above.	  	    All other ROS negative    PHYSICAL EXAM:  T(C): 36.9 (12-25-19 @ 11:16), Max: 37.1 (12-24-19 @ 21:10)  HR: 100 (12-25-19 @ 06:32) (89 - 100)  BP: 124/65 (12-25-19 @ 06:32) (104/70 - 124/65)  RR: 18 (12-25-19 @ 11:16) (16 - 18)  SpO2: 95% (12-25-19 @ 11:16) (95% - 100%)  Wt(kg): --  I&O's Summary      Appearance: Normal	  HEENT:   Normal oral mucosa, EOMI	  Cardiovascular: Normal S1 S2, No JVD, No murmurs  Respiratory: Lungs clear to auscultation	  Psychiatry: Alert  Gastrointestinal:  Soft, Non-tender, + BS	  Skin: No rashes   Neurologic: Non-focal  Extremities:  No edema  Vascular: Peripheral pulses palpable    	    	  	  CARDIAC MARKERS:  Labs personally reviewed by me                                  7.6    5.03  )-----------( 126      ( 25 Dec 2019 09:20 )             23.5     12-25    136  |  99  |  12  ----------------------------<  134<H>  4.2   |  24  |  0.64    Ca    8.4      25 Dec 2019 00:22    TPro  6.0  /  Alb  2.8<L>  /  TBili  1.4<H>  /  DBili  x   /  AST  280<H>  /  ALT  51<H>  /  AlkPhos  336<H>  12-25          EKG: Personally reviewed by me -   Radiology: Personally reviewed by me -  pleural effusion improved compared to prior    Assessment /Plan:   Elevated trop - nonspecific in this male with multiple comorbidities, conservative management  - TTE  - Full recs to follow    Assessment and Plan:    Assessment:  · Assessment		  69 M w/cholangiocarcinoma s/p Whipple procedure with liver mets  c/b  portal vein thrombosis, s/p multiple radiation and currently on chemotherapy( last session 12/19) ,  chronic back pain, GERD, IBS with diarrhea, and recently diagnosed atrial fibrillation,  p/w lethargy and  hypotension x 1 day.       Problem/Plan - 1:  ·  Problem: Ascites, malignant.  Plan: Tense ascites  contributing to poor po intake and patient discomfort , recent ascitic fluid analysis not suggestive of SBP currently on cipro for PPX   - Eliquis on hold and patient on heparin gtt for possible therapeutic paracentesis      Problem/Plan - 2:  ·  Problem: Elevated troponin.  Plan: suspect 2/2 to demand in setting of hypotension , ekg w/ twi,   -- Elevated trop - nonspecific in this male with multiple comorbidities, conservative management  - TTE     Problem/Plan - 3:  Problem: Essential hypertension.Plan: - since BP improved and currently stable will resume home metoprolol.    Advanced care planning was discussed with patient/family. Goals of care was discussed with patient/family.  Differential diagnosis and plan of care discussed with patient after the evaluation.  Counseling on diet, exercise and medication compliance was done.        Adonis Collins DO [facility]  Cardiovascular Medicine  [address] [place] [address], [address]  Office [phone]  Cell [phone]

## 2019-12-25 NOTE — CONSULT NOTE ADULT - ASSESSMENT
Patient is a 68 yo M with PMHx of cholangiocarcinoma (dx in 2017 s/p Whipple procedure with liver mets found in 2019 after finding of clots in portal vein, also s/p multiple rounds of radiation treatment) currently on FOLFOX s/p 1 cycle,  chronic back pain, GERD, IBS with diarrhea, and recently diagnosed atrial fibrillation, multiple recent admissions for fever workup who presents with weakness.     #Weakness  - likely overall constellation of symptoms in setting of dehydration after chemo  - already feels improved after IVF hydration today  - continue aggressive supportive care  - r/o infection although does not appear so, blood cultures pending   - may need IV hydration appointments scheduled as outpatient     #Cholangiocarcinoma  - s/p next line of treatment FOLFOX 12/19, today is D6  - monitor CBC daily, transfuse to keep Hg >7  - recurrent ascites likely malignant although cytology negative  - plans for paracentesis noted, currently on hep gtt   - if fluid reaccumulates rapidly, may need to consider indwelling catheter going forward  - f/u with primary oncologist Dr. Atkinson as outpatient to resume treatments    Arturo Falk, PGY-5  Hematology-Oncology Fellow  [phone] ([place]) [phone] ([facility])

## 2019-12-25 NOTE — PROVIDER CONTACT NOTE (CRITICAL VALUE NOTIFICATION) - BACKGROUND
69y Male pmhx of cholelangocarcinoma, portal vein thrombosis, AFib, and GERD seen at [facility] for his primary CA presenting for evaluation of abdominal distention

## 2019-12-25 NOTE — CHART NOTE - NSCHARTNOTEFT_GEN_A_CORE
Patient was seen and examined by me at bedside.     VSS    General NAD aa0x3   HEENT MMM  Cardio s1/s2 RRR  Lung CTAB  GI distended nontender   Ext WWP no edema      1.  Tropinemia- echo ordered, appreciate cards recs.    2.  Ascities- US of abdomen, transitioned to hep gtt in anticipation   3.  Orthostatics- will fluid resuscitate aggressively and re-evaluate.  Discontinue cardura  4. Transaminitis- likely secondary to underlying malignancy

## 2019-12-25 NOTE — H&P ADULT - NSHPLABSRESULTS_GEN_ALL_CORE
Labs personally reviewed:                          9.0    5.51  )-----------( 127      ( 25 Dec 2019 00:22 )             26.9     12-25    136  |  99  |  12  ----------------------------<  134<H>  4.2   |  24  |  0.64    Ca    8.4      25 Dec 2019 00:22    TPro  6.0  /  Alb  2.8<L>  /  TBili  1.4<H>  /  DBili  x   /  AST  280<H>  /  ALT  51<H>  /  AlkPhos  336<H>  12-25        LIVER FUNCTIONS - ( 25 Dec 2019 00:22 )  Alb: 2.8 g/dL / Pro: 6.0 g/dL / ALK PHOS: 336 U/L / ALT: 51 U/L / AST: 280 U/L / GGT: x           PT/INR - ( 25 Dec 2019 02:49 )   PT: 28.8 sec;   INR: 2.44 ratio         PTT - ( 25 Dec 2019 02:49 )  PTT:29.8 sec    CAPILLARY BLOOD GLUCOSE          Imaging:  CXR personally reviewed: Decreased left pleural effusion and atelectasis since 12/12/2019.    EKG personally reviewed: Labs personally reviewed:                          9.0    5.51  )-----------( 127      ( 25 Dec 2019 00:22 )             26.9     12-25    136  |  99  |  12  ----------------------------<  134<H>  4.2   |  24  |  0.64    Ca    8.4      25 Dec 2019 00:22    TPro  6.0  /  Alb  2.8<L>  /  TBili  1.4<H>  /  DBili  x   /  AST  280<H>  /  ALT  51<H>  /  AlkPhos  336<H>  12-25        LIVER FUNCTIONS - ( 25 Dec 2019 00:22 )  Alb: 2.8 g/dL / Pro: 6.0 g/dL / ALK PHOS: 336 U/L / ALT: 51 U/L / AST: 280 U/L / GGT: x           PT/INR - ( 25 Dec 2019 02:49 )   PT: 28.8 sec;   INR: 2.44 ratio         PTT - ( 25 Dec 2019 02:49 )  PTT:29.8 sec    CAPILLARY BLOOD GLUCOSE          Imaging:  CXR personally reviewed: Decreased left pleural effusion and atelectasis since 12/12/2019.    EKG personally reviewed: sinus at 89 bpm , twi v2-v3 flattened twaves v4-v6

## 2019-12-25 NOTE — H&P ADULT - NSHPPHYSICALEXAM_GEN_ALL_CORE
Vital Signs Last 24 Hrs  T(C): 37 (25 Dec 2019 03:28), Max: 37.1 (24 Dec 2019 21:10)  T(F): 98.6 (25 Dec 2019 03:28), Max: 98.7 (24 Dec 2019 21:10)  HR: 92 (25 Dec 2019 03:28) (89 - 93)  BP: 119/90 (25 Dec 2019 03:28) (104/70 - 119/90)  BP(mean): --  RR: 16 (25 Dec 2019 03:28) (16 - 16)  SpO2: 95% (25 Dec 2019 03:28) (95% - 100%) Vital Signs Last 24 Hrs  T(C): 37 (25 Dec 2019 03:28), Max: 37.1 (24 Dec 2019 21:10)  T(F): 98.6 (25 Dec 2019 03:28), Max: 98.7 (24 Dec 2019 21:10)  HR: 92 (25 Dec 2019 03:28) (89 - 93)  BP: 119/90 (25 Dec 2019 03:28) (104/70 - 119/90)  BP(mean): --  RR: 16 (25 Dec 2019 03:28) (16 - 16)  SpO2: 95% (25 Dec 2019 03:28) (95% - 100%)    GENERAL: mild  distress, well-developed, breathing comfortable on room air   HEAD:  Atraumatic, Normocephalic  ENT: EOMI, PERRLA, conjunctiva and sclera clear,  moist mucosa no pharyngeal erythema or exudates   NECK: supple , no JVD   CHEST/LUNG: R  chemo port in clean dressing , Clear to auscultation bilaterally; No wheeze, equal breath sounds bilaterally   BACK: No spinal tenderness,  No CVA tenderness   HEART: Regular rate and rhythm; No murmurs, rubs, or gallops  ABDOMEN:  + distended with fluid wave , tense , non tender ; Bowel sounds present  EXTREMITIES:  No clubbing, cyanosis, or edema  MSK: No joint swelling or effusions, ROM intact   PSYCH: Normal behavior/affect  NEUROLOGY: AAOx3, non-focal, cranial nerves intact  SKIN: Normal color, No rashes or lesions

## 2019-12-25 NOTE — H&P ADULT - ASSESSMENT
69 M w/cholangiocarcinoma s/p Whipple procedure with liver mets  c/b  portal vein thrombosis, s/p multiple radiation and currently on chemotherapy( last session 12/19) ,  chronic back pain, GERD, IBS with diarrhea, and recently diagnosed atrial fibrillation,  p/w lethargy and  hypotension x 1 day.

## 2019-12-25 NOTE — H&P ADULT - HISTORY OF PRESENT ILLNESS
Patient is a 70 yo M with PMH of cholangiocarcinoma (dx in 2017 s/p Whipple procedure with liver mets found in 2019 after finding of clots in portal vein, also s/p multiple rounds of radiation treatment) currently on chemotherapy including oxplatin and 2 other medications per patient and wife (last dose in 2018, attempting to get next dose lately), as well as chronic back pain, GERD, IBS with diarrhea, and recently diagnosed atrial fibrillation, multiple  recent admissions ( most recent Dec 12-17) for fevers , work up has been non revealing , patient was treated with vanc, cefepime and metronidazole  s/p paracentesis  Fluid analysis not c/w SBP , ascites fluid w/ no growth , patient was discharged home on ciprofloxacin , he now presents for hypotension. Patient is a 68 yo M with PMH of cholangiocarcinoma (dx in 2017 s/p Whipple procedure with liver mets found in 2019 after finding of clots in portal vein, also s/p multiple rounds of radiation treatment) currently on chemotherapy, last session 12/19) ,  chronic back pain, GERD, IBS with diarrhea, and recently diagnosed atrial fibrillation, multiple  recent admissions ( most recent Dec 12-17) for fevers , work up has been non revealing , patient was treated with vanc, cefepime and metronidazole  s/p paracentesis  Fluid analysis not c/w SBP , ascites fluid w/ no growth , patient was discharged home on ciprofloxacin , he now presents for hypotension.  Per patients wife , patient was feeling weak and had hypotension on 12/21, at that time her was given IV fluids at [facility] and improved. ON day of admission , wife reports patient was on again lethargic and generally weak , he was unable to get out of bed. Per wife , patient has poor appetite and hardly eating . Patient reports GERD symptoms poorly responsive to Maalox and simethicone . Patient also reports expanding ascites that is uncomfortable. He reports no fever or chills , no runny nose sore throat , no diarrhea , no dysuria .

## 2019-12-25 NOTE — ED ADULT NURSE REASSESSMENT NOTE - NS ED NURSE REASSESS COMMENT FT1
Unsuccessful IV attempts made by multiple RNs, MD Michael Guillen notified. US guided IV requested. Pt has port, awaits xray chest for confirmation prior to port use.

## 2019-12-25 NOTE — CONSULT NOTE ADULT - SUBJECTIVE AND OBJECTIVE BOX
HPI:  Patient is a 68 yo M with PMHx of cholangiocarcinoma (dx in 2017 s/p Whipple procedure with liver mets found in 2019 after finding of clots in portal vein, also s/p multiple rounds of radiation treatment) currently on FOLFOX s/p 1 cycle,  chronic back pain, GERD, IBS with diarrhea, and recently diagnosed atrial fibrillation, multiple recent admissions for fever workup who presents with weakness. Pt was treated with first cycle FOLFOX 12/19 and had come back 12/21 to get disconnected. At that time, pt was noted to be hypotensive and given 1 L IVFs. Since, then pt has been weak and has not really been able to get out of bed. Also with poor appetite and limited po intake. Feels as though belly distention is causing worsening symptoms as well. No infectious symptoms including fever, chills, diarrhea, abdominal pain, cough, headache, or dysuria.     14 point ROS otherwise negative    PAST MEDICAL & SURGICAL HISTORY:  Rash  Biliary stricture: s/p biliary stent  Pancreatic lesion  Gastric polyp  Obstructive jaundice  Other hyperlipidemia  Essential hypertension  IBS (irritable bowel syndrome)  GERD (gastroesophageal reflux disease)  Intestinal Whipple's disease  S/P unilateral inguinal hernia repair  S/P tendon repair: hand  History of lumbar laminectomy  History of biliary stent insertion: 2/21/2017  S/P cervical spinal fusion      Allergies    penicillin G benzathine (Rash)  sulfa drugs (Rash)    Intolerances        MEDICATIONS  (STANDING):  ciprofloxacin     Tablet 500 milliGRAM(s) Oral daily  doxazosin 1 milliGRAM(s) Oral at bedtime  gabapentin 300 milliGRAM(s) Oral at bedtime  heparin  Infusion.  Unit(s)/Hr (12 mL/Hr) IV Continuous <Continuous>  metoprolol succinate ER 25 milliGRAM(s) Oral daily  pantoprazole    Tablet 40 milliGRAM(s) Oral before breakfast  sodium chloride 0.9%. 500 milliLiter(s) (125 mL/Hr) IV Continuous <Continuous>    MEDICATIONS  (PRN):  acetaminophen   Tablet .. 650 milliGRAM(s) Oral every 4 hours PRN Mild Pain (1 - 3)  aluminum hydroxide/magnesium hydroxide/simethicone Suspension 30 milliLiter(s) Oral every 4 hours PRN Dyspepsia  famotidine    Tablet 20 milliGRAM(s) Oral two times a day PRN GERD  heparin  Injectable 5500 Unit(s) IV Push every 6 hours PRN For aPTT less than 40  heparin  Injectable 2500 Unit(s) IV Push every 6 hours PRN For aPTT between 40 - 57  oxyCODONE    IR 5 milliGRAM(s) Oral every 6 hours PRN Moderate Pain (4 - 6)  simethicone 80 milliGRAM(s) Chew two times a day PRN Gas      FAMILY HISTORY:  FH: CHF (congestive heart failure)  FH: prostate cancer  Family history of colitis      SOCIAL HISTORY: No EtOH, no tobacco    Height (cm): 177.8 (12-25 @ 09:44)  Weight (kg): 68.1 (12-25 @ 09:44)  BMI (kg/m2): 21.5 (12-25 @ 09:44)  BSA (m2): 1.85 (12-25 @ 09:44)    VITALS:   T(F): 98.8 (12-25-19 @ 06:32), Max: 98.8 (12-25-19 @ 06:32)  HR: 100 (12-25-19 @ 06:32)  BP: 124/65 (12-25-19 @ 06:32)  RR: 18 (12-25-19 @ 06:32)  SpO2: 96% (12-25-19 @ 06:32)  Wt(kg): --    PHYSICAL EXAM    GENERAL: NAD, appears weak in bed   HEAD:  Atraumatic, Normocephalic  EYES: EOMI, PERRLA, conjunctiva and sclera clear  NECK: Supple, No JVD  CHEST/LUNG: Clear to auscultation bilaterally  HEART: Regular rate and rhythm; No murmurs, rubs, or gallops  ABDOMEN: Soft, NT, moderately distended; + BS  EXTREMITIES: No clubbing, cyanosis, or edema  NEUROLOGY: non-focal  SKIN: No rashes or lesions    LABS:                         7.6    5.03  )-----------( 126      ( 25 Dec 2019 09:20 )             23.5     12-25    136  |  99  |  12  ----------------------------<  134<H>  4.2   |  24  |  0.64    Ca    8.4      25 Dec 2019 00:22    TPro  6.0  /  Alb  2.8<L>  /  TBili  1.4<H>  /  DBili  x   /  AST  280<H>  /  ALT  51<H>  /  AlkPhos  336<H>  12-25      PT/INR - ( 25 Dec 2019 02:49 )   PT: 28.8 sec;   INR: 2.44 ratio         PTT - ( 25 Dec 2019 09:20 )  PTT:155.3 sec  Ascites Fl Peritoneal Fluid  12-16 @ 18:16   No growth at 5 days  --    polymorphonuclear leukocytes seen  No organisms seen  by cytocentrifuge      .Urine Clean Catch (Midstream)  12-15 @ 21:05   No growth  --  --      .Blood Blood-Peripheral  12-14 @ 23:02   No growth at 5 days.  --  --      .Urine Clean Catch (Midstream)  12-12 @ 09:51   No growth  --  --      .Blood Blood-Peripheral  12-12 @ 03:13   No growth at 5 days.  --  --      FECES  12-04 @ 16:36 --  --  --      FECES  12-03 @ 23:05 --  --  --      URINE MIDSTREAM  12-03 @ 02:51 --  --  --      BLOOD  12-02 @ 19:51 --    NO ORGANISMS ISOLATED  --      Ascites Fl Peritoneal Fluid  11-22 @ 16:54   No fungus isolated after 4 weeks.  --  --      Ascites Fl Peritoneal Fluid  11-22 @ 16:53   No growth at 5 days  --    No polymorphonuclear cells seen  No organisms seen by cytocentrifuge      .Blood Blood-Peripheral  11-20 @ 23:05   No growth at 5 days.  --  --      .Body Fluid Peritoneal Fluid  11-19 @ 05:27   No growth at 5 days  --    polymorphonuclear leukocytes seen  No organisms seen  by cytocentrifuge      .Stool Feces  11-19 @ 03:07   No Protozoa seen by trichrome stain  No Helminths or Protozoa seen in formalin concentrate  performed by iodine stain  (routine O+P not evaluated for Microsporidia,  Cryptosporidia, Cyclospora, or Isospora.)  One negative sample does not necessarily rule  out the presence of a parasitic infection.  --  --      .Stool Feces  11-19 @ 02:22   GI PCR Results: NOT detected  *******Please Note:*******  GI panel PCR evaluates for:  Campylobacter, Plesiomonas shigelloides, Salmonella,  Vibrio, Yersinia enterocolitica, Enteroaggregative  Escherichia coli (EAEC), Enteropathogenic E.coli (EPEC),  Enterotoxigenic E. coli (ETEC) lt/st, Shiga-like  toxin-producing E. coli (STEC) stx1/stx2,  Shigella/ Enteroinvasive E. coli (EIEC), Cryptosporidium,  Cyclospora cayetanensis, Entamoeba histolytica,  Giardia lamblia, Adenovirus F 40/41, Astrovirus,  Norovirus GI/GII, Rotavirus A, Sapovirus  --  --      .Urine Clean Catch (Midstream)  11-18 @ 22:57   No growth  --  --      .Blood Blood-Peripheral  11-18 @ 22:10   No growth at 5 days.  --  --      .Blood Blood-Peripheral  11-18 @ 22:09   No growth at 5 days.  --  --          IMAGING:

## 2019-12-25 NOTE — H&P ADULT - PROBLEM SELECTOR PLAN 8
Transitions of Care Status:  1.  Name of PCP:  Homero Melgar  2.  PCP Contacted on Admission: [ ] Y    [ ] N    3.  PCP contacted at Discharge: [ ] Y    [ ] N    [ ] N/A  4.  Post-Discharge Appointment Date and Location:  5.  Summary of Handoff given to PCP:

## 2019-12-25 NOTE — H&P ADULT - PROBLEM SELECTOR PLAN 7
Transitions of Care Status:  1.  Name of PCP:  Homero Melgar  2.  PCP Contacted on Admission: [ ] Y    [ ] N    3.  PCP contacted at Discharge: [ ] Y    [ ] N    [ ] N/A  4.  Post-Discharge Appointment Date and Location:  5.  Summary of Handoff given to PCP: worsening liver test likely 2/2 mets   - Oncology consult - to be arranged and followed up by day team

## 2019-12-25 NOTE — H&P ADULT - NSHPREVIEWOFSYSTEMS_GEN_ALL_CORE
CONSTITUTIONAL: + weakness, no  fevers or chills  EYES/ENT: No visual changes;  No dysphagia  NECK: No pain or stiffness  RESPIRATORY: No cough, wheezing, hemoptysis; No shortness of breath  CARDIOVASCULAR: No chest pain or palpitations; No lower extremity edema  EXTREMITIES: no le edema, cyanosis, clubbing  GASTROINTESTINAL: +  abdominal  pain. + nausea, NO  vomiting, or hematemesis; No diarrhea or constipation. No melena or hematochezia.  BACK: + chronic  back pain  GENITOURINARY: No dysuria, frequency or hematuria  NEUROLOGICAL: No numbness or weakness  MSK: no joint swelling or pain  SKIN: No itching, burning, rashes, or lesions   PSYCH: no agitation  All other review of systems is negative unless indicated above.

## 2019-12-25 NOTE — H&P ADULT - PROBLEM SELECTOR PLAN 6
worsening liver test likely 2/2 mets   - Oncology consult - to be arranged and followed up by day team - since BP improved and currently stable will resume home metoprolol

## 2019-12-25 NOTE — H&P ADULT - PROBLEM SELECTOR PLAN 2
suspect 2/2 to demand in setting of hypotension , ekg w/ twi  , discussed briefly with cardiology , patient on already heparin  infusion , no active chest pain , will monitor closely on Telemetry and trend troponin , IF develops chest pain or marked rise in troponin,  formal cardiology consult can be obtained by day team   - telemetry   - trend troponin   - [facility]

## 2019-12-26 DIAGNOSIS — I95.1 ORTHOSTATIC HYPOTENSION: ICD-10-CM

## 2019-12-26 DIAGNOSIS — D61.810 ANTINEOPLASTIC CHEMOTHERAPY INDUCED PANCYTOPENIA: ICD-10-CM

## 2019-12-26 LAB
ALBUMIN SERPL ELPH-MCNC: 2.5 G/DL — LOW (ref 3.3–5)
ALP SERPL-CCNC: 325 U/L — HIGH (ref 40–120)
ALT FLD-CCNC: 36 U/L — SIGNIFICANT CHANGE UP (ref 10–45)
ANION GAP SERPL CALC-SCNC: 12 MMOL/L — SIGNIFICANT CHANGE UP (ref 5–17)
APTT BLD: 72.3 SEC — HIGH (ref 27.5–36.3)
AST SERPL-CCNC: 106 U/L — HIGH (ref 10–40)
BASOPHILS # BLD AUTO: 0.05 K/UL — SIGNIFICANT CHANGE UP (ref 0–0.2)
BASOPHILS NFR BLD AUTO: 1 % — SIGNIFICANT CHANGE UP (ref 0–2)
BILIRUB SERPL-MCNC: 1.2 MG/DL — SIGNIFICANT CHANGE UP (ref 0.2–1.2)
BLD GP AB SCN SERPL QL: NEGATIVE — SIGNIFICANT CHANGE UP
BUN SERPL-MCNC: 9 MG/DL — SIGNIFICANT CHANGE UP (ref 7–23)
CALCIUM SERPL-MCNC: 7.5 MG/DL — LOW (ref 8.4–10.5)
CHLORIDE SERPL-SCNC: 101 MMOL/L — SIGNIFICANT CHANGE UP (ref 96–108)
CO2 SERPL-SCNC: 23 MMOL/L — SIGNIFICANT CHANGE UP (ref 22–31)
CREAT SERPL-MCNC: 0.58 MG/DL — SIGNIFICANT CHANGE UP (ref 0.5–1.3)
CULTURE RESULTS: NO GROWTH — SIGNIFICANT CHANGE UP
EOSINOPHIL # BLD AUTO: 0.33 K/UL — SIGNIFICANT CHANGE UP (ref 0–0.5)
EOSINOPHIL NFR BLD AUTO: 6.3 % — HIGH (ref 0–6)
GLUCOSE SERPL-MCNC: 117 MG/DL — HIGH (ref 70–99)
HCT VFR BLD CALC: 22.2 % — LOW (ref 39–50)
HCT VFR BLD CALC: 24.5 % — LOW (ref 39–50)
HGB BLD-MCNC: 7.4 G/DL — LOW (ref 13–17)
HGB BLD-MCNC: 8 G/DL — LOW (ref 13–17)
IMM GRANULOCYTES NFR BLD AUTO: 0.6 % — SIGNIFICANT CHANGE UP (ref 0–1.5)
INR BLD: 1.75 RATIO — HIGH (ref 0.88–1.16)
LYMPHOCYTES # BLD AUTO: 0.59 K/UL — LOW (ref 1–3.3)
LYMPHOCYTES # BLD AUTO: 11.3 % — LOW (ref 13–44)
MCHC RBC-ENTMCNC: 26.8 PG — LOW (ref 27–34)
MCHC RBC-ENTMCNC: 27 PG — SIGNIFICANT CHANGE UP (ref 27–34)
MCHC RBC-ENTMCNC: 32.7 GM/DL — SIGNIFICANT CHANGE UP (ref 32–36)
MCHC RBC-ENTMCNC: 33.3 GM/DL — SIGNIFICANT CHANGE UP (ref 32–36)
MCV RBC AUTO: 81 FL — SIGNIFICANT CHANGE UP (ref 80–100)
MCV RBC AUTO: 81.9 FL — SIGNIFICANT CHANGE UP (ref 80–100)
MONOCYTES # BLD AUTO: 0.06 K/UL — SIGNIFICANT CHANGE UP (ref 0–0.9)
MONOCYTES NFR BLD AUTO: 1.1 % — LOW (ref 2–14)
NEUTROPHILS # BLD AUTO: 4.17 K/UL — SIGNIFICANT CHANGE UP (ref 1.8–7.4)
NEUTROPHILS NFR BLD AUTO: 79.7 % — HIGH (ref 43–77)
NRBC # BLD: 0 /100 WBCS — SIGNIFICANT CHANGE UP (ref 0–0)
OB PNL STL: POSITIVE
PLATELET # BLD AUTO: 119 K/UL — LOW (ref 150–400)
PLATELET # BLD AUTO: 125 K/UL — LOW (ref 150–400)
POTASSIUM SERPL-MCNC: 3.4 MMOL/L — LOW (ref 3.5–5.3)
POTASSIUM SERPL-SCNC: 3.4 MMOL/L — LOW (ref 3.5–5.3)
PROT SERPL-MCNC: 5.3 G/DL — LOW (ref 6–8.3)
PROTHROM AB SERPL-ACNC: 20.3 SEC — HIGH (ref 10–13.1)
RBC # BLD: 2.74 M/UL — LOW (ref 4.2–5.8)
RBC # BLD: 2.99 M/UL — LOW (ref 4.2–5.8)
RBC # FLD: 20.4 % — HIGH (ref 10.3–14.5)
RBC # FLD: 20.7 % — HIGH (ref 10.3–14.5)
RH IG SCN BLD-IMP: POSITIVE — SIGNIFICANT CHANGE UP
SODIUM SERPL-SCNC: 136 MMOL/L — SIGNIFICANT CHANGE UP (ref 135–145)
SPECIMEN SOURCE: SIGNIFICANT CHANGE UP
WBC # BLD: 5.23 K/UL — SIGNIFICANT CHANGE UP (ref 3.8–10.5)
WBC # BLD: 7.02 K/UL — SIGNIFICANT CHANGE UP (ref 3.8–10.5)
WBC # FLD AUTO: 5.23 K/UL — SIGNIFICANT CHANGE UP (ref 3.8–10.5)
WBC # FLD AUTO: 7.02 K/UL — SIGNIFICANT CHANGE UP (ref 3.8–10.5)

## 2019-12-26 PROCEDURE — 76705 ECHO EXAM OF ABDOMEN: CPT | Mod: 26

## 2019-12-26 PROCEDURE — 99232 SBSQ HOSP IP/OBS MODERATE 35: CPT | Mod: GC

## 2019-12-26 PROCEDURE — 99233 SBSQ HOSP IP/OBS HIGH 50: CPT

## 2019-12-26 RX ORDER — POTASSIUM CHLORIDE 20 MEQ
40 PACKET (EA) ORAL ONCE
Refills: 0 | Status: COMPLETED | OUTPATIENT
Start: 2019-12-26 | End: 2019-12-26

## 2019-12-26 RX ORDER — FLUCONAZOLE 150 MG/1
200 TABLET ORAL DAILY
Refills: 0 | Status: DISCONTINUED | OUTPATIENT
Start: 2019-12-26 | End: 2020-01-04

## 2019-12-26 RX ADMIN — OXYCODONE HYDROCHLORIDE 5 MILLIGRAM(S): 5 TABLET ORAL at 20:15

## 2019-12-26 RX ADMIN — GABAPENTIN 300 MILLIGRAM(S): 400 CAPSULE ORAL at 21:51

## 2019-12-26 RX ADMIN — Medication 500 MILLIGRAM(S): at 11:03

## 2019-12-26 RX ADMIN — Medication 25 MILLIGRAM(S): at 05:26

## 2019-12-26 RX ADMIN — Medication 650 MILLIGRAM(S): at 18:45

## 2019-12-26 RX ADMIN — OXYCODONE HYDROCHLORIDE 5 MILLIGRAM(S): 5 TABLET ORAL at 02:43

## 2019-12-26 RX ADMIN — Medication 40 MILLIEQUIVALENT(S): at 10:24

## 2019-12-26 RX ADMIN — PANTOPRAZOLE SODIUM 40 MILLIGRAM(S): 20 TABLET, DELAYED RELEASE ORAL at 05:44

## 2019-12-26 RX ADMIN — HEPARIN SODIUM 1000 UNIT(S)/HR: 5000 INJECTION INTRAVENOUS; SUBCUTANEOUS at 01:34

## 2019-12-26 RX ADMIN — SIMETHICONE 80 MILLIGRAM(S): 80 TABLET, CHEWABLE ORAL at 10:24

## 2019-12-26 RX ADMIN — OXYCODONE HYDROCHLORIDE 5 MILLIGRAM(S): 5 TABLET ORAL at 12:55

## 2019-12-26 RX ADMIN — OXYCODONE HYDROCHLORIDE 5 MILLIGRAM(S): 5 TABLET ORAL at 20:55

## 2019-12-26 RX ADMIN — OXYCODONE HYDROCHLORIDE 5 MILLIGRAM(S): 5 TABLET ORAL at 13:30

## 2019-12-26 RX ADMIN — FAMOTIDINE 20 MILLIGRAM(S): 10 INJECTION INTRAVENOUS at 11:03

## 2019-12-26 RX ADMIN — Medication 650 MILLIGRAM(S): at 18:14

## 2019-12-26 RX ADMIN — FLUCONAZOLE 200 MILLIGRAM(S): 150 TABLET ORAL at 11:03

## 2019-12-26 RX ADMIN — OXYCODONE HYDROCHLORIDE 5 MILLIGRAM(S): 5 TABLET ORAL at 01:41

## 2019-12-26 NOTE — PROGRESS NOTE ADULT - PROBLEM SELECTOR PLAN 2
1 L NS Bolus again today if patient symptomatic when standing up.    Etiology likely combination of deconditioning and chemotherapy effect  -

## 2019-12-26 NOTE — CHART NOTE - NSCHARTNOTEFT_GEN_A_CORE
Called by RN to report pt is guaiac positive. Heparin gtt discontinued. Stat CBC and type and screen sent. Blood consent in chart. Pt and RN report dark tarry stools. GI consult called. Will continue to monitor. Attending Hospitalist notified. Will continue to closely monitor. VSS.    Candice Christy  Spectra-link [phone]

## 2019-12-26 NOTE — PROGRESS NOTE ADULT - PROBLEM SELECTOR PLAN 1
Tense ascites  contributing to poor po intake and patient discomfort , recent ascitic fluid analysis not suggestive of SBP currently on cipro for PPX   - Eliquis on hold and patient on heparin gtt for possible therapeutic paracentesis   - IR consult for paracentesis tentative for tomorrow.  - continue cipro for SBP pPX  -Hold on aldactone and lasix as patient Orthostatic

## 2019-12-26 NOTE — CHART NOTE - NSCHARTNOTEFT_GEN_A_CORE
------------------------------------------------------------  Interventional Radiology Pre-Procedure Note  ------------------------------------------------------------    Procedure: Abdominal Pleurex placement    Indication: 69y Male with a past medical history of metastatic malignancy presenting with recurrent large volume ascites. Patient had 7 L removed less than one week ago and now presents with large re-accumulation. Prior cytology of ascites unrevealing, however, thought strongly to be related to malignancy per primary team as the patient does not have other predisposing issues. IR consulted for Pleurex placement to manage likely malignant ascites and help with patient's distention and discomfort.     Past Medical History:    Rash  Biliary stricture  Pancreatic lesion  Gastric polyp  Obstructive jaundice  Other hyperlipidemia  Essential hypertension  IBS (irritable bowel syndrome)  GERD (gastroesophageal reflux disease)      Allergies: penicillin G benzathine (Rash)  sulfa drugs (Rash)      Vital Signs: T(F): 98.9 (11:50), Max: 99.3 (20:48)  HR: 86 (04:04)  BP: 124/67 (04:04)  RR: 17 (11:50)  SpO2: 95% (11:50)    Labs:         7.4  5.23)-----(125     (12-26-19 @ 07:11)         22.2     136 | 101 | 9  ----------------------< 117     (12-26-19 @ 06:03)  3.4 | 23 | 0.58       PT: 20.3<H> 12-26-19 @ 08:46  aPTT: -- 12-26-19 @ 08:46   INR: 1.75<H> 12-26-19 @ 08:46    Imaging: Large volume ascites with numerous windows scene on imaging.     Consent: To be obtained    Plan:   - Abdominal Pleurex placement

## 2019-12-26 NOTE — PROGRESS NOTE ADULT - ASSESSMENT
Patient is a 70 yo M with PMHx of cholangiocarcinoma (dx in 2017 s/p Whipple procedure with liver mets found in 2019 after finding of clots in portal vein, also s/p multiple rounds of radiation treatment) currently on FOLFOX s/p 1 cycle,  chronic back pain, GERD, IBS with diarrhea, and recently diagnosed atrial fibrillation, multiple recent admissions for fever workup who presents with weakness. Also found to have reaccumulation of ascites.     #Weakness/FTT  - likely overall constellation of symptoms in setting of dehydration after chemo  - continue with IV hydration as able  - would consult nutritionist   - continue aggressive supportive care  - blood cultures negative, infectious etiology less likely   - may need IV hydration appointments scheduled as outpatient     #Cholangiocarcinoma  - s/p next 2nd line treatment with FOLFOX 12/19, today is D7  - monitor CBC daily, transfuse to keep Hg >7  - recurrent ascites likely malignant although cytology negative  - US abdomen with large amount of ascites; would pursue indwelling catheter for recurrent ascites given rapid reaccumulation   - would start low dose furosemide and aldactone and uptitrate as BP allows to help with ascites   - f/u with primary oncologist Dr. Atkinson as outpatient to resume treatments    Arturo Falk, PGY-5  Hematology-Oncology Fellow  [phone] ([place]) [phone] ([facility])

## 2019-12-26 NOTE — PROGRESS NOTE ADULT - SUBJECTIVE AND OBJECTIVE BOX
Patient is a 69y old  Male who presents with a chief complaint of Hypotension x 1 day (26 Dec 2019 12:38)      INTERVAL HISTORY: feels ok    MEDICATIONS:  metoprolol succinate ER 25 milliGRAM(s) Oral daily        PHYSICAL EXAM:  T(C): 37 (12-26-19 @ 20:28), Max: 37.2 (12-26-19 @ 11:50)  HR: 76 (12-26-19 @ 20:28) (76 - 86)  BP: 134/79 (12-26-19 @ 20:28) (124/67 - 134/79)  RR: 18 (12-26-19 @ 20:28) (17 - 18)  SpO2: 97% (12-26-19 @ 20:28) (94% - 97%)  Wt(kg): --  I&O's Summary    25 Dec 2019 07:01  -  26 Dec 2019 07:00  --------------------------------------------------------  IN: 1700 mL / OUT: 1300 mL / NET: 400 mL    26 Dec 2019 07:01  -  26 Dec 2019 22:24  --------------------------------------------------------  IN: 240 mL / OUT: 600 mL / NET: -360 mL          Appearance: In no distress	  HEENT:    PERRL, EOMI	  Cardiovascular:  S1 S2, No JVD  Respiratory: Lungs clear to auscultation	  Gastrointestinal:  Soft, Non-tender, + BS	  Extremities:  No edema of LE                                8.0    7.02  )-----------( 119      ( 26 Dec 2019 19:17 )             24.5     12-26    136  |  101  |  9   ----------------------------<  117<H>  3.4<L>   |  23  |  0.58    Ca    7.5<L>      26 Dec 2019 06:03    TPro  5.3<L>  /  Alb  2.5<L>  /  TBili  1.2  /  DBili  x   /  AST  106<H>  /  ALT  36  /  AlkPhos  325<H>  12-26        Labs personally reviewed      Assessment and Plan:    Assessment:  · Assessment		  69 M w/cholangiocarcinoma s/p Whipple procedure with liver mets  c/b  portal vein thrombosis, s/p multiple radiation and currently on chemotherapy( last session 12/19) ,  chronic back pain, GERD, IBS with diarrhea, and recently diagnosed atrial fibrillation,  p/w lethargy and  hypotension x 1 day.       Problem/Plan - 1:  ·  Problem: Ascites, malignant.  Plan: Tense ascites  contributing to poor po intake and patient discomfort , recent ascitic fluid analysis not suggestive of SBP currently on cipro for PPX   - Eliquis on hold and patient on heparin gtt for possible therapeutic paracentesis      Problem/Plan - 2:  ·  Problem: Elevated troponin.  Plan: suspect 2/2 to demand in setting of hypotension , ekg w/ twi,   -- Elevated trop - nonspecific in this male with multiple comorbidities, conservative management  - TTE pending     Problem/Plan - 3:  Problem: Essential hypertension.Plan: - since BP improved and currently stable will resume home metoprolol.        Adonis Collins DO [facility]  Cardiovascular Medicine  [address], [address]  Office [phone]  Cell: [phone]

## 2019-12-26 NOTE — PROGRESS NOTE ADULT - ATTENDING COMMENTS
68 yo M with PMHx of cholangiocarcinoma (dx in 2017 s/p Whipple procedure with liver mets found in 2019 after finding of clots in portal vein, also s/p multiple rounds of radiation treatment) currently on FOLFOX s/p 1 cycle on 12/19/19,  chronic back pain, GERD, IBS with diarrhea, and recently diagnosed atrial fibrillation, multiple recent admissions for fever workup who presents with weakness. Also found to have reaccumulation of ascites.   Given rapid reaccumulation of ascites would agree with placement of permanent drain so ascites can be managed at home.  After discharge he will follow up with his oncologist Dr. Urbina at [facility].

## 2019-12-26 NOTE — PROGRESS NOTE ADULT - SUBJECTIVE AND OBJECTIVE BOX
[facility] Division of Hospital Medicine  Waylon Cox MD  Pager  [phone]    Patient is a 69y old  Male who presents with a chief complaint of Hypotension x 1 day (26 Dec 2019 12:02)      SUBJECTIVE / OVERNIGHT EVENTS:  Orthostatic yesterday  ADDITIONAL REVIEW OF SYSTEMS:  no chest pain or shortness of breath, is having abdominal discomfort     MEDICATIONS  (STANDING):  ciprofloxacin     Tablet 500 milliGRAM(s) Oral daily  fluconAZOLE   Tablet 200 milliGRAM(s) Oral daily  gabapentin 300 milliGRAM(s) Oral at bedtime  heparin  Infusion. 1000 Unit(s)/Hr (10 mL/Hr) IV Continuous <Continuous>  metoprolol succinate ER 25 milliGRAM(s) Oral daily  pantoprazole    Tablet 40 milliGRAM(s) Oral before breakfast    MEDICATIONS  (PRN):  acetaminophen   Tablet .. 650 milliGRAM(s) Oral every 4 hours PRN Mild Pain (1 - 3)  aluminum hydroxide/magnesium hydroxide/simethicone Suspension 30 milliLiter(s) Oral every 6 hours PRN Dyspepsia  famotidine    Tablet 20 milliGRAM(s) Oral two times a day PRN GERD  heparin  Injectable 5500 Unit(s) IV Push every 6 hours PRN For aPTT less than 40  heparin  Injectable 2500 Unit(s) IV Push every 6 hours PRN For aPTT between 40 - 57  oxyCODONE    IR 5 milliGRAM(s) Oral every 6 hours PRN Moderate Pain (4 - 6)  simethicone 80 milliGRAM(s) Chew two times a day PRN Gas      CAPILLARY BLOOD GLUCOSE        I&O's Summary    25 Dec 2019 07:01  -  26 Dec 2019 07:00  --------------------------------------------------------  IN: 1700 mL / OUT: 1300 mL / NET: 400 mL        PHYSICAL EXAM:  Vital Signs Last 24 Hrs  T(C): 37.2 (26 Dec 2019 11:50), Max: 37.4 (25 Dec 2019 20:48)  T(F): 98.9 (26 Dec 2019 11:50), Max: 99.3 (25 Dec 2019 20:48)  HR: 86 (26 Dec 2019 04:04) (86 - 93)  BP: 124/67 (26 Dec 2019 04:04) (124/67 - 150/76)  BP(mean): --  RR: 17 (26 Dec 2019 11:50) (17 - 18)  SpO2: 95% (26 Dec 2019 11:50) (94% - 96%)      CONSTITUTIONAL: NAD, well-developed  EYES: conjunctiva and sclera clear  ENMT: Moist oral mucosa, no pharyngeal injection   NECK: Supple, no palpable masses; no thyromegaly  RESPIRATORY: Normal respiratory effort; lungs are clear to auscultation bilaterally  CARDIOVASCULAR: Regular rate and rhythm, normal S1 and S2, no murmur  ABDOMEN: distended, mildly tender with palpation over RLQ.    MUSCLOSKELETAL:  Normal gait  PSYCH: A+O to person, place, and time; affect appropriate  NEUROLOGY: CN 2-12 are intact and symmetric; no gross sensory deficits;   SKIN: No rashes; no palpable lesions    LABS:                        7.4    5.23  )-----------( 125      ( 26 Dec 2019 07:11 )             22.2     12    136  |  101  |  9   ----------------------------<  117<H>  3.4<L>   |  23  |  0.58    Ca    7.5<L>      26 Dec 2019 06:03    TPro  5.3<L>  /  Alb  2.5<L>  /  TBili  1.2  /  DBili  x   /  AST  106<H>  /  ALT  36  /  AlkPhos  325<H>  12    PT/INR - ( 26 Dec 2019 08:46 )   PT: 20.3 sec;   INR: 1.75 ratio         PTT - ( 26 Dec 2019 01:01 )  PTT:72.3 sec  CARDIAC MARKERS ( 25 Dec 2019 14:41 )  x     / x     / 64 U/L / x     / 5.3 ng/mL  CARDIAC MARKERS ( 25 Dec 2019 07:23 )  x     / x     / x     / x     / 6.1 ng/mL      Urinalysis Basic - ( 25 Dec 2019 12:30 )    Color: Yellow / Appearance: Clear / S.024 / pH: x  Gluc: x / Ketone: Small  / Bili: Negative / Urobili: Negative   Blood: x / Protein: Trace / Nitrite: Negative   Leuk Esterase: Negative / RBC: 4 /hpf / WBC 2 /HPF   Sq Epi: x / Non Sq Epi: 0 /hpf / Bacteria: Negative        Culture - Urine (collected 25 Dec 2019 15:08)  Source: .Urine Clean Catch (Midstream)  Final Report (26 Dec 2019 10:30):    No growth    Culture - Blood (collected 25 Dec 2019 03:58)  Source: .Blood Blood-Peripheral  Preliminary Report (26 Dec 2019 04:01):    No growth to date.    Culture - Blood (collected 25 Dec 2019 03:58)  Source: .Blood Blood-Venous  Preliminary Report (26 Dec 2019 04:01):    No growth to date.        RADIOLOGY & ADDITIONAL TESTS:  Results Reviewed:   Imaging Personally Reviewed:  Electrocardiogram Personally Reviewed:    COORDINATION OF CARE:  Care Discussed with Consultants/Other Providers [Y/N]:  Prior or Outpatient Records Reviewed [Y/N]:

## 2019-12-26 NOTE — PROGRESS NOTE ADULT - SUBJECTIVE AND OBJECTIVE BOX
INTERVAL HPI/OVERNIGHT EVENTS:  Wife describes pt as confused at times. This AM feels weak. Minimal po intake. No fevers, chills, pain noted.     MEDICATIONS  (STANDING):  ciprofloxacin     Tablet 500 milliGRAM(s) Oral daily  fluconAZOLE   Tablet 200 milliGRAM(s) Oral daily  gabapentin 300 milliGRAM(s) Oral at bedtime  heparin  Infusion. 1000 Unit(s)/Hr (10 mL/Hr) IV Continuous <Continuous>  metoprolol succinate ER 25 milliGRAM(s) Oral daily  pantoprazole    Tablet 40 milliGRAM(s) Oral before breakfast    MEDICATIONS  (PRN):  acetaminophen   Tablet .. 650 milliGRAM(s) Oral every 4 hours PRN Mild Pain (1 - 3)  aluminum hydroxide/magnesium hydroxide/simethicone Suspension 30 milliLiter(s) Oral every 6 hours PRN Dyspepsia  famotidine    Tablet 20 milliGRAM(s) Oral two times a day PRN GERD  heparin  Injectable 5500 Unit(s) IV Push every 6 hours PRN For aPTT less than 40  heparin  Injectable 2500 Unit(s) IV Push every 6 hours PRN For aPTT between 40 - 57  oxyCODONE    IR 5 milliGRAM(s) Oral every 6 hours PRN Moderate Pain (4 - 6)  simethicone 80 milliGRAM(s) Chew two times a day PRN Gas    Allergies    penicillin G benzathine (Rash)  sulfa drugs (Rash)    Intolerances          VITAL SIGNS:  T(F): 98.9 (19 @ 11:50)  HR: 86 (19 @ 04:04)  BP: 124/67 (19 @ 04:04)  RR: 17 (19 @ 11:50)  SpO2: 95% (19 @ 11:50)  Wt(kg): --    PHYSICAL EXAM:    Constitutional: NAD, weak appearing male in bed   HEENT: PERRLA, EOMI; slightly dry MM  Respiratory: CTAB; no r/r/w  Cardiovascular: RRR, no M/R/G  Gastrointestinal: +BS, soft, moderately distended; NT to palpation    Extremities: no c/c/e  Neurological: AAOx3, nonfocal    LABS:                        7.4    5.23  )-----------( 125      ( 26 Dec 2019 07:11 )             22.2         136  |  101  |  9   ----------------------------<  117<H>  3.4<L>   |  23  |  0.58    Ca    7.5<L>      26 Dec 2019 06:03    TPro  5.3<L>  /  Alb  2.5<L>  /  TBili  1.2  /  DBili  x   /  AST  106<H>  /  ALT  36  /  AlkPhos  325<H>      PT/INR - ( 26 Dec 2019 08:46 )   PT: 20.3 sec;   INR: 1.75 ratio         PTT - ( 26 Dec 2019 01:01 )  PTT:72.3 sec  Urinalysis Basic - ( 25 Dec 2019 12:30 )    Color: Yellow / Appearance: Clear / S.024 / pH: x  Gluc: x / Ketone: Small  / Bili: Negative / Urobili: Negative   Blood: x / Protein: Trace / Nitrite: Negative   Leuk Esterase: Negative / RBC: 4 /hpf / WBC 2 /HPF   Sq Epi: x / Non Sq Epi: 0 /hpf / Bacteria: Negative        RADIOLOGY & ADDITIONAL TESTS:  Studies reviewed.

## 2019-12-27 ENCOUNTER — TRANSCRIPTION ENCOUNTER (OUTPATIENT)
Age: 69
End: 2019-12-27

## 2019-12-27 DIAGNOSIS — D62 ACUTE POSTHEMORRHAGIC ANEMIA: ICD-10-CM

## 2019-12-27 LAB
ANION GAP SERPL CALC-SCNC: 13 MMOL/L — SIGNIFICANT CHANGE UP (ref 5–17)
APTT BLD: 97.4 SEC — HIGH (ref 27.5–36.3)
BUN SERPL-MCNC: 9 MG/DL — SIGNIFICANT CHANGE UP (ref 7–23)
CALCIUM SERPL-MCNC: 7.6 MG/DL — LOW (ref 8.4–10.5)
CHLORIDE SERPL-SCNC: 102 MMOL/L — SIGNIFICANT CHANGE UP (ref 96–108)
CO2 SERPL-SCNC: 21 MMOL/L — LOW (ref 22–31)
CREAT SERPL-MCNC: 0.56 MG/DL — SIGNIFICANT CHANGE UP (ref 0.5–1.3)
GLUCOSE SERPL-MCNC: 96 MG/DL — SIGNIFICANT CHANGE UP (ref 70–99)
HCT VFR BLD CALC: 23.2 % — LOW (ref 39–50)
HGB BLD-MCNC: 7.7 G/DL — LOW (ref 13–17)
INR BLD: 1.56 RATIO — HIGH (ref 0.88–1.16)
MCHC RBC-ENTMCNC: 27.2 PG — SIGNIFICANT CHANGE UP (ref 27–34)
MCHC RBC-ENTMCNC: 33.2 GM/DL — SIGNIFICANT CHANGE UP (ref 32–36)
MCV RBC AUTO: 82 FL — SIGNIFICANT CHANGE UP (ref 80–100)
PLATELET # BLD AUTO: 116 K/UL — LOW (ref 150–400)
POTASSIUM SERPL-MCNC: 3.6 MMOL/L — SIGNIFICANT CHANGE UP (ref 3.5–5.3)
POTASSIUM SERPL-SCNC: 3.6 MMOL/L — SIGNIFICANT CHANGE UP (ref 3.5–5.3)
PROTHROM AB SERPL-ACNC: 18.2 SEC — HIGH (ref 10–13.1)
RBC # BLD: 2.83 M/UL — LOW (ref 4.2–5.8)
RBC # FLD: 20.8 % — HIGH (ref 10.3–14.5)
SODIUM SERPL-SCNC: 136 MMOL/L — SIGNIFICANT CHANGE UP (ref 135–145)
WBC # BLD: 7.6 K/UL — SIGNIFICANT CHANGE UP (ref 3.8–10.5)
WBC # FLD AUTO: 7.6 K/UL — SIGNIFICANT CHANGE UP (ref 3.8–10.5)

## 2019-12-27 PROCEDURE — 99233 SBSQ HOSP IP/OBS HIGH 50: CPT

## 2019-12-27 PROCEDURE — 99222 1ST HOSP IP/OBS MODERATE 55: CPT | Mod: GC

## 2019-12-27 PROCEDURE — 49418 INSERT TUN IP CATH PERC: CPT

## 2019-12-27 RX ORDER — OXYCODONE HYDROCHLORIDE 5 MG/1
10 TABLET ORAL ONCE
Refills: 0 | Status: DISCONTINUED | OUTPATIENT
Start: 2019-12-27 | End: 2019-12-27

## 2019-12-27 RX ADMIN — OXYCODONE HYDROCHLORIDE 5 MILLIGRAM(S): 5 TABLET ORAL at 06:30

## 2019-12-27 RX ADMIN — Medication 25 MILLIGRAM(S): at 05:51

## 2019-12-27 RX ADMIN — GABAPENTIN 300 MILLIGRAM(S): 400 CAPSULE ORAL at 21:49

## 2019-12-27 RX ADMIN — OXYCODONE HYDROCHLORIDE 5 MILLIGRAM(S): 5 TABLET ORAL at 05:50

## 2019-12-27 RX ADMIN — Medication 500 MILLIGRAM(S): at 13:15

## 2019-12-27 RX ADMIN — PANTOPRAZOLE SODIUM 40 MILLIGRAM(S): 20 TABLET, DELAYED RELEASE ORAL at 05:50

## 2019-12-27 RX ADMIN — OXYCODONE HYDROCHLORIDE 5 MILLIGRAM(S): 5 TABLET ORAL at 14:48

## 2019-12-27 RX ADMIN — FLUCONAZOLE 200 MILLIGRAM(S): 150 TABLET ORAL at 13:17

## 2019-12-27 RX ADMIN — OXYCODONE HYDROCHLORIDE 5 MILLIGRAM(S): 5 TABLET ORAL at 14:18

## 2019-12-27 RX ADMIN — OXYCODONE HYDROCHLORIDE 10 MILLIGRAM(S): 5 TABLET ORAL at 19:20

## 2019-12-27 RX ADMIN — OXYCODONE HYDROCHLORIDE 10 MILLIGRAM(S): 5 TABLET ORAL at 18:50

## 2019-12-27 NOTE — PHYSICAL THERAPY INITIAL EVALUATION ADULT - IMPAIRMENTS FOUND, PT EVAL
gait, locomotion, and balance/muscle strength gait, locomotion, and balance/arousal, attention, and cognition/ROM/gross motor/poor safety awareness/cognitive impairment/muscle strength

## 2019-12-27 NOTE — PRE-ANESTHESIA EVALUATION ADULT - NSANTHPMHFT_GEN_ALL_CORE
Cholangiocarcinoma s/p whipple with liter mets c/b portal vein thrombosis s/p radiation/chemo  Presented this admission for lethargy and hypotension x 1 day  Elevated troponin - as per 12/26 cardiology note "nonspecific in this male with multiple comorbidities, conservative management"

## 2019-12-27 NOTE — PROGRESS NOTE ADULT - PROBLEM SELECTOR PLAN 2
patient has stool occult +   On Eliquis for portal vein thrombosis  -As per GI no intervention  -need clearance to restart a/c

## 2019-12-27 NOTE — PHYSICAL THERAPY INITIAL EVALUATION ADULT - PERTINENT HX OF CURRENT PROBLEM, REHAB EVAL
68 y/o male admitted on 12/25/19 w/cholangiocarcinoma s/p Whipple procedure with liver mets  c/b  portal vein thrombosis, s/p multiple radiation and currently on chemotherapy( last session 12/19) ,  chronic back pain, GERD, IBS with diarrhea, and recently diagnosed atrial fibrillation,  p/w lethargy and  hypotension x 1 day. 70 y/o male admitted on 12/25/19 w/cholangiocarcinoma s/p Whipple procedure with liver mets  c/b  portal vein thrombosis, s/p multiple radiation and currently on chemotherapy( last session 12/19) ,  chronic back pain, GERD, IBS with diarrhea, and recently diagnosed atrial fibrillation,  p/w lethargy and  hypotension x 1 day. Pt w/ Updated 12/29: 68 y/o male admitted on 12/25/19 w/cholangiocarcinoma s/p Whipple procedure with liver mets  c/b  portal vein thrombosis, s/p multiple radiation and currently on chemotherapy( last session 12/19) ,  chronic back pain, GERD, IBS with diarrhea, and recently diagnosed atrial fibrillation,  p/w lethargy and  hypotension x 1 day. CTBrain: multiple new small infarcts, no evidence for intracranial hemorrhage. Findings suggest embolic disease. Updated 12/29: 68 y/o male admitted on 12/25/19 w/cholangiocarcinoma s/p Whipple procedure with liver mets  c/b  portal vein thrombosis, s/p multiple radiation and currently on chemotherapy( last session 12/19) ,  chronic back pain, GERD, IBS with diarrhea, and recently diagnosed atrial fibrillation,  p/w lethargy and  hypotension x 1 day. CTBrain 12/28: multiple new small infarcts, no evidence for intracranial hemorrhage. Findings suggest embolic disease. [id number]: 70 y/o male admitted 12/25/19 w/cholangiocarcinoma s/p Whipple procedure w/liver mets c/b  portal vein thrombosis, s/p multiple radiation & currently on chemotherapy(last [id number]/19), chronic LBP, GERD, IBS w/diarrhea, & recently diagnosed atrial fibrillation,  p/w lethargy and  hypotension x 1 day. CTBrain 12/28: multiple new small infarcts,no evidence for intracranial hemorrhage. Findings suggest embolic disease. (+) Pancytopenia (+) Acute blood loss anemia

## 2019-12-27 NOTE — DISCHARGE NOTE PROVIDER - CARE PROVIDER_API CALL
Homero Melgar (DO)  Internal Medicine  [address] [place] [address], [address]  [place] [address]  Phone: [phone]  Fax: [phone]  Follow Up Time: 1 week Homero Melgar (DO)  Internal Medicine  [address], [address]  [place] [address]  Phone: [phone]  Fax: [phone]  Follow Up Time: 1 week    Lukasz Orantes (MD)  Gastroenterology; Internal Medicine  [address] [place] [address]  Phone: [phone]  Fax: [phone]  Follow Up Time:     Adonis Collins (DO)  Cardiovascular Disease; Internal Medicine; Nuclear Cardiology  [address] [facility] [address], [address]  [place] [address]  Phone: [phone]  Fax: [phone]  Follow Up Time:     [place],   oncology  Phone: (   )    -  Fax: (   )    -  Follow Up Time:     Phillip Kang (DO)  Neurology; Vascular Neurology  [address] [facility] [address]  [place] [address]  Phone: [phone]  Fax: [phone]  Follow Up Time: Homero Melgar (DO)  Internal Medicine  [address], [address]  [place] [address]  Phone: [phone]  Fax: [phone]  Follow Up Time: 1 week    Phillip Kang (DO)  Neurology; Vascular Neurology  [address] [facility] [address]  [place] [address]  Phone: [phone]  Fax: [phone]  Follow Up Time:     Korey Tompkins)  [facility] Medicine; Internal Medicine; Medical Oncology  [address] [place] [address]  Phone: [phone]  Fax: [phone]  Follow Up Time: Routine

## 2019-12-27 NOTE — DISCHARGE NOTE PROVIDER - NSDCMRMEDTOKEN_GEN_ALL_CORE_FT
aluminum hydroxide-magnesium hydroxide 200 mg-200 mg/5 mL oral suspension: 30 milliliter(s) orally every 4 hours, As needed, Dyspepsia  Cipro 500 mg oral tablet: 1 tab(s) orally once a day   doxazosin 1 mg oral tablet: 1 tab(s) orally once a day (at bedtime)  Eliquis 5 mg oral tablet: 2 tab(s) orally 2 times a day starting for 6 day. Starting on 12/23 in the evening, continue taking 1 tab orally 2 times a day.   famotidine 20 mg oral tablet: 1 tab(s) orally 2 times a day, As needed, GERD  gabapentin 300 mg oral capsule: 1 cap(s) orally once a day (at bedtime)  metoprolol succinate 25 mg oral tablet, extended release: 1 tab(s) orally once a day  oxyCODONE 5 mg oral tablet: 1 tab(s) orally every 4 hours, As needed, moderate and severe pain MDD:30 mg  pantoprazole 40 mg oral delayed release tablet: 1 tab(s) orally once a day (before a meal)  simethicone 80 mg oral tablet, chewable: 1 tab(s) orally 2 times a day, As needed, Gas acetaminophen 325 mg oral tablet: 2 tab(s) orally every 4 hours, As needed, Mild Pain (1 - 3)  aluminum hydroxide-magnesium hydroxide 200 mg-200 mg/5 mL oral suspension: 30 milliliter(s) orally every 4 hours, As needed, Dyspepsia  Cipro 500 mg oral tablet: 1 tab(s) orally once a day   dexamethasone 4 mg oral tablet: 1 tab(s) orally 2 times a day for 4 more doses. stop after 4th dose.  doxazosin 1 mg oral tablet: 1 tab(s) orally once a day (at bedtime)  enoxaparin: 60 milligram(s) subcutaneous 2 times a day  famotidine 20 mg oral tablet: 1 tab(s) orally 2 times a day, As needed, GERD  furosemide 40 mg oral tablet: 1 tab(s) orally once a day  gabapentin 300 mg oral capsule: 1 cap(s) orally once a day (at bedtime)  metoprolol succinate 25 mg oral tablet, extended release: 1 tab(s) orally once a day  oxyCODONE 5 mg oral tablet: 1 tab(s) orally every 4 hours, As needed, moderate and severe pain MDD:30 mg  pantoprazole 40 mg oral delayed release tablet: 1 tab(s) orally once a day (before a meal)  sertraline 25 mg oral tablet: 1 tab(s) orally once a day  simethicone 80 mg oral tablet, chewable: 1 tab(s) orally 2 times a day, As needed, Gas  spironolactone 25 mg oral tablet: 0.5 tab(s) orally once a day

## 2019-12-27 NOTE — PHYSICAL THERAPY INITIAL EVALUATION ADULT - CRITERIA FOR SKILLED THERAPEUTIC INTERVENTIONS
impairments found/anticipated discharge recommendation/TBA when functional eval completed anticipated equipment needs at discharge/impairments found/rehab potential/risk reduction/prevention/anticipated discharge recommendation/Subacute Rehab/functional limitations in following categories

## 2019-12-27 NOTE — PROGRESS NOTE ADULT - SUBJECTIVE AND OBJECTIVE BOX
Patient is a 69y old  Male who presents with a chief complaint of Hypotension x 1 day (27 Dec 2019 12:25)      INTERVAL HISTORY: feels ok       MEDICATIONS:  metoprolol succinate ER 25 milliGRAM(s) Oral daily        PHYSICAL EXAM:  T(C): 36.7 (12-27-19 @ 11:05), Max: 37 (12-26-19 @ 20:28)  HR: 86 (12-27-19 @ 11:05) (76 - 86)  BP: 126/71 (12-27-19 @ 11:05) (126/71 - 138/79)  RR: 18 (12-27-19 @ 11:05) (18 - 18)  SpO2: 98% (12-27-19 @ 11:05) (92% - 98%)  Wt(kg): --  I&O's Summary    26 Dec 2019 07:01  -  27 Dec 2019 07:00  --------------------------------------------------------  IN: 265 mL / OUT: 1000 mL / NET: -735 mL    27 Dec 2019 07:01  -  27 Dec 2019 16:00  --------------------------------------------------------  IN: 240 mL / OUT: 0 mL / NET: 240 mL      Height (cm): 177.8 (12-27 @ 07:59)  Weight (kg): 68.1 (12-27 @ 07:59)  BMI (kg/m2): 21.5 (12-27 @ 07:59)  BSA (m2): 1.85 (12-27 @ 07:59)    Appearance: In no distress	  HEENT:    PERRL, EOMI	  Cardiovascular:  S1 S2, No JVD  Respiratory: Lungs clear to auscultation	  Gastrointestinal:  Soft, Non-tender, + BS	  Extremities:  No edema of LE                                7.7    7.60  )-----------( 116      ( 27 Dec 2019 08:57 )             23.2     12-27    136  |  102  |  9   ----------------------------<  96  3.6   |  21<L>  |  0.56    Ca    7.6<L>      27 Dec 2019 07:15    TPro  5.3<L>  /  Alb  2.5<L>  /  TBili  1.2  /  DBili  x   /  AST  106<H>  /  ALT  36  /  AlkPhos  325<H>  12-26        Labs personally reviewed      Assessment and Plan:    Assessment:  · Assessment		  69 M w/cholangiocarcinoma s/p Whipple procedure with liver mets  c/b  portal vein thrombosis, s/p multiple radiation and currently on chemotherapy( last session 12/19) ,  chronic back pain, GERD, IBS with diarrhea, and recently diagnosed atrial fibrillation,  p/w lethargy and  hypotension x 1 day.       Problem/Plan - 1:  ·  Problem: Ascites, malignant.  Plan: Tense ascites   - resume eliquis post therapeutic paracentesis      Problem/Plan - 2:  ·  Problem: Elevated troponin.  Plan: suspect 2/2 to demand in setting of hypotension , ekg w/ twi,   -- Elevated trop - nonspecific in this male with multiple comorbidities, conservative management  - TTE pending     Problem/Plan - 3:  Problem: Essential hypertension.Plan: - since BP improved and currently stable will resume home metoprolol.        Adonis Collins DO [facility]  Cardiovascular Medicine  [address] [place] [address], [address]  Office [phone]  Cell: [phone]

## 2019-12-27 NOTE — DIETITIAN INITIAL EVALUATION ADULT. - REASON INDICATOR FOR ASSESSMENT
Nutrition consult received for education.   Source: Pt, pt's wife at bedside, previous RD note, EMR  Per chart, 68 y/o male presented with lethargic and hypotension x 1 day. Pt is s/p paracentesis today.  PMH: w/cholangiocarcinoma s/p Whipple procedure with liver mets  c/b  portal vein thrombosis, s/p multiple radiation and currently on chemotherapy( last session 12/19), chronic back pain, GERD, IBS with diarrhea

## 2019-12-27 NOTE — PHYSICAL THERAPY INITIAL EVALUATION ADULT - IMPAIRMENTS CONTRIBUTING IMPAIRED BED MOBILITY, REHAB EVAL
decreased strength/pain/impaired postural control/impaired motor control/impaired balance decreased strength/pain/impaired motor control/impaired postural control/impaired balance/cognition

## 2019-12-27 NOTE — DIETITIAN INITIAL EVALUATION ADULT. - ADD RECOMMEND
1) Continue regular diet- please add: Ensure Clear x1, Ensure Pudding x2 daily, Prosource x1 daily. 2) RD to add additional supplements 3) Pt aware RD remains available for any concerns, questions or diet preferences 4) Malnutrition sticker placed

## 2019-12-27 NOTE — DIETITIAN INITIAL EVALUATION ADULT. - OTHER INFO
Intake PTA: Endorses ongoing poor appetite and PO intake due to bloating from ascites, in addition to GERD symptoms exacerbated by abdominal pressure. Pt and pt's wife endorse minimal intake, wife says she has tried everything. pt has seen an RD at [facility] who provided them with ways to increase calories.     Confirms NKFA, no nausea/vomiting, no difficulty chewing/swallowing, last BM 12/25, Multivitamin, milk thistle and magneium supplementation PTA. - recommended pt to speak to oncologist regarding supplements as supplements are generally not recommended while receiving chemotherapy.     Diet: Pt states he feels better after paracentesis procedure today, was able to drink 3/4 of ~12oz apple juice, more than he has in a few days. Pt amenable to Ensure clear (states regular Ensure upsets his stomach), ensure pudding, prosource, mighty shakes, greek yogurt and peanut butter and crackers to try to increase energy and protein intake.     Education: Reviewed ways to optimize nutrition every bite, with foods like peanut butter, greek yogurt, nutrition supplements, and choosing protein foods on the plate first. Also reviewed foods to avoid to prevent GERD symptoms. Pt verbalized understanding.     Weight Hx: Endorses UBW is 160 pounds, which was in September. Per sunrise: 165 pounds (5/2017), 149 pounds (6/2017), 155 pounds (7/22/19)-outpt records, 147 pounds (11/15/2019)-outpt records, 145 pounds (12/12) and 150 pounds (12/25). Note pt is s/p paracentesis where 4L was removed and was contributing to weight of 150 pounds. No new weight yet, however noted pt has significant wt decrease.

## 2019-12-27 NOTE — DISCHARGE NOTE PROVIDER - INSTRUCTIONS
regular  -no carb prosource ( 1pkg=15 gms protein) 1 daily  -ensure cleare 1x/day  -ensure pudding 2x/day

## 2019-12-27 NOTE — PHYSICAL THERAPY INITIAL EVALUATION ADULT - MANUAL MUSCLE TESTING RESULTS, REHAB EVAL
ext x3 3+/5 and LUE 3-/5 grossly/grossly assessed due to B LE seated EOB 3+/5 and L UE 2-/5 R UE 3+/5 grossly/grossly assessed due to

## 2019-12-27 NOTE — PROGRESS NOTE ADULT - SUBJECTIVE AND OBJECTIVE BOX
Vascular & Interventional Radiology Pre-Procedure Note    Procedure Name: Abdominal pleurx catheter placement    HPI: 69y Male with cholangiocarcinoma and recurrent ascites. Pleurx catheter placement is requested.    Allergies: penicillin G benzathine (Rash)  sulfa drugs (Rash)    Medications (Abx/Cardiac/Anticoagulation/Blood Products)  ciprofloxacin     Tablet: 500 milliGRAM(s) Oral (12-26 @ 11:03)  fluconAZOLE   Tablet: 200 milliGRAM(s) Oral (12-26 @ 11:03)  heparin  Infusion.: 1000 Unit(s)/Hr IV Continuous (12-25 @ 19:03)  metoprolol succinate ER: 25 milliGRAM(s) Oral (12-27 @ 05:51)    Data:  177.8  68.1  T(C): 36.7  HR: 78  BP: 132/78  RR: 18  SpO2: 96%    -WBC 7.02 / HgB 8.0 / Hct 24.5 / Plt 119  -Na 136 / Cl 102 / BUN 9 / Glucose 96  -K 3.6 / CO2 21 / Cr 0.56  -ALT -- / Alk Phos -- / T.Bili --  -INR1.75      Plan:   -69y Male presents for pleurx catheter placement. Procedure discussed with patient and he is agreeable to proceed.   -Risks/Benefits/alternatives explained with the patient and/or healthcare proxy and witnessed informed consent obtained.

## 2019-12-27 NOTE — DISCHARGE NOTE PROVIDER - CARE PROVIDERS DIRECT ADDRESSES
[email or web address],[email or web address]

## 2019-12-27 NOTE — PROGRESS NOTE ADULT - SUBJECTIVE AND OBJECTIVE BOX
[facility] Division of Hospital Medicine  Waylon Cox MD  Pager  [phone]    Patient is a 69y old  Male who presents with a chief complaint of Hypotension x 1 day (27 Dec 2019 11:28)      SUBJECTIVE / OVERNIGHT EVENTS:  patient heparin held overnight.   ADDITIONAL REVIEW OF SYSTEMS:  no chest pain or shortness of breath     MEDICATIONS  (STANDING):  ciprofloxacin     Tablet 500 milliGRAM(s) Oral daily  fluconAZOLE   Tablet 200 milliGRAM(s) Oral daily  gabapentin 300 milliGRAM(s) Oral at bedtime  metoprolol succinate ER 25 milliGRAM(s) Oral daily  pantoprazole    Tablet 40 milliGRAM(s) Oral before breakfast    MEDICATIONS  (PRN):  acetaminophen   Tablet .. 650 milliGRAM(s) Oral every 4 hours PRN Mild Pain (1 - 3)  aluminum hydroxide/magnesium hydroxide/simethicone Suspension 30 milliLiter(s) Oral every 6 hours PRN Dyspepsia  famotidine    Tablet 20 milliGRAM(s) Oral two times a day PRN GERD  heparin  Injectable 5500 Unit(s) IV Push every 6 hours PRN For aPTT less than 40  heparin  Injectable 2500 Unit(s) IV Push every 6 hours PRN For aPTT between 40 - 57  oxyCODONE    IR 5 milliGRAM(s) Oral every 6 hours PRN Moderate Pain (4 - 6)  simethicone 80 milliGRAM(s) Chew two times a day PRN Gas      CAPILLARY BLOOD GLUCOSE        I&O's Summary    26 Dec 2019 07:01  -  27 Dec 2019 07:00  --------------------------------------------------------  IN: 265 mL / OUT: 1000 mL / NET: -735 mL        PHYSICAL EXAM:  Vital Signs Last 24 Hrs  T(C): 36.7 (27 Dec 2019 11:05), Max: 37 (26 Dec 2019 20:28)  T(F): 98.1 (27 Dec 2019 11:05), Max: 98.6 (26 Dec 2019 20:28)  HR: 86 (27 Dec 2019 11:05) (76 - 86)  BP: 126/71 (27 Dec 2019 11:05) (126/71 - 138/79)  BP(mean): --  RR: 18 (27 Dec 2019 11:05) (18 - 18)  SpO2: 98% (27 Dec 2019 11:05) (92% - 98%)      CONSTITUTIONAL: NAD, well-developed, well-groomed  EYES:  conjunctiva and sclera clear  ENMT: Moist oral mucosa, no pharyngeal injection  NECK: Supple  RESPIRATORY: Normal respiratory effort; lungs are clear to auscultation bilaterally  CARDIOVASCULAR: Regular rate and rhythm, normal S1 and S2, no murmur/rub/gallop; No lower extremity edema  ABDOMEN: Nontender to palpation, normoactive bowel sounds, no rebound/guarding  MUSCLOSKELETAL:  Normal gait; no clubbing or cyanosis of digits; no joint swelling or tenderness to palpation  PSYCH: A+O to person, place, and time; affect appropriate  NEUROLOGY: CN 2-12 are intact and symmetric; no gross sensory deficits;   SKIN: No rashes; no palpable lesions    LABS:                        7.7    7.60  )-----------( 116      ( 27 Dec 2019 08:57 )             23.2         136  |  102  |  9   ----------------------------<  96  3.6   |  21<L>  |  0.56    Ca    7.6<L>      27 Dec 2019 07:15    TPro  5.3<L>  /  Alb  2.5<L>  /  TBili  1.2  /  DBili  x   /  AST  106<H>  /  ALT  36  /  AlkPhos  325<H>  12-    PT/INR - ( 27 Dec 2019 08:49 )   PT: 18.2 sec;   INR: 1.56 ratio         PTT - ( 27 Dec 2019 08:49 )  PTT:97.4 sec  CARDIAC MARKERS ( 25 Dec 2019 14:41 )  x     / x     / 64 U/L / x     / 5.3 ng/mL      Urinalysis Basic - ( 25 Dec 2019 12:30 )    Color: Yellow / Appearance: Clear / S.024 / pH: x  Gluc: x / Ketone: Small  / Bili: Negative / Urobili: Negative   Blood: x / Protein: Trace / Nitrite: Negative   Leuk Esterase: Negative / RBC: 4 /hpf / WBC 2 /HPF   Sq Epi: x / Non Sq Epi: 0 /hpf / Bacteria: Negative        Culture - Urine (collected 25 Dec 2019 15:08)  Source: .Urine Clean Catch (Midstream)  Final Report (26 Dec 2019 10:30):    No growth    Culture - Blood (collected 25 Dec 2019 03:58)  Source: .Blood Blood-Peripheral  Preliminary Report (26 Dec 2019 04:01):    No growth to date.    Culture - Blood (collected 25 Dec 2019 03:58)  Source: .Blood Blood-Venous  Preliminary Report (26 Dec 2019 04:01):    No growth to date.        RADIOLOGY & ADDITIONAL TESTS:  Results Reviewed:   Imaging Personally Reviewed:  Electrocardiogram Personally Reviewed:    COORDINATION OF CARE:  Care Discussed with Consultants/Other Providers [Y/N]:  Prior or Outpatient Records Reviewed [Y/N]:

## 2019-12-27 NOTE — PHYSICAL THERAPY INITIAL EVALUATION ADULT - ORIENTATION, REHAB EVAL
oriented to person, place, time and situation person/place/situation/did not know date, knew year and month.

## 2019-12-27 NOTE — CONSULT NOTE ADULT - ASSESSMENT
69M w/ cholangiocarcinoma s/p whipple w/ recurrence and mets to liver c/b ascites and portal hypertension, known anemia and hemorrhoids, presents for the second time this month with weakness, hypotension, and recurrence of ascites. GI consulted for chronic anemia and blood in stool.  Impression:  #Hematochezia – intermittent, long-standing, no change in Hb  #Anemia – chronic  #Cholangiocarcinoma  #Ascites – s/p pigtail  #Dysphagia on fluconazole    Recommendations:  - no plan for endoscopy this admission after discussion w/ pt and wife, likely hemorhhoidal in nature, not hemodynamically significant  - trend Hb, transfuse for Hb < 7 or if HD unstable only  - agree w fluconazole  - agree w/ pigtail catheter placement  - recommend GOC discussion given recurrent admission for weakness/hypotension  - discussed w/ outpatient provider Dr. Orantes and inpatient attending Dr. Cheek  - please call back w/ further questions    Timo Davies  Gastroenterology Fellow  Pager# [phone] ([facility]) or [phone] ([facility])  GI Phone# [phone] ([facility])  Page on-call GI fellow via  from 5pm-8am and on weekends 69M w/ cholangiocarcinoma s/p whipple w/ recurrence and mets to liver c/b ascites and portal hypertension, known anemia and hemorrhoids, presents for the second time this month with weakness, hypotension, and recurrence of ascites. GI consulted for chronic anemia and blood in stool.  Impression:  #Hematochezia – intermittent, long-standing, no change in Hb  #Anemia – chronic  #Cholangiocarcinoma  #Ascites – s/p pigtail  #Dysphagia on fluconazole    Recommendations:  - no plan for endoscopy this admission after discussion w/ pt and wife, likely hemorhhoidal in nature, not hemodynamically significant  - trend Hb, transfuse for Hb < 7 or if HD unstable only  - no GI contraindication to continuing anticoagulation; if concerned about intermittent bleeding on apixaban, would recommend colorectal surgery to evaluate presumptive bleeding source (i.e. hemorrhoids) if pt amenable  - agree w fluconazole  - agree w/ pigtail catheter placement  - recommend GOC discussion given recurrent admission for weakness/hypotension  - discussed w/ outpatient provider Dr. Orantes and inpatient attending Dr. Cheek  - please call back w/ further questions    Timo Davies  Gastroenterology Fellow  Pager# [phone] ([facility]) or [phone] ([facility])  GI Phone# [phone] ([facility])  Page on-call GI fellow via  from 5pm-8am and on weekends 69M w/ cholangiocarcinoma s/p whipple w/ recurrence and mets to liver c/b ascites and portal hypertension, known anemia and hemorrhoids, presents for the second time this month with weakness, hypotension, and recurrence of ascites. GI consulted for chronic anemia and blood in stool.  Impression:  #Hematochezia – intermittent, long-standing, no change in Hb  #Anemia – chronic  #Cholangiocarcinoma  #Ascites – s/p pigtail  #Dysphagia on fluconazole    Recommendations:  - no plan for endoscopy this admission after discussion w/ pt and wife, likely hemorhhoidal in nature, not hemodynamically significant  - trend Hb, transfuse for Hb < 7 or if HD unstable only  - no GI contraindication to continuing anticoagulation; if concerned about intermittent bleeding on apixaban, would recommend colorectal surgery to evaluate presumptive bleeding source (i.e. hemorrhoids) if pt amenable  - agree w fluconazole  - agree w/ pigtail catheter placement  - recommend GOC discussion given recurrent admission for weakness/hypotension  - please call back w/ further questions    Timo Davies  Gastroenterology Fellow  Pager# [phone] ([facility]) or [phone] ([facility])  GI Phone# [phone] ([facility])  Page on-call GI fellow via  from 5pm-8am and on weekends

## 2019-12-27 NOTE — PHYSICAL THERAPY INITIAL EVALUATION ADULT - ADDITIONAL COMMENTS
lives w/ wife in lives w/ wife in private home, one step to enter and flight of stairs to bedroom, does not use assistive device,  glasses for distance and reading, hearing good, R handed, lives w/ wife in private home, one step to enter and flight of stairs to bedroom, does not use assistive device,  glasses for distance and reading, hearing good, R handed,  Unable to assess prior level of function, pt confused/poor historian. Pt had cane at side of bed, stated he used it sometime, other reports state he did not use AD. Will call wife for confirmation. lives w/ wife in private home, one step to enter and flight of stairs to bedroom, does not use assistive device indoors,  glasses for distance and reading, hearing good, R handed,  Pt's wife present at bedside, confirmed pt was independent w/all functional mobility, ADLs/IADLs and used [place] for long walks, stopped driving several months ago.

## 2019-12-27 NOTE — DIETITIAN INITIAL EVALUATION ADULT. - ENERGY NEEDS
Ht: 70 inches Wt: 150 pounds (prior to paracentesis, no new wt) BMI: 21.5 kg/m2 IBW: 166  pounds(+/-10%) 111%IBW  edema. pressure ulcers documented.

## 2019-12-27 NOTE — DIETITIAN INITIAL EVALUATION ADULT. - PROBLEM SELECTOR PLAN 7
worsening liver test likely 2/2 mets   - Oncology consult - to be arranged and followed up by day team

## 2019-12-27 NOTE — PHYSICAL THERAPY INITIAL EVALUATION ADULT - IMPAIRED TRANSFERS: SIT/STAND, REHAB EVAL
cognition/impaired coordination/decreased strength/impaired balance/decreased ROM/impaired motor control/abnormal muscle tone/pain

## 2019-12-27 NOTE — PRE-ANESTHESIA EVALUATION ADULT - NSANTHPEFT_GEN_ALL_CORE
GENERAL: NAD  HEAD:  Atraumatic, Normocephalic  CHEST/LUNG: Clear to auscultation bilaterally  ABDOMEN: Distended, mildly tender to palpation over RLQ  HEART: Normal S1/S2  PSYCH: AAOx3  NEUROLOGY: non-focal  SKIN: No obvious rashes or lesions

## 2019-12-27 NOTE — CHART NOTE - NSCHARTNOTEFT_GEN_A_CORE
Upon Nutritional Assessment by the Registered Dietitian your patient was determined to meet criteria / has evidence of the following diagnosis/diagnoses:          [ ]  Mild Protein Calorie Malnutrition        [ ]  Moderate Protein Calorie Malnutrition        [x] Severe Protein Calorie Malnutrition        [ ] Unspecified Protein Calorie Malnutrition        [ ] Underweight / BMI <19        [ ] Morbid Obesity / BMI > 40      Findings as based on:  [x] Comprehensive nutrition assessment   [x] Nutrition Focused Physical Exam  [x] Other:     Signs/Symptoms <75% estimated nutrient needs >1 month, moderate/severe muscle/fat depletion, >6% wt loss x5 months.      Nutrition Plan/Recommendations:    1) Continue regular diet- please add: Ensure Clear x1, Ensure Pudding x2 daily, Prosource x1 daily.   2) RD to add additional supplements   3) Pt aware RD remains available for any concerns, questions or diet preferences     Barbi Murphy RD, CDN. Pager: [phone]     PROVIDER Section:     By signing this assessment you are acknowledging and agree with the diagnosis/diagnoses assigned by the Registered Dietitian    Comments:

## 2019-12-27 NOTE — PHYSICAL THERAPY INITIAL EVALUATION ADULT - ACTIVE RANGE OF MOTION EXAMINATION, REHAB EVAL
bilateral  lower extremity Active ROM was WFL (within functional limits)/bilateral upper extremity Active ROM was WFL (within functional limits) deficits as listed below/bilateral  lower extremity Active ROM was WFL (within functional limits)/Pt now has L UE weakness, unable to perform shldr flx greater than 10%,  weakened bilateral  lower extremity Active ROM was WFL (within functional limits)/Pt now has L UE weakness, unable to perform shldr flx or elbow flx greater than 10%,  weakened 2-/5/deficits as listed below

## 2019-12-27 NOTE — PHYSICAL THERAPY INITIAL EVALUATION ADULT - DIAGNOSIS, PT EVAL
pain at pluerex site, w/ mvt, decreased strength and functional mobility pain at pluerex site, w/ mvt, L UE weakness, cognitive deficits, decreased strength and functional mobility

## 2019-12-27 NOTE — DISCHARGE NOTE PROVIDER - PROVIDER TOKENS
PROVIDER:[TOKEN:[[id number]],FOLLOWUP:[1 week]] PROVIDER:[TOKEN:[[id number]],FOLLOWUP:[1 week]],PROVIDER:[TOKEN:[[id number]]],PROVIDER:[TOKEN:[[id number]]],FREE:[LAST:[[place]],PHONE:[(   )    -],FAX:[(   )    -],ADDRESS:[oncology]],PROVIDER:[TOKEN:[[id number]]] PROVIDER:[TOKEN:[[id number]],FOLLOWUP:[1 week]],PROVIDER:[TOKEN:[[id number]]],PROVIDER:[TOKEN:[[id number]],FOLLOWUP:[Routine]]

## 2019-12-27 NOTE — PHYSICAL THERAPY INITIAL EVALUATION ADULT - PLANNED THERAPY INTERVENTIONS, PT EVAL
balance training/bed mobility training/gait training/strengthening/To negotiate one flight of stairs w/one handrail step over step 2 weeks balance training/gait training/bed mobility training/strengthening/GOAL: To negotiate one flight of stairs w/one handrail step over step 4 weeks

## 2019-12-27 NOTE — DISCHARGE NOTE PROVIDER - NSDCFUSCHEDAPPT_GEN_ALL_CORE_FT
WOODY CEVALLOS ; 01/06/2020 ; [facility]  WOODY CEVALLOS ; 01/08/2020 ; [facility]  WOODY CEVALLOS ; 01/22/2020 ; [facility]  WOODY CEVALLOS ; 01/24/2020 ; [facility]  WOODY CEVALLOS ; 02/03/2020 ; [facility]  WOODY CEVALLOS ; 02/05/2020 ; [facility]  WOODY CEVALLOS ; 02/27/2020 ; [facility] [address] [place] [address]  WOODY CEVALLOS ; 02/27/2020 ; [facility] [address] [place] [address] WOODY CEVALLOS ; 01/22/2020 ; [facility]  WOODY CEVALLOS ; 01/24/2020 ; [facility]  WOODY CEVALLOS ; 02/03/2020 ; [facility]  WOODY CEVALLOS ; 02/05/2020 ; [facility]  WOODY CEVALLOS ; 02/27/2020 ; [facility] [address] [place] [address]  WOODY CEVALLOS ; 02/27/2020 ; [facility] [address] [place] [address] WOODY CEVALLOS ; 01/22/2020 ; [facility]  WOODY CEVALLOS ; 01/24/2020 ; [facility]  WOODY CEVALLOS ; 02/03/2020 ; [facility]  WOODY CEVALLOS ; 02/05/2020 ; [facility]  WOODY CEVALLOS ; 02/27/2020 ; [facility] [address] [place] [address]  WOODY CEVALLOS ; 02/27/2020 ; [facility] [address] [place] [address] WOODY CEVALLOS ; 01/22/2020 ; [facility]  WOODY CEVALLOS ; 01/24/2020 ; [facility]  WOODY CEVALLOS ; 02/03/2020 ; [facility]  WOODY CEVALLOS ; 02/05/2020 ; [facility]  WOODY CEVALLOS ; 02/27/2020 ; [facility] [address] [place] [address]  WOODY CEVALLOS ; 02/27/2020 ; [facility] [address] [place] [address] WOODY CEVALLOS ; 01/22/2020 ; [facility]  WOODY CEVALLOS ; 01/24/2020 ; [facility]  WOODY CEVALLOS ; 02/03/2020 ; [facility]  WOODY CEVALLOS ; 02/05/2020 ; [facility]  WOODY CEVALLOS ; 02/27/2020 ; [facility] [address] [place] [address]  WOODY CEVALLOS ; 02/27/2020 ; [facility] [address] [place] [address]

## 2019-12-27 NOTE — DIETITIAN INITIAL EVALUATION ADULT. - ENERGY INTAKE
Will have Saturday clinic on Nov 23 but it hasn't opened up yet.  We could call her when it is opened up.    Nydia Dickson MD, Internal Medicine  8/28/2019 5:25 PM    Poor (<50%)

## 2019-12-27 NOTE — DISCHARGE NOTE PROVIDER - HOSPITAL COURSE
Patient is a 70 yo M with PMH of cholangiocarcinoma (dx in 2017 s/p Whipple procedure with liver mets found in 2019 after finding of clots in portal vein, also s/p multiple rounds of radiation treatment) currently on chemotherapy, last session 12/19) ,  chronic back pain, GERD, IBS with diarrhea, and recently diagnosed atrial fibrillation, multiple  recent admissions ( most recent Dec 12-17) for fevers , work up has been non revealing , patient was treated with vanc, cefepime and metronidazole  s/p paracentesis  Fluid analysis not c/w SBP , ascites fluid w/ no growth , patient was discharged home on ciprofloxacin , he now presents for hypotension.  Per patients wife , patient was feeling weak and had hypotension on 12/21, at that time her was given IV fluids at [facility] and improved. ON day of admission , wife reports patient was on again lethargic and generally weak , he was unable to get out of bed. Per wife , patient has poor appetite and hardly eating .     Admitted with Malignant Ascitis with tense ascites contributing to poor po intake and patient discomfort , recent ascitic fluid analysis not suggestive of SBP currently on cipro for PPX. s/p pigtail catheter placement for recurrent ascities    -Fluid analysis pending on Cipro for SBP ppx     Orthostatic hypotension - s/p 1 L NS Bolus. Etiology likely combination of deconditioning and chemotherapy effect. patient not symptomatic when standing. Held aldactone and Lasix due to  Orthostatic.     Elevated troponin - suspect 2/2 to demand in setting of hypotension Echo pending ------------------------    Also noted with Acute blood loss anemia - patient has stool occult positive. Eliquis for portal vein thrombosis held. Need GI clearance to restart a/c---------------------------------------------------------    Pancytopenia due to antineoplastic chemotherapy    Added fluconazole for possible thrush. On Simethicone, Famotidine PRN and protonix for GERD Patient is a 68 yo M with PMH of cholangiocarcinoma (dx in 2017 s/p Whipple procedure with liver mets found in 2019 after finding of clots in portal vein, also s/p multiple rounds of radiation treatment) currently on chemotherapy, last session 12/19) ,  chronic back pain, GERD, IBS with diarrhea, and recently diagnosed atrial fibrillation, multiple  recent admissions ( most recent Dec 12-17) for fevers , work up has been non revealing , patient was treated with vanc, cefepime and metronidazole  s/p paracentesis  Fluid analysis not c/w SBP , ascites fluid w/ no growth , patient was discharged home on ciprofloxacin , he now presents for hypotension.  Per patients wife , patient was feeling weak and had hypotension on 12/21, at that time her was given IV fluids at [facility] and improved. ON day of admission , wife reports patient was on again lethargic and generally weak , he was unable to get out of bed. Per wife , patient has poor appetite and hardly eating .     Admitted with Malignant Ascitis with tense ascites contributing to poor po intake and patient discomfort , recent ascitic fluid analysis not suggestive of SBP currently on cipro for PPX. s/p pigtail catheter placement for recurrent ascities    -Fluid analysis pending on Cipro for SBP ppx     Orthostatic hypotension - s/p 1 L NS Bolus. Etiology likely combination of deconditioning and chemotherapy effect. patient not symptomatic when standing. Held aldactone and Lasix due to  Orthostatic.     Elevated troponin - suspect 2/2 to demand in setting of hypotension Echo done showing no significant changes from previous    Also noted with Acute blood loss anemia - patient has stool occult positive. Eliquis for portal vein thrombosis held. started on lovenox BID for embolic stroke. H/H has been stable.    Pancytopenia due to antineoplastic chemotherapy    S/p fluconazole for possible thrush    For acute embolic stroke, patient with acute left sided weakness with findings of new infarcts on MRI suggestive of embolic in nature. hypercoagulable state/NBTE in setting of malignancy.     - patient with recent diagnosis of afib, however no documented EKG with this rhythm nor has he been in the is rhythm while maintained on TELE inpatient.     Resumed a/c- full dose Lovenox.        Patient stable for discharge to [facility] today, 1/9/20 Patient is a 68 yo M with PMH of cholangiocarcinoma (dx in 2017 s/p Whipple procedure with liver mets found in 2019 after finding of clots in portal vein, also s/p multiple rounds of radiation treatment) currently on chemotherapy, last session 12/19) ,  chronic back pain, GERD, IBS with diarrhea, and recently diagnosed atrial fibrillation, multiple  recent admissions ( most recent Dec 12-17) for fevers , work up has been non revealing , patient was treated with vanc, cefepime and metronidazole  s/p paracentesis  Fluid analysis not c/w SBP , ascites fluid w/ no growth , patient was discharged home on ciprofloxacin , he now presents for hypotension.  Per patients wife , patient was feeling weak and had hypotension on 12/21, at that time her was given IV fluids at [facility] and improved. ON day of admission , wife reports patient was on again lethargic and generally weak , he was unable to get out of bed. Per wife , patient has poor appetite and hardly eating .         1. Malignant Ascitis : tense ascites contributing to poor po intake and patient discomfort , recent ascitic fluid analysis not suggestive of SBP currently on cipro for PPX. s/p pigtail catheter placement for recurrent ascities. need drainage of abd ascites every Friday 2-3 liters. Started on diuretics to help with management of fluid        2. Acute embolic stroke: focal LUE weakness and confusion sec to embolic stroke likely in setting of hypercoagulable state vs NBTE sec to malignancy. Eliquis was switched to Lovenox as per neuro and onc rec. Patient seen and evaluated by PT/OT, PMR and recommended for [facility].         3. Cholangiocarcinoma: Dr. Atkinson Onc. no further plan at this time for chemo. plan for eventual immunotherapy when improved as outpt.         4. leukocytosis: likely sec to neupogen and decadron for appetite stimulant,         Patient stable for discharge to [facility] today, 1/9/20

## 2019-12-27 NOTE — PHYSICAL THERAPY INITIAL EVALUATION ADULT - BALANCE DISTURBANCE, IDENTIFIED IMPAIRMENT CONTRIBUTE, REHAB EVAL
impaired motor control/impaired coordination/abnormal muscle tone/pain/decreased ROM/decreased strength

## 2019-12-27 NOTE — DISCHARGE NOTE PROVIDER - NSDCFUADDINST_GEN_ALL_CORE_FT
pigtail catheter: weekly drainage via cath every Friday 2-3 liters. 1. pigtail catheter: weekly drainage via cath every Friday 2-3 liters.  2. follow up CBC and monitor for any signs of infection. (elevated WBC likely sec to neupogen and steroid)

## 2019-12-27 NOTE — PROGRESS NOTE ADULT - PROBLEM SELECTOR PLAN 3
1 L NS Bolus again today if patient symptomatic when standing up.    Etiology likely combination of deconditioning and chemotherapy effect  -patient not symptomatic when standing  -await PT evaluation

## 2019-12-27 NOTE — CONSULT NOTE ADULT - SUBJECTIVE AND OBJECTIVE BOX
Chief Complaint:  Patient is a 69y old  Male who presents with a chief complaint of Hypotension x 1 day (27 Dec 2019 09:44)    HPI:WOODY CEVALLOS is a 69y Male w/ hx of cholangiocarcinoma s/p whipple w/ recurrent dz w/ mets to liver c/b ascites and portal vein thrombus on apixaban, on FOLFOX chemotherapy, multiple recent admissions for weakness/hypotension, admitted again w/ weakness, lethargy, and noted hypotension at home. Pt also noted recurrent ascites since last large volume paracentesis.   GI consulted for anemia and stool mixed w/ blood. Pt has baseline Hb of 7-9 (on last admission). It was 9 on admission and has downtrended to the high 7 low 8 range. It has remained stable. Pt says he had 1 bowel movement yesterday that was brown stool mixed w/ small amount of bright red blood. He has been having this intermittently for weeks now – states specifically that nothing about this is new.   He reports new onset of dysphagia as of a few days ago – was started on fluconazole yesterday. He denies epigastric abd pain. He has no appetite. He had a large volume paracentesis today w/ Pigtail drain placement.   He had EGD/Colonoscopy 9/2019 as outpatient w/ Dr. Orantes – notable for internal hemorrhoids only.  PMHX/PSHX:  Rash  Biliary stricture  Pancreatic lesion  Gastric polyp  Obstructive jaundice  Other hyperlipidemia  Essential hypertension  IBS (irritable bowel syndrome)  GERD (gastroesophageal reflux disease)  Intestinal Whipple's disease  S/P unilateral inguinal hernia repair  S/P tendon repair  History of lumbar laminectomy  History of biliary stent insertion  S/P lumbar spinal fusion  S/P cervical spinal fusion  No significant past surgical history    Allergies:  penicillin G benzathine (Rash)  sulfa drugs (Rash)      Home Medications: reviewed  Hospital Medications:  acetaminophen   Tablet .. 650 milliGRAM(s) Oral every 4 hours PRN  aluminum hydroxide/magnesium hydroxide/simethicone Suspension 30 milliLiter(s) Oral every 6 hours PRN  ciprofloxacin     Tablet 500 milliGRAM(s) Oral daily  famotidine    Tablet 20 milliGRAM(s) Oral two times a day PRN  fluconAZOLE   Tablet 200 milliGRAM(s) Oral daily  gabapentin 300 milliGRAM(s) Oral at bedtime  heparin  Injectable 5500 Unit(s) IV Push every 6 hours PRN  heparin  Injectable 2500 Unit(s) IV Push every 6 hours PRN  metoprolol succinate ER 25 milliGRAM(s) Oral daily  oxyCODONE    IR 5 milliGRAM(s) Oral every 6 hours PRN  pantoprazole    Tablet 40 milliGRAM(s) Oral before breakfast  simethicone 80 milliGRAM(s) Chew two times a day PRN      Social History:   Tob: Denies  EtOH: Denies  Illicit Drugs: Denies    Family history:  FH: CHF (congestive heart failure)  FH: prostate cancer  Family history of colitis  No pertinent family history in first degree relatives  Family history of prostate cancer  Family history of coronary artery disease    Denies family history of colon cancer/polyps, stomach cancer/polyps, pancreatic cancer/masses, liver cancer/disease, ovarian cancer and endometrial cancer.    ROS:   General:  No, chills, night sweats, fatigue  Eyes:  Good vision, no reported pain  ENT:  No sore throat, pain, runny nose, dysphagia  CV:  No pain, palpitations, hypo/hypertension  Pulm:  No dyspnea, cough, tachypnea, wheezing  GI:  See HPI, otherwise negative  :  No pain, bleeding, incontinence, nocturia  Muscle:  No pain,  Neuro:  No  tingling, memory problems  Psych:  No fatigue, insomnia, mood problems, depression  Endocrine:  No polyuria, polydipsia, cold/heat intolerance  Heme:  No petechiae, ecchymosis, easy bruisability  Skin:  No rash, tattoos, scars, edema    PHYSICAL EXAM:   Vital Signs:  Vital Signs Last 24 Hrs  T(C): 36.7 (27 Dec 2019 11:05), Max: 37.2 (26 Dec 2019 11:50)  T(F): 98.1 (27 Dec 2019 11:05), Max: 98.9 (26 Dec 2019 11:50)  HR: 86 (27 Dec 2019 11:05) (76 - 86)  BP: 126/71 (27 Dec 2019 11:05) (126/71 - 138/79)  BP(mean): --  RR: 18 (27 Dec 2019 11:05) (17 - 18)  SpO2: 98% (27 Dec 2019 11:05) (92% - 98%)  Daily Height in cm: 177.8 (27 Dec 2019 07:59)    Daily     GENERAL: no acute distress, pallor, cachexia  NEURO: alert and oriented x 3, no asterixis  HEENT: anicteric sclera, no conjunctival pallor appreciated  CHEST: no respiratory distress, no accessory muscle use  CARDIAC: regular rate, rhythm  ABDOMEN: soft, non-tender, non-distended, no rebound or guarding  EXTREMITIES: warm, well perfused, no edema  SKIN: no lesions noted  RECTAL: deferred per pt    LABS: reviewed                        7.7    7.60  )-----------( 116      ( 27 Dec 2019 08:57 )             23.2     12-27    136  |  102  |  9   ----------------------------<  96  3.6   |  21<L>  |  0.56    Ca    7.6<L>      27 Dec 2019 07:15    TPro  5.3<L>  /  Alb  2.5<L>  /  TBili  1.2  /  DBili  x   /  AST  106<H>  /  ALT  36  /  AlkPhos  325<H>  12-26    LIVER FUNCTIONS - ( 26 Dec 2019 06:03 )  Alb: 2.5 g/dL / Pro: 5.3 g/dL / ALK PHOS: 325 U/L / ALT: 36 U/L / AST: 106 U/L / GGT: x             Culture - Urine (collected 25 Dec 2019 15:08)  Source: .Urine Clean Catch (Midstream)  Final Report (26 Dec 2019 10:30):    No growth    Culture - Blood (collected 25 Dec 2019 03:58)  Source: .Blood Blood-Peripheral  Preliminary Report (26 Dec 2019 04:01):    No growth to date.    Culture - Blood (collected 25 Dec 2019 03:58)  Source: .Blood Blood-Venous  Preliminary Report (26 Dec 2019 04:01):    No growth to date.        Diagnostic Studies: see sunrise for full report

## 2019-12-27 NOTE — PROGRESS NOTE ADULT - PROBLEM SELECTOR PLAN 1
s/p pigtail catheter placement for recurrent ascities  -Fluid analysis pending   on Cipro for SBP ppx   - Eliquis on hold awaiting clearance for GI  -Hold on aldactone and Lasix as patient Orthostatic

## 2019-12-27 NOTE — DIETITIAN INITIAL EVALUATION ADULT. - PHYSICAL APPEARANCE
underweight/emaciated/other (specify) Nutrition-focused physical exam conducted with pt consent. Findings: Moderate muscle wasting to: temples, clavicles, acromion process. Severe fat loss to triceps; moderate fat loss to buccal region.

## 2019-12-27 NOTE — DISCHARGE NOTE PROVIDER - NSDCCPCAREPLAN_GEN_ALL_CORE_FT
PRINCIPAL DISCHARGE DIAGNOSIS  Diagnosis: Ascites, malignant  Assessment and Plan of Treatment: Malignant Ascitis with tense ascites contributing to poor po intake and patient discomfort , recent ascitic fluid analysis not suggestive of SBP currently on cipro for PPX.   s/p pigtail catheter placement for recurrent ascities in IR on 12/26  On Cipro for SBP ppx         SECONDARY DISCHARGE DIAGNOSES  Diagnosis: Orthostatic hypotension  Assessment and Plan of Treatment: Likely due to dehydration from decreased PO intake, and combination of deconditioning and chemotherapy effect.   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    Diagnosis: Essential hypertension  Assessment and Plan of Treatment: Resumed home metoprolol. PRINCIPAL DISCHARGE DIAGNOSIS  Diagnosis: Ascites, malignant  Assessment and Plan of Treatment: Malignant Ascitis with tense ascites contributing to poor po intake and patient discomfort , recent ascitic fluid analysis not suggestive of SBP currently on cipro for PPX.   s/p pigtail catheter placement for recurrent ascities in IR on 12/26  On Cipro for SBP ppx   -cont diuretics in addition to likely weekly drainage via cath every Friday 2-3 liters.         SECONDARY DISCHARGE DIAGNOSES  Diagnosis: Acute blood loss anemia  Assessment and Plan of Treatment: hemoglobin/hematicrit low but stable. Status post chemo as outpt 12/19  -patient has stool occult + with thrombocytopenia/anemia s/p chemo on 12/19.  -Was on Eliquis previously but now switched to Lovenox in setting of hypercoagulable state/possible NBTE with malignancy  -As per Gastroenterology, no intervention  CBC stable.   continue to monitor CBC at rehab  Eat iron and protein rich foods including: meat, dark leafy green vegetables, and beans.  Limit caffeine.  Notify your doctor if you experience heartburn, constipation, diarrhea, nausea/vomiting, dizziness or are feeling extremely tired.  Seek immediate care or call 911 if you experience:  shortness of breath even at rest, you have blood in your bowel movement or vomit, if you are too dizzy to stand up      Diagnosis: GERD (gastroesophageal reflux disease)  Assessment and Plan of Treatment: Simethicone prn   - Famotidine PRN   - Pantoprazole.  HOME CARE INSTRUCTIONS  Change the factors that you can control. Ask your caregiver for guidance concerning weight loss, quitting smoking, and alcohol consumption.  Avoid foods and drinks that make your symptoms worse, such as:   Caffeine or alcoholic drinks.   Chocolate.   Peppermint or mint flavorings.  Garlic and onions.  Spicy foods.   Citrus fruits, such as oranges, kary, or limes.   Tomato-based foods such as sauce, chili, salsa, and pizza.  Fried and fatty foods.  Avoid lying down for the 3 hours prior to your bedtime or prior to taking a nap.  Eat small, frequent meals instead of large meals.   Wear loose-fitting clothing. Do not wear anything tight around your waist that causes pressure on your stomach.  Raise the head of your bed 6 to 8 inches with wood blocks to help you sleep. Extra pillows will not help.  Only take over-the-counter or prescription medicines for pain, discomfort, or fever as directed by your caregiver.   Do not take aspirin, ibuprofen, or other nonsteroidal anti-inflammatory drugs (NSAIDs).  SEEK IMMEDIATE MEDICAL CARE IF:  You have pain in your arms, neck, jaw, teeth, or back.   Your pain increases or changes in intensity or duration.   You develop nausea, vomiting, or sweating (diaphoresis).  You develop shortness of breath, or you faint.  Your vomit is green, yellow, black, or looks like coffee grounds or blood.  Your stool is red, bloody, or black.  These symptoms could be signs of other problems, such as heart disease, gastric bleeding, or esophageal bleeding.      Diagnosis: Acute embolic stroke  Assessment and Plan of Treatment: -Acute left sided weakness with findings of new infarcts on MRI suggestive of embolic in nature. hypercoagulable state/NBTE in setting of malignancy.   -also has recent diagnosis of afib, however no documented EKG with this rhythm nor has he been in the is rhythm while maintained on TELE inpatient.   - resumed back on anticoagulation, lovenox BID as per neurology and oncology      Diagnosis: Essential hypertension  Assessment and Plan of Treatment: Resumed home metoprolol.  Low salt diet  Activity as tolerated.  Take all medication as prescribed.  Follow up with your medical doctor for routine blood pressure monitoring at your next visit.  Notify your doctor if you have any of the following symptoms:   Dizziness, Lightheadedness, Blurry vision, Headache, Chest pain, Shortness of breath      Diagnosis: Orthostatic hypotension  Assessment and Plan of Treatment: Likely due to dehydration from decreased PO intake, and combination of deconditioning and chemotherapy effect.   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  - Will consider for immunotherapy once patient is improved physically/nutritionally after completion of rehab. no plans for treatment while in rehab.   Follow up with oncology as outpatient    Diagnosis: Portal vein thrombosis  Assessment and Plan of Treatment: Continue anticoagulation (lovenox).   previously on eliquis now on Lovenox.       Diagnosis: Pancytopenia due to antineoplastic chemotherapy  Assessment and Plan of Treatment: -Pancytopenia due to antineoplastic chemotherapy  -Leukocytosis likely secondary to recent initiation of steroid for appetite   No evidence of new infection. s/p neupogen x3 days as well previously.   H/H stable with plt improved.   Continue Lovenox.       Diagnosis: Elevated troponin  Assessment and Plan of Treatment: suspect secondary to demand in setting of hypotension Echo done showing no significant changes from previous PRINCIPAL DISCHARGE DIAGNOSIS  Diagnosis: Ascites, malignant  Assessment and Plan of Treatment: Malignant Ascitis with tense ascites contributing to poor po intake and patient discomfort , recent ascitic fluid analysis not suggestive of SBP currently on cipro for PPX.   s/p pigtail catheter placement for recurrent ascities in IR on 12/26  On Cipro for SBP ppx   -cont diuretics in addition to likely weekly drainage via cath every Friday 2-3 liters.         SECONDARY DISCHARGE DIAGNOSES  Diagnosis: Leukocytosis  Assessment and Plan of Treatment: no signs or symptoms of infection  no fever  was started on steroids  monitor cbc at rehab    Diagnosis: Acute blood loss anemia  Assessment and Plan of Treatment: hemoglobin/hematicrit low but stable. Status post chemo as outpt 12/19  -patient has stool occult + with thrombocytopenia/anemia s/p chemo on 12/19.  -Was on Eliquis previously but now switched to Lovenox in setting of hypercoagulable state/possible NBTE with malignancy  -As per Gastroenterology, no intervention  CBC stable.   continue to monitor CBC at rehab  Eat iron and protein rich foods including: meat, dark leafy green vegetables, and beans.  Limit caffeine.  Notify your doctor if you experience heartburn, constipation, diarrhea, nausea/vomiting, dizziness or are feeling extremely tired.  Seek immediate care or call 911 if you experience:  shortness of breath even at rest, you have blood in your bowel movement or vomit, if you are too dizzy to stand up      Diagnosis: GERD (gastroesophageal reflux disease)  Assessment and Plan of Treatment: Simethicone prn   - Famotidine PRN   - Pantoprazole.  HOME CARE INSTRUCTIONS  Change the factors that you can control. Ask your caregiver for guidance concerning weight loss, quitting smoking, and alcohol consumption.  Avoid foods and drinks that make your symptoms worse, such as:   Caffeine or alcoholic drinks.   Chocolate.   Peppermint or mint flavorings.  Garlic and onions.  Spicy foods.   Citrus fruits, such as oranges, kary, or limes.   Tomato-based foods such as sauce, chili, salsa, and pizza.  Fried and fatty foods.  Avoid lying down for the 3 hours prior to your bedtime or prior to taking a nap.  Eat small, frequent meals instead of large meals.   Wear loose-fitting clothing. Do not wear anything tight around your waist that causes pressure on your stomach.  Raise the head of your bed 6 to 8 inches with wood blocks to help you sleep. Extra pillows will not help.  Only take over-the-counter or prescription medicines for pain, discomfort, or fever as directed by your caregiver.   Do not take aspirin, ibuprofen, or other nonsteroidal anti-inflammatory drugs (NSAIDs).  SEEK IMMEDIATE MEDICAL CARE IF:  You have pain in your arms, neck, jaw, teeth, or back.   Your pain increases or changes in intensity or duration.   You develop nausea, vomiting, or sweating (diaphoresis).  You develop shortness of breath, or you faint.  Your vomit is green, yellow, black, or looks like coffee grounds or blood.  Your stool is red, bloody, or black.  These symptoms could be signs of other problems, such as heart disease, gastric bleeding, or esophageal bleeding.      Diagnosis: Acute embolic stroke  Assessment and Plan of Treatment: -Follow up with neurology as outpatient  -Acute left sided weakness with findings of new infarcts on MRI suggestive of embolic in nature. hypercoagulable state/NBTE in setting of malignancy.   -also has recent diagnosis of afib, however no documented EKG with this rhythm nor has he been in the is rhythm while maintained on TELE inpatient.   - resumed back on anticoagulation, lovenox BID as per neurology and oncology      Diagnosis: Essential hypertension  Assessment and Plan of Treatment: Resumed home metoprolol.  Low salt diet  Activity as tolerated.  Take all medication as prescribed.  Follow up with your medical doctor for routine blood pressure monitoring at your next visit.  Notify your doctor if you have any of the following symptoms:   Dizziness, Lightheadedness, Blurry vision, Headache, Chest pain, Shortness of breath      Diagnosis: Orthostatic hypotension  Assessment and Plan of Treatment: Likely due to dehydration from decreased PO intake, and combination of deconditioning and chemotherapy effect.   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  - Will consider for immunotherapy once patient is improved physically/nutritionally after completion of rehab. no plans for treatment while in rehab.   Follow up with oncology as outpatient    Diagnosis: Portal vein thrombosis  Assessment and Plan of Treatment: Continue anticoagulation (lovenox).   previously on eliquis now on Lovenox.       Diagnosis: Pancytopenia due to antineoplastic chemotherapy  Assessment and Plan of Treatment: -Pancytopenia due to antineoplastic chemotherapy  -Leukocytosis likely secondary to recent initiation of steroid for appetite   No evidence of new infection. s/p neupogen x3 days as well previously.   H/H stable with plt improved.   Continue Lovenox.       Diagnosis: Elevated troponin  Assessment and Plan of Treatment: suspect secondary to demand in setting of hypotension Echo done showing no significant changes from previous

## 2019-12-27 NOTE — PROGRESS NOTE ADULT - SUBJECTIVE AND OBJECTIVE BOX
Interventional Radiology Brief Post-Procedure Note    Procedure: Abdominal pleurx catheter placement    Operators: Darrion France MD    Anesthesia (type): Provided by the anesthesiology attending    Contrast: None.     EBL: Minimal.     Findings/Follow up Plan of Care: Successful abdominal pleurx catheter placement    Specimens Removed: 4 liters of ascites.    Implants: Abdominal pleurx catheter via the left abdomen.     Complications: No immediate complications.    Condition/Disposition: To recovery room.     Please call Interventional Radiology x [phone] with any questions, concerns, or issues.

## 2019-12-28 LAB
ANION GAP SERPL CALC-SCNC: 14 MMOL/L — SIGNIFICANT CHANGE UP (ref 5–17)
BUN SERPL-MCNC: 9 MG/DL — SIGNIFICANT CHANGE UP (ref 7–23)
CALCIUM SERPL-MCNC: 8 MG/DL — LOW (ref 8.4–10.5)
CHLORIDE SERPL-SCNC: 100 MMOL/L — SIGNIFICANT CHANGE UP (ref 96–108)
CO2 SERPL-SCNC: 23 MMOL/L — SIGNIFICANT CHANGE UP (ref 22–31)
CREAT SERPL-MCNC: 0.59 MG/DL — SIGNIFICANT CHANGE UP (ref 0.5–1.3)
GLUCOSE SERPL-MCNC: 112 MG/DL — HIGH (ref 70–99)
HCT VFR BLD CALC: 23.8 % — LOW (ref 39–50)
HGB BLD-MCNC: 8 G/DL — LOW (ref 13–17)
MCHC RBC-ENTMCNC: 27.1 PG — SIGNIFICANT CHANGE UP (ref 27–34)
MCHC RBC-ENTMCNC: 33.6 GM/DL — SIGNIFICANT CHANGE UP (ref 32–36)
MCV RBC AUTO: 80.7 FL — SIGNIFICANT CHANGE UP (ref 80–100)
PLATELET # BLD AUTO: 89 K/UL — LOW (ref 150–400)
POTASSIUM SERPL-MCNC: 3.7 MMOL/L — SIGNIFICANT CHANGE UP (ref 3.5–5.3)
POTASSIUM SERPL-SCNC: 3.7 MMOL/L — SIGNIFICANT CHANGE UP (ref 3.5–5.3)
RBC # BLD: 2.95 M/UL — LOW (ref 4.2–5.8)
RBC # FLD: 20.7 % — HIGH (ref 10.3–14.5)
SODIUM SERPL-SCNC: 137 MMOL/L — SIGNIFICANT CHANGE UP (ref 135–145)
WBC # BLD: 5.84 K/UL — SIGNIFICANT CHANGE UP (ref 3.8–10.5)
WBC # FLD AUTO: 5.84 K/UL — SIGNIFICANT CHANGE UP (ref 3.8–10.5)

## 2019-12-28 PROCEDURE — 70450 CT HEAD/BRAIN W/O DYE: CPT | Mod: 26

## 2019-12-28 PROCEDURE — 99233 SBSQ HOSP IP/OBS HIGH 50: CPT

## 2019-12-28 RX ORDER — ALBUMIN HUMAN 25 %
100 VIAL (ML) INTRAVENOUS ONCE
Refills: 0 | Status: COMPLETED | OUTPATIENT
Start: 2019-12-28 | End: 2019-12-28

## 2019-12-28 RX ORDER — APIXABAN 2.5 MG/1
5 TABLET, FILM COATED ORAL EVERY 12 HOURS
Refills: 0 | Status: DISCONTINUED | OUTPATIENT
Start: 2019-12-28 | End: 2019-12-29

## 2019-12-28 RX ADMIN — Medication 650 MILLIGRAM(S): at 21:54

## 2019-12-28 RX ADMIN — Medication 500 MILLIGRAM(S): at 13:08

## 2019-12-28 RX ADMIN — PANTOPRAZOLE SODIUM 40 MILLIGRAM(S): 20 TABLET, DELAYED RELEASE ORAL at 05:58

## 2019-12-28 RX ADMIN — Medication 25 MILLIGRAM(S): at 05:58

## 2019-12-28 RX ADMIN — FAMOTIDINE 20 MILLIGRAM(S): 10 INJECTION INTRAVENOUS at 19:51

## 2019-12-28 RX ADMIN — Medication 50 MILLILITER(S): at 21:58

## 2019-12-28 RX ADMIN — OXYCODONE HYDROCHLORIDE 5 MILLIGRAM(S): 5 TABLET ORAL at 21:58

## 2019-12-28 RX ADMIN — Medication 650 MILLIGRAM(S): at 07:02

## 2019-12-28 RX ADMIN — OXYCODONE HYDROCHLORIDE 5 MILLIGRAM(S): 5 TABLET ORAL at 16:32

## 2019-12-28 RX ADMIN — Medication 650 MILLIGRAM(S): at 13:08

## 2019-12-28 RX ADMIN — APIXABAN 5 MILLIGRAM(S): 2.5 TABLET, FILM COATED ORAL at 18:11

## 2019-12-28 RX ADMIN — OXYCODONE HYDROCHLORIDE 5 MILLIGRAM(S): 5 TABLET ORAL at 23:29

## 2019-12-28 RX ADMIN — OXYCODONE HYDROCHLORIDE 5 MILLIGRAM(S): 5 TABLET ORAL at 15:56

## 2019-12-28 RX ADMIN — Medication 650 MILLIGRAM(S): at 19:53

## 2019-12-28 RX ADMIN — Medication 650 MILLIGRAM(S): at 05:57

## 2019-12-28 RX ADMIN — FLUCONAZOLE 200 MILLIGRAM(S): 150 TABLET ORAL at 13:08

## 2019-12-28 RX ADMIN — GABAPENTIN 300 MILLIGRAM(S): 400 CAPSULE ORAL at 21:57

## 2019-12-28 RX ADMIN — Medication 650 MILLIGRAM(S): at 14:00

## 2019-12-28 NOTE — PROGRESS NOTE ADULT - PROBLEM SELECTOR PLAN 1
s/p pigtail catheter placement for recurrent ascitics  -4 Liters removed- Albumin being given back today   -Fluid analysis not sent   on Cipro for SBP ppx   - Eliquis restarted will monitor hgb closely   -Hold on aldactone and Lasix as patient Orthostatic

## 2019-12-28 NOTE — CONSULT NOTE ADULT - ASSESSMENT
Patient is a 68 yo M with PMH of cholangiocarcinoma (dx in 2017 s/p Whipple procedure with liver mets found in 2019 after finding of clots in portal vein, also s/p multiple rounds of radiation treatment) currently on chemotherapy, last session 12/19) ,  chronic back pain, GERD, IBS with diarrhea, and recently diagnosed atrial fibrillation, multiple  recent admissions ( most recent Dec 12-17) for fevers , work up has been non revealing , patient was treated with vanc, cefepime and metronidazole  s/p paracentesis  Fluid analysis not c/w SBP , ascites fluid w/ no growth.  Now with several days of weakening LUE and CTH demonstrating multiple infarcts bilateral frontal lobes white matter, right occipital lobe, and left cerebellum.      Impression:  Patient with new infarcts iso complicated cancer hx, possible NBTE.  General  [] telemetry monitoring    Imaging   [x] CT head  [] MRI head w/wo contrast, MRA head/neck  [] STAT repeat CTH if change in neuro status  [] loop recorder outpt vs inpatient    Meds  [] Given patients current ac and cancer hx, will discuss AC/AP strategy with stroke attending before giving recs  [] atorvastatin 80    Studies  [] TTE with bubble    Labs   [] CBC, BMP  [] Lipid panel  [] HbA1C    Other  [] PT/OT  [] dysphagia screen

## 2019-12-28 NOTE — PROGRESS NOTE ADULT - PROBLEM SELECTOR PLAN 3
Etiology likely combination of deconditioning and chemotherapy effect  -patient not symptomatic when standing  -await PT evaluation

## 2019-12-28 NOTE — CONSULT NOTE ADULT - SUBJECTIVE AND OBJECTIVE BOX
HPI:  Patient is a 68 yo M with PMH of cholangiocarcinoma (dx in 2017 s/p Whipple procedure with liver mets found in 2019 after finding of clots in portal vein, also s/p multiple rounds of radiation treatment) currently on chemotherapy, last session 12/19) ,  chronic back pain, GERD, IBS with diarrhea, and recently diagnosed atrial fibrillation, multiple  recent admissions ( most recent Dec 12-17) for fevers , work up has been non revealing , patient was treated with vanc, cefepime and metronidazole  s/p paracentesis  Fluid analysis not c/w SBP , ascites fluid w/ no growth , patient was discharged home on ciprofloxacin , he now presents for hypotension.  Per patients wife , patient was feeling weak and had hypotension on 12/21, at that time her was given IV fluids at [facility] and improved. ON day of admission , wife reports patient was on again lethargic and generally weak , he was unable to get out of bed. Per wife , patient has poor appetite and hardly eating . Patient reports GERD symptoms poorly responsive to Maalox and simethicone . Patient also reports expanding ascites that is uncomfortable. He reports no fever or chills , no runny nose sore throat , no diarrhea , no dysuria.  Neurology called for LUE weakness for several days.      REVIEW OF SYSTEMS  General:	  Skin/Breast:	  Ophthalmologic:  ENMT:	  Respiratory and Thorax:	  Cardiovascular:	  Gastrointestinal:	  Genitourinary:	  Musculoskeletal:	  Neurological:	  Psychiatric:	  Hematology/Lymphatics:	  Endocrine:	  Allergic/Immunologic:	    A 10-system ROS was performed and is negative except for those items noted above and/or in the HPI.    PAST MEDICAL & SURGICAL HISTORY:  Rash  Biliary stricture: s/p biliary stent  Pancreatic lesion  Gastric polyp  Obstructive jaundice  Other hyperlipidemia  Essential hypertension  IBS (irritable bowel syndrome)  GERD (gastroesophageal reflux disease)  Intestinal Whipple's disease  S/P unilateral inguinal hernia repair  S/P tendon repair: hand  History of lumbar laminectomy  History of biliary stent insertion: 2/21/2017  S/P cervical spinal fusion    FAMILY HISTORY:  FH: CHF (congestive heart failure)  FH: prostate cancer  Family history of colitis    SOCIAL HISTORY:   T/E/D:   Occupation:   Lives with:     MEDICATIONS (HOME):  Home Medications:  aluminum hydroxide-magnesium hydroxide 200 mg-200 mg/5 mL oral suspension: 30 milliliter(s) orally every 4 hours, As needed, Dyspepsia (19 Dec 2019 09:03)  doxazosin 1 mg oral tablet: 1 tab(s) orally once a day (at bedtime) (19 Dec 2019 09:03)  gabapentin 300 mg oral capsule: 1 cap(s) orally once a day (at bedtime) (19 Dec 2019 09:03)  metoprolol succinate 25 mg oral tablet, extended release: 1 tab(s) orally once a day (19 Dec 2019 09:03)  pantoprazole 40 mg oral delayed release tablet: 1 tab(s) orally once a day (before a meal) (19 Dec 2019 09:03)    MEDICATIONS  (STANDING):  apixaban 5 milliGRAM(s) Oral every 12 hours  ciprofloxacin     Tablet 500 milliGRAM(s) Oral daily  fluconAZOLE   Tablet 200 milliGRAM(s) Oral daily  gabapentin 300 milliGRAM(s) Oral at bedtime  metoprolol succinate ER 25 milliGRAM(s) Oral daily  pantoprazole    Tablet 40 milliGRAM(s) Oral before breakfast    MEDICATIONS  (PRN):  acetaminophen   Tablet .. 650 milliGRAM(s) Oral every 4 hours PRN Mild Pain (1 - 3)  aluminum hydroxide/magnesium hydroxide/simethicone Suspension 30 milliLiter(s) Oral every 6 hours PRN Dyspepsia  famotidine    Tablet 20 milliGRAM(s) Oral two times a day PRN GERD  oxyCODONE    IR 5 milliGRAM(s) Oral every 6 hours PRN Moderate Pain (4 - 6)  simethicone 80 milliGRAM(s) Chew two times a day PRN Gas    ALLERGIES/INTOLERANCES:  Allergies  penicillin G benzathine (Rash)  sulfa drugs (Rash)    Intolerances    VITALS & EXAMINATION:  Vital Signs Last 24 Hrs  T(C): 36.6 (28 Dec 2019 19:48), Max: 37.1 (28 Dec 2019 04:07)  T(F): 97.9 (28 Dec 2019 19:48), Max: 98.7 (28 Dec 2019 04:07)  HR: 80 (28 Dec 2019 19:48) (80 - 107)  BP: 115/71 (28 Dec 2019 19:48) (115/71 - 124/68)  BP(mean): --  RR: 18 (28 Dec 2019 19:48) (17 - 18)  SpO2: 98% (28 Dec 2019 19:48) (95% - 98%)    Neurological Exam    Mental Status:  alert and oriented x 2 (knew the year but not the month/day/date)    Cranial Nerves: PERRL, EOMI without nystagmus, visual fields intact, V 1-3 intact, no facial droop, pallate and uvula midline, no dysarthria, head turn strength normal, shoulder shrug strength normal.    Motor:     RUE: 5/5  LUE: 3/5, could hold it above bed for 10 seconds but drifted down and hit bed right after 10 seconds, 0/5 wrist extensors  RLE, LLE: 5/5    Sensation: intact grossly to light touch    Tremor: none appreciated at rest or in action w RUE, LUE too weak to assess    Toes flexor bilaterally: toes mute b/l     LABORATORY:  CBC                       8.0    5.84  )-----------( 89       ( 28 Dec 2019 08:52 )             23.8     Chem 12-28    137  |  100  |  9   ----------------------------<  112<H>  3.7   |  23  |  0.59    Ca    8.0<L>      28 Dec 2019 06:17      LFTs   Coagulopathy PT/INR - ( 27 Dec 2019 08:49 )   PT: 18.2 sec;   INR: 1.56 ratio         PTT - ( 27 Dec 2019 08:49 )  PTT:97.4 sec  Lipid Panel   A1c   Cardiac enzymes     U/A   CSF  Immunological  Other    STUDIES & IMAGING:    < from: CT Head No Cont (12.28.19 @ 18:26) >  IMPRESSION:    Compared to the prior CT study from 12/02/2019, there has been interval development of multiple new small infarcts as described. There is no evidence for intracranial hemorrhage. The exam findings suggest embolic disease. Consider follow-up brain MRI and brain/neck MRA imaging further workup if clinically warranted. HPI:  Patient is a 70 yo M with PMH of cholangiocarcinoma (dx in 2017 s/p Whipple procedure with liver mets found in 2019 after finding of clots in portal vein, also s/p multiple rounds of radiation treatment) currently on chemotherapy, last session 12/19) ,  chronic back pain, GERD, IBS with diarrhea, and recently diagnosed atrial fibrillation, multiple  recent admissions ( most recent Dec 12-17) for fevers , work up has been non revealing , patient was treated with vanc, cefepime and metronidazole  s/p paracentesis  Fluid analysis not c/w SBP , ascites fluid w/ no growth , patient was discharged home on ciprofloxacin , he now presents for hypotension.  Patient reports GERD symptoms poorly responsive to Maalox and simethicone . Patient also reports expanding ascites that is uncomfortable. Neurology called for LUE weakness for several days.      REVIEW OF SYSTEMS  General:	  Skin/Breast:	  Ophthalmologic:  ENMT:	  Respiratory and Thorax:	  Cardiovascular:	  Gastrointestinal:	  Genitourinary:	  Musculoskeletal:	  Neurological:	  Psychiatric:	  Hematology/Lymphatics:	  Endocrine:	  Allergic/Immunologic:	    A 10-system ROS was performed and is negative except for those items noted above and/or in the HPI.    PAST MEDICAL & SURGICAL HISTORY:  Rash  Biliary stricture: s/p biliary stent  Pancreatic lesion  Gastric polyp  Obstructive jaundice  Other hyperlipidemia  Essential hypertension  IBS (irritable bowel syndrome)  GERD (gastroesophageal reflux disease)  Intestinal Whipple's disease  S/P unilateral inguinal hernia repair  S/P tendon repair: hand  History of lumbar laminectomy  History of biliary stent insertion: 2/21/2017  S/P cervical spinal fusion    FAMILY HISTORY:  FH: CHF (congestive heart failure)  FH: prostate cancer  Family history of colitis    SOCIAL HISTORY:   T/E/D:   Occupation:   Lives with:     MEDICATIONS (HOME):  Home Medications:  aluminum hydroxide-magnesium hydroxide 200 mg-200 mg/5 mL oral suspension: 30 milliliter(s) orally every 4 hours, As needed, Dyspepsia (19 Dec 2019 09:03)  doxazosin 1 mg oral tablet: 1 tab(s) orally once a day (at bedtime) (19 Dec 2019 09:03)  gabapentin 300 mg oral capsule: 1 cap(s) orally once a day (at bedtime) (19 Dec 2019 09:03)  metoprolol succinate 25 mg oral tablet, extended release: 1 tab(s) orally once a day (19 Dec 2019 09:03)  pantoprazole 40 mg oral delayed release tablet: 1 tab(s) orally once a day (before a meal) (19 Dec 2019 09:03)    MEDICATIONS  (STANDING):  apixaban 5 milliGRAM(s) Oral every 12 hours  ciprofloxacin     Tablet 500 milliGRAM(s) Oral daily  fluconAZOLE   Tablet 200 milliGRAM(s) Oral daily  gabapentin 300 milliGRAM(s) Oral at bedtime  metoprolol succinate ER 25 milliGRAM(s) Oral daily  pantoprazole    Tablet 40 milliGRAM(s) Oral before breakfast    MEDICATIONS  (PRN):  acetaminophen   Tablet .. 650 milliGRAM(s) Oral every 4 hours PRN Mild Pain (1 - 3)  aluminum hydroxide/magnesium hydroxide/simethicone Suspension 30 milliLiter(s) Oral every 6 hours PRN Dyspepsia  famotidine    Tablet 20 milliGRAM(s) Oral two times a day PRN GERD  oxyCODONE    IR 5 milliGRAM(s) Oral every 6 hours PRN Moderate Pain (4 - 6)  simethicone 80 milliGRAM(s) Chew two times a day PRN Gas    ALLERGIES/INTOLERANCES:  Allergies  penicillin G benzathine (Rash)  sulfa drugs (Rash)    Intolerances    VITALS & EXAMINATION:  Vital Signs Last 24 Hrs  T(C): 36.6 (28 Dec 2019 19:48), Max: 37.1 (28 Dec 2019 04:07)  T(F): 97.9 (28 Dec 2019 19:48), Max: 98.7 (28 Dec 2019 04:07)  HR: 80 (28 Dec 2019 19:48) (80 - 107)  BP: 115/71 (28 Dec 2019 19:48) (115/71 - 124/68)  BP(mean): --  RR: 18 (28 Dec 2019 19:48) (17 - 18)  SpO2: 98% (28 Dec 2019 19:48) (95% - 98%)    Neurological Exam    Mental Status:  alert and oriented x 2 (knew the year but not the month/day/date)    Cranial Nerves: PERRL, EOMI without nystagmus, visual fields intact, V 1-3 intact, no facial droop, pallate and uvula midline, no dysarthria, head turn strength normal, shoulder shrug strength normal.    Motor:     RUE: 5/5  LUE: 3/5, could hold it above bed for 10 seconds but drifted down and hit bed right after 10 seconds, 0/5 wrist extensors  RLE, LLE: 5/5    Sensation: intact grossly to light touch    Coordination: RUE: dysmetria FTN, LUE too weak to test, HTS intact b/l     Toes flexor bilaterally: toes mute b/l     LABORATORY:  CBC                       8.0    5.84  )-----------( 89       ( 28 Dec 2019 08:52 )             23.8     Chem 12-28    137  |  100  |  9   ----------------------------<  112<H>  3.7   |  23  |  0.59    Ca    8.0<L>      28 Dec 2019 06:17      LFTs   Coagulopathy PT/INR - ( 27 Dec 2019 08:49 )   PT: 18.2 sec;   INR: 1.56 ratio         PTT - ( 27 Dec 2019 08:49 )  PTT:97.4 sec  Lipid Panel   A1c   Cardiac enzymes     U/A   CSF  Immunological  Other    STUDIES & IMAGING:    < from: CT Head No Cont (12.28.19 @ 18:26) >  IMPRESSION:    Compared to the prior CT study from 12/02/2019, there has been interval development of multiple new small infarcts as described. There is no evidence for intracranial hemorrhage. The exam findings suggest embolic disease. Consider follow-up brain MRI and brain/neck MRA imaging further workup if clinically warranted. HPI:  Patient is a 68 yo M with PMH of cholangiocarcinoma (dx in 2017 s/p Whipple procedure with liver mets found in 2019 after finding of clots in portal vein, also s/p multiple rounds of radiation treatment) currently on chemotherapy, last session 12/19) ,  chronic back pain, GERD, IBS with diarrhea, and recently diagnosed atrial fibrillation, multiple  recent admissions ( most recent Dec 12-17) for fevers , work up has been non revealing , patient was treated with vanc, cefepime and metronidazole  s/p paracentesis  Fluid analysis not c/w SBP , ascites fluid w/ no growth , patient was discharged home on ciprofloxacin , he now presents for hypotension.  Patient reports GERD symptoms poorly responsive to Maalox and simethicone . Patient also reports expanding ascites that is uncomfortable. Neurology called for LUE weakness for several days.    NIHSS: 1  MRS: 2        REVIEW OF SYSTEMS  General:	  Skin/Breast:	  Ophthalmologic:  ENMT:	  Respiratory and Thorax:	  Cardiovascular:	  Gastrointestinal:	  Genitourinary:	  Musculoskeletal:	  Neurological:	  Psychiatric:	  Hematology/Lymphatics:	  Endocrine:	  Allergic/Immunologic:	    A 10-system ROS was performed and is negative except for those items noted above and/or in the HPI.    PAST MEDICAL & SURGICAL HISTORY:  Rash  Biliary stricture: s/p biliary stent  Pancreatic lesion  Gastric polyp  Obstructive jaundice  Other hyperlipidemia  Essential hypertension  IBS (irritable bowel syndrome)  GERD (gastroesophageal reflux disease)  Intestinal Whipple's disease  S/P unilateral inguinal hernia repair  S/P tendon repair: hand  History of lumbar laminectomy  History of biliary stent insertion: 2/21/2017  S/P cervical spinal fusion    FAMILY HISTORY:  FH: CHF (congestive heart failure)  FH: prostate cancer  Family history of colitis    SOCIAL HISTORY:   T/E/D:   Occupation:   Lives with:     MEDICATIONS (HOME):  Home Medications:  aluminum hydroxide-magnesium hydroxide 200 mg-200 mg/5 mL oral suspension: 30 milliliter(s) orally every 4 hours, As needed, Dyspepsia (19 Dec 2019 09:03)  doxazosin 1 mg oral tablet: 1 tab(s) orally once a day (at bedtime) (19 Dec 2019 09:03)  gabapentin 300 mg oral capsule: 1 cap(s) orally once a day (at bedtime) (19 Dec 2019 09:03)  metoprolol succinate 25 mg oral tablet, extended release: 1 tab(s) orally once a day (19 Dec 2019 09:03)  pantoprazole 40 mg oral delayed release tablet: 1 tab(s) orally once a day (before a meal) (19 Dec 2019 09:03)    MEDICATIONS  (STANDING):  apixaban 5 milliGRAM(s) Oral every 12 hours  ciprofloxacin     Tablet 500 milliGRAM(s) Oral daily  fluconAZOLE   Tablet 200 milliGRAM(s) Oral daily  gabapentin 300 milliGRAM(s) Oral at bedtime  metoprolol succinate ER 25 milliGRAM(s) Oral daily  pantoprazole    Tablet 40 milliGRAM(s) Oral before breakfast    MEDICATIONS  (PRN):  acetaminophen   Tablet .. 650 milliGRAM(s) Oral every 4 hours PRN Mild Pain (1 - 3)  aluminum hydroxide/magnesium hydroxide/simethicone Suspension 30 milliLiter(s) Oral every 6 hours PRN Dyspepsia  famotidine    Tablet 20 milliGRAM(s) Oral two times a day PRN GERD  oxyCODONE    IR 5 milliGRAM(s) Oral every 6 hours PRN Moderate Pain (4 - 6)  simethicone 80 milliGRAM(s) Chew two times a day PRN Gas    ALLERGIES/INTOLERANCES:  Allergies  penicillin G benzathine (Rash)  sulfa drugs (Rash)    Intolerances    VITALS & EXAMINATION:  Vital Signs Last 24 Hrs  T(C): 36.6 (28 Dec 2019 19:48), Max: 37.1 (28 Dec 2019 04:07)  T(F): 97.9 (28 Dec 2019 19:48), Max: 98.7 (28 Dec 2019 04:07)  HR: 80 (28 Dec 2019 19:48) (80 - 107)  BP: 115/71 (28 Dec 2019 19:48) (115/71 - 124/68)  BP(mean): --  RR: 18 (28 Dec 2019 19:48) (17 - 18)  SpO2: 98% (28 Dec 2019 19:48) (95% - 98%)    Neurological Exam    Mental Status:  alert and oriented x 2 (knew the year but not the month/day/date)    Cranial Nerves: PERRL, EOMI without nystagmus, visual fields intact, V 1-3 intact, no facial droop, pallate and uvula midline, no dysarthria, head turn strength normal, shoulder shrug strength normal.    Motor:     RUE: 5/5  LUE: 3/5, could hold it above bed for 10 seconds but drifted down and hit bed right after 10 seconds, 0/5 wrist extensors  RLE, LLE: 5/5    Sensation: intact grossly to light touch    Coordination: RUE: dysmetria FTN, LUE too weak to test, HTS intact b/l     Toes flexor bilaterally: toes mute b/l     LABORATORY:  CBC                       8.0    5.84  )-----------( 89       ( 28 Dec 2019 08:52 )             23.8     Chem 12-28    137  |  100  |  9   ----------------------------<  112<H>  3.7   |  23  |  0.59    Ca    8.0<L>      28 Dec 2019 06:17      LFTs   Coagulopathy PT/INR - ( 27 Dec 2019 08:49 )   PT: 18.2 sec;   INR: 1.56 ratio         PTT - ( 27 Dec 2019 08:49 )  PTT:97.4 sec  Lipid Panel   A1c   Cardiac enzymes     U/A   CSF  Immunological  Other    STUDIES & IMAGING:    < from: CT Head No Cont (12.28.19 @ 18:26) >  IMPRESSION:    Compared to the prior CT study from 12/02/2019, there has been interval development of multiple new small infarcts as described. There is no evidence for intracranial hemorrhage. The exam findings suggest embolic disease. Consider follow-up brain MRI and brain/neck MRA imaging further workup if clinically warranted.

## 2019-12-28 NOTE — PROGRESS NOTE ADULT - PROBLEM SELECTOR PLAN 2
patient has stool occult +   On Eliquis for portal vein thrombosis  -As per GI no intervention  -cleared by GI for reinitiation of Eliquis   -Monitor CBC

## 2019-12-28 NOTE — CHART NOTE - NSCHARTNOTEFT_GEN_A_CORE
MEDICINE NP    Notified by Radiologist patient with new infarct . Seen and examined patient at bedside. Patient is alert, NAD. Denies HA, CP, SOB, cough, N/V, or abd pain. Compared to the prior CT study from 12/02/2019, there has been interval development of multiple new small infarcts. no intracranial hemorrhage. The exam findings suggest embolic disease. Radiologist recommended MRI brain and MRA Head and neck. spoke with neuro coverage for the night who also agrees that the recommended imaging is warranted. patient is stable and was able to verbalize that left upper extremity weakness is couple days old.      VITAL SIGNS:  T(C): 36.6 (12-28-19 @ 19:48), Max: 37.2 (12-27-19 @ 21:48)  HR: 80 (12-28-19 @ 19:48) (80 - 107)  BP: 115/71 (12-28-19 @ 19:48) (115/71 - 124/75)  RR: 18 (12-28-19 @ 19:48) (17 - 18)  SpO2: 98% (12-28-19 @ 19:48) (95% - 98%)  Wt(kg): --      Javeir Duperval spectra [id number]

## 2019-12-28 NOTE — PROGRESS NOTE ADULT - SUBJECTIVE AND OBJECTIVE BOX
[facility] Division of Hospital Medicine  Waylon Cox MD  Pager  [phone]    Patient is a 69y old  Male who presents with a chief complaint of Hypotension x 1 day (27 Dec 2019 17:22)      SUBJECTIVE / OVERNIGHT EVENTS:  patient had Pleurx drain placed, had some pain overnight  ADDITIONAL REVIEW OF SYSTEMS:  no chest pain or shortness of breath     MEDICATIONS  (STANDING):  apixaban 5 milliGRAM(s) Oral every 12 hours  ciprofloxacin     Tablet 500 milliGRAM(s) Oral daily  fluconAZOLE   Tablet 200 milliGRAM(s) Oral daily  gabapentin 300 milliGRAM(s) Oral at bedtime  metoprolol succinate ER 25 milliGRAM(s) Oral daily  pantoprazole    Tablet 40 milliGRAM(s) Oral before breakfast    MEDICATIONS  (PRN):  acetaminophen   Tablet .. 650 milliGRAM(s) Oral every 4 hours PRN Mild Pain (1 - 3)  aluminum hydroxide/magnesium hydroxide/simethicone Suspension 30 milliLiter(s) Oral every 6 hours PRN Dyspepsia  famotidine    Tablet 20 milliGRAM(s) Oral two times a day PRN GERD  oxyCODONE    IR 5 milliGRAM(s) Oral every 6 hours PRN Moderate Pain (4 - 6)  simethicone 80 milliGRAM(s) Chew two times a day PRN Gas      CAPILLARY BLOOD GLUCOSE        I&O's Summary    27 Dec 2019 07:01  -  28 Dec 2019 07:00  --------------------------------------------------------  IN: 240 mL / OUT: 200 mL / NET: 40 mL        PHYSICAL EXAM:  Vital Signs Last 24 Hrs  T(C): 36.4 (28 Dec 2019 12:04), Max: 37.3 (27 Dec 2019 20:24)  T(F): 97.5 (28 Dec 2019 12:04), Max: 99.1 (27 Dec 2019 20:24)  HR: 107 (28 Dec 2019 12:04) (85 - 107)  BP: 116/73 (28 Dec 2019 12:04) (116/73 - 137/87)  BP(mean): --  RR: 17 (28 Dec 2019 12:04) (17 - 18)  SpO2: 97% (28 Dec 2019 12:04) (94% - 97%)      CONSTITUTIONAL: NAD  EYES: conjunctiva and sclera clear  ENMT: Moist oral mucosa, no pharyngeal injection  NECK: Supple  RESPIRATORY: Normal respiratory effort; lungs are clear to auscultation bilaterally  CARDIOVASCULAR: Regular rate and rhythm, normal S1 and S2, no murmur  ABDOMEN: Nontender to palpation, normoactive bowel sounds, Pleurx in place clean and dry   PSYCH: A+O to person, place, and time; affect appropriate  NEUROLOGY: CN 2-12 are intact and symmetric; no gross sensory deficits;       LABS:                        8.0    5.84  )-----------( 89       ( 28 Dec 2019 08:52 )             23.8     12-28    137  |  100  |  9   ----------------------------<  112<H>  3.7   |  23  |  0.59    Ca    8.0<L>      28 Dec 2019 06:17      PT/INR - ( 27 Dec 2019 08:49 )   PT: 18.2 sec;   INR: 1.56 ratio         PTT - ( 27 Dec 2019 08:49 )  PTT:97.4 sec          Culture - Urine (collected 25 Dec 2019 15:08)  Source: .Urine Clean Catch (Midstream)  Final Report (26 Dec 2019 10:30):    No growth        RADIOLOGY & ADDITIONAL TESTS:  Results Reviewed:   Imaging Personally Reviewed:  Electrocardiogram Personally Reviewed:    COORDINATION OF CARE:  Care Discussed with Consultants/Other Providers [Y/N]:  Prior or Outpatient Records Reviewed [Y/N]:

## 2019-12-28 NOTE — CONSULT NOTE ADULT - ATTENDING COMMENTS
Agree with above. Pt seen and examined at bedside.  69 M w/ metastatic cholangiocarcinoma on 2nd line FOLFOX with recurrent malignant ascites admitted with FTT.   - would perform therapeutic paracentesis prior to discharge   - elevated Tbili is likely 2/2 disease. Would trend as this is a new abnormality. Monitor for development of biliary obstruction   - will need standing apts for paracentesis as an out pt on a 1-2 week basis   - discussed with patient options for appetite stimulants including medical cannabis.
Red blood per rectum  Likely hemorrhoid bleeding  No plans for colonoscopy   Rec colorectal eval for proctoscopy
VASCULAR NEUROLOGY ATTENDING  The patient is seen and examined the history and imaging are reviewed. I agree with the resident note unless otherwise noted. Patient with known histrory of malignancy. Prior venous thrombosis now with arterial thrombosis in the setting of AC with NOAC. At this point concern for hypercoagulable state associated with malignancy and NBTE. Would stop Eliquis and start theraputic Lovenox if no medical contraindication.

## 2019-12-29 LAB
ANION GAP SERPL CALC-SCNC: 12 MMOL/L — SIGNIFICANT CHANGE UP (ref 5–17)
BLD GP AB SCN SERPL QL: NEGATIVE — SIGNIFICANT CHANGE UP
BUN SERPL-MCNC: 9 MG/DL — SIGNIFICANT CHANGE UP (ref 7–23)
CALCIUM SERPL-MCNC: 8 MG/DL — LOW (ref 8.4–10.5)
CHLORIDE SERPL-SCNC: 101 MMOL/L — SIGNIFICANT CHANGE UP (ref 96–108)
CO2 SERPL-SCNC: 25 MMOL/L — SIGNIFICANT CHANGE UP (ref 22–31)
CREAT SERPL-MCNC: 0.57 MG/DL — SIGNIFICANT CHANGE UP (ref 0.5–1.3)
GLUCOSE SERPL-MCNC: 112 MG/DL — HIGH (ref 70–99)
HCT VFR BLD CALC: 21.9 % — LOW (ref 39–50)
HGB BLD-MCNC: 7.3 G/DL — LOW (ref 13–17)
MCHC RBC-ENTMCNC: 27.2 PG — SIGNIFICANT CHANGE UP (ref 27–34)
MCHC RBC-ENTMCNC: 33.3 GM/DL — SIGNIFICANT CHANGE UP (ref 32–36)
MCV RBC AUTO: 81.7 FL — SIGNIFICANT CHANGE UP (ref 80–100)
PLATELET # BLD AUTO: 64 K/UL — LOW (ref 150–400)
POTASSIUM SERPL-MCNC: 3.5 MMOL/L — SIGNIFICANT CHANGE UP (ref 3.5–5.3)
POTASSIUM SERPL-SCNC: 3.5 MMOL/L — SIGNIFICANT CHANGE UP (ref 3.5–5.3)
RBC # BLD: 2.68 M/UL — LOW (ref 4.2–5.8)
RBC # FLD: 20.8 % — HIGH (ref 10.3–14.5)
RH IG SCN BLD-IMP: POSITIVE — SIGNIFICANT CHANGE UP
SODIUM SERPL-SCNC: 138 MMOL/L — SIGNIFICANT CHANGE UP (ref 135–145)
WBC # BLD: 3.4 K/UL — LOW (ref 3.8–10.5)
WBC # FLD AUTO: 3.4 K/UL — LOW (ref 3.8–10.5)

## 2019-12-29 PROCEDURE — 99233 SBSQ HOSP IP/OBS HIGH 50: CPT

## 2019-12-29 PROCEDURE — 70551 MRI BRAIN STEM W/O DYE: CPT | Mod: 26

## 2019-12-29 PROCEDURE — 70544 MR ANGIOGRAPHY HEAD W/O DYE: CPT | Mod: 26,59

## 2019-12-29 PROCEDURE — 99232 SBSQ HOSP IP/OBS MODERATE 35: CPT | Mod: GC

## 2019-12-29 PROCEDURE — 70547 MR ANGIOGRAPHY NECK W/O DYE: CPT | Mod: 26

## 2019-12-29 RX ADMIN — PANTOPRAZOLE SODIUM 40 MILLIGRAM(S): 20 TABLET, DELAYED RELEASE ORAL at 05:08

## 2019-12-29 RX ADMIN — OXYCODONE HYDROCHLORIDE 5 MILLIGRAM(S): 5 TABLET ORAL at 10:40

## 2019-12-29 RX ADMIN — OXYCODONE HYDROCHLORIDE 5 MILLIGRAM(S): 5 TABLET ORAL at 09:44

## 2019-12-29 RX ADMIN — APIXABAN 5 MILLIGRAM(S): 2.5 TABLET, FILM COATED ORAL at 05:08

## 2019-12-29 RX ADMIN — OXYCODONE HYDROCHLORIDE 5 MILLIGRAM(S): 5 TABLET ORAL at 18:01

## 2019-12-29 RX ADMIN — FAMOTIDINE 20 MILLIGRAM(S): 10 INJECTION INTRAVENOUS at 18:01

## 2019-12-29 RX ADMIN — Medication 650 MILLIGRAM(S): at 18:01

## 2019-12-29 RX ADMIN — GABAPENTIN 300 MILLIGRAM(S): 400 CAPSULE ORAL at 22:38

## 2019-12-29 RX ADMIN — Medication 500 MILLIGRAM(S): at 12:18

## 2019-12-29 RX ADMIN — Medication 25 MILLIGRAM(S): at 05:08

## 2019-12-29 RX ADMIN — FLUCONAZOLE 200 MILLIGRAM(S): 150 TABLET ORAL at 12:17

## 2019-12-29 NOTE — PROGRESS NOTE ADULT - PROBLEM SELECTOR PLAN 6
Eliquis Eliquis/ awaiting on the Oncology fellow for rec Eliquis on hold / awaiting on the Oncology fellow for rec

## 2019-12-29 NOTE — PROVIDER CONTACT NOTE (OTHER) - SITUATION
Patient sent to ordered MR head/neck upon start of overnight shift. Type & Screen and CBC with diff. sent as ordered prior to testing. Patient arrived to unit approx 22:30, CBC with diff un-resulted.

## 2019-12-29 NOTE — PROGRESS NOTE ADULT - SUBJECTIVE AND OBJECTIVE BOX
INTERVAL EVENTS: Patient had CT head that showed interval development of multiple new small infarcts. There is no evidence for intracranial hemorrhage. The exam findings suggest embolic disease. Plan to stop Eliquis and start Lovenox therapeutic per neurology, patient PLT 64, patient got heparin 12/25, baseline Plt 120s, s/p FOLFOX 12/19.      REVIEW OF SYSTEMS:  CONSTITUTIONAL: No weakness, fevers or chills  EYES/ENT: No visual changes, no throat pain   RESPIRATORY: No cough, wheezing, hemoptysis; No shortness of breath  CARDIOVASCULAR: No chest pain or palpitations  GASTROINTESTINAL: No abdominal pain, nausea, vomiting, or hematemesis; No diarrhea or constipation. No melena or hematochezia.  GENITOURINARY: No dysuria, frequency or hematuria  MUSCULOSKELETAL: No joint pain.  NEUROLOGICAL: No dizziness, numbness, or weakness  SKIN: No itching, burning, rashes, or lesions   All other review of systems is negative unless indicated above.    Vital Signs Last 24 Hrs  T(C): 36.6 (29 Dec 2019 12:15), Max: 36.9 (29 Dec 2019 03:58)  T(F): 97.8 (29 Dec 2019 12:15), Max: 98.4 (29 Dec 2019 03:58)  HR: 83 (29 Dec 2019 12:15) (78 - 85)  BP: 118/73 (29 Dec 2019 12:15) (106/63 - 133/77)  BP(mean): --  RR: 17 (29 Dec 2019 12:15) (17 - 18)  SpO2: 93% (29 Dec 2019 12:15) (93% - 98%)    GENERAL: NAD, well-developed  HEAD:  Atraumatic, Normocephalic  EYES:  conjunctiva and sclera clear  NECK: Supple, No JVD  CHEST/LUNG: Clear to auscultation bilaterally; No wheeze  HEART: Regular rate and rhythm; No murmurs, rubs, or gallops  ABDOMEN: Soft, Nontender, Nondistended; Bowel sounds present, pos tube on the Rt abdomen   EXTREMITIES:  2+ Peripheral Pulses, No clubbing, cyanosis, or edema  PSYCH: AAOx3  NEUROLOGY: non-focal  SKIN: No rashes or lesions                         7.3    3.40  )-----------( 64       ( 29 Dec 2019 09:24 )             21.9     12-29    138  |  101  |  9   ----------------------------<  112<H>  3.5   |  25  |  0.57    Ca    8.0<L>      29 Dec 2019 05:35          MEDICATIONS  (STANDING):  ciprofloxacin     Tablet 500 milliGRAM(s) Oral daily  fluconAZOLE   Tablet 200 milliGRAM(s) Oral daily  gabapentin 300 milliGRAM(s) Oral at bedtime  metoprolol succinate ER 25 milliGRAM(s) Oral daily  pantoprazole    Tablet 40 milliGRAM(s) Oral before breakfast

## 2019-12-29 NOTE — PROVIDER CONTACT NOTE (OTHER) - BACKGROUND
69 yr old male presenting for evaluation of abdominal distention, additionally with intermittent episodes of hypotension throughout the week.
69 year old male admitted for Malignant ascites.
Admitting dx: malignant ascites  PMH: GERD, HTN
Dx: Malgnant Ascites....Tele: NSR h/o PATs up to 3 seconds as per Tele.

## 2019-12-29 NOTE — PROGRESS NOTE ADULT - ASSESSMENT
Patient is a 68 yo M with PMHx of cholangiocarcinoma (dx in 2017 s/p Whipple procedure with liver mets found in 2019 after finding of clots in portal vein, also s/p multiple rounds of radiation treatment) currently on FOLFOX s/p 1 cycle,  chronic back pain, GERD, IBS with diarrhea, and recently diagnosed atrial fibrillation, multiple recent admissions for fever workup who presents with weakness. Also found to have reaccumulation of ascites. CT head that showed interval development of multiple new small infarcts. There is no evidence for intracranial hemorrhage. The exam findings suggest embolic disease. Plan to stop Eliquis and start Lovenox therapeutic per neurology, patient PLT 64, patient got heparin 12/25, baseline Plt 120s, s/p FOLFOX 12/19.    # Multiple new small infarcts  -Please give 1 U PLT, then check PLT and start Lovenox therapeutic  -Please trend PLT daily  #Weakness/FTT  - continue aggressive supportive care  - blood cultures negative, infectious etiology less likely   - may need IV hydration appointments scheduled as outpatient     #Cholangiocarcinoma  - s/p next 2nd line treatment with FOLFOX 12/19, today is D1  - monitor CBC daily, transfuse to keep Hg >7  - Recurrent ascites likely malignant although cytology negative  - s/p pigtail catheter placement for recurrent ascitics  - would start low dose furosemide and aldactone and uptitrate as BP allows to help with ascites   - f/u with primary oncologist Dr. Atkinson as outpatient to resume treatments    Carole Mendez PGY4  Heme/Onc fellow

## 2019-12-29 NOTE — PROGRESS NOTE ADULT - SUBJECTIVE AND OBJECTIVE BOX
Patient is a 69y old  Male who presents with a chief complaint of Hypotension x 1 day (28 Dec 2019 23:53)      SUBJECTIVE / OVERNIGHT EVENT      ADDITIONAL REVIEW OF SYSTEMS: Negative except for above    MEDICATIONS  (STANDING):  apixaban 5 milliGRAM(s) Oral every 12 hours  ciprofloxacin     Tablet 500 milliGRAM(s) Oral daily  fluconAZOLE   Tablet 200 milliGRAM(s) Oral daily  gabapentin 300 milliGRAM(s) Oral at bedtime  metoprolol succinate ER 25 milliGRAM(s) Oral daily  pantoprazole    Tablet 40 milliGRAM(s) Oral before breakfast    MEDICATIONS  (PRN):  acetaminophen   Tablet .. 650 milliGRAM(s) Oral every 4 hours PRN Mild Pain (1 - 3)  aluminum hydroxide/magnesium hydroxide/simethicone Suspension 30 milliLiter(s) Oral every 6 hours PRN Dyspepsia  famotidine    Tablet 20 milliGRAM(s) Oral two times a day PRN GERD  oxyCODONE    IR 5 milliGRAM(s) Oral every 6 hours PRN Moderate Pain (4 - 6)  simethicone 80 milliGRAM(s) Chew two times a day PRN Gas      CAPILLARY BLOOD GLUCOSE        I&O's Summary    28 Dec 2019 07:01  -  29 Dec 2019 07:00  --------------------------------------------------------  IN: 0 mL / OUT: 350 mL / NET: -350 mL        PHYSICAL EXAM:  Vital Signs Last 24 Hrs  T(C): 36.6 (29 Dec 2019 12:15), Max: 36.9 (29 Dec 2019 03:58)  T(F): 97.8 (29 Dec 2019 12:15), Max: 98.4 (29 Dec 2019 03:58)  HR: 83 (29 Dec 2019 12:15) (78 - 85)  BP: 118/73 (29 Dec 2019 12:15) (106/63 - 133/77)  BP(mean): --  RR: 17 (29 Dec 2019 12:15) (17 - 18)  SpO2: 93% (29 Dec 2019 12:15) (93% - 98%)    PHYSICAL EXAM:  GENERAL: NAD, well-developed  HEAD:  Atraumatic, Normocephalic  EYES:  conjunctiva and sclera clear  NECK: Supple, No JVD  CHEST/LUNG: Clear to auscultation bilaterally; No wheeze  HEART: Regular rate and rhythm; No murmurs, rubs, or gallops  ABDOMEN: Soft, Nontender, Nondistended; Bowel sounds present  EXTREMITIES:  2+ Peripheral Pulses, No clubbing, cyanosis, or edema  PSYCH: AAOx3  NEUROLOGY: non-focal  SKIN: No rashes or lesions      LABS:                        7.3    3.40  )-----------( 64       ( 29 Dec 2019 09:24 )             21.9     12-29    138  |  101  |  9   ----------------------------<  112<H>  3.5   |  25  |  0.57    Ca    8.0<L>      29 Dec 2019 05:35                  RADIOLOGY & ADDITIONAL TESTS:    Imaging Personally Reviewed:    Electrocardiogram Personally Reviewed:    COORDINATION OF CARE:  Care Discussed with Consultants/Other Providers [Y/N]:  Prior or Outpatient Records Reviewed [Y/N]: Patient is a 69y old  Male who presents with a chief complaint of Hypotension x 1 day (28 Dec 2019 23:53)      SUBJECTIVE / OVERNIGHT EVENT    Patient is seen with wife and family at the bedside .  He recently had a CT of the head with multiple new infarct most likely embolic .      ADDITIONAL REVIEW OF SYSTEMS: Negative except for above    MEDICATIONS  (STANDING):  apixaban 5 milliGRAM(s) Oral every 12 hours  ciprofloxacin     Tablet 500 milliGRAM(s) Oral daily  fluconAZOLE   Tablet 200 milliGRAM(s) Oral daily  gabapentin 300 milliGRAM(s) Oral at bedtime  metoprolol succinate ER 25 milliGRAM(s) Oral daily  pantoprazole    Tablet 40 milliGRAM(s) Oral before breakfast    MEDICATIONS  (PRN):  acetaminophen   Tablet .. 650 milliGRAM(s) Oral every 4 hours PRN Mild Pain (1 - 3)  aluminum hydroxide/magnesium hydroxide/simethicone Suspension 30 milliLiter(s) Oral every 6 hours PRN Dyspepsia  famotidine    Tablet 20 milliGRAM(s) Oral two times a day PRN GERD  oxyCODONE    IR 5 milliGRAM(s) Oral every 6 hours PRN Moderate Pain (4 - 6)  simethicone 80 milliGRAM(s) Chew two times a day PRN Gas      CAPILLARY BLOOD GLUCOSE        I&O's Summary    28 Dec 2019 07:01  -  29 Dec 2019 07:00  --------------------------------------------------------  IN: 0 mL / OUT: 350 mL / NET: -350 mL        PHYSICAL EXAM:  Vital Signs Last 24 Hrs  T(C): 36.6 (29 Dec 2019 12:15), Max: 36.9 (29 Dec 2019 03:58)  T(F): 97.8 (29 Dec 2019 12:15), Max: 98.4 (29 Dec 2019 03:58)  HR: 83 (29 Dec 2019 12:15) (78 - 85)  BP: 118/73 (29 Dec 2019 12:15) (106/63 - 133/77)  BP(mean): --  RR: 17 (29 Dec 2019 12:15) (17 - 18)  SpO2: 93% (29 Dec 2019 12:15) (93% - 98%)    PHYSICAL EXAM:  GENERAL: NAD, well-developed  HEAD:  Atraumatic, Normocephalic  EYES:  conjunctiva and sclera clear  NECK: Supple, No JVD  CHEST/LUNG: Clear to auscultation bilaterally; No wheeze  HEART: Regular rate and rhythm; No murmurs, rubs, or gallops  ABDOMEN: Soft, Nontender, Nondistended; Bowel sounds present, pos tube on the Rt abdomen   EXTREMITIES:  2+ Peripheral Pulses, No clubbing, cyanosis, or edema  PSYCH: AAOx3  NEUROLOGY: non-focal  SKIN: No rashes or lesions      LABS:                        7.3    3.40  )-----------( 64       ( 29 Dec 2019 09:24 )             21.9     12-29    138  |  101  |  9   ----------------------------<  112<H>  3.5   |  25  |  0.57    Ca    8.0<L>      29 Dec 2019 05:35                  RADIOLOGY & ADDITIONAL TESTS:    Imaging Personally Reviewed:    Electrocardiogram Personally Reviewed:    COORDINATION OF CARE:  Care Discussed with Consultants/Other Providers [Y/N]:  Prior or Outpatient Records Reviewed [Y/N]: Patient is a 69y old  Male who presents with a chief complaint of Hypotension x 1 day (28 Dec 2019 23:53)      SUBJECTIVE / OVERNIGHT EVENT    Patient is seen with wife and family at the bedside .  He recently had a CT of the head with multiple new infarct most likely embolic .      ADDITIONAL REVIEW OF SYSTEMS: Negative except for above    MEDICATIONS  (STANDING):  apixaban 5 milliGRAM(s) Oral every 12 hours  ciprofloxacin     Tablet 500 milliGRAM(s) Oral daily  fluconAZOLE   Tablet 200 milliGRAM(s) Oral daily  gabapentin 300 milliGRAM(s) Oral at bedtime  metoprolol succinate ER 25 milliGRAM(s) Oral daily  pantoprazole    Tablet 40 milliGRAM(s) Oral before breakfast    MEDICATIONS  (PRN):  acetaminophen   Tablet .. 650 milliGRAM(s) Oral every 4 hours PRN Mild Pain (1 - 3)  aluminum hydroxide/magnesium hydroxide/simethicone Suspension 30 milliLiter(s) Oral every 6 hours PRN Dyspepsia  famotidine    Tablet 20 milliGRAM(s) Oral two times a day PRN GERD  oxyCODONE    IR 5 milliGRAM(s) Oral every 6 hours PRN Moderate Pain (4 - 6)  simethicone 80 milliGRAM(s) Chew two times a day PRN Gas      CAPILLARY BLOOD GLUCOSE        I&O's Summary    28 Dec 2019 07:01  -  29 Dec 2019 07:00  --------------------------------------------------------  IN: 0 mL / OUT: 350 mL / NET: -350 mL        PHYSICAL EXAM:  Vital Signs Last 24 Hrs  T(C): 36.6 (29 Dec 2019 12:15), Max: 36.9 (29 Dec 2019 03:58)  T(F): 97.8 (29 Dec 2019 12:15), Max: 98.4 (29 Dec 2019 03:58)  HR: 83 (29 Dec 2019 12:15) (78 - 85)  BP: 118/73 (29 Dec 2019 12:15) (106/63 - 133/77)  BP(mean): --  RR: 17 (29 Dec 2019 12:15) (17 - 18)  SpO2: 93% (29 Dec 2019 12:15) (93% - 98%)    PHYSICAL EXAM:  GENERAL: NAD, well-developed  HEAD:  Atraumatic, Normocephalic  EYES:  conjunctiva and sclera clear  NECK: Supple, No JVD  CHEST/LUNG: Clear to auscultation bilaterally; No wheeze  HEART: Regular rate and rhythm; No murmurs, rubs, or gallops  ABDOMEN: Soft, Nontender, Nondistended; Bowel sounds present, pos PleurX  on the Lt abdomen   EXTREMITIES:  2+ Peripheral Pulses, No clubbing, cyanosis, or edema  PSYCH: AAOx3  NEUROLOGY: non-focal  SKIN: No rashes or lesions      LABS:                        7.3    3.40  )-----------( 64       ( 29 Dec 2019 09:24 )             21.9     12-29    138  |  101  |  9   ----------------------------<  112<H>  3.5   |  25  |  0.57    Ca    8.0<L>      29 Dec 2019 05:35                  RADIOLOGY & ADDITIONAL TESTS:    Imaging Personally Reviewed:    Electrocardiogram Personally Reviewed:    COORDINATION OF CARE:  Care Discussed with Consultants/Other Providers [Y/N]:  Prior or Outpatient Records Reviewed [Y/N]:

## 2019-12-29 NOTE — PROVIDER CONTACT NOTE (OTHER) - RECOMMENDATIONS
Pt is on heparin drip ? Planned for IR procedure tomorrow
Continue to monitor as per NP request.
To clarify if Plt transfusion to begin after CBC with differential results, or to proceed without. NP to be contacted.
continue on tele,continue to monitor

## 2019-12-29 NOTE — PROGRESS NOTE ADULT - PROBLEM SELECTOR PLAN 2
patient has stool occult +   On Eliquis for portal vein thrombosis  -As per GI no intervention  -cleared by GI for reinitiation of Eliquis   -Monitor CBC patient has stool occult +   On Eliquis for portal vein thrombosis  -As per GI no intervention  -cleared by GI for reinitiation of Eliquis   -Monitor CBC/ will repeat CBC patient has stool occult +   was On Eliquis for portal vein thrombosis/ It is now on HOLD   -As per GI no intervention  -cleared by GI for reinitiation of Eliquis / but is placed on HOLD on 12/29 due to new embolic events / Lovenox  will be started after one unit of platelet given   -Monitor CBC/ will repeat CBC

## 2019-12-29 NOTE — PROVIDER CONTACT NOTE (OTHER) - ASSESSMENT
Pt is alert and oriented x4, pt is on heparin drip for portal vein thrombosis. Pt's occult came back positive. No active bleeding noted
98.0 78 18 132/78 96% room air
PATS HR up to 150 for 1.8 sec (Tele notified RN at 21:47)...VSS. Patient denies chest pain / palpitations.
Patient A&O x3-4, periods of forgetfulness. VSS. Denies chest pain, palpitations, SOB. (+) left sided weakness on neurological exam, no facial droop, PERRLA intact. No s/sx of bleeding noted.

## 2019-12-29 NOTE — PROGRESS NOTE ADULT - ATTENDING COMMENTS
Elle Pratt   Hospitalist    CT of head done on 12/28 with multiple  new embolic events in the brain / Please  F/up MRI of brain/ Lovenox  was suggested as pt has arterial events while on NOAC , it is noted that NOAC was on HOLD and it is also noted that  Pt received Heparin on 12/25 .  Unsure if it NOAC Failure / Doubt ? HIT / as discussed with the oncology fellow / will give one unit of platelet and repeat CBC In AM.  If plt improves then will start therapeutic dose heparin and closely monitor.     Elle Pratt   Hospitalist   [phone]

## 2019-12-29 NOTE — PROGRESS NOTE ADULT - PROBLEM SELECTOR PLAN 1
s/p pigtail catheter placement for recurrent ascitics  -4 Liters removed- Albumin being given back today   -Fluid analysis not sent   on Cipro for SBP ppx   - Eliquis restarted will monitor hgb closely   -Hold on aldactone and Lasix as patient Orthostatic s/p pigtail catheter placement for recurrent ascitics  -4 Liters removed- Albumin being given back today   -Fluid analysis not sent   on Cipro for SBP ppx   -  Eliquis was  restarted on 12/28 and HELD again on 12/29   -Hold on aldactone and Lasix as patient Orthostatic

## 2019-12-29 NOTE — PROGRESS NOTE ADULT - SUBJECTIVE AND OBJECTIVE BOX
Patient is a 69y old  Male who presents with a chief complaint of Hypotension x 1 day (29 Dec 2019 16:57)      INTERVAL HISTORY: feels ok, ct with brain infarcts  	  MEDICATIONS:  metoprolol succinate ER 25 milliGRAM(s) Oral daily        PHYSICAL EXAM:  T(C): 36.1 (12-29-19 @ 22:36), Max: 36.9 (12-29-19 @ 03:58)  HR: 81 (12-29-19 @ 22:36) (78 - 85)  BP: 120/70 (12-29-19 @ 22:36) (106/63 - 133/77)  RR: 18 (12-29-19 @ 22:36) (17 - 18)  SpO2: 95% (12-29-19 @ 22:36) (93% - 97%)  Wt(kg): --  I&O's Summary    28 Dec 2019 07:01  -  29 Dec 2019 07:00  --------------------------------------------------------  IN: 0 mL / OUT: 350 mL / NET: -350 mL    29 Dec 2019 07:01  -  29 Dec 2019 23:29  --------------------------------------------------------  IN: 120 mL / OUT: 0 mL / NET: 120 mL          Appearance: In no distress	  HEENT:    PERRL, EOMI	  Cardiovascular:  S1 S2, No JVD  Respiratory: Lungs clear to auscultation	  Gastrointestinal:  Soft, Non-tender, + BS	  Extremities:  No edema of LE                                7.3    3.40  )-----------( 64       ( 29 Dec 2019 09:24 )             21.9     12-29    138  |  101  |  9   ----------------------------<  112<H>  3.5   |  25  |  0.57    Ca    8.0<L>      29 Dec 2019 05:35          Labs personally reviewed      Assessment and Plan:    Assessment:  · Assessment		  69 M w/cholangiocarcinoma s/p Whipple procedure with liver mets  c/b  portal vein thrombosis, s/p multiple radiation and currently on chemotherapy( last session 12/19) ,  chronic back pain, GERD, IBS with diarrhea, and recently diagnosed atrial fibrillation,  p/w lethargy and  hypotension x 1 day.       Problem/Plan - 1:  ·  Problem: Ascites, malignant.  Plan: Tense ascites s/p tap     Problem/Plan - 2:  ·  Problem: Elevated troponin.  Plan: suspect 2/2 to demand in setting of hypotension , ekg w/ twi,   -- Elevated trop - nonspecific in this male with multiple comorbidities, conservative management  - TTE pending     Problem/Plan - 3:  Problem: Essential hypertension.Plan: - since BP improved and currently stable will resume home metoprolol.    CVA - AC per neuro and heme recs    Adonis Collins DO [facility]  Cardiovascular Medicine  [address] [place] [address] Dr, [address]  Office [phone]  Cell: [phone]

## 2019-12-30 ENCOUNTER — TRANSCRIPTION ENCOUNTER (OUTPATIENT)
Age: 69
End: 2019-12-30

## 2019-12-30 DIAGNOSIS — I63.9 CEREBRAL INFARCTION, UNSPECIFIED: ICD-10-CM

## 2019-12-30 LAB
ANION GAP SERPL CALC-SCNC: 12 MMOL/L — SIGNIFICANT CHANGE UP (ref 5–17)
BASOPHILS # BLD AUTO: 0.04 K/UL — SIGNIFICANT CHANGE UP (ref 0–0.2)
BASOPHILS # BLD AUTO: 0.04 K/UL — SIGNIFICANT CHANGE UP (ref 0–0.2)
BASOPHILS NFR BLD AUTO: 1.3 % — SIGNIFICANT CHANGE UP (ref 0–2)
BASOPHILS NFR BLD AUTO: 1.7 % — SIGNIFICANT CHANGE UP (ref 0–2)
BUN SERPL-MCNC: 7 MG/DL — SIGNIFICANT CHANGE UP (ref 7–23)
CALCIUM SERPL-MCNC: 7.8 MG/DL — LOW (ref 8.4–10.5)
CHLORIDE SERPL-SCNC: 100 MMOL/L — SIGNIFICANT CHANGE UP (ref 96–108)
CO2 SERPL-SCNC: 25 MMOL/L — SIGNIFICANT CHANGE UP (ref 22–31)
CREAT SERPL-MCNC: 0.55 MG/DL — SIGNIFICANT CHANGE UP (ref 0.5–1.3)
CULTURE RESULTS: SIGNIFICANT CHANGE UP
CULTURE RESULTS: SIGNIFICANT CHANGE UP
EOSINOPHIL # BLD AUTO: 0.33 K/UL — SIGNIFICANT CHANGE UP (ref 0–0.5)
EOSINOPHIL # BLD AUTO: 0.39 K/UL — SIGNIFICANT CHANGE UP (ref 0–0.5)
EOSINOPHIL NFR BLD AUTO: 10.8 % — HIGH (ref 0–6)
EOSINOPHIL NFR BLD AUTO: 17 % — HIGH (ref 0–6)
GLUCOSE SERPL-MCNC: 114 MG/DL — HIGH (ref 70–99)
HCT VFR BLD CALC: 22.9 % — LOW (ref 39–50)
HCT VFR BLD CALC: 23.1 % — LOW (ref 39–50)
HCT VFR BLD CALC: 25 % — LOW (ref 39–50)
HGB BLD-MCNC: 7.5 G/DL — LOW (ref 13–17)
HGB BLD-MCNC: 7.6 G/DL — LOW (ref 13–17)
HGB BLD-MCNC: 8.1 G/DL — LOW (ref 13–17)
IMM GRANULOCYTES NFR BLD AUTO: 0 % — SIGNIFICANT CHANGE UP (ref 0–1.5)
IMM GRANULOCYTES NFR BLD AUTO: 0.4 % — SIGNIFICANT CHANGE UP (ref 0–1.5)
LYMPHOCYTES # BLD AUTO: 0.63 K/UL — LOW (ref 1–3.3)
LYMPHOCYTES # BLD AUTO: 0.63 K/UL — LOW (ref 1–3.3)
LYMPHOCYTES # BLD AUTO: 20.6 % — SIGNIFICANT CHANGE UP (ref 13–44)
LYMPHOCYTES # BLD AUTO: 27.5 % — SIGNIFICANT CHANGE UP (ref 13–44)
MCHC RBC-ENTMCNC: 26.6 PG — LOW (ref 27–34)
MCHC RBC-ENTMCNC: 26.8 PG — LOW (ref 27–34)
MCHC RBC-ENTMCNC: 27.2 PG — SIGNIFICANT CHANGE UP (ref 27–34)
MCHC RBC-ENTMCNC: 32.4 GM/DL — SIGNIFICANT CHANGE UP (ref 32–36)
MCHC RBC-ENTMCNC: 32.5 GM/DL — SIGNIFICANT CHANGE UP (ref 32–36)
MCHC RBC-ENTMCNC: 33.2 GM/DL — SIGNIFICANT CHANGE UP (ref 32–36)
MCV RBC AUTO: 82 FL — SIGNIFICANT CHANGE UP (ref 80–100)
MCV RBC AUTO: 82.1 FL — SIGNIFICANT CHANGE UP (ref 80–100)
MCV RBC AUTO: 82.5 FL — SIGNIFICANT CHANGE UP (ref 80–100)
MONOCYTES # BLD AUTO: 0.06 K/UL — SIGNIFICANT CHANGE UP (ref 0–0.9)
MONOCYTES # BLD AUTO: 0.07 K/UL — SIGNIFICANT CHANGE UP (ref 0–0.9)
MONOCYTES NFR BLD AUTO: 2 % — SIGNIFICANT CHANGE UP (ref 2–14)
MONOCYTES NFR BLD AUTO: 3.1 % — SIGNIFICANT CHANGE UP (ref 2–14)
NEUTROPHILS # BLD AUTO: 1.15 K/UL — LOW (ref 1.8–7.4)
NEUTROPHILS # BLD AUTO: 2 K/UL — SIGNIFICANT CHANGE UP (ref 1.8–7.4)
NEUTROPHILS NFR BLD AUTO: 50.3 % — SIGNIFICANT CHANGE UP (ref 43–77)
NEUTROPHILS NFR BLD AUTO: 65.3 % — SIGNIFICANT CHANGE UP (ref 43–77)
NRBC # BLD: 0 /100 WBCS — SIGNIFICANT CHANGE UP (ref 0–0)
PLATELET # BLD AUTO: 113 K/UL — LOW (ref 150–400)
PLATELET # BLD AUTO: 64 K/UL — LOW (ref 150–400)
PLATELET # BLD AUTO: 69 K/UL — LOW (ref 150–400)
POTASSIUM SERPL-MCNC: 3.1 MMOL/L — LOW (ref 3.5–5.3)
POTASSIUM SERPL-SCNC: 3.1 MMOL/L — LOW (ref 3.5–5.3)
RBC # BLD: 2.79 M/UL — LOW (ref 4.2–5.8)
RBC # BLD: 2.8 M/UL — LOW (ref 4.2–5.8)
RBC # BLD: 3.05 M/UL — LOW (ref 4.2–5.8)
RBC # FLD: 20.4 % — HIGH (ref 10.3–14.5)
RBC # FLD: 20.8 % — HIGH (ref 10.3–14.5)
RBC # FLD: 20.9 % — HIGH (ref 10.3–14.5)
SODIUM SERPL-SCNC: 137 MMOL/L — SIGNIFICANT CHANGE UP (ref 135–145)
SPECIMEN SOURCE: SIGNIFICANT CHANGE UP
SPECIMEN SOURCE: SIGNIFICANT CHANGE UP
WBC # BLD: 2.29 K/UL — LOW (ref 3.8–10.5)
WBC # BLD: 2.79 K/UL — LOW (ref 3.8–10.5)
WBC # BLD: 3.06 K/UL — LOW (ref 3.8–10.5)
WBC # FLD AUTO: 2.29 K/UL — LOW (ref 3.8–10.5)
WBC # FLD AUTO: 2.79 K/UL — LOW (ref 3.8–10.5)
WBC # FLD AUTO: 3.06 K/UL — LOW (ref 3.8–10.5)

## 2019-12-30 PROCEDURE — 99232 SBSQ HOSP IP/OBS MODERATE 35: CPT | Mod: GC

## 2019-12-30 PROCEDURE — 99231 SBSQ HOSP IP/OBS SF/LOW 25: CPT

## 2019-12-30 PROCEDURE — 99233 SBSQ HOSP IP/OBS HIGH 50: CPT

## 2019-12-30 PROCEDURE — 93306 TTE W/DOPPLER COMPLETE: CPT | Mod: 26

## 2019-12-30 RX ORDER — ATORVASTATIN CALCIUM 80 MG/1
40 TABLET, FILM COATED ORAL AT BEDTIME
Refills: 0 | Status: DISCONTINUED | OUTPATIENT
Start: 2019-12-30 | End: 2020-01-06

## 2019-12-30 RX ORDER — POTASSIUM CHLORIDE 20 MEQ
40 PACKET (EA) ORAL EVERY 4 HOURS
Refills: 0 | Status: COMPLETED | OUTPATIENT
Start: 2019-12-30 | End: 2019-12-30

## 2019-12-30 RX ORDER — ENOXAPARIN SODIUM 100 MG/ML
70 INJECTION SUBCUTANEOUS EVERY 12 HOURS
Refills: 0 | Status: DISCONTINUED | OUTPATIENT
Start: 2019-12-30 | End: 2020-01-06

## 2019-12-30 RX ADMIN — FAMOTIDINE 20 MILLIGRAM(S): 10 INJECTION INTRAVENOUS at 17:30

## 2019-12-30 RX ADMIN — GABAPENTIN 300 MILLIGRAM(S): 400 CAPSULE ORAL at 21:25

## 2019-12-30 RX ADMIN — OXYCODONE HYDROCHLORIDE 5 MILLIGRAM(S): 5 TABLET ORAL at 12:15

## 2019-12-30 RX ADMIN — ATORVASTATIN CALCIUM 40 MILLIGRAM(S): 80 TABLET, FILM COATED ORAL at 21:24

## 2019-12-30 RX ADMIN — ENOXAPARIN SODIUM 70 MILLIGRAM(S): 100 INJECTION SUBCUTANEOUS at 17:30

## 2019-12-30 RX ADMIN — Medication 500 MILLIGRAM(S): at 11:44

## 2019-12-30 RX ADMIN — FLUCONAZOLE 200 MILLIGRAM(S): 150 TABLET ORAL at 11:45

## 2019-12-30 RX ADMIN — Medication 650 MILLIGRAM(S): at 14:18

## 2019-12-30 RX ADMIN — Medication 25 MILLIGRAM(S): at 06:46

## 2019-12-30 RX ADMIN — PANTOPRAZOLE SODIUM 40 MILLIGRAM(S): 20 TABLET, DELAYED RELEASE ORAL at 06:46

## 2019-12-30 RX ADMIN — Medication 40 MILLIEQUIVALENT(S): at 11:44

## 2019-12-30 RX ADMIN — Medication 40 MILLIEQUIVALENT(S): at 14:15

## 2019-12-30 RX ADMIN — OXYCODONE HYDROCHLORIDE 5 MILLIGRAM(S): 5 TABLET ORAL at 11:43

## 2019-12-30 RX ADMIN — Medication 650 MILLIGRAM(S): at 14:45

## 2019-12-30 NOTE — DISCHARGE NOTE NURSING/CASE MANAGEMENT/SOCIAL WORK - NSDCPEEMAIL_GEN_ALL_CORE
[facility] for Tobacco Control email [email or web address]

## 2019-12-30 NOTE — DISCHARGE NOTE NURSING/CASE MANAGEMENT/SOCIAL WORK - NSDCPEWEB_GEN_ALL_CORE
[place] website --- [email or web address] website --- [email or web address]

## 2019-12-30 NOTE — DISCHARGE NOTE NURSING/CASE MANAGEMENT/SOCIAL WORK - PATIENT PORTAL LINK FT
You can access the FollowMyHealth Patient Portal offered by [facility] by registering at the following website: [email or web address]. By joining [facility]’s FollowMyHealth portal, you will also be able to view your health information using other applications (apps) compatible with our system.

## 2019-12-30 NOTE — CHART NOTE - NSCHARTNOTEFT_GEN_A_CORE
Nutrition Follow Up Note  Patient seen for severe malnutrition follow-up. Per chart, 70 y/o male presented with lethargy and hypotension x 1 day. Pt is s/p paracentesis  and S/P abdominal pleur-ex catheter placement .  PMH: cholangiocarcinoma s/p Whipple procedure with liver mets c/b portal vein thrombosis, s/p multiple radiation and currently on chemotherapy( last session ), chronic back pain, GERD, IBS with diarrhea, and recently diagnosed atrial fibrillation    Source: Pt, EMR, pt's family at bedside    Diet :  Regular + Ensure Clear x1 daily, + Ensure Pudding x2 daily + health shakes x2 daily + Prosource x1 daily    Patient reports feeling better s/p paracentesis on Friday, although observed pt appears fatigued. His family states he is taking in more food since Friday such as Ensure Clear today, some bites of pie, health shakes x1 daily, some bites of meals/pudding. Pt's wife was not aware of Prosource supplement so she was instructed to mix with food such as applesauce, or a liquid. Pt would like to continue Ensure Clear x1 daily, pudding and health shakes. Noted pt without a BM in a few days (), although pt denies feeling constipated or discomfort.     PO intake : poor, 25%    Source for PO intake: family, pt      Daily Weight in k.1 (12-30), Weight in k.8 (12-29), Weight in k.9 (12-28), Weight in k.5 (-27), Weight in k.1 (12-25)- noted decline of wt, likely due to loss of fluid  % Weight Change    Pertinent Medications: MEDICATIONS  (STANDING):  atorvastatin 40 milliGRAM(s) Oral at bedtime  ciprofloxacin     Tablet 500 milliGRAM(s) Oral daily  enoxaparin Injectable 70 milliGRAM(s) SubCutaneous every 12 hours  fluconAZOLE   Tablet 200 milliGRAM(s) Oral daily  gabapentin 300 milliGRAM(s) Oral at bedtime  metoprolol succinate ER 25 milliGRAM(s) Oral daily  pantoprazole    Tablet 40 milliGRAM(s) Oral before breakfast    MEDICATIONS  (PRN):  acetaminophen   Tablet .. 650 milliGRAM(s) Oral every 4 hours PRN Mild Pain (1 - 3)  aluminum hydroxide/magnesium hydroxide/simethicone Suspension 30 milliLiter(s) Oral every 6 hours PRN Dyspepsia  famotidine    Tablet 20 milliGRAM(s) Oral two times a day PRN GERD  oxyCODONE    IR 5 milliGRAM(s) Oral every 6 hours PRN Moderate Pain (4 - 6)  simethicone 80 milliGRAM(s) Chew two times a day PRN Gas    Pertinent Labs:  @ 08:57: Na --, BUN --, Cr --, BG --, K+ --, Phos --, Mg --, Alk Phos --, ALT/SGPT --, AST/SGOT --, HbA1c 5.3   @ 07:11: Na 137, BUN 7, Cr 0.55, <H>, K+ 3.1<L>, Phos --, Mg --, Alk Phos --, ALT/SGPT --, AST/SGOT --, HbA1c --    Skin per nursing documentation: no pressure injuries  Edema: 1+ L/R leg    Estimated Needs:   [x] no change since previous assessment: Based on usual wt 72.5 kg  Estimated Energy Needs (30-35 calories/kg): 2547-9307 kcal/d  Estimated Protein Needs (1.2-1.4 gm/kg):  g/d    Previous Nutrition Diagnosis: severe malnutrition  Nutrition Diagnosis is: ongoing, addressed with food preferences and supplementation    Nutrition care plan is ongoing.     Recommend  1) Continue regular diet with nutrition supplementation. Family and pt are aware to notify RD or provider if pt would like to receive additional supplements. Pt declined to add additional food preferences   2) Continue to monitor PO intake for improvement  3) Monitor need for bowel regimen    Monitoring and Evaluation:   Continue to monitor Nutritional intake, Tolerance to diet prescription, weights, labs, skin integrity    Barbi Murphy RD, CDN  Pager [phone]  RD remains available upon request and will follow up per protocol

## 2019-12-30 NOTE — PROGRESS NOTE ADULT - PROBLEM SELECTOR PLAN 4
Etiology likely combination of deconditioning and chemotherapy effect  -patient not symptomatic when standing  - Needs Acute rehab

## 2019-12-30 NOTE — PROGRESS NOTE ADULT - PROBLEM SELECTOR PLAN 1
patient with acute left sided weakness with findings of new infarcts.  Multiple foci of restricted diffusion with hyperintense DWI, T2, and FLAIR signal in the bilateral cerebral and cerebellar hemispheres with new infarction, likely embolic   -PT/OT  -Lipitor   -Neurology recommendations patient with acute left sided weakness with findings of new infarcts.  Multiple foci of restricted diffusion with hyperintense DWI, T2, and FLAIR signal in the bilateral cerebral and cerebellar hemispheres with new infarction, likely embolic  - patient with recent diagnosis of afib, however no documented EKG with this rhythm nor has he been in the is rhythm while maintained on TELE inpatient.   -PT/OT  -Lipitor   -Neurology recommendations

## 2019-12-30 NOTE — PROGRESS NOTE ADULT - PROBLEM SELECTOR PLAN 6
suspect 2/2 to demand in setting of hypotension  -Echo-  1. Normal left ventricular internal dimensions and wall  thicknesses.  2. Normal left ventricular systolic function. No segmental  wall motion abnormalities.  3. Left pleural effusion.

## 2019-12-30 NOTE — PROGRESS NOTE ADULT - SUBJECTIVE AND OBJECTIVE BOX
Interventional Radiology    S/P abdominal pleur-ex catheter placement, POD # 3.  pt admits to abdominal pain over the left side abdomen.  he denies nausea or vomiting.     Vital Signs Last 24 Hrs  T(C): 36.4 (30 Dec 2019 11:34), Max: 36.7 (30 Dec 2019 01:30)  T(F): 97.6 (30 Dec 2019 11:34), Max: 98.1 (30 Dec 2019 01:30)  HR: 80 (30 Dec 2019 11:34) (75 - 81)  BP: 116/70 (30 Dec 2019 11:34) (102/57 - 120/70)  BP(mean): --  RR: 18 (30 Dec 2019 11:34) (18 - 18)  SpO2: 100% (30 Dec 2019 11:34) (95% - 100%)    General Appearance: NAD    Procedure Site (left side abdomen): + tenderness with palpation over LUQ without guarding.  dressing is C/D/I.    LABS:               7.6    2.29  )-----------( 113      ( 30 Dec 2019 08:57 )             22.9     12-30    137  |  100  |  7   ----------------------------<  114<H>  3.1<L>   |  25  |  0.55    Ca    7.8<L>      30 Dec 2019 07:11    A/P  69y Male with cholangiocarcinoma and recurrent ascites S/P abdominal pleur-ex catheter placement on 12/27/2019    Continue global medical management as per primary team.  Ok to use the left abdominal pleur-ex catheter as needed and as per primary team.    Korey Aguiar, RPA-C  [facility] [id number]  Ext [phone]

## 2019-12-30 NOTE — PROGRESS NOTE ADULT - ATTENDING COMMENTS
Patient is a 68 yo M with PMHx of cholangiocarcinoma (dx in 2017 s/p Whipple procedure with liver mets found in 2019 after finding of clots in portal vein, also s/p multiple rounds of radiation treatment) currently on FOLFOX s/p 1 cycle,  chronic back pain, GERD, IBS with diarrhea, and recently diagnosed atrial fibrillation, multiple recent admissions for fever workup who presents with weakness. Also found to have reaccumulation of ascites now s/p abdominal pleurex catheter. 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 Neurology recommending therapeutic lovenox dosing. Ok for AC as long as plts > 50k. Patient is very week and will be discharged to rehab. Will hold chemo for now. HE will see Dr Tompkins as outpt

## 2019-12-30 NOTE — PROGRESS NOTE ADULT - SUBJECTIVE AND OBJECTIVE BOX
[facility] Division of Hospital Medicine  Waylon Cox MD  Pager  [phone]    Patient is a 69y old  Male who presents with a chief complaint of Hypotension x 1 day (30 Dec 2019 12:59)      SUBJECTIVE / OVERNIGHT EVENTS:  patient with MRI overnight.  ADDITIONAL REVIEW OF SYSTEMS:  reports left arm weakness     MEDICATIONS  (STANDING):  atorvastatin 40 milliGRAM(s) Oral at bedtime  ciprofloxacin     Tablet 500 milliGRAM(s) Oral daily  enoxaparin Injectable 70 milliGRAM(s) SubCutaneous every 12 hours  fluconAZOLE   Tablet 200 milliGRAM(s) Oral daily  gabapentin 300 milliGRAM(s) Oral at bedtime  metoprolol succinate ER 25 milliGRAM(s) Oral daily  pantoprazole    Tablet 40 milliGRAM(s) Oral before breakfast    MEDICATIONS  (PRN):  acetaminophen   Tablet .. 650 milliGRAM(s) Oral every 4 hours PRN Mild Pain (1 - 3)  aluminum hydroxide/magnesium hydroxide/simethicone Suspension 30 milliLiter(s) Oral every 6 hours PRN Dyspepsia  famotidine    Tablet 20 milliGRAM(s) Oral two times a day PRN GERD  oxyCODONE    IR 5 milliGRAM(s) Oral every 6 hours PRN Moderate Pain (4 - 6)  simethicone 80 milliGRAM(s) Chew two times a day PRN Gas      CAPILLARY BLOOD GLUCOSE        I&O's Summary    29 Dec 2019 07:01  -  30 Dec 2019 07:00  --------------------------------------------------------  IN: 420 mL / OUT: 0 mL / NET: 420 mL    30 Dec 2019 07:01  -  30 Dec 2019 15:31  --------------------------------------------------------  IN: 0 mL / OUT: 0 mL / NET: 0 mL        PHYSICAL EXAM:  Vital Signs Last 24 Hrs  T(C): 36.4 (30 Dec 2019 11:34), Max: 36.7 (30 Dec 2019 01:30)  T(F): 97.6 (30 Dec 2019 11:34), Max: 98.1 (30 Dec 2019 01:30)  HR: 80 (30 Dec 2019 11:34) (75 - 81)  BP: 116/70 (30 Dec 2019 11:34) (102/57 - 120/70)  BP(mean): --  RR: 18 (30 Dec 2019 11:34) (18 - 18)  SpO2: 100% (30 Dec 2019 11:34) (95% - 100%)      CONSTITUTIONAL: NAD  EYES: conjunctiva and sclera clear  ENMT: Moist oral mucosa, no pharyngeal injection  NECK: Supple  RESPIRATORY: Normal respiratory effort; lungs are clear to auscultation bilaterally  CARDIOVASCULAR: Regular rate and rhythm, normal S1 and S2, no murmur  ABDOMEN: Nontender to palpation, normoactive bowel sounds, no rebound/guarding; No hepatosplenomegaly  PSYCH: A+O to person, place, and time; affect appropriate  NEUROLOGY: weakness of left upper extremity only   SKIN: No rashes; no palpable lesions    LABS:                        7.6    2.29  )-----------( 113      ( 30 Dec 2019 08:57 )             22.9     12-30    137  |  100  |  7   ----------------------------<  114<H>  3.1<L>   |  25  |  0.55    Ca    7.8<L>      30 Dec 2019 07:11                  RADIOLOGY & ADDITIONAL TESTS:  Results Reviewed:   Imaging Personally Reviewed:  Electrocardiogram Personally Reviewed:    COORDINATION OF CARE:  Care Discussed with Consultants/Other Providers [Y/N]:  Prior or Outpatient Records Reviewed [Y/N]:

## 2019-12-30 NOTE — PROGRESS NOTE ADULT - PROBLEM SELECTOR PLAN 3
patient has stool occult +   was on Eliquis previously but now switched to Lovenox as per neurology and cleared by GI  -As per GI no intervention  -cleared for a/c per GI  -Monitor CBC

## 2019-12-30 NOTE — PROGRESS NOTE ADULT - ASSESSMENT
Patient is a 68 yo M with PMHx of cholangiocarcinoma (dx in 2017 s/p Whipple procedure with liver mets found in 2019 after finding of clots in portal vein, also s/p multiple rounds of radiation treatment) currently on FOLFOX s/p 1 cycle,  chronic back pain, GERD, IBS with diarrhea, and recently diagnosed atrial fibrillation, multiple recent admissions for fever workup who presents with weakness. Also found to have reaccumulation of ascites now s/p abdominal pleurex catheter. 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    #CVA  - upper extremity weakness likely attributed to acute strokes, likely embolic in nature  - neurology recommending therapuetic lovenox dosing, can be transitioned to this going forward  - care per primary/neurology     #Weakness/FTT  - likely overall constellation of symptoms in setting of dehydration after chemo  - continue with IV hydration as able  - would consult nutritionist   - continue aggressive supportive care  - blood cultures negative, infectious etiology less likely   - may need IV hydration appointments scheduled as outpatient     #Cholangiocarcinoma  - s/p next 2nd line treatment with FOLFOX 12/19  - monitor CBC daily, transfuse to keep Hg >7  - recurrent ascites likely malignant although cytology negative  - s/p indwelling catheter for recurrent ascites given rapid reaccumulation   - will need catheter teaching prior to discharge   - would start low dose furosemide and aldactone and uptitrate as BP allows to help with ascites   - pancytopenia like myelosuppression from recent chemo, monitor CBC w/ diff daily   - f/u with primary oncologist Dr. Atkinson as outpatient to resume treatments    Arturo Falk, PGY-5  Hematology-Oncology Fellow  [phone] ([place]) [phone] ([facility])

## 2019-12-30 NOTE — PROGRESS NOTE ADULT - PROBLEM SELECTOR PLAN 2
s/p pigtail catheter placement for recurrent ascitics  -4 Liters removed- s/p albumin   -Fluid analysis not sent   on Cipro for SBP ppx   -Hold on aldactone and Lasix as patient Orthostatic

## 2019-12-30 NOTE — DISCHARGE NOTE NURSING/CASE MANAGEMENT/SOCIAL WORK - [id number]_GEN_ALL_CORE
Stroke warning signs and symptoms/Need for follow up after discharge/Signs and symptoms of stroke/Stroke education booklet/Prescribed medications/Risk factors for stroke/Call 911 for stroke/Stroke support groups for patients, families, and friends

## 2019-12-31 DIAGNOSIS — B37.0 CANDIDAL STOMATITIS: ICD-10-CM

## 2019-12-31 LAB
ANION GAP SERPL CALC-SCNC: 12 MMOL/L — SIGNIFICANT CHANGE UP (ref 5–17)
BUN SERPL-MCNC: 8 MG/DL — SIGNIFICANT CHANGE UP (ref 7–23)
CALCIUM SERPL-MCNC: 8 MG/DL — LOW (ref 8.4–10.5)
CHLORIDE SERPL-SCNC: 104 MMOL/L — SIGNIFICANT CHANGE UP (ref 96–108)
CHOLEST SERPL-MCNC: 119 MG/DL — SIGNIFICANT CHANGE UP (ref 10–199)
CO2 SERPL-SCNC: 25 MMOL/L — SIGNIFICANT CHANGE UP (ref 22–31)
CREAT SERPL-MCNC: 0.65 MG/DL — SIGNIFICANT CHANGE UP (ref 0.5–1.3)
GLUCOSE SERPL-MCNC: 121 MG/DL — HIGH (ref 70–99)
HCT VFR BLD CALC: 24.1 % — LOW (ref 39–50)
HDLC SERPL-MCNC: 36 MG/DL — LOW
HGB BLD-MCNC: 7.9 G/DL — LOW (ref 13–17)
LIPID PNL WITH DIRECT LDL SERPL: 63 MG/DL — SIGNIFICANT CHANGE UP
MCHC RBC-ENTMCNC: 27.4 PG — SIGNIFICANT CHANGE UP (ref 27–34)
MCHC RBC-ENTMCNC: 32.8 GM/DL — SIGNIFICANT CHANGE UP (ref 32–36)
MCV RBC AUTO: 83.7 FL — SIGNIFICANT CHANGE UP (ref 80–100)
PLATELET # BLD AUTO: 136 K/UL — LOW (ref 150–400)
POTASSIUM SERPL-MCNC: 3.9 MMOL/L — SIGNIFICANT CHANGE UP (ref 3.5–5.3)
POTASSIUM SERPL-SCNC: 3.9 MMOL/L — SIGNIFICANT CHANGE UP (ref 3.5–5.3)
RBC # BLD: 2.88 M/UL — LOW (ref 4.2–5.8)
RBC # FLD: 22.4 % — HIGH (ref 10.3–14.5)
SODIUM SERPL-SCNC: 141 MMOL/L — SIGNIFICANT CHANGE UP (ref 135–145)
TOTAL CHOLESTEROL/HDL RATIO MEASUREMENT: 3.3 RATIO — LOW (ref 3.4–9.6)
TRIGL SERPL-MCNC: 101 MG/DL — SIGNIFICANT CHANGE UP (ref 10–149)
WBC # BLD: 1.71 K/UL — LOW (ref 3.8–10.5)
WBC # FLD AUTO: 1.71 K/UL — LOW (ref 3.8–10.5)

## 2019-12-31 PROCEDURE — 99233 SBSQ HOSP IP/OBS HIGH 50: CPT

## 2019-12-31 RX ORDER — SPIRONOLACTONE 25 MG/1
12.5 TABLET, FILM COATED ORAL DAILY
Refills: 0 | Status: DISCONTINUED | OUTPATIENT
Start: 2019-12-31 | End: 2020-01-09

## 2019-12-31 RX ORDER — FUROSEMIDE 40 MG
40 TABLET ORAL DAILY
Refills: 0 | Status: DISCONTINUED | OUTPATIENT
Start: 2019-12-31 | End: 2020-01-09

## 2019-12-31 RX ADMIN — ATORVASTATIN CALCIUM 40 MILLIGRAM(S): 80 TABLET, FILM COATED ORAL at 21:21

## 2019-12-31 RX ADMIN — PANTOPRAZOLE SODIUM 40 MILLIGRAM(S): 20 TABLET, DELAYED RELEASE ORAL at 05:45

## 2019-12-31 RX ADMIN — OXYCODONE HYDROCHLORIDE 5 MILLIGRAM(S): 5 TABLET ORAL at 09:31

## 2019-12-31 RX ADMIN — Medication 500 MILLIGRAM(S): at 13:08

## 2019-12-31 RX ADMIN — ENOXAPARIN SODIUM 70 MILLIGRAM(S): 100 INJECTION SUBCUTANEOUS at 18:20

## 2019-12-31 RX ADMIN — Medication 650 MILLIGRAM(S): at 11:20

## 2019-12-31 RX ADMIN — Medication 650 MILLIGRAM(S): at 11:50

## 2019-12-31 RX ADMIN — Medication 25 MILLIGRAM(S): at 05:45

## 2019-12-31 RX ADMIN — Medication 40 MILLIGRAM(S): at 17:02

## 2019-12-31 RX ADMIN — OXYCODONE HYDROCHLORIDE 5 MILLIGRAM(S): 5 TABLET ORAL at 10:48

## 2019-12-31 RX ADMIN — GABAPENTIN 300 MILLIGRAM(S): 400 CAPSULE ORAL at 21:21

## 2019-12-31 RX ADMIN — FLUCONAZOLE 200 MILLIGRAM(S): 150 TABLET ORAL at 13:08

## 2019-12-31 RX ADMIN — ENOXAPARIN SODIUM 70 MILLIGRAM(S): 100 INJECTION SUBCUTANEOUS at 05:45

## 2019-12-31 NOTE — OCCUPATIONAL THERAPY INITIAL EVALUATION ADULT - PLANNED THERAPY INTERVENTIONS, OT EVAL
cognitive, visual perceptual/neuromuscular re-education/transfer training/bed mobility training/ADL retraining/balance training

## 2019-12-31 NOTE — PROGRESS NOTE ADULT - PROBLEM SELECTOR PLAN 4
WBC still down trending. H/H and plt improved.   Judah expected in next couple days will monitor closely  s/p platelet transfusion as per Heme  Can cont with AC for now if plt remain >50

## 2019-12-31 NOTE — OCCUPATIONAL THERAPY INITIAL EVALUATION ADULT - DIAGNOSIS, OT EVAL
Patient presents with decreased balance, strength, bed mobility ,functional transfers endurance and flaccid LUE impacting ability to perform ADLs and functional mobility

## 2019-12-31 NOTE — OCCUPATIONAL THERAPY INITIAL EVALUATION ADULT - PERTINENT HX OF CURRENT PROBLEM, REHAB EVAL
70 y/o male admitted 12/25/19 w/cholangiocarcinoma s/p Whipple procedure w/liver mets c/b  portal vein thrombosis, s/p multiple radiation & currently on chemotherapy(last [id number]/19), chronic LBP, GERD, IBS w/diarrhea, & recently diagnosed atrial fibrillation,  p/w lethargy and  hypotension x 1 day. CTBrain 12/28: multiple new small infarcts,no evidence for intracranial hemorrhage. Findings suggest embolic disease. (+) Pancytopenia (+) Acute blood loss anemia

## 2019-12-31 NOTE — PROGRESS NOTE ADULT - PROBLEM SELECTOR PLAN 2
s/p pigtail catheter placement for recurrent ascitics  -4 Liters removed- s/p albumin   -Fluid analysis not sent   on Cipro for SBP ppx   -will start low dose diuretics in addition to likely weekly drainage via cath.

## 2019-12-31 NOTE — PROGRESS NOTE ADULT - SUBJECTIVE AND OBJECTIVE BOX
[facility] Division of Hospital Medicine  Yudith Monsalve MD  Pager (M-F, 3I-1F): [phone]  Other Times:  [phone]    Patient is a 69y old  Male who presents with a chief complaint of Hypotension x 1 day (30 Dec 2019 15:30)      SUBJECTIVE / OVERNIGHT EVENTS:  No acute issues overnight. Left UE weakness and right facial droop noted.   denies any headache, visual issues. no BRBPR or melena as per report.   afebrile.     ADDITIONAL REVIEW OF SYSTEMS:    MEDICATIONS  (STANDING):  atorvastatin 40 milliGRAM(s) Oral at bedtime  ciprofloxacin     Tablet 500 milliGRAM(s) Oral daily  enoxaparin Injectable 70 milliGRAM(s) SubCutaneous every 12 hours  fluconAZOLE   Tablet 200 milliGRAM(s) Oral daily  gabapentin 300 milliGRAM(s) Oral at bedtime  metoprolol succinate ER 25 milliGRAM(s) Oral daily  pantoprazole    Tablet 40 milliGRAM(s) Oral before breakfast    MEDICATIONS  (PRN):  acetaminophen   Tablet .. 650 milliGRAM(s) Oral every 4 hours PRN Mild Pain (1 - 3)  aluminum hydroxide/magnesium hydroxide/simethicone Suspension 30 milliLiter(s) Oral every 6 hours PRN Dyspepsia  famotidine    Tablet 20 milliGRAM(s) Oral two times a day PRN GERD  oxyCODONE    IR 5 milliGRAM(s) Oral every 6 hours PRN Moderate Pain (4 - 6)  simethicone 80 milliGRAM(s) Chew two times a day PRN Gas      CAPILLARY BLOOD GLUCOSE        I&O's Summary    30 Dec 2019 07:01  -  31 Dec 2019 07:00  --------------------------------------------------------  IN: 360 mL / OUT: 0 mL / NET: 360 mL        PHYSICAL EXAM:  Vital Signs Last 24 Hrs  T(C): 36.5 (31 Dec 2019 13:27), Max: 36.8 (30 Dec 2019 20:39)  T(F): 97.7 (31 Dec 2019 13:27), Max: 98.2 (30 Dec 2019 20:39)  HR: 79 (31 Dec 2019 13:27) (73 - 87)  BP: 112/77 (31 Dec 2019 13:27) (110/67 - 122/70)  BP(mean): --  RR: 18 (31 Dec 2019 13:27) (18 - 18)  SpO2: 95% (31 Dec 2019 13:27) (95% - 98%)    CONSTITUTIONAL: NAD, well-groomed  EYES:  conjunctiva and sclera clear  ENMT: Moist oral mucosa  NECK: Supple, no palpable masses; no JVD  RESPIRATORY: Normal respiratory effort; lungs are clear to auscultation bilaterally  CARDIOVASCULAR: Regular rate and rhythm, normal S1 and S2, no murmur/rub/gallop; No lower extremity edema  ABDOMEN: Nontender to palpation, normoactive bowel sounds, no rebound/guarding  MUSCULOSKELETAL:  no clubbing or cyanosis of digits; no joint swelling or tenderness to palpation  PSYCH: A+O to person, place, and time; affect appropriate  SKIN: No rashes; no palpable lesions  NEURO: right facial droop, LUE weakness. sensation intact.     LABS:                        7.9    1.71  )-----------( 136      ( 31 Dec 2019 08:58 )             24.1     12-31    141  |  104  |  8   ----------------------------<  121<H>  3.9   |  25  |  0.65    Ca    8.0<L>      31 Dec 2019 05:50                  RADIOLOGY & ADDITIONAL TESTS:  Results Reviewed:   Imaging Personally Reviewed:  Electrocardiogram Personally Reviewed:    COORDINATION OF CARE:  Care Discussed with Consultants/Other Providers [Y/N]:  Prior or Outpatient Records Reviewed [Y/N]:

## 2019-12-31 NOTE — OCCUPATIONAL THERAPY INITIAL EVALUATION ADULT - ORIENTATION, REHAB EVAL
Pt scored a 4 on the short blessed cognitive screen but would benefit from further cognitive assessment at next OT session/place/person

## 2019-12-31 NOTE — PROGRESS NOTE ADULT - PROBLEM SELECTOR PLAN 1
Patient with acute left sided weakness with findings of new infarcts on MRI suggestive of embolic in nature.  - patient with recent diagnosis of afib, however no documented EKG with this rhythm nor has he been in the is rhythm while maintained on TELE inpatient.   -PT/OT, PMR consult.   -Lipitor   -resume back on AC (lovenox now as per neuro and onc)  -Neurology recommendations

## 2019-12-31 NOTE — OCCUPATIONAL THERAPY INITIAL EVALUATION ADULT - TRANSFER SAFETY CONCERNS NOTED: SIT/STAND, REHAB EVAL
Spoke with Dr. Choudhury about patient's urinary issues; states to give Flomax x1 now. Give option of catheter to go home with or be discharged with awareness that need to go to the ER may arise later. Also states to hold patient for an hour to see if able to void more before discharging home if doesn't want the catheter.   decreased balance during turns

## 2019-12-31 NOTE — OCCUPATIONAL THERAPY INITIAL EVALUATION ADULT - RANGE OF MOTION EXAMINATION, UPPER EXTREMITY
Left UE Passive ROM was WFL  (within functional limits)/Right UE Active ROM was WNL (within normal limits)/minimal arom of  left shoulder and digits. no other active movement of LUE noted

## 2019-12-31 NOTE — PROGRESS NOTE ADULT - PROBLEM SELECTOR PLAN 3
patient has stool occult + with thrombocytopenia/anemia s/p chemo on 12/19.  Was on Eliquis previously but now switched to Lovenox as per neurology and cleared by GI  -As per GI no intervention  -cleared for a/c per GI  -Monitor CBC

## 2019-12-31 NOTE — PROGRESS NOTE ADULT - PROBLEM SELECTOR PLAN 8
worsening liver test likely 2/2 mets   - Oncology consult appreciated  - s/p chemo on 12/19 . likely cont chemo as outpt

## 2020-01-01 LAB
ANION GAP SERPL CALC-SCNC: 10 MMOL/L — SIGNIFICANT CHANGE UP (ref 5–17)
BUN SERPL-MCNC: 7 MG/DL — SIGNIFICANT CHANGE UP (ref 7–23)
CALCIUM SERPL-MCNC: 8 MG/DL — LOW (ref 8.4–10.5)
CHLORIDE SERPL-SCNC: 101 MMOL/L — SIGNIFICANT CHANGE UP (ref 96–108)
CO2 SERPL-SCNC: 27 MMOL/L — SIGNIFICANT CHANGE UP (ref 22–31)
CREAT SERPL-MCNC: 0.68 MG/DL — SIGNIFICANT CHANGE UP (ref 0.5–1.3)
GLUCOSE SERPL-MCNC: 132 MG/DL — HIGH (ref 70–99)
HCT VFR BLD CALC: 24.4 % — LOW (ref 39–50)
HGB BLD-MCNC: 8 G/DL — LOW (ref 13–17)
MAGNESIUM SERPL-MCNC: 1.3 MG/DL — LOW (ref 1.6–2.6)
MCHC RBC-ENTMCNC: 27 PG — SIGNIFICANT CHANGE UP (ref 27–34)
MCHC RBC-ENTMCNC: 32.8 GM/DL — SIGNIFICANT CHANGE UP (ref 32–36)
MCV RBC AUTO: 82.4 FL — SIGNIFICANT CHANGE UP (ref 80–100)
PLATELET # BLD AUTO: 140 K/UL — LOW (ref 150–400)
POTASSIUM SERPL-MCNC: 3.5 MMOL/L — SIGNIFICANT CHANGE UP (ref 3.5–5.3)
POTASSIUM SERPL-SCNC: 3.5 MMOL/L — SIGNIFICANT CHANGE UP (ref 3.5–5.3)
RBC # BLD: 2.96 M/UL — LOW (ref 4.2–5.8)
RBC # FLD: 22.6 % — HIGH (ref 10.3–14.5)
SODIUM SERPL-SCNC: 138 MMOL/L — SIGNIFICANT CHANGE UP (ref 135–145)
WBC # BLD: 1.08 K/UL — CRITICAL LOW (ref 3.8–10.5)
WBC # FLD AUTO: 1.08 K/UL — CRITICAL LOW (ref 3.8–10.5)

## 2020-01-01 PROCEDURE — 99233 SBSQ HOSP IP/OBS HIGH 50: CPT

## 2020-01-01 PROCEDURE — 93010 ELECTROCARDIOGRAM REPORT: CPT

## 2020-01-01 RX ORDER — OXYCODONE HYDROCHLORIDE 5 MG/1
5 TABLET ORAL EVERY 6 HOURS
Refills: 0 | Status: DISCONTINUED | OUTPATIENT
Start: 2020-01-01 | End: 2020-01-08

## 2020-01-01 RX ORDER — MAGNESIUM SULFATE 500 MG/ML
2 VIAL (ML) INJECTION ONCE
Refills: 0 | Status: COMPLETED | OUTPATIENT
Start: 2020-01-01 | End: 2020-01-01

## 2020-01-01 RX ORDER — POTASSIUM CHLORIDE 20 MEQ
20 PACKET (EA) ORAL ONCE
Refills: 0 | Status: COMPLETED | OUTPATIENT
Start: 2020-01-01 | End: 2020-01-01

## 2020-01-01 RX ADMIN — OXYCODONE HYDROCHLORIDE 5 MILLIGRAM(S): 5 TABLET ORAL at 12:50

## 2020-01-01 RX ADMIN — Medication 20 MILLIEQUIVALENT(S): at 11:35

## 2020-01-01 RX ADMIN — SPIRONOLACTONE 12.5 MILLIGRAM(S): 25 TABLET, FILM COATED ORAL at 06:19

## 2020-01-01 RX ADMIN — Medication 500 MILLIGRAM(S): at 11:36

## 2020-01-01 RX ADMIN — ENOXAPARIN SODIUM 70 MILLIGRAM(S): 100 INJECTION SUBCUTANEOUS at 17:43

## 2020-01-01 RX ADMIN — Medication 50 GRAM(S): at 11:36

## 2020-01-01 RX ADMIN — PANTOPRAZOLE SODIUM 40 MILLIGRAM(S): 20 TABLET, DELAYED RELEASE ORAL at 06:20

## 2020-01-01 RX ADMIN — ENOXAPARIN SODIUM 70 MILLIGRAM(S): 100 INJECTION SUBCUTANEOUS at 06:19

## 2020-01-01 RX ADMIN — GABAPENTIN 300 MILLIGRAM(S): 400 CAPSULE ORAL at 21:46

## 2020-01-01 RX ADMIN — ATORVASTATIN CALCIUM 40 MILLIGRAM(S): 80 TABLET, FILM COATED ORAL at 21:46

## 2020-01-01 RX ADMIN — Medication 25 MILLIGRAM(S): at 06:20

## 2020-01-01 RX ADMIN — OXYCODONE HYDROCHLORIDE 5 MILLIGRAM(S): 5 TABLET ORAL at 12:20

## 2020-01-01 RX ADMIN — FLUCONAZOLE 200 MILLIGRAM(S): 150 TABLET ORAL at 11:36

## 2020-01-01 RX ADMIN — Medication 40 MILLIGRAM(S): at 06:20

## 2020-01-01 NOTE — PROGRESS NOTE ADULT - PROBLEM SELECTOR PLAN 2
s/p pigtail catheter placement for recurrent ascitics  -4 Liters removed- s/p albumin   -on Cipro for SBP ppx   -On low dose diuretics in addition to likely weekly drainage via cath.  -Monitor electrolytes.

## 2020-01-01 NOTE — PROVIDER CONTACT NOTE (CRITICAL VALUE NOTIFICATION) - BACKGROUND
PATIENT WITH MALIGNANT ASCITES AND Cholangiocarcinoma, patient found to be also in junctional rhythm

## 2020-01-01 NOTE — PROGRESS NOTE ADULT - PROBLEM SELECTOR PLAN 1
Patient with acute left sided weakness with findings of new infarcts on MRI suggestive of embolic in nature.  - patient with recent diagnosis of afib, however no documented EKG with this rhythm nor has he been in the is rhythm while maintained on TELE inpatient.   -PT/OT, PMR consult.   -Lipitor   -resume back on AC (lovenox now as per neuro and onc)  -Neurology recommendations appreciated.

## 2020-01-01 NOTE — PROGRESS NOTE ADULT - SUBJECTIVE AND OBJECTIVE BOX
HOSPITALIST NOTE    Dr. Matt Velez DO  Attending Physician  Division of Hospital Medicine  [facility]  Pager:  [phone]    SUBJECTIVE  No acute complaints.     REVIEW OF SYSTEMS  CONSTITUTIONAL: No fevers. No chills  EYES/ENT: No visual changes. No discharge from eyes. No vertigo. No throat pain. No dysphagia.  NECK: No pain or stiffness or rigidity.  RESPIRATORY: No cough. No wheezing. No hemoptysis. No shortness of breath.  CARDIOVASCULAR: No chest pain. No palpitations.   GASTROINTESTINAL: No abdominal pain. No nausea. No vomiting. No hematemesis. No diarrhea. No constipation. No melena, No hematochezia.  GENITOURINARY: No dysuria. No hesitancy. No frequency. No hematuria.  NEUROLOGICAL: No numbness. +weakness. No change in speech. No fecal or urinary incontinence.   MSK: No joint swelling or erythema. No back pain.  SKIN: No itching or rashes.   PSYCH: Normal affect. Normal mood. No si. No hi.    Review of systems negative except for items noted above.      PAST MEDICAL & SURGICAL HISTORY:  Rash  Biliary stricture: s/p biliary stent  Pancreatic lesion  Gastric polyp  Obstructive jaundice  Other hyperlipidemia  Essential hypertension  IBS (irritable bowel syndrome)  GERD (gastroesophageal reflux disease)  Intestinal Whipple's disease  S/P unilateral inguinal hernia repair  S/P tendon repair: hand  History of lumbar laminectomy  History of biliary stent insertion: 2/21/2017  S/P cervical spinal fusion      MEDICATIONS  (STANDING):  atorvastatin 40 milliGRAM(s) Oral at bedtime  ciprofloxacin     Tablet 500 milliGRAM(s) Oral daily  enoxaparin Injectable 70 milliGRAM(s) SubCutaneous every 12 hours  fluconAZOLE   Tablet 200 milliGRAM(s) Oral daily  furosemide    Tablet 40 milliGRAM(s) Oral daily  gabapentin 300 milliGRAM(s) Oral at bedtime  metoprolol succinate ER 25 milliGRAM(s) Oral daily  pantoprazole    Tablet 40 milliGRAM(s) Oral before breakfast  spironolactone 12.5 milliGRAM(s) Oral daily    MEDICATIONS  (PRN):  acetaminophen   Tablet .. 650 milliGRAM(s) Oral every 4 hours PRN Mild Pain (1 - 3)  aluminum hydroxide/magnesium hydroxide/simethicone Suspension 30 milliLiter(s) Oral every 6 hours PRN Dyspepsia  famotidine    Tablet 20 milliGRAM(s) Oral two times a day PRN GERD  oxyCODONE    IR 5 milliGRAM(s) Oral every 6 hours PRN Moderate Pain (4 - 6)  simethicone 80 milliGRAM(s) Chew two times a day PRN Gas      Allergies    penicillin G benzathine (Rash)  sulfa drugs (Rash)    Intolerances        T(C): 36.7 (01-01-20 @ 13:46), Max: 37.3 (01-01-20 @ 04:13)  T(F): 98.1 (01-01-20 @ 13:46), Max: 99.1 (01-01-20 @ 04:13)  HR: 111 (01-01-20 @ 13:46) (73 - 111)  BP: 123/84 (01-01-20 @ 13:46) (104/66 - 123/84)  ABP: --  ABP(mean): --  RR: 18 (01-01-20 @ 13:46) (18 - 18)  SpO2: 99% (01-01-20 @ 04:13) (98% - 99%)      CONSTITUTIONAL: No acute distress.   HEENT:  Conjunctiva clear B/L. Nasal mucosa normal. Moist oral mucosa.    Cardiovascular: RRR with no murmurs. No JVD noted. No lower extremity edema B/L. Extremities are warm and well perfused. Radial pulses 2+ B/L. Dorsalis pedis pulses 2+ B/L. Capillary refill <2s  Respiratory: Lungs CTAB. No accessory muscle use.   Gastrointestinal:  Soft, nontender. Distended with fluid wave. Peritoneal catheter noted.  Non-rigid. No CVA tenderness B/L.  MSK:  No joint swelling. No joint erythema B/L. No midline spinal tenderness.  Neurologic:  Alert and awake. Oriented x3. Moving all extremities. Following commands. Making eye contact. No focal deficits.  Skin:  No rashes noted. No skin erythema noted.   Psych:  Normal affect. Normal Mood.     LABS                        8.0    1.08  )-----------( 140      ( 01 Jan 2020 08:24 )             24.4     01-01    138  |  101  |  7   ----------------------------<  132<H>  3.5   |  27  |  0.68    Ca    8.0<L>      01 Jan 2020 07:08  Mg     1.3     01-01 HOSPITALIST NOTE    Dr. Matt Velez DO  Attending Physician  Division of Hospital Medicine  [facility]  Pager:  [phone]    SUBJECTIVE  No acute complaints.     REVIEW OF SYSTEMS  CONSTITUTIONAL: No fevers. No chills  EYES/ENT: No visual changes. No discharge from eyes. No vertigo. No throat pain. No dysphagia.  NECK: No pain or stiffness or rigidity.  RESPIRATORY: No cough. No wheezing. No hemoptysis. No shortness of breath.  CARDIOVASCULAR: No chest pain. No palpitations.   GASTROINTESTINAL: No abdominal pain. No nausea. No vomiting. No hematemesis. No diarrhea. No constipation. No melena, No hematochezia.  GENITOURINARY: No dysuria. No hesitancy. No frequency. No hematuria.  NEUROLOGICAL: No numbness. +weakness. No change in speech. No fecal or urinary incontinence.   MSK: No joint swelling or erythema. No back pain.  SKIN: No itching or rashes.   PSYCH: Normal affect. Normal mood. No si. No hi.    Review of systems negative except for items noted above.      PAST MEDICAL & SURGICAL HISTORY:  Rash  Biliary stricture: s/p biliary stent  Pancreatic lesion  Gastric polyp  Obstructive jaundice  Other hyperlipidemia  Essential hypertension  IBS (irritable bowel syndrome)  GERD (gastroesophageal reflux disease)  Intestinal Whipple's disease  S/P unilateral inguinal hernia repair  S/P tendon repair: hand  History of lumbar laminectomy  History of biliary stent insertion: 2/21/2017  S/P cervical spinal fusion      MEDICATIONS  (STANDING):  atorvastatin 40 milliGRAM(s) Oral at bedtime  ciprofloxacin     Tablet 500 milliGRAM(s) Oral daily  enoxaparin Injectable 70 milliGRAM(s) SubCutaneous every 12 hours  fluconAZOLE   Tablet 200 milliGRAM(s) Oral daily  furosemide    Tablet 40 milliGRAM(s) Oral daily  gabapentin 300 milliGRAM(s) Oral at bedtime  metoprolol succinate ER 25 milliGRAM(s) Oral daily  pantoprazole    Tablet 40 milliGRAM(s) Oral before breakfast  spironolactone 12.5 milliGRAM(s) Oral daily    MEDICATIONS  (PRN):  acetaminophen   Tablet .. 650 milliGRAM(s) Oral every 4 hours PRN Mild Pain (1 - 3)  aluminum hydroxide/magnesium hydroxide/simethicone Suspension 30 milliLiter(s) Oral every 6 hours PRN Dyspepsia  famotidine    Tablet 20 milliGRAM(s) Oral two times a day PRN GERD  oxyCODONE    IR 5 milliGRAM(s) Oral every 6 hours PRN Moderate Pain (4 - 6)  simethicone 80 milliGRAM(s) Chew two times a day PRN Gas      Allergies    penicillin G benzathine (Rash)  sulfa drugs (Rash)    Intolerances        T(C): 36.7 (01-01-20 @ 13:46), Max: 37.3 (01-01-20 @ 04:13)  T(F): 98.1 (01-01-20 @ 13:46), Max: 99.1 (01-01-20 @ 04:13)  HR: 111 (01-01-20 @ 13:46) (73 - 111)  BP: 123/84 (01-01-20 @ 13:46) (104/66 - 123/84)  ABP: --  ABP(mean): --  RR: 18 (01-01-20 @ 13:46) (18 - 18)  SpO2: 99% (01-01-20 @ 04:13) (98% - 99%)      CONSTITUTIONAL: No acute distress.   HEENT:  Conjunctiva clear B/L. Nasal mucosa normal. Moist oral mucosa.    Cardiovascular: RRR with no murmurs. No JVD noted. No lower extremity edema B/L. Extremities are warm and well perfused. Radial pulses 2+ B/L. Dorsalis pedis pulses 2+ B/L. Capillary refill <2s  Respiratory: Lungs CTAB. No accessory muscle use.   Gastrointestinal:  Soft, nontender. Distended with fluid wave. Peritoneal catheter noted.  Non-rigid. No CVA tenderness B/L.  MSK:  No joint swelling. No joint erythema B/L. No midline spinal tenderness.  Neurologic:  Alert and awake. Oriented x3. Moving all extremities. LUE weakness 3/5. Otherwise 4/5 in other extremities.   Skin:  No rashes noted. No skin erythema noted.   Psych:  Normal affect. Normal Mood.     LABS                        8.0    1.08  )-----------( 140      ( 01 Jan 2020 08:24 )             24.4     01-01    138  |  101  |  7   ----------------------------<  132<H>  3.5   |  27  |  0.68    Ca    8.0<L>      01 Jan 2020 07:08  Mg     1.3     01-01

## 2020-01-01 NOTE — PROGRESS NOTE ADULT - PROBLEM SELECTOR PLAN 9
Transitions of Care Status:  1.  Name of PCP:  Homero Melgar  2.  PCP Contacted on Admission: [ ] Y    [x ] N    3.  PCP contacted at Discharge: [ ] Y    [ ] N    [ ] N/A  4.  Post-Discharge Appointment Date and Location:  5.  Summary of Handoff given to PCP:

## 2020-01-02 LAB
ANION GAP SERPL CALC-SCNC: 9 MMOL/L — SIGNIFICANT CHANGE UP (ref 5–17)
BUN SERPL-MCNC: 8 MG/DL — SIGNIFICANT CHANGE UP (ref 7–23)
CALCIUM SERPL-MCNC: 8 MG/DL — LOW (ref 8.4–10.5)
CHLORIDE SERPL-SCNC: 102 MMOL/L — SIGNIFICANT CHANGE UP (ref 96–108)
CO2 SERPL-SCNC: 27 MMOL/L — SIGNIFICANT CHANGE UP (ref 22–31)
CREAT SERPL-MCNC: 0.83 MG/DL — SIGNIFICANT CHANGE UP (ref 0.5–1.3)
GLUCOSE SERPL-MCNC: 117 MG/DL — HIGH (ref 70–99)
HCT VFR BLD CALC: 24.3 % — LOW (ref 39–50)
HGB BLD-MCNC: 7.9 G/DL — LOW (ref 13–17)
MAGNESIUM SERPL-MCNC: 1.5 MG/DL — LOW (ref 1.6–2.6)
MCHC RBC-ENTMCNC: 27.1 PG — SIGNIFICANT CHANGE UP (ref 27–34)
MCHC RBC-ENTMCNC: 32.5 GM/DL — SIGNIFICANT CHANGE UP (ref 32–36)
MCV RBC AUTO: 83.2 FL — SIGNIFICANT CHANGE UP (ref 80–100)
PHOSPHATE SERPL-MCNC: 3.4 MG/DL — SIGNIFICANT CHANGE UP (ref 2.5–4.5)
PLATELET # BLD AUTO: 156 K/UL — SIGNIFICANT CHANGE UP (ref 150–400)
POTASSIUM SERPL-MCNC: 3.8 MMOL/L — SIGNIFICANT CHANGE UP (ref 3.5–5.3)
POTASSIUM SERPL-SCNC: 3.8 MMOL/L — SIGNIFICANT CHANGE UP (ref 3.5–5.3)
RBC # BLD: 2.92 M/UL — LOW (ref 4.2–5.8)
RBC # FLD: 23 % — HIGH (ref 10.3–14.5)
SODIUM SERPL-SCNC: 138 MMOL/L — SIGNIFICANT CHANGE UP (ref 135–145)
WBC # BLD: 0.91 K/UL — CRITICAL LOW (ref 3.8–10.5)
WBC # FLD AUTO: 0.91 K/UL — CRITICAL LOW (ref 3.8–10.5)

## 2020-01-02 PROCEDURE — 99233 SBSQ HOSP IP/OBS HIGH 50: CPT

## 2020-01-02 PROCEDURE — 99232 SBSQ HOSP IP/OBS MODERATE 35: CPT | Mod: GC

## 2020-01-02 RX ORDER — FILGRASTIM 480MCG/1.6
300 VIAL (ML) INJECTION ONCE
Refills: 0 | Status: COMPLETED | OUTPATIENT
Start: 2020-01-02 | End: 2020-01-02

## 2020-01-02 RX ADMIN — ENOXAPARIN SODIUM 70 MILLIGRAM(S): 100 INJECTION SUBCUTANEOUS at 17:29

## 2020-01-02 RX ADMIN — PANTOPRAZOLE SODIUM 40 MILLIGRAM(S): 20 TABLET, DELAYED RELEASE ORAL at 05:15

## 2020-01-02 RX ADMIN — FLUCONAZOLE 200 MILLIGRAM(S): 150 TABLET ORAL at 11:36

## 2020-01-02 RX ADMIN — ENOXAPARIN SODIUM 70 MILLIGRAM(S): 100 INJECTION SUBCUTANEOUS at 05:16

## 2020-01-02 RX ADMIN — Medication 500 MILLIGRAM(S): at 11:36

## 2020-01-02 RX ADMIN — ATORVASTATIN CALCIUM 40 MILLIGRAM(S): 80 TABLET, FILM COATED ORAL at 21:25

## 2020-01-02 RX ADMIN — Medication 25 MILLIGRAM(S): at 05:15

## 2020-01-02 RX ADMIN — Medication 300 MICROGRAM(S): at 15:49

## 2020-01-02 RX ADMIN — Medication 40 MILLIGRAM(S): at 05:15

## 2020-01-02 RX ADMIN — GABAPENTIN 300 MILLIGRAM(S): 400 CAPSULE ORAL at 21:25

## 2020-01-02 RX ADMIN — SPIRONOLACTONE 12.5 MILLIGRAM(S): 25 TABLET, FILM COATED ORAL at 05:15

## 2020-01-02 NOTE — PROGRESS NOTE ADULT - PROBLEM SELECTOR PLAN 4
WBC still down trending. H/H stable and plt improved.   d/w Onc fellow Judah expected but will consider neupogen.   s/p platelet transfusion as per Heme  Can cont with AC for now if plt remain >50

## 2020-01-02 NOTE — PROGRESS NOTE ADULT - ATTENDING COMMENTS
Patient is a 70 yo M with PMHx of cholangiocarcinoma (dx in 2017 s/p Whipple procedure with liver mets found in 2019 after finding of clots in portal vein, also s/p multiple rounds of radiation treatment) currently on FOLFOX s/p 1 cycle,  chronic back pain, GERD, IBS with diarrhea, and recently diagnosed atrial fibrillation, multiple recent admissions for fever workup who presents with weakness. Also found to have reaccumulation of ascites now s/p abdominal pleurex catheter. 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 He has been transitioned to therapuetic lovenox dosing. Pt is noted to be neutropenic 2/2 recent chemo, expect agapito usually around day 10-14. platelets starting to uptrend while white count still downtrending. start Neupogen x3 days to help stimulate recovery. if fever, would panculture and start empiric antibiotics. monitor CBC w/ DIFF daily, transfuse to keep Hg >7

## 2020-01-02 NOTE — PROGRESS NOTE ADULT - SUBJECTIVE AND OBJECTIVE BOX
[facility] Division of Hospital Medicine  Yudith Monsalve MD  Pager (M-F, 1O-7Y): [phone]  Other Times:  [phone]    Patient is a 69y old  Male who presents with a chief complaint of Hypotension x 1 day (02 Jan 2020 09:59)      SUBJECTIVE / OVERNIGHT EVENTS:  feeling ok. denies any complaints. having BMs,  low grade fever 100 this am       ADDITIONAL REVIEW OF SYSTEMS:    MEDICATIONS  (STANDING):  atorvastatin 40 milliGRAM(s) Oral at bedtime  ciprofloxacin     Tablet 500 milliGRAM(s) Oral daily  enoxaparin Injectable 70 milliGRAM(s) SubCutaneous every 12 hours  filgrastim-sndz Injectable 300 MICROGram(s) SubCutaneous once  fluconAZOLE   Tablet 200 milliGRAM(s) Oral daily  furosemide    Tablet 40 milliGRAM(s) Oral daily  gabapentin 300 milliGRAM(s) Oral at bedtime  metoprolol succinate ER 25 milliGRAM(s) Oral daily  pantoprazole    Tablet 40 milliGRAM(s) Oral before breakfast  spironolactone 12.5 milliGRAM(s) Oral daily    MEDICATIONS  (PRN):  acetaminophen   Tablet .. 650 milliGRAM(s) Oral every 4 hours PRN Mild Pain (1 - 3)  aluminum hydroxide/magnesium hydroxide/simethicone Suspension 30 milliLiter(s) Oral every 6 hours PRN Dyspepsia  famotidine    Tablet 20 milliGRAM(s) Oral two times a day PRN GERD  oxyCODONE    IR 5 milliGRAM(s) Oral every 6 hours PRN Moderate Pain (4 - 6)  simethicone 80 milliGRAM(s) Chew two times a day PRN Gas      CAPILLARY BLOOD GLUCOSE        I&O's Summary    01 Jan 2020 07:01  -  02 Jan 2020 07:00  --------------------------------------------------------  IN: 480 mL / OUT: 350 mL / NET: 130 mL    02 Jan 2020 07:01  -  02 Jan 2020 13:49  --------------------------------------------------------  IN: 360 mL / OUT: 0 mL / NET: 360 mL        PHYSICAL EXAM:  Vital Signs Last 24 Hrs  T(C): 36.9 (02 Jan 2020 11:24), Max: 37.8 (02 Jan 2020 04:14)  T(F): 98.5 (02 Jan 2020 11:24), Max: 100 (02 Jan 2020 04:14)  HR: 89 (02 Jan 2020 11:24) (89 - 100)  BP: 109/74 (02 Jan 2020 11:24) (102/67 - 109/74)  BP(mean): --  RR: 18 (02 Jan 2020 11:24) (16 - 18)  SpO2: 93% (02 Jan 2020 11:24) (93% - 98%)    CONSTITUTIONAL: NAD, well-groomed  EYES:  conjunctiva and sclera clear  ENMT: Moist oral mucosa  NECK: Supple, no palpable masses; no JVD  RESPIRATORY: Normal respiratory effort; lungs are clear to auscultation bilaterally  CARDIOVASCULAR: Regular rate and rhythm, normal S1 and S2, no murmur/rub/gallop; No lower extremity edema  ABDOMEN: Nontender to palpation, normoactive bowel sounds, no rebound/guarding  MUSCULOSKELETAL:  no clubbing or cyanosis of digits; no joint swelling or tenderness to palpation  PSYCH: A+O to person, place, and time; affect appropriate  SKIN: No rashes; no palpable lesions  NEURO: right facial droop, LUE weakness. sensation intact.     LABS:                        7.9    0.91  )-----------( 156      ( 02 Jan 2020 07:50 )             24.3     01-02    138  |  102  |  8   ----------------------------<  117<H>  3.8   |  27  |  0.83    Ca    8.0<L>      02 Jan 2020 06:06  Phos  3.4     01-02  Mg     1.5     01-02                  RADIOLOGY & ADDITIONAL TESTS:  Results Reviewed:   Imaging Personally Reviewed:  Electrocardiogram Personally Reviewed:    COORDINATION OF CARE:  Care Discussed with Consultants/Other Providers [Y/N]:  Prior or Outpatient Records Reviewed [Y/N]:

## 2020-01-02 NOTE — PROGRESS NOTE ADULT - PROBLEM SELECTOR PLAN 3
H/h low but stable. s/p chemo as outpt 12/19  patient has stool occult + with thrombocytopenia/anemia s/p chemo on 12/19.  Was on Eliquis previously but now switched to Lovenox as per neurology and cleared by GI  -As per GI no intervention  -Monitor CBC

## 2020-01-02 NOTE — PROGRESS NOTE ADULT - SUBJECTIVE AND OBJECTIVE BOX
INTERVAL HPI/OVERNIGHT EVENTS:  No overnight events. Reports eating more and weakness slightly improved. No complaints this AM.     MEDICATIONS  (STANDING):  atorvastatin 40 milliGRAM(s) Oral at bedtime  ciprofloxacin     Tablet 500 milliGRAM(s) Oral daily  enoxaparin Injectable 70 milliGRAM(s) SubCutaneous every 12 hours  fluconAZOLE   Tablet 200 milliGRAM(s) Oral daily  furosemide    Tablet 40 milliGRAM(s) Oral daily  gabapentin 300 milliGRAM(s) Oral at bedtime  metoprolol succinate ER 25 milliGRAM(s) Oral daily  pantoprazole    Tablet 40 milliGRAM(s) Oral before breakfast  spironolactone 12.5 milliGRAM(s) Oral daily    MEDICATIONS  (PRN):  acetaminophen   Tablet .. 650 milliGRAM(s) Oral every 4 hours PRN Mild Pain (1 - 3)  aluminum hydroxide/magnesium hydroxide/simethicone Suspension 30 milliLiter(s) Oral every 6 hours PRN Dyspepsia  famotidine    Tablet 20 milliGRAM(s) Oral two times a day PRN GERD  oxyCODONE    IR 5 milliGRAM(s) Oral every 6 hours PRN Moderate Pain (4 - 6)  simethicone 80 milliGRAM(s) Chew two times a day PRN Gas    Allergies    penicillin G benzathine (Rash)  sulfa drugs (Rash)    Intolerances          VITAL SIGNS:  T(F): 99 (01-02-20 @ 05:32)  HR: 97 (01-02-20 @ 04:14)  BP: 107/64 (01-02-20 @ 04:14)  RR: 18 (01-02-20 @ 04:14)  SpO2: 97% (01-02-20 @ 04:14)  Wt(kg): --    PHYSICAL EXAM:    Constitutional: NAD, lying comfortably in bed; appears weak   Eyes: EOMI, PERRLA  Neck: supple, no masses, no JVD  Respiratory: CTAB; no r/r/w  Cardiovascular: RRR, no M/R/G  Gastrointestinal: +BS, soft, NTND, indwelling catheter in place   Extremities: no c/c/e  Neurological: AAOx3, slight weakness in all extremities L>R    LABS:                        7.9    0.91  )-----------( 156      ( 02 Jan 2020 07:50 )             24.3     01-02    138  |  102  |  8   ----------------------------<  117<H>  3.8   |  27  |  0.83    Ca    8.0<L>      02 Jan 2020 06:06  Phos  3.4     01-02  Mg     1.5     01-02            RADIOLOGY & ADDITIONAL TESTS:  Studies reviewed.

## 2020-01-02 NOTE — PROGRESS NOTE ADULT - PROBLEM SELECTOR PLAN 1
Patient with acute left sided weakness with findings of new infarcts on MRI suggestive of embolic in nature.  - patient with recent diagnosis of afib, however no documented EKG with this rhythm nor has he been in the is rhythm while maintained on TELE inpatient.   -PT/OT, PMR consult.   -Lipitor   -resume back on AC (lovenox now as per neuro and onc)

## 2020-01-02 NOTE — PROGRESS NOTE ADULT - PROBLEM SELECTOR PLAN 2
s/p pigtail catheter placement for recurrent ascitics  -4 Liters removed- s/p albumin   -Fluid analysis not sent   on Cipro for SBP ppx   -cont diuretics in addition to likely weekly drainage via cath.

## 2020-01-02 NOTE — PROGRESS NOTE ADULT - ASSESSMENT
Patient is a 68 yo M with PMHx of cholangiocarcinoma (dx in 2017 s/p Whipple procedure with liver mets found in 2019 after finding of clots in portal vein, also s/p multiple rounds of radiation treatment) currently on FOLFOX s/p 1 cycle,  chronic back pain, GERD, IBS with diarrhea, and recently diagnosed atrial fibrillation, multiple recent admissions for fever workup who presents with weakness. Also found to have reaccumulation of ascites now s/p abdominal pleurex catheter. 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    #CVA  - upper extremity weakness likely attributed to acute strokes, likely embolic in nature  - has been transitioned to therapuetic lovenox dosing  - needs aggressive PT/OT  - care per primary/neurology     #Weakness/FTT  - likely overall constellation of symptoms in setting of dehydration after chemo  - continue with IV hydration as able  - appreciate nutritionist assistance   - continue aggressive supportive care  - may need IV hydration appointments scheduled as outpatient     #Cholangiocarcinoma  - s/p next 2nd line treatment with FOLFOX 12/19  - recurrent ascites likely malignant although cytology negative  - s/p indwelling catheter for recurrent ascites given rapid reaccumulation   - will need catheter teaching prior to discharge   - started on low dose furosemide and aldactone, uptitrate as BP allows to help with ascites   - f/u with primary oncologist Dr. Atkinson as outpatient to resume treatments if functional status improves    #Pancytopenia  - likely myelosuppression from recent chemo, expect agapito usually around day 10-14  - platelets starting to uptrend while white count still downtrending   - if fever, would panculture and start empiric antibiotics  - monitor CBC w/ DIFF daily, transfuse to keep Hg >7    Arturo Falk, PGY-5  Hematology-Oncology Fellow  [phone] ([place]) [phone] ([facility]) Patient is a 68 yo M with PMHx of cholangiocarcinoma (dx in 2017 s/p Whipple procedure with liver mets found in 2019 after finding of clots in portal vein, also s/p multiple rounds of radiation treatment) currently on FOLFOX s/p 1 cycle,  chronic back pain, GERD, IBS with diarrhea, and recently diagnosed atrial fibrillation, multiple recent admissions for fever workup who presents with weakness. Also found to have reaccumulation of ascites now s/p abdominal pleurex catheter. 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    #CVA  - upper extremity weakness likely attributed to acute strokes, likely embolic in nature  - has been transitioned to therapuetic lovenox dosing  - needs aggressive PT/OT  - care per primary/neurology     #Pancytopenia  - likely myelosuppression from recent chemo, expect agapito usually around day 10-14  - platelets starting to uptrend while white count still downtrending   - start Neupogen x3 days to help stimulate recovery (order put in)   - if fever, would panculture and start empiric antibiotics  - monitor CBC w/ DIFF daily, transfuse to keep Hg >7    #Cholangiocarcinoma  - s/p next 2nd line treatment with FOLFOX 12/19  - recurrent ascites likely malignant although cytology negative  - s/p indwelling catheter for recurrent ascites given rapid reaccumulation   - will need catheter teaching prior to discharge   - started on low dose furosemide and aldactone, uptitrate as BP allows to help with ascites   - f/u with primary oncologist Dr. Atkinson as outpatient to resume treatments if functional status improves    Arturo Falk, PGY-5  Hematology-Oncology Fellow  [phone] ([place]) [phone] ([facility])

## 2020-01-03 DIAGNOSIS — Z51.5 ENCOUNTER FOR PALLIATIVE CARE: ICD-10-CM

## 2020-01-03 LAB
ALBUMIN SERPL ELPH-MCNC: 2.7 G/DL — LOW (ref 3.3–5)
ALP SERPL-CCNC: 370 U/L — HIGH (ref 40–120)
ALT FLD-CCNC: 14 U/L — SIGNIFICANT CHANGE UP (ref 10–45)
ANION GAP SERPL CALC-SCNC: 13 MMOL/L — SIGNIFICANT CHANGE UP (ref 5–17)
AST SERPL-CCNC: 31 U/L — SIGNIFICANT CHANGE UP (ref 10–40)
BILIRUB DIRECT SERPL-MCNC: 0.4 MG/DL — HIGH (ref 0–0.2)
BILIRUB INDIRECT FLD-MCNC: 0.5 MG/DL — SIGNIFICANT CHANGE UP (ref 0.2–1)
BILIRUB SERPL-MCNC: 0.9 MG/DL — SIGNIFICANT CHANGE UP (ref 0.2–1.2)
BUN SERPL-MCNC: 10 MG/DL — SIGNIFICANT CHANGE UP (ref 7–23)
CALCIUM SERPL-MCNC: 8.1 MG/DL — LOW (ref 8.4–10.5)
CHLORIDE SERPL-SCNC: 97 MMOL/L — SIGNIFICANT CHANGE UP (ref 96–108)
CO2 SERPL-SCNC: 28 MMOL/L — SIGNIFICANT CHANGE UP (ref 22–31)
CREAT SERPL-MCNC: 0.79 MG/DL — SIGNIFICANT CHANGE UP (ref 0.5–1.3)
GLUCOSE SERPL-MCNC: 110 MG/DL — HIGH (ref 70–99)
HCT VFR BLD CALC: 23.8 % — LOW (ref 39–50)
HGB BLD-MCNC: 7.6 G/DL — LOW (ref 13–17)
MAGNESIUM SERPL-MCNC: 1.3 MG/DL — LOW (ref 1.6–2.6)
MCHC RBC-ENTMCNC: 27.3 PG — SIGNIFICANT CHANGE UP (ref 27–34)
MCHC RBC-ENTMCNC: 31.9 GM/DL — LOW (ref 32–36)
MCV RBC AUTO: 85.6 FL — SIGNIFICANT CHANGE UP (ref 80–100)
PHOSPHATE SERPL-MCNC: 2.8 MG/DL — SIGNIFICANT CHANGE UP (ref 2.5–4.5)
PLATELET # BLD AUTO: 158 K/UL — SIGNIFICANT CHANGE UP (ref 150–400)
POTASSIUM SERPL-MCNC: 3.3 MMOL/L — LOW (ref 3.5–5.3)
POTASSIUM SERPL-SCNC: 3.3 MMOL/L — LOW (ref 3.5–5.3)
PROT SERPL-MCNC: 5.5 G/DL — LOW (ref 6–8.3)
RBC # BLD: 2.78 M/UL — LOW (ref 4.2–5.8)
RBC # FLD: 23.3 % — HIGH (ref 10.3–14.5)
SODIUM SERPL-SCNC: 138 MMOL/L — SIGNIFICANT CHANGE UP (ref 135–145)
WBC # BLD: 1.08 K/UL — CRITICAL LOW (ref 3.8–10.5)
WBC # FLD AUTO: 1.08 K/UL — CRITICAL LOW (ref 3.8–10.5)

## 2020-01-03 PROCEDURE — 99223 1ST HOSP IP/OBS HIGH 75: CPT

## 2020-01-03 PROCEDURE — 99222 1ST HOSP IP/OBS MODERATE 55: CPT

## 2020-01-03 PROCEDURE — 99233 SBSQ HOSP IP/OBS HIGH 50: CPT

## 2020-01-03 RX ORDER — POTASSIUM CHLORIDE 20 MEQ
40 PACKET (EA) ORAL EVERY 4 HOURS
Refills: 0 | Status: COMPLETED | OUTPATIENT
Start: 2020-01-03 | End: 2020-01-03

## 2020-01-03 RX ORDER — MAGNESIUM SULFATE 500 MG/ML
2 VIAL (ML) INJECTION ONCE
Refills: 0 | Status: COMPLETED | OUTPATIENT
Start: 2020-01-03 | End: 2020-01-03

## 2020-01-03 RX ORDER — SERTRALINE 25 MG/1
25 TABLET, FILM COATED ORAL DAILY
Refills: 0 | Status: DISCONTINUED | OUTPATIENT
Start: 2020-01-03 | End: 2020-01-09

## 2020-01-03 RX ADMIN — Medication 25 MILLIGRAM(S): at 05:36

## 2020-01-03 RX ADMIN — FLUCONAZOLE 200 MILLIGRAM(S): 150 TABLET ORAL at 14:08

## 2020-01-03 RX ADMIN — Medication 40 MILLIGRAM(S): at 05:36

## 2020-01-03 RX ADMIN — OXYCODONE HYDROCHLORIDE 5 MILLIGRAM(S): 5 TABLET ORAL at 18:59

## 2020-01-03 RX ADMIN — Medication 50 GRAM(S): at 18:23

## 2020-01-03 RX ADMIN — SERTRALINE 25 MILLIGRAM(S): 25 TABLET, FILM COATED ORAL at 18:41

## 2020-01-03 RX ADMIN — ENOXAPARIN SODIUM 70 MILLIGRAM(S): 100 INJECTION SUBCUTANEOUS at 18:22

## 2020-01-03 RX ADMIN — ENOXAPARIN SODIUM 70 MILLIGRAM(S): 100 INJECTION SUBCUTANEOUS at 05:36

## 2020-01-03 RX ADMIN — Medication 40 MILLIEQUIVALENT(S): at 21:43

## 2020-01-03 RX ADMIN — Medication 650 MILLIGRAM(S): at 20:52

## 2020-01-03 RX ADMIN — ATORVASTATIN CALCIUM 40 MILLIGRAM(S): 80 TABLET, FILM COATED ORAL at 21:43

## 2020-01-03 RX ADMIN — GABAPENTIN 300 MILLIGRAM(S): 400 CAPSULE ORAL at 21:43

## 2020-01-03 RX ADMIN — OXYCODONE HYDROCHLORIDE 5 MILLIGRAM(S): 5 TABLET ORAL at 18:22

## 2020-01-03 RX ADMIN — Medication 650 MILLIGRAM(S): at 21:42

## 2020-01-03 RX ADMIN — Medication 500 MILLIGRAM(S): at 14:07

## 2020-01-03 RX ADMIN — SPIRONOLACTONE 12.5 MILLIGRAM(S): 25 TABLET, FILM COATED ORAL at 05:36

## 2020-01-03 RX ADMIN — PANTOPRAZOLE SODIUM 40 MILLIGRAM(S): 20 TABLET, DELAYED RELEASE ORAL at 05:36

## 2020-01-03 RX ADMIN — Medication 40 MILLIEQUIVALENT(S): at 18:21

## 2020-01-03 NOTE — PROGRESS NOTE ADULT - PROBLEM SELECTOR PLAN 3
H/h low but stable. s/p chemo as outpt 12/19  patient has stool occult + with thrombocytopenia/anemia s/p chemo on 12/19.  Was on Eliquis previously but now switched to Lovenox in setting of hypercoagulable state/NBTE with malignancy  -As per GI no intervention  -Monitor CBC

## 2020-01-03 NOTE — PROGRESS NOTE ADULT - PROBLEM SELECTOR PLAN 8
liver function improved.   - Oncology consult appreciated  - s/p chemo on 12/19 . likely cont chemo as outpt when improved overall functioning.

## 2020-01-03 NOTE — CONSULT NOTE ADULT - REASON FOR ADMISSION
Hypotension x 1 day

## 2020-01-03 NOTE — PROGRESS NOTE ADULT - PROBLEM SELECTOR PLAN 2
Plan for drainage today  s/p pigtail catheter placement for recurrent ascitics  on Cipro for SBP ppx   -cont diuretics in addition to likely weekly drainage via cath. every Friday 2-3 liters

## 2020-01-03 NOTE — CONSULT NOTE ADULT - SUBJECTIVE AND OBJECTIVE BOX
HPI: 69M with cholangiocarcinoma s/p Whipple, portal vein thrombosis (s/p RT, on chemo), chronic back pain, GERD, IBS, and AF here with acute embolic stroke with acute L sided weakness. Course c/b ascites, s/p 4L paracentesis, and acute blood loss anemia, with no intervention planned per GI. Palliative care called for support and GOC.    PERTINENT PM/SXH:   Rash  Biliary stricture  Pancreatic lesion  Gastric polyp  Obstructive jaundice  Other hyperlipidemia  Essential hypertension  IBS (irritable bowel syndrome)  GERD (gastroesophageal reflux disease)    Intestinal Whipple's disease  S/P unilateral inguinal hernia repair  S/P tendon repair  History of lumbar laminectomy  History of biliary stent insertion  S/P lumbar spinal fusion  S/P cervical spinal fusion  No significant past surgical history    FAMILY HISTORY:  FH: CHF (congestive heart failure)  FH: prostate cancer  Family history of colitis    ITEMS NOT CHECKED ARE NOT PRESENT    SOCIAL HISTORY:   Significant other/partner[X ]  Children[ ]  Confucianist/Spirituality:  Substance hx:  [ ]   Tobacco hx:  [ ]   Alcohol hx: [ ]   Home Opioid hx:  [ ] I-Stop Reference No: [id number]      Rx Written	Rx Dispensed	Drug	Quantity	Days Supply	Prescriber Name  12/05/2019	12/07/2019	oxycodone hcl 5 mg tablet	42	7	[facility]  11/22/2019	11/22/2019	oxycodone-acetaminophen  mg tab	14	7	Jaky Lucero  11/07/2019	11/09/2019	diazepam 5 mg tablet	1	1	Korey Atkinson MD  10/31/2019	11/03/2019	diazepam 5 mg tablet	1	1	Iraida Foote K  10/10/2019	10/12/2019	oxycodone-acetaminophen  mg tab	20	10	Jonathon Alcantara  10/01/2019	10/01/2019	oxycodone-acetaminophen  mg tab	14	14	Jory Almazan    Living Situation: [ ]Home  [ ]Long term care  [ ]Rehab [ ]Other  Occupation:    ADVANCE DIRECTIVES:    DNR  MOLST  [ ]  Living Will  [ ]   DECISION MAKER(s):  [ ] Health Care Proxy(s)  [ ] Surrogate(s)  [ ] Guardian           Name(s): Phone Number(s): spouse Elle    BASELINE (I)ADL(s) (prior to admission):  [place]: [ ]Total  [ ] Moderate [ ]Dependent    Allergies    penicillin G benzathine (Rash)  sulfa drugs (Rash)    Intolerances    MEDICATIONS  (STANDING):  atorvastatin 40 milliGRAM(s) Oral at bedtime  ciprofloxacin     Tablet 500 milliGRAM(s) Oral daily  enoxaparin Injectable 70 milliGRAM(s) SubCutaneous every 12 hours  fluconAZOLE   Tablet 200 milliGRAM(s) Oral daily  furosemide    Tablet 40 milliGRAM(s) Oral daily  gabapentin 300 milliGRAM(s) Oral at bedtime  metoprolol succinate ER 25 milliGRAM(s) Oral daily  pantoprazole    Tablet 40 milliGRAM(s) Oral before breakfast  spironolactone 12.5 milliGRAM(s) Oral daily    MEDICATIONS  (PRN):  acetaminophen   Tablet .. 650 milliGRAM(s) Oral every 4 hours PRN Mild Pain (1 - 3)  aluminum hydroxide/magnesium hydroxide/simethicone Suspension 30 milliLiter(s) Oral every 6 hours PRN Dyspepsia  famotidine    Tablet 20 milliGRAM(s) Oral two times a day PRN GERD  oxyCODONE    IR 5 milliGRAM(s) Oral every 6 hours PRN Moderate Pain (4 - 6)  simethicone 80 milliGRAM(s) Chew two times a day PRN Gas      PRESENT SYMPTOMS: [ ]Unable to obtain due to poor mentation   Source if other than patient:  [ ]Family   [ ]Team     Pain: [ ]yes [ ]no  QOL impact -   Location -                    Aggravating factors -  Quality -  Radiation -  Timing-  Severity (0-10 scale):  Minimal acceptable level (0-10 scale):     PAIN AD Score:     [email or web address] (press ctrl +  left click to view)    Dyspnea:                           [ ]Mild [ ]Moderate [ ]Severe  Anxiety:                             [ ]Mild [ ]Moderate [ ]Severe  Fatigue:                             [ ]Mild [ ]Moderate [ ]Severe  Nausea:                             [ ]Mild [ ]Moderate [ ]Severe  Loss of appetite:              [ ]Mild [ ]Moderate [ ]Severe  Constipation:                    [ ]Mild [ ]Moderate [ ]Severe    Other Symptoms:  [ ]All other review of systems negative     Palliative Performance Status Version 2:         %    http://npcrc.org/files/news/palliative_performance_scale_ppsv2.pdf    PHYSICAL EXAM:  Vital Signs Last 24 Hrs  T(C): 37.1 (03 Jan 2020 04:20), Max: 37.4 (02 Jan 2020 20:39)  T(F): 98.8 (03 Jan 2020 04:20), Max: 99.3 (02 Jan 2020 20:39)  HR: 86 (03 Jan 2020 04:20) (85 - 86)  BP: 111/66 (03 Jan 2020 04:20) (108/71 - 111/66)  BP(mean): --  RR: 18 (03 Jan 2020 04:20) (17 - 18)  SpO2: 96% (03 Jan 2020 04:20) (96% - 98%) I&O's Summary    02 Jan 2020 07:01  -  03 Jan 2020 07:00  --------------------------------------------------------  IN: 360 mL / OUT: 0 mL / NET: 360 mL    03 Jan 2020 07:01  -  03 Jan 2020 12:09  --------------------------------------------------------  IN: 120 mL / OUT: 0 mL / NET: 120 mL      GENERAL:  [ ]Alert  [ ]Oriented x   [ ]Lethargic  [ ]Cachexia  [ ]Unarousable  [ ]Verbal  [ ]Non-Verbal  Behavioral:   [ ] Normal [ ] Anxiety  [ ] Delirium [ ] Agitation [ ] Other  HEENT:  [ ]Normal   [ ]Dry mouth   [ ]ET Tube/Trach  [ ]Oral lesions  PULMONARY:   [ ]Clear [ ]Tachypnea  [ ]Audible excessive secretions   [ ]Rhonchi        [ ]Right [ ]Left [ ]Bilateral  [ ]Crackles        [ ]Right [ ]Left [ ]Bilateral  [ ]Wheezing     [ ]Right [ ]Left [ ]Bilatera  [ ]Diminished breath sounds [ ]right [ ]left [ ]bilateral  CARDIOVASCULAR:    [ ]Regular [ ]Irregular [ ]Tachy  [ ]David [ ]Murmur [ ]Other  GASTROINTESTINAL:  [ ]Soft  [ ]Distended   [ ]+BS  [ ]Non tender [ ]Tender  [ ]PEG [ ]OGT/ NGT  Last BM:     GENITOURINARY:  [ ]Normal [ ] Incontinent   [ ]Oliguria/Anuria   [ ]Page  MUSCULOSKELETAL:   [ ]Normal   [ ]Weakness  [ ]Bed/Wheelchair bound [ ]Edema  NEUROLOGIC:   [ ]No focal deficits  [ ]Cognitive impairment  [ ]Dysphagia [ ]Dysarthria [ ]Paresis [ ]Other   SKIN:   [ ]Normal    [ ]Rash  [ ]Pressure ulcer(s)       Present on admission [ ]y [ ]n    CRITICAL CARE:  [ ] Shock Present  [ ]Septic [ ]Cardiogenic [ ]Neurologic [ ]Hypovolemic  [ ]  Vasopressors [ ]  Inotropes   [ ]Respiratory failure present [ ]Mechanical ventilation [ ]Non-invasive ventilatory support [ ]High flow  [ ]Acute  [ ]Chronic [ ]Hypoxic  [ ]Hypercarbic [ ]Other  [ ]Other organ failure     LABS:                        7.6    1.08  )-----------( 158      ( 03 Jan 2020 09:58 )             23.8   01-03    138  |  97  |  10  ----------------------------<  110<H>  3.3<L>   |  28  |  0.79    Ca    8.1<L>      03 Jan 2020 07:48  Phos  2.8     01-03  Mg     1.3     01-03    TPro  5.5<L>  /  Alb  2.7<L>  /  TBili  0.9  /  DBili  0.4<H>  /  AST  31  /  ALT  14  /  AlkPhos  370<H>  01-03        RADIOLOGY & ADDITIONAL STUDIES:    MRA head 12/29:    Ischemia in cerebral and cerebellar hemispheres likely embolic, no hemorrhagic transformation. Cavernous and supraclinoid calcification without flow-limiting stenosis, stenosis of proximal left M2 and distal JEFFERY, MCA and PCA stenosis as above.    Volume loss, microvascular disease, no new hemorrhage or midline shift. Slightly tortuous vessels, likely from hypertension, no hemodynamically significant stenosis at ICA origins by NASCET criteria. Patent vertebral arteries.    PROTEIN CALORIE MALNUTRITION PRESENT: [ ]mild [ ]moderate [ ]severe [ ]underweight [ ]morbid obesity  https://www.andeal.org/vault/2440/web/files/ONC/Table_Clinical%20Characteristics%20to%20Document%20Malnutrition-White%20JV%20et%20al%202012.pdf    Height (cm): 177.8 (12-27-19 @ 07:59), 179 (12-19-19 @ 08:15), 177.8 (12-12-19 @ 20:37)  Weight (kg): 68.1 (12-27-19 @ 07:59), 64.41 (12-19-19 @ 08:15), 65.8 (12-12-19 @ 20:37)  BMI (kg/m2): 21.5 (12-27-19 @ 07:59), 20.1 (12-19-19 @ 08:15), 20.8 (12-12-19 @ 20:37)    [ ]PPSV2 < or = to 30% [ ]significant weight loss  [ ]poor nutritional intake  [ ]anasarca     Albumin, Serum: 2.7 g/dL (01-03-20 @ 07:48)   [ ]Artificial Nutrition      REFERRALS:   [ ]Chaplaincy  [ ]Hospice  [ ]Child Life  [ ]Social Work  [ ]Case management [ ]Holistic Therapy     Goals of Care Document: HPI: 69M with cholangiocarcinoma s/p Whipple, portal vein thrombosis (s/p RT, on chemo), chronic back pain, GERD, IBS, and AF here with acute embolic stroke with acute L sided weakness. Course c/b ascites, s/p 4L paracentesis, and acute blood loss anemia, with no intervention planned per GI. Palliative care called for support and GOC.    PERTINENT PM/SXH:   Rash  Biliary stricture  Pancreatic lesion  Gastric polyp  Obstructive jaundice  Other hyperlipidemia  Essential hypertension  IBS (irritable bowel syndrome)  GERD (gastroesophageal reflux disease)    Intestinal Whipple's disease  S/P unilateral inguinal hernia repair  S/P tendon repair  History of lumbar laminectomy  History of biliary stent insertion  S/P lumbar spinal fusion  S/P cervical spinal fusion  No significant past surgical history    FAMILY HISTORY:  FH: CHF (congestive heart failure)  FH: prostate cancer  Family history of colitis    ITEMS NOT CHECKED ARE NOT PRESENT    SOCIAL HISTORY:   Significant other/partner[X ]  Children[ ]  Sabianist/Spirituality:  Substance hx:  [ ]   Tobacco hx:  [ ]   Alcohol hx: [ ]   Home Opioid hx:  [ ] I-Stop Reference No: [id number]      Rx Written	Rx Dispensed	Drug	Quantity	Days Supply	Prescriber Name  12/05/2019	12/07/2019	oxycodone hcl 5 mg tablet	42	7	[facility]  11/22/2019	11/22/2019	oxycodone-acetaminophen  mg tab	14	7	Jaky Lucero  11/07/2019	11/09/2019	diazepam 5 mg tablet	1	1	Korey Atkinson MD  10/31/2019	11/03/2019	diazepam 5 mg tablet	1	1	Iraida Foote K  10/10/2019	10/12/2019	oxycodone-acetaminophen  mg tab	20	10	Jonathon Alcantara  10/01/2019	10/01/2019	oxycodone-acetaminophen  mg tab	14	14	Jory Almazan    Living Situation: [ ]Home  [ ]Long term care  [ ]Rehab [ ]Other  Occupation:    ADVANCE DIRECTIVES:    DNR  MOLST  [ ]  Living Will  [ ]   DECISION MAKER(s):  [ ] Health Care Proxy(s)  [ ] Surrogate(s)  [ ] Guardian           Name(s): Phone Number(s): spouse Elle    BASELINE (I)ADL(s) (prior to admission):  [place]: [ ]Total  [ ] Moderate [ ]Dependent    Allergies    penicillin G benzathine (Rash)  sulfa drugs (Rash)    Intolerances    MEDICATIONS  (STANDING):  atorvastatin 40 milliGRAM(s) Oral at bedtime  ciprofloxacin     Tablet 500 milliGRAM(s) Oral daily  enoxaparin Injectable 70 milliGRAM(s) SubCutaneous every 12 hours  fluconAZOLE   Tablet 200 milliGRAM(s) Oral daily  furosemide    Tablet 40 milliGRAM(s) Oral daily  gabapentin 300 milliGRAM(s) Oral at bedtime  magnesium sulfate  IVPB 2 Gram(s) IV Intermittent once  metoprolol succinate ER 25 milliGRAM(s) Oral daily  pantoprazole    Tablet 40 milliGRAM(s) Oral before breakfast  potassium chloride    Tablet ER 40 milliEquivalent(s) Oral every 4 hours  sertraline 25 milliGRAM(s) Oral daily  spironolactone 12.5 milliGRAM(s) Oral daily    MEDICATIONS  (PRN):  acetaminophen   Tablet .. 650 milliGRAM(s) Oral every 4 hours PRN Mild Pain (1 - 3)  aluminum hydroxide/magnesium hydroxide/simethicone Suspension 30 milliLiter(s) Oral every 6 hours PRN Dyspepsia  famotidine    Tablet 20 milliGRAM(s) Oral two times a day PRN GERD  oxyCODONE    IR 5 milliGRAM(s) Oral every 6 hours PRN Moderate Pain (4 - 6)  simethicone 80 milliGRAM(s) Chew two times a day PRN Gas        PRESENT SYMPTOMS: [ ]Unable to obtain due to poor mentation   Source if other than patient:  [ ]Family   [ ]Team     Pain: [ ]yes [ ]no  QOL impact -   Location -                    Aggravating factors -  Quality -  Radiation -  Timing-  Severity (0-10 scale):  Minimal acceptable level (0-10 scale):     PAIN AD Score:     [email or web address]/painad.[email or web address] (press ctrl +  left click to view)    Dyspnea:                           [ ]Mild [ ]Moderate [ ]Severe  Anxiety:                             [ ]Mild [ ]Moderate [ ]Severe  Fatigue:                             [ ]Mild [ ]Moderate [ ]Severe  Nausea:                             [ ]Mild [ ]Moderate [ ]Severe  Loss of appetite:              [ ]Mild [ ]Moderate [ ]Severe  Constipation:                    [ ]Mild [ ]Moderate [ ]Severe    Other Symptoms:  [ ]All other review of systems negative     Palliative Performance Status Version 2:         %    http://npcrc.org/files/news/palliative_performance_scale_ppsv2.pdf    PHYSICAL EXAM:  Vital Signs Last 24 Hrs  T(C): 37.1 (03 Jan 2020 11:25), Max: 37.4 (02 Jan 2020 20:39)  T(F): 98.8 (03 Jan 2020 11:25), Max: 99.3 (02 Jan 2020 20:39)  HR: 106 (03 Jan 2020 11:25) (85 - 106)  BP: 102/68 (03 Jan 2020 11:25) (102/68 - 111/66)  BP(mean): --  RR: 18 (03 Jan 2020 11:25) (17 - 18)  SpO2: 99% (03 Jan 2020 11:25) (96% - 99%)    GENERAL:  [ X]Alert  [ ]Oriented x   [ ]Lethargic  [ ]Cachexia  [ ]Unarousable  [ X]Verbal  [ ]Non-Verbal  Behavioral:   [ ] Normal [ ] Anxiety  [ ] Delirium [ ] Agitation [ ] Other  HEENT:  [ ]Normal   [ ]Dry mouth   [ ]ET Tube/Trach  [ ]Oral lesions  PULMONARY:   [X ]Clear [ ]Tachypnea  [ ]Audible excessive secretions   [ ]Rhonchi        [ ]Right [ ]Left [ ]Bilateral  [ ]Crackles        [ ]Right [ ]Left [ ]Bilateral  [ ]Wheezing     [ ]Right [ ]Left [ ]Bilatera  [ ]Diminished breath sounds [ ]right [ ]left [ ]bilateral  CARDIOVASCULAR:    [X ]Regular [ ]Irregular [ ]Tachy  [ ]David [ ]Murmur [ ]Other  GASTROINTESTINAL:  [ X]Soft  [ ]Distended   [ X]+BS  [X ]Non tender [ ]Tender  [ ]PEG [ ]OGT/ NGT  Last BM:     GENITOURINARY:  [ ]Normal [ ] Incontinent   [ ]Oliguria/Anuria   [ ]Page  MUSCULOSKELETAL:   [ ]Normal   [ ]Weakness  [ ]Bed/Wheelchair bound [ ]Edema  NEUROLOGIC:   [X ]No focal deficits  [ ]Cognitive impairment  [ ]Dysphagia [ ]Dysarthria [ ]Paresis [ ]Other   SKIN:   [ ]Normal    [ ]Rash  [ ]Pressure ulcer(s)       Present on admission [ ]y [ ]n    CRITICAL CARE:  [ ] Shock Present  [ ]Septic [ ]Cardiogenic [ ]Neurologic [ ]Hypovolemic  [ ]  Vasopressors [ ]  Inotropes   [ ]Respiratory failure present [ ]Mechanical ventilation [ ]Non-invasive ventilatory support [ ]High flow  [ ]Acute  [ ]Chronic [ ]Hypoxic  [ ]Hypercarbic [ ]Other  [ ]Other organ failure     LABS: reviewed                        7.6    1.08  )-----------( 158      ( 03 Jan 2020 09:58 )             23.8   01-03    138  |  97  |  10  ----------------------------<  110<H>  3.3<L>   |  28  |  0.79    Ca    8.1<L>      03 Jan 2020 07:48  Phos  2.8     01-03  Mg     1.3     01-03    TPro  5.5<L>  /  Alb  2.7<L>  /  TBili  0.9  /  DBili  0.4<H>  /  AST  31  /  ALT  14  /  AlkPhos  370<H>  01-03        RADIOLOGY & ADDITIONAL STUDIES:    MRA head 12/29:    Ischemia in cerebral and cerebellar hemispheres likely embolic, no hemorrhagic transformation. Cavernous and supraclinoid calcification without flow-limiting stenosis, stenosis of proximal left M2 and distal JEFFERY, MCA and PCA stenosis as above.    Volume loss, microvascular disease, no new hemorrhage or midline shift. Slightly tortuous vessels, likely from hypertension, no hemodynamically significant stenosis at ICA origins by NASCET criteria. Patent vertebral arteries.    PROTEIN CALORIE MALNUTRITION PRESENT: [ ]mild [ ]moderate [ ]severe [ ]underweight [ ]morbid obesity  https://www.andeal.org/vault/2440/web/files/ONC/Table_Clinical%20Characteristics%20to%20Document%20Malnutrition-White%20JV%20et%20al%202012.pdf    Height (cm): 177.8 (12-27-19 @ 07:59), 179 (12-19-19 @ 08:15), 177.8 (12-12-19 @ 20:37)  Weight (kg): 68.1 (12-27-19 @ 07:59), 64.41 (12-19-19 @ 08:15), 65.8 (12-12-19 @ 20:37)  BMI (kg/m2): 21.5 (12-27-19 @ 07:59), 20.1 (12-19-19 @ 08:15), 20.8 (12-12-19 @ 20:37)    [ ]PPSV2 < or = to 30% [ ]significant weight loss  [ ]poor nutritional intake  [ ]anasarca     Albumin, Serum: 2.7 g/dL (01-03-20 @ 07:48)   [ ]Artificial Nutrition      REFERRALS:   [ ]Chaplaincy  [ ]Hospice  [ ]Child Life  [ ]Social Work  [ ]Case management [ ]Holistic Therapy     Goals of Care Document:

## 2020-01-03 NOTE — CONSULT NOTE ADULT - ASSESSMENT
69M with cholangiocarcinoma s/p Whipple, portal vein thrombosis (s/p RT, on chemo), chronic back pain, GERD, IBS, and AF here with acute embolic stroke with acute L sided weakness. Course c/b ascites, s/p 4L paracentesis, and acute blood loss anemia, with no intervention planned per GI. Palliative care called for support and GOC.

## 2020-01-03 NOTE — PROGRESS NOTE ADULT - PROBLEM SELECTOR PLAN 1
Patient with acute left sided weakness with findings of new infarcts on MRI suggestive of embolic in nature. hypercoagulable state/NBTE in setting of malignancy.   - patient with recent diagnosis of afib, however no documented EKG with this rhythm nor has he been in the is rhythm while maintained on TELE inpatient.   -PT/OT, PMR consult.   -Lipitor   -resume back on AC (lovenox now as per neuro and onc)

## 2020-01-03 NOTE — PROVIDER CONTACT NOTE (CRITICAL VALUE NOTIFICATION) - ASSESSMENT
Pt. asymptomatic.
EKG done patient in sinus rhythm with intermittent junctional beats
asleep nut arousable, vs as recorded
patient on hepatin gtt at 12 ml/hr

## 2020-01-03 NOTE — CONSULT NOTE ADULT - PROBLEM SELECTOR RECOMMENDATION 3
- follows with Dr. Atkinson  - poor prognosis, will continue to discuss GOC with patient and family at subsequent visits

## 2020-01-03 NOTE — PROGRESS NOTE ADULT - SUBJECTIVE AND OBJECTIVE BOX
[facility] Division of Hospital Medicine  Yudith Monsalve MD  Pager ([facility], 5B-4T): [phone]  Other Times:  [phone]    Patient is a 69y old  Male who presents with a chief complaint of Hypotension x 1 day (03 Jan 2020 12:08)      SUBJECTIVE / OVERNIGHT EVENTS:  Feeling ok denies any complaints. afebrile.   no acute issues overnight.     ADDITIONAL REVIEW OF SYSTEMS:    MEDICATIONS  (STANDING):  atorvastatin 40 milliGRAM(s) Oral at bedtime  ciprofloxacin     Tablet 500 milliGRAM(s) Oral daily  enoxaparin Injectable 70 milliGRAM(s) SubCutaneous every 12 hours  fluconAZOLE   Tablet 200 milliGRAM(s) Oral daily  furosemide    Tablet 40 milliGRAM(s) Oral daily  gabapentin 300 milliGRAM(s) Oral at bedtime  metoprolol succinate ER 25 milliGRAM(s) Oral daily  pantoprazole    Tablet 40 milliGRAM(s) Oral before breakfast  spironolactone 12.5 milliGRAM(s) Oral daily    MEDICATIONS  (PRN):  acetaminophen   Tablet .. 650 milliGRAM(s) Oral every 4 hours PRN Mild Pain (1 - 3)  aluminum hydroxide/magnesium hydroxide/simethicone Suspension 30 milliLiter(s) Oral every 6 hours PRN Dyspepsia  famotidine    Tablet 20 milliGRAM(s) Oral two times a day PRN GERD  oxyCODONE    IR 5 milliGRAM(s) Oral every 6 hours PRN Moderate Pain (4 - 6)  simethicone 80 milliGRAM(s) Chew two times a day PRN Gas      CAPILLARY BLOOD GLUCOSE        I&O's Summary    02 Jan 2020 07:01  -  03 Jan 2020 07:00  --------------------------------------------------------  IN: 360 mL / OUT: 0 mL / NET: 360 mL    03 Jan 2020 07:01  -  03 Jan 2020 13:06  --------------------------------------------------------  IN: 120 mL / OUT: 0 mL / NET: 120 mL        PHYSICAL EXAM:  Vital Signs Last 24 Hrs  T(C): 37.1 (03 Jan 2020 04:20), Max: 37.4 (02 Jan 2020 20:39)  T(F): 98.8 (03 Jan 2020 04:20), Max: 99.3 (02 Jan 2020 20:39)  HR: 86 (03 Jan 2020 04:20) (85 - 86)  BP: 111/66 (03 Jan 2020 04:20) (108/71 - 111/66)  BP(mean): --  RR: 18 (03 Jan 2020 04:20) (17 - 18)  SpO2: 96% (03 Jan 2020 04:20) (96% - 98%)    CONSTITUTIONAL: NAD, well-groomed  EYES:  conjunctiva and sclera clear  ENMT: Moist oral mucosa  NECK: Supple, no palpable masses; no JVD  RESPIRATORY: Normal respiratory effort; lungs are clear to auscultation bilaterally  CARDIOVASCULAR: Regular rate and rhythm, normal S1 and S2, no murmur/rub/gallop; No lower extremity edema  ABDOMEN: +pleurax Nontender to palpation, normoactive bowel sounds, no rebound/guarding  MUSCULOSKELETAL:  no clubbing or cyanosis of digits; no joint swelling or tenderness to palpation  PSYCH: A+O to person, place, and time; affect appropriate  SKIN: No rashes; no palpable lesions  NEURO: right facial droop, LUE weakness. sensation intact.     LABS:                        7.6    1.08  )-----------( 158      ( 03 Jan 2020 09:58 )             23.8     01-03    138  |  97  |  10  ----------------------------<  110<H>  3.3<L>   |  28  |  0.79    Ca    8.1<L>      03 Jan 2020 07:48  Phos  2.8     01-03  Mg     1.3     01-03    TPro  5.5<L>  /  Alb  2.7<L>  /  TBili  0.9  /  DBili  0.4<H>  /  AST  31  /  ALT  14  /  AlkPhos  370<H>  01-03                RADIOLOGY & ADDITIONAL TESTS:  Results Reviewed:   Imaging Personally Reviewed:  Electrocardiogram Personally Reviewed:    COORDINATION OF CARE:  Care Discussed with Consultants/Other Providers [Y/N]:  Prior or Outpatient Records Reviewed [Y/N]:

## 2020-01-03 NOTE — PROGRESS NOTE ADULT - ATTENDING COMMENTS
Extensive 1 hour family meeting with wife, daughter, son, daughter in law, brother today along with Onc, SW, CM, nurse manager.  Discussed in details about current medical condition and future goal/plans.   Plan for likely discharge to rehab (acute vs [facility]) next week. PMR consult today pending.

## 2020-01-03 NOTE — CONSULT NOTE ADULT - SUBJECTIVE AND OBJECTIVE BOX
Patient is a 69y old  Male who presents with a chief complaint of Hypotension x 1 day (03 Jan 2020 12:08)      Admission  HPI:  Patient is a 70 yo M with PMH of cholangiocarcinoma (dx in 2017 s/p Whipple procedure with liver mets found in 2019 after finding of clots in portal vein, also s/p multiple rounds of radiation treatment) currently on chemotherapy, last session 12/19) ,  chronic back pain, GERD, IBS with diarrhea, and recently diagnosed atrial fibrillation, multiple  recent admissions ( most recent Dec 12-17) for fevers , work up has been non revealing , patient was treated with vanc, cefepime and metronidazole  s/p paracentesis  Fluid analysis not c/w SBP , ascites fluid w/ no growth , patient was discharged home on ciprofloxacin , he now presents for hypotension.  Per patients wife , patient was feeling weak and had hypotension on 12/21, at that time her was given IV fluids at [facility] and improved. ON day of admission , wife reports patient was on again lethargic and generally weak , he was unable to get out of bed. Per wife , patient has poor appetite and hardly eating . Patient reports GERD symptoms poorly responsive to Maalox and simethicone . Patient also reports expanding ascites that is uncomfortable. He reports no fever or chills , no runny nose sore throat , no diarrhea , no dysuria . (25 Dec 2019 04:38)    Interval History:  Patient with weakness- had CT Head - Compared to the prior CT study from 12/02/2019, there has been interval development of multiple new small infarcts as described. There is no evidence for intracranial hemorrhage. The exam findings suggest embolic disease. Considerfollow-up brain MRI and brain/neck MRA imaging further workup if clinically warranted.  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  Patient s/p paracentesis for ascites. Course also complicated by anemia.     REVIEW OF SYSTEMS: No chest pain, shortness of breath, nausea, vomiting or diarhea; other ROS neg     PAST MEDICAL & SURGICAL HISTORY  Rash  Biliary stricture  Pancreatic lesion  Gastric polyp  Obstructive jaundice  Other hyperlipidemia  Essential hypertension  IBS (irritable bowel syndrome)  GERD (gastroesophageal reflux disease)  Intestinal Whipple's disease  S/P unilateral inguinal hernia repair  S/P tendon repair  History of lumbar laminectomy  History of biliary stent insertion  S/P lumbar spinal fusion  S/P cervical spinal fusion    FUNCTIONAL HISTORY:   Lives w wife in a house  PTA Independent ambulation (uses cane for long distances) and I with ADLs    CURRENT FUNCTIONAL STATUS:  Min A transfers.    FAMILY HISTORY   FH: CHF (congestive heart failure)  FH: prostate cancer  Family history of colitis  No pertinent family history in first degree relatives  Family history of prostate cancer  Family history of coronary artery disease      RECENT LABS/IMAGING  CBC Full  -  ( 03 Jan 2020 09:58 )  WBC Count : 1.08 K/uL  RBC Count : 2.78 M/uL  Hemoglobin : 7.6 g/dL  Hematocrit : 23.8 %  Platelet Count - Automated : 158 K/uL  Mean Cell Volume : 85.6 fl  Mean Cell Hemoglobin : 27.3 pg  Mean Cell Hemoglobin Concentration : 31.9 gm/dL  Auto Neutrophil # : x  Auto Lymphocyte # : x  Auto Monocyte # : x  Auto Eosinophil # : x  Auto Basophil # : x  Auto Neutrophil % : x  Auto Lymphocyte % : x  Auto Monocyte % : x  Auto Eosinophil % : x  Auto Basophil % : x    01-03    138  |  97  |  10  ----------------------------<  110<H>  3.3<L>   |  28  |  0.79    Ca    8.1<L>      03 Jan 2020 07:48  Phos  2.8     01-03  Mg     1.3     01-03    TPro  5.5<L>  /  Alb  2.7<L>  /  TBili  0.9  /  DBili  0.4<H>  /  AST  31  /  ALT  14  /  AlkPhos  370<H>  01-03        VITALS  T(C): 37.1 (01-03-20 @ 04:20), Max: 37.4 (01-02-20 @ 20:39)  HR: 86 (01-03-20 @ 04:20) (85 - 86)  BP: 111/66 (01-03-20 @ 04:20) (108/71 - 111/66)  RR: 18 (01-03-20 @ 04:20) (17 - 18)  SpO2: 96% (01-03-20 @ 04:20) (96% - 98%)  Wt(kg): --    ALLERGIES  penicillin G benzathine (Rash)  sulfa drugs (Rash)      MEDICATIONS   acetaminophen   Tablet .. 650 milliGRAM(s) Oral every 4 hours PRN  aluminum hydroxide/magnesium hydroxide/simethicone Suspension 30 milliLiter(s) Oral every 6 hours PRN  atorvastatin 40 milliGRAM(s) Oral at bedtime  ciprofloxacin     Tablet 500 milliGRAM(s) Oral daily  enoxaparin Injectable 70 milliGRAM(s) SubCutaneous every 12 hours  famotidine    Tablet 20 milliGRAM(s) Oral two times a day PRN  fluconAZOLE   Tablet 200 milliGRAM(s) Oral daily  furosemide    Tablet 40 milliGRAM(s) Oral daily  gabapentin 300 milliGRAM(s) Oral at bedtime  metoprolol succinate ER 25 milliGRAM(s) Oral daily  oxyCODONE    IR 5 milliGRAM(s) Oral every 6 hours PRN  pantoprazole    Tablet 40 milliGRAM(s) Oral before breakfast  simethicone 80 milliGRAM(s) Chew two times a day PRN  spironolactone 12.5 milliGRAM(s) Oral daily      ----------------------------------------------------------------------------------------  PHYSICAL EXAM  Constitutional - NAD, Comfortable  HEENT - NCAT, EOMI  Neck - Supple, No limited ROM  Chest - CTA bilaterally, No wheeze, No rhonchi, No crackles  Cardiovascular - RRR, S1S2, No murmurs  Abdomen - BS+, Soft, NTND  Extremities - No C/C/E, No calf tenderness   Neurologic Exam -                    Cognitive - Awake, Alert, AAO to self, place, date, year, situation     Communication - Fluent, No dysarthria, no aphasia     Cranial Nerves - CN 2-12 intact     Motor - No focal deficits      Sensory - Intact to LT     Reflexes - DTR Intact, No primitive reflexive       Psychiatric - Mood stable, Affect WNL      Impression:    70 yo with functional deficits secondary to diagnoses of CVA and debility.      Plan:  PT- ROM, Bed Mob, Transfers, Amb w AD and bracing as needed  OT- ADLs, bracing  SLP- Dysphagia eval and treat  Prec- Falls, Cardiac, Pulm  DVT Prophylaxis- Lovenox  Monitor H/H and K+  Skin- Turn q2 h  Dispo- Patient is a 69y old  Male who presents with a chief complaint of Hypotension x 1 day (03 Jan 2020 12:08)      Admission  HPI:  Patient is a 68 yo M with PMH of cholangiocarcinoma (dx in 2017 s/p Whipple procedure with liver mets found in 2019 after finding of clots in portal vein, also s/p multiple rounds of radiation treatment) currently on chemotherapy, last session 12/19) ,  chronic back pain, GERD, IBS with diarrhea, and recently diagnosed atrial fibrillation, multiple  recent admissions ( most recent Dec 12-17) for fevers , work up has been non revealing , patient was treated with vanc, cefepime and metronidazole  s/p paracentesis  Fluid analysis not c/w SBP , ascites fluid w/ no growth , patient was discharged home on ciprofloxacin , he now presents for hypotension.  Per patients wife , patient was feeling weak and had hypotension on 12/21, at that time her was given IV fluids at [facility] and improved. ON day of admission , wife reports patient was on again lethargic and generally weak , he was unable to get out of bed. Per wife , patient has poor appetite and hardly eating . Patient reports GERD symptoms poorly responsive to Maalox and simethicone . Patient also reports expanding ascites that is uncomfortable. He reports no fever or chills , no runny nose sore throat , no diarrhea , no dysuria . (25 Dec 2019 04:38)    Interval History:  Patient with weakness- had CT Head - Compared to the prior CT study from 12/02/2019, there has been interval development of multiple new small infarcts as described. There is no evidence for intracranial hemorrhage. The exam findings suggest embolic disease. Considerfollow-up brain MRI and brain/neck MRA imaging further workup if clinically warranted.  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  Patient s/p paracentesis for ascites. Course also complicated by anemia.   Patient's daughter and son-in-law at bedside.    REVIEW OF SYSTEMS: + weakness (primarily L side but c/o generalized fatigue and weakness), No chest pain, shortness of breath, nausea, vomiting or diarhea; other ROS neg     PAST MEDICAL & SURGICAL HISTORY  Rash  Biliary stricture  Pancreatic lesion  Gastric polyp  Obstructive jaundice  Other hyperlipidemia  Essential hypertension  IBS (irritable bowel syndrome)  GERD (gastroesophageal reflux disease)  Intestinal Whipple's disease  S/P unilateral inguinal hernia repair  S/P tendon repair  History of lumbar laminectomy  History of biliary stent insertion  S/P lumbar spinal fusion  S/P cervical spinal fusion    FUNCTIONAL HISTORY:   Lives w wife in a house  PTA Independent ambulation (uses cane for long distances) and I with ADLs    CURRENT FUNCTIONAL STATUS:  Min A transfers.    FAMILY HISTORY   FH: CHF (congestive heart failure)  FH: prostate cancer  Family history of colitis  No pertinent family history in first degree relatives  Family history of prostate cancer  Family history of coronary artery disease      RECENT LABS/IMAGING  CBC Full  -  ( 03 Jan 2020 09:58 )  WBC Count : 1.08 K/uL  RBC Count : 2.78 M/uL  Hemoglobin : 7.6 g/dL  Hematocrit : 23.8 %  Platelet Count - Automated : 158 K/uL  Mean Cell Volume : 85.6 fl  Mean Cell Hemoglobin : 27.3 pg  Mean Cell Hemoglobin Concentration : 31.9 gm/dL  Auto Neutrophil # : x  Auto Lymphocyte # : x  Auto Monocyte # : x  Auto Eosinophil # : x  Auto Basophil # : x  Auto Neutrophil % : x  Auto Lymphocyte % : x  Auto Monocyte % : x  Auto Eosinophil % : x  Auto Basophil % : x    01-03    138  |  97  |  10  ----------------------------<  110<H>  3.3<L>   |  28  |  0.79    Ca    8.1<L>      03 Jan 2020 07:48  Phos  2.8     01-03  Mg     1.3     01-03    TPro  5.5<L>  /  Alb  2.7<L>  /  TBili  0.9  /  DBili  0.4<H>  /  AST  31  /  ALT  14  /  AlkPhos  370<H>  01-03        VITALS  T(C): 37.1 (01-03-20 @ 04:20), Max: 37.4 (01-02-20 @ 20:39)  HR: 86 (01-03-20 @ 04:20) (85 - 86)  BP: 111/66 (01-03-20 @ 04:20) (108/71 - 111/66)  RR: 18 (01-03-20 @ 04:20) (17 - 18)  SpO2: 96% (01-03-20 @ 04:20) (96% - 98%)  Wt(kg): --    ALLERGIES  penicillin G benzathine (Rash)  sulfa drugs (Rash)      MEDICATIONS   acetaminophen   Tablet .. 650 milliGRAM(s) Oral every 4 hours PRN  aluminum hydroxide/magnesium hydroxide/simethicone Suspension 30 milliLiter(s) Oral every 6 hours PRN  atorvastatin 40 milliGRAM(s) Oral at bedtime  ciprofloxacin     Tablet 500 milliGRAM(s) Oral daily  enoxaparin Injectable 70 milliGRAM(s) SubCutaneous every 12 hours  famotidine    Tablet 20 milliGRAM(s) Oral two times a day PRN  fluconAZOLE   Tablet 200 milliGRAM(s) Oral daily  furosemide    Tablet 40 milliGRAM(s) Oral daily  gabapentin 300 milliGRAM(s) Oral at bedtime  metoprolol succinate ER 25 milliGRAM(s) Oral daily  oxyCODONE    IR 5 milliGRAM(s) Oral every 6 hours PRN  pantoprazole    Tablet 40 milliGRAM(s) Oral before breakfast  simethicone 80 milliGRAM(s) Chew two times a day PRN  spironolactone 12.5 milliGRAM(s) Oral daily      ----------------------------------------------------------------------------------------  PHYSICAL EXAM  Constitutional - NAD, Comfortable, thin male  HEENT - NCAT, EOMI  Neck - Supple, No limited ROM  Chest - CTA bilaterally, No wheeze, No rhonchi, No crackles  Cardiovascular - RRR, S1S2, No murmurs  Abdomen - BS+, Soft, NTND  Extremities - No C/C/E, No calf tenderness   Neurologic Exam -                    Alert     Follows verbal instruction     LUE 0/5, LLE 3+/5, RUE/RLE- 4/5     Speech intelligible     Psychiatric - Mood stable, Affect WNL      Impression:    68 yo with functional deficits secondary to diagnoses of CVA and debility.      Plan:  PT- ROM, Bed Mob, Transfers, Amb w AD and bracing as needed  OT- ADLs, bracing  SLP- Dysphagia eval and treat  Prec- Falls, Cardiac, Pulm  DVT Prophylaxis- Lovenox  Monitor H/H and K+  Skin- Turn q2 h  Dispo- Will follow clinical course- at this time likely could not tolerate 3h/d of therapies to qualify for acute rehab and more likely appropriate for [facility]. If improvement noted may consider acute as patient with significant medical co-morbidities that require close medical oversight.

## 2020-01-03 NOTE — PROVIDER CONTACT NOTE (CRITICAL VALUE NOTIFICATION) - ACTION/TREATMENT ORDERED:
Cont. to monitor pt.
PA aware of WBC 1.08 and junctional rhythm
PA is aware, will continue to monitor
will continue monitor /and restart heparin gtt as ordered

## 2020-01-03 NOTE — PROGRESS NOTE ADULT - PROBLEM SELECTOR PLAN 4
WBC uptrended slightly today. H/H stable and plt improved.   d/w Onc fellow . started on Neupogen  s/p platelet transfusion as per Heme  Can cont with AC for now if plt remain >50

## 2020-01-03 NOTE — CONSULT NOTE ADULT - PROBLEM SELECTOR RECOMMENDATION 9
Full consult to follow shortly. Please page [phone] with any acute concerns. - c/w drainage  - planning for ascites drainage today  - d/w attending

## 2020-01-04 LAB
ANION GAP SERPL CALC-SCNC: 7 MMOL/L — SIGNIFICANT CHANGE UP (ref 5–17)
BUN SERPL-MCNC: 11 MG/DL — SIGNIFICANT CHANGE UP (ref 7–23)
CALCIUM SERPL-MCNC: 8.4 MG/DL — SIGNIFICANT CHANGE UP (ref 8.4–10.5)
CHLORIDE SERPL-SCNC: 94 MMOL/L — LOW (ref 96–108)
CO2 SERPL-SCNC: 30 MMOL/L — SIGNIFICANT CHANGE UP (ref 22–31)
CREAT SERPL-MCNC: 0.84 MG/DL — SIGNIFICANT CHANGE UP (ref 0.5–1.3)
GLUCOSE SERPL-MCNC: 163 MG/DL — HIGH (ref 70–99)
HCT VFR BLD CALC: 26.3 % — LOW (ref 39–50)
HGB BLD-MCNC: 8.2 G/DL — LOW (ref 13–17)
MAGNESIUM SERPL-MCNC: 1.7 MG/DL — SIGNIFICANT CHANGE UP (ref 1.6–2.6)
MCHC RBC-ENTMCNC: 27.2 PG — SIGNIFICANT CHANGE UP (ref 27–34)
MCHC RBC-ENTMCNC: 31.2 GM/DL — LOW (ref 32–36)
MCV RBC AUTO: 87.1 FL — SIGNIFICANT CHANGE UP (ref 80–100)
NRBC # BLD: 0 /100 WBCS — SIGNIFICANT CHANGE UP (ref 0–0)
PHOSPHATE SERPL-MCNC: 2.7 MG/DL — SIGNIFICANT CHANGE UP (ref 2.5–4.5)
PLATELET # BLD AUTO: 211 K/UL — SIGNIFICANT CHANGE UP (ref 150–400)
POTASSIUM SERPL-MCNC: 3.7 MMOL/L — SIGNIFICANT CHANGE UP (ref 3.5–5.3)
POTASSIUM SERPL-SCNC: 3.7 MMOL/L — SIGNIFICANT CHANGE UP (ref 3.5–5.3)
RBC # BLD: 3.02 M/UL — LOW (ref 4.2–5.8)
RBC # FLD: 22.7 % — HIGH (ref 10.3–14.5)
SODIUM SERPL-SCNC: 131 MMOL/L — LOW (ref 135–145)
WBC # BLD: 1.59 K/UL — LOW (ref 3.8–10.5)
WBC # FLD AUTO: 1.59 K/UL — LOW (ref 3.8–10.5)

## 2020-01-04 PROCEDURE — 99232 SBSQ HOSP IP/OBS MODERATE 35: CPT

## 2020-01-04 RX ORDER — MAGNESIUM SULFATE 500 MG/ML
2 VIAL (ML) INJECTION ONCE
Refills: 0 | Status: COMPLETED | OUTPATIENT
Start: 2020-01-04 | End: 2020-01-04

## 2020-01-04 RX ORDER — POTASSIUM CHLORIDE 20 MEQ
40 PACKET (EA) ORAL ONCE
Refills: 0 | Status: COMPLETED | OUTPATIENT
Start: 2020-01-04 | End: 2020-01-04

## 2020-01-04 RX ADMIN — Medication 50 GRAM(S): at 13:45

## 2020-01-04 RX ADMIN — OXYCODONE HYDROCHLORIDE 5 MILLIGRAM(S): 5 TABLET ORAL at 12:30

## 2020-01-04 RX ADMIN — Medication 40 MILLIGRAM(S): at 05:18

## 2020-01-04 RX ADMIN — Medication 25 MILLIGRAM(S): at 05:18

## 2020-01-04 RX ADMIN — OXYCODONE HYDROCHLORIDE 5 MILLIGRAM(S): 5 TABLET ORAL at 11:34

## 2020-01-04 RX ADMIN — ATORVASTATIN CALCIUM 40 MILLIGRAM(S): 80 TABLET, FILM COATED ORAL at 21:28

## 2020-01-04 RX ADMIN — ENOXAPARIN SODIUM 70 MILLIGRAM(S): 100 INJECTION SUBCUTANEOUS at 18:11

## 2020-01-04 RX ADMIN — Medication 40 MILLIEQUIVALENT(S): at 13:45

## 2020-01-04 RX ADMIN — SPIRONOLACTONE 12.5 MILLIGRAM(S): 25 TABLET, FILM COATED ORAL at 05:18

## 2020-01-04 RX ADMIN — GABAPENTIN 300 MILLIGRAM(S): 400 CAPSULE ORAL at 21:28

## 2020-01-04 RX ADMIN — Medication 500 MILLIGRAM(S): at 13:41

## 2020-01-04 RX ADMIN — ENOXAPARIN SODIUM 70 MILLIGRAM(S): 100 INJECTION SUBCUTANEOUS at 05:18

## 2020-01-04 RX ADMIN — FLUCONAZOLE 200 MILLIGRAM(S): 150 TABLET ORAL at 13:45

## 2020-01-04 RX ADMIN — SERTRALINE 25 MILLIGRAM(S): 25 TABLET, FILM COATED ORAL at 13:45

## 2020-01-04 RX ADMIN — PANTOPRAZOLE SODIUM 40 MILLIGRAM(S): 20 TABLET, DELAYED RELEASE ORAL at 05:18

## 2020-01-04 NOTE — PROGRESS NOTE ADULT - SUBJECTIVE AND OBJECTIVE BOX
HOSPITALIST NOTE    Dr. Matt Velez DO  Attending Physician  Division of Hospital Medicine  [facility]  Pager:  [phone]    SUBJECTIVE  Reports subacute neck pain with movement for about 6 weeks that has not worsened.  No other complaints    REVIEW OF SYSTEMS  CONSTITUTIONAL: No fevers. No chills  EYES/ENT: No visual changes. No discharge from eyes. No vertigo. No throat pain. No dysphagia.  NECK: No pain or stiffness or rigidity.  RESPIRATORY: No cough. No wheezing. No hemoptysis. No shortness of breath.  CARDIOVASCULAR: No chest pain. No palpitations.   GASTROINTESTINAL: No abdominal pain. No nausea. No vomiting. No hematemesis. No diarrhea. No constipation. No melena, No hematochezia.  GENITOURINARY: No dysuria. No hesitancy. No frequency. No hematuria.  NEUROLOGICAL: No numbness. No weakness. No change in speech. No fecal or urinary incontinence.   MSK: +neck pain.   SKIN: No itching or rashes.   PSYCH: Normal affect. Normal mood. No si. No hi.    Review of systems negative except for items noted above.      PAST MEDICAL & SURGICAL HISTORY:  Rash  Biliary stricture: s/p biliary stent  Pancreatic lesion  Gastric polyp  Obstructive jaundice  Other hyperlipidemia  Essential hypertension  IBS (irritable bowel syndrome)  GERD (gastroesophageal reflux disease)  Intestinal Whipple's disease  S/P unilateral inguinal hernia repair  S/P tendon repair: hand  History of lumbar laminectomy  History of biliary stent insertion: 2/21/2017  S/P cervical spinal fusion      MEDICATIONS  (STANDING):  atorvastatin 40 milliGRAM(s) Oral at bedtime  ciprofloxacin     Tablet 500 milliGRAM(s) Oral daily  enoxaparin Injectable 70 milliGRAM(s) SubCutaneous every 12 hours  fluconAZOLE   Tablet 200 milliGRAM(s) Oral daily  furosemide    Tablet 40 milliGRAM(s) Oral daily  gabapentin 300 milliGRAM(s) Oral at bedtime  magnesium sulfate  IVPB 2 Gram(s) IV Intermittent once  metoprolol succinate ER 25 milliGRAM(s) Oral daily  pantoprazole    Tablet 40 milliGRAM(s) Oral before breakfast  sertraline 25 milliGRAM(s) Oral daily  spironolactone 12.5 milliGRAM(s) Oral daily    MEDICATIONS  (PRN):  acetaminophen   Tablet .. 650 milliGRAM(s) Oral every 4 hours PRN Mild Pain (1 - 3)  aluminum hydroxide/magnesium hydroxide/simethicone Suspension 30 milliLiter(s) Oral every 6 hours PRN Dyspepsia  famotidine    Tablet 20 milliGRAM(s) Oral two times a day PRN GERD  oxyCODONE    IR 5 milliGRAM(s) Oral every 6 hours PRN Moderate Pain (4 - 6)  simethicone 80 milliGRAM(s) Chew two times a day PRN Gas      Allergies    penicillin G benzathine (Rash)  sulfa drugs (Rash)    Intolerances        T(C): 36.7 (01-04-20 @ 11:48), Max: 37.2 (01-03-20 @ 21:31)  T(F): 98.1 (01-04-20 @ 11:48), Max: 98.9 (01-03-20 @ 21:31)  HR: 75 (01-04-20 @ 11:48) (75 - 94)  BP: 102/64 (01-04-20 @ 11:48) (101/62 - 105/68)  ABP: --  ABP(mean): --  RR: 18 (01-04-20 @ 11:48) (18 - 18)  SpO2: 100% (01-04-20 @ 11:48) (98% - 100%)      CONSTITUTIONAL: No acute distress.   HEENT:  Conjunctiva clear B/L. Nasal mucosa normal. Moist oral mucosa.    Cardiovascular: RRR with no murmurs. No JVD noted. No lower extremity edema B/L. Extremities are warm and well perfused. Radial pulses 2+ B/L. Dorsalis pedis pulses 2+ B/L. Capillary refill <2s  Respiratory: Lungs CTAB. No accessory muscle use.   Gastrointestinal:  Soft, nontender. Less distended after drainage. Peritoneal catheter noted.  Non-rigid. No CVA tenderness B/L.  MSK:  No joint swelling. No joint erythema B/L. No midline spinal tenderness. No neck stiffness. Neck with mild reduce ROM on rotational axis b/l.   Neurologic:  Alert and awake. Oriented x3. Moving all extremities. LUE weakness 3/5. Otherwise 4/5 in other extremities.   Skin:  No rashes noted. No skin erythema noted.   Psych:  Normal affect. Normal Mood.     LABS                        8.2    1.59  )-----------( 211      ( 04 Jan 2020 12:18 )             26.3     01-04    131<L>  |  94<L>  |  11  ----------------------------<  163<H>  3.7   |  30  |  0.84    Ca    8.4      04 Jan 2020 12:17  Phos  2.7     01-04  Mg     1.7     01-04    TPro  5.5<L>  /  Alb  2.7<L>  /  TBili  0.9  /  DBili  0.4<H>  /  AST  31  /  ALT  14  /  AlkPhos  370<H>  01-03          ADDITIONAL STUDIES PERSONALLY REVIEWED

## 2020-01-05 LAB
ANION GAP SERPL CALC-SCNC: 13 MMOL/L — SIGNIFICANT CHANGE UP (ref 5–17)
BUN SERPL-MCNC: 10 MG/DL — SIGNIFICANT CHANGE UP (ref 7–23)
CALCIUM SERPL-MCNC: 8.3 MG/DL — LOW (ref 8.4–10.5)
CHLORIDE SERPL-SCNC: 94 MMOL/L — LOW (ref 96–108)
CO2 SERPL-SCNC: 25 MMOL/L — SIGNIFICANT CHANGE UP (ref 22–31)
CREAT SERPL-MCNC: 0.83 MG/DL — SIGNIFICANT CHANGE UP (ref 0.5–1.3)
GLUCOSE SERPL-MCNC: 128 MG/DL — HIGH (ref 70–99)
HCT VFR BLD CALC: 27 % — LOW (ref 39–50)
HGB BLD-MCNC: 8.5 G/DL — LOW (ref 13–17)
MAGNESIUM SERPL-MCNC: 1.9 MG/DL — SIGNIFICANT CHANGE UP (ref 1.6–2.6)
MCHC RBC-ENTMCNC: 27.2 PG — SIGNIFICANT CHANGE UP (ref 27–34)
MCHC RBC-ENTMCNC: 31.5 GM/DL — LOW (ref 32–36)
MCV RBC AUTO: 86.3 FL — SIGNIFICANT CHANGE UP (ref 80–100)
PHOSPHATE SERPL-MCNC: 3.2 MG/DL — SIGNIFICANT CHANGE UP (ref 2.5–4.5)
PLATELET # BLD AUTO: 269 K/UL — SIGNIFICANT CHANGE UP (ref 150–400)
POTASSIUM SERPL-MCNC: 4.2 MMOL/L — SIGNIFICANT CHANGE UP (ref 3.5–5.3)
POTASSIUM SERPL-SCNC: 4.2 MMOL/L — SIGNIFICANT CHANGE UP (ref 3.5–5.3)
RBC # BLD: 3.13 M/UL — LOW (ref 4.2–5.8)
RBC # FLD: 22.6 % — HIGH (ref 10.3–14.5)
SODIUM SERPL-SCNC: 132 MMOL/L — LOW (ref 135–145)
WBC # BLD: 3.73 K/UL — LOW (ref 3.8–10.5)
WBC # FLD AUTO: 3.73 K/UL — LOW (ref 3.8–10.5)

## 2020-01-05 PROCEDURE — 99232 SBSQ HOSP IP/OBS MODERATE 35: CPT

## 2020-01-05 RX ADMIN — Medication 500 MILLIGRAM(S): at 11:30

## 2020-01-05 RX ADMIN — Medication 40 MILLIGRAM(S): at 05:12

## 2020-01-05 RX ADMIN — GABAPENTIN 300 MILLIGRAM(S): 400 CAPSULE ORAL at 21:31

## 2020-01-05 RX ADMIN — OXYCODONE HYDROCHLORIDE 5 MILLIGRAM(S): 5 TABLET ORAL at 12:05

## 2020-01-05 RX ADMIN — OXYCODONE HYDROCHLORIDE 5 MILLIGRAM(S): 5 TABLET ORAL at 20:00

## 2020-01-05 RX ADMIN — SERTRALINE 25 MILLIGRAM(S): 25 TABLET, FILM COATED ORAL at 17:28

## 2020-01-05 RX ADMIN — ATORVASTATIN CALCIUM 40 MILLIGRAM(S): 80 TABLET, FILM COATED ORAL at 21:33

## 2020-01-05 RX ADMIN — PANTOPRAZOLE SODIUM 40 MILLIGRAM(S): 20 TABLET, DELAYED RELEASE ORAL at 05:12

## 2020-01-05 RX ADMIN — SPIRONOLACTONE 12.5 MILLIGRAM(S): 25 TABLET, FILM COATED ORAL at 05:12

## 2020-01-05 RX ADMIN — ENOXAPARIN SODIUM 70 MILLIGRAM(S): 100 INJECTION SUBCUTANEOUS at 05:12

## 2020-01-05 RX ADMIN — OXYCODONE HYDROCHLORIDE 5 MILLIGRAM(S): 5 TABLET ORAL at 19:09

## 2020-01-05 RX ADMIN — ENOXAPARIN SODIUM 70 MILLIGRAM(S): 100 INJECTION SUBCUTANEOUS at 17:28

## 2020-01-05 RX ADMIN — OXYCODONE HYDROCHLORIDE 5 MILLIGRAM(S): 5 TABLET ORAL at 11:29

## 2020-01-05 RX ADMIN — Medication 25 MILLIGRAM(S): at 05:12

## 2020-01-05 NOTE — PROGRESS NOTE ADULT - PROBLEM SELECTOR PLAN 2
s/p pigtail catheter placement for recurrent ascitics  on Cipro for SBP ppx   -cont diuretics in addition to likely weekly drainage via cath. every Friday 2-3 liters

## 2020-01-05 NOTE — PROGRESS NOTE ADULT - SUBJECTIVE AND OBJECTIVE BOX
HOSPITALIST NOTE    Dr. Matt Velez DO  Attending Physician  Division of Hospital Medicine  [facility]  Pager:  [phone]    SUBJECTIVE  No acute complaints.     REVIEW OF SYSTEMS  CONSTITUTIONAL: No fevers. No chills  EYES/ENT: No visual changes. No discharge from eyes. No vertigo. No throat pain. No dysphagia.  NECK: No pain or stiffness or rigidity.  RESPIRATORY: No cough. No wheezing. No hemoptysis. No shortness of breath.  CARDIOVASCULAR: No chest pain. No palpitations.   GASTROINTESTINAL: No abdominal pain. No nausea. No vomiting. No hematemesis. No diarrhea. No constipation. No melena, No hematochezia.  GENITOURINARY: No dysuria. No hesitancy. No frequency. No hematuria.  NEUROLOGICAL: No numbness. + weakness. No change in speech. No fecal or urinary incontinence.   MSK: No joint swelling or erythema. No back pain.  SKIN: No itching or rashes.   PSYCH: Normal affect. Normal mood. No si. No hi.    Review of systems negative except for items noted above.      PAST MEDICAL & SURGICAL HISTORY:  Rash  Biliary stricture: s/p biliary stent  Pancreatic lesion  Gastric polyp  Obstructive jaundice  Other hyperlipidemia  Essential hypertension  IBS (irritable bowel syndrome)  GERD (gastroesophageal reflux disease)  Intestinal Whipple's disease  S/P unilateral inguinal hernia repair  S/P tendon repair: hand  History of lumbar laminectomy  History of biliary stent insertion: 2/21/2017  S/P cervical spinal fusion      MEDICATIONS  (STANDING):  atorvastatin 40 milliGRAM(s) Oral at bedtime  ciprofloxacin     Tablet 500 milliGRAM(s) Oral daily  enoxaparin Injectable 70 milliGRAM(s) SubCutaneous every 12 hours  furosemide    Tablet 40 milliGRAM(s) Oral daily  gabapentin 300 milliGRAM(s) Oral at bedtime  metoprolol succinate ER 25 milliGRAM(s) Oral daily  pantoprazole    Tablet 40 milliGRAM(s) Oral before breakfast  sertraline 25 milliGRAM(s) Oral daily  spironolactone 12.5 milliGRAM(s) Oral daily    MEDICATIONS  (PRN):  acetaminophen   Tablet .. 650 milliGRAM(s) Oral every 4 hours PRN Mild Pain (1 - 3)  aluminum hydroxide/magnesium hydroxide/simethicone Suspension 30 milliLiter(s) Oral every 6 hours PRN Dyspepsia  famotidine    Tablet 20 milliGRAM(s) Oral two times a day PRN GERD  oxyCODONE    IR 5 milliGRAM(s) Oral every 6 hours PRN Moderate Pain (4 - 6)  simethicone 80 milliGRAM(s) Chew two times a day PRN Gas      Allergies    penicillin G benzathine (Rash)  sulfa drugs (Rash)    Intolerances        T(C): 36.8 (01-05-20 @ 11:25), Max: 37.1 (01-04-20 @ 20:34)  T(F): 98.2 (01-05-20 @ 11:25), Max: 98.8 (01-04-20 @ 20:34)  HR: 84 (01-05-20 @ 11:25) (84 - 91)  BP: 109/69 (01-05-20 @ 11:25) (103/61 - 109/69)  ABP: --  ABP(mean): --  RR: 18 (01-05-20 @ 11:25) (18 - 18)  SpO2: 98% (01-05-20 @ 11:25) (98% - 98%)      CONSTITUTIONAL: No acute distress.   HEENT:  Conjunctiva clear B/L. Nasal mucosa normal. Moist oral mucosa.    Cardiovascular: RRR with no murmurs. No JVD noted. No lower extremity edema B/L. Extremities are warm and well perfused. Radial pulses 2+ B/L. Dorsalis pedis pulses 2+ B/L. Capillary refill <2s  Respiratory: Lungs CTAB. No accessory muscle use.   Gastrointestinal:  Soft, nontender. Less distended after drainage. Peritoneal catheter noted.  Non-rigid. No CVA tenderness B/L.  MSK:  No joint swelling. No joint erythema B/L. No midline spinal tenderness. No neck stiffness. Neck with mild reduce ROM on rotational axis b/l.   Neurologic:  Alert and awake. Oriented x3. Moving all extremities. LUE weakness 3/5. Otherwise 4/5 in other extremities.   Skin:  No rashes noted. No skin erythema noted.   Psych:  Normal affect. Normal Mood.     LABS                        8.5    3.73  )-----------( 269      ( 05 Jan 2020 10:07 )             27.0     01-05    132<L>  |  94<L>  |  10  ----------------------------<  128<H>  4.2   |  25  |  0.83    Ca    8.3<L>      05 Jan 2020 07:26  Phos  3.2     01-05  Mg     1.9     01-05

## 2020-01-05 NOTE — PROGRESS NOTE ADULT - PROBLEM SELECTOR PLAN 1
Patient with acute left sided weakness with findings of new infarcts on MRI suggestive of embolic in nature. hypercoagulable state/NBTE in setting of malignancy.   - patient with recent diagnosis of afib, however no documented EKG with this rhythm nor has he been in the is rhythm while maintained on TELE inpatient.   -PT/OT, PMR consult.   -Lipitor   -resumed back on AC (lovenox now as per neuro and onc)

## 2020-01-05 NOTE — PROGRESS NOTE ADULT - PROBLEM SELECTOR PLAN 4
d/w Onc fellow . started on Neupogen  s/p platelet transfusion as per Heme  Can cont with AC for now if plt remain >50  No clear s/s of bacterial infection. Continue to monitor off additional abx.

## 2020-01-06 ENCOUNTER — APPOINTMENT (OUTPATIENT)
Dept: INFUSION THERAPY | Facility: HOSPITAL | Age: 70
End: 2020-01-06

## 2020-01-06 LAB
ANION GAP SERPL CALC-SCNC: 11 MMOL/L — SIGNIFICANT CHANGE UP (ref 5–17)
BUN SERPL-MCNC: 11 MG/DL — SIGNIFICANT CHANGE UP (ref 7–23)
CALCIUM SERPL-MCNC: 8.6 MG/DL — SIGNIFICANT CHANGE UP (ref 8.4–10.5)
CHLORIDE SERPL-SCNC: 88 MMOL/L — LOW (ref 96–108)
CO2 SERPL-SCNC: 31 MMOL/L — SIGNIFICANT CHANGE UP (ref 22–31)
CREAT SERPL-MCNC: 0.87 MG/DL — SIGNIFICANT CHANGE UP (ref 0.5–1.3)
GLUCOSE SERPL-MCNC: 117 MG/DL — HIGH (ref 70–99)
HCT VFR BLD CALC: 28.2 % — LOW (ref 39–50)
HGB BLD-MCNC: 8.9 G/DL — LOW (ref 13–17)
MAGNESIUM SERPL-MCNC: 1.6 MG/DL — SIGNIFICANT CHANGE UP (ref 1.6–2.6)
MCHC RBC-ENTMCNC: 27.1 PG — SIGNIFICANT CHANGE UP (ref 27–34)
MCHC RBC-ENTMCNC: 31.6 GM/DL — LOW (ref 32–36)
MCV RBC AUTO: 86 FL — SIGNIFICANT CHANGE UP (ref 80–100)
PHOSPHATE SERPL-MCNC: 3.6 MG/DL — SIGNIFICANT CHANGE UP (ref 2.5–4.5)
PLATELET # BLD AUTO: 291 K/UL — SIGNIFICANT CHANGE UP (ref 150–400)
POTASSIUM SERPL-MCNC: 3.5 MMOL/L — SIGNIFICANT CHANGE UP (ref 3.5–5.3)
POTASSIUM SERPL-SCNC: 3.5 MMOL/L — SIGNIFICANT CHANGE UP (ref 3.5–5.3)
RBC # BLD: 3.28 M/UL — LOW (ref 4.2–5.8)
RBC # FLD: 22.4 % — HIGH (ref 10.3–14.5)
SODIUM SERPL-SCNC: 130 MMOL/L — LOW (ref 135–145)
WBC # BLD: 5.22 K/UL — SIGNIFICANT CHANGE UP (ref 3.8–10.5)
WBC # FLD AUTO: 5.22 K/UL — SIGNIFICANT CHANGE UP (ref 3.8–10.5)

## 2020-01-06 PROCEDURE — 99233 SBSQ HOSP IP/OBS HIGH 50: CPT | Mod: GC

## 2020-01-06 PROCEDURE — 99233 SBSQ HOSP IP/OBS HIGH 50: CPT

## 2020-01-06 RX ORDER — POTASSIUM CHLORIDE 20 MEQ
40 PACKET (EA) ORAL ONCE
Refills: 0 | Status: COMPLETED | OUTPATIENT
Start: 2020-01-06 | End: 2020-01-06

## 2020-01-06 RX ORDER — DEXAMETHASONE 0.5 MG/5ML
4 ELIXIR ORAL
Refills: 0 | Status: DISCONTINUED | OUTPATIENT
Start: 2020-01-06 | End: 2020-01-09

## 2020-01-06 RX ORDER — ENOXAPARIN SODIUM 100 MG/ML
60 INJECTION SUBCUTANEOUS
Refills: 0 | Status: DISCONTINUED | OUTPATIENT
Start: 2020-01-06 | End: 2020-01-09

## 2020-01-06 RX ADMIN — SERTRALINE 25 MILLIGRAM(S): 25 TABLET, FILM COATED ORAL at 12:19

## 2020-01-06 RX ADMIN — ENOXAPARIN SODIUM 70 MILLIGRAM(S): 100 INJECTION SUBCUTANEOUS at 18:01

## 2020-01-06 RX ADMIN — GABAPENTIN 300 MILLIGRAM(S): 400 CAPSULE ORAL at 22:33

## 2020-01-06 RX ADMIN — OXYCODONE HYDROCHLORIDE 5 MILLIGRAM(S): 5 TABLET ORAL at 12:26

## 2020-01-06 RX ADMIN — ENOXAPARIN SODIUM 70 MILLIGRAM(S): 100 INJECTION SUBCUTANEOUS at 06:40

## 2020-01-06 RX ADMIN — SPIRONOLACTONE 12.5 MILLIGRAM(S): 25 TABLET, FILM COATED ORAL at 06:41

## 2020-01-06 RX ADMIN — Medication 40 MILLIEQUIVALENT(S): at 18:59

## 2020-01-06 RX ADMIN — PANTOPRAZOLE SODIUM 40 MILLIGRAM(S): 20 TABLET, DELAYED RELEASE ORAL at 06:41

## 2020-01-06 RX ADMIN — Medication 500 MILLIGRAM(S): at 12:19

## 2020-01-06 RX ADMIN — OXYCODONE HYDROCHLORIDE 5 MILLIGRAM(S): 5 TABLET ORAL at 18:58

## 2020-01-06 RX ADMIN — Medication 4 MILLIGRAM(S): at 18:58

## 2020-01-06 RX ADMIN — Medication 40 MILLIGRAM(S): at 06:41

## 2020-01-06 RX ADMIN — Medication 25 MILLIGRAM(S): at 06:41

## 2020-01-06 RX ADMIN — OXYCODONE HYDROCHLORIDE 5 MILLIGRAM(S): 5 TABLET ORAL at 13:10

## 2020-01-06 NOTE — PROGRESS NOTE ADULT - SUBJECTIVE AND OBJECTIVE BOX
HPI: 69M with cholangiocarcinoma s/p Whipple, portal vein thrombosis (s/p RT, on chemo), chronic back pain, GERD, IBS, and AF here with acute embolic stroke with acute L sided weakness. Course c/b ascites, s/p 4L paracentesis, and acute blood loss anemia, with no intervention planned per GI. Palliative care called for support and GOC.    INTERVAL EVENTS:  1/6: patient sleeping, family states he was able to move his L side more, and has been trying to take PO more    ADVANCE DIRECTIVES:    DNR  MOLST  [ ]  Living Will  [ ]   DECISION MAKER(s):  [ ] Health Care Proxy(s)  [ ] Surrogate(s)  [ ] Guardian           Name(s): Phone Number(s): spouse Elle LOWE (I)ADL(s) (prior to admission):  [place]: [ ]Total  [ ] Moderate [ ]Dependent    Allergies    penicillin G benzathine (Rash)  sulfa drugs (Rash)    Intolerances    MEDICATIONS  (STANDING):  ciprofloxacin     Tablet 500 milliGRAM(s) Oral daily  dexAMETHasone     Tablet 4 milliGRAM(s) Oral two times a day  enoxaparin Injectable 70 milliGRAM(s) SubCutaneous every 12 hours  furosemide    Tablet 40 milliGRAM(s) Oral daily  gabapentin 300 milliGRAM(s) Oral at bedtime  metoprolol succinate ER 25 milliGRAM(s) Oral daily  pantoprazole    Tablet 40 milliGRAM(s) Oral before breakfast  sertraline 25 milliGRAM(s) Oral daily  spironolactone 12.5 milliGRAM(s) Oral daily    MEDICATIONS  (PRN):  acetaminophen   Tablet .. 650 milliGRAM(s) Oral every 4 hours PRN Mild Pain (1 - 3)  aluminum hydroxide/magnesium hydroxide/simethicone Suspension 30 milliLiter(s) Oral every 6 hours PRN Dyspepsia  famotidine    Tablet 20 milliGRAM(s) Oral two times a day PRN GERD  oxyCODONE    IR 5 milliGRAM(s) Oral every 6 hours PRN Moderate Pain (4 - 6)  simethicone 80 milliGRAM(s) Chew two times a day PRN Gas        PRESENT SYMPTOMS: [ ]Unable to obtain due to poor mentation   Source if other than patient:  [ ]Family   [ ]Team     Pain: [ ]yes [ ]no  QOL impact -   Location -                    Aggravating factors -  Quality -  Radiation -  Timing-  Severity (0-10 scale):  Minimal acceptable level (0-10 scale):     PAIN AD Score:     [email or web address] (press ctrl +  left click to view)    Dyspnea:                           [ ]Mild [ ]Moderate [ ]Severe  Anxiety:                             [ ]Mild [ ]Moderate [ ]Severe  Fatigue:                             [ ]Mild [ ]Moderate [ ]Severe  Nausea:                             [ ]Mild [ ]Moderate [ ]Severe  Loss of appetite:              [ ]Mild [ ]Moderate [ ]Severe  Constipation:                    [ ]Mild [ ]Moderate [ ]Severe    Other Symptoms:  [ ]All other review of systems negative     Palliative Performance Status Version 2:         %    [email or web address]    PHYSICAL EXAM:  Vital Signs Last 24 Hrs  T(C): 36.9 (06 Jan 2020 12:04), Max: 37.1 (05 Jan 2020 21:10)  T(F): 98.4 (06 Jan 2020 12:04), Max: 98.8 (06 Jan 2020 04:21)  HR: 94 (06 Jan 2020 12:04) (75 - 94)  BP: 106/70 (06 Jan 2020 12:04) (106/70 - 115/76)  BP(mean): --  RR: 18 (06 Jan 2020 12:04) (18 - 18)  SpO2: 99% (06 Jan 2020 12:04) (95% - 99%)    Limited exam for patient comfort  GENERAL:  [ X]Alert  [ ]Oriented x   [ ]Lethargic  [ ]Cachexia  [ ]Unarousable  [ X]Verbal  [ ]Non-Verbal  Behavioral:   [ ] Normal [ ] Anxiety  [ ] Delirium [ ] Agitation [ ] Other  HEENT:  [ ]Normal   [ ]Dry mouth   [ ]ET Tube/Trach  [ ]Oral lesions  PULMONARY:   [ ]Clear [ ]Tachypnea  [ ]Audible excessive secretions   [ ]Rhonchi        [ ]Right [ ]Left [ ]Bilateral  [ ]Crackles        [ ]Right [ ]Left [ ]Bilateral  [ ]Wheezing     [ ]Right [ ]Left [ ]Bilatera  [ ]Diminished breath sounds [ ]right [ ]left [ ]bilateral  CARDIOVASCULAR:    [ ]Regular [ ]Irregular [ ]Tachy  [ ]David [ ]Murmur [ ]Other  GASTROINTESTINAL:  [ ]Soft  [ ]Distended   [ ]+BS  [ ]Non tender [ ]Tender  [ ]PEG [ ]OGT/ NGT  Last BM:     GENITOURINARY:  [ ]Normal [ ] Incontinent   [ ]Oliguria/Anuria   [ ]Page  MUSCULOSKELETAL:   [ ]Normal   [ ]Weakness  [ ]Bed/Wheelchair bound [ ]Edema  NEUROLOGIC:   [X ]No focal deficits  [ ]Cognitive impairment  [ ]Dysphagia [ ]Dysarthria [ ]Paresis [ ]Other   SKIN:   [ ]Normal    [ ]Rash  [ ]Pressure ulcer(s)       Present on admission [ ]y [ ]n    CRITICAL CARE:  [ ] Shock Present  [ ]Septic [ ]Cardiogenic [ ]Neurologic [ ]Hypovolemic  [ ]  Vasopressors [ ]  Inotropes   [ ]Respiratory failure present [ ]Mechanical ventilation [ ]Non-invasive ventilatory support [ ]High flow  [ ]Acute  [ ]Chronic [ ]Hypoxic  [ ]Hypercarbic [ ]Other  [ ]Other organ failure     LABS: reviewed                                   8.9    5.22  )-----------( 291      ( 06 Jan 2020 10:38 )             28.2     01-06    130<L>  |  88<L>  |  11  ----------------------------<  117<H>  3.5   |  31  |  0.87    Ca    8.6      06 Jan 2020 07:54  Phos  3.6     01-06  Mg     1.6     01-06        RADIOLOGY & ADDITIONAL STUDIES:    MRA head 12/29:    Ischemia in cerebral and cerebellar hemispheres likely embolic, no hemorrhagic transformation. Cavernous and supraclinoid calcification without flow-limiting stenosis, stenosis of proximal left M2 and distal JEFFERY, MCA and PCA stenosis as above.    Volume loss, microvascular disease, no new hemorrhage or midline shift. Slightly tortuous vessels, likely from hypertension, no hemodynamically significant stenosis at ICA origins by NASCET criteria. Patent vertebral arteries.    PROTEIN CALORIE MALNUTRITION PRESENT: [ ]mild [ ]moderate [ ]severe [ ]underweight [ ]morbid obesity  https://www.andeal.org/vault/5480/web/files/ONC/Table_Clinical%20Characteristics%20to%20Document%20Malnutrition-White%20JV%20et%20al%202012.pdf    Height (cm): 177.8 (12-27-19 @ 07:59), 179 (12-19-19 @ 08:15), 177.8 (12-12-19 @ 20:37)  Weight (kg): 68.1 (12-27-19 @ 07:59), 64.41 (12-19-19 @ 08:15), 65.8 (12-12-19 @ 20:37)  BMI (kg/m2): 21.5 (12-27-19 @ 07:59), 20.1 (12-19-19 @ 08:15), 20.8 (12-12-19 @ 20:37)    [ ]PPSV2 < or = to 30% [ ]significant weight loss  [ ]poor nutritional intake  [ ]anasarca     Albumin, Serum: 2.7 g/dL (01-03-20 @ 07:48)   [ ]Artificial Nutrition      REFERRALS:   [ ]Chaplaincy  [ ]Hospice  [ ]Child Life  [ ]Social Work  [ ]Case management [ ]Holistic Therapy     Goals of Care Document:

## 2020-01-06 NOTE — PROGRESS NOTE ADULT - SUBJECTIVE AND OBJECTIVE BOX
[facility] Division of Hospital Medicine  Yudith Monsalve MD  Pager (M-F, 7O-0N): [phone]  Other Times:  [phone]    Patient is a 69y old  Male who presents with a chief complaint of Hypotension x 1 day (05 Jan 2020 15:34)      SUBJECTIVE / OVERNIGHT EVENTS:  feeling ok. able to tolerate 25min PT session yesterday with min-mod assist.   afebrile.  no acute issues overnight.     ADDITIONAL REVIEW OF SYSTEMS:    MEDICATIONS  (STANDING):  atorvastatin 40 milliGRAM(s) Oral at bedtime  ciprofloxacin     Tablet 500 milliGRAM(s) Oral daily  enoxaparin Injectable 70 milliGRAM(s) SubCutaneous every 12 hours  furosemide    Tablet 40 milliGRAM(s) Oral daily  gabapentin 300 milliGRAM(s) Oral at bedtime  metoprolol succinate ER 25 milliGRAM(s) Oral daily  pantoprazole    Tablet 40 milliGRAM(s) Oral before breakfast  sertraline 25 milliGRAM(s) Oral daily  spironolactone 12.5 milliGRAM(s) Oral daily    MEDICATIONS  (PRN):  acetaminophen   Tablet .. 650 milliGRAM(s) Oral every 4 hours PRN Mild Pain (1 - 3)  aluminum hydroxide/magnesium hydroxide/simethicone Suspension 30 milliLiter(s) Oral every 6 hours PRN Dyspepsia  famotidine    Tablet 20 milliGRAM(s) Oral two times a day PRN GERD  oxyCODONE    IR 5 milliGRAM(s) Oral every 6 hours PRN Moderate Pain (4 - 6)  simethicone 80 milliGRAM(s) Chew two times a day PRN Gas      CAPILLARY BLOOD GLUCOSE        I&O's Summary    05 Jan 2020 07:01  -  06 Jan 2020 07:00  --------------------------------------------------------  IN: 420 mL / OUT: 0 mL / NET: 420 mL        PHYSICAL EXAM:  Vital Signs Last 24 Hrs  T(C): 36.9 (06 Jan 2020 12:04), Max: 37.1 (05 Jan 2020 21:10)  T(F): 98.4 (06 Jan 2020 12:04), Max: 98.8 (06 Jan 2020 04:21)  HR: 94 (06 Jan 2020 12:04) (75 - 94)  BP: 106/70 (06 Jan 2020 12:04) (106/70 - 115/76)  BP(mean): --  RR: 18 (06 Jan 2020 12:04) (18 - 18)  SpO2: 99% (06 Jan 2020 12:04) (95% - 99%)    CONSTITUTIONAL: NAD, well-groomed  EYES:  conjunctiva and sclera clear  ENMT: Moist oral mucosa  NECK: Supple, no palpable masses; no JVD  RESPIRATORY: Normal respiratory effort; lungs are clear to auscultation bilaterally  CARDIOVASCULAR: Regular rate and rhythm, normal S1 and S2, no murmur/rub/gallop; No lower extremity edema  ABDOMEN: +pleurax Nontender to palpation, normoactive bowel sounds, no rebound/guarding  MUSCULOSKELETAL:  no clubbing or cyanosis of digits; no joint swelling or tenderness to palpation  PSYCH: A+O to person, place, and time; affect appropriate  SKIN: No rashes; no palpable lesions  NEURO: right facial droop, LUE weakness. sensation intact.     LABS:                        8.9    5.22  )-----------( 291      ( 06 Jan 2020 10:38 )             28.2     01-06    130<L>  |  88<L>  |  11  ----------------------------<  117<H>  3.5   |  31  |  0.87    Ca    8.6      06 Jan 2020 07:54  Phos  3.6     01-06  Mg     1.6     01-06                  RADIOLOGY & ADDITIONAL TESTS:  Results Reviewed:   Imaging Personally Reviewed:  Electrocardiogram Personally Reviewed:    COORDINATION OF CARE:  Care Discussed with Consultants/Other Providers [Y/N]:  Prior or Outpatient Records Reviewed [Y/N]:

## 2020-01-06 NOTE — PROGRESS NOTE ADULT - ASSESSMENT
Patient is a 70 yo M with PMHx of cholangiocarcinoma (dx in 2017 s/p Whipple procedure with liver mets found in 2019 after finding of clots in portal vein, also s/p multiple rounds of radiation treatment) currently on FOLFOX s/p 1 cycle,  chronic back pain, GERD, IBS with diarrhea, and recently diagnosed atrial fibrillation, multiple recent admissions for fever workup who presents with weakness. Also found to have reaccumulation of ascites now s/p abdominal pleurex catheter. 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    #CVA  - upper extremity weakness likely attributed to acute strokes, likely embolic in nature  - has been transitioned to therapuetic lovenox dosing  - needs aggressive PT/OT  - care per primary/neurology, awaiting rehab placement     #Pancytopenia  - resolved now   - likely myelosuppression from recent chemo, expect agapito usually around day 10-14  - s/p Neupogen x3 days with uptrending white count    - monitor CBC w/ DIFF daily, transfuse to keep Hg >7    #Cholangiocarcinoma  - s/p next 2nd line treatment with FOLFOX 12/19  - recurrent ascites likely malignant although cytology negative  - s/p indwelling catheter for recurrent ascites given rapid reaccumulation   - started on low dose furosemide and aldactone, uptitrate as BP allows to help with ascites   - f/u with primary oncologist Dr. Atkinson as outpatient to resume treatments if functional status improves    Arturo Falk, PGY-5  Hematology-Oncology Fellow  [phone] ([place]) [phone] ([facility]) Patient is a 68 yo M with PMHx of cholangiocarcinoma (dx in 2017 s/p Whipple procedure with liver mets found in 2019 after finding of clots in portal vein, also s/p multiple rounds of radiation treatment) currently on FOLFOX s/p 1 cycle,  chronic back pain, GERD, IBS with diarrhea, and recently diagnosed atrial fibrillation, multiple recent admissions for fever workup who presents with weakness. Also found to have reaccumulation of ascites now s/p abdominal pleurex catheter. 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    #CVA  - upper extremity weakness likely attributed to acute strokes, likely embolic in nature  - has been transitioned to therapuetic lovenox dosing  - needs aggressive PT/OT  - care per primary/neurology, awaiting rehab placement     #Pancytopenia  - resolved now   - likely myelosuppression from recent chemo, expect agapito usually around day 10-14  - s/p Neupogen x3 days with uptrending white count    - monitor CBC w/ DIFF daily, transfuse to keep Hg >7    #Cholangiocarcinoma  - s/p next 2nd line treatment with FOLFOX 12/19  - recurrent ascites likely malignant although cytology negative  - s/p indwelling catheter for recurrent ascites given rapid reaccumulation   - started on low dose furosemide and aldactone, uptitrate as BP allows to help with ascites   - would start dexamethasone 4 mg po BID x4-5 days to see if this can help with weakness, appetite, energy, etc  - f/u with primary oncologist Dr. Atkinson as outpatient to resume treatments if functional status improves    Arturo Falk, PGY-5  Hematology-Oncology Fellow  [phone] ([place]) [phone] ([facility]

## 2020-01-06 NOTE — PROGRESS NOTE ADULT - PROBLEM SELECTOR PLAN 4
- introduced palliative care to family and patient  - will sign off for now, but be available as needed for goals of care or symptoms

## 2020-01-06 NOTE — PROGRESS NOTE ADULT - ATTENDING COMMENTS
Extensive 1 hour family meeting with wife, daughter, son, daughter in law, brother today along with Onc, SW, CM, nurse manager on 1/3  Discussed in details about current medical condition and future goal/plans.   Plan for likely discharge to rehab (acute vs [facility]) this week. PMR to follow up.

## 2020-01-06 NOTE — PROGRESS NOTE ADULT - SUBJECTIVE AND OBJECTIVE BOX
INTERVAL HPI/OVERNIGHT EVENTS:  No overnight events. Sitting in chair, has no complaints this AM. Reports appetite is still so-so. Otherwise no fevers, chills, bleeding, pain.     MEDICATIONS  (STANDING):  ciprofloxacin     Tablet 500 milliGRAM(s) Oral daily  enoxaparin Injectable 70 milliGRAM(s) SubCutaneous every 12 hours  furosemide    Tablet 40 milliGRAM(s) Oral daily  gabapentin 300 milliGRAM(s) Oral at bedtime  metoprolol succinate ER 25 milliGRAM(s) Oral daily  pantoprazole    Tablet 40 milliGRAM(s) Oral before breakfast  sertraline 25 milliGRAM(s) Oral daily  spironolactone 12.5 milliGRAM(s) Oral daily    MEDICATIONS  (PRN):  acetaminophen   Tablet .. 650 milliGRAM(s) Oral every 4 hours PRN Mild Pain (1 - 3)  aluminum hydroxide/magnesium hydroxide/simethicone Suspension 30 milliLiter(s) Oral every 6 hours PRN Dyspepsia  famotidine    Tablet 20 milliGRAM(s) Oral two times a day PRN GERD  oxyCODONE    IR 5 milliGRAM(s) Oral every 6 hours PRN Moderate Pain (4 - 6)  simethicone 80 milliGRAM(s) Chew two times a day PRN Gas    Allergies    penicillin G benzathine (Rash)  sulfa drugs (Rash)    Intolerances          VITAL SIGNS:  T(F): 98.4 (01-06-20 @ 12:04)  HR: 94 (01-06-20 @ 12:04)  BP: 106/70 (01-06-20 @ 12:04)  RR: 18 (01-06-20 @ 12:04)  SpO2: 99% (01-06-20 @ 12:04)  Wt(kg): --    PHYSICAL EXAM:    Constitutional: NAD, lying comfortably in bed  Eyes: EOMI, PERRLA  Neck: supple, no masses, no JVD  Respiratory: CTAB; no r/r/w  Cardiovascular: RRR, no M/R/G  Gastrointestinal: +BS, soft, NTND, catheter in place   Extremities: no c/c/e  Neurological: weakness noted    LABS:                        8.9    5.22  )-----------( 291      ( 06 Jan 2020 10:38 )             28.2     01-06    130<L>  |  88<L>  |  11  ----------------------------<  117<H>  3.5   |  31  |  0.87    Ca    8.6      06 Jan 2020 07:54  Phos  3.6     01-06  Mg     1.6     01-06            RADIOLOGY & ADDITIONAL TESTS:  Studies reviewed.

## 2020-01-06 NOTE — PROGRESS NOTE ADULT - PROBLEM SELECTOR PLAN 4
WBC uptrending. H/H stable and plt improved.   s/p neupogen  Can cont with AC for now if plt remain >50

## 2020-01-06 NOTE — PROGRESS NOTE ADULT - PROBLEM SELECTOR PLAN 1
Patient with acute left sided weakness with findings of new infarcts on MRI suggestive of embolic in nature. hypercoagulable state/NBTE in setting of malignancy.   - patient with recent diagnosis of afib, however no documented EKG with this rhythm nor has he been in the is rhythm while maintained on TELE inpatient.   - PT/OT, PMR consult appreciated.   - resume back on AC (lovenox now as per neuro and onc)

## 2020-01-07 LAB
ANION GAP SERPL CALC-SCNC: 11 MMOL/L — SIGNIFICANT CHANGE UP (ref 5–17)
BUN SERPL-MCNC: 16 MG/DL — SIGNIFICANT CHANGE UP (ref 7–23)
CALCIUM SERPL-MCNC: 8.9 MG/DL — SIGNIFICANT CHANGE UP (ref 8.4–10.5)
CHLORIDE SERPL-SCNC: 88 MMOL/L — LOW (ref 96–108)
CO2 SERPL-SCNC: 31 MMOL/L — SIGNIFICANT CHANGE UP (ref 22–31)
CREAT SERPL-MCNC: 0.93 MG/DL — SIGNIFICANT CHANGE UP (ref 0.5–1.3)
GLUCOSE SERPL-MCNC: 172 MG/DL — HIGH (ref 70–99)
HCT VFR BLD CALC: 28.9 % — LOW (ref 39–50)
HGB BLD-MCNC: 9.2 G/DL — LOW (ref 13–17)
MAGNESIUM SERPL-MCNC: 1.8 MG/DL — SIGNIFICANT CHANGE UP (ref 1.6–2.6)
MCHC RBC-ENTMCNC: 27 PG — SIGNIFICANT CHANGE UP (ref 27–34)
MCHC RBC-ENTMCNC: 31.8 GM/DL — LOW (ref 32–36)
MCV RBC AUTO: 84.8 FL — SIGNIFICANT CHANGE UP (ref 80–100)
PLATELET # BLD AUTO: 323 K/UL — SIGNIFICANT CHANGE UP (ref 150–400)
POTASSIUM SERPL-MCNC: 4.3 MMOL/L — SIGNIFICANT CHANGE UP (ref 3.5–5.3)
POTASSIUM SERPL-SCNC: 4.3 MMOL/L — SIGNIFICANT CHANGE UP (ref 3.5–5.3)
RBC # BLD: 3.41 M/UL — LOW (ref 4.2–5.8)
RBC # FLD: 22.5 % — HIGH (ref 10.3–14.5)
SODIUM SERPL-SCNC: 130 MMOL/L — LOW (ref 135–145)
WBC # BLD: 5.08 K/UL — SIGNIFICANT CHANGE UP (ref 3.8–10.5)
WBC # FLD AUTO: 5.08 K/UL — SIGNIFICANT CHANGE UP (ref 3.8–10.5)

## 2020-01-07 PROCEDURE — 99233 SBSQ HOSP IP/OBS HIGH 50: CPT

## 2020-01-07 PROCEDURE — 99232 SBSQ HOSP IP/OBS MODERATE 35: CPT

## 2020-01-07 PROCEDURE — 99233 SBSQ HOSP IP/OBS HIGH 50: CPT | Mod: GC

## 2020-01-07 RX ADMIN — SERTRALINE 25 MILLIGRAM(S): 25 TABLET, FILM COATED ORAL at 11:39

## 2020-01-07 RX ADMIN — Medication 40 MILLIGRAM(S): at 05:31

## 2020-01-07 RX ADMIN — Medication 4 MILLIGRAM(S): at 05:30

## 2020-01-07 RX ADMIN — GABAPENTIN 300 MILLIGRAM(S): 400 CAPSULE ORAL at 21:37

## 2020-01-07 RX ADMIN — Medication 500 MILLIGRAM(S): at 11:39

## 2020-01-07 RX ADMIN — Medication 4 MILLIGRAM(S): at 17:28

## 2020-01-07 RX ADMIN — ENOXAPARIN SODIUM 60 MILLIGRAM(S): 100 INJECTION SUBCUTANEOUS at 05:30

## 2020-01-07 RX ADMIN — OXYCODONE HYDROCHLORIDE 5 MILLIGRAM(S): 5 TABLET ORAL at 17:31

## 2020-01-07 RX ADMIN — Medication 25 MILLIGRAM(S): at 05:30

## 2020-01-07 RX ADMIN — OXYCODONE HYDROCHLORIDE 5 MILLIGRAM(S): 5 TABLET ORAL at 16:27

## 2020-01-07 RX ADMIN — PANTOPRAZOLE SODIUM 40 MILLIGRAM(S): 20 TABLET, DELAYED RELEASE ORAL at 05:34

## 2020-01-07 RX ADMIN — ENOXAPARIN SODIUM 60 MILLIGRAM(S): 100 INJECTION SUBCUTANEOUS at 17:28

## 2020-01-07 RX ADMIN — SPIRONOLACTONE 12.5 MILLIGRAM(S): 25 TABLET, FILM COATED ORAL at 05:31

## 2020-01-07 NOTE — CHART NOTE - NSCHARTNOTEFT_GEN_A_CORE
Nutrition Follow Up Note  Patient seen for malnutrition follow-up. Per chart, 68 y/o male presented with lethargy and hypotension x 1 day. Pt is s/p paracentesis  and S/P abdominal pleur-ex catheter placement . Pt now has ascites drained once weekly, last Friday with drainage of 2-3L. Pt also started on decadron yesterday. Pt now followed by palliative care, " introduced palliative care to family and patient  - will sign off for now, but be available as needed for goals of care or symptoms."  PMH: cholangiocarcinoma s/p Whipple procedure with liver mets c/b portal vein thrombosis, s/p multiple radiation and currently on chemotherapy( last session ), chronic back pain, GERD, IBS with diarrhea, and recently diagnosed atrial fibrillation    Source: Pt, pt's wife, EMR    Diet : Regular + Ensure Clear + Ensure Pudding x2, Prosource daily    Patient reports poor PO intake and appetite persists. He states he feels slightly better after weekly fluid drainage, however is only able to take 1 Ensure clear daily, ice cream, chocolate health shakes and his wife has been mixing prosource with apple sauce.  Pt states he was placed on steroid to increase appetite yesterday, however pt feels the same. Encouraged pt to continue to choose calorie and protein dense foods wisely, as every bite counts. Pt would like to receive ice cream daily, and milkshake from milkshake program MW. Pt denies any nausea/vomiting, has regular BMs.     PO intake : Poor, 0-25% per flow sheets    Daily Weight in k.4 (), Weight in k.5 (), Weight in k.4 (), Weight in k.7 ()- noted wt trending down, also note pt s/p drainage Friday of 2-3L, receiving lasix  % Weight Change    Pertinent Medications: MEDICATIONS  (STANDING):  ciprofloxacin     Tablet 500 milliGRAM(s) Oral daily  dexAMETHasone     Tablet 4 milliGRAM(s) Oral two times a day  enoxaparin Injectable 60 milliGRAM(s) SubCutaneous two times a day  furosemide    Tablet 40 milliGRAM(s) Oral daily  gabapentin 300 milliGRAM(s) Oral at bedtime  metoprolol succinate ER 25 milliGRAM(s) Oral daily  pantoprazole    Tablet 40 milliGRAM(s) Oral before breakfast  sertraline 25 milliGRAM(s) Oral daily  spironolactone 12.5 milliGRAM(s) Oral daily    MEDICATIONS  (PRN):  acetaminophen   Tablet .. 650 milliGRAM(s) Oral every 4 hours PRN Mild Pain (1 - 3)  aluminum hydroxide/magnesium hydroxide/simethicone Suspension 30 milliLiter(s) Oral every 6 hours PRN Dyspepsia  famotidine    Tablet 20 milliGRAM(s) Oral two times a day PRN GERD  oxyCODONE    IR 5 milliGRAM(s) Oral every 6 hours PRN Moderate Pain (4 - 6)  simethicone 80 milliGRAM(s) Chew two times a day PRN Gas    Pertinent Labs:  @ 06:16: Na 130<L>, BUN 16, Cr 0.93, <H>, K+ 4.3, Phos --, Mg 1.8, Alk Phos --, ALT/SGPT --, AST/SGOT --, HbA1c --    Skin per nursing documentation: no pressure injuries  Edema: none    Estimated Needs:   [x] no change since previous assessment: Based on usual wt 72.5 kg  Estimated Energy Needs (30-35 calories/kg): 0963-7682 kcal/d  Estimated Protein Needs (1.2-1.4 gm/kg):  g/d    Previous Nutrition Diagnosis: severe malnutrition  Nutrition Diagnosis is: ongoing, addressed with nutrition supplementation     New Nutrition Diagnosis: none    Recommend  1) Continue regular diet with nutrition supplements, RD will adjust pt preferences of flavors and mighty shakes.  2) Collaboration of Care- pt with minimal PO intake since admission and pt has increased needs due to catabolic medical state. Depending upon pt GOC, EN would be beneficial for patient.    Monitoring and Evaluation:   Continue to monitor Nutritional intake, Tolerance to diet prescription, weights, labs, skin integrity    Barbi Murphy RD, CDN  Pager [phone]  RD remains available upon request and will follow up per protocol Nutrition Follow Up Note  Patient seen for malnutrition follow-up. Per chart, 68 y/o male presented with lethargy and hypotension x 1 day. Pt is s/p paracentesis  and S/P abdominal pleur-ex catheter placement . Pt now has ascites drained once weekly, last Friday with drainage of 2-3L. Pt also started on decadron yesterday. Pt now followed by palliative care, " introduced palliative care to family and patient  - will sign off for now, but be available as needed for goals of care or symptoms."  PMH: cholangiocarcinoma s/p Whipple procedure with liver mets c/b portal vein thrombosis, s/p multiple radiation and currently on chemotherapy( last session ), chronic back pain, GERD, IBS with diarrhea, and recently diagnosed atrial fibrillation    Source: Pt, pt's wife, EMR    Diet : Regular + Ensure Clear + Ensure Pudding x2, Prosource daily    Patient reports poor PO intake and appetite persists. He states he feels slightly better after weekly fluid drainage, however is only able to take 1 Ensure clear daily, some  ice cream, some of chocolate health shake and his wife has been mixing prosource with apple sauce.  Pt states he was placed on steroid to increase appetite yesterday, however pt feels the same. Encouraged pt to continue to choose calorie and protein dense foods wisely, as every bite counts. Pt would like to receive ice cream daily, and milkshake from milkshake program MW. Pt denies any nausea/vomiting, has regular BMs.     PO intake : Poor, 0-25% per flow sheets    Daily Weight in k.4 (), Weight in k.5 (), Weight in k.4 (), Weight in k.7 ()- noted wt trending down, also note pt s/p drainage Friday of 2-3L, receiving lasix  % Weight Change    Pertinent Medications: MEDICATIONS  (STANDING):  ciprofloxacin     Tablet 500 milliGRAM(s) Oral daily  dexAMETHasone     Tablet 4 milliGRAM(s) Oral two times a day  enoxaparin Injectable 60 milliGRAM(s) SubCutaneous two times a day  furosemide    Tablet 40 milliGRAM(s) Oral daily  gabapentin 300 milliGRAM(s) Oral at bedtime  metoprolol succinate ER 25 milliGRAM(s) Oral daily  pantoprazole    Tablet 40 milliGRAM(s) Oral before breakfast  sertraline 25 milliGRAM(s) Oral daily  spironolactone 12.5 milliGRAM(s) Oral daily    MEDICATIONS  (PRN):  acetaminophen   Tablet .. 650 milliGRAM(s) Oral every 4 hours PRN Mild Pain (1 - 3)  aluminum hydroxide/magnesium hydroxide/simethicone Suspension 30 milliLiter(s) Oral every 6 hours PRN Dyspepsia  famotidine    Tablet 20 milliGRAM(s) Oral two times a day PRN GERD  oxyCODONE    IR 5 milliGRAM(s) Oral every 6 hours PRN Moderate Pain (4 - 6)  simethicone 80 milliGRAM(s) Chew two times a day PRN Gas    Pertinent Labs:  @ 06:16: Na 130<L>, BUN 16, Cr 0.93, <H>, K+ 4.3, Phos --, Mg 1.8, Alk Phos --, ALT/SGPT --, AST/SGOT --, HbA1c --    Skin per nursing documentation: no pressure injuries  Edema: none    Estimated Needs:   [x] no change since previous assessment: Based on usual wt 72.5 kg  Estimated Energy Needs (30-35 calories/kg): 8125-9388 kcal/d  Estimated Protein Needs (1.2-1.4 gm/kg):  g/d    Previous Nutrition Diagnosis: severe malnutrition  Nutrition Diagnosis is: ongoing, addressed with nutrition supplementation     New Nutrition Diagnosis: none    Recommend  1) Continue regular diet with nutrition supplements, RD will adjust pt preferences of flavors and mighty shakes.  2) Collaboration of Care- pt with minimal PO intake since admission and pt has increased needs due to catabolic medical state. Depending upon pt GOC, EN would be beneficial for patient. Discussed with NP. Will continue to follow-up if nutrition GOC are set in place.    Monitoring and Evaluation:   Continue to monitor Nutritional intake, Tolerance to diet prescription, weights, labs, skin integrity    Barbi Murphy RD, CDN  Pager [phone]  RD remains available upon request and will follow up per protocol

## 2020-01-07 NOTE — PROGRESS NOTE ADULT - SUBJECTIVE AND OBJECTIVE BOX
INTERVAL HPI/OVERNIGHT EVENTS:  No overnight events. Feeling a bit stronger today. No other complaints.     MEDICATIONS  (STANDING):  ciprofloxacin     Tablet 500 milliGRAM(s) Oral daily  dexAMETHasone     Tablet 4 milliGRAM(s) Oral two times a day  enoxaparin Injectable 60 milliGRAM(s) SubCutaneous two times a day  furosemide    Tablet 40 milliGRAM(s) Oral daily  gabapentin 300 milliGRAM(s) Oral at bedtime  metoprolol succinate ER 25 milliGRAM(s) Oral daily  pantoprazole    Tablet 40 milliGRAM(s) Oral before breakfast  sertraline 25 milliGRAM(s) Oral daily  spironolactone 12.5 milliGRAM(s) Oral daily    MEDICATIONS  (PRN):  acetaminophen   Tablet .. 650 milliGRAM(s) Oral every 4 hours PRN Mild Pain (1 - 3)  aluminum hydroxide/magnesium hydroxide/simethicone Suspension 30 milliLiter(s) Oral every 6 hours PRN Dyspepsia  famotidine    Tablet 20 milliGRAM(s) Oral two times a day PRN GERD  oxyCODONE    IR 5 milliGRAM(s) Oral every 6 hours PRN Moderate Pain (4 - 6)  simethicone 80 milliGRAM(s) Chew two times a day PRN Gas    Allergies    penicillin G benzathine (Rash)  sulfa drugs (Rash)    Intolerances          VITAL SIGNS:  T(F): 97.7 (01-07-20 @ 11:03)  HR: 82 (01-07-20 @ 14:11)  BP: 107/65 (01-07-20 @ 14:11)  RR: 19 (01-07-20 @ 11:03)  SpO2: 97% (01-07-20 @ 14:11)  Wt(kg): --    PHYSICAL EXAM:    Constitutional: NAD, sitting comfortably   Eyes: EOMI, PERRLA  Neck: supple, no masses, no JVD  Respiratory: CTAB; no r/r/w  Cardiovascular: RRR, no M/R/G  Gastrointestinal: +BS, soft, NTND, pleurex in place   Extremities: no c/c/e  Neurological: left sided weakness    LABS:                        9.2    5.08  )-----------( 323      ( 07 Jan 2020 09:18 )             28.9     01-07    130<L>  |  88<L>  |  16  ----------------------------<  172<H>  4.3   |  31  |  0.93    Ca    8.9      07 Jan 2020 06:16  Phos  3.6     01-06  Mg     1.8     01-07            RADIOLOGY & ADDITIONAL TESTS:  Studies reviewed.

## 2020-01-07 NOTE — PROGRESS NOTE ADULT - ASSESSMENT
The patient has been generally ill for several months and has had progressive functional decline due to deconditioning related to multiple illness episodes requiring multiple hospitalizations.  He also underwent XRT and chemotherapy not too long ago.  There is chronically poor po intake and chronic muscular atrophy.  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  As of today, he was very fatigued after a short session of PT, managing to only ambulate from bed to chair, with significant assistance.  He was too fatigued to participate with OT afterwards.  He appears generally weak on my examination, and has no useful motor function in the left upper limb.  He does have multiple comorbidities and has complex nursing and medical needs.    Overall, the patient does not appear to be a candidate for intensive (acute) rehabilitation at this time.  Basically, it would be "too much" in terms of rehabilitation intensity, which he is not anticipated to be able to tolerate.  I had a long talk with pt and wife about this.  Wife generally has a preference for [facility] (acute rehab), but appeared to understand that he is not likely to tolerate it.  He does need to improve his nutrition if possible and try to regain some basic muscle mass through maintenance exercise, which can be provided at a less intense/complex level of rehabilitation care.  He also needs his L shoulder ranged, as he is at high risk for development of a frozen shoulder there.  If he can improve his nutrition, strength and endurance to tolerate more therapies, he may yet become an acute rehab candidate, either after transition to a [facility], or even before, if he can demonstrate better therapy tolerance here at [facility] before he leaves.  Pt and wife agreeable with plan.

## 2020-01-07 NOTE — PROGRESS NOTE ADULT - SUBJECTIVE AND OBJECTIVE BOX
[facility] Division of Hospital Medicine  Yudith Monsalve MD  Pager (TAI-CHAD, 8W-8X): [phone]  Other Times:  [phone]    Patient is a 69y old  Male who presents with a chief complaint of Hypotension x 1 day (06 Jan 2020 15:58)      SUBJECTIVE / OVERNIGHT EVENTS:  feeling ok. no acute issues overnight. afebrile.   denies any specific complaints.     ADDITIONAL REVIEW OF SYSTEMS:    MEDICATIONS  (STANDING):  ciprofloxacin     Tablet 500 milliGRAM(s) Oral daily  dexAMETHasone     Tablet 4 milliGRAM(s) Oral two times a day  enoxaparin Injectable 60 milliGRAM(s) SubCutaneous two times a day  furosemide    Tablet 40 milliGRAM(s) Oral daily  gabapentin 300 milliGRAM(s) Oral at bedtime  metoprolol succinate ER 25 milliGRAM(s) Oral daily  pantoprazole    Tablet 40 milliGRAM(s) Oral before breakfast  sertraline 25 milliGRAM(s) Oral daily  spironolactone 12.5 milliGRAM(s) Oral daily    MEDICATIONS  (PRN):  acetaminophen   Tablet .. 650 milliGRAM(s) Oral every 4 hours PRN Mild Pain (1 - 3)  aluminum hydroxide/magnesium hydroxide/simethicone Suspension 30 milliLiter(s) Oral every 6 hours PRN Dyspepsia  famotidine    Tablet 20 milliGRAM(s) Oral two times a day PRN GERD  oxyCODONE    IR 5 milliGRAM(s) Oral every 6 hours PRN Moderate Pain (4 - 6)  simethicone 80 milliGRAM(s) Chew two times a day PRN Gas      CAPILLARY BLOOD GLUCOSE        I&O's Summary    06 Jan 2020 07:01  -  07 Jan 2020 07:00  --------------------------------------------------------  IN: 540 mL / OUT: 0 mL / NET: 540 mL    07 Jan 2020 07:01  -  07 Jan 2020 14:11  --------------------------------------------------------  IN: 480 mL / OUT: 0 mL / NET: 480 mL        PHYSICAL EXAM:  Vital Signs Last 24 Hrs  T(C): 36.5 (07 Jan 2020 11:03), Max: 37.1 (06 Jan 2020 20:51)  T(F): 97.7 (07 Jan 2020 11:03), Max: 98.7 (06 Jan 2020 20:51)  HR: 82 (07 Jan 2020 11:03) (79 - 93)  BP: 109/73 (07 Jan 2020 11:03) (107/71 - 109/73)  BP(mean): --  RR: 19 (07 Jan 2020 11:03) (17 - 19)  SpO2: 96% (07 Jan 2020 11:03) (95% - 96%)    CONSTITUTIONAL: NAD, well-groomed  EYES:  conjunctiva and sclera clear  ENMT: Moist oral mucosa  NECK: Supple, no palpable masses; no JVD  RESPIRATORY: Normal respiratory effort; lungs are clear to auscultation bilaterally  CARDIOVASCULAR: Regular rate and rhythm, normal S1 and S2, no murmur/rub/gallop; No lower extremity edema  ABDOMEN: +pleurax Nontender to palpation, normoactive bowel sounds, no rebound/guarding  MUSCULOSKELETAL:  no clubbing or cyanosis of digits; no joint swelling or tenderness to palpation  PSYCH: A+O to person, place, and time; affect appropriate  SKIN: No rashes; no palpable lesions  NEURO:  LUE weakness. sensation intact.     LABS:                        9.2    5.08  )-----------( 323      ( 07 Jan 2020 09:18 )             28.9     01-07    130<L>  |  88<L>  |  16  ----------------------------<  172<H>  4.3   |  31  |  0.93    Ca    8.9      07 Jan 2020 06:16  Phos  3.6     01-06  Mg     1.8     01-07                  RADIOLOGY & ADDITIONAL TESTS:  Results Reviewed:   Imaging Personally Reviewed:  Electrocardiogram Personally Reviewed:    COORDINATION OF CARE:  Care Discussed with Consultants/Other Providers [Y/N]:  Prior or Outpatient Records Reviewed [Y/N]:

## 2020-01-07 NOTE — PROGRESS NOTE ADULT - ASSESSMENT
Patient is a 70 yo M with PMHx of cholangiocarcinoma (dx in 2017 s/p Whipple procedure with liver mets found in 2019 after finding of clots in portal vein, also s/p multiple rounds of radiation treatment) currently on FOLFOX s/p 1 cycle,  chronic back pain, GERD, IBS with diarrhea, and recently diagnosed atrial fibrillation, multiple recent admissions for fever workup who presents with weakness. Also found to have reaccumulation of ascites now s/p abdominal pleurex catheter. 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    #Cholangiocarcinoma  - s/p next 2nd line treatment with FOLFOX 12/19  - recurrent ascites likely malignant although cytology negative  - s/p indwelling catheter for recurrent ascites given rapid reaccumulation   - started on low dose furosemide and aldactone, uptitrate as BP allows to help with ascites   - continue dexamethasone 4 mg po BID x5 days; today is D#2  - discussed next line of treatment would be with immunotherapy once/if functional status improves and no further chemotherapy to be offered   - f/u with primary oncologist Dr. Atkinson as outpatient to resume treatment if functional status improves    #Pancytopenia  - resolved now   - likely myelosuppression from recent chemo, expect agapito usually around day 10-14  - s/p Neupogen x3 days with uptrending white count    - monitor CBC w/ DIFF daily, transfuse to keep Hg >7    Arturo Falk, PGY-5  Hematology-Oncology Fellow  [phone] ([place]) [phone] ([facility])

## 2020-01-07 NOTE — PROGRESS NOTE ADULT - PROBLEM SELECTOR PLAN 3
H/h low but stable. s/p chemo as outpt 12/19  patient has stool occult + with thrombocytopenia/anemia s/p chemo on 12/19.  Was on Eliquis previously but now switched to Lovenox in setting of hypercoagulable state/possible NBTE with malignancy  -As per GI no intervention  -Monitor CBC

## 2020-01-07 NOTE — PROGRESS NOTE ADULT - SUBJECTIVE AND OBJECTIVE BOX
Rehab Attending X-cover for Dr. Silva, follow up progress note:    Chart reviewed, pt seen/examined.  Pt was originally seen by Dr. Silva of physiatry on 1/3/2020.  Hx obtained from chart, pt and wife who is at bedside.    Pt is a 70 y/o male who retired as an  at age 64 y/o (approximately 2013).  He was doing very well until 2017, when he was diagnosed with cholangiocarcinoma.  He underwent Whipple surgery in April 2017.  He recovered and eventually did very well, returning to the community back to full independence, driving, etc.  Earlier in 2019 he had some relatively minor medical issues.  In early September 2019, he was well enough to go on vacation for a week with his wife to [place], where he had a great time.  Since returning from vacation, he has been in and out of the hospital.  Most significantly, he was diagnosed with portal vein thrombosis and liver metastases.  He had atrial fibrillation and was placed on apixaban.  He had ascites requiring large volume paracentesis.  He was progressively weaker, eating less and losing weight.  He was hospitalized on 12/17/2019 for fevers, with work up negative for peritonitis.  He underwent XRT and received one chemotherapy treatment on 12/19/19.  He returned home and was well enough to attend dinner with family on [facility] 12/24/19, where he was fine.  After dinner, he did not seem right.  His wife brought him to the hospital primarily out of concern for worsening ascites, with generalized weakness and lethargy.  He had had intermittent hypotension and intermittent rectal bleed as well, with recent outpatient endoscopy demonstrating internal hemorrhoids only.    At [facility] during his current admission, he underwent paracentesis and a catheter was placed for ongoing drainage.  His course was complicated by development of a stroke resulting primarily in L upper limb weakness.  His voice has also been weaker since then.  MRI showed acute b/l cerebral and cerebellar strokes, consistent with embolic shower.  There was no redemonstration of atrial fibrillation.  Given that he has mostly been anticoagulated, it has been opined that the emboli are neoplastic in nature.  Pt's wife states that plan is Keytruda immunotherapy, perhaps starting in a week.  She reports that original plan was chemotherapy once stronger, but this is reportedly off the table for now, per the oncologist's discussion with pt and wife this afternoon.  Per chart, no GI intervention related to his GI bleed is planned.    Functionally, he has had acute on progressive functional decline.  He has not been eating well for a while.  He is down from about 160 lbs this summer to 132 lbs now.  On 1/5/2020, he was requiring min assist for sit to stand and ambulating 7 steps at mod assist.  On 1/7, this afternoon, he required min assist for sit to stand and mod assist for chair to bed.  He did some exercises.  OT presented after PT, but pt declined OT due to fatigue, asking for OT to return tomorrow.    ROS: No visual changes, wears glasses, sees fine.  Denies chewing or swallowing difficulties, but endorses poor appetite.  Denies SOB.  Was receiving supplemental O2 yesterday, but not today.  No CP or palpitations.  No N/V, bowel or bladder dysfunction.  Numbness in L hand since the stroke, but denies other numbness.  His abdominal Pleurex cath is being drained intermittently, with a standing order in place for every Friday x 2 L.    Medications:  Various oral pain meds, oral dexamethasone until 1/11/2020, diuretics, bowel meds, oral antibiotics, and treatment dose LMWH (enoxaparin 60 mg q12h) sc.    Social/functional Hx:  As above.  Lives in [place] with wife.  Wife is retired from working in legal administration.  She drives.  Pt was fully independent as of very recently, using a cane intermittently for long distances.  He was driving until very recently.  Their house has [address], 2 floors.  Half bath on 1st floor.  A bed had been placed on the 1st floor earlier this year, so a first floor set up is possible.  Otherwise, full bathroom and bedroom is on 2nd floor.    PE: NAD, emaciated and very fatigued appearing.  Hypophonic voice.  Pleasant, cooperative, follows all commands.  Oriented x 4.  Cath in place in abdomen, capped off and not currently draining.  Limbs show extensive muscular atrophy, no edema x 4 distal limbs.  Motor testing shows trace to poor minus L upper limb strength at shoulder and elbow.  No volitional L hand movement.  L UEx resting splint in place.  L shoulder PROM was significantly limited due to stiffness and pain, with only about 120 degrees of flexion/abduction achieved with significant pain at that point.  Good to normal R UEx strength, and b/l Chevy strength.  There was only mild R shoulder AROM loss.    Recent labs show mild-mod anemia with hgb up to 9.2 today (much improved), platelets WNL, mild hyponatremia (130-132), and good renal function.

## 2020-01-08 ENCOUNTER — APPOINTMENT (OUTPATIENT)
Dept: INFUSION THERAPY | Facility: HOSPITAL | Age: 70
End: 2020-01-08

## 2020-01-08 LAB
ANION GAP SERPL CALC-SCNC: 16 MMOL/L — SIGNIFICANT CHANGE UP (ref 5–17)
BUN SERPL-MCNC: 22 MG/DL — SIGNIFICANT CHANGE UP (ref 7–23)
CALCIUM SERPL-MCNC: 8.8 MG/DL — SIGNIFICANT CHANGE UP (ref 8.4–10.5)
CHLORIDE SERPL-SCNC: 88 MMOL/L — LOW (ref 96–108)
CO2 SERPL-SCNC: 31 MMOL/L — SIGNIFICANT CHANGE UP (ref 22–31)
CREAT SERPL-MCNC: 0.9 MG/DL — SIGNIFICANT CHANGE UP (ref 0.5–1.3)
GLUCOSE SERPL-MCNC: 233 MG/DL — HIGH (ref 70–99)
HCT VFR BLD CALC: 27.3 % — LOW (ref 39–50)
HGB BLD-MCNC: 8.9 G/DL — LOW (ref 13–17)
MCHC RBC-ENTMCNC: 27.6 PG — SIGNIFICANT CHANGE UP (ref 27–34)
MCHC RBC-ENTMCNC: 32.6 GM/DL — SIGNIFICANT CHANGE UP (ref 32–36)
MCV RBC AUTO: 84.8 FL — SIGNIFICANT CHANGE UP (ref 80–100)
PLATELET # BLD AUTO: 317 K/UL — SIGNIFICANT CHANGE UP (ref 150–400)
POTASSIUM SERPL-MCNC: 4.2 MMOL/L — SIGNIFICANT CHANGE UP (ref 3.5–5.3)
POTASSIUM SERPL-SCNC: 4.2 MMOL/L — SIGNIFICANT CHANGE UP (ref 3.5–5.3)
RBC # BLD: 3.22 M/UL — LOW (ref 4.2–5.8)
RBC # FLD: 21.7 % — HIGH (ref 10.3–14.5)
SODIUM SERPL-SCNC: 135 MMOL/L — SIGNIFICANT CHANGE UP (ref 135–145)
WBC # BLD: 14.28 K/UL — HIGH (ref 3.8–10.5)
WBC # FLD AUTO: 14.28 K/UL — HIGH (ref 3.8–10.5)

## 2020-01-08 PROCEDURE — 99232 SBSQ HOSP IP/OBS MODERATE 35: CPT

## 2020-01-08 RX ADMIN — OXYCODONE HYDROCHLORIDE 5 MILLIGRAM(S): 5 TABLET ORAL at 15:04

## 2020-01-08 RX ADMIN — Medication 4 MILLIGRAM(S): at 05:24

## 2020-01-08 RX ADMIN — Medication 500 MILLIGRAM(S): at 12:25

## 2020-01-08 RX ADMIN — PANTOPRAZOLE SODIUM 40 MILLIGRAM(S): 20 TABLET, DELAYED RELEASE ORAL at 05:24

## 2020-01-08 RX ADMIN — ENOXAPARIN SODIUM 60 MILLIGRAM(S): 100 INJECTION SUBCUTANEOUS at 17:49

## 2020-01-08 RX ADMIN — SERTRALINE 25 MILLIGRAM(S): 25 TABLET, FILM COATED ORAL at 12:25

## 2020-01-08 RX ADMIN — GABAPENTIN 300 MILLIGRAM(S): 400 CAPSULE ORAL at 22:47

## 2020-01-08 RX ADMIN — Medication 4 MILLIGRAM(S): at 17:49

## 2020-01-08 RX ADMIN — Medication 25 MILLIGRAM(S): at 05:24

## 2020-01-08 RX ADMIN — ENOXAPARIN SODIUM 60 MILLIGRAM(S): 100 INJECTION SUBCUTANEOUS at 05:25

## 2020-01-08 RX ADMIN — OXYCODONE HYDROCHLORIDE 5 MILLIGRAM(S): 5 TABLET ORAL at 14:35

## 2020-01-08 RX ADMIN — Medication 40 MILLIGRAM(S): at 05:24

## 2020-01-08 RX ADMIN — SPIRONOLACTONE 12.5 MILLIGRAM(S): 25 TABLET, FILM COATED ORAL at 05:24

## 2020-01-08 NOTE — PROGRESS NOTE ADULT - PROBLEM SELECTOR PLAN 2
s/p pigtail catheter placement for recurrent ascitics  on Cipro for SBP ppx   -cont diuretics in addition to likely weekly drainage via cath every Friday 2-3 liters.

## 2020-01-08 NOTE — PROGRESS NOTE ADULT - ATTENDING COMMENTS
Extensive 1 hour family meeting with wife, daughter, son, daughter in law, brother today along with Onc, SW, CM, nurse manager on 1/3  Discussed in details about current medical condition and future goal/plans.   Plan for likely discharge to [facility] this week. PMR follow up appreciated

## 2020-01-08 NOTE — PROGRESS NOTE ADULT - SUBJECTIVE AND OBJECTIVE BOX
[facility] Division of Hospital Medicine  Yudith Monsalve MD  Pager (M-F, 6B-5P): [phone]  Other Times:  [phone]    Patient is a 69y old  Male who presents with a chief complaint of abdominal distention and generalized malaise (07 Jan 2020 16:17)      SUBJECTIVE / OVERNIGHT EVENTS:  Feeling well. still with poor appetite . minimal change for now.   Denies any abd complaints.  afebrile. no acute overnight issues    ADDITIONAL REVIEW OF SYSTEMS:    MEDICATIONS  (STANDING):  ciprofloxacin     Tablet 500 milliGRAM(s) Oral daily  dexAMETHasone     Tablet 4 milliGRAM(s) Oral two times a day  enoxaparin Injectable 60 milliGRAM(s) SubCutaneous two times a day  furosemide    Tablet 40 milliGRAM(s) Oral daily  gabapentin 300 milliGRAM(s) Oral at bedtime  metoprolol succinate ER 25 milliGRAM(s) Oral daily  pantoprazole    Tablet 40 milliGRAM(s) Oral before breakfast  sertraline 25 milliGRAM(s) Oral daily  spironolactone 12.5 milliGRAM(s) Oral daily    MEDICATIONS  (PRN):  acetaminophen   Tablet .. 650 milliGRAM(s) Oral every 4 hours PRN Mild Pain (1 - 3)  aluminum hydroxide/magnesium hydroxide/simethicone Suspension 30 milliLiter(s) Oral every 6 hours PRN Dyspepsia  famotidine    Tablet 20 milliGRAM(s) Oral two times a day PRN GERD  oxyCODONE    IR 5 milliGRAM(s) Oral every 6 hours PRN Moderate Pain (4 - 6)  simethicone 80 milliGRAM(s) Chew two times a day PRN Gas      CAPILLARY BLOOD GLUCOSE        I&O's Summary    07 Jan 2020 07:01  -  08 Jan 2020 07:00  --------------------------------------------------------  IN: 720 mL / OUT: 0 mL / NET: 720 mL        PHYSICAL EXAM:  Vital Signs Last 24 Hrs  T(C): 36.3 (08 Jan 2020 11:36), Max: 36.4 (07 Jan 2020 20:37)  T(F): 97.4 (08 Jan 2020 11:36), Max: 97.6 (07 Jan 2020 20:37)  HR: 60 (08 Jan 2020 11:36) (60 - 84)  BP: 103/64 (08 Jan 2020 11:36) (103/64 - 112/73)  BP(mean): --  RR: 17 (08 Jan 2020 11:36) (17 - 18)  SpO2: 96% (08 Jan 2020 11:36) (95% - 97%)    CONSTITUTIONAL: NAD, well-groomed  EYES:  conjunctiva and sclera clear  ENMT: Moist oral mucosa  NECK: Supple, no palpable masses; no JVD  RESPIRATORY: Normal respiratory effort; lungs are clear to auscultation bilaterally  CARDIOVASCULAR: Regular rate and rhythm, normal S1 and S2, no murmur/rub/gallop; No lower extremity edema  ABDOMEN: +pleurax Nontender to palpation, normoactive bowel sounds, no rebound/guarding  MUSCULOSKELETAL:  no clubbing or cyanosis of digits; no joint swelling or tenderness to palpation  PSYCH: A+O to person, place, and time; affect appropriate  SKIN: No rashes; no palpable lesions  NEURO:  LUE weakness. sensation intact.     LABS:                        8.9    14.28 )-----------( 317      ( 08 Jan 2020 08:53 )             27.3     01-08    135  |  88<L>  |  22  ----------------------------<  233<H>  4.2   |  31  |  0.90    Ca    8.8      08 Jan 2020 05:47  Mg     1.8     01-07                  RADIOLOGY & ADDITIONAL TESTS:  Results Reviewed:   Imaging Personally Reviewed:  Electrocardiogram Personally Reviewed:    COORDINATION OF CARE:  Care Discussed with Consultants/Other Providers [Y/N]:  Prior or Outpatient Records Reviewed [Y/N]:

## 2020-01-08 NOTE — PROGRESS NOTE ADULT - PROBLEM SELECTOR PLAN 4
Leukocytosis likely sec to recent initiation of steroid for appetite   No evidence of new infection. s/p neupogen x3 days as well previously.   H/H stable with plt improved.   Cont Lovenox

## 2020-01-08 NOTE — PROGRESS NOTE ADULT - PROBLEM SELECTOR PLAN 7
- D/w Onc team. No further plans for ongoing chemo given overall status.  - Will consider for immunotherapy once patient is improved physically/nutritionally after completion of rehab. no plans for treatment while in rehab

## 2020-01-08 NOTE — PROGRESS NOTE ADULT - PROBLEM SELECTOR PLAN 3
H/h low but stable. s/p chemo as outpt 12/19  patient has stool occult + with thrombocytopenia/anemia s/p chemo on 12/19.  Was on Eliquis previously but now switched to Lovenox in setting of hypercoagulable state/possible NBTE with malignancy  -As per GI no intervention  CBC stable

## 2020-01-09 VITALS
RESPIRATION RATE: 18 BRPM | OXYGEN SATURATION: 98 % | TEMPERATURE: 98 F | HEART RATE: 70 BPM | SYSTOLIC BLOOD PRESSURE: 107 MMHG | DIASTOLIC BLOOD PRESSURE: 64 MMHG | WEIGHT: 119.27 LBS

## 2020-01-09 DIAGNOSIS — D72.829 ELEVATED WHITE BLOOD CELL COUNT, UNSPECIFIED: ICD-10-CM

## 2020-01-09 LAB
ANION GAP SERPL CALC-SCNC: 15 MMOL/L — SIGNIFICANT CHANGE UP (ref 5–17)
BUN SERPL-MCNC: 30 MG/DL — HIGH (ref 7–23)
CALCIUM SERPL-MCNC: 8.6 MG/DL — SIGNIFICANT CHANGE UP (ref 8.4–10.5)
CHLORIDE SERPL-SCNC: 88 MMOL/L — LOW (ref 96–108)
CO2 SERPL-SCNC: 30 MMOL/L — SIGNIFICANT CHANGE UP (ref 22–31)
CREAT SERPL-MCNC: 0.86 MG/DL — SIGNIFICANT CHANGE UP (ref 0.5–1.3)
GLUCOSE SERPL-MCNC: 337 MG/DL — HIGH (ref 70–99)
HCT VFR BLD CALC: 28.9 % — LOW (ref 39–50)
HGB BLD-MCNC: 9.2 G/DL — LOW (ref 13–17)
MCHC RBC-ENTMCNC: 27.2 PG — SIGNIFICANT CHANGE UP (ref 27–34)
MCHC RBC-ENTMCNC: 31.8 GM/DL — LOW (ref 32–36)
MCV RBC AUTO: 85.5 FL — SIGNIFICANT CHANGE UP (ref 80–100)
PLATELET # BLD AUTO: 322 K/UL — SIGNIFICANT CHANGE UP (ref 150–400)
POTASSIUM SERPL-MCNC: 3.9 MMOL/L — SIGNIFICANT CHANGE UP (ref 3.5–5.3)
POTASSIUM SERPL-SCNC: 3.9 MMOL/L — SIGNIFICANT CHANGE UP (ref 3.5–5.3)
RBC # BLD: 3.38 M/UL — LOW (ref 4.2–5.8)
RBC # FLD: 22.1 % — HIGH (ref 10.3–14.5)
SODIUM SERPL-SCNC: 133 MMOL/L — LOW (ref 135–145)
WBC # BLD: 22.46 K/UL — HIGH (ref 3.8–10.5)
WBC # FLD AUTO: 22.46 K/UL — HIGH (ref 3.8–10.5)

## 2020-01-09 PROCEDURE — 96374 THER/PROPH/DIAG INJ IV PUSH: CPT

## 2020-01-09 PROCEDURE — 97162 PT EVAL MOD COMPLEX 30 MIN: CPT

## 2020-01-09 PROCEDURE — 85730 THROMBOPLASTIN TIME PARTIAL: CPT

## 2020-01-09 PROCEDURE — 70551 MRI BRAIN STEM W/O DYE: CPT

## 2020-01-09 PROCEDURE — 84484 ASSAY OF TROPONIN QUANT: CPT

## 2020-01-09 PROCEDURE — 80061 LIPID PANEL: CPT

## 2020-01-09 PROCEDURE — 87040 BLOOD CULTURE FOR BACTERIA: CPT

## 2020-01-09 PROCEDURE — 93306 TTE W/DOPPLER COMPLETE: CPT

## 2020-01-09 PROCEDURE — 80048 BASIC METABOLIC PNL TOTAL CA: CPT

## 2020-01-09 PROCEDURE — 93005 ELECTROCARDIOGRAM TRACING: CPT

## 2020-01-09 PROCEDURE — 99239 HOSP IP/OBS DSCHRG MGMT >30: CPT

## 2020-01-09 PROCEDURE — 84100 ASSAY OF PHOSPHORUS: CPT

## 2020-01-09 PROCEDURE — 49418 INSERT TUN IP CATH PERC: CPT

## 2020-01-09 PROCEDURE — P9037: CPT

## 2020-01-09 PROCEDURE — 97116 GAIT TRAINING THERAPY: CPT

## 2020-01-09 PROCEDURE — 86850 RBC ANTIBODY SCREEN: CPT

## 2020-01-09 PROCEDURE — 85027 COMPLETE CBC AUTOMATED: CPT

## 2020-01-09 PROCEDURE — 97530 THERAPEUTIC ACTIVITIES: CPT

## 2020-01-09 PROCEDURE — 82553 CREATINE MB FRACTION: CPT

## 2020-01-09 PROCEDURE — 86900 BLOOD TYPING SEROLOGIC ABO: CPT

## 2020-01-09 PROCEDURE — 76705 ECHO EXAM OF ABDOMEN: CPT

## 2020-01-09 PROCEDURE — 70450 CT HEAD/BRAIN W/O DYE: CPT

## 2020-01-09 PROCEDURE — 99285 EMERGENCY DEPT VISIT HI MDM: CPT | Mod: 25

## 2020-01-09 PROCEDURE — 83735 ASSAY OF MAGNESIUM: CPT

## 2020-01-09 PROCEDURE — 70547 MR ANGIOGRAPHY NECK W/O DYE: CPT

## 2020-01-09 PROCEDURE — 97112 NEUROMUSCULAR REEDUCATION: CPT

## 2020-01-09 PROCEDURE — P9047: CPT

## 2020-01-09 PROCEDURE — 97166 OT EVAL MOD COMPLEX 45 MIN: CPT

## 2020-01-09 PROCEDURE — 86901 BLOOD TYPING SEROLOGIC RH(D): CPT

## 2020-01-09 PROCEDURE — 97760 ORTHOTIC MGMT&TRAING 1ST ENC: CPT

## 2020-01-09 PROCEDURE — 83036 HEMOGLOBIN GLYCOSYLATED A1C: CPT

## 2020-01-09 PROCEDURE — 80053 COMPREHEN METABOLIC PANEL: CPT

## 2020-01-09 PROCEDURE — C1729: CPT

## 2020-01-09 PROCEDURE — 81001 URINALYSIS AUTO W/SCOPE: CPT

## 2020-01-09 PROCEDURE — G0515: CPT

## 2020-01-09 PROCEDURE — 82272 OCCULT BLD FECES 1-3 TESTS: CPT

## 2020-01-09 PROCEDURE — 71046 X-RAY EXAM CHEST 2 VIEWS: CPT

## 2020-01-09 PROCEDURE — 87086 URINE CULTURE/COLONY COUNT: CPT

## 2020-01-09 PROCEDURE — 80076 HEPATIC FUNCTION PANEL: CPT

## 2020-01-09 PROCEDURE — 82550 ASSAY OF CK (CPK): CPT

## 2020-01-09 PROCEDURE — 70544 MR ANGIOGRAPHY HEAD W/O DYE: CPT

## 2020-01-09 PROCEDURE — 85610 PROTHROMBIN TIME: CPT

## 2020-01-09 PROCEDURE — C1769: CPT

## 2020-01-09 PROCEDURE — 36430 TRANSFUSION BLD/BLD COMPNT: CPT

## 2020-01-09 PROCEDURE — 97110 THERAPEUTIC EXERCISES: CPT

## 2020-01-09 RX ORDER — SERTRALINE 25 MG/1
1 TABLET, FILM COATED ORAL
Qty: 0 | Refills: 0 | DISCHARGE
Start: 2020-01-09

## 2020-01-09 RX ORDER — ENOXAPARIN SODIUM 100 MG/ML
60 INJECTION SUBCUTANEOUS
Qty: 0 | Refills: 0 | DISCHARGE
Start: 2020-01-09

## 2020-01-09 RX ORDER — FUROSEMIDE 40 MG
1 TABLET ORAL
Qty: 0 | Refills: 0 | DISCHARGE
Start: 2020-01-09

## 2020-01-09 RX ORDER — ACETAMINOPHEN 500 MG
2 TABLET ORAL
Qty: 0 | Refills: 0 | DISCHARGE
Start: 2020-01-09

## 2020-01-09 RX ORDER — DEXAMETHASONE 0.5 MG/5ML
1 ELIXIR ORAL
Qty: 0 | Refills: 0 | DISCHARGE
Start: 2020-01-09

## 2020-01-09 RX ORDER — SPIRONOLACTONE 25 MG/1
0.5 TABLET, FILM COATED ORAL
Qty: 0 | Refills: 0 | DISCHARGE
Start: 2020-01-09

## 2020-01-09 RX ADMIN — SERTRALINE 25 MILLIGRAM(S): 25 TABLET, FILM COATED ORAL at 12:24

## 2020-01-09 RX ADMIN — Medication 25 MILLIGRAM(S): at 05:46

## 2020-01-09 RX ADMIN — Medication 500 MILLIGRAM(S): at 12:24

## 2020-01-09 RX ADMIN — PANTOPRAZOLE SODIUM 40 MILLIGRAM(S): 20 TABLET, DELAYED RELEASE ORAL at 05:56

## 2020-01-09 RX ADMIN — ENOXAPARIN SODIUM 60 MILLIGRAM(S): 100 INJECTION SUBCUTANEOUS at 05:46

## 2020-01-09 RX ADMIN — Medication 4 MILLIGRAM(S): at 05:46

## 2020-01-09 RX ADMIN — SIMETHICONE 80 MILLIGRAM(S): 80 TABLET, CHEWABLE ORAL at 12:24

## 2020-01-09 RX ADMIN — SPIRONOLACTONE 12.5 MILLIGRAM(S): 25 TABLET, FILM COATED ORAL at 05:46

## 2020-01-09 RX ADMIN — Medication 40 MILLIGRAM(S): at 05:46

## 2020-01-09 NOTE — PROGRESS NOTE ADULT - PROVIDER SPECIALTY LIST ADULT
Cardiology
Heme/Onc
Hospitalist
Internal Medicine
Intervent Radiology
Palliative Care
Rehab Medicine
Internal Medicine

## 2020-01-09 NOTE — PROGRESS NOTE ADULT - NSHPATTENDINGPLANDISCUSS_GEN_ALL_CORE
team
JESSICA Jimenez and wife, CM, Onc
ELHAM Larsen
ELHAM Larsen
JESSICA Joel
Np and oncology and IR
ELHAM Larsen
JESSICA Jimenez and wife
JESSICA Wilde
family, CM, PA Heidi
NP
Np
4 M NP and the Oncology fellow

## 2020-01-09 NOTE — PROGRESS NOTE ADULT - ATTENDING COMMENTS
Extensive 1 hour family meeting with wife, daughter, son, daughter in law, brother today along with Onc, SW, CM, nurse manager on 1/3  Discussed in details about current medical condition and future goal/plans.   Plan for discharge to [facility] today. D/w Dr. Guerrier ([facility] medical doctor)  D/c time 40 mins.

## 2020-01-09 NOTE — PROGRESS NOTE ADULT - PROBLEM SELECTOR PLAN 9
Transitions of Care Status:  1.  Name of PCP:  Homero Melgar/ Dr. Atkinson  2.  PCP Contacted on Admission: [ ] Y    [ ] N    3.  PCP contacted at Discharge: [X] Y    [ ] N    [ ] N/A  4.  Post-Discharge Appointment Date and Location: [facility]  5.  Summary of Handoff given to PCP: yes

## 2020-01-09 NOTE — CHART NOTE - NSCHARTNOTEFT_GEN_A_CORE
PA Medicine Discharge Note    Patient medically cleared for discharge today by Dr. Monsalve.  WC 22.46 in setting of oral steroids, no S/S infection. Discussed with attending cleared for d/c.  Medications for discharge reviewed and confirmed by Dr. Monsalve.  Patient discharges to [facility].    Grace Nichols PA-C  Dept of Medicine  #[id number]

## 2020-01-09 NOTE — PROGRESS NOTE ADULT - SUBJECTIVE AND OBJECTIVE BOX
[facility] Division of Hospital Medicine  Yudith Monsalve MD  Pager (TAI-CHAD, 6Z-6L): [phone]  Other Times:  [phone]    Patient is a 69y old  Male who presents with a chief complaint of Hypotension x 1 day (08 Jan 2020 10:00)      SUBJECTIVE / OVERNIGHT EVENTS:  feeling ok. denies any complaints. afebrile.   no acute issues overnight.    ADDITIONAL REVIEW OF SYSTEMS:    MEDICATIONS  (STANDING):  ciprofloxacin     Tablet 500 milliGRAM(s) Oral daily  dexAMETHasone     Tablet 4 milliGRAM(s) Oral two times a day  enoxaparin Injectable 60 milliGRAM(s) SubCutaneous two times a day  furosemide    Tablet 40 milliGRAM(s) Oral daily  gabapentin 300 milliGRAM(s) Oral at bedtime  metoprolol succinate ER 25 milliGRAM(s) Oral daily  pantoprazole    Tablet 40 milliGRAM(s) Oral before breakfast  sertraline 25 milliGRAM(s) Oral daily  spironolactone 12.5 milliGRAM(s) Oral daily    MEDICATIONS  (PRN):  acetaminophen   Tablet .. 650 milliGRAM(s) Oral every 4 hours PRN Mild Pain (1 - 3)  aluminum hydroxide/magnesium hydroxide/simethicone Suspension 30 milliLiter(s) Oral every 6 hours PRN Dyspepsia  famotidine    Tablet 20 milliGRAM(s) Oral two times a day PRN GERD  simethicone 80 milliGRAM(s) Chew two times a day PRN Gas      CAPILLARY BLOOD GLUCOSE        I&O's Summary    08 Jan 2020 07:01  -  09 Jan 2020 07:00  --------------------------------------------------------  IN: 480 mL / OUT: 150 mL / NET: 330 mL    09 Jan 2020 07:01  -  09 Jan 2020 15:57  --------------------------------------------------------  IN: 480 mL / OUT: 0 mL / NET: 480 mL        PHYSICAL EXAM:  Vital Signs Last 24 Hrs  T(C): 36.4 (09 Jan 2020 14:20), Max: 36.4 (08 Jan 2020 20:59)  T(F): 97.6 (09 Jan 2020 14:20), Max: 97.6 (09 Jan 2020 14:20)  HR: 70 (09 Jan 2020 14:20) (66 - 74)  BP: 107/64 (09 Jan 2020 14:20) (103/67 - 119/77)  BP(mean): --  RR: 18 (09 Jan 2020 14:20) (18 - 18)  SpO2: 98% (09 Jan 2020 14:20) (95% - 98%)    CONSTITUTIONAL: NAD, well-groomed  EYES:  conjunctiva and sclera clear  ENMT: Moist oral mucosa  NECK: Supple, no palpable masses; no JVD  RESPIRATORY: Normal respiratory effort; lungs are clear to auscultation bilaterally  CARDIOVASCULAR: Regular rate and rhythm, normal S1 and S2, no murmur/rub/gallop; No lower extremity edema  ABDOMEN: +pleurax Nontender to palpation, normoactive bowel sounds, no rebound/guarding  MUSCULOSKELETAL:  no clubbing or cyanosis of digits; no joint swelling or tenderness to palpation  PSYCH: A+O to person, place, and time; affect appropriate  SKIN: No rashes; no palpable lesions  NEURO:  LUE weakness. sensation intact.       LABS:                        9.2    22.46 )-----------( 322      ( 09 Jan 2020 09:42 )             28.9     01-09    133<L>  |  88<L>  |  30<H>  ----------------------------<  337<H>  3.9   |  30  |  0.86    Ca    8.6      09 Jan 2020 06:41                  RADIOLOGY & ADDITIONAL TESTS:  Results Reviewed:   Imaging Personally Reviewed:  Electrocardiogram Personally Reviewed:    COORDINATION OF CARE:  Care Discussed with Consultants/Other Providers [Y/N]:  Prior or Outpatient Records Reviewed [Y/N]:

## 2020-01-09 NOTE — PROGRESS NOTE ADULT - PROBLEM SELECTOR PROBLEM 2
Ascites, malignant
Acute blood loss anemia
Acute embolic stroke
Ascites, malignant
Leukocytosis, unspecified type
Orthostatic hypotension

## 2020-01-09 NOTE — PROGRESS NOTE ADULT - PROBLEM SELECTOR PROBLEM 1
Acute embolic stroke
Ascites, malignant

## 2020-01-09 NOTE — PROGRESS NOTE ADULT - PROBLEM SELECTOR PLAN 2
Likely in setting of steroid and recent neupogen  No evidence of active infection. afebrile.   Will monitor CBC and any symptoms  Monitor off abx

## 2020-01-10 ENCOUNTER — INBOUND DOCUMENT (OUTPATIENT)
Age: 70
End: 2020-01-10

## 2020-01-13 ENCOUNTER — APPOINTMENT (OUTPATIENT)
Dept: INFUSION THERAPY | Facility: HOSPITAL | Age: 70
End: 2020-01-13

## 2020-01-14 DIAGNOSIS — K21.9 GASTRO-ESOPHAGEAL REFLUX DISEASE W/OUT ESOPHAGITIS: ICD-10-CM

## 2020-01-14 RX ORDER — PANTOPRAZOLE 40 MG/1
40 TABLET, DELAYED RELEASE ORAL
Qty: 30 | Refills: 2 | Status: ACTIVE | COMMUNITY
Start: 2020-01-14 | End: 1900-01-01

## 2020-01-15 LAB
CULTURE RESULTS: SIGNIFICANT CHANGE UP
SPECIMEN SOURCE: SIGNIFICANT CHANGE UP

## 2020-01-21 NOTE — ED PROCEDURE NOTE - PROCEDURE ADDITIONAL DETAILS
Tourniquet was placed on the patient's extremity. Vasculature was visualized using linear probe and sequential compression. This was performed in a fashion as to visualize and differentiate venous structures from arterial vessels. A large superficial vein was selected and the skin was cleansed with chlorhexadine to debulk bacteria from the area of venopuncture. A 1.88cm angiocatheter was advanced along with the probe in short axis view of the vein to allow for visualization of the tip of the needle entering the vein. The angiocatheter was advanced as a unit approximately 1 more cm and then the catheter was advanced separate with good return of blood, catheter was iv locked in the typical fashion with standard tubing and a luer-lock device. Labs were drawn as needed and the iv tubing was flushed using a standard saline flush.

## 2020-01-22 ENCOUNTER — APPOINTMENT (OUTPATIENT)
Dept: INFUSION THERAPY | Facility: HOSPITAL | Age: 70
End: 2020-01-22

## 2020-01-24 ENCOUNTER — APPOINTMENT (OUTPATIENT)
Dept: INFUSION THERAPY | Facility: HOSPITAL | Age: 70
End: 2020-01-24

## 2020-01-27 ENCOUNTER — FORM ENCOUNTER (OUTPATIENT)
Age: 70
End: 2020-01-27

## 2020-01-30 NOTE — ED ADULT NURSE NOTE - NS ED NURSE DC INFO COMPLEXITY
Call placed to patient.  verified.    Patient notified pathology results from left breast biopsy performed on 2020 revealed: Invasive Ductal Carcinoma, grade 2. Receptors pending at this time.    Per radiologist, Dr. Bubba Padilla, results are concordant with imaging findings.    Recommendation: Surgical Consultation.     Patient is scheduled to meet with Dr. Gonzalez on  check in at 9:30 at [facility].      All patient's questions answered appropriately and thoroughly. Patient will call our office in the interim with additional questions/concerns.     Both parties in agreement of above plan.    Tawanna DAVISN, RN, OCN  Oncology Care Coordinator  [facility]  [facility]  Phone: [phone]            
Straightforward: Basic instructions, no meds, no home treatment/Verbalized Understanding/Patient asked questions/Simple: Patient demonstrates quick and easy understanding

## 2020-02-03 ENCOUNTER — APPOINTMENT (OUTPATIENT)
Dept: INFUSION THERAPY | Facility: HOSPITAL | Age: 70
End: 2020-02-03

## 2020-02-05 ENCOUNTER — APPOINTMENT (OUTPATIENT)
Dept: INFUSION THERAPY | Facility: HOSPITAL | Age: 70
End: 2020-02-05

## 2020-02-25 NOTE — PROVIDER CONTACT NOTE (OTHER) - DATE AND TIME:
[FreeTextEntry1] : 63 yo M presents for initial evaluation of sleep disordered breathing. Current 1 ppd smoker, approx 30 py. H/o PVCs, nonobstructive CAD, HTN, HLD. Referred by Dr. Way. \par Main complaints of severe snoring and witnessed apneas, per his wife, who accompanies him to the visit. \par \par Sleep history: \par Bed: 11:30 pm, no difficultly falling asleep, minimal wake ups, out of bed at 6:30 am to take his daughter to school, back to sleep 1 hour from 8-9 am. Denies waking up with palpitations\par Unrefreshed upon awakening. \par Morning headaches: denies\par Dry mouth/throat: denies\par Weight trend: stable\par Denies drowsy driving, denies any accidents or near accidents. \par ESS of 7\par \par A/P:\par High suspicion for sleep disordered breathing given history and physical exam. Also given his history of frequent PVCs, concern for arrhythmia exacerbated by sleep disordered breathing.\par Will send the patient for a diagnostic PSG at the [facility] lab to evaluate for the presence of sleep disordered breathing. Explained the rationale for diagnosis and treatment of sleep apnea including its effect on quality of life and long term cardiovascular risk. \par Above discussed at length, all questions answered, he appears to understand and will call for results 1 week after completion of the study.\par \par Tobacco use - counseled extensively on the dangers of smoking and offered referral to tobacco control center and other NRT. Patient is not interested at this time.  \par \par  26-Dec-2019 17:45

## 2020-02-27 ENCOUNTER — APPOINTMENT (OUTPATIENT)
Dept: HEPATOLOGY | Facility: CLINIC | Age: 70
End: 2020-02-27

## 2020-05-26 NOTE — ED PROVIDER NOTE - NS ED MD TWO NIGHTS YN
Last Appt:  11/18/2019  Next Appt:   5/29/2020  Med verified in Epic  Should not need kamilla,in d yet
Yes

## 2020-06-12 NOTE — PROGRESS NOTE ADULT - ATTENDING COMMENTS
Patient seen and examined, care d/w residents    Recurrence of biliary duct cancer planned for OP chemo had a fall at [facility] where he reports losing balance and dripping over cane, noted to be hypotensive.  Given IVF and pressors in ED.  Labs notable for leukocytosis, CT with known malignancy and colitis.   Patient reports had diarrhea in past was seen at [facility] had OP EGD/colon found to have H pylori s/p treatment, then had diarrhea seen at [facility] given loperamide and broad spectrum abx as also have fever, reports diarrhea had resolved there but recurred 2-3 day ago, no as severe yet (prior 8-12 BM per day, now 4 today already).  No fevers at home, feels weak.  Only took Xeloda 1 dose (2 tabs) on day of planned OP chemo.  - Hb now stabilized, no s/s of bleeding, continue to monitor  - SBP improved s/p transient pressors in ED and fluids, now SBP in 120s range; off fluids x24 hours, encourage to increase PO  - sepsis ruled out,  no source identified, CTAP w/o overt infection aside from possible colitis, c diff and GI pcr neg, blood cx and urine cx NG  - appreciate onc eval  - appreciate GI eval,  pt prefer to do c-scope as OP with Dr. Orantes; loperamide prn for diarrhea   - c/w Eliquis for known thrombosis, Cr at baseline, CT neg    Care d/w pt and wife  dispo home today  OP oncology office notified of impending dc, will call pt to schedule f/u  OP GI office notified   dispo time 45 min
Patient seen and examined, care d/w residents    Recurrence of biliary duct cancer planned for OP chemo had a fall at [facility] where he reports losing balance and dripping over cane, noted to be hypotensive.  Given IVF and pressors in ED.  Labs notable for leukocytosis, CT with known malignancy and colitis.   Patient reports had diarrhea in past was seen at [facility] had OP EGD/colon found to have H pylori s/p treatment, then had diarrhea seen at [facility] given loperamide and broad spectrum abx as also have fever, reports diarrhea had resolved there but recurred 2-3 day ago, no as severe yet (prior 8-12 BM per day, now 4 today already).  No fevers at home, feels weak.  Only took Xeloda 1 dose on day of chemo.  - Hb now stablized, no s/s of bleeding, continue to monitor  - SBP improved s/p transient pressors in ED and fluids, now SBP in 120s range; off fluids, encourage to increase PO  - sepsis being ruled out, thus far no source identified, CTAP w/o overt infeciton aside from possible colitis, c diff and GI pcr neg, blood cx and urine cx NG; await 48 hour blood culture results, dc vanco, maintain cefepime alone fo rnow   - appreciate onc eval  - appreciate GI eval, IP vs OP eval, pt prefer to do c-scope as OP with Dr. Orantes as previously   - c/w eliquis for known thrombosis, Cr at baseline, CT neg    Care d/w pt and wife  d/w patient and wife, if blood cx remain NG can likely dc home tomorrow with OP GI and oncology f/u
Patient seen and examined, care d/w residents    Recurrence of biliary duct cancer planned for OP chemo had a fall at [facility] where he reports losing balance and dripping over cane, noted to be hypotensive.  Given IVF and pressors in ED.  Labs notable for leukocytosis, CT with known malignancy and colitis.   Patient reports had diarrhea in past was seen at [facility] had OP EGD/colon found to have H pylori s/p treatment, then had diarrhea seen at [facility] given loperamide and broad spectrum abx as also have fever, reports diarrhea had resolved there but recurred 2-3 day ago, no as severe yet (prior 8-12 BM per day, now 4 today already).  No fevers at home, feels weak.  Only took Xeloda 1 dose on day of chemo.  - trend H/H, transfuse <7, denies melena/BRBPR   - dehydration with urine SG of 1.040, c/w IV fluids, encourage PO  - r/o sepsis, f/u blood cultures, c/w vanco & cefepime until NG x24 hours,  c diff and GI pcr pending in setting of diarrhea  - oncology aware of admission  - c/w eliquis for known thrombosis, Cr at baseline, CT neg    Care d/w pt and wife
Fair

## 2020-07-10 NOTE — DIETITIAN INITIAL EVALUATION ADULT. - PROBLEM SELECTOR PLAN 2
suspect 2/2 to demand in setting of hypotension , ekg w/ twi  , discussed briefly with cardiology , patient on already heparin  infusion , no active chest pain , will monitor closely on Telemetry and trend troponin , IF develops chest pain or marked rise in troponin,  formal cardiology consult can be obtained by day team   - telemetry   - trend troponin   - CKMB
See MAR for last dose taken

## 2020-08-26 NOTE — PROGRESS NOTE ADULT - REASON FOR ADMISSION
Hypotension x 1 day
abdominal distention and generalized malaise
Hypotension x 1 day
Negative

## 2020-08-29 NOTE — PRE-ANESTHESIA EVALUATION ADULT - NSANTHADDINFOFT_GEN_ALL_CORE
It was explained that IV sedation will be administered for this anesthetic.  As we are giving sedation and not general anesthesia, there is a chance that the patient may recall certain parts of the procedure.  This was acknowledged by the patient.

## 2020-10-05 NOTE — DIETITIAN INITIAL EVALUATION ADULT. - ETIOLOGY
AMS w/ AMS inadequate protein-energy intake with increased needs in setting of catabolic disease state and altered GI function

## 2020-11-05 NOTE — ED ADULT NURSE NOTE - NSFALLRSKASSESSTYPE_ED_ALL_ED
COVID-19 Screening:    • Does the patient OR patient’s household members have any of the following symptoms?  o Temperature: Fever ?100.0°F or ?37.8°C?  No  o Respiratory symptoms: New or worsening cough, shortness of breath, difficulty breathing, or sore throat? No  o GI symptoms: New onset of nausea, vomiting or diarrhea?  No  o Miscellaneous: New onset of loss of taste or smell, chills, repeated shaking with chills, muscle pain, headache, congestion or runny nose?  No  • Has the patient or a household member tested positive for COVID-19 in the last 14 days?  No  • Has the patient or a household member been tested for COVID-19 and are waiting for the results?  No                                       [facility] PREOPERATIVE PATIENT INSTRUCTIONS     PARKING:     Pavilion: Parking is available I Parking Garage [address] on [address] [place] [address] and [facility]. Pedestrian Walkway bridge is located on the 2nd floor of Parking Garage D.  is also available at main entrance of Outpatient Pavilion on [facility].     Hospital:. Please arrive to the Main Entrance of the hospital on [address] [place] [address] and [facility].  Parking access is located across the street from the Hospital Main Entrance in Parking Garage A. Check in at Main Entrance , and you will be given instructions from there.     GENERAL INSTRUCTIONS:    One family members/friend who is over the age of 18 may accompany you. A responsible person MUST accompany you home and stay with you the first 24 hours after surgery. Your surgery will be cancelled if these arrangements have not been made.   If you become ill prior to surgery (I.e. fever, vomiting), please notify your surgeon immediately.   Children under 12 years old MUST have a parent with them at all times.   On the day of surgery: Do not wear contact lenses, make up, nail polish or jewelry. Leave all valuables at home except what you will need or are instructed to bring.   For your convenience, [facility] is  available to fill medications. Please bring a form of payments accepted at [facility]' locations.     BRING WITH YOU ON DAY OF SURGERY:  Insurance Card and referral (if required), 's license or photo ID  All your medications in their original pharmacy bottles  Advance directive (living will, Healthcare Power of )  All information on your pacemaker or AICD, if appropriate  Any other forms, x-rays or films the doctor may have given you  Any medical devices (I.e. crutches, braces, sling)  Loose fitting clothing, slippers, walking shoes if applicable  For comfort measures, you may bring headphones/music device/magazines that can be given to family when you go into the operating room  Favorite toy of blanket for children. Formula for feeding after surgery or favorite sippy cup.   Please be aware that there are 2 different locations. Hospital patients please arrive at the Hospital Main Entrance on [address] [place] [address] and [facility] and check in with .         Pavilion patients please check in with  Desk right off the 2nd floor Pedestrian walkway entrance.     Please call the location of surgery with questions: Pavilion (up to PM) [phone]; Hospital (up to PM) [phone].   After hours for both locations: [phone]    This information has been reviewed with the patient mom on 11/05/20 9:49 AM.  Acceptable clear liquids such as water, pre-surgical ensure/propel, non-colored clear gatorade, apple juice without fiber, pedialyte, 7-up/Sprite, black coffee or tea without milk products or lemon - can be given 1 hour prior to arrival.    Do NOT eat or consume dairy after midnight.  This includes candy or gum.   •      Initial (On Arrival)

## 2021-04-07 NOTE — OCCUPATIONAL THERAPY INITIAL EVALUATION ADULT - LEVEL OF INDEPENDENCE: EATING, OT EVAL
Gynecology Oncology Progress Note               Author: Diann Justice P.A.-C. Date & Time created: 4/7/2021  2:32 PM     Interval History:  Patient up in chair, just back from ambulating in [place]. States she is feeling good, answers all questions appropriately.  Denies abdominal pain. No nausea or vomiting, tolerating diet. She denies fever, chills.       Physical Exam:  Physical Exam  HENT:      Head: Normocephalic and atraumatic.      Mouth/Throat:      Mouth: Mucous membranes are moist.   Cardiovascular:      Rate and Rhythm: Tachycardia present.      Pulses: Normal pulses.      Heart sounds: Normal heart sounds.   Pulmonary:      Effort: Pulmonary effort is normal.      Breath sounds: Normal breath sounds.   Abdominal:      General: Abdomen is flat. There is no distension.      Palpations: Abdomen is soft.      Tenderness: There is no abdominal tenderness.      Comments: midline incision healing well,c/d/i, LLQ drain w/ minimal serous output    Skin:     General: Skin is warm and dry.   Neurological:      Mental Status: She is alert.         Labs:          Recent Labs     04/05/21 0053 04/06/21 0357 04/07/21  0404   SODIUM 136 138 137   POTASSIUM 4.0 4.1 4.7   CHLORIDE 100 101 100   CO2 25 25 26   BUN 10 10 12   CREATININE 0.65 0.69 0.77   CALCIUM 8.5 8.7 8.6     Recent Labs     04/05/21 0053 04/06/21 0357 04/07/21  0404   ALTSGPT 156* 137* 126*   ASTSGOT 95* 82* 81*   ALKPHOSPHAT 263* 267* 265*   TBILIRUBIN 0.2 0.2 0.2   GLUCOSE 112* 125* 123*     Recent Labs     04/05/21 0053 04/06/21 0357 04/07/21  0404   RBC 3.15* 3.29* 3.17*   HEMOGLOBIN 8.0* 8.2* 7.9*   HEMATOCRIT 25.1* 26.1* 24.9*   PLATELETCT 401 497* 495*     Recent Labs     04/05/21 0053 04/06/21 0357 04/07/21  0404   WBC 17.8* 18.6* 20.3*   NEUTSPOLYS 78.20* 80.00* 81.60*   LYMPHOCYTES 8.10* 8.00* 8.30*   MONOCYTES 8.00 7.00 5.60   EOSINOPHILS 2.10 1.80 1.40   BASOPHILS 0.40 0.30 0.50   ASTSGOT 95* 82* 81*   ALTSGPT 156* 137* 126*    ALKPHOSPHAT 263* 267* 265*   TBILIRUBIN 0.2 0.2 0.2     Recent Labs     21  0053 21  0357 21  0404   SODIUM 136 138 137   POTASSIUM 4.0 4.1 4.7   CHLORIDE 100 101 100   CO2 25 25 26   GLUCOSE 112* 125* 123*   BUN 10 10 12   CREATININE 0.65 0.69 0.77   CALCIUM 8.5 8.7 8.6     Hemodynamics:  Temp (24hrs), Av.8 °C (98.3 °F), Min:36.6 °C (97.8 °F), Max:37.1 °C (98.8 °F)  Temperature: 36.8 °C (98.3 °F)  Pulse  Av.5  Min: 62  Max: 120   Blood Pressure : 119/69     Respiratory:    Respiration: 16, Pulse Oximetry: 96 %        RUL Breath Sounds: Diminished, RML Breath Sounds: Diminished, RLL Breath Sounds: Diminished, KAREN Breath Sounds: Diminished, LLL Breath Sounds: Diminished  Fluids:    Intake/Output Summary (Last 24 hours) at 2021 1802  Last data filed at 2021 1738  Gross per 24 hour   Intake 480 ml   Output 3800 ml   Net -3320 ml        GI/Nutrition:  Orders Placed This Encounter   Procedures   • Diet Order Diet: Level 3 - Liquidised; Liquid level: Level 2 - Mildly Thick; Tray Modifications (optional): SLP - OK per SLP, SLP - 1:1 Supervision by Nursing     Standing Status:   Standing     Number of Occurrences:   1     Order Specific Question:   Diet:     Answer:   Level 3 - Liquidised [26]     Order Specific Question:   Liquid level     Answer:   Level 2 - Mildly Thick     Order Specific Question:   Tray Modifications (optional)     Answer:   SLP - OK per SLP     Order Specific Question:   Tray Modifications (optional)     Answer:   SLP - 1:1 Supervision by Nursing     Medical Decision Making, by Problem:  Active Hospital Problems    Diagnosis    • *Metastatic endometrial cancer (HCC) [C54.1]    • Constipation [K59.00]    • Iron deficiency anemia due to chronic blood loss [D50.0]    • Hypokalemia [E87.6]    • Hyponatremia [E87.1]    • Peripheral edema [R60.9]    • Hyperlipidemia [E78.5]    • Essential hypertension [I10]    • Prediabetes [R73.03]    • Confusion [R41.0]         Plan:  69 y/o found to have a endometrial mass now s/p MACKENZIE/BSO/PALND/PLND/omentectomy/bilateral uretrolysis/peritoneal biopsies/cystotomy repair/bilateral ureteral stent placement on 3/29/21:      1. POD #9: Tolerating thickened liquid diet w/ advancement pending SLP recs, KATIUSKA drain with minimal serous, +ROBF, encourage ambulation, Needs stents x6 weeks, dunn x3 weeks, will follow up with Dr. Quinteros in 2 weeks with a cystogram prior to her appointment.  2. Expressive aphasia: L MCA infarct, cardiothrombotic event, appreciate neurology assistance, repeat CTH w/ no change 3/31, on ASA, verbalization improving, comprehension limited   3. Post op pain: percocet prn  4. Anemia: acute on chronic, s/p 2 units intraop, stable, asymptomatic, 7.9 today, CTM and transfuse as indicated  5. Leukocytosis: increased today to 20.3, remains afebrile. C. Diff not performed due to formed stool. UA was turbid with large leuk esterase, on Rocephin for UTI per hosptialist, will CTM.   6. Transaminitis: improving, NH4 wnl, suspect d/t shock, no tylenol  7. Stage IVB uterine carcinosarcoma: surgical findings and details d/w patient and boyfriend, final pathology reveals metastatic carcinosarcoma. Dr. Quinteros discussed with the patient and her partner on 4/3/21 regarding diagnosis, prognosis, and potential treatment options. SO Star made aware of poor prognosis w/ advanced metastatic disease. Discussed palliative chemotherapy, regimen, and potential side effects vs. palliative care and/or Hospice pending functional status and goals of care. Psychiatric consult done today, states patient lacks capacity to make medical decisions, specifically regarding her cancer treatment.    8. Dysphagia: s/p SLP consult and FEES study    9. Debilitation: PT/OT  10. GI/FEN: thickened liquid diet, monitor lytes and replete PRN  11. Ppx: SCDs, ASA, lovenox  12. Dispo: Case discussed with Dr. Quinteros, patient may be cleared for discharge from a surgical  standpoint, recommend continue to monitor response to antibiotics and leukocytosis.      Quality-Core Measures   minimum assist (75% patients effort)

## 2021-05-28 NOTE — PROGRESS NOTE ADULT - PROBLEM SELECTOR PLAN 5
Bedside shift change report given to Srinivas Estrada (oncoming nurse) by Gallito Gilliam RN (offgoing nurse).  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    -Ab CT (11/18/2019) did not suggest mass contributing to diarrhea   -Ab MRI (11/04/2019) showed 3.6cm retroperitoneal mass with involvement of the small bowel, pancreas, SMA, VALENTE, and portal vein.  -Oncology on board; CAPEOX when no evidence on infection per ID;

## 2021-06-01 NOTE — CONSULT NOTE ADULT - ATTENDING COMMENTS
Patient seen and examined, data and imaging personally reviewed by me.  Ultrasound at bedside reveals small left pleural effusion with elevated hemidiaphragm and large amount of ascites below the diaphragm.  He is scheduled for paracentesis on Monday.  Agree with plan as outlined above.
Pt recurrently admitted for high fever and chills c/w tumor fever /cytokine storm vs countervening sepsis.Clearly has inc ascitis and will need a paracentesis. May receive FOLFOX on Tuesday if w/u negative for sepsis. d/w pt and team.
21F PMH anemia (baseline ~10.5), nutcracker syndrome, p/w abd pain/NV. , LMP 3/23, US at 8w reportedly wnl. Has had intermittent NBNB NV over last few weeks, some relief w/ diclegis which she now ran out of. Has multiple episodes NBNB NV since yesterday. Today was also feeling very fatigued, attempted to get out of bed and felt lightheaded/heart racing. Also w/ vague chest heaviness since this morning. Called her OB (Dr. Webb), referred to ED. No other systemic symptoms. Vitals wnl, exam as above. Very well appearing.  ddx: Likely hyperemesis/related to pregnancy, possible mild dehydration.  Labs, IVF/symptom control, reassess.  GYN consulted (sent in by OB).

## 2021-07-08 NOTE — ED CDU PROVIDER SUBSEQUENT DAY NOTE - GENITOURINARY NEGATIVE STATEMENT, MLM
no dysuria, no frequency, and no hematuria. Strength is grossly at least 3/5 throughout/grossly assessed due to

## 2021-09-02 NOTE — PROVIDER CONTACT NOTE (CRITICAL VALUE NOTIFICATION) - TEST AND RESULT REPORTED:
Patient contacted at this time and informed effective 08/16/2021 there is a no visitor policy at the infusion center  Voicemail left, call back number provided  [email or web address]

## 2021-11-15 NOTE — ED PROVIDER NOTE - MDM ORDERS SUBMITTED SELECTION
Julee Hannah was seen and treated in our emergency department on 11/15/2021. She may return to work on 11/17/2021. If you have any questions or concerns, please don't hesitate to call.       Tina Crook, DO Labs/EKG

## 2022-01-01 NOTE — ED PROVIDER NOTE - PRO INTERPRETER NEED 2
[facility]    [place] Discharge Summary    Date of Admission:  2022 10:28 AM  Date of Discharge:  2022  2:24 PM  Discharging Provider: Blank Jasso MD    Primary Care Physician   Primary care provider: PIP    Discharge Diagnoses   Patient Active Problem List   Diagnosis     Liveborn infant       Hospital Course   Female-Valentina Sanchez is a Term  appropriate for gestational age female  [place] who was born at 2022 10:28 AM by  Vaginal, Spontaneous.    Hearing Screen Date: 08/15/22   Hearing Screening Method: ABR  Hearing Screen, Left Ear: passed  Hearing Screen, Right Ear: passed     Oxygen Screen/CCHD  Critical Congen Heart Defect Test Date: 08/15/22  Right Hand (%): 99 %  Foot (%): 100 %  Critical Congenital Heart Screen Result: pass       Patient Active Problem List   Diagnosis     Liveborn infant       Feeding: Breast feeding going well    Plan:  -Discharge to home with parents  -Follow-up with PCP in 48 hrs   -Anticipatory guidance given    Blank Jasso MD    Discharge Disposition   Discharged to home  Condition at discharge: Stable    Consultations This Hospital Stay   LACTATION IP CONSULT  NURSE PRACT  IP CONSULT  CARE MANAGEMENT / SOCIAL WORK IP CONSULT    Discharge Orders      Activity    Developmentally appropriate care and safe sleep practices (infant on back with no use of pillows).     Reason for your hospital stay    Newly born     Follow Up and recommended labs and tests    Follow-up with PCP in 48 hours     Breastfeeding or formula    Breast feeding 8-12 times in 24 hours based on infant feeding cues or formula feeding 6-12 times in 24 hours based on infant feeding cues.     Pending Results   These results will be followed up by PCP  Unresulted Labs Ordered in the Past 30 Days of this Admission     Date and Time Order Name Status Description    2022  4:30 AM NB metabolic screen In process           Discharge Medications   There are no  discharge medications for this patient.    Allergies   No Known Allergies    Immunization History   Immunization History   Administered Date(s) Administered     Hep B, Peds or Adolescent 2022        Significant Results and Procedures   None    Physical Exam   Vital Signs:  Patient Vitals for the past 24 hrs:   Temp Temp src Pulse Resp SpO2 Weight   08/15/22 0900 98.3  F (36.8  C) Axillary 140 40 -- --   08/15/22 0357 98.4  F (36.9  C) Axillary 122 52 -- --   08/15/22 0005 98.8  F (37.1  C) Axillary 124 36 -- 3.374 kg (7 lb 7 oz)   08/14/22 2220 -- -- 133 -- 100 % --   08/14/22 2003 97.6  F (36.4  C) Axillary 122 36 100 % --     Wt Readings from Last 3 Encounters:   08/15/22 3.374 kg (7 lb 7 oz) (59 %, Z= 0.24)*     * Growth percentiles are based on WHO (Girls, 0-2 years) data.     Weight change since birth: -2%    General:  alert and normally responsive  Skin:  no abnormal markings; normal color without significant rash.  No jaundice  Head/Neck  normal anterior and posterior fontanelle, intact scalp, molding, caput, bruising; Neck without masses.  Eyes  normal red reflex  Ears/Nose/Mouth:  intact canals, patent nares, mouth normal  Thorax:  normal contour, clavicles intact  Lungs:  clear, no retractions, no increased work of breathing  Heart:  normal rate, rhythm.  No murmurs.  Normal femoral pulses.  Abdomen  soft without mass, tenderness, organomegaly, hernia.  Umbilicus normal.  Genitalia:  normal female external genitalia  Anus:  patent  Trunk/Spine  straight, intact  Musculoskeletal:  Normal Houston and Ortolani maneuvers.  intact without deformity.  Normal digits.  Neurologic:  normal, symmetric tone and strength.  normal reflexes.    Data   Serum bilirubin:  Recent Labs   Lab 08/15/22  1053   BILITOTAL 5.9       bilitool    English

## 2022-03-09 NOTE — PROGRESS NOTE ADULT - PROBLEM SELECTOR PROBLEM 3
Speech Therapy - IRP  Communication/Cognition Treatment     RECOMMENDATIONS:   Patient continues to demonstrate acute communication and cognition deficits.  Speech to continue to follow to address these deficits.  Patient will require 24/7 supervision at discharge.       ASSESSMENT:   The patient was seen for communication and cognition treatment.  Pt obtained score 80/100 on GOAT (>/=76/100 WNL), indicating Pt no longer remains in PTA. This session is consecutive x3 scoring WNL on GOAT.  Pt noted with improved hearing this session, not needing to use PocketTalker.  Cont per plan of care.     This patient participated in all scheduled speech therapy time with this therapist today.    Prior Communication/Cognition:  Prior Cognition: Intact  Prior Auditory Comprehension: Intact  Prior Verbal Expression: Intact        Education:   No new education at this time    Plan for Next Session:  Plan for Next Session: cont per established plan of care    Frequency:  Frequency: 5-6x/week, 30-60 minutes, PN due Wednesday    Barriers to Discharge:   SLP Identified Barriers to Discharge: insight, memory, attention    Recommendations for Discharge:  Recommendations for Discharge: SLP IL: Patient requires 24-hour assistance secondary to cognitive/communication impairments (03/09/22 1401)    Subjective/Objective:  Communication/Cognition:  Subjective  Subjective Comments: Pt seen bedside at this time per Pt request, alert and cooperative.  Behavior/Social Interaction  Arousal and Alertness: Delayed responses to stimuli  Cooperates with Tasks: Mild impairment  Appropriate Behavior: Intact  Visual Recognition  Visual Acuity: Wears reading glasses  Reading Comprehension  Functional Reading: Mild impairment  Reading Comprehension Comments: Pt with difficulty reading outloud; poor comprehension of simple passage  Attention  Sustained Attention: Mild impairment  Attention Comments: Pt attending to simple conversation with min c  Orientation  Level  Oriented to Person: Yes  Oriented to Place: Yes  Oriented to Month: Yes  Oriented to Year: Yes  Oriented to Date: Yes  Oriented to Day of Week: Yes  Problem Solving  Cause/Effect: Moderate impairment (Pt benefits from mod c/prolonged time to complete)  Insight: Mild impairment  Objective Tests  Objective Test 1: GOAT 80/100    GOALS:  SLP Goals  Short Term Goals to be Reviewed on : 03/09/22  STG are the Same as Discharge Goals: No  Goal Agreement: Patient unable to agree with goals and treatment plan, Will attempt to contact family/significant other/caregiver  Attention Short Term Goal 1  Attention STG 1: Pt will sustain attention to a task x5 min with no more than 2 cues to redirect  Attention Discharge Goal 1: Pt will sustain attention to a structured task for at least 10 minutes with initial cues only for attention  Orientation Short Term Goal 1  Orientation STG 1: Pt will be oriented to place, month, year, and situation after 15 min delay with min c for encoding  Orientation Discharge Goal 1: Pt will be oriented to time, place, and situation w/independent use of environmental aides  Problem Solving Short Term Goal 1  Problem Solving STG 1: Pt will demonstrate med management skills with 100% accy and min-mod initial cues for organization strategies  Problem Solving Discharge Goal 1: Pt will complete simple executive function tasks (organization, planning) with >80% accy provided min initial cues.  Additional Cognition Short Term Goal 1  Additional Cognition STG 1: Pt will complete ongoing assessment of functional reading and writing.    Total Treatment Time:   60    At the end of the therapy session, patient is in wheelchair with the following safety measures in place: alarm system in place/re-engaged, call light within reach and equipment intact. Patient is located in the patient room and was handed off to RN. Patient's family was not present. .     Anemia

## 2022-06-30 NOTE — ED ADULT TRIAGE NOTE - PAIN: PRESENCE, MLM
- We will call with nail biopsy results.  -Can prescribe oral terbinafine pending results and labs. -Continue ambulating with accommodative insert to left foot. Use urea cream and callus sites. Follow-up in 1 month for reevaluation or sooner if other concerns arise. denies pain/discomfort

## 2022-07-11 NOTE — DISCHARGE NOTE PROVIDER - CARE PROVIDERS DIRECT ADDRESSES
[email or web address] Mid-Level Procedure Text (A): After obtaining clear surgical margins the patient was sent to a mid-level provider for surgical repair.  The patient understands they will receive post-surgical care and follow-up from the mid-level provider.

## 2022-10-03 NOTE — DISCHARGE NOTE ADULT - PHYSICIAN SECTION COMPLETE
-- DO NOT REPLY / DO NOT REPLY ALL --  -- Message is from Engagement Center Operations (ECO) --    ONLY TO BE USED WITHIN A REFILL MEDICATION ENCOUNTER    Med Refill  Is the patient currently having Emergent symptoms?: Yes, close Medication Refill encounter and initiate Nurse Triage workflow for Emergent Symptoms    Name of medication requested: chlorthalidone, atorvastatin    Has patient contacted the pharmacy? Yes    Is this the first request for the medication in the last 48 hours?: Yes    Patient is requesting a medication refill - medication is on active medication list    Patient is currently OUT of the requested medication - sent as HIGH priority      Full name of the provider who ordered the medication: CATRACHO Sandoval    Clinic site name / Account # for provider: [id number]    Preferred Pharmacy: Pharmacy    [id number] - [place] [id number] [facility] AT [address] [address]  Phone: [phone] Fax: [phone]    Patient confirmed the above pharmacy as correct?  Yes          Alternative phone number: [phone]    Can a detailed message be left?: Yes    Patient is completely out of medication: verify if patient is currently experiencing symptoms. If patient is symptomatic, proceed with front end triage instead of medication refill. If patient is not symptomatic but is completely out of medication, radames as High priority when routing. Inform patient: “Please call back with any questions or concerns and if your condition becomes life threatening, you should seek immediate medical assistance by calling 911 or going to the Emergency Department for evaluation.”    Inform all patients: \"Please be aware the return phone call may come from an unidentified or out of state phone number and your refill request will be addressed as soon as the clinical team reviews your message.\"   Yes

## 2022-10-26 NOTE — PATIENT PROFILE ADULT. - NS TRANSFER PATIENT BELONGINGS
What Type Of Note Output Would You Prefer (Optional)?: Standard Output Is This A New Presentation, Or A Follow-Up?: Skin Lesions Clothing

## 2022-11-22 NOTE — PATIENT PROFILE ADULT. - MENTAL HEALTH CONDITIONS/SYMPTOMS, PROFILE
39 y/o M with PMH of HLD and hx of kidney stones presents to ER with right sided abdominal pain since yesterday morning. Pt states the pain started yesterday when he was on the way to the gym, and comes and goes. He reports taking pepto bismol b/c he attributed the pain to indigestion, and it helped. He then reports waking up last night in worse pain, and he took an 800 mg Ibuprofen. He reports taking another 800 mg Ibuprofen 1 hour prior to arrival. He describes the pain as 1-2/10 at this time. He reports an episode of hematuria, which he attributed at the time to using his pre-workout. He also reports delayed onset of urination. Endorsees an episode of chills last night. Denies hx of UTI. Denies fever, nausea, vomiting, sob, chest pain, diarrhea, dysuria. The pt did have a BM this morning. Denies recent sick contacts, changes in diet. none

## 2022-12-12 NOTE — ED ADULT TRIAGE NOTE - CCCP TRG CHIEF CMPLNT
fever Thalidomide Counseling: I discussed with the patient the risks of thalidomide including but not limited to birth defects, anxiety, weakness, chest pain, dizziness, cough and severe allergy.

## 2022-12-25 NOTE — PROGRESS NOTE ADULT - SUBJECTIVE AND OBJECTIVE BOX
Chief Complaint:  Patient is a 69y old  Male who presents with a chief complaint of Diarrhea (2019 10:23)      Interval Events: Pt has 1 loose stool yesterday with small amount of sediment, improved from prior. No n/v. Tolerating po. States cholestyramine causes discomfort due to taste    Allergies:  penicillin G benzathine (Rash)  sulfa drugs (Rash)      Hospital Medications:  acetaminophen   Tablet .. 650 milliGRAM(s) Oral every 6 hours PRN  aluminum hydroxide/magnesium hydroxide/simethicone Suspension 30 milliLiter(s) Oral every 4 hours PRN  cefTRIAXone   IVPB 2000 milliGRAM(s) IV Intermittent every 24 hours  doxazosin 1 milliGRAM(s) Oral at bedtime  gabapentin 300 milliGRAM(s) Oral three times a day  loperamide 2 milliGRAM(s) Oral five times a day PRN  metoprolol succinate ER 25 milliGRAM(s) Oral daily  oxyCODONE    IR 5 milliGRAM(s) Oral every 6 hours PRN  pantoprazole    Tablet 40 milliGRAM(s) Oral before breakfast  simethicone 80 milliGRAM(s) Chew four times a day      PMHX/PSHX:  Rash  Biliary stricture  Pancreatic lesion  Gastric polyp  Obstructive jaundice  Other hyperlipidemia  Essential hypertension  IBS (irritable bowel syndrome)  GERD (gastroesophageal reflux disease)  Intestinal Whipple's disease  S/P unilateral inguinal hernia repair  S/P tendon repair  History of lumbar laminectomy  History of biliary stent insertion  S/P lumbar spinal fusion  S/P cervical spinal fusion  No significant past surgical history      Family history:  FH: CHF (congestive heart failure)  FH: prostate cancer  Family history of colitis  No pertinent family history in first degree relatives  Family history of prostate cancer  Family history of coronary artery disease      ROS:     General:  No wt loss, fevers, chills, night sweats, fatigue,   Eyes:  Good vision, no reported pain  ENT:  No sore throat, pain, runny nose, dysphagia  CV:  No pain, palpitations, hypo/hypertension  Resp:  No dyspnea, cough, tachypnea, wheezing  GI:  See HPI  :  No pain, bleeding, incontinence, nocturia  Muscle:  No pain, weakness  Neuro:  No weakness, tingling, memory problems  Psych:  No fatigue, insomnia, mood problems, depression  Endocrine:  No polyuria, polydipsia, cold/heat intolerance  Heme:  No petechiae, ecchymosis, easy bruisability  Skin:  No rash, edema      PHYSICAL EXAM:     Vital Signs:  Vital Signs Last 24 Hrs  T(C): 37.7 (2019 05:46), Max: 37.7 (2019 05:46)  T(F): 99.8 (2019 05:46), Max: 99.8 (2019 05:46)  HR: 100 (2019 05:46) (78 - 100)  BP: 108/74 (2019 05:46) (100/60 - 108/74)  BP(mean): --  RR: 18 (2019 05:46) (18 - 18)  SpO2: 95% (2019 05:46) (94% - 100%)  Daily     Daily     GENERAL:  appears comfortable, no acute distress  HEENT:  NC/AT,  conjunctivae clear, sclera -anicteric  CHEST:  CTABL, no increased effort  HEART:  Regular rhythm, S1, S2, no murmur/rub/S3/S4  ABDOMEN:  Soft, non-tender, non-distended, normoactive bowel sounds,  no masses ,no hepato-splenomegaly,   EXTREMITIES:  no cyanosis, clubbing or edema  SKIN:  No rash/erythema/ecchymoses/petechiae/wounds  NEURO:  Alert, oriented    LABS:                        8.0    5.63  )-----------( 200      ( 2019 07:08 )             25.2     -    139  |  110<H>  |  6<L>  ----------------------------<  107<H>  3.5   |  22  |  0.68    Ca    8.0<L>      2019 07:00  Phos  2.5       Mg     1.7         TPro  5.3<L>  /  Alb  2.7<L>  /  TBili  0.5  /  DBili  x   /  AST  11  /  ALT  10  /  AlkPhos  198<H>      LIVER FUNCTIONS - ( 2019 07:00 )  Alb: 2.7 g/dL / Pro: 5.3 g/dL / ALK PHOS: 198 U/L / ALT: 10 U/L / AST: 11 U/L / GGT: x             Urinalysis Basic - ( 2019 21:15 )    Color: Yellow / Appearance: Clear / S.030 / pH: x  Gluc: x / Ketone: Negative  / Bili: Negative / Urobili: Negative   Blood: x / Protein: 30 mg/dL / Nitrite: Negative   Leuk Esterase: Negative / RBC: 7 /hpf / WBC 3 /HPF   Sq Epi: x / Non Sq Epi: 1 /hpf / Bacteria: Negative          Imaging: 108.9

## 2023-03-09 NOTE — ED PROVIDER NOTE - CARDIOVASCULAR NEGATIVE STATEMENT, MLM
normal rate and rhythm, no chest pain and no edema. Return to baseline health, regular diet, pain control. Follow up with Dr. Sandoval.

## 2023-04-04 NOTE — ED PROVIDER NOTE - PSYCHIATRIC NEGATIVE STATEMENT, MLM
Patient arrived to the floor stable. Vital signs were stable. Physical exam consistent with sign out. No changes to the plan. Plan communicated with family.    Plan:  #Constipation  - Golytely 100cc/hr for 10 hours   - mIVF  - clear diet, advance as tolerated  - f/u celiac labs and lead level
no known mental health issues.

## 2023-06-04 NOTE — PATIENT PROFILE ADULT - LAST ORAL INTAKE
[facility] [address] [facility]  Cardiothoracic Surgery  Discharge Summary      Patient Name: James Reid  MRN: [id number]  Admission Date: 5/31/2023  Hospital Length of Stay: 4 days  Discharge Date and Time: No discharge date for patient encounter.  Attending Physician: Mason Malik IV, MD   Discharging Provider: RAY Jimenez  Primary Care Provider: Safia Walters Jr, MD    HPI:   No notes on file    Procedure(s) (LRB):  VATS (VIDEO-ASSISTED THORACOSCOPIC SURGERY) (Right)      Indwelling Lines/Drains at time of discharge:   Lines/Drains/Airways     Drain  Duration                Hemodialysis AV Fistula Left upper arm -- days              Hospital Course: No notes on file    Goals of Care Treatment Preferences:  Code Status: Full Code      Consults (From admission, onward)        Status Ordering Provider     Inpatient consult to Nephrology  Once        Provider:  Yovany Cortez MD    Acknowledged MONICA RITCHIE     Inpatient consult to Pulmonology  Once        Provider:  MD Buzz Zendejas IV, VICTOR E          Significant Diagnostic Studies: Labs: All labs within the past 24 hours have been reviewed    Pending Diagnostic Studies:     Procedure Component Value Units Date/Time    Specimen to Pathology [[id number]] Collected: 05/31/23 1115    Order Status: Sent Lab Status: In process Updated: 05/31/23 1645    Specimen: Tissue from Lung, Right; Tissue from Lung, Right           No new Assessment & Plan notes have been filed under this hospital service since the last note was generated.  Service: Cardiothoracic Surgery    Final Active Diagnoses:    Diagnosis Date Noted POA    PRINCIPAL PROBLEM:  Pleural effusion on right [J90] 06/04/2023 Yes      Problems Resolved During this Admission:      Discharged Condition: good    Disposition: Home or Self Care    Follow Up:   Follow-up Information     Mason Malik Iv, MD Follow up in 3 week(s).    Specialties:  Cardiothoracic Surgery, Cardiology  Contact information:  [address] [place] [address]  [address]  [facility] [address]  [phone]                       Patient Instructions:   No discharge procedures on file.  Medications:  Reconciled Home Medications:      Medication List      START taking these medications    oxyCODONE 5 MG immediate release tablet  Commonly known as: ROXICODONE  Take 1 tablet (5 mg total) by mouth every 6 (six) hours as needed for Pain.        CONTINUE taking these medications    albuterol 2.5 mg /3 mL (0.083 %) nebulizer solution  Commonly known as: PROVENTIL  Take 3 mLs (2.5 mg total) by nebulization every 4 (four) hours as needed for Wheezing. Rescue     amLODIPine 5 MG tablet  Commonly known as: NORVASC  Take 5 mg by mouth once daily.     aspirin 81 MG EC tablet  Commonly known as: ECOTRIN  Take 81 mg by mouth once daily.     atorvastatin 80 MG tablet  Commonly known as: LIPITOR  Take 80 mg by mouth once daily.     clonazePAM 0.5 MG tablet  Commonly known as: KlonoPIN  Take 1 mg by mouth 2 (two) times daily as needed.     ergocalciferol 50,000 unit Cap  Commonly known as: ERGOCALCIFEROL  Take 50,000 Units by mouth 3 (three) times a week.     hydrALAZINE 25 MG tablet  Commonly known as: APRESOLINE  Take 25 mg by mouth once daily.     insulin glargine 100 units/mL SubQ pen  40 Units as needed.     metoprolol succinate 100 MG 24 hr tablet  Commonly known as: TOPROL-XL  Take 100 mg by mouth once daily.     pantoprazole 40 MG tablet  Commonly known as: PROTONIX  Take 40 mg by mouth once daily.     sevelamer carbonate 800 mg Tab  Commonly known as: RENVELA  Take 800 mg by mouth 3 (three) times daily with meals.     sodium bicarbonate 650 MG tablet  Take 650 mg by mouth 4 (four) times daily.     triamcinolone acetonide 0.1% 0.1 % cream  Commonly known as: KENALOG  PLEASE SEE ATTACHED FOR DETAILED DIRECTIONS        STOP taking these medications    hydrOXYzine pamoate 25 MG Cap  Commonly known as: VISTARIL             Subjective:  Awake. Alert.  Doing well  Chest tube removed yesterday     Objective:  AFVSS  Heart: RRR  Lungs: respirations nonlabored, clear  Incision: c/d/I  Cxr: Interval removal of right thoracostomy tube.  No pneumothorax.  Subsegmental atelectatic changes of the right mid lung zone with right effusion remains.      A/p:  S/p R VATS pleural biopsy and decort  - d/c home  - f/u 3 weeks      Time spent on the discharge of patient: 20 minutes    RAY Jimenez  Cardiothoracic Surgery  [facility] - [facility]   18-Nov-2019 13:00

## 2023-09-25 NOTE — PROGRESS NOTE ADULT - PROBLEM/PLAN-2
Patient last office visit 8/1/2023  Next appointment scheduled 10/17/2023  Last refill Lexapro 10 mg 7/17/2023 # 30 with 1 refill. Please advise Ada. DISPLAY PLAN FREE TEXT

## 2023-11-20 NOTE — DISCHARGE NOTE NURSING/CASE MANAGEMENT/SOCIAL WORK - NSDCVIVACCINE_GEN_ALL_CORE_FT
"Verbal orders from Dr. Perry received to \"confirm\" pt within ipledge.     Prescription Window  Patient can obtain their prescription from:  November 20, 2023 - December 19, 2023 (30 - Day Prescription window)  " No Vaccines Administered.

## 2023-12-04 NOTE — PROVIDER CONTACT NOTE (CRITICAL VALUE NOTIFICATION) - NS PROVIDER READ BACK TO LAB
Called and spoke to pt. She feels well, denies dizziness / lightheadedness. Home BP cuff 116/64. She keeps a routine BP log and it has been around there since last in office. She will come in for a BP check to compare her cuff with a manual pressure in our office. She will proceed to the ED if she has any questions. Called the OBGYN office where her BP was taken. They confirm they did not advise her to proceed to the ED but that \"they had a hard time hearing the bottom number\" so it was recorded as 0. I requested a call for further clarification on this from their office's practice manager.
Daughter states that at a doctor visit the patient's bp was 138/0. She was told her bp was bottoming out. She wants to know if she should go to the ER.  Please call to advise
Reviewed with Marin Kat
yes

## 2023-12-05 NOTE — CONSULT NOTE ADULT - CONSULT REQUESTED BY NAME
Daily Note     Today's date: 2023  Patient name: Tiffany Champagne  : 1981  MRN: [id number]  Referring provider: ERMA Harvey  Dx:   Encounter Diagnosis     ICD-10-CM    1. Decreased right shoulder range of motion  M25.611                      Subjective:  Pt. reports her R shoulder feels better, which she attributes mainly to the steroid she is taking (Prednisone). Objective: See treatment diary below      Assessment: Tolerated treatment well. Patient exhibited good technique with therapeutic exercises and would benefit from continued PT. Plan:  Pt. will F/U w/MD.  Will await results from that upcoming visit, then will follow MD recommendations on whether to cont PT services or D/C.             Precautions: tachycardia    Re-eval Date: 23    Date 15.23  12.5.23   Visit Count 1 2 3 4 5   FOTO Comp        Pain In See eval   R shoulder  5/10 0/10   variable   Pain Out improved    0/10     Manuals 11/14 11/15 11/21  12.5.23   PROM R Shld 10' 10' 10' 20 min  With scap mobs/long axis distraction to pt alphonso 20 min  With scap mobs/long axis distraction to pt alphonso                           Neuro Re-Ed        S/                                                        Ther Ex     12.5.23   pendulum Rev for HEP 3#  30x ea 3#  30x ea 3#  30x ea 3#  40x ea     Wand ex  Flex,ext,abd, IR Rev for HEP 20x ea 20x ea 20x 5"   Flex/scap/ext    IR with towel 10x 10"   20x 5"   Flex/scap/ext   Wall slides  Flex/abd Rev for HEP 15x flex 15x 10" @ home    UBE   100 rpm    10' alt every 2 min 100 rpm    10' alt every 2 min 100 rpm    10' alt every 2 min   pulleys   FF 2 min FF / scap 2 min w/ 5"   FF / scap 2 min w/ 5"    Finger ladder   10x flex Perform at home                      Ther Activity                        Gait Training                        Modalities        US to R shld  1.5 w/cm2  10'  100 %  1 MHz  Pt def Dr. Lucero 11/15

## 2023-12-06 NOTE — PHYSICAL THERAPY INITIAL EVALUATION ADULT - IMPAIRMENTS FOUND, PT EVAL
Patient said he is returning a call from Jaz (LVM with [facility]). He can be reached at  [phone]   gait, locomotion, and balance/aerobic capacity/endurance/muscle strength

## 2023-12-06 NOTE — PATIENT PROFILE ADULT. - EXTENSIONS OF SELF_ADULT
Eyeglasses Peng Advancement Flap Text: The defect edges were debeveled with a #15 scalpel blade.  Given the location of the defect, shape of the defect and the proximity to free margins a Peng advancement flap was deemed most appropriate.  Using a sterile surgical marker, an appropriate advancement flap was drawn incorporating the defect and placing the expected incisions within the relaxed skin tension lines where possible. The area thus outlined was incised deep to adipose tissue with a #15 scalpel blade.  The skin margins were undermined to an appropriate distance in all directions utilizing iris scissors.

## 2024-01-26 NOTE — ED ADULT NURSE NOTE - NS ED NURSE REPORT GIVEN TO FT
I have personally evaluated and examined the patient. The Attending was available to me as a supervising provider if needed. Mary Kate DAVIES

## 2024-05-16 NOTE — ED ADULT TRIAGE NOTE - BP NONINVASIVE DIASTOLIC (MM HG)
[Designated Health Care Proxy] : Designated Health Care Proxy [Name: ___] : Name: [unfilled] [Relationship: ___] : Relationship: [unfilled] [Full Code] : full code [FreeTextEntry1] : ## Metastatic squamous cell carcinoma PET/CT(7/19/23) hypermetabolic mediastinal, hilar, celiac and right inguinal lymphadenopathy. Most FDG avid lymph node: Right inguinal lymph node Likely mets from his penile ca vs less likely alternate primary such as lung given he is chronic smoker Status post bilateral inguinal node dissection with Dr. Ramirez on 6/1/2023-Path shows no lymph node identified. Fibroadipose tissue. Subsequently underwent EBUS, FNA which was negative for any malignant cells Chronic active smoker- has greatly cut down but still smokes here and there. s/p excisional biopsy of the right inguinal LN Path cw Squamous cell ca ALE PDL1 10-15%  Patient is here for C5D1 Carboplatin paclitaxel and keytruda (2/16/24 - present) - Carboplatin was not received on C2 due to hyperglycemia.  His BP and HR is better controlled since cardiology adjusted his med to Coreg Reports of eating healthier with BS better controlled -Labs are drawn in the office, reviewed, analyzed, and discussed Overall tolerating treatment Take Decadron 12 mg the morning of treatment only and tolerating Paclitaxel with no issue Repeated Scans is still pending  continue with Zofran prn nasuea  Extensive discussion with daughter Lucila that patient can go to [facility] for a few weeks if he wishes and further treatment adjustment pending with CT result.   Plan: Carboplatin AUC 5 plus paclitaxel 150 mg/m plus Keytruda 200 mg every 3 weeks with Neulasta  Penile SCC Status post biopsy of plans (2/03): Invasive SCC s/p partial penectomy (4/21/23) - Path: pT3, G2, moderately differentiated SCC infiltrates corpus cavernosum  #Cardiomyopathy continue following up with cardiology  Patient and daughter (Lucila) had multiple questions which were answered to satisfaction  d/w Dr. Maria  Follow up in 3 weeks for C6 CBC, CMP, LDH, ESR, CRP  COntact: Lucila (Daughter) [phone]. ntro [FreeTextEntry2] : Paramjit - [phone]

## 2024-09-20 NOTE — ED ADULT NURSE NOTE - NS ED NURSE DISCH DISPOSITION
Refill must be reviewed and completed by the office or provider. The refill is unable to be approved or denied by the medication management team.    Cannot be delegated     
Admitted

## 2024-12-06 NOTE — ED ADULT NURSE NOTE - ALCOHOL PRE SCREEN (AUDIT - C)
Fall after tripping on pavers while walking . + hematoma on Right eyebrow . No LOC .  On blood thinners   
Statement Selected
